# Patient Record
Sex: FEMALE | Race: WHITE | NOT HISPANIC OR LATINO | Employment: OTHER | URBAN - METROPOLITAN AREA
[De-identification: names, ages, dates, MRNs, and addresses within clinical notes are randomized per-mention and may not be internally consistent; named-entity substitution may affect disease eponyms.]

---

## 2017-01-06 DIAGNOSIS — I67.5 MOYAMOYA DISEASE: Primary | ICD-10-CM

## 2017-01-06 NOTE — TELEPHONE ENCOUNTER
clopidogrel           Last Written Prescription Date: 7/14/16  Last Fill Quantity: 90, # refills: 1    Last Office Visit with [facility], [facility] or [facility] prescribing provider:  5/27/16   Future Office Visit:       WBC      7.0   6/16/2015  RBC     4.03   6/16/2015  HGB     12.4   6/16/2015  HCT     37.3   6/16/2015  No components found with this name: mct  MCV       93   6/16/2015  MCH     30.8   6/16/2015  MCHC     33.2   6/16/2015  RDW     12.4   6/16/2015  PLT      339   6/16/2015  AST       15   5/24/2016  ALT       23   5/24/2016  CREATININE   Date Value Ref Range Status   05/24/2016 1.03 0.52 - 1.04 mg/dL Final   ]    Labs showing if normal/abnormal  Lab Results   Component Value Date    WBC 7.0 06/16/2015    RBC 4.03 06/16/2015    HGB 12.4 06/16/2015    HCT 37.3 06/16/2015    MCV 93 06/16/2015    MCH 30.8 06/16/2015    MCHC 33.2 06/16/2015    RDW 12.4 06/16/2015     06/16/2015    DTYP Automated Method 06/05/2010    NEUTROPHIL 52 06/05/2010    LYMPH 36 06/05/2010    MONOCYTE 10 06/05/2010    EOSINOPHIL 2 06/05/2010    BASOPHIL 0 06/05/2010    ANEU 4.8 06/05/2010    ALYM 3.4 06/05/2010    ALDEN 0.9 06/05/2010    AEOS 0.2 06/05/2010    ABAS 0.0 06/05/2010      Lab Results   Component Value Date    AST 15 05/24/2016    ALT 23 05/24/2016     rxclop

## 2017-01-10 RX ORDER — CLOPIDOGREL BISULFATE 75 MG/1
75 TABLET ORAL DAILY
Qty: 90 TABLET | Refills: 0 | Status: SHIPPED | OUTPATIENT
Start: 2017-01-10 | End: 2017-04-09

## 2017-01-13 ENCOUNTER — OFFICE VISIT (OUTPATIENT)
Dept: INTERNAL MEDICINE | Facility: CLINIC | Age: 77
End: 2017-01-13
Payer: COMMERCIAL

## 2017-01-13 VITALS
HEIGHT: 68 IN | DIASTOLIC BLOOD PRESSURE: 50 MMHG | BODY MASS INDEX: 36.4 KG/M2 | HEART RATE: 81 BPM | TEMPERATURE: 97.8 F | OXYGEN SATURATION: 100 % | WEIGHT: 240.2 LBS | SYSTOLIC BLOOD PRESSURE: 99 MMHG

## 2017-01-13 DIAGNOSIS — E55.9 VITAMIN D DEFICIENCY: ICD-10-CM

## 2017-01-13 DIAGNOSIS — M85.80 OSTEOPENIA: Chronic | ICD-10-CM

## 2017-01-13 DIAGNOSIS — I10 HTN, GOAL BELOW 140/90: Primary | Chronic | ICD-10-CM

## 2017-01-13 DIAGNOSIS — Z23 NEED FOR TDAP VACCINATION: ICD-10-CM

## 2017-01-13 DIAGNOSIS — I67.5 MOYAMOYA DISEASE: ICD-10-CM

## 2017-01-13 DIAGNOSIS — E78.5 HYPERLIPIDEMIA LDL GOAL <130: Chronic | ICD-10-CM

## 2017-01-13 DIAGNOSIS — E11.9 TYPE 2 DIABETES MELLITUS WITHOUT COMPLICATION, WITHOUT LONG-TERM CURRENT USE OF INSULIN (H): Chronic | ICD-10-CM

## 2017-01-13 DIAGNOSIS — N18.30 CKD (CHRONIC KIDNEY DISEASE) STAGE 3, GFR 30-59 ML/MIN (H): Chronic | ICD-10-CM

## 2017-01-13 PROCEDURE — 90471 IMMUNIZATION ADMIN: CPT | Performed by: INTERNAL MEDICINE

## 2017-01-13 PROCEDURE — 99213 OFFICE O/P EST LOW 20 MIN: CPT | Mod: 25 | Performed by: INTERNAL MEDICINE

## 2017-01-13 PROCEDURE — 90715 TDAP VACCINE 7 YRS/> IM: CPT | Performed by: INTERNAL MEDICINE

## 2017-01-13 NOTE — PROGRESS NOTES
Dr Farrell's note      Patient's instructions / PLAN:                                                        Plan:  1. Please make a lab appointment for fasting labs    2. Diabetic education referral   3. Diet and exercise as we discussed   4. Tetanus shot  5. I recommend the new pneumonia vaccine ( Prevnar 13) - check with insurance first       ASSESSMENT & PLAN:                                                      (I10) HTN, goal below 140/90  (primary encounter diagnosis)  Comment: Controlled    Plan: CBC with platelets, Comprehensive metabolic         panel, Lipid panel reflex to direct LDL, TSH         with free T4 reflex, Albumin Random Urine         Quantitative, Hemoglobin A1c, aspirin 81 MG         tablet            (E78.5) Hyperlipidemia LDL goal <130  Comment: Controlled    Plan: CBC with platelets, Comprehensive metabolic         panel, Lipid panel reflex to direct LDL, TSH         with free T4 reflex, Albumin Random Urine         Quantitative, Hemoglobin A1c        Continue same meds, same doses for now     (N18.3) CKD 3  Comment: stable  CREATININE   Date Value Ref Range Status   01/18/2017 1.13* 0.52 - 1.04 mg/dL Final   ]   Plan: CBC with platelets, Comprehensive metabolic         panel, Lipid panel reflex to direct LDL, TSH         with free T4 reflex, Albumin Random Urine         Quantitative, Hemoglobin A1c            (E11.9) DM2 - no insulin (H)  Comment:   Plan: CBC with platelets, Comprehensive metabolic         panel, Lipid panel reflex to direct LDL, TSH         with free T4 reflex, Albumin Random Urine         Quantitative, Hemoglobin A1c, DIABETES EDUCATOR        REFERRAL, aspirin 81 MG tablet            (M85.80) Osteopenia  Comment:   Plan: Vitamin D Deficiency, Parathyroid Hormone         Intact            (E55.9) Vitamin D deficiency  Comment:   Plan: Vitamin D Deficiency, Parathyroid Hormone         Intact            (I67.5) Moyamoya disease  Comment: stable   Plan:     (Z23) Need for  "Tdap vaccination  Comment:   Plan: TDAP (ADACEL AGES 11-64)               Chief complaint:                                                      Follow up chronic medical problems      SUBJECTIVE:                                                    Candida Rose is a 76 year old female who presents to clinic today for the following health issues:    URI  -- started last week  -- Pt had persistent cough, running nose, wheezing,and cold  -- she doesn't think she needs attention for the URI    DM  --  New dx   -- discussed diet and exercise. Doesn't want meds  -- A1C      5.9   1/18/2017  A1C      6.3   5/24/2016      Review of Systems:                                                      ROS: negative for fever, chills, cough, wheezes, chest pain, shortness of breath, vomiting, abdominal pain, leg swelling     A 10-point review of systems was obtained.  Those pertinent are above and in the in the Subjective section.  The rest of the systems are negative.           OBJECTIVE:             Physical exam:  Blood pressure 99/50, pulse 81, temperature 97.8  F (36.6  C), temperature source Oral, height 5' 8.3\" (1.735 m), weight 240 lb 3.2 oz (108.954 kg), SpO2 100 %, not currently breastfeeding.   NAD, appears comfortable  Skin: no rashes   Neck: supple, no JVD, No thyroidmegaly. Lymph nodes nonpalpable cervical and supraclavicular.  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 F/u dr. Soto     Anxiety state, unspecified      inactive     OA (osteoarthritis)      s/p bilat total hips      CVA (cerebral infarction) June 2010     acute right " occipital infarct     Hyperlipidemia LDL goal < 130      HTN, goal below 140/90 3/06     Cataract      Vitamin D deficiencies      Family hx of colon cancer      sister dx at 69     Unspecified cerebral artery occlusion with cerebral infarction      After Moyamoya surgery > 10 yrs ago.      PSHx: reviewed  Past Surgical History   Procedure Laterality Date     C nonspecific procedure  10/30/06     neurosurgery Dr Charles LACY nonspecific procedure       bilateral total hips approx 2002     C nonspecific procedure  3/07     neurosurgery      Colonoscopy  2008     normal     Colonoscopy  7/17/2014     Procedure: COLONOSCOPY;  Surgeon: Raul Nolan DO;  Location: [facility]     Reconstruct forefoot with metatarsophalangeal (mtp) fusion Left 8/21/2014     Procedure: RECONSTRUCT FOREFOOT WITH METATARSOPHALANGEAL (MTP) FUSION;  Surgeon: Tres Merlos DPM;  Location: [facility] OR        Meds: reviewed  Current Outpatient Prescriptions   Medication Sig Dispense Refill     clopidogrel (PLAVIX) 75 MG tablet Take 1 tablet (75 mg) by mouth daily 90 tablet 0     losartan (COZAAR) 50 MG tablet Take 1 tablet (50 mg) by mouth 2 times daily 180 tablet 1     atenolol (TENORMIN) 25 MG tablet Take 1 tablet (25 mg) by mouth At Bedtime 90 tablet 1     simvastatin (ZOCOR) 20 MG tablet Take 1 tablet (20 mg) by mouth At Bedtime 90 tablet 1     hydrochlorothiazide (HYDRODIURIL) 25 MG tablet Take 1 tablet (25 mg) by mouth daily 90 tablet 1     aspirin 81 MG tablet Take 1 tablet (81 mg) by mouth daily 30 tablet      Multiple Vitamins-Minerals (MULTIVITAMIN & MINERAL PO) Take 1 tablet by mouth daily.       Calcium Carbonate (CALCIUM 500 PO) Take 1 tablet by mouth daily.         Soc Hx: reviewed  Fam Hx: reviewed          Chani Farrell MD  Internal Medicine

## 2017-01-13 NOTE — PATIENT INSTRUCTIONS
Plan:  1. Please make a lab appointment for fasting labs    2. Diabetic education referral   3. Diet and exercise as we discussed   4. Tetanus shot  5. I recommend the new pneumonia vaccine ( Prevnar 13) - check with insurance

## 2017-01-13 NOTE — MR AVS SNAPSHOT
After Visit Summary   1/13/2017    Candida Rose    MRN: [id number]           Patient Information     Date Of Birth          1940        Visit Information        Provider Department      1/13/2017 11:00 AM Chani Peoples MD [facility]        Today's Diagnoses     HTN, goal below 140/90    -  1     Hyperlipidemia LDL goal <130         CKD 3         DM2 - no insulin (H)         Osteopenia         Vitamin D deficiency         Moyamoya disease           Care Instructions    Plan:  1. Please make a lab appointment for fasting labs    2. Diabetic education referral   3. Diet and exercise as we discussed   4. Tetanus shot  5. I recommend the new pneumonia vaccine ( Prevnar 13) - check with insurance         Follow-ups after your visit        Additional Services     DIABETES EDUCATOR REFERRAL       DIABETES SELF MANAGEMENT TRAINING (DSMT)      Your provider has referred you to Diabetes Education: FMG: Diabetes Education - All [facility] [phone]   [email or web address]    Type of training and number of hours: New Diagnosis: Initial group DSMT - 10 hours.      Medicare covers: 10 hours of initial DSMT in 12 month period from the time of first visit, plus 2 hours of follow-up DSMT annually, and additional hours as requested for insulin training.    Diabetes Type: Type 2 - Diet Control             Diabetes Co-Morbidities: none               A1C Goal:  <7.0       A1C is: A1C      6.3   5/24/2016    If an urgent visit is needed or A1C is above 12, Care Team to call the Diabetes Education Team at [phone] or send an In Basket message to the Diabetes Education Pool (P DIAB ED-PATIENT CARE).    Diabetes Education Topics: Comprehensive Knowledge Assessment and Instruction    Special Educational Needs Requiring Individual DSMT:        MEDICAL NUTRITION THERAPY (MNT) for Diabetes    Medical Nutrition Therapy with a  Registered Dietitian can be provided in coordination with Diabetes Self-Management Training to assist in achieving optimal diabetes management.    MNT Type and Hours:                       Medicare will cover: 3 hours initial MNT in 12 month period after first visit, plus 2 hours of follow-up MNT annually    Please be aware that coverage of these services is subject to the terms and limitations of your health insurance plan.  Call member services at your health plan to determine Diabetes Self-Management Training benefits and ask which blood glucose monitor brands are covered by your plan.      Please bring the following with you to your appointment:    (1)  List of current medications   (2)  List of Blood Glucose Monitor brands that are covered by your insurance plan  (3)  Blood Glucose Monitor and log book  (4)   Food records for the 3 days prior to your visit    The Certified Diabetes Educator may make diabetes medication adjustments per the CDE Protocol and Collaborative Practice Agreement.                  Future tests that were ordered for you today     Open Future Orders        Priority Expected Expires Ordered    Vitamin D Deficiency Routine  1/13/2018 1/13/2017    Parathyroid Hormone Intact Routine  1/13/2018 1/13/2017    CBC with platelets Routine  1/13/2018 1/13/2017    Comprehensive metabolic panel Routine  1/13/2018 1/13/2017    Lipid panel reflex to direct LDL Routine  1/13/2018 1/13/2017    TSH with free T4 reflex Routine  1/13/2018 1/13/2017    Albumin Random Urine Quantitative Routine  1/3/2018 1/13/2017    Hemoglobin A1c Routine  1/13/2018 1/13/2017            Who to contact     If you have questions or need follow up information about today's clinic visit or your schedule please contact [facility] directly at [phone].  Normal or non-critical lab and imaging results will be communicated to you by MyChart, letter or phone within 4 business days after the clinic has received the  "results. If you do not hear from us within 7 days, please contact the clinic through [employer] or phone. If you have a critical or abnormal lab result, we will notify you by phone as soon as possible.  Submit refill requests through [employer] or call your pharmacy and they will forward the refill request to us. Please allow 3 business days for your refill to be completed.          Additional Information About Your Visit        Ideal Me Information     I[employer] Me gives you secure access to your electronic health record. If you see a primary care provider, you can also send messages to your care team and make appointments. If you have questions, please call your primary care clinic.  If you do not have a primary care provider, please call [phone] and they will assist you.        Care EveryWhere ID     This is your Care EveryWhere ID. This could be used by other organizations to access your [place] medical records  [id number]        Your Vitals Were     Pulse Temperature Height BMI (Body Mass Index) Pulse Oximetry Breastfeeding?    81 97.8  F (36.6  C) (Oral) 5' 8.3\" (1.735 m) 36.19 kg/m2 100% No       Blood Pressure from Last 3 Encounters:   01/13/17 99/50   10/12/16 130/76   05/27/16 140/76    Weight from Last 3 Encounters:   01/13/17 240 lb 3.2 oz (108.954 kg)   10/12/16 241 lb (109.317 kg)   05/27/16 241 lb 12.8 oz (109.68 kg)              We Performed the Following     DIABETES EDUCATOR REFERRAL          Today's Medication Changes          These changes are accurate as of: 1/13/17 11:43 AM.  If you have any questions, ask your nurse or doctor.               These medicines have changed or have updated prescriptions.        Dose/Directions    * aspirin 81 MG tablet   This may have changed:  Another medication with the same name was added. Make sure you understand how and when to take each.   Changed by:  Chani Peoples MD        Dose:  81 mg   Take 1 tablet (81 mg) by mouth daily   Quantity:  30 " tablet   Refills:  0       * aspirin 81 MG tablet   This may have changed:  You were already taking a medication with the same name, and this prescription was added. Make sure you understand how and when to take each.   Used for:  HTN, goal below 140/90, Type 2 diabetes mellitus without complication, without long-term current use of insulin (H)   Changed by:  Chani Peoples MD        Dose:  81 mg   Take 1 tablet (81 mg) by mouth daily   Quantity:  30 tablet   Refills:  0       * Notice:  This list has 2 medication(s) that are the same as other medications prescribed for you. Read the directions carefully, and ask your doctor or other care provider to review them with you.         Where to get your medicines      Some of these will need a paper prescription and others can be bought over the counter.  Ask your nurse if you have questions.     You don't need a prescription for these medications    - aspirin 81 MG tablet             Primary Care Provider Office Phone # Fax #    Chani Peoples [phone]       [facility] [address] [place] [address]        Thank you!     Thank you for choosing [facility]  for your care. Our goal is always to provide you with excellent care. Hearing back from our patients is one way we can continue to improve our services. Please take a few minutes to complete the written survey that you may receive in the mail after your visit with us. Thank you!             Your Updated Medication List - Protect others around you: Learn how to safely use, store and throw away your medicines at www.disposemymeds.org.          This list is accurate as of: 1/13/17 11:43 AM.  Always use your most recent med list.                   Brand Name Dispense Instructions for use    * aspirin 81 MG tablet     30 tablet    Take 1 tablet (81 mg) by mouth daily       * aspirin 81 MG tablet     30 tablet    Take 1 tablet (81 mg)  by mouth daily       atenolol 25 MG tablet    TENORMIN    90 tablet    Take 1 tablet (25 mg) by mouth At Bedtime       CALCIUM 500 PO      Take 1 tablet by mouth daily.       clopidogrel 75 MG tablet    PLAVIX    90 tablet    Take 1 tablet (75 mg) by mouth daily       hydrochlorothiazide 25 MG tablet    HYDRODIURIL    90 tablet    Take 1 tablet (25 mg) by mouth daily       losartan 50 MG tablet    COZAAR    180 tablet    Take 1 tablet (50 mg) by mouth 2 times daily       MULTIVITAMIN & MINERAL PO      Take 1 tablet by mouth daily.       simvastatin 20 MG tablet    ZOCOR    90 tablet    Take 1 tablet (20 mg) by mouth At Bedtime       * Notice:  This list has 2 medication(s) that are the same as other medications prescribed for you. Read the directions carefully, and ask your doctor or other care provider to review them with you.

## 2017-01-13 NOTE — NURSING NOTE
"Chief Complaint   Patient presents with     Recheck Medication     Pt has persistent cough, running nose, wheezing,and cold       Initial BP 99/50 mmHg  Pulse 81  Temp(Src) 97.8  F (36.6  C) (Oral)  Ht 5' 8.3\" (1.735 m)  Wt 240 lb 3.2 oz (108.954 kg)  BMI 36.19 kg/m2  SpO2 100%  Breastfeeding? No Estimated body mass index is 36.19 kg/(m^2) as calculated from the following:    Height as of this encounter: 5' 8.3\" (1.735 m).    Weight as of this encounter: 240 lb 3.2 oz (108.954 kg).  BP completed using cuff size: uriel Terry MA      "

## 2017-01-18 DIAGNOSIS — E55.9 VITAMIN D DEFICIENCY: ICD-10-CM

## 2017-01-18 DIAGNOSIS — M85.80 OSTEOPENIA: Chronic | ICD-10-CM

## 2017-01-18 DIAGNOSIS — I10 HTN, GOAL BELOW 140/90: Chronic | ICD-10-CM

## 2017-01-18 DIAGNOSIS — E11.9 TYPE 2 DIABETES MELLITUS WITHOUT COMPLICATION, WITHOUT LONG-TERM CURRENT USE OF INSULIN (H): Chronic | ICD-10-CM

## 2017-01-18 DIAGNOSIS — N18.30 CKD (CHRONIC KIDNEY DISEASE) STAGE 3, GFR 30-59 ML/MIN (H): Chronic | ICD-10-CM

## 2017-01-18 DIAGNOSIS — E78.5 HYPERLIPIDEMIA LDL GOAL <130: Chronic | ICD-10-CM

## 2017-01-18 LAB
ALBUMIN SERPL-MCNC: 3.2 G/DL (ref 3.4–5)
ALP SERPL-CCNC: 118 U/L (ref 40–150)
ALT SERPL W P-5'-P-CCNC: 19 U/L (ref 0–50)
ANION GAP SERPL CALCULATED.3IONS-SCNC: 10 MMOL/L (ref 3–14)
AST SERPL W P-5'-P-CCNC: 9 U/L (ref 0–45)
BILIRUB SERPL-MCNC: 0.4 MG/DL (ref 0.2–1.3)
BUN SERPL-MCNC: 23 MG/DL (ref 7–30)
CALCIUM SERPL-MCNC: 9.1 MG/DL (ref 8.5–10.1)
CHLORIDE SERPL-SCNC: 101 MMOL/L (ref 94–109)
CHOLEST SERPL-MCNC: 179 MG/DL
CO2 SERPL-SCNC: 27 MMOL/L (ref 20–32)
CREAT SERPL-MCNC: 1.13 MG/DL (ref 0.52–1.04)
DEPRECATED CALCIDIOL+CALCIFEROL SERPL-MC: 25 UG/L (ref 20–75)
ERYTHROCYTE [DISTWIDTH] IN BLOOD BY AUTOMATED COUNT: 11.9 % (ref 10–15)
GFR SERPL CREATININE-BSD FRML MDRD: 47 ML/MIN/1.7M2
GLUCOSE SERPL-MCNC: 120 MG/DL (ref 70–99)
HBA1C MFR BLD: 5.9 % (ref 4.3–6)
HCT VFR BLD AUTO: 36.3 % (ref 35–47)
HDLC SERPL-MCNC: 52 MG/DL
HGB BLD-MCNC: 11.8 G/DL (ref 11.7–15.7)
LDLC SERPL CALC-MCNC: 99 MG/DL
MCH RBC QN AUTO: 30.3 PG (ref 26.5–33)
MCHC RBC AUTO-ENTMCNC: 32.5 G/DL (ref 31.5–36.5)
MCV RBC AUTO: 93 FL (ref 78–100)
NONHDLC SERPL-MCNC: 127 MG/DL
PLATELET # BLD AUTO: 414 10E9/L (ref 150–450)
POTASSIUM SERPL-SCNC: 4 MMOL/L (ref 3.4–5.3)
PROT SERPL-MCNC: 7.2 G/DL (ref 6.8–8.8)
PTH-INTACT SERPL-MCNC: 55 PG/ML (ref 12–72)
RBC # BLD AUTO: 3.89 10E12/L (ref 3.8–5.2)
SODIUM SERPL-SCNC: 138 MMOL/L (ref 133–144)
TRIGL SERPL-MCNC: 141 MG/DL
TSH SERPL DL<=0.005 MIU/L-ACNC: 1.49 MU/L (ref 0.4–4)
WBC # BLD AUTO: 7.7 10E9/L (ref 4–11)

## 2017-01-18 PROCEDURE — 80053 COMPREHEN METABOLIC PANEL: CPT | Performed by: INTERNAL MEDICINE

## 2017-01-18 PROCEDURE — 85027 COMPLETE CBC AUTOMATED: CPT | Performed by: INTERNAL MEDICINE

## 2017-01-18 PROCEDURE — 82043 UR ALBUMIN QUANTITATIVE: CPT | Performed by: INTERNAL MEDICINE

## 2017-01-18 PROCEDURE — 83036 HEMOGLOBIN GLYCOSYLATED A1C: CPT | Mod: GZ | Performed by: INTERNAL MEDICINE

## 2017-01-18 PROCEDURE — 80061 LIPID PANEL: CPT | Performed by: INTERNAL MEDICINE

## 2017-01-18 PROCEDURE — 36415 COLL VENOUS BLD VENIPUNCTURE: CPT | Performed by: INTERNAL MEDICINE

## 2017-01-18 PROCEDURE — 84443 ASSAY THYROID STIM HORMONE: CPT | Performed by: INTERNAL MEDICINE

## 2017-01-18 PROCEDURE — 82306 VITAMIN D 25 HYDROXY: CPT | Performed by: INTERNAL MEDICINE

## 2017-01-18 PROCEDURE — 83970 ASSAY OF PARATHORMONE: CPT | Performed by: INTERNAL MEDICINE

## 2017-01-19 LAB
CREAT UR-MCNC: 122 MG/DL
MICROALBUMIN UR-MCNC: 17 MG/L
MICROALBUMIN/CREAT UR: 13.61 MG/G CR (ref 0–25)

## 2017-01-23 DIAGNOSIS — E11.9 TYPE 2 DIABETES MELLITUS WITHOUT COMPLICATION, WITHOUT LONG-TERM CURRENT USE OF INSULIN (H): Primary | Chronic | ICD-10-CM

## 2017-01-23 DIAGNOSIS — I10 HTN, GOAL BELOW 140/90: Chronic | ICD-10-CM

## 2017-01-23 DIAGNOSIS — E55.9 VITAMIN D DEFICIENCY: ICD-10-CM

## 2017-01-26 ENCOUNTER — ALLIED HEALTH/NURSE VISIT (OUTPATIENT)
Dept: EDUCATION SERVICES | Facility: CLINIC | Age: 77
End: 2017-01-26
Payer: COMMERCIAL

## 2017-01-26 DIAGNOSIS — E11.9 TYPE 2 DIABETES MELLITUS WITHOUT COMPLICATION, WITHOUT LONG-TERM CURRENT USE OF INSULIN (H): Primary | Chronic | ICD-10-CM

## 2017-01-26 DIAGNOSIS — N18.30 CKD (CHRONIC KIDNEY DISEASE) STAGE 3, GFR 30-59 ML/MIN (H): Chronic | ICD-10-CM

## 2017-01-26 DIAGNOSIS — I10 HTN, GOAL BELOW 140/90: Primary | Chronic | ICD-10-CM

## 2017-01-26 PROCEDURE — G0108 DIAB MANAGE TRN  PER INDIV: HCPCS

## 2017-01-26 RX ORDER — HYDROCHLOROTHIAZIDE 25 MG/1
25 TABLET ORAL DAILY
Qty: 90 TABLET | Refills: 3 | Status: SHIPPED | OUTPATIENT
Start: 2017-01-26 | End: 2017-05-04 | Stop reason: SINTOL

## 2017-01-26 NOTE — PROGRESS NOTES
Diabetes Self Management Training: Individual Review Visit    Candida Rose presents today for education related to Type 2 diabetes.    She is accompanied by self    Patient's diabetes management related comments/concerns: what to eat for her and her diabetic     Patient's emotional response to diabetes: expresses readiness to learn and confidence diabetes can be controlled    Patient would like this visit to be focused around the following diabetes-related behaviors and goals: Healthy Eating    ASSESSMENT:  Patient Problem List and Family Medical History reviewed for relevant medical history, current medical status, and diabetes risk factors.    Went back to Weight [employer], but needs to know how to follow Weight Watchers with diabetic diet in mind.    Current Diabetes Management per Patient:  Taking diabetes medications? no    *Abbreviated insulin dose documentation key: Insulin Trade Name (lbrzzhezn-bvnwg-upjudy-bedtime) - i.e. Humalog 5-5-5-0 (Humalog 5 units at breakfast, 5 units at lunch, and 5 units at dinner).    Past Diabetes Education: No    Patient glucose self monitoring as follows: never.     Patient's most recent A1C      5.9   1/18/2017 is meeting goal of <7.0    Nutrition:  Usually eats only 2 meals per day -- late breakfast, then a dinner, maybe some between meal snacks     Breakfast - eggs, valle, 2 pancakes with syrup, blueberries ([place]) OR breakfast shake (protein shake)  Lunch - vegetable beef soup, lettuce salad  Dinner - 2 eggs, 2 slices bread, hard boiled egg   Snacks - cottage cheese and fruit    Beverages: water    Cultural/Latter-day diet restrictions: No     Biggest Challenge to Healthy Eating: knowing what to eat    Physical Activity:    Need knee surgery, not as active right now    Diabetes Risk Factors:  family history, age over 45 years, overweight/obesity and inactivity    Diabetes Complications:  Not discussed today.    Vitals:  There were no vitals taken for this  "visit.  Estimated body mass index is 36.19 kg/(m^2) as calculated from the following:    Height as of 1/13/17: 1.735 m (5' 8.3\").    Weight as of 1/13/17: 108.954 kg (240 lb 3.2 oz).   Last 3 BP:   BP Readings from Last 3 Encounters:   01/13/17 99/50   10/12/16 130/76   05/27/16 140/76       History   Smoking status     Never Smoker    Smokeless tobacco     Never Used       Labs:  A1C      5.9   1/18/2017  GLC      120   1/18/2017  LDL       99   1/18/2017  HDL CHOLESTEROL   Date Value Ref Range Status   01/18/2017 52 >49 mg/dL Final   ]  GFR ESTIMATE   Date Value Ref Range Status   01/18/2017 47* >60 mL/min/1.7m2 Final     Comment:     Non  GFR Calc     GFR ESTIMATE IF BLACK   Date Value Ref Range Status   01/18/2017 57* >60 mL/min/1.7m2 Final     Comment:      GFR Calc     CR     1.13   1/18/2017  No results found for this basename: microalbumin    Socio/Economic Considerations:    Support system: family and spouse/significant other    Health Beliefs and Attitudes:   Patient Activation Measure Survey Score:  MITESH Score (Last Two) 10/7/2011   MITESH Raw Score 52   Activation Score 100   MITESH Level 4       Stage of Change: PREPARATION (Decided to change - considering how)      Diabetes knowledge and skills assessment:     Patient is knowledgeable in diabetes management concepts related to: Healthy Eating and Being Active    Patient needs further education on the following diabetes management concepts: none currently    Barriers to Learning Assessment: No Barriers identified    Based on learning assessment above, most appropriate setting for further diabetes education would be: Group class or Individual setting.    INTERVENTION:   Education provided today on:  AADE Self-Care Behaviors:  Healthy Eating: carbohydrate counting, consistency in amount, composition, and timing of food intake, weight reduction, eating out, portion control and label reading  Being Active: relationship to blood " glucose    Opportunities for ongoing education and support in diabetes-self management were discussed.    Pt verbalized understanding of concepts discussed and recommendations provided today.       Education Materials Provided:  Carbohydrate Counting    PLAN:  Meal Plan Recommendation: eat 3 meals a day, have small snacks between meals, if needed and Aim for 3-4 carb servings at meals and 0-1 carb servings at snacks    FOLLOW-UP:  Follow-up as needed.     Ongoing plan for education and support: Weight loss program    LEEROY Skelton CDE  Time Spent: 30 minutes  Encounter Type: Individual    Any diabetes medication dose changes were made via the CDE Protocol and Collaborative Practice Agreement with the patient's referring provider. A copy of this encounter was shared with the provider.

## 2017-01-26 NOTE — TELEPHONE ENCOUNTER
HCTZ      Last Written Prescription Date: 07/27/16  Last Fill Quantity: 90, # refills: 1  Last Office Visit with G, P or [facility] prescribing provider: 01/13/17       POTASSIUM   Date Value Ref Range Status   01/18/2017 4.0 3.4 - 5.3 mmol/L Final     CREATININE   Date Value Ref Range Status   01/18/2017 1.13* 0.52 - 1.04 mg/dL Final     BP Readings from Last 3 Encounters:   01/13/17 99/50   10/12/16 130/76   05/27/16 140/76

## 2017-03-23 ENCOUNTER — OFFICE VISIT (OUTPATIENT)
Dept: ORTHOPEDICS | Facility: CLINIC | Age: 77
End: 2017-03-23
Payer: COMMERCIAL

## 2017-03-23 ENCOUNTER — TELEPHONE (OUTPATIENT)
Dept: ORTHOPEDICS | Facility: CLINIC | Age: 77
End: 2017-03-23

## 2017-03-23 VITALS
BODY MASS INDEX: 35.77 KG/M2 | DIASTOLIC BLOOD PRESSURE: 68 MMHG | SYSTOLIC BLOOD PRESSURE: 114 MMHG | WEIGHT: 236 LBS | HEIGHT: 68 IN

## 2017-03-23 DIAGNOSIS — M17.12 PRIMARY OSTEOARTHRITIS OF LEFT KNEE: Primary | ICD-10-CM

## 2017-03-23 PROCEDURE — 99213 OFFICE O/P EST LOW 20 MIN: CPT | Performed by: ORTHOPAEDIC SURGERY

## 2017-03-23 NOTE — PROGRESS NOTES
"HISTORY OF PRESENT ILLNESS:    Candida Rose is a 76 year old female who is seen in follow up for left knee. She would like to discuss getting a TKA at this point..  Present symptoms: left knee pain  Treatments tried to this point: corticosteroid injections, Aleve,     PHYSICAL EXAM:  /68  Ht 5' 8\" (1.727 m)  Wt 236 lb (107 kg)  BMI 35.88 kg/m2  Body mass index is 35.88 kg/(m^2).   GENERAL APPEARANCE: healthy, alert and no distress   SKIN: no suspicious lesions or rashes  NEURO: Normal strength and tone, mentation intact and speech normal  VASCULAR:  good pulses, and cappillary refill   LYMPH: no lymphadenopathy   PSYCH:  mentation appears normal and affect normal/bright    MSK:  The patient ambulates without an antalgic gait.  The patient is able to get on and off the exam table without difficulty.        KNEE: Examination of the left knee reveals the lower extremity to have a Normal anatomic alignment.  There is no erythema, ecchymosis nor edema.  There is an unclear effusion present clinically.  There is no significant warmth.  Range of motion is 0 to 120 degrees, without crepitus.  There is moderate tenderness to palpation at the medial joint line.  Gillian's is positive without crepitus. The knee is ligamentously stable. Patello-femoral grind test is positive.      The calves and thighs are symmetric, without atrophy and non-tender to palpation. Baljinder's sign is negative, bilaterally.   CMS is intact to the toes.       IMAGING INTERPRETATION:       ASSESSMENT / PLAN: Primary osteoarthritis left knee. We discussed the potential risks as well as benefits of a total knee arthroplasty and she would like to proceed. She will schedule this at her convenience.      Bob Jenkins MD  Dept. Orthopedic Surgery  [facility]         "

## 2017-03-23 NOTE — LETTER
"  3/23/2017       RE: Candida Rose  [address] [facility] [address]           Dear Colleague,    Thank you for referring your patient, Candida Rose, to the [facility] ORTHOPEDIC SURGERY. Please see a copy of my visit note below.    HISTORY OF PRESENT ILLNESS:    Candida Rose is a 76 year old female who is seen in follow up for left knee. She would like to discuss getting a TKA at this point..  Present symptoms: left knee pain  Treatments tried to this point: corticosteroid injections, Aleve,     PHYSICAL EXAM:  /68  Ht 5' 8\" (1.727 m)  Wt 236 lb (107 kg)  BMI 35.88 kg/m2  Body mass index is 35.88 kg/(m^2).   GENERAL APPEARANCE: healthy, alert and no distress   SKIN: no suspicious lesions or rashes  NEURO: Normal strength and tone, mentation intact and speech normal  VASCULAR:  good pulses, and cappillary refill   LYMPH: no lymphadenopathy   PSYCH:  mentation appears normal and affect normal/bright    MSK:  The patient ambulates without an antalgic gait.  The patient is able to get on and off the exam table without difficulty.        KNEE: Examination of the left knee reveals the lower extremity to have a Normal anatomic alignment.  There is no erythema, ecchymosis nor edema.  There is an unclear effusion present clinically.  There is no significant warmth.  Range of motion is 0 to 120 degrees, without crepitus.  There is moderate tenderness to palpation at the medial joint line.  Gillian's is positive without crepitus. The knee is ligamentously stable. Patello-femoral grind test is positive.      The calves and thighs are symmetric, without atrophy and non-tender to palpation. Baljinder's sign is negative, bilaterally.   CMS is intact to the toes.       IMAGING INTERPRETATION:       ASSESSMENT / PLAN: Primary osteoarthritis left knee. We discussed the potential risks as well as benefits of a total knee arthroplasty and she would like to proceed. She will schedule this at her convenience.      Bob" MD Gregory  Dept. Orthopedic Surgery  [facility]           Again, thank you for allowing me to participate in the care of your patient.        Sincerely,    Chidi Jenkins MD

## 2017-03-23 NOTE — TELEPHONE ENCOUNTER
Scheduled surgery for Left total knee arthroplasty on 4/17/2017 with Dr. Jenkins @ [facility] @ 7:30.  Surgery education packet provided to patient.

## 2017-03-23 NOTE — MR AVS SNAPSHOT
After Visit Summary   3/23/2017    Candida Rose    MRN: [id number]           Patient Information     Date Of Birth          1940        Visit Information        Provider Department      3/23/2017 11:10 AM Chidi Jenkins MD [facility] ORTHOPEDIC SURGERY        Today's Diagnoses     Primary osteoarthritis of left knee    -  1       Follow-ups after your visit        Your next 10 appointments already scheduled     Apr 11, 2017  7:40 AM CDT   Pre-Op physical with Chani Peoples MD   [facility] ([facility]    [address]  [facility] [address]   [phone]            Apr 17, 2017   Procedure with Chidi Jenkins MD   [facility]Op [facility] (--)    [address]  [facility] [address]   [phone]            Apr 27, 2017  8:20 AM CDT   Return Visit with Carlos Paz PA-C   [facility] ORTHOPEDIC SURGERY ([facility] [place])    [address]  [address]  [facility] [address]   [phone]              Who to contact     If you have questions or need follow up information about today's clinic visit or your schedule please contact [facility] ORTHOPEDIC SURGERY directly at [phone].  Normal or non-critical lab and imaging results will be communicated to you by MyChart, letter or phone within 4 business days after the clinic has received the results. If you do not hear from us within 7 days, please contact the clinic through MyChart or phone. If you have a critical or abnormal lab result, we will notify you by phone as soon as possible.  Submit refill requests through [employer] or call your pharmacy and they will forward the refill request to us. Please allow 3 business days for your refill to be completed.          Additional Information About Your Visit        [employer] Pocket Information     [employer] gives you secure access to your electronic health record. If you see a  "primary care provider, you can also send messages to your care team and make appointments. If you have questions, please call your primary care clinic.  If you do not have a primary care provider, please call [phone] and they will assist you.        Care EveryWhere ID     This is your Care EveryWhere ID. This could be used by other organizations to access your [place] medical records  [id number]        Your Vitals Were     Height BMI (Body Mass Index)                5' 8\" (1.727 m) 35.88 kg/m2           Blood Pressure from Last 3 Encounters:   03/23/17 114/68   01/13/17 99/50   10/12/16 130/76    Weight from Last 3 Encounters:   03/23/17 236 lb (107 kg)   01/13/17 240 lb 3.2 oz (109 kg)   10/12/16 241 lb (109.3 kg)              Today, you had the following     No orders found for display       Primary Care Provider Office Phone # Fax #    Chani Trina Peoples [phone]       [facility] [address] [place] [address]        Thank you!     Thank you for choosing [facility] ORTHOPEDIC SURGERY  for your care. Our goal is always to provide you with excellent care. Hearing back from our patients is one way we can continue to improve our services. Please take a few minutes to complete the written survey that you may receive in the mail after your visit with us. Thank you!             Your Updated Medication List - Protect others around you: Learn how to safely use, store and throw away your medicines at www.disposemymeds.org.          This list is accurate as of: 3/23/17 11:59 PM.  Always use your most recent med list.                   Brand Name Dispense Instructions for use    aspirin 81 MG tablet     30 tablet    Take 1 tablet (81 mg) by mouth daily       atenolol 25 MG tablet    TENORMIN    90 tablet    Take 1 tablet (25 mg) by mouth At Bedtime       CALCIUM 500 PO      Take 1 tablet by mouth daily.       clopidogrel 75 MG tablet    PLAVIX    90 tablet    " Take 1 tablet (75 mg) by mouth daily       hydrochlorothiazide 25 MG tablet    HYDRODIURIL    90 tablet    Take 1 tablet (25 mg) by mouth daily       losartan 50 MG tablet    COZAAR    180 tablet    Take 1 tablet (50 mg) by mouth 2 times daily       MULTIVITAMIN & MINERAL PO      Take 1 tablet by mouth daily.       simvastatin 20 MG tablet    ZOCOR    90 tablet    Take 1 tablet (20 mg) by mouth At Bedtime

## 2017-04-04 ENCOUNTER — TELEPHONE (OUTPATIENT)
Dept: INTERNAL MEDICINE | Facility: CLINIC | Age: 77
End: 2017-04-04

## 2017-04-04 NOTE — TELEPHONE ENCOUNTER
Added note to appointment note to do MRSA nasal swab when she comes for her pre-op. Also sent pt a message that Dr. Farrell prefers not to do vaccines at pre-op appointments due to the small possibility of having a reaction. If she really wants it on that day, we can do it, but that is not the recommended time for her. Dr. Farrell would prefer to wait until after her surgery to have this done.

## 2017-04-04 NOTE — TELEPHONE ENCOUNTER
Rahel Andrade, nurse in pre-admitting ([phone]) calling to request that clinic staff do nasal screening when pt comes in for preop on 4/11/17.  Orders for MRSA and MSSA screening have been entered into [facility].    Patient also passed along a request via pre-admitting:  She would like a pneumonia shot when she comes in for the preop.

## 2017-04-09 DIAGNOSIS — I67.5 MOYAMOYA DISEASE: ICD-10-CM

## 2017-04-10 RX ORDER — CLOPIDOGREL BISULFATE 75 MG/1
TABLET ORAL
Qty: 90 TABLET | Refills: 0 | Status: SHIPPED | OUTPATIENT
Start: 2017-04-10 | End: 2017-07-09

## 2017-04-11 ENCOUNTER — OFFICE VISIT (OUTPATIENT)
Dept: INTERNAL MEDICINE | Facility: CLINIC | Age: 77
End: 2017-04-11
Payer: COMMERCIAL

## 2017-04-11 VITALS
HEIGHT: 68 IN | SYSTOLIC BLOOD PRESSURE: 108 MMHG | BODY MASS INDEX: 36.65 KG/M2 | HEART RATE: 73 BPM | OXYGEN SATURATION: 98 % | DIASTOLIC BLOOD PRESSURE: 70 MMHG | TEMPERATURE: 98.8 F | WEIGHT: 241.8 LBS

## 2017-04-11 DIAGNOSIS — E55.9 VITAMIN D DEFICIENCY: ICD-10-CM

## 2017-04-11 DIAGNOSIS — I10 HTN, GOAL BELOW 140/90: Chronic | ICD-10-CM

## 2017-04-11 DIAGNOSIS — I67.5 MOYAMOYA DISEASE: Chronic | ICD-10-CM

## 2017-04-11 DIAGNOSIS — Z01.818 PREOPERATIVE EXAMINATION: Primary | ICD-10-CM

## 2017-04-11 DIAGNOSIS — E11.9 TYPE 2 DIABETES MELLITUS WITHOUT COMPLICATION, WITHOUT LONG-TERM CURRENT USE OF INSULIN (H): Chronic | ICD-10-CM

## 2017-04-11 DIAGNOSIS — M17.10 ARTHRITIS OF KNEE: ICD-10-CM

## 2017-04-11 DIAGNOSIS — N18.30 CKD (CHRONIC KIDNEY DISEASE) STAGE 3, GFR 30-59 ML/MIN (H): Chronic | ICD-10-CM

## 2017-04-11 DIAGNOSIS — Z23 ENCOUNTER FOR IMMUNIZATION: ICD-10-CM

## 2017-04-11 LAB
ANION GAP SERPL CALCULATED.3IONS-SCNC: 8 MMOL/L (ref 3–14)
BUN SERPL-MCNC: 21 MG/DL (ref 7–30)
CALCIUM SERPL-MCNC: 8.8 MG/DL (ref 8.5–10.1)
CHLORIDE SERPL-SCNC: 101 MMOL/L (ref 94–109)
CO2 SERPL-SCNC: 27 MMOL/L (ref 20–32)
CREAT SERPL-MCNC: 1.02 MG/DL (ref 0.52–1.04)
ERYTHROCYTE [DISTWIDTH] IN BLOOD BY AUTOMATED COUNT: 12.7 % (ref 10–15)
GFR SERPL CREATININE-BSD FRML MDRD: 53 ML/MIN/1.7M2
GLUCOSE SERPL-MCNC: 106 MG/DL (ref 70–99)
HBA1C MFR BLD: 5.8 % (ref 4.3–6)
HCT VFR BLD AUTO: 36.1 % (ref 35–47)
HGB BLD-MCNC: 11.6 G/DL (ref 11.7–15.7)
MCH RBC QN AUTO: 30.2 PG (ref 26.5–33)
MCHC RBC AUTO-ENTMCNC: 32.1 G/DL (ref 31.5–36.5)
MCV RBC AUTO: 94 FL (ref 78–100)
PLATELET # BLD AUTO: 310 10E9/L (ref 150–450)
POTASSIUM SERPL-SCNC: 3.7 MMOL/L (ref 3.4–5.3)
RBC # BLD AUTO: 3.84 10E12/L (ref 3.8–5.2)
SODIUM SERPL-SCNC: 136 MMOL/L (ref 133–144)
WBC # BLD AUTO: 7.2 10E9/L (ref 4–11)

## 2017-04-11 PROCEDURE — 80048 BASIC METABOLIC PNL TOTAL CA: CPT | Performed by: INTERNAL MEDICINE

## 2017-04-11 PROCEDURE — 36415 COLL VENOUS BLD VENIPUNCTURE: CPT | Performed by: INTERNAL MEDICINE

## 2017-04-11 PROCEDURE — 93000 ELECTROCARDIOGRAM COMPLETE: CPT | Performed by: INTERNAL MEDICINE

## 2017-04-11 PROCEDURE — G0008 ADMIN INFLUENZA VIRUS VAC: HCPCS | Performed by: INTERNAL MEDICINE

## 2017-04-11 PROCEDURE — 83036 HEMOGLOBIN GLYCOSYLATED A1C: CPT | Performed by: INTERNAL MEDICINE

## 2017-04-11 PROCEDURE — 87081 CULTURE SCREEN ONLY: CPT | Performed by: INTERNAL MEDICINE

## 2017-04-11 PROCEDURE — 99215 OFFICE O/P EST HI 40 MIN: CPT | Mod: 25 | Performed by: INTERNAL MEDICINE

## 2017-04-11 PROCEDURE — 82306 VITAMIN D 25 HYDROXY: CPT | Performed by: INTERNAL MEDICINE

## 2017-04-11 PROCEDURE — 90670 PCV13 VACCINE IM: CPT | Performed by: INTERNAL MEDICINE

## 2017-04-11 PROCEDURE — 85027 COMPLETE CBC AUTOMATED: CPT | Performed by: INTERNAL MEDICINE

## 2017-04-11 NOTE — MR AVS SNAPSHOT
After Visit Summary   4/11/2017    Candida Rose    MRN: [id number]           Patient Information     Date Of Birth          1940        Visit Information        Provider Department      4/11/2017 7:40 AM Chani Peoples MD [facility]        Today's Diagnoses     Preoperative examination    -  1    Arthritis of knee        DM2 - no insulin (H)        HTN, goal below 140/90        Vitamin D deficiency        Moyamoya disease        CKD 3           Follow-ups after your visit        Your next 10 appointments already scheduled     Apr 17, 2017   Procedure with Chidi Jenkins MD   [facility] PeriOp [facility] (--)    [address]  [facility] [address]   [phone]            Apr 21, 2017 11:30 AM CDT   [facility]SHALL Extremity with KARLA Palacio PT ([facility]  )    [address] [place] [address]  [address]  [facility] [address]   [phone]            Apr 27, 2017  8:20 AM CDT   Return Visit with Carlos Paz PA-C   [facility]THOPEDIC SURGERY ([facility])    [address]  [address]  [facility] [address]   [phone]              Who to contact     If you have questions or need follow up information about today's clinic visit or your schedule please contact [facility] directly at [phone].  Normal or non-critical lab and imaging results will be communicated to you by MyChart, letter or phone within 4 business days after the clinic has received the results. If you do not hear from us within 7 days, please contact the clinic through MyChart or phone. If you have a critical or abnormal lab result, we will notify you by phone as soon as possible.  Submit refill requests through [employer] or call your pharmacy and they will forward the refill request to us. Please allow 3 business days for your refill to be completed.          Additional Information About Your Visit       "  MyChart Information     [employer] gives you secure access to your electronic health record. If you see a primary care provider, you can also send messages to your care team and make appointments. If you have questions, please call your primary care clinic.  If you do not have a primary care provider, please call [phone] and they will assist you.        Care EveryWhere ID     This is your Care EveryWhere ID. This could be used by other organizations to access your [place] medical records  [id number]        Your Vitals Were     Pulse Temperature Height Pulse Oximetry BMI (Body Mass Index)       73 98.8  F (37.1  C) (Oral) 5' 8\" (1.727 m) 98% 36.77 kg/m2        Blood Pressure from Last 3 Encounters:   04/11/17 108/70   03/23/17 114/68   01/13/17 99/50    Weight from Last 3 Encounters:   04/11/17 241 lb 12.8 oz (109.7 kg)   03/23/17 236 lb (107 kg)   04/04/17 236 lb (107 kg)              We Performed the Following     Basic metabolic panel     CBC with platelets     EKG 12-lead complete w/read - Clinics     Hemoglobin A1c     Vitamin D Deficiency        Primary Care Provider Office Phone # Fax #    Chani Peoples [phone]       [facility] [address] [facility] [address]        Thank you!     Thank you for choosing [facility]  for your care. Our goal is always to provide you with excellent care. Hearing back from our patients is one way we can continue to improve our services. Please take a few minutes to complete the written survey that you may receive in the mail after your visit with us. Thank you!             Your Updated Medication List - Protect others around you: Learn how to safely use, store and throw away your medicines at www.disposemymeds.org.          This list is accurate as of: 4/11/17  8:25 AM.  Always use your most recent med list.                   Brand Name Dispense Instructions for use    aspirin 81 MG tablet     30 " tablet    Take 1 tablet (81 mg) by mouth daily       atenolol 25 MG tablet    TENORMIN    90 tablet    Take 1 tablet (25 mg) by mouth At Bedtime       CALCIUM 500 PO      Take 1 tablet by mouth daily.       clopidogrel 75 MG tablet    PLAVIX    90 tablet    TAKE 1 TABLET BY MOUTH DAILY       hydrochlorothiazide 25 MG tablet    HYDRODIURIL    90 tablet    Take 1 tablet (25 mg) by mouth daily       losartan 50 MG tablet    COZAAR    180 tablet    Take 1 tablet (50 mg) by mouth 2 times daily       MULTIVITAMIN & MINERAL PO      Take 1 tablet by mouth daily.       simvastatin 20 MG tablet    ZOCOR    90 tablet    Take 1 tablet (20 mg) by mouth At Bedtime

## 2017-04-11 NOTE — PROGRESS NOTES
[place] [address]  [facility] [address]  [phone]  Dept: [phone]    PRE-OP EVALUATION:  Today's date: 2017    Candida Rose (: 1940) presents for pre-operative evaluation assessment as requested by Dr. Jenkins.  She requires evaluation and anesthesia risk assessment prior to undergoing surgery/procedure for treatment of knee arthritis  .  Proposed procedure: Left total knee arthroplasty    Date of Surgery/ Procedure: 17  Time of Surgery/ Procedure: 7:30 AM  Hospital/Surgical Facility: [facility]  Fax number for surgical facility:   Primary Physician: Chani Peoples  Type of Anesthesia Anticipated: General    Patient has a Health Care Directive or Living Will:  YES     1. NO - Do you have a history of heart attack, stroke, stent, bypass or surgery on an artery in the head, neck, heart or legs?  2. NO - Do you ever have any pain or discomfort in your chest?  3. NO - Do you have a history of  Heart Failure?  4. NO - Are you troubled by shortness of breath when: walking on the level, up a slight hill or at night?  5. NO - Do you currently have a cold, bronchitis or other respiratory infection?  6. NO - Do you have a cough, shortness of breath or wheezing?  7. NO - Do you sometimes get pains in the calves of your legs when you walk?  8. NO - Do you or anyone in your family have previous history of blood clots?  9. NO - Do you or does anyone in your family have a serious bleeding problem such as prolonged bleeding following surgeries or cuts?  10. NO - Have you ever had problems with anemia or been told to take iron pills?  11. NO - Have you had any abnormal blood loss such as black, tarry or bloody stools, or abnormal vaginal bleeding?  12. NO - Have you ever had a blood transfusion?  13. NO - Have you or any of your relatives ever had problems with anesthesia?  14. NO - Do you have sleep apnea, excessive snoring or daytime drowsiness?  15. NO - Do  you have any prosthetic heart valves?  16. NO - Do you have prosthetic joints?  17. NO - Is there any chance that you may be pregnant?    CC:  Preop for multiple medical problems.    HPI:    The patient is scheduled for L knee arthroplasty surgery with Dr. Jenkins on  April 17  No other acute complaints.    Assessment:  1. V72.83H Preop general physical exam _ I do not see any major contraindications for the patient to go through the scheduled surgery.  The proposed surgical procedure is considered INTERMEDIATE surgery risk.    For above listed surgery and anesthesia, Patient is at MODERATE risk for surgery/procedure and perioperative/procedure complications.  ECG:   Sinus rhythm, first degree AV block, no changes suggestive for ischemia     (M19.90) Arthritis of knee  Comment:   Plan: surgery, as above     (E11.9) DM2 - no insulin (H)  Comment: diet Controlled    Plan: cont diet    (I10) HTN, goal below 140/90  Comment: Controlled    Plan: Continue same meds, same doses for now     (E55.9) Vitamin D deficiency  Comment:   Plan:     (I67.5) Moyamoya disease  Comment: stable, no issues  Plan: f/u with neuro     (N18.3) CKD 3  Comment: stable   Plan: BMP          Plan:  1. In the morning of the surgery day you do not take any meds   2. Labs today       ROS:   General: Negative for fever, chills, major weight changes, fatigue  Skin: Negative for rashes, abnormal spots  Eyes: Negative for blurred or double vision  ENT/mouth: Negative for sinuses discomfort, earache, sore throat  Respiratory: Negative for cough, wheezes, chronic lung disease  Cardiovascular: Negative for rest or exertional chest pain, shortness of breath, palpitations, leg edema,   Gastrointestinal: Negative for vomiting, abdominal pain, heartburn, blood in stool, diarrhea, constipation  Genitourinary: Negative for urinary frequency, blood in urine, history of kidney stones  Neuro: Negative for headaches, numbness, tingling, weakness in arms or legs,  "history of seizure, recent syncope  Psychiatry: Negative for depression, anxiety, suicidal thoughts  Endo: Negative for known thyroid disease, Pos for diabetes, diet controlled.   Hemato/Lymph: Negative for nodes, easy bleeding, history of DVT, blood transfusion  Musculoskeletal: Negative for joint swelling, back pain. 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 F/u dr. Soto     OA (osteoarthritis)     s/p bilat total hips      Other chronic pain     Joint pain for many years     Unspecified cerebral artery occlusion with cerebral infarction     After Moyamoya surgery > 10 yrs ago.     Vitamin D deficiencies      Past Surgical History:   Procedure Laterality Date     C NONSPECIFIC PROCEDURE  10/30/06    neurosurgery Dr Soto     C NONSPECIFIC PROCEDURE      bilateral total hips approx 2002     C NONSPECIFIC PROCEDURE  3/07    neurosurgery      COLONOSCOPY  2008    normal     COLONOSCOPY  7/17/2014    Procedure: COLONOSCOPY;  Surgeon: Raul Nolan DO;  Location:  GI     CRANIOTOMY      MOJAMOJA  \"Pt had repair of blood flow.\"     RECONSTRUCT FOREFOOT WITH METATARSOPHALANGEAL (MTP) FUSION Left 8/21/2014    Procedure: RECONSTRUCT FOREFOOT WITH METATARSOPHALANGEAL (MTP) FUSION;  Surgeon: Tres Merlos DPM;  Location: [facility] OR        PSHx: No complications with prior surgeries or anesthesias.    Soc Hx: No daily alcohol, no smoking    Family History   Problem Relation Age of Onset     Obesity Sister      gastric by-pass age age 60, " "heart murmur.     Cancer - colorectal Sister 69     HEART DISEASE Father      mi,  age 48     Family History Negative Mother      killed in MVA age 54     CANCER Maternal Aunt      lung cancer ,non-smoker      All: reviewed    Current Outpatient Prescriptions   Medication Sig Dispense Refill     clopidogrel (PLAVIX) 75 MG tablet TAKE 1 TABLET BY MOUTH DAILY 90 tablet 0     hydrochlorothiazide (HYDRODIURIL) 25 MG tablet Take 1 tablet (25 mg) by mouth daily 90 tablet 3     aspirin 81 MG tablet Take 1 tablet (81 mg) by mouth daily 30 tablet      losartan (COZAAR) 50 MG tablet Take 1 tablet (50 mg) by mouth 2 times daily 180 tablet 1     atenolol (TENORMIN) 25 MG tablet Take 1 tablet (25 mg) by mouth At Bedtime 90 tablet 1     simvastatin (ZOCOR) 20 MG tablet Take 1 tablet (20 mg) by mouth At Bedtime 90 tablet 1     Multiple Vitamins-Minerals (MULTIVITAMIN & MINERAL PO) Take 1 tablet by mouth daily.       Calcium Carbonate (CALCIUM 500 PO) Take 1 tablet by mouth daily.          Physical exam:  Blood pressure 108/70, pulse 73, temperature 98.8  F (37.1  C), temperature source Oral, height 5' 8\" (1.727 m), weight 241 lb 12.8 oz (109.7 kg), SpO2 98 %, not currently breastfeeding.   NAD, appears comfortable  Skin clear, no rashes  HEENT: PERRLA, EOMI, anicteric sclera, pink conjunctiva, external ears appear normal, bilateral tympanic membranes clinically normal, oropharynx normal color.  Neck: supple, no JVD,  no thyroidmegaly  Lymph nodes non palpable in the cervical, supraclavicular   Chest: clear to auscultation with good respiratory effort  Cardiac: S1S2, RRR, no mgr appreciated  Abdomen: soft, not tender, not distended, audible bowel sound, no hepatosplenomegaly, no palpable masses, no abdominal bruits  Extremities: no cyanosis, clubbing or edema.   Neuro: A, Ox3, no focal signs.      Chani Farrell MD  Internal Medicine       MEDICAL HISTORY:                                                      Patient Active " "Problem List    Diagnosis Date Noted     Moyamoya disease      Priority: High     see line 2       DM2 - no insulin (H) 01/13/2017     Priority: Medium     CKD 3 10/19/2015     Priority: Medium     HTN, goal below 140/90      Family hx of colon cancer      sister dx at 69       Hyponatremia 05/14/2012     Advanced directives, counseling/discussion 05/07/2012     Patient states has Advance Directive and will bring in a copy to clinic. 5/7/2012          Osteopenia 05/07/2012     Vitamin D deficiency      (Problem list name updated by automated process. 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 F/u dr. Soto     OA (osteoarthritis)     s/p bilat total hips      Other chronic pain     Joint pain for many years     Unspecified cerebral artery occlusion with cerebral infarction     After Moyamoya surgery > 10 yrs ago.     Vitamin D deficiencies      Past Surgical History:   Procedure Laterality Date     C NONSPECIFIC PROCEDURE  10/30/06    neurosurgery Dr Soto     C NONSPECIFIC PROCEDURE      bilateral total hips approx 2002     C NONSPECIFIC PROCEDURE  3/07    neurosurgery      COLONOSCOPY  2008    normal     COLONOSCOPY  7/17/2014    Procedure: COLONOSCOPY;  Surgeon: Raul Nolan DO;  Location:  GI     CRANIOTOMY      MOJAMOJA  \"Pt had repair of blood flow.\"     RECONSTRUCT FOREFOOT WITH METATARSOPHALANGEAL (MTP) FUSION Left 8/21/2014    Procedure: RECONSTRUCT FOREFOOT WITH " METATARSOPHALANGEAL (MTP) FUSION;  Surgeon: Tres Merlos DPM;  Location: [facility] OR     Current Outpatient Prescriptions   Medication Sig Dispense Refill     clopidogrel (PLAVIX) 75 MG tablet TAKE 1 TABLET BY MOUTH DAILY 90 tablet 0     hydrochlorothiazide (HYDRODIURIL) 25 MG tablet Take 1 tablet (25 mg) by mouth daily 90 tablet 3     aspirin 81 MG tablet Take 1 tablet (81 mg) by mouth daily 30 tablet      losartan (COZAAR) 50 MG tablet Take 1 tablet (50 mg) by mouth 2 times daily 180 tablet 1     atenolol (TENORMIN) 25 MG tablet Take 1 tablet (25 mg) by mouth At Bedtime 90 tablet 1     simvastatin (ZOCOR) 20 MG tablet Take 1 tablet (20 mg) by mouth At Bedtime 90 tablet 1     Multiple Vitamins-Minerals (MULTIVITAMIN & MINERAL PO) Take 1 tablet by mouth daily.       Calcium Carbonate (CALCIUM 500 PO) Take 1 tablet by mouth daily.       OTC products: None, except as noted above    Allergies   Allergen Reactions     Lisinopril      Persistent cough      Latex Allergy: NO    Social History   Substance Use Topics     Smoking status: Never Smoker     Smokeless tobacco: Never Used     Alcohol use Yes      Comment:  1-2 per day     History   Drug Use No           Recent Labs   Lab Test  01/18/17   0849  05/24/16   0947   06/16/15   1127  07/13/10  07/06/10   1634   HGB  11.8   --    --   12.4   < >   --   12.1   PLT  414   --    --   339   < >   --   300   INR   --    --    --    --    --   0.99@  0.93   NA  138  136   < >  132*   < >   --   138   POTASSIUM  4.0  3.9   < >  3.7   < >  4.1@  4.1   CR  1.13*  1.03   < >  1.00   < >  0.91@  1.06*   A1C  5.9  6.3*   --    --    < >   --    --     < > = values in this interval not displayed.

## 2017-04-11 NOTE — NURSING NOTE
"Chief Complaint   Patient presents with     Pre-Op Exam     Would like to have pneumonia shot today.       Initial /70 (BP Location: Right arm, Patient Position: Chair, Cuff Size: Adult Large)  Pulse 73  Temp 98.8  F (37.1  C) (Oral)  Ht 5' 8\" (1.727 m)  Wt 241 lb 12.8 oz (109.7 kg)  SpO2 98%  BMI 36.77 kg/m2 Estimated body mass index is 36.77 kg/(m^2) as calculated from the following:    Height as of this encounter: 5' 8\" (1.727 m).    Weight as of this encounter: 241 lb 12.8 oz (109.7 kg).  Medication Reconciliation: complete   Ebony Salazar MA      "

## 2017-04-12 LAB — DEPRECATED CALCIDIOL+CALCIFEROL SERPL-MC: 21 UG/L (ref 20–75)

## 2017-04-13 NOTE — H&P (VIEW-ONLY)
[place] [address]  [facility] [address]  [phone]  Dept: [phone]    PRE-OP EVALUATION:  Today's date: 2017    Candida Rose (: 1940) presents for pre-operative evaluation assessment as requested by Dr. Jenkins.  She requires evaluation and anesthesia risk assessment prior to undergoing surgery/procedure for treatment of knee arthritis  .  Proposed procedure: Left total knee arthroplasty    Date of Surgery/ Procedure: 17  Time of Surgery/ Procedure: 7:30 AM  [facility]Surgical [facility]  Fax number for surgical facility:   Primary Physician: Chani Peoples  Type of Anesthesia Anticipated: General    Patient has a Health Care Directive or Living Will:  YES     1. NO - Do you have a history of heart attack, stroke, stent, bypass or surgery on an artery in the head, neck, heart or legs?  2. NO - Do you ever have any pain or discomfort in your chest?  3. NO - Do you have a history of  Heart Failure?  4. NO - Are you troubled by shortness of breath when: walking on the level, up a slight hill or at night?  5. NO - Do you currently have a cold, bronchitis or other respiratory infection?  6. NO - Do you have a cough, shortness of breath or wheezing?  7. NO - Do you sometimes get pains in the calves of your legs when you walk?  8. NO - Do you or anyone in your family have previous history of blood clots?  9. NO - Do you or does anyone in your family have a serious bleeding problem such as prolonged bleeding following surgeries or cuts?  10. NO - Have you ever had problems with anemia or been told to take iron pills?  11. NO - Have you had any abnormal blood loss such as black, tarry or bloody stools, or abnormal vaginal bleeding?  12. NO - Have you ever had a blood transfusion?  13. NO - Have you or any of your relatives ever had problems with anesthesia?  14. NO - Do you have sleep apnea, excessive snoring or daytime drowsiness?  15. NO - Do  you have any prosthetic heart valves?  16. NO - Do you have prosthetic joints?  17. NO - Is there any chance that you may be pregnant?    CC:  Preop for multiple medical problems.    HPI:    The patient is scheduled for L knee arthroplasty surgery with Dr. Jenkins on  April 17  No other acute complaints.    Assessment:  1. V72.83H Preop general physical exam _ I do not see any major contraindications for the patient to go through the scheduled surgery.  The proposed surgical procedure is considered INTERMEDIATE surgery risk.    For above listed surgery and anesthesia, Patient is at MODERATE risk for surgery/procedure and perioperative/procedure complications.  ECG:   Sinus rhythm, first degree AV block, no changes suggestive for ischemia     (M19.90) Arthritis of knee  Comment:   Plan: surgery, as above     (E11.9) DM2 - no insulin (H)  Comment: diet Controlled    Plan: cont diet    (I10) HTN, goal below 140/90  Comment: Controlled    Plan: Continue same meds, same doses for now     (E55.9) Vitamin D deficiency  Comment:   Plan:     (I67.5) Moyamoya disease  Comment: stable, no issues  Plan: f/u with neuro     (N18.3) CKD 3  Comment: stable   Plan: BMP          Plan:  1. In the morning of the surgery day you do not take any meds   2. Labs today       ROS:   General: Negative for fever, chills, major weight changes, fatigue  Skin: Negative for rashes, abnormal spots  Eyes: Negative for blurred or double vision  ENT/mouth: Negative for sinuses discomfort, earache, sore throat  Respiratory: Negative for cough, wheezes, chronic lung disease  Cardiovascular: Negative for rest or exertional chest pain, shortness of breath, palpitations, leg edema,   Gastrointestinal: Negative for vomiting, abdominal pain, heartburn, blood in stool, diarrhea, constipation  Genitourinary: Negative for urinary frequency, blood in urine, history of kidney stones  Neuro: Negative for headaches, numbness, tingling, weakness in arms or legs,  "history of seizure, recent syncope  Psychiatry: Negative for depression, anxiety, suicidal thoughts  Endo: Negative for known thyroid disease, Pos for diabetes, diet controlled.   Hemato/Lymph: Negative for nodes, easy bleeding, history of DVT, blood transfusion  Musculoskeletal: Negative for joint swelling, back pain. 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 F/u dr. Soto     OA (osteoarthritis)     s/p bilat total hips      Other chronic pain     Joint pain for many years     Unspecified cerebral artery occlusion with cerebral infarction     After Moyamoya surgery > 10 yrs ago.     Vitamin D deficiencies      Past Surgical History:   Procedure Laterality Date     C NONSPECIFIC PROCEDURE  10/30/06    neurosurgery Dr Soto     C NONSPECIFIC PROCEDURE      bilateral total hips approx 2002     C NONSPECIFIC PROCEDURE  3/07    neurosurgery      COLONOSCOPY  2008    normal     COLONOSCOPY  7/17/2014    Procedure: COLONOSCOPY;  Surgeon: Raul Nolan DO;  Location:  GI     CRANIOTOMY      MOJAMOJA  \"Pt had repair of blood flow.\"     RECONSTRUCT FOREFOOT WITH METATARSOPHALANGEAL (MTP) FUSION Left 8/21/2014    Procedure: RECONSTRUCT FOREFOOT WITH METATARSOPHALANGEAL (MTP) FUSION;  Surgeon: Tres Merlos DPM;  Location: [facility] OR        PSHx: No complications with prior surgeries or anesthesias.    Soc Hx: No daily alcohol, no smoking    Family History   Problem Relation Age of Onset     Obesity Sister      gastric by-pass age age 60, " "heart murmur.     Cancer - colorectal Sister 69     HEART DISEASE Father      mi,  age 48     Family History Negative Mother      killed in MVA age 54     CANCER Maternal Aunt      lung cancer ,non-smoker      All: reviewed    Current Outpatient Prescriptions   Medication Sig Dispense Refill     clopidogrel (PLAVIX) 75 MG tablet TAKE 1 TABLET BY MOUTH DAILY 90 tablet 0     hydrochlorothiazide (HYDRODIURIL) 25 MG tablet Take 1 tablet (25 mg) by mouth daily 90 tablet 3     aspirin 81 MG tablet Take 1 tablet (81 mg) by mouth daily 30 tablet      losartan (COZAAR) 50 MG tablet Take 1 tablet (50 mg) by mouth 2 times daily 180 tablet 1     atenolol (TENORMIN) 25 MG tablet Take 1 tablet (25 mg) by mouth At Bedtime 90 tablet 1     simvastatin (ZOCOR) 20 MG tablet Take 1 tablet (20 mg) by mouth At Bedtime 90 tablet 1     Multiple Vitamins-Minerals (MULTIVITAMIN & MINERAL PO) Take 1 tablet by mouth daily.       Calcium Carbonate (CALCIUM 500 PO) Take 1 tablet by mouth daily.          Physical exam:  Blood pressure 108/70, pulse 73, temperature 98.8  F (37.1  C), temperature source Oral, height 5' 8\" (1.727 m), weight 241 lb 12.8 oz (109.7 kg), SpO2 98 %, not currently breastfeeding.   NAD, appears comfortable  Skin clear, no rashes  HEENT: PERRLA, EOMI, anicteric sclera, pink conjunctiva, external ears appear normal, bilateral tympanic membranes clinically normal, oropharynx normal color.  Neck: supple, no JVD,  no thyroidmegaly  Lymph nodes non palpable in the cervical, supraclavicular   Chest: clear to auscultation with good respiratory effort  Cardiac: S1S2, RRR, no mgr appreciated  Abdomen: soft, not tender, not distended, audible bowel sound, no hepatosplenomegaly, no palpable masses, no abdominal bruits  Extremities: no cyanosis, clubbing or edema.   Neuro: A, Ox3, no focal signs.      Chani Farrell MD  Internal Medicine       MEDICAL HISTORY:                                                      Patient Active " "Problem List    Diagnosis Date Noted     Moyamoya disease      Priority: High     see line 2       DM2 - no insulin (H) 01/13/2017     Priority: Medium     CKD 3 10/19/2015     Priority: Medium     HTN, goal below 140/90      Family hx of colon cancer      sister dx at 69       Hyponatremia 05/14/2012     Advanced directives, counseling/discussion 05/07/2012     Patient states has Advance Directive and will bring in a copy to clinic. 5/7/2012          Osteopenia 05/07/2012     Vitamin D deficiency      (Problem list name updated by automated process. 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 F/u dr. Soto     OA (osteoarthritis)     s/p bilat total hips      Other chronic pain     Joint pain for many years     Unspecified cerebral artery occlusion with cerebral infarction     After Moyamoya surgery > 10 yrs ago.     Vitamin D deficiencies      Past Surgical History:   Procedure Laterality Date     C NONSPECIFIC PROCEDURE  10/30/06    neurosurgery Dr Soto     C NONSPECIFIC PROCEDURE      bilateral total hips approx 2002     C NONSPECIFIC PROCEDURE  3/07    neurosurgery      COLONOSCOPY  2008    normal     COLONOSCOPY  7/17/2014    Procedure: COLONOSCOPY;  Surgeon: Raul Nolan DO;  Location:  GI     CRANIOTOMY      MOJAMOJA  \"Pt had repair of blood flow.\"     RECONSTRUCT FOREFOOT WITH METATARSOPHALANGEAL (MTP) FUSION Left 8/21/2014    Procedure: RECONSTRUCT FOREFOOT WITH " METATARSOPHALANGEAL (MTP) FUSION;  Surgeon: Tres Merlos DPM;  Location: [facility] OR     Current Outpatient Prescriptions   Medication Sig Dispense Refill     clopidogrel (PLAVIX) 75 MG tablet TAKE 1 TABLET BY MOUTH DAILY 90 tablet 0     hydrochlorothiazide (HYDRODIURIL) 25 MG tablet Take 1 tablet (25 mg) by mouth daily 90 tablet 3     aspirin 81 MG tablet Take 1 tablet (81 mg) by mouth daily 30 tablet      losartan (COZAAR) 50 MG tablet Take 1 tablet (50 mg) by mouth 2 times daily 180 tablet 1     atenolol (TENORMIN) 25 MG tablet Take 1 tablet (25 mg) by mouth At Bedtime 90 tablet 1     simvastatin (ZOCOR) 20 MG tablet Take 1 tablet (20 mg) by mouth At Bedtime 90 tablet 1     Multiple Vitamins-Minerals (MULTIVITAMIN & MINERAL PO) Take 1 tablet by mouth daily.       Calcium Carbonate (CALCIUM 500 PO) Take 1 tablet by mouth daily.       OTC products: None, except as noted above    Allergies   Allergen Reactions     Lisinopril      Persistent cough      Latex Allergy: NO    Social History   Substance Use Topics     Smoking status: Never Smoker     Smokeless tobacco: Never Used     Alcohol use Yes      Comment:  1-2 per day     History   Drug Use No           Recent Labs   Lab Test  01/18/17   0849  05/24/16   0947   06/16/15   1127  07/13/10  07/06/10   1634   HGB  11.8   --    --   12.4   < >   --   12.1   PLT  414   --    --   339   < >   --   300   INR   --    --    --    --    --   0.99@  0.93   NA  138  136   < >  132*   < >   --   138   POTASSIUM  4.0  3.9   < >  3.7   < >  4.1@  4.1   CR  1.13*  1.03   < >  1.00   < >  0.91@  1.06*   A1C  5.9  6.3*   --    --    < >   --    --     < > = values in this interval not displayed.

## 2017-04-13 NOTE — PHARMACY-ADMISSION MEDICATION HISTORY
Admission medication history interview status for this patient is complete. See [facility] admission navigator for allergy information, prior to admission medications and immunization status.     Med rec completed by pre admitting RN Rahel ROD    Prior to Admission medications    Medication Sig Last Dose Taking? Auth Provider   hydrochlorothiazide (HYDRODIURIL) 25 MG tablet Take 1 tablet (25 mg) by mouth daily  Yes Chani Peoples MD   aspirin 81 MG tablet Take 1 tablet (81 mg) by mouth daily  Yes Chani Peoples MD   losartan (COZAAR) 50 MG tablet Take 1 tablet (50 mg) by mouth 2 times daily  Yes Chani Peoples MD   atenolol (TENORMIN) 25 MG tablet Take 1 tablet (25 mg) by mouth At Bedtime  Yes Chani Peoples MD   simvastatin (ZOCOR) 20 MG tablet Take 1 tablet (20 mg) by mouth At Bedtime  Yes Chani Peoples MD   Calcium Carbonate (CALCIUM 500 PO) Take 1 tablet by mouth daily.  Yes Reported, Patient   clopidogrel (PLAVIX) 75 MG tablet TAKE 1 TABLET BY MOUTH DAILY   Chani Peoples MD   Multiple Vitamins-Minerals (MULTIVITAMIN & MINERAL PO) Take 1 tablet by mouth daily.   Reported, Patient

## 2017-04-14 LAB
BACTERIA SPEC CULT: NORMAL
MICRO REPORT STATUS: NORMAL
SPECIMEN SOURCE: NORMAL

## 2017-04-16 DIAGNOSIS — E55.9 VITAMIN D DEFICIENCY: ICD-10-CM

## 2017-04-16 DIAGNOSIS — D64.9 ANEMIA, UNSPECIFIED TYPE: Primary | ICD-10-CM

## 2017-04-17 ENCOUNTER — ANESTHESIA EVENT (OUTPATIENT)
Dept: SURGERY | Facility: CLINIC | Age: 77
DRG: 470 | End: 2017-04-17
Payer: MEDICARE

## 2017-04-17 ENCOUNTER — ANESTHESIA (OUTPATIENT)
Dept: SURGERY | Facility: CLINIC | Age: 77
DRG: 470 | End: 2017-04-17
Payer: MEDICARE

## 2017-04-17 ENCOUNTER — APPOINTMENT (OUTPATIENT)
Dept: PHYSICAL THERAPY | Facility: CLINIC | Age: 77
DRG: 470 | End: 2017-04-17
Attending: ORTHOPAEDIC SURGERY
Payer: MEDICARE

## 2017-04-17 ENCOUNTER — APPOINTMENT (OUTPATIENT)
Dept: GENERAL RADIOLOGY | Facility: CLINIC | Age: 77
DRG: 470 | End: 2017-04-17
Attending: ORTHOPAEDIC SURGERY
Payer: MEDICARE

## 2017-04-17 ENCOUNTER — HOSPITAL ENCOUNTER (INPATIENT)
Facility: CLINIC | Age: 77
LOS: 3 days | Discharge: HOME | DRG: 470 | End: 2017-04-20
Attending: ORTHOPAEDIC SURGERY | Admitting: ORTHOPAEDIC SURGERY
Payer: MEDICARE

## 2017-04-17 DIAGNOSIS — K59.03 CONSTIPATION DUE TO PAIN MEDICATION: ICD-10-CM

## 2017-04-17 DIAGNOSIS — M17.12 OSTEOARTHRITIS OF LEFT KNEE, UNSPECIFIED OSTEOARTHRITIS TYPE: Primary | ICD-10-CM

## 2017-04-17 DIAGNOSIS — Z96.652 S/P TOTAL KNEE REPLACEMENT USING CEMENT, LEFT: ICD-10-CM

## 2017-04-17 LAB — GLUCOSE BLDC GLUCOMTR-MCNC: 135 MG/DL (ref 70–99)

## 2017-04-17 PROCEDURE — 12000007 ZZH R&B INTERMEDIATE

## 2017-04-17 PROCEDURE — 27447 TOTAL KNEE ARTHROPLASTY: CPT | Mod: LT | Performed by: ORTHOPAEDIC SURGERY

## 2017-04-17 PROCEDURE — 25000566 ZZH SEVOFLURANE, EA 15 MIN: Performed by: ORTHOPAEDIC SURGERY

## 2017-04-17 PROCEDURE — 00000146 ZZHCL STATISTIC GLUCOSE BY METER IP

## 2017-04-17 PROCEDURE — 40000171 ZZH STATISTIC PRE-PROCEDURE ASSESSMENT III: Performed by: ORTHOPAEDIC SURGERY

## 2017-04-17 PROCEDURE — 25000125 ZZHC RX 250: Performed by: NURSE ANESTHETIST, CERTIFIED REGISTERED

## 2017-04-17 PROCEDURE — 97110 THERAPEUTIC EXERCISES: CPT | Mod: GP | Performed by: PHYSICAL THERAPIST

## 2017-04-17 PROCEDURE — 25800025 ZZH RX 258: Performed by: ANESTHESIOLOGY

## 2017-04-17 PROCEDURE — 37000009 ZZH ANESTHESIA TECHNICAL FEE, EACH ADDTL 15 MIN: Performed by: ORTHOPAEDIC SURGERY

## 2017-04-17 PROCEDURE — 25000132 ZZH RX MED GY IP 250 OP 250 PS 637: Mod: GY | Performed by: ORTHOPAEDIC SURGERY

## 2017-04-17 PROCEDURE — 25000128 H RX IP 250 OP 636: Performed by: ANESTHESIOLOGY

## 2017-04-17 PROCEDURE — A9270 NON-COVERED ITEM OR SERVICE: HCPCS | Mod: GY | Performed by: ORTHOPAEDIC SURGERY

## 2017-04-17 PROCEDURE — 71000012 ZZH RECOVERY PHASE 1 LEVEL 1 FIRST HR: Performed by: ORTHOPAEDIC SURGERY

## 2017-04-17 PROCEDURE — 97116 GAIT TRAINING THERAPY: CPT | Mod: GP | Performed by: PHYSICAL THERAPIST

## 2017-04-17 PROCEDURE — 0SRD0J9 REPLACEMENT OF LEFT KNEE JOINT WITH SYNTHETIC SUBSTITUTE, CEMENTED, OPEN APPROACH: ICD-10-PCS | Performed by: ORTHOPAEDIC SURGERY

## 2017-04-17 PROCEDURE — 27810169 ZZH OR IMPLANT GENERAL: Performed by: ORTHOPAEDIC SURGERY

## 2017-04-17 PROCEDURE — 37000008 ZZH ANESTHESIA TECHNICAL FEE, 1ST 30 MIN: Performed by: ORTHOPAEDIC SURGERY

## 2017-04-17 PROCEDURE — 27110028 ZZH OR GENERAL SUPPLY NON-STERILE: Performed by: ORTHOPAEDIC SURGERY

## 2017-04-17 PROCEDURE — 25800025 ZZH RX 258: Performed by: ORTHOPAEDIC SURGERY

## 2017-04-17 PROCEDURE — 25000128 H RX IP 250 OP 636: Performed by: NURSE ANESTHETIST, CERTIFIED REGISTERED

## 2017-04-17 PROCEDURE — 40000193 ZZH STATISTIC PT WARD VISIT: Performed by: PHYSICAL THERAPIST

## 2017-04-17 PROCEDURE — C1776 JOINT DEVICE (IMPLANTABLE): HCPCS | Performed by: ORTHOPAEDIC SURGERY

## 2017-04-17 PROCEDURE — 27210794 ZZH OR GENERAL SUPPLY STERILE: Performed by: ORTHOPAEDIC SURGERY

## 2017-04-17 PROCEDURE — 25000128 H RX IP 250 OP 636: Performed by: ORTHOPAEDIC SURGERY

## 2017-04-17 PROCEDURE — 25000125 ZZHC RX 250: Performed by: ANESTHESIOLOGY

## 2017-04-17 PROCEDURE — 97530 THERAPEUTIC ACTIVITIES: CPT | Mod: GP | Performed by: PHYSICAL THERAPIST

## 2017-04-17 PROCEDURE — 36000063 ZZH SURGERY LEVEL 4 EA 15 ADDTL MIN: Performed by: ORTHOPAEDIC SURGERY

## 2017-04-17 PROCEDURE — 71000013 ZZH RECOVERY PHASE 1 LEVEL 1 EA ADDTL HR: Performed by: ORTHOPAEDIC SURGERY

## 2017-04-17 PROCEDURE — 36000093 ZZH SURGERY LEVEL 4 1ST 30 MIN: Performed by: ORTHOPAEDIC SURGERY

## 2017-04-17 PROCEDURE — 40000986 XR KNEE PORT LT 1/2 VW: Mod: LT

## 2017-04-17 PROCEDURE — 25000128 H RX IP 250 OP 636: Performed by: PHYSICIAN ASSISTANT

## 2017-04-17 DEVICE — IMPLANTABLE DEVICE: Type: IMPLANTABLE DEVICE | Site: KNEE | Status: FUNCTIONAL

## 2017-04-17 DEVICE — BONE CEMENT SIMPLEX FULL DOSE 6191-1-001: Type: IMPLANTABLE DEVICE | Site: KNEE | Status: FUNCTIONAL

## 2017-04-17 RX ORDER — LIDOCAINE 40 MG/G
CREAM TOPICAL
Status: DISCONTINUED | OUTPATIENT
Start: 2017-04-17 | End: 2017-04-17

## 2017-04-17 RX ORDER — PROPOFOL 10 MG/ML
INJECTION, EMULSION INTRAVENOUS PRN
Status: DISCONTINUED | OUTPATIENT
Start: 2017-04-17 | End: 2017-04-17

## 2017-04-17 RX ORDER — ACETAMINOPHEN 325 MG/1
975 TABLET ORAL EVERY 8 HOURS
Status: COMPLETED | OUTPATIENT
Start: 2017-04-17 | End: 2017-04-20

## 2017-04-17 RX ORDER — SIMVASTATIN 20 MG
20 TABLET ORAL AT BEDTIME
Status: DISCONTINUED | OUTPATIENT
Start: 2017-04-17 | End: 2017-04-20 | Stop reason: HOSPADM

## 2017-04-17 RX ORDER — CEFAZOLIN SODIUM 2 G/100ML
2 INJECTION, SOLUTION INTRAVENOUS EVERY 8 HOURS
Status: COMPLETED | OUTPATIENT
Start: 2017-04-17 | End: 2017-04-18

## 2017-04-17 RX ORDER — CEFAZOLIN SODIUM 2 G/100ML
2 INJECTION, SOLUTION INTRAVENOUS
Status: DISCONTINUED | OUTPATIENT
Start: 2017-04-17 | End: 2017-04-17

## 2017-04-17 RX ORDER — TEMAZEPAM 7.5 MG/1
7.5 CAPSULE ORAL
Status: DISCONTINUED | OUTPATIENT
Start: 2017-04-18 | End: 2017-04-20 | Stop reason: HOSPADM

## 2017-04-17 RX ORDER — METHYLPREDNISOLONE ACETATE 80 MG/ML
INJECTION, SUSPENSION INTRA-ARTICULAR; INTRALESIONAL; INTRAMUSCULAR; SOFT TISSUE PRN
Status: DISCONTINUED | OUTPATIENT
Start: 2017-04-17 | End: 2017-04-17

## 2017-04-17 RX ORDER — ONDANSETRON 4 MG/1
4 TABLET, ORALLY DISINTEGRATING ORAL EVERY 30 MIN PRN
Status: DISCONTINUED | OUTPATIENT
Start: 2017-04-17 | End: 2017-04-17 | Stop reason: HOSPADM

## 2017-04-17 RX ORDER — BUPIVACAINE HYDROCHLORIDE AND EPINEPHRINE 5; 5 MG/ML; UG/ML
INJECTION, SOLUTION PERINEURAL PRN
Status: DISCONTINUED | OUTPATIENT
Start: 2017-04-17 | End: 2017-04-17

## 2017-04-17 RX ORDER — SODIUM CHLORIDE, SODIUM LACTATE, POTASSIUM CHLORIDE, CALCIUM CHLORIDE 600; 310; 30; 20 MG/100ML; MG/100ML; MG/100ML; MG/100ML
INJECTION, SOLUTION INTRAVENOUS CONTINUOUS
Status: DISCONTINUED | OUTPATIENT
Start: 2017-04-17 | End: 2017-04-17

## 2017-04-17 RX ORDER — ONDANSETRON 2 MG/ML
INJECTION INTRAMUSCULAR; INTRAVENOUS PRN
Status: DISCONTINUED | OUTPATIENT
Start: 2017-04-17 | End: 2017-04-17

## 2017-04-17 RX ORDER — NALOXONE HYDROCHLORIDE 0.4 MG/ML
.1-.4 INJECTION, SOLUTION INTRAMUSCULAR; INTRAVENOUS; SUBCUTANEOUS
Status: DISCONTINUED | OUTPATIENT
Start: 2017-04-17 | End: 2017-04-20 | Stop reason: HOSPADM

## 2017-04-17 RX ORDER — KETOROLAC TROMETHAMINE 15 MG/ML
15 INJECTION, SOLUTION INTRAMUSCULAR; INTRAVENOUS EVERY 6 HOURS
Status: COMPLETED | OUTPATIENT
Start: 2017-04-17 | End: 2017-04-18

## 2017-04-17 RX ORDER — HYDROMORPHONE HYDROCHLORIDE 1 MG/ML
.3-.5 INJECTION, SOLUTION INTRAMUSCULAR; INTRAVENOUS; SUBCUTANEOUS EVERY 5 MIN PRN
Status: DISCONTINUED | OUTPATIENT
Start: 2017-04-17 | End: 2017-04-17 | Stop reason: HOSPADM

## 2017-04-17 RX ORDER — CLOPIDOGREL BISULFATE 75 MG/1
75 TABLET ORAL DAILY
Status: DISCONTINUED | OUTPATIENT
Start: 2017-04-17 | End: 2017-04-17

## 2017-04-17 RX ORDER — LOSARTAN POTASSIUM 50 MG/1
50 TABLET ORAL 2 TIMES DAILY
Status: DISCONTINUED | OUTPATIENT
Start: 2017-04-17 | End: 2017-04-20 | Stop reason: HOSPADM

## 2017-04-17 RX ORDER — DEXAMETHASONE SODIUM PHOSPHATE 4 MG/ML
INJECTION, SOLUTION INTRA-ARTICULAR; INTRALESIONAL; INTRAMUSCULAR; INTRAVENOUS; SOFT TISSUE PRN
Status: DISCONTINUED | OUTPATIENT
Start: 2017-04-17 | End: 2017-04-17

## 2017-04-17 RX ORDER — HYDROCHLOROTHIAZIDE 25 MG/1
25 TABLET ORAL DAILY
Status: DISCONTINUED | OUTPATIENT
Start: 2017-04-18 | End: 2017-04-20 | Stop reason: HOSPADM

## 2017-04-17 RX ORDER — DIMENHYDRINATE 50 MG/ML
25 INJECTION, SOLUTION INTRAMUSCULAR; INTRAVENOUS
Status: DISCONTINUED | OUTPATIENT
Start: 2017-04-17 | End: 2017-04-17 | Stop reason: HOSPADM

## 2017-04-17 RX ORDER — NEOSTIGMINE METHYLSULFATE 1 MG/ML
VIAL (ML) INJECTION PRN
Status: DISCONTINUED | OUTPATIENT
Start: 2017-04-17 | End: 2017-04-17

## 2017-04-17 RX ORDER — ONDANSETRON 2 MG/ML
4 INJECTION INTRAMUSCULAR; INTRAVENOUS EVERY 30 MIN PRN
Status: DISCONTINUED | OUTPATIENT
Start: 2017-04-17 | End: 2017-04-17 | Stop reason: HOSPADM

## 2017-04-17 RX ORDER — CLOPIDOGREL BISULFATE 75 MG/1
75 TABLET ORAL DAILY
Status: DISCONTINUED | OUTPATIENT
Start: 2017-04-18 | End: 2017-04-20 | Stop reason: HOSPADM

## 2017-04-17 RX ORDER — ONDANSETRON 2 MG/ML
4 INJECTION INTRAMUSCULAR; INTRAVENOUS EVERY 6 HOURS PRN
Status: DISCONTINUED | OUTPATIENT
Start: 2017-04-17 | End: 2017-04-20 | Stop reason: HOSPADM

## 2017-04-17 RX ORDER — LIDOCAINE 40 MG/G
CREAM TOPICAL
Status: DISCONTINUED | OUTPATIENT
Start: 2017-04-17 | End: 2017-04-20 | Stop reason: HOSPADM

## 2017-04-17 RX ORDER — LABETALOL HYDROCHLORIDE 5 MG/ML
10 INJECTION, SOLUTION INTRAVENOUS
Status: DISCONTINUED | OUTPATIENT
Start: 2017-04-17 | End: 2017-04-17 | Stop reason: HOSPADM

## 2017-04-17 RX ORDER — KETOROLAC TROMETHAMINE 30 MG/ML
INJECTION, SOLUTION INTRAMUSCULAR; INTRAVENOUS PRN
Status: DISCONTINUED | OUTPATIENT
Start: 2017-04-17 | End: 2017-04-17

## 2017-04-17 RX ORDER — ACETAMINOPHEN 325 MG/1
650 TABLET ORAL EVERY 4 HOURS PRN
Status: DISCONTINUED | OUTPATIENT
Start: 2017-04-20 | End: 2017-04-20 | Stop reason: HOSPADM

## 2017-04-17 RX ORDER — MEPERIDINE HYDROCHLORIDE 25 MG/ML
12.5 INJECTION INTRAMUSCULAR; INTRAVENOUS; SUBCUTANEOUS EVERY 5 MIN PRN
Status: DISCONTINUED | OUTPATIENT
Start: 2017-04-17 | End: 2017-04-17 | Stop reason: HOSPADM

## 2017-04-17 RX ORDER — AMOXICILLIN 250 MG
1-2 CAPSULE ORAL 2 TIMES DAILY
Status: DISCONTINUED | OUTPATIENT
Start: 2017-04-17 | End: 2017-04-20 | Stop reason: HOSPADM

## 2017-04-17 RX ORDER — FENTANYL CITRATE 50 UG/ML
INJECTION, SOLUTION INTRAMUSCULAR; INTRAVENOUS PRN
Status: DISCONTINUED | OUTPATIENT
Start: 2017-04-17 | End: 2017-04-17

## 2017-04-17 RX ORDER — GLYCOPYRROLATE 0.2 MG/ML
INJECTION, SOLUTION INTRAMUSCULAR; INTRAVENOUS PRN
Status: DISCONTINUED | OUTPATIENT
Start: 2017-04-17 | End: 2017-04-17

## 2017-04-17 RX ORDER — HYDROXYZINE HYDROCHLORIDE 10 MG/1
10 TABLET, FILM COATED ORAL EVERY 6 HOURS PRN
Status: DISCONTINUED | OUTPATIENT
Start: 2017-04-17 | End: 2017-04-20 | Stop reason: HOSPADM

## 2017-04-17 RX ORDER — ACETAMINOPHEN 10 MG/ML
1000 INJECTION, SOLUTION INTRAVENOUS ONCE
Status: DISCONTINUED | OUTPATIENT
Start: 2017-04-17 | End: 2017-04-17 | Stop reason: HOSPADM

## 2017-04-17 RX ORDER — HYDROMORPHONE HYDROCHLORIDE 1 MG/ML
.3-.5 INJECTION, SOLUTION INTRAMUSCULAR; INTRAVENOUS; SUBCUTANEOUS
Status: DISCONTINUED | OUTPATIENT
Start: 2017-04-17 | End: 2017-04-20 | Stop reason: HOSPADM

## 2017-04-17 RX ORDER — CYCLOBENZAPRINE HCL 5 MG
5 TABLET ORAL 3 TIMES DAILY PRN
Status: DISCONTINUED | OUTPATIENT
Start: 2017-04-17 | End: 2017-04-20 | Stop reason: HOSPADM

## 2017-04-17 RX ORDER — CEFAZOLIN SODIUM 1 G/3ML
1 INJECTION, POWDER, FOR SOLUTION INTRAMUSCULAR; INTRAVENOUS SEE ADMIN INSTRUCTIONS
Status: DISCONTINUED | OUTPATIENT
Start: 2017-04-17 | End: 2017-04-17

## 2017-04-17 RX ORDER — DROPERIDOL 2.5 MG/ML
0.62 INJECTION, SOLUTION INTRAMUSCULAR; INTRAVENOUS
Status: DISCONTINUED | OUTPATIENT
Start: 2017-04-17 | End: 2017-04-17 | Stop reason: RX

## 2017-04-17 RX ORDER — DIAZEPAM 10 MG/2ML
2.5 INJECTION, SOLUTION INTRAMUSCULAR; INTRAVENOUS
Status: DISCONTINUED | OUTPATIENT
Start: 2017-04-17 | End: 2017-04-17 | Stop reason: HOSPADM

## 2017-04-17 RX ORDER — SODIUM CHLORIDE, SODIUM LACTATE, POTASSIUM CHLORIDE, CALCIUM CHLORIDE 600; 310; 30; 20 MG/100ML; MG/100ML; MG/100ML; MG/100ML
INJECTION, SOLUTION INTRAVENOUS CONTINUOUS
Status: DISCONTINUED | OUTPATIENT
Start: 2017-04-17 | End: 2017-04-17 | Stop reason: HOSPADM

## 2017-04-17 RX ORDER — ATENOLOL 25 MG/1
25 TABLET ORAL AT BEDTIME
Status: DISCONTINUED | OUTPATIENT
Start: 2017-04-17 | End: 2017-04-20 | Stop reason: HOSPADM

## 2017-04-17 RX ORDER — CEFAZOLIN SODIUM 2 G/100ML
2 INJECTION, SOLUTION INTRAVENOUS
Status: COMPLETED | OUTPATIENT
Start: 2017-04-17 | End: 2017-04-17

## 2017-04-17 RX ORDER — ONDANSETRON 4 MG/1
4 TABLET, ORALLY DISINTEGRATING ORAL EVERY 6 HOURS PRN
Status: DISCONTINUED | OUTPATIENT
Start: 2017-04-17 | End: 2017-04-20 | Stop reason: HOSPADM

## 2017-04-17 RX ORDER — HYDROMORPHONE HYDROCHLORIDE 2 MG/1
2-4 TABLET ORAL
Status: DISCONTINUED | OUTPATIENT
Start: 2017-04-17 | End: 2017-04-20 | Stop reason: HOSPADM

## 2017-04-17 RX ORDER — ALBUTEROL SULFATE 0.83 MG/ML
2.5 SOLUTION RESPIRATORY (INHALATION) EVERY 4 HOURS PRN
Status: DISCONTINUED | OUTPATIENT
Start: 2017-04-17 | End: 2017-04-17 | Stop reason: HOSPADM

## 2017-04-17 RX ORDER — FENTANYL CITRATE 50 UG/ML
25-50 INJECTION, SOLUTION INTRAMUSCULAR; INTRAVENOUS
Status: DISCONTINUED | OUTPATIENT
Start: 2017-04-17 | End: 2017-04-17 | Stop reason: HOSPADM

## 2017-04-17 RX ORDER — SODIUM CHLORIDE, SODIUM LACTATE, POTASSIUM CHLORIDE, CALCIUM CHLORIDE 600; 310; 30; 20 MG/100ML; MG/100ML; MG/100ML; MG/100ML
INJECTION, SOLUTION INTRAVENOUS CONTINUOUS
Status: DISCONTINUED | OUTPATIENT
Start: 2017-04-17 | End: 2017-04-18 | Stop reason: CLARIF

## 2017-04-17 RX ADMIN — ACETAMINOPHEN 975 MG: 325 TABLET, FILM COATED ORAL at 12:11

## 2017-04-17 RX ADMIN — CEFAZOLIN SODIUM 2 G: 2 INJECTION, SOLUTION INTRAVENOUS at 07:50

## 2017-04-17 RX ADMIN — GLYCOPYRROLATE 0.2 MG: 0.2 INJECTION, SOLUTION INTRAMUSCULAR; INTRAVENOUS at 07:46

## 2017-04-17 RX ADMIN — DEXAMETHASONE SODIUM PHOSPHATE 8 MG: 4 INJECTION, SOLUTION INTRA-ARTICULAR; INTRALESIONAL; INTRAMUSCULAR; INTRAVENOUS; SOFT TISSUE at 07:46

## 2017-04-17 RX ADMIN — SODIUM CHLORIDE, POTASSIUM CHLORIDE, SODIUM LACTATE AND CALCIUM CHLORIDE: 600; 310; 30; 20 INJECTION, SOLUTION INTRAVENOUS at 07:22

## 2017-04-17 RX ADMIN — KETOROLAC TROMETHAMINE 15 MG: 15 INJECTION, SOLUTION INTRAMUSCULAR; INTRAVENOUS at 18:01

## 2017-04-17 RX ADMIN — SENNOSIDES AND DOCUSATE SODIUM 1 TABLET: 8.6; 5 TABLET ORAL at 19:48

## 2017-04-17 RX ADMIN — KETOROLAC TROMETHAMINE 15 MG: 15 INJECTION, SOLUTION INTRAMUSCULAR; INTRAVENOUS at 12:50

## 2017-04-17 RX ADMIN — Medication 30 MG: at 07:46

## 2017-04-17 RX ADMIN — PROPOFOL 200 MG: 10 INJECTION, EMULSION INTRAVENOUS at 07:46

## 2017-04-17 RX ADMIN — BUPIVACAINE HYDROCHLORIDE AND EPINEPHRINE BITARTRATE 40 ML: 5; .005 INJECTION, SOLUTION EPIDURAL; INTRACAUDAL; PERINEURAL at 07:00

## 2017-04-17 RX ADMIN — KETOROLAC TROMETHAMINE 30 MG: 30 INJECTION, SOLUTION INTRAMUSCULAR at 07:46

## 2017-04-17 RX ADMIN — SODIUM CHLORIDE, POTASSIUM CHLORIDE, SODIUM LACTATE AND CALCIUM CHLORIDE: 600; 310; 30; 20 INJECTION, SOLUTION INTRAVENOUS at 12:07

## 2017-04-17 RX ADMIN — METHYLPREDNISOLONE ACETATE 80 MG: 80 INJECTION, SUSPENSION INTRA-ARTICULAR; INTRALESIONAL; INTRAMUSCULAR; SOFT TISSUE at 07:00

## 2017-04-17 RX ADMIN — MIDAZOLAM HYDROCHLORIDE 2 MG: 1 INJECTION, SOLUTION INTRAMUSCULAR; INTRAVENOUS at 07:00

## 2017-04-17 RX ADMIN — CYCLOBENZAPRINE HYDROCHLORIDE 5 MG: 5 TABLET, FILM COATED ORAL at 13:02

## 2017-04-17 RX ADMIN — ACETAMINOPHEN 975 MG: 325 TABLET, FILM COATED ORAL at 19:48

## 2017-04-17 RX ADMIN — HYDROMORPHONE HYDROCHLORIDE 1 MG: 1 INJECTION, SOLUTION INTRAMUSCULAR; INTRAVENOUS; SUBCUTANEOUS at 07:46

## 2017-04-17 RX ADMIN — GLYCOPYRROLATE 0.4 MG: 0.2 INJECTION, SOLUTION INTRAMUSCULAR; INTRAVENOUS at 09:08

## 2017-04-17 RX ADMIN — Medication 2 MG: at 09:08

## 2017-04-17 RX ADMIN — FENTANYL CITRATE 50 MCG: 50 INJECTION, SOLUTION INTRAMUSCULAR; INTRAVENOUS at 07:00

## 2017-04-17 RX ADMIN — SENNOSIDES AND DOCUSATE SODIUM 2 TABLET: 8.6; 5 TABLET ORAL at 12:11

## 2017-04-17 RX ADMIN — SODIUM CHLORIDE, POTASSIUM CHLORIDE, SODIUM LACTATE AND CALCIUM CHLORIDE: 600; 310; 30; 20 INJECTION, SOLUTION INTRAVENOUS at 08:35

## 2017-04-17 RX ADMIN — ATENOLOL 25 MG: 25 TABLET ORAL at 22:00

## 2017-04-17 RX ADMIN — HYDROMORPHONE HYDROCHLORIDE 2 MG: 2 TABLET ORAL at 22:00

## 2017-04-17 RX ADMIN — HYDROMORPHONE HYDROCHLORIDE 0.5 MG: 1 INJECTION, SOLUTION INTRAMUSCULAR; INTRAVENOUS; SUBCUTANEOUS at 09:45

## 2017-04-17 RX ADMIN — LOSARTAN POTASSIUM 50 MG: 50 TABLET, FILM COATED ORAL at 19:48

## 2017-04-17 RX ADMIN — HYDROMORPHONE HYDROCHLORIDE 0.5 MG: 1 INJECTION, SOLUTION INTRAMUSCULAR; INTRAVENOUS; SUBCUTANEOUS at 13:05

## 2017-04-17 RX ADMIN — FENTANYL CITRATE 25 MCG: 50 INJECTION INTRAMUSCULAR; INTRAVENOUS at 09:45

## 2017-04-17 RX ADMIN — ONDANSETRON 4 MG: 2 INJECTION INTRAMUSCULAR; INTRAVENOUS at 07:46

## 2017-04-17 RX ADMIN — ROCURONIUM BROMIDE 30 MG: 10 INJECTION INTRAVENOUS at 07:46

## 2017-04-17 RX ADMIN — FENTANYL CITRATE 50 MCG: 50 INJECTION INTRAMUSCULAR; INTRAVENOUS at 09:57

## 2017-04-17 RX ADMIN — CEFAZOLIN SODIUM 2 G: 2 INJECTION, SOLUTION INTRAVENOUS at 16:33

## 2017-04-17 RX ADMIN — SIMVASTATIN 20 MG: 20 TABLET, FILM COATED ORAL at 22:00

## 2017-04-17 NOTE — IP AVS SNAPSHOT
MRN:[id number]                      After Visit Summary   4/17/2017    Candida Rose    MRN: [id number]           Thank you!     Thank you for choosing [facility] for your care. Our goal is always to provide you with excellent care. Hearing back from our patients is one way we can continue to improve our services. Please take a few minutes to complete the written survey that you may receive in the mail after you visit. If you would like to speak to someone directly about your visit please contact Patient Relations at [phone]. Thank you!          Patient Information     Date Of Birth          1940        Designated Caregiver       Most Recent Value    Caregiver    Will someone help with your care after discharge? yes    Name of designated caregiver Chris Rose ()    Phone number of caregiver [phone]    Caregiver address [address]      About your hospital stay     You were admitted on:  April 17, 2017 You last received care in the:  [facility]    You were discharged on:  April 20, 2017        Reason for your hospital stay       S/P left knee replacement                  Who to Call     For medical emergencies, please call 911.  For non-urgent questions about your medical care, please call your primary care provider or clinic, [phone]  For questions related to your surgery, please call your surgery clinic        Attending Provider     Provider Specialty    Chidi Jenkins MD Orthopedics       Primary Care Provider Office Phone # Fax #    Chani Trina Peoples [phone]       [facility] [address] [facility] [address]        After Care Instructions     Diet       Follow this diet upon discharge: Advance to a regular diet as tolerated  Increase water and fiber intake while on pain medications.                  Follow-up Appointments     Follow-up and recommended labs  and tests        Follow up with me,  Carlos Paz, on 4/27/17 a 8:20 am. to evaluate after surgery.  No follow up labs or test are needed.    Carlos Paz PA-C  [facility] - Surgery  [facility]  [address] [place]  [address]  [phone]                  Your next 10 appointments already scheduled     Apr 27, 2017  8:20 AM CDT   Return Visit with Carlos Paz PA-C   [facility] ([facility])    [address]  [address]  [facility] [address]   [phone]              Further instructions from your care team       POST OP TOTAL KNEE INSTRUCTION  Pain medication: You will be discharged from the hospital with pain medication(s). In most cases, consistent use the pain pills can be limited to a few days. After this period, the medication should be weaned off as soon as possible to avoid potential complications of constipation, nausea, or rash, etc. Until you can be completely off the pain pills, using them only at nighttime along with controlling the pain with over-the-counter medication(s) such as Ibuprofen/Naproxen and/or Tylenol during the day would be a good way of managing the pain.    A medication for blood clot prevention will be specified when you are discharged. If you were taking Coumadin before the surgery, you will resume it.    Activity: You will be using a walker or crutches until you feel confident. You also need to use the knee immobilizer until you are able to straight leg raise 10-15 times. Gradual rather than sudden increase of walking distance is recommended as the comfort level dictates. A cane instead of walker or crutches can be used when you strength and balance are improved.    Dressing: Typically, the first dressing change will be done on postop day #2. Showers can be taken with plastic covering over the original post surgical dressing for the first 4 days from the surgery.  At that point, the incision site can be wet for showering but should not be soaked. A light gauze dressing along with paper tape should be placed over the wound after each shower. The staples will be removed 10-14 days from the operation. During that time it is best to cover the incision site to protect the staples from being pulled or snagged.     If you have an aquacell dressing, (flesh colored rubber like bandage), you may leave this dressing in place until follow up in the clinic, unless; the dressing becomes completely saturated, is leaking, gets water inside as evident by moisture appearing on the inside of the dressing, or you have a skin reaction.  In this case you should remove and use dressings like what is mentioned above.    Exercises: It is recommended that you do the exercises of range of motion and strengthening in a small amounts frequently throughout the day rather than infrequent long sessions. Being over aggressive with her exercises can be hindering rather than helping. We are looking for a gradual steady improvement even though the general goal for range of motion post-operatively is 0-90 degrees within the first 2 weeks. During this time, gaining full Extension is far more important than gaining flexion. Generally, you be working with a physical therapist 2-3 times per week for the first 2-4 weeks.    5 keys to the knee,- to be done every 2 hours during the day:  1. Knee Bending: Dangle- in a seated position where foot can swing, let the leg from the knee down dangle off the edge of bed or table. You may use the other leg/foot to gently push the affected leg into more bending.  2. Knee straightening: Heel blocking - While sitting or laying, place pillow, ottoman or some other support under the heel with toes pointing up but ankle relaxed.  Rest there, working up to 10 minutes relaxing the leg to induce knee straightening.  Once relaxed, push/squeeze the knee towards the floor, hold for 2  seconds, and relax. Repeat x 10.    NOTE:  Alternate 1 and 2 every other hour.  3. Straight leg raises: from a laying or sitting position, while keeping the knee locked straight, slowly lift the foot up 12-16 inches and hold for 2 seconds and relax.  Repeat working up to 20 repetitions.    4. Ice and elevate: whenever you are not up and about.  No standing around or sitting with the knee bend for more than very short periods.  5. Walk:  Use assistive devices as needed such as walker or cane to promote safety.  Walk focusing more on good form than getting somewhere.  Lead with the knee and then straighten the knee trying to make the surgical leg move like normal knee.    Soft tissue: It is not unusual to have swelling in the leg (thigh down to the foot) up to 2 months from the surgery. Frequent ankle pumping, elevation of the leg above the heart level and gentle compression support with ace wrap should help with swelling. Icing regularly, for 20 minutes every hour, will also help with swelling and pain.  Due to soft tissue swelling, increased amount of redness around the incision site is also commonly seen. Progressively worsening redness along with increasing pain or increasing drainage from the wound should be a reason to alert us immediately.     Follow up in clinic within 14 days after surgery.  May call [phone] to schedule.    Carlos Paz PA-C  [facility] - Surgery  [facility]  [address] [place]  [address]  [phone]          Pending Results     No orders found from 4/15/2017 to 4/18/2017.            Statement of Approval     Ordered          04/20/17 0940  I have reviewed and agree with all the recommendations and orders detailed in this document.  EFFECTIVE NOW     Approved and electronically signed by:  Carlos Paz PA-C             Admission Information     Date & Time Provider Department Dept. Phone    4/17/2017 Chidi Jenkins,  "MD [facility] [phone]      Your Vitals Were     Blood Pressure Pulse Temperature Respirations Height Weight    167/57 60 98.4  F (36.9  C) (Oral) 18 1.727 m (5' 7.99\") 108.9 kg (240 lb)    Pulse Oximetry BMI (Body Mass Index)                96% 36.5 kg/m2          CS-Keys Information     CS-Keys gives you secure access to your electronic health record. If you see a primary care provider, you can also send messages to your care team and make appointments. If you have questions, please call your primary care clinic.  If you do not have a primary care provider, please call [phone] and they will assist you.        Care EveryWhere ID     This is your Care EveryWhere ID. This could be used by other organizations to access your [facility] medical records  [id number]           Review of your medicines      START taking        Dose / Directions    acetaminophen 325 MG tablet   Commonly known as:  TYLENOL   Used for:  S/P total knee replacement using cement, left        Dose:  650 mg   Take 2 tablets (650 mg) by mouth every 4 hours as needed for other (surgical pain)   Quantity:  100 tablet   Refills:  0       HYDROmorphone 2 MG tablet   Commonly known as:  DILAUDID   Used for:  S/P total knee replacement using cement, left        Dose:  2-4 mg   Take 1-2 tablets (2-4 mg) by mouth every 4 hours as needed for moderate to severe pain   Quantity:  50 tablet   Refills:  0       order for DME        Equipment being ordered: Raised Toilet Seats () Treatment Diagnosis :decreased ADL's   Quantity:  1 each   Refills:  0       senna-docusate 8.6-50 MG per tablet   Commonly known as:  SENOKOT-S;PERICOLACE   Used for:  Constipation due to pain medication        Dose:  1 tablet   Take 1 tablet by mouth 2 times daily as needed for constipation   Quantity:  100 tablet   Refills:  0         CONTINUE these medicines which have NOT CHANGED        Dose / Directions    aspirin 81 MG tablet   Used for:  HTN, goal " below 140/90, Type 2 diabetes mellitus without complication, without long-term current use of insulin (H)        Dose:  81 mg   Take 1 tablet (81 mg) by mouth daily   Quantity:  30 tablet   Refills:  0       atenolol 25 MG tablet   Commonly known as:  TENORMIN   Used for:  HTN, goal below 140/90        Dose:  25 mg   Take 1 tablet (25 mg) by mouth At Bedtime   Quantity:  90 tablet   Refills:  1       CALCIUM 500 PO        Dose:  1 tablet   Take 1 tablet by mouth daily.   Refills:  0       clopidogrel 75 MG tablet   Commonly known as:  PLAVIX   Used for:  [place] disease        TAKE 1 TABLET BY MOUTH DAILY   Quantity:  90 tablet   Refills:  0       hydrochlorothiazide 25 MG tablet   Commonly known as:  HYDRODIURIL   Used for:  HTN, goal below 140/90, CKD (chronic kidney disease) stage 3, GFR 30-59 ml/min        Dose:  25 mg   Take 1 tablet (25 mg) by mouth daily   Quantity:  90 tablet   Refills:  3       losartan 50 MG tablet   Commonly known as:  COZAAR   Used for:  HTN, goal below 140/90        Dose:  50 mg   Take 1 tablet (50 mg) by mouth 2 times daily   Quantity:  180 tablet   Refills:  1       MULTIVITAMIN & MINERAL PO        Dose:  1 tablet   Take 1 tablet by mouth daily.   Refills:  0       simvastatin 20 MG tablet   Commonly known as:  ZOCOR   Used for:  Hyperlipidemia LDL goal <130        Dose:  20 mg   Take 1 tablet (20 mg) by mouth At Bedtime   Quantity:  90 tablet   Refills:  1            Where to get your medicines      These medications were sent to [facility] [place] [address] [place] [facility] [address]     Phone:  [phone]     acetaminophen 325 MG tablet         Some of these will need a paper prescription and others can be bought over the counter. Ask your nurse if you have questions.     Bring a paper prescription for each of these medications     HYDROmorphone 2 MG tablet    order for DME       You don't need a prescription for these  medications     senna-docusate 8.6-50 MG per tablet                Protect others around you: Learn how to safely use, store and throw away your medicines at www.disposemymeds.org.             Medication List: This is a list of all your medications and when to take them. Check marks below indicate your daily home schedule. Keep this list as a reference.      Medications           Morning Afternoon Evening Bedtime As Needed    acetaminophen 325 MG tablet   Commonly known as:  TYLENOL   Take 2 tablets (650 mg) by mouth every 4 hours as needed for other (surgical pain)   Last time this was given:  650 mg on 4/20/2017  8:38 AM   Next Dose Due:  12:30 4-20-17                                   aspirin 81 MG tablet   Take 1 tablet (81 mg) by mouth daily   Next Dose Due:  4-                                   atenolol 25 MG tablet   Commonly known as:  TENORMIN   Take 1 tablet (25 mg) by mouth At Bedtime   Last time this was given:  25 mg on 4/19/2017  9:29 PM   Next Dose Due:  4-20-17                                   CALCIUM 500 PO   Take 1 tablet by mouth daily.   Next Dose Due:  4-                                   clopidogrel 75 MG tablet   Commonly known as:  PLAVIX   TAKE 1 TABLET BY MOUTH DAILY   Last time this was given:  75 mg on 4/20/2017  8:38 AM   Next Dose Due:  4-                                   hydrochlorothiazide 25 MG tablet   Commonly known as:  HYDRODIURIL   Take 1 tablet (25 mg) by mouth daily   Last time this was given:  25 mg on 4/20/2017  8:38 AM   Next Dose Due:  4-                                   HYDROmorphone 2 MG tablet   Commonly known as:  DILAUDID   Take 1-2 tablets (2-4 mg) by mouth every 4 hours as needed for moderate to severe pain   Last time this was given:  2 mg on 4/20/2017  8:38 AM   Next Dose Due:  12:38 4-20-17                                   losartan 50 MG tablet   Commonly known as:  COZAAR   Take 1 tablet (50 mg) by mouth 2 times daily   Last time  this was given:  50 mg on 4/20/2017  8:38 AM   Next Dose Due:  4-20-17 evening dose                                      MULTIVITAMIN & MINERAL PO   Take 1 tablet by mouth daily.   Next Dose Due:  4-                                   order for DME   Equipment being ordered: Raised Toilet Seats () Treatment Diagnosis :decreased ADL's                                senna-docusate 8.6-50 MG per tablet   Commonly known as:  SENOKOT-S;PERICOLACE   Take 1 tablet by mouth 2 times daily as needed for constipation   Last time this was given:  2 tablets on 4/20/2017  8:38 AM   Next Dose Due:  4-                                   simvastatin 20 MG tablet   Commonly known as:  ZOCOR   Take 1 tablet (20 mg) by mouth At Bedtime   Last time this was given:  20 mg on 4/19/2017  9:29 PM   Next Dose Due:  4-

## 2017-04-17 NOTE — OP NOTE
DATE OF PROCEDURE:  04/17/2017       ASSISTANT:  Edgar Paz PA-C      PREOPERATIVE DIAGNOSIS:  Left knee osteoarthritis.      POSTOPERATIVE DIAGNOSIS:  Left knee osteoarthritis.      PROCEDURE:  Left total knee arthroplasty.      ANESTHESIA:  General.      INDICATIONS FOR PROCEDURE:  Candida Rose is a 76-year-old woman with end-stage primary osteoarthritis to her left knee, who elected to undergo the above-stated procedure, fully understanding the potential risks as well as benefits of this procedure.      DESCRIPTION OF PROCEDURE:  Candida was brought to the operating room, placed in a supine position on the operating table, where general anesthesia was administered without difficulty.  A tourniquet was applied to her left upper thigh, and following routine sterile scrub, prep and drape, the left lower extremity was exsanguinated and tourniquet was inflated to 300 mmHg.  Total tourniquet time was approximately 1 hour.  Using a standard anterior midline surgical approach, the skin and subcutaneous tissue were dissected sharply down to the quadriceps mechanism, where the quadriceps tendon was split and carried down around the medial aspect of the patella.  There was grade 4 chondromalacia in all compartments.  Osteophytes were rongeured.  An intramedullary guide rupal with 5 degrees of angulation was passed into the femoral canal and sequential cuts were made for a size 6 femoral component from the [employer] system.  A 3 degree posterior slope extramedullary guide was used to cut the proximal tibia for a size 6 tibial baseplate.  The patella was reamed for a 26 mm biconvex patella.  A 9 mm spacer gave excellent range of motion and stability.  The trial components were removed and the bone ends were pulse lavaged and dried carefully.  A size 6 tibial baseplate, a size 6 Oxinium femoral component and 8 at patellar button of 26 mm were cemented with polymethyl methacrylate cement.  Following cement cure, the trial poly  was replaced with a 9 mm prosthetic poly spacer.  The wound was irrigated and was closed in routine fashion in layers.  The wound was dressed sterilely.  Keturah tolerated the procedure well and left the operating room in satisfactory and stable condition.      ESTIMATED BLOOD LOSS:  10 mL.      SPONGE AND NEEDLE COUNT:  Correct x2.         MARY ROSENBAUM MD             D: 2017 09:47   T: 2017 11:53   MT: [id number]      Name:     KETURAH TERRELL   MRN:      [id number]        Account:        [id number]   :      1940           Procedure Date: 2017      Document: [id number]

## 2017-04-17 NOTE — PROGRESS NOTES
04/17/17 1511   Quick Adds   Type of Visit Initial PT Evaluation   Living Environment   Lives With spouse   Living Arrangements house   Home Accessibility stairs to enter home   Number of Stairs to Enter Home 2  (no rail)   Number of Stairs Within Home 14  (stairs to basement laundry; spouse takes care of that)   Transportation Available car;family or friend will provide   Living Environment Comment kristen[place]anneyossi style home;  trouble with stairs prior to surgery   Self-Care   Usual Activity Tolerance fair   Current Activity Tolerance fair   Regular Exercise no   Equipment Currently Used at Home none  (walker;cane; )   Activity/Exercise/Self-Care Comment patient independent with mobility prior to surgery; limited by L knee pain; trouble getting in and out of car prior to surgery   Functional Level Prior   Ambulation 0-->independent   Transferring 0-->independent   Toileting 0-->independent   Bathing 0-->independent   Dressing 0-->independent   Eating 0-->independent   Communication 0-->understands/communicates without difficulty   Swallowing 0-->swallows foods/liquids without difficulty   Cognition 1 - attention or memory deficits  (some aphasia at times; Brain surgery in 2003)   Fall history within last six months no   Which of the above functional risks had a recent onset or change? ambulation;transferring   Prior Functional Level Comment patient independent with mobility without a walker; limited by L knee pain   General Information   Onset of Illness/Injury or Date of Surgery - Date 04/17/17   Referring Physician Dr. Chidi Jenkins   Patient/Family Goals Statement patient plans to return home with assist of spouse and OP PT   Pertinent History of Current Problem (include personal factors and/or comorbidities that impact the POC) Patient with L knee OA who underwent L TKA; see medical record for further information   Precautions/Limitations fall precautions;oxygen therapy device and L/min;other (see comments)  (3L;  knee immobilizer until independent with SLR)   Weight-Bearing Status - LLE weight-bearing as tolerated   General Observations HR 40's-50's; decreased O2 sats; Kake   General Info Comments Activity: ambulate with assist   Cognitive Status Examination   Orientation orientation to person, place and time   Level of Consciousness alert   Follows Commands and Answers Questions 75% of the time  (Kake and possibly some receptive aphasia)   Personal Safety and Judgment impaired;at risk behaviors demonstrated   Memory impaired   Cognitive Comment some minor impairment since brain surgery in 2003   Pain Assessment   Patient Currently in Pain Yes, see Vital Sign flowsheet  (3-4/10)   Integumentary/Edema   Integumentary/Edema Comments L knee incision; wrapped   Range of Motion (ROM)   ROM Comment L LE limited by recent surgery and pain; others appear WFL   Strength   Strength Comments L LE limited by recent surgery  and pain; others appear WFL   Bed Mobility   Bed Mobility Comments Min A for L LE with use of knee immobilizer; cues for safety and sequencing   Transfer Skills   Transfer Comments Mod A x 2 for sit>stand with use of L knee immobilizer and rolling walker; bed elevated to assist standing   Gait   Gait Comments Gait in room forward, backward and sideways with rolling walker and min A x 2; cues for sequencing and safety; patient tends to take too large of steps and needs help to manage walker   Balance   Balance Comments current need for walker and assist   Sensory Examination   Sensory Perception Comments intact per patient   Modality Interventions   Planned Modality Interventions Comments ice to L knee   General Therapy Interventions   Planned Therapy Interventions bed mobility training;gait training;ROM;strengthening;stretching;transfer training;progressive activity/exercise   Clinical Impression   Criteria for Skilled Therapeutic Intervention yes, treatment indicated   PT Diagnosis Impaired gait/transfers   Influenced  "by the following impairments decreased L knee ROM/strength; weakness; pain; muscle cramps; some difficulty with command following (prior brain surgery with some aphasia)   Functional limitations due to impairments impaired functional mobility   Clinical Presentation Stable/Uncomplicated   Clinical Presentation Rationale good family support; min/mod A for activity   Clinical Decision Making (Complexity) Low complexity   Therapy Frequency` 2 times/day   Predicted Duration of Therapy Intervention (days/wks) 3 days   Anticipated Equipment Needs at Discharge front wheeled walker   Anticipated Discharge Disposition Home with Assist;Home with Outpatient Therapy   Risk & Benefits of therapy have been explained Yes   Patient, Family & other staff in agreement with plan of care Yes   [facility] TM \"6 Clicks\"   2016, Trustees of [facility], under license to [employer].  All rights reserved.   6 Clicks Short Forms Basic Mobility Inpatient Short Form   [facility]  \"6 Clicks\" V.2 Basic Mobility Inpatient Short Form   1. Turning from your back to your side while in a flat bed without using bedrails? 3 - A Little   2. Moving from lying on your back to sitting on the side of a flat bed without using bedrails? 3 - A Little   3. Moving to and from a bed to a chair (including a wheelchair)? 3 - A Little   4. Standing up from a chair using your arms (e.g., wheelchair, or bedside chair)? 2 - A Lot   5. To walk in hospital room? 3 - A Little   6. Climbing 3-5 steps with a railing? 2 - A Lot   Basic Mobility Raw Score (Score out of 24.Lower scores equate to lower levels of function) 16   Total Evaluation Time   Total Evaluation Time (Minutes) 15     "

## 2017-04-17 NOTE — ANESTHESIA PREPROCEDURE EVALUATION
Anesthesia Evaluation     . Pt has had prior anesthetic. Type: General    No history of anesthetic complications          ROS/MED HX    ENT/Pulmonary:     (+)LINDA risk factors hypertension, obese, , . .    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 : . . . :. .       METS/Exercise Tolerance:     Hematologic:  - neg hematologic  ROS       Musculoskeletal:   (+) arthritis, , , -       GI/Hepatic:     (+) Other GI/Hepatic S/P gastric bypass      Renal/Genitourinary:     (+) chronic renal disease, type: CRI, Pt does not require dialysis, Pt has no history of transplant,       Endo: Comment: Class 2 despite gastric bypass    (+) type II DM Not using insulin - not using insulin pump Diabetic complications: nephropathy cardiac problems, Obesity, .      Psychiatric:         Infectious Disease:  - neg infectious disease ROS       Malignancy:      - no malignancy   Other:    (+) H/O Chronic Pain,                   Physical Exam  Normal systems: cardiovascular, pulmonary and dental    Airway   Mallampati: II  TM distance: >3 FB  Neck ROM: full    Dental     Cardiovascular   Rhythm and rate: regular and normal      Pulmonary    breath sounds clear to auscultation    Other findings: Lab Test        04/11/17     01/18/17     06/16/15      --          07/13/10     07/06/10     06/05/10                       0849          0849          1127           --                             1634          1605          WBC          7.2          7.7          7.0            < >         --          8.1          9.3           HGB          11.6*        11.8         12.4           < >         --          12.1         12.6          MCV          94           93           93             < >         --          93           91            PLT          310          414          339            < >         --          300          322           INR           --            --           --           --          0.99@        0.93         0.88           < > = values in this interval not displayed.                  Lab Test        04/11/17 01/18/17 05/24/16                       0849          0849          0947          NA           136          138          136           POTASSIUM    3.7          4.0          3.9           CHLORIDE     101          101          101           CO2          27           27           26            BUN          21           23           28            CR           1.02         1.13*        1.03          ANIONGAP     8            10           9             RAINE          8.8          9.1          8.8           GLC          106*         120*         133*                                Anesthesia Plan      History & Physical Review  History and physical reviewed and following examination; no interval change.    ASA Status:  3 .    NPO Status:  > 8 hours    Plan for General, LMA and ETT with Intravenous induction. Maintenance will be Balanced.    PONV prophylaxis:  Ondansetron (or other 5HT-3) and Dexamethasone or Solumedrol       Postoperative Care  Postoperative pain management:  IV analgesics, Oral pain medications, Multi-modal analgesia and Peripheral nerve block (Single Shot).      Consents  Anesthetic plan, risks, benefits and alternatives discussed with:  Patient.  Use of blood products discussed: No .   .                          .

## 2017-04-17 NOTE — ANESTHESIA CARE TRANSFER NOTE
Patient: Candida RUCKER Kastler    Procedure(s):  Left Total Knee Arthroplasty   - Wound Class: I-Clean    Diagnosis: Left knee pain  Diagnosis Additional Information: End stage arthritis of left knee  Dr. Jenkins    Anesthesia Type:   General, LMA     Note:  Airway :Face Mask  Patient transferred to:PACU  Comments: Pt sv good tidal volumes, awake LMA removed prepare to transfer to PACU,  Report to PACU RN.  VSS transfer care      Vitals: (Last set prior to Anesthesia Care Transfer)    CRNA VITALS  4/17/2017 0851 - 4/17/2017 0926      4/17/2017             Pulse: 77    SpO2: 100 %    Resp Rate (observed): (!)  7                Electronically Signed By: ALFREDO Christina CRNA  April 17, 2017  9:26 AM

## 2017-04-17 NOTE — IP AVS SNAPSHOT
[facility]    [address]    [facility] [address]    Phone:  [phone]    Fax:  [phone]                                       After Visit Summary   4/17/2017    Candida Rose    MRN: [id number]           After Visit Summary Signature Page     I have received my discharge instructions, and my questions have been answered. I have discussed any challenges I see with this plan with the nurse or doctor.    ..........................................................................................................................................  Patient/Patient Representative Signature      ..........................................................................................................................................  Patient Representative Print Name and Relationship to Patient    ..................................................               ................................................  Date                                            Time    ..........................................................................................................................................  Reviewed by Signature/Title    ...................................................              ..............................................  Date                                                            Time

## 2017-04-17 NOTE — ANESTHESIA PROCEDURE NOTES
Peripheral nerve/Neuraxial procedure note : Sciatic  Pre-Procedure  Performed by JUJU GAINES  Referred by ROBI  Location: pre-op      Pre-Anesthestic Checklist: patient identified, IV checked, site marked, risks and benefits discussed, informed consent, monitors and equipment checked, pre-op evaluation, at physician/surgeon's request and post-op pain management    Timeout  Correct Patient: Yes   Correct Procedure: Yes   Correct Site: Yes   Correct Laterality: Yes   Correct Position: Yes   Site Marked: Yes   .   Procedure Documentation    .    Procedure:    Sciatic.  Local skin infiltrated with 0.5 mL of 1% lidocaine.     Ultrasound used to identify targeted nerve, plexus, or vascular marker and placed a needle adjacent to it., Ultrasound was used to visualize the spread of the anesthetic in close proximity to the above stated nerve.   Patient Prep;sterile gloves, povidone-iodine 7.5% surgical scrub.  .  Needle: insulated, short bevel (21 G. 4 in). .  Spinal Needle: . . Insertion Method: Single Shot.     Assessment/Narrative  Paresthesias: No.  Injection made incrementally with aspirations every 5 mL..  The placement was negative for: blood aspirated, painful injection and site bleeding.  Bolus given via needle..   Secured via.   Complications: none. Test dose of 3 mL at.. Comments:  10 cc  The surgeon has given a verbal order transferring care of this patient to me for the performance of a regional analgesia block for post-op pain control. It is requested of me because I am uniquely trained and qualified to perform this block and the surgeon is neither trained nor qualified to perform this procedure.

## 2017-04-17 NOTE — ANESTHESIA PROCEDURE NOTES
Peripheral nerve/Neuraxial procedure note : Femoral  Pre-Procedure  Performed by JUJU GAINES  Referred by ROBI  Location: pre-op      Pre-Anesthestic Checklist: patient identified, IV checked, site marked, risks and benefits discussed, informed consent, monitors and equipment checked, pre-op evaluation, at physician/surgeon's request and post-op pain management    Timeout  Correct Patient: Yes   Correct Procedure: Yes   Correct Site: Yes   Correct Laterality: Yes   Correct Position: Yes   Site Marked: Yes   .   Procedure Documentation    .    Procedure:    Femoral.  Local skin infiltrated with 0.5 mL of 1% lidocaine.     Ultrasound used to identify targeted nerve, plexus, or vascular marker and placed a needle adjacent to it., Ultrasound was used to visualize the spread of the anesthetic in close proximity to the above stated nerve.   Patient Prep;sterile gloves, povidone-iodine 7.5% surgical scrub.  Nerve Stim: Initial Level 1 mA.  Lowest motor response 0.5 mA..  Needle: insulated, short bevel (21 G. 4 in). .  Spinal Needle: . . Insertion Method: Single Shot.     Assessment/Narrative  Paresthesias: No.  Injection made incrementally with aspirations every 5 mL..  The placement was negative for: blood aspirated, painful injection and site bleeding.  Bolus given via needle..   Secured via.   Complications: none. Test dose of 3 mL at. Test dose negative for signs of intravascular, subdural or intrathecal injection. Comments:  30 cc  The surgeon has given a verbal order transferring care of this patient to me for the performance of a regional analgesia block for post-op pain control. It is requested of me because I am uniquely trained and qualified to perform this block and the surgeon is neither trained nor qualified to perform this procedure.

## 2017-04-17 NOTE — ANESTHESIA POSTPROCEDURE EVALUATION
Patient: Candida Rose    Procedure(s):  Left Total Knee Arthroplasty   - Wound Class: I-Clean    Diagnosis:Left knee pain  Diagnosis Additional Information: End stage arthritis of left knee  Dr. Jenkins    Anesthesia Type:  General, LMA    Note:  Anesthesia Post Evaluation    Patient location during evaluation: PACU  Patient participation: Able to participate in evaluation but full recovery from regional anesthesia has not yet ocurrred but is anticipated to occur within 48 hours  Level of consciousness: awake  Pain management: adequate  Airway patency: patent  Cardiovascular status: acceptable  Respiratory status: acceptable  Hydration status: acceptable  PONV: none             Last vitals:  Vitals:    04/17/17 0945 04/17/17 0950 04/17/17 0955   BP: 164/69 153/60    Resp: 9 8 10   Temp:      SpO2: 100% 100% 100%         Electronically Signed By: Jose Dobbs MD  April 17, 2017  10:00 AM

## 2017-04-17 NOTE — BRIEF OP NOTE
[facility]  Orthopedics Brief Operative Note    Pre-operative diagnosis: Left knee pain   Post-operative diagnosis Same   Procedure: Procedure(s):  Left Total Knee Arthroplasty   - Wound Class: I-Clean   Surgeon: Chidi Jenkins MD   Assistants(s): Surgeon(s) and Role:     * Chidi Jenkins MD - Primary     * Carlos Paz PA-C - Assisting   Anesthesia: Combined General with Femoral Nerve Block    Estimated blood loss: * No values recorded between 4/17/2017  8:00 AM and 4/17/2017  9:16 AM *    Total IV fluids: See anesthesia record   Blood transfusion: None       Drains: None   Specimens: * No specimens in log *   Implants: Smith Nephew #6 femur and tibia, 26mm patella, 9mm  spacer   Findings: Dictated   Complications: None   Condition: Stable   Comments: See Dictated Operative report for full details

## 2017-04-18 ENCOUNTER — APPOINTMENT (OUTPATIENT)
Dept: PHYSICAL THERAPY | Facility: CLINIC | Age: 77
DRG: 470 | End: 2017-04-18
Attending: ORTHOPAEDIC SURGERY
Payer: MEDICARE

## 2017-04-18 ENCOUNTER — APPOINTMENT (OUTPATIENT)
Dept: OCCUPATIONAL THERAPY | Facility: CLINIC | Age: 77
DRG: 470 | End: 2017-04-18
Attending: ORTHOPAEDIC SURGERY
Payer: MEDICARE

## 2017-04-18 LAB
GLUCOSE SERPL-MCNC: 125 MG/DL (ref 70–99)
HGB BLD-MCNC: 8.8 G/DL (ref 11.7–15.7)

## 2017-04-18 PROCEDURE — 97535 SELF CARE MNGMENT TRAINING: CPT | Mod: GO | Performed by: REHABILITATION PRACTITIONER

## 2017-04-18 PROCEDURE — 82947 ASSAY GLUCOSE BLOOD QUANT: CPT | Performed by: ORTHOPAEDIC SURGERY

## 2017-04-18 PROCEDURE — A9270 NON-COVERED ITEM OR SERVICE: HCPCS | Mod: GY | Performed by: ORTHOPAEDIC SURGERY

## 2017-04-18 PROCEDURE — 36415 COLL VENOUS BLD VENIPUNCTURE: CPT | Performed by: ORTHOPAEDIC SURGERY

## 2017-04-18 PROCEDURE — 97530 THERAPEUTIC ACTIVITIES: CPT | Mod: GP | Performed by: PHYSICAL THERAPIST

## 2017-04-18 PROCEDURE — 97116 GAIT TRAINING THERAPY: CPT | Mod: GP | Performed by: PHYSICAL THERAPIST

## 2017-04-18 PROCEDURE — 40000133 ZZH STATISTIC OT WARD VISIT: Performed by: REHABILITATION PRACTITIONER

## 2017-04-18 PROCEDURE — 25000128 H RX IP 250 OP 636: Performed by: ORTHOPAEDIC SURGERY

## 2017-04-18 PROCEDURE — 85018 HEMOGLOBIN: CPT | Performed by: ORTHOPAEDIC SURGERY

## 2017-04-18 PROCEDURE — 40000193 ZZH STATISTIC PT WARD VISIT: Performed by: PHYSICAL THERAPIST

## 2017-04-18 PROCEDURE — 12000000 ZZH R&B MED SURG/OB

## 2017-04-18 PROCEDURE — 97165 OT EVAL LOW COMPLEX 30 MIN: CPT | Mod: GO | Performed by: REHABILITATION PRACTITIONER

## 2017-04-18 PROCEDURE — 25000132 ZZH RX MED GY IP 250 OP 250 PS 637: Mod: GY | Performed by: ORTHOPAEDIC SURGERY

## 2017-04-18 PROCEDURE — 97110 THERAPEUTIC EXERCISES: CPT | Mod: GP | Performed by: PHYSICAL THERAPIST

## 2017-04-18 RX ADMIN — HYDROMORPHONE HYDROCHLORIDE 2 MG: 2 TABLET ORAL at 12:33

## 2017-04-18 RX ADMIN — ATENOLOL 25 MG: 25 TABLET ORAL at 21:43

## 2017-04-18 RX ADMIN — SENNOSIDES AND DOCUSATE SODIUM 2 TABLET: 8.6; 5 TABLET ORAL at 19:34

## 2017-04-18 RX ADMIN — ACETAMINOPHEN 975 MG: 325 TABLET, FILM COATED ORAL at 19:34

## 2017-04-18 RX ADMIN — HYDROXYZINE HYDROCHLORIDE 10 MG: 10 TABLET ORAL at 15:28

## 2017-04-18 RX ADMIN — CLOPIDOGREL 75 MG: 75 TABLET, FILM COATED ORAL at 07:51

## 2017-04-18 RX ADMIN — HYDROXYZINE HYDROCHLORIDE 10 MG: 10 TABLET ORAL at 08:52

## 2017-04-18 RX ADMIN — KETOROLAC TROMETHAMINE 15 MG: 15 INJECTION, SOLUTION INTRAMUSCULAR; INTRAVENOUS at 06:37

## 2017-04-18 RX ADMIN — SIMVASTATIN 20 MG: 20 TABLET, FILM COATED ORAL at 21:43

## 2017-04-18 RX ADMIN — HYDROMORPHONE HYDROCHLORIDE 2 MG: 2 TABLET ORAL at 15:28

## 2017-04-18 RX ADMIN — SENNOSIDES AND DOCUSATE SODIUM 2 TABLET: 8.6; 5 TABLET ORAL at 07:51

## 2017-04-18 RX ADMIN — KETOROLAC TROMETHAMINE 15 MG: 15 INJECTION, SOLUTION INTRAMUSCULAR; INTRAVENOUS at 01:20

## 2017-04-18 RX ADMIN — HYDROMORPHONE HYDROCHLORIDE 2 MG: 2 TABLET ORAL at 08:52

## 2017-04-18 RX ADMIN — HYDROMORPHONE HYDROCHLORIDE 2 MG: 2 TABLET ORAL at 21:44

## 2017-04-18 RX ADMIN — CEFAZOLIN SODIUM 2 G: 2 INJECTION, SOLUTION INTRAVENOUS at 01:20

## 2017-04-18 RX ADMIN — ACETAMINOPHEN 975 MG: 325 TABLET, FILM COATED ORAL at 12:33

## 2017-04-18 RX ADMIN — LOSARTAN POTASSIUM 50 MG: 50 TABLET, FILM COATED ORAL at 19:34

## 2017-04-18 RX ADMIN — ACETAMINOPHEN 975 MG: 325 TABLET, FILM COATED ORAL at 03:10

## 2017-04-18 RX ADMIN — HYDROCHLOROTHIAZIDE 25 MG: 25 TABLET ORAL at 07:51

## 2017-04-18 RX ADMIN — HYDROMORPHONE HYDROCHLORIDE 2 MG: 2 TABLET ORAL at 18:40

## 2017-04-18 ASSESSMENT — ACTIVITIES OF DAILY LIVING (ADL): PREVIOUS_RESPONSIBILITIES: MEAL PREP

## 2017-04-18 NOTE — PROGRESS NOTES
04/18/17 1528   Quick Adds   Type of Visit Initial Occupational Therapy Evaluation   Living Environment   Lives With spouse   Living Arrangements house   Home Accessibility stairs to enter home;stairs within home   Number of Stairs to Enter Home 2   Number of Stairs Within Home 14  (basement, but doesn't need to use)   Transportation Available car;family or friend will provide   Living Environment Comment Pt lives in single level home with basement laundry.  Pt independent in mobility PTA.  Had difficulty with stairs.   Self-Care   Dominant Hand right   Usual Activity Tolerance moderate   Current Activity Tolerance fair   Regular Exercise no   Equipment Currently Used at Home none   Activity/Exercise/Self-Care Comment Pt reported to be independent in ADLs at baseline.   does cooking, cleaning, homemaking and driving.   Functional Level Prior   Ambulation 0-->independent   Transferring 0-->independent   Toileting 0-->independent   Bathing 0-->independent   Dressing 0-->independent   Eating 0-->independent   Communication 0-->understands/communicates without difficulty   Swallowing 0-->swallows foods/liquids without difficulty   Cognition 1 - attention or memory deficits   Fall history within last six months no   Which of the above functional risks had a recent onset or change? ambulation;transferring;cognition       Present no   General Information   Onset of Illness/Injury or Date of Surgery - Date 04/17/17   Referring Physician Dr. Chidi Jenkins   Patient/Family Goals Statement Return to home.   Additional Occupational Profile Info/Pertinent History of Current Problem Patient with L knee OA who underwent L TKA; see medical record for further information   Precautions/Limitations fall precautions   Weight-Bearing Status - LLE weight-bearing as tolerated   General Observations L KI   General Info Comments activity:  ambulate with assist   Cognitive Status Examination   Orientation  orientation to person, place and time   Level of Consciousness alert   Able to Follow Commands WNL/WFL   Personal Safety (Cognitive) WNL/WFL   Memory impaired   Attention Distractible during evaluation   Organization/Problem Solving No deficits were identified   Executive Function Self awareness/monitoring impaired;Working memory impaired, decreased storage of information for performing tasks   Visual Perception   Visual Perception Wears glasses   Sensory Examination   Sensory Quick Adds No deficits were identified   Pain Assessment   Patient Currently in Pain Yes, see Vital Sign flowsheet   Posture   Posture not impaired   Posture Comments B UE AROM WFL   Range of Motion (ROM)   ROM Quick Adds No deficits were identified   ROM Comment B UE AROM WFL   Strength   Manual Muscle Testing Quick Adds Other   Strength Comments B UE strength grossly 4/5   Hand Strength   Hand Strength Comments WFL   Muscle Tone Assessment   Muscle Tone Quick Adds No deficits were identified   Coordination   Upper Extremity Coordination No deficits were identified   Mobility   Bed Mobility Comments min A   Transfer Skill: Bed to Chair/Chair to Bed   Level of [place]: Bed to Chair contact guard   Physical Assist/Nonphysical Assist: Bed to Chair verbal cues   Weight-Bearing Restrictions weight-bearing as tolerated   Assistive Device - Transfer Skill Bed to Chair Chair to Bed Rehab Eval rolling walker   Transfer Skill: Sit to Stand   Level of [place]: Sit/Stand contact guard   Physical Assist/Nonphysical Assist: Sit/Stand verbal cues   Transfer Skill: Sit to Stand weight-bearing as tolerated   Assistive Device for Transfer: Sit/Stand rolling walker   Transfer Skill: Toilet Transfer   Level of [place]: Toilet contact guard   Physical Assist/Nonphysical Assist: Toilet verbal cues   Weight-Bearing Restrictions: Toilet weight-bearing as tolerated   Assistive Device rolling walker;grab bars   Lower Body Dressing   Level of  "[place]: Dress Lower Body moderate assist (50% patients effort)   Physical Assist/Nonphysical Assist: Dress Lower Body set-up required   Toileting   Level of [place]: Toilet stand-by assist   Physical Assist/Nonphysical Assist: Toilet set-up required   Grooming   Level of [place]: Grooming contact guard   Physical Assist/Nonphysical Assist: Grooming set-up required   Eating/Self Feeding   Level of [place]: Eating independent   Physical Assist/Nonphysical Assist: Eating set-up required   Instrumental Activities of Daily Living (IADL)   Previous Responsibilities meal prep   Activities of Daily Living Analysis   Impairments Contributing to Impaired Activities of Daily Living balance impaired;cognition impaired;pain;post surgical precautions;ROM decreased;strength decreased   General Therapy Interventions   Planned Therapy Interventions ADL retraining;transfer training   Clinical Impression   Criteria for Skilled Therapeutic Interventions Met yes, treatment indicated   OT Diagnosis decreased ADL performance   Influenced by the following impairments L TKA   Assessment of Occupational Performance 1-3 Performance Deficits   Identified Performance Deficits Pt with decreased ability to complete dressing, bathing, toileting   Clinical Decision Making (Complexity) Low complexity   Therapy Frequency daily   Predicted Duration of Therapy Intervention (days/wks) 3 days   Anticipated Discharge Disposition Home with Assist;Home with Outpatient Therapy   Risks and Benefits of Treatment have been explained. Yes   Patient, Family & other staff in agreement with plan of care Yes   [facility] [employer] TM \"6 Clicks\"   2016, Trustees of [facility], under license to [employer].  All rights reserved.   6 Clicks Short Forms Daily Activity Inpatient Short Form   [facility]  \"6 Clicks\" Daily Activity Inpatient Short Form   1. Putting on and taking off regular lower body clothing? 2 - A Lot   2. " Bathing (including washing, rinsing, drying)? 2 - A Lot   3. Toileting, which includes using toilet, bedpan or urinal? 3 - A Little   4. Putting on and taking off regular upper body clothing? 4 - None   5. Taking care of personal grooming such as brushing teeth? 3 - A Little   6. Eating meals? 4 - None   Daily Activity Raw Score (Score out of 24.Lower scores equate to lower levels of function) 18   Total Evaluation Time   Total Evaluation Time (Minutes) 10

## 2017-04-18 NOTE — DOWNTIME EVENT NOTE
The EMR was down for 5   hours on 4/18/2017.    Lillian Arellano  was responsible for completing the paper charting during this time period.     The following information was re-entered into the system by Lillian Arellano: Flowsheet data, Intake and output, Orders and MAR    The following information will remain in the paper chart:     Lillian Arellano  4/18/2017

## 2017-04-18 NOTE — PROGRESS NOTES
[facility]  Orthopedics Progress Note             Interval History:     76 year old female admitted postoperatively for elective LEft total knee arthroplasty  with Dr. Jenkins due to DJD unresponsive to non operative treatment.  There were no intraoperative complications.  Patient currently Post op day #1.    Patient reports doing well. Minimal pain when stays on schedule with medications.  Feeling well.  Eating, ambulating, voiding.  Has stairs to enter home.  No new complaints.       Pain: tolerable.   Nausea: none.   Light headedness : none  Chest pain: none  Shortness of breath: none              Assessment and Plan:   S/P Left TKA, Day #1.  VSS, hemodynamically asymptomatic, pain management adequate.  Hgb down from 11.6 to 8.8.    PLAN:  DVT proph  PT/OT  KI until SLR  hospitalist cares.    Discharge to home tomorrow or Thursday depending on medical stability and ambulation.                  Physical Exam:   Patient Vitals for the past 12 hrs:   BP Temp Temp src Pulse Heart Rate Resp SpO2   04/18/17 1612 138/44 97.6  F (36.4  C) Oral - 62 18 100 %   04/18/17 1233 - - - - - 16 -   04/18/17 1218 127/40 97.5  F (36.4  C) Oral - 58 18 97 %   04/18/17 0738 110/54 97.8  F (36.6  C) Temporal 60 - 18 94 %     Hemoglobin   Date Value Ref Range Status   04/18/2017 8.8 (L) 11.7 - 15.7 g/dL Final   04/11/2017 11.6 (L) 11.7 - 15.7 g/dL Final     Comment:     Reviewed: OK with previous       Patient is alert and oriented.  Vitals: stable  Neurovascular status: intact  Dressing: clean and dry  Calves: soft and non tender    Unable to SLR without KI.      Carlos Paz PA-C  [facility] - Surgery    4/18/2017

## 2017-04-18 NOTE — DISCHARGE INSTRUCTIONS
POST OP TOTAL KNEE INSTRUCTION  Pain medication: You will be discharged from the hospital with pain medication(s). In most cases, consistent use the pain pills can be limited to a few days. After this period, the medication should be weaned off as soon as possible to avoid potential complications of constipation, nausea, or rash, etc. Until you can be completely off the pain pills, using them only at nighttime along with controlling the pain with over-the-counter medication(s) such as Ibuprofen/Naproxen and/or Tylenol during the day would be a good way of managing the pain.    A medication for blood clot prevention will be specified when you are discharged. If you were taking Coumadin before the surgery, you will resume it.    Activity: You will be using a walker or crutches until you feel confident. You also need to use the knee immobilizer until you are able to straight leg raise 10-15 times. Gradual rather than sudden increase of walking distance is recommended as the comfort level dictates. A cane instead of walker or crutches can be used when you strength and balance are improved.    Dressing: Typically, the first dressing change will be done on postop day #2. Showers can be taken with plastic covering over the original post surgical dressing for the first 4 days from the surgery. At that point, the incision site can be wet for showering but should not be soaked. A light gauze dressing along with paper tape should be placed over the wound after each shower. The staples will be removed 10-14 days from the operation. During that time it is best to cover the incision site to protect the staples from being pulled or snagged.     If you have an aquacell dressing, (flesh colored rubber like bandage), you may leave this dressing in place until follow up in the clinic, unless; the dressing becomes completely saturated, is leaking, gets water inside as evident by moisture appearing on the inside of the dressing, or you  have a skin reaction.  In this case you should remove and use dressings like what is mentioned above.    Exercises: It is recommended that you do the exercises of range of motion and strengthening in a small amounts frequently throughout the day rather than infrequent long sessions. Being over aggressive with her exercises can be hindering rather than helping. We are looking for a gradual steady improvement even though the general goal for range of motion post-operatively is 0-90 degrees within the first 2 weeks. During this time, gaining full Extension is far more important than gaining flexion. Generally, you be working with a physical therapist 2-3 times per week for the first 2-4 weeks.    5 keys to the knee,- to be done every 2 hours during the day:  1. Knee Bending: Dangle- in a seated position where foot can swing, let the leg from the knee down dangle off the edge of bed or table. You may use the other leg/foot to gently push the affected leg into more bending.  2. Knee straightening: Heel blocking - While sitting or laying, place pillow, ottoman or some other support under the heel with toes pointing up but ankle relaxed.  Rest there, working up to 10 minutes relaxing the leg to induce knee straightening.  Once relaxed, push/squeeze the knee towards the floor, hold for 2 seconds, and relax. Repeat x 10.    NOTE:  Alternate 1 and 2 every other hour.  3. Straight leg raises: from a laying or sitting position, while keeping the knee locked straight, slowly lift the foot up 12-16 inches and hold for 2 seconds and relax.  Repeat working up to 20 repetitions.    4. Ice and elevate: whenever you are not up and about.  No standing around or sitting with the knee bend for more than very short periods.  5. Walk:  Use assistive devices as needed such as walker or cane to promote safety.  Walk focusing more on good form than getting somewhere.  Lead with the knee and then straighten the knee trying to make the surgical  leg move like normal knee.    Soft tissue: It is not unusual to have swelling in the leg (thigh down to the foot) up to 2 months from the surgery. Frequent ankle pumping, elevation of the leg above the heart level and gentle compression support with ace wrap should help with swelling. Icing regularly, for 20 minutes every hour, will also help with swelling and pain.  Due to soft tissue swelling, increased amount of redness around the incision site is also commonly seen. Progressively worsening redness along with increasing pain or increasing drainage from the wound should be a reason to alert us immediately.     Follow up in clinic within 14 days after surgery.  May call [phone] to schedule.    Carlos Paz PA-C  [facility] - Surgery  [facility]  [address] [place]  [address]  [phone]

## 2017-04-19 ENCOUNTER — APPOINTMENT (OUTPATIENT)
Dept: PHYSICAL THERAPY | Facility: CLINIC | Age: 77
DRG: 470 | End: 2017-04-19
Attending: ORTHOPAEDIC SURGERY
Payer: MEDICARE

## 2017-04-19 ENCOUNTER — APPOINTMENT (OUTPATIENT)
Dept: OCCUPATIONAL THERAPY | Facility: CLINIC | Age: 77
DRG: 470 | End: 2017-04-19
Attending: ORTHOPAEDIC SURGERY
Payer: MEDICARE

## 2017-04-19 LAB
GLUCOSE SERPL-MCNC: 100 MG/DL (ref 70–99)
HGB BLD-MCNC: 7.8 G/DL (ref 11.7–15.7)

## 2017-04-19 PROCEDURE — 12000000 ZZH R&B MED SURG/OB

## 2017-04-19 PROCEDURE — 36415 COLL VENOUS BLD VENIPUNCTURE: CPT | Performed by: ORTHOPAEDIC SURGERY

## 2017-04-19 PROCEDURE — 40000133 ZZH STATISTIC OT WARD VISIT

## 2017-04-19 PROCEDURE — 97116 GAIT TRAINING THERAPY: CPT | Mod: GP | Performed by: PHYSICAL THERAPIST

## 2017-04-19 PROCEDURE — 25000132 ZZH RX MED GY IP 250 OP 250 PS 637: Mod: GY | Performed by: ORTHOPAEDIC SURGERY

## 2017-04-19 PROCEDURE — 82947 ASSAY GLUCOSE BLOOD QUANT: CPT | Performed by: ORTHOPAEDIC SURGERY

## 2017-04-19 PROCEDURE — 97110 THERAPEUTIC EXERCISES: CPT | Mod: GP | Performed by: PHYSICAL THERAPIST

## 2017-04-19 PROCEDURE — A9270 NON-COVERED ITEM OR SERVICE: HCPCS | Mod: GY | Performed by: ORTHOPAEDIC SURGERY

## 2017-04-19 PROCEDURE — 97535 SELF CARE MNGMENT TRAINING: CPT | Mod: GO

## 2017-04-19 PROCEDURE — 85018 HEMOGLOBIN: CPT | Performed by: ORTHOPAEDIC SURGERY

## 2017-04-19 PROCEDURE — 40000193 ZZH STATISTIC PT WARD VISIT: Performed by: PHYSICAL THERAPIST

## 2017-04-19 RX ORDER — DIPHENHYDRAMINE HYDROCHLORIDE 50 MG/ML
25 INJECTION INTRAMUSCULAR; INTRAVENOUS EVERY 6 HOURS PRN
Status: DISCONTINUED | OUTPATIENT
Start: 2017-04-19 | End: 2017-04-20

## 2017-04-19 RX ORDER — DIPHENHYDRAMINE HCL 25 MG
25 CAPSULE ORAL EVERY 6 HOURS PRN
Status: DISCONTINUED | OUTPATIENT
Start: 2017-04-19 | End: 2017-04-20

## 2017-04-19 RX ORDER — LANOLIN ALCOHOL/MO/W.PET/CERES
3 CREAM (GRAM) TOPICAL
Status: DISCONTINUED | OUTPATIENT
Start: 2017-04-19 | End: 2017-04-20

## 2017-04-19 RX ADMIN — LOSARTAN POTASSIUM 50 MG: 50 TABLET, FILM COATED ORAL at 09:05

## 2017-04-19 RX ADMIN — HYDROMORPHONE HYDROCHLORIDE 2 MG: 2 TABLET ORAL at 21:29

## 2017-04-19 RX ADMIN — ACETAMINOPHEN 975 MG: 325 TABLET, FILM COATED ORAL at 12:31

## 2017-04-19 RX ADMIN — HYDROCHLOROTHIAZIDE 25 MG: 25 TABLET ORAL at 09:04

## 2017-04-19 RX ADMIN — ACETAMINOPHEN 975 MG: 325 TABLET, FILM COATED ORAL at 04:18

## 2017-04-19 RX ADMIN — HYDROMORPHONE HYDROCHLORIDE 2 MG: 2 TABLET ORAL at 00:37

## 2017-04-19 RX ADMIN — ATENOLOL 25 MG: 25 TABLET ORAL at 21:29

## 2017-04-19 RX ADMIN — HYDROMORPHONE HYDROCHLORIDE 2 MG: 2 TABLET ORAL at 01:18

## 2017-04-19 RX ADMIN — ACETAMINOPHEN 975 MG: 325 TABLET, FILM COATED ORAL at 21:29

## 2017-04-19 RX ADMIN — SIMVASTATIN 20 MG: 20 TABLET, FILM COATED ORAL at 21:29

## 2017-04-19 RX ADMIN — LOSARTAN POTASSIUM 50 MG: 50 TABLET, FILM COATED ORAL at 21:29

## 2017-04-19 RX ADMIN — HYDROMORPHONE HYDROCHLORIDE 4 MG: 2 TABLET ORAL at 04:18

## 2017-04-19 RX ADMIN — CLOPIDOGREL 75 MG: 75 TABLET, FILM COATED ORAL at 09:04

## 2017-04-19 RX ADMIN — SENNOSIDES AND DOCUSATE SODIUM 2 TABLET: 8.6; 5 TABLET ORAL at 21:29

## 2017-04-19 RX ADMIN — HYDROMORPHONE HYDROCHLORIDE 4 MG: 2 TABLET ORAL at 09:05

## 2017-04-19 NOTE — PROGRESS NOTES
[facility]  Orthopedics Progress Note          Assessment and Plan:   POD#2 L TKA, doing well.  PT/OT, home tomorrow             Interval History:   no new complaints and doing well; no cp, sob, n/v/d, or abd pain.              Review of Systems:   C: NEGATIVE for fever, chills, change in weight  E/M: NEGATIVE for ear, mouth and throat problems  R: NEGATIVE for significant cough or SOB  CV: NEGATIVE for chest pain, palpitations or peripheral edema             Medications:       atenolol  25 mg Oral At Bedtime     hydrochlorothiazide  25 mg Oral Daily     losartan  50 mg Oral BID     simvastatin  20 mg Oral At Bedtime     sodium chloride (PF)  3 mL Intracatheter Q8H     acetaminophen  975 mg Oral Q8H     senna-docusate  1-2 tablet Oral BID     clopidogrel  75 mg Oral Daily     lidocaine, lidocaine 4%, sodium chloride (PF), sore throat lozenge, naloxone, HYDROmorphone, [START ON 4/20/2017] acetaminophen, HYDROmorphone, cyclobenzaprine, hydrOXYzine, ondansetron **OR** ondansetron, temazepam               Physical Exam:   Vitals were reviewed  Patient Vitals for the past 24 hrs:   BP Temp Temp src Heart Rate Resp SpO2   04/19/17 0746 125/40 96.3  F (35.7  C) Oral 54 18 94 %   04/19/17 0033 133/44 95.8  F (35.4  C) Oral 62 18 95 %   04/18/17 2230 - - - - 18 -   04/18/17 2144 - - - - 18 -   04/18/17 2141 134/43 98.3  F (36.8  C) Oral 65 18 96 %   04/18/17 1932 121/44 98.7  F (37.1  C) Oral 64 18 98 %   04/18/17 1840 - - - - 18 -   04/18/17 1612 138/44 97.6  F (36.4  C) Oral 62 18 100 %   04/18/17 1233 - - - - 16 -   04/18/17 1218 127/40 97.5  F (36.4  C) Oral 58 18 97 %     Musculoskeletal:   D/D/I, CMSI, Baljinder's neg              Data:     Lab Results   Component Value Date    WBC 7.2 04/11/2017     Lab Results   Component Value Date    RBC 3.84 04/11/2017     Lab Results   Component Value Date    HGB 7.8 04/19/2017     Lab Results   Component Value Date    HCT 36.1 04/11/2017     Lab Results   Component  Value Date    RDW 12.7 04/11/2017     Lab Results   Component Value Date     04/11/2017           Chidi Jenkins MD  4/19/2017 11:06 AM

## 2017-04-20 ENCOUNTER — APPOINTMENT (OUTPATIENT)
Dept: PHYSICAL THERAPY | Facility: CLINIC | Age: 77
DRG: 470 | End: 2017-04-20
Attending: ORTHOPAEDIC SURGERY
Payer: MEDICARE

## 2017-04-20 ENCOUNTER — APPOINTMENT (OUTPATIENT)
Dept: OCCUPATIONAL THERAPY | Facility: CLINIC | Age: 77
DRG: 470 | End: 2017-04-20
Attending: ORTHOPAEDIC SURGERY
Payer: MEDICARE

## 2017-04-20 VITALS
WEIGHT: 240 LBS | HEIGHT: 68 IN | DIASTOLIC BLOOD PRESSURE: 57 MMHG | RESPIRATION RATE: 18 BRPM | SYSTOLIC BLOOD PRESSURE: 167 MMHG | BODY MASS INDEX: 36.37 KG/M2 | HEART RATE: 60 BPM | TEMPERATURE: 98.4 F | OXYGEN SATURATION: 96 %

## 2017-04-20 PROCEDURE — 25000132 ZZH RX MED GY IP 250 OP 250 PS 637: Mod: GY | Performed by: ORTHOPAEDIC SURGERY

## 2017-04-20 PROCEDURE — A9270 NON-COVERED ITEM OR SERVICE: HCPCS | Mod: GY | Performed by: ORTHOPAEDIC SURGERY

## 2017-04-20 PROCEDURE — 40000193 ZZH STATISTIC PT WARD VISIT: Performed by: PHYSICAL THERAPIST

## 2017-04-20 PROCEDURE — 40000133 ZZH STATISTIC OT WARD VISIT

## 2017-04-20 PROCEDURE — 97535 SELF CARE MNGMENT TRAINING: CPT | Mod: GO

## 2017-04-20 PROCEDURE — 97116 GAIT TRAINING THERAPY: CPT | Mod: GP | Performed by: PHYSICAL THERAPIST

## 2017-04-20 PROCEDURE — 97110 THERAPEUTIC EXERCISES: CPT | Mod: GP | Performed by: PHYSICAL THERAPIST

## 2017-04-20 RX ORDER — HYDROMORPHONE HYDROCHLORIDE 2 MG/1
2-4 TABLET ORAL EVERY 4 HOURS PRN
Qty: 50 TABLET | Refills: 0 | Status: SHIPPED | OUTPATIENT
Start: 2017-04-20 | End: 2017-05-04

## 2017-04-20 RX ORDER — ACETAMINOPHEN 325 MG/1
650 TABLET ORAL EVERY 4 HOURS PRN
Qty: 100 TABLET | Refills: 0 | Status: SHIPPED | OUTPATIENT
Start: 2017-04-20 | End: 2018-06-11

## 2017-04-20 RX ORDER — AMOXICILLIN 250 MG
1 CAPSULE ORAL 2 TIMES DAILY PRN
Qty: 100 TABLET
Start: 2017-04-20 | End: 2017-11-03

## 2017-04-20 RX ADMIN — HYDROMORPHONE HYDROCHLORIDE 2 MG: 2 TABLET ORAL at 08:38

## 2017-04-20 RX ADMIN — HYDROCHLOROTHIAZIDE 25 MG: 25 TABLET ORAL at 08:38

## 2017-04-20 RX ADMIN — ACETAMINOPHEN 650 MG: 325 TABLET, FILM COATED ORAL at 08:38

## 2017-04-20 RX ADMIN — HYDROMORPHONE HYDROCHLORIDE 2 MG: 2 TABLET ORAL at 04:35

## 2017-04-20 RX ADMIN — CLOPIDOGREL 75 MG: 75 TABLET, FILM COATED ORAL at 08:38

## 2017-04-20 RX ADMIN — LOSARTAN POTASSIUM 50 MG: 50 TABLET, FILM COATED ORAL at 08:38

## 2017-04-20 RX ADMIN — SENNOSIDES AND DOCUSATE SODIUM 2 TABLET: 8.6; 5 TABLET ORAL at 08:38

## 2017-04-20 RX ADMIN — ACETAMINOPHEN 975 MG: 325 TABLET, FILM COATED ORAL at 04:02

## 2017-04-20 RX ADMIN — HYDROMORPHONE HYDROCHLORIDE 2 MG: 2 TABLET ORAL at 00:42

## 2017-04-20 NOTE — DISCHARGE SUMMARY
[facility]  Discharge Summary            Interval History:     76 year old female admitted postoperatively for elective Left total knee arthroplasty  with Dr. Jenkins due to DJD unresponsive to non operative treatment.  There were no notable intraoperative complications.  Patient being discharged post op Day #3.  Candida Rose received skilled nursing, PT, OT, SW inquiry.  There was no Hospitalist care during the stay.     Candida Rose has progressed satisfactorily with ambulation and pain management and without medical complication.  Patient is confident in their discharge and has met goals with PT and OT. Pt lives at home with spouse and will be discharged to home based on home living conditions and level of support.  Candida Rose leaves the hospital in stable condition with good chance for recovery.  Today: doing well.  More energy.  Eating, voiding, ambulating.  No complaints, confident in discharge.   present and helpful.    Pain: tolerable.   Nausea: none.   Light headedness : none  Chest pain: none  Shortness of breath: none              Assessment and Plan:   S/P L TKA Day #3.  Final Diagnosis: same.   VSS, Hemodynamically asymptomatic, pain management adequate.    PLAN:  DVT prophylaxis with asa and plavix, as patient took prior to surgery. .  Pain management with Dilaudid and tylenol   Stool softeners OTC  Patient instructions attached to discharge.      Discharge to home.   Follow up in clinic as previously scheduled, approx 10-14 days post op.    [facility]  [facility] [address] [facility].  [facility]  [address]  [phone]                    Physical Exam:   Patient Vitals for the past 12 hrs:   BP Temp Temp src Heart Rate Resp SpO2   04/20/17 0838 167/57 98.4  F (36.9  C) Oral 82 18 96 %   04/20/17 0030 167/60 98.9  F (37.2  C) Axillary 83 18 95 %   04/19/17 2214 - - - - 18 -     Hemoglobin   Date Value Ref Range Status   04/19/2017 7.8 (L) 11.7 - 15.7 g/dL Final    04/18/2017 8.8 (L) 11.7 - 15.7 g/dL Final       Patient is alert and oriented.  Vitals: stable  Neurovascular status: intact  Dressing: clean and dry  Calves: soft and non tender          Carlos Paz PA-C  [facility] - Surgery    4/20/2017

## 2017-04-21 ENCOUNTER — TELEPHONE (OUTPATIENT)
Dept: INTERNAL MEDICINE | Facility: CLINIC | Age: 77
End: 2017-04-21

## 2017-04-21 ENCOUNTER — TELEPHONE (OUTPATIENT)
Dept: ORTHOPEDICS | Facility: CLINIC | Age: 77
End: 2017-04-21

## 2017-04-21 NOTE — TELEPHONE ENCOUNTER
Spoke with patient.  Doing well, no questions at this time.  Offered number for nurse triage and confirmed follow up appointment.    Carlos Paz PA-C  [facility] - Surgery

## 2017-04-21 NOTE — TELEPHONE ENCOUNTER
IP F/U    Date: 4/20/17  Diagnosis: Left Knee Pain, Osteoarthritis Of Left Knee  Is patient active in care coordination? No  Was patient in TCU? No    Next 5 appointments (look out 90 days)     Apr 27, 2017  8:20 AM CDT   Return Visit with Carlos Paz PA-C   [facility] ([facility])    [address] [address] [id number] [phone]

## 2017-04-24 NOTE — TELEPHONE ENCOUNTER
"ED/Discharge Protocol    \"Hi, my name is Alessandra Gomez, a registered nurse, and I am calling on behalf of Dr. Farrell's office at [place].  I am calling to follow up and see how things are going for you after your recent visit.\"    \"I see that you were in the (ER/UC/IP) on 04/17/17.    How are you doing now that you are home?\" good, just pain and exercises associated with TKA.    Is patient experiencing symptoms that may require a hospital visit?  No     Discharge Instructions    \"Let's review your discharge instructions.  What is/are the follow-up recommendations?  Pt. Response: pain and stool softeners; f/u with ortho    \"Were you instructed to make a follow-up appointment?\"  Pt. Response: Not with PCP. F/u with ortho, already as appt.       \"When you see the provider, I would recommend that you bring your discharge instructions with you.    Medications    \"How many new medications are you on since your hospitalization/ED visit?\"    2 or more - Epic MTM referral needed  \"How many of your current medicines changed (dose, timing, name, etc.) while you were in the hospital/ED visit?\"   0-1  \"Do you have questions about your medications?\"   No  \"Were you newly diagnosed with heart failure, COPD, diabetes or did you have a heart attack?\"   No  For patients on insulin: \"Did you start on insulin in the hospital or did you have your insulin dose changed?\"   No    Medication reconciliation completed? Yes    Was MTM referral placed (*Make sure to put transitions as reason for referral)?   No, new meds temporary    Call Summary    \"Do you have any questions or concerns about your condition or care plan at the moment?\"    No  Triage nurse advice given: f/u with ortho. Questions/concerns call clinic    Patient was in ER 0 in the past year (assess appropriateness of ER visits.)      \"If you have questions or things don't continue to improve, we encourage you contact us through the main clinic number,  [phone].  Even if " "the clinic is not open, triage nurses are available 24/7 to help you.     We would like you to know that our clinic has extended hours (provide information).  We also have urgent care (provide details on closest location and hours/contact info)\"      \"Thank you for your time and take care!\"        "

## 2017-04-27 ENCOUNTER — OFFICE VISIT (OUTPATIENT)
Dept: ORTHOPEDICS | Facility: CLINIC | Age: 77
End: 2017-04-27
Payer: COMMERCIAL

## 2017-04-27 DIAGNOSIS — Z47.89 ORTHOPEDIC AFTERCARE: Primary | ICD-10-CM

## 2017-04-27 PROCEDURE — 99024 POSTOP FOLLOW-UP VISIT: CPT | Performed by: PHYSICIAN ASSISTANT

## 2017-04-27 NOTE — MR AVS SNAPSHOT
After Visit Summary   4/27/2017    Candida Rose    MRN: [id number]           Patient Information     Date Of Birth          1940        Visit Information        Provider Department      4/27/2017 10:20 AM Carlos Paz PA-C [facility] ORTHOPEDIC SURGERY        Care Instructions    Incision care instructions:  Keep dry 24 hours.  Showering is ok after that time, however no soaking or scrubbing of incision for 2 weeks.  Steri-strips will most likely fall off on their own, however they may be removed after 2 weeks with rubbing alcohol if they have not.    Take your blood pressure, if below 120 systolic, hold off on Hydrochlorothiazide.      Contact PCP if you continue to be light headed, develop a fever or feel faint.    Some providers recommend that from now on, you take a single dose antibiotic prior to any invasive procedure such as dental work, colonoscopy or surgery.  Please notify the provider of your implant prior to the procedure. They may prescribe an antibiotic for you.    It is recommended that you avoid invasive procedures such as the before mentioned for 4 months after surgery unless it is an emergency.    You may discontinue the aspirin or return to a dose recommended by your Primary care provider 6 weeks after surgery.    You may increase your activities as you can tolerate them.  It is recommended that you do not do prolonged kneeling.    Please follow up in 4 weeks.           Follow-ups after your visit        Follow-up notes from your care team     Return in about 4 weeks (around 5/25/2017).      Who to contact     If you have questions or need follow up information about today's clinic visit or your schedule please contact [facility] ORTHOPEDIC SURGERY directly at [phone].  Normal or non-critical lab and imaging results will be communicated to you by MyChart, letter or phone within 4 business days after the clinic has received the results. If you do not hear  from us within 7 days, please contact the clinic through [employer] or phone. If you have a critical or abnormal lab result, we will notify you by phone as soon as possible.  Submit refill requests through [employer] or call your pharmacy and they will forward the refill request to us. Please allow 3 business days for your refill to be completed.          Additional Information About Your Visit        [employer] Information     [employer] gives you secure access to your electronic health record. If you see a primary care provider, you can also send messages to your care team and make appointments. If you have questions, please call your primary care clinic.  If you do not have a primary care provider, please call [phone] and they will assist you.        Care EveryWhere ID     This is your Care EveryWhere ID. This could be used by other organizations to access your [facility] medical records  [id number]         Blood Pressure from Last 3 Encounters:   04/20/17 167/57   04/11/17 108/70   03/23/17 114/68    Weight from Last 3 Encounters:   04/17/17 240 lb (108.9 kg)   04/11/17 241 lb 12.8 oz (109.7 kg)   03/23/17 236 lb (107 kg)              Today, you had the following     No orders found for display       Primary Care Provider Office Phone # Fax #    Chani Peoples [phone]       [facility] [address] [place] [address]        Thank you!     Thank you for choosing [facility] SURGERY  for your care. Our goal is always to provide you with excellent care. Hearing back from our patients is one way we can continue to improve our services. Please take a few minutes to complete the written survey that you may receive in the mail after your visit with us. Thank you!             Your Updated Medication List - Protect others around you: Learn how to safely use, store and throw away your medicines at www.disposemymeds.org.          This list is accurate as of:  4/27/17 12:03 PM.  Always use your most recent med list.                   Brand Name Dispense Instructions for use    acetaminophen 325 MG tablet    TYLENOL    100 tablet    Take 2 tablets (650 mg) by mouth every 4 hours as needed for other (surgical pain)       aspirin 81 MG tablet     30 tablet    Take 1 tablet (81 mg) by mouth daily       atenolol 25 MG tablet    TENORMIN    90 tablet    Take 1 tablet (25 mg) by mouth At Bedtime       CALCIUM 500 PO      Take 1 tablet by mouth daily.       clopidogrel 75 MG tablet    PLAVIX    90 tablet    TAKE 1 TABLET BY MOUTH DAILY       hydrochlorothiazide 25 MG tablet    HYDRODIURIL    90 tablet    Take 1 tablet (25 mg) by mouth daily       HYDROmorphone 2 MG tablet    DILAUDID    50 tablet    Take 1-2 tablets (2-4 mg) by mouth every 4 hours as needed for moderate to severe pain       losartan 50 MG tablet    COZAAR    180 tablet    Take 1 tablet (50 mg) by mouth 2 times daily       MULTIVITAMIN & MINERAL PO      Take 1 tablet by mouth daily.       order for DME     1 each    Equipment being ordered: Raised Toilet Seats () Treatment Diagnosis :decreased ADL's       senna-docusate 8.6-50 MG per tablet    SENOKOT-S;PERICOLACE    100 tablet    Take 1 tablet by mouth 2 times daily as needed for constipation       simvastatin 20 MG tablet    ZOCOR    90 tablet    Take 1 tablet (20 mg) by mouth At Bedtime

## 2017-04-27 NOTE — LETTER
4/27/2017       RE: Candida Rose  [address] [facility] [address]           Dear Colleague,    Thank you for referring your patient, Candida Rose, to the [facility] ORTHOPEDIC SURGERY. Please see a copy of my visit note below.    HISTORY OF PRESENT ILLNESS:    Candida Rose is a 76 year old female who is seen in follow up for L TKA, DOS, 4/17/27, Dr. Jenkins .  Present symptoms: pt reports light headed today.  Has no BM since surgery.  Also taking both BP meds this morning.  Feels much better when she lays down.  Tired from walk from parking lot to exam.  Pain is very tolerable.  Doing exercises.  No other complaints.   Denies Chest pain, Calve pain, Fever, Chills.    Current Treatment: postop, walker, pain medication.    PHYSICAL EXAM:  BP 96/66  Temp 97.6  F (36.4  C) HR 70  There is no height or weight on file to calculate BMI.   GENERAL APPEARANCE:  alert and no distress   Pt mildly diaphoretic / mild pallor   PSYCH:  mentation appears normal and affect normal/bright    Pt appears to recover from diaphoresis and pallor when supine and water intake.    MSK:  Left: Knee .  Ambulates: Well with walker. .  Incision clean and dry, staples under intact aquacell present, healing.  Appropriate incisional erythema.   Yes Ecchymosis moderate at knee.  No calve pain on palpation.  Edema moderate at knee, difficult to assess due to BH.  CMS: vasile incisional numbness, otherwise grossly intact.  AROM Flexion 0-85.    IMAGING INTERPRETATION:  None today.     ASSESSMENT:  Candida Rose is a 76 year old female S/P L TKA, DOS, 4/17/27, Dr. Jenkins ..  Hypotensive and constipated.    PLAN:  - Surgery discussed, images reviewed if applicable, and all questions were answered at this time.  - Staples removed with sterile technique, steri-strips applied in usual fashion, care instructions given and verbally acknowledged.  - Medications: Hold BP meds and monitor at home., stool softener regimen.  - Physical Therapy: hold  PT until feeling better.  - Present to PCP if no  BM and Hypotension continues.  NOTE: spoke with pt later in the day, doing much better.    Return to clinic 4, weeks.    Carlos Paz PA-C    Dept. Orthopedic Surgery  [facility]   4/28/2017        Again, thank you for allowing me to participate in the care of your patient.        Sincerely,              Carlos Paz PA-C

## 2017-04-27 NOTE — PATIENT INSTRUCTIONS
Incision care instructions:  Keep dry 24 hours.  Showering is ok after that time, however no soaking or scrubbing of incision for 2 weeks.  Steri-strips will most likely fall off on their own, however they may be removed after 2 weeks with rubbing alcohol if they have not.    Take your blood pressure, if below 120 systolic, hold off on Hydrochlorothiazide.      Contact PCP if you continue to be light headed, develop a fever or feel faint.    Some providers recommend that from now on, you take a single dose antibiotic prior to any invasive procedure such as dental work, colonoscopy or surgery.  Please notify the provider of your implant prior to the procedure. They may prescribe an antibiotic for you.    It is recommended that you avoid invasive procedures such as the before mentioned for 4 months after surgery unless it is an emergency.    You may discontinue the aspirin or return to a dose recommended by your Primary care provider 6 weeks after surgery.    You may increase your activities as you can tolerate them.  It is recommended that you do not do prolonged kneeling.    Please follow up in 4 weeks.

## 2017-04-28 ENCOUNTER — TELEPHONE (OUTPATIENT)
Dept: INTERNAL MEDICINE | Facility: CLINIC | Age: 77
End: 2017-04-28

## 2017-04-28 VITALS — DIASTOLIC BLOOD PRESSURE: 66 MMHG | TEMPERATURE: 97.6 F | SYSTOLIC BLOOD PRESSURE: 96 MMHG

## 2017-04-28 NOTE — PROGRESS NOTES
HISTORY OF PRESENT ILLNESS:    Candida Rose is a 76 year old female who is seen in follow up for L TKA, DOS, 4/17/27, Dr. Jenkins .  Present symptoms: pt reports light headed today.  Has no BM since surgery.  Also taking both BP meds this morning.  Feels much better when she lays down.  Tired from walk from parking lot to exam.  Pain is very tolerable.  Doing exercises.  No other complaints.   Denies Chest pain, Calve pain, Fever, Chills.    Current Treatment: postop, walker, pain medication.    PHYSICAL EXAM:  BP 96/66  Temp 97.6  F (36.4  C) HR 70  There is no height or weight on file to calculate BMI.   GENERAL APPEARANCE:  alert and no distress   Pt mildly diaphoretic / mild pallor   PSYCH:  mentation appears normal and affect normal/bright    Pt appears to recover from diaphoresis and pallor when supine and water intake.    MSK:  Left: Knee .  Ambulates: Well with walker. .  Incision clean and dry, staples under intact aquacell present, healing.  Appropriate incisional erythema.   Yes Ecchymosis moderate at knee.  No calve pain on palpation.  Edema moderate at knee, difficult to assess due to BH.  CMS: vasile incisional numbness, otherwise grossly intact.  AROM Flexion 0-85.    IMAGING INTERPRETATION:  None today.     ASSESSMENT:  Candida Rose is a 76 year old female S/P L TKA, DOS, 4/17/27, Dr. Jenkins ..  Hypotensive and constipated.    PLAN:  - Surgery discussed, images reviewed if applicable, and all questions were answered at this time.  - Staples removed with sterile technique, steri-strips applied in usual fashion, care instructions given and verbally acknowledged.  - Medications: Hold BP meds and monitor at home., stool softener regimen.  - Physical Therapy: hold PT until feeling better.  - Present to PCP if no  BM and Hypotension continues.  NOTE: spoke with pt later in the day, doing much better.    Return to clinic 4, weeks.    Carlos Paz PA-C    Dept. Orthopedic Surgery  [facility]  Services   4/28/2017

## 2017-04-28 NOTE — TELEPHONE ENCOUNTER
Pt's  calls. Pt had surgery recently and having constipation. The surgeon's office told her to try Miralax, she started this yesterday, but has not had any results. Pt did take 2 doses of Miralax yesterday.     Advised increasing water intake, high fiber diet and walking. Add Senna-S BID and continued Miralax BID until having regular stools, then can decrease to just one stool softener daily until off pain medication. Pt's  verbalizes understanding and agrees with plan.

## 2017-05-04 ENCOUNTER — OFFICE VISIT (OUTPATIENT)
Dept: INTERNAL MEDICINE | Facility: CLINIC | Age: 77
End: 2017-05-04
Payer: COMMERCIAL

## 2017-05-04 VITALS
SYSTOLIC BLOOD PRESSURE: 98 MMHG | HEART RATE: 65 BPM | HEIGHT: 68 IN | WEIGHT: 231 LBS | DIASTOLIC BLOOD PRESSURE: 60 MMHG | OXYGEN SATURATION: 98 % | BODY MASS INDEX: 35.01 KG/M2 | TEMPERATURE: 97.8 F

## 2017-05-04 DIAGNOSIS — R42 LIGHTHEADEDNESS: Primary | ICD-10-CM

## 2017-05-04 DIAGNOSIS — Z96.652 S/P TOTAL KNEE REPLACEMENT USING CEMENT, LEFT: ICD-10-CM

## 2017-05-04 PROBLEM — E11.9 TYPE 2 DIABETES MELLITUS WITHOUT COMPLICATION, WITHOUT LONG-TERM CURRENT USE OF INSULIN (H): Chronic | Status: RESOLVED | Noted: 2017-01-13 | Resolved: 2017-05-04

## 2017-05-04 LAB
ALBUMIN SERPL-MCNC: 3.5 G/DL (ref 3.4–5)
ALP SERPL-CCNC: 116 U/L (ref 40–150)
ALT SERPL W P-5'-P-CCNC: 17 U/L (ref 0–50)
ANION GAP SERPL CALCULATED.3IONS-SCNC: 7 MMOL/L (ref 3–14)
AST SERPL W P-5'-P-CCNC: 17 U/L (ref 0–45)
BASOPHILS # BLD AUTO: 0.1 10E9/L (ref 0–0.2)
BASOPHILS NFR BLD AUTO: 1 %
BILIRUB SERPL-MCNC: 0.6 MG/DL (ref 0.2–1.3)
BUN SERPL-MCNC: 26 MG/DL (ref 7–30)
CALCIUM SERPL-MCNC: 9.5 MG/DL (ref 8.5–10.1)
CHLORIDE SERPL-SCNC: 101 MMOL/L (ref 94–109)
CO2 SERPL-SCNC: 27 MMOL/L (ref 20–32)
CREAT SERPL-MCNC: 1.1 MG/DL (ref 0.52–1.04)
DIFFERENTIAL METHOD BLD: ABNORMAL
EOSINOPHIL # BLD AUTO: 0.1 10E9/L (ref 0–0.7)
EOSINOPHIL NFR BLD AUTO: 1 %
ERYTHROCYTE [DISTWIDTH] IN BLOOD BY AUTOMATED COUNT: 12.9 % (ref 10–15)
GFR SERPL CREATININE-BSD FRML MDRD: 48 ML/MIN/1.7M2
GLUCOSE SERPL-MCNC: 138 MG/DL (ref 70–99)
HCT VFR BLD AUTO: 31.3 % (ref 35–47)
HGB BLD-MCNC: 10 G/DL (ref 11.7–15.7)
LYMPHOCYTES # BLD AUTO: 2.7 10E9/L (ref 0.8–5.3)
LYMPHOCYTES NFR BLD AUTO: 24 %
MCH RBC QN AUTO: 29.9 PG (ref 26.5–33)
MCHC RBC AUTO-ENTMCNC: 31.9 G/DL (ref 31.5–36.5)
MCV RBC AUTO: 94 FL (ref 78–100)
MONOCYTES # BLD AUTO: 0.9 10E9/L (ref 0–1.3)
MONOCYTES NFR BLD AUTO: 8 %
NEUTROPHILS # BLD AUTO: 7.6 10E9/L (ref 1.6–8.3)
NEUTROPHILS NFR BLD AUTO: 66 %
PLATELET # BLD AUTO: 501 10E9/L (ref 150–450)
POTASSIUM SERPL-SCNC: 4.3 MMOL/L (ref 3.4–5.3)
PROT SERPL-MCNC: 7.5 G/DL (ref 6.8–8.8)
RBC # BLD AUTO: 3.34 10E12/L (ref 3.8–5.2)
SODIUM SERPL-SCNC: 135 MMOL/L (ref 133–144)
WBC # BLD AUTO: 11.4 10E9/L (ref 4–11)

## 2017-05-04 PROCEDURE — 80053 COMPREHEN METABOLIC PANEL: CPT | Performed by: FAMILY MEDICINE

## 2017-05-04 PROCEDURE — 85025 COMPLETE CBC W/AUTO DIFF WBC: CPT | Performed by: FAMILY MEDICINE

## 2017-05-04 PROCEDURE — 36415 COLL VENOUS BLD VENIPUNCTURE: CPT | Performed by: FAMILY MEDICINE

## 2017-05-04 PROCEDURE — 99215 OFFICE O/P EST HI 40 MIN: CPT | Performed by: FAMILY MEDICINE

## 2017-05-04 RX ORDER — HYDROMORPHONE HYDROCHLORIDE 2 MG/1
2-4 TABLET ORAL EVERY 4 HOURS PRN
Qty: 30 TABLET | Refills: 0 | Status: SHIPPED | OUTPATIENT
Start: 2017-05-04 | End: 2017-05-18

## 2017-05-04 NOTE — MR AVS SNAPSHOT
After Visit Summary   5/4/2017    Candida Rose    MRN: [id number]           Patient Information     Date Of Birth          1940        Visit Information        Provider Department      5/4/2017 8:00 AM Anmol Liu MD [facility]        Today's Diagnoses     Lightheadedness    -  1    S/P total knee replacement using cement, left           Follow-ups after your visit        Follow-up notes from your care team     Return in about 2 weeks (around 5/18/2017).      Your next 10 appointments already scheduled     May 18, 2017  9:00 AM CDT   Office Visit with Anmol Liu MD   [facility] ([facility])    [address] [facility] [place] [address] [phone]           Bring a current list of meds and any records pertaining to this visit.  For Physicals, please bring immunization records and any forms needing to be filled out.  Please arrive 10 minutes early to complete paperwork.            May 25, 2017 10:00 AM CDT   Return Visit with Carlos Paz PA-C   [facility] ORTHOPEDIC SURGERY ([facility] [place])    [address]  [address]  [facility] [address]   [phone]              Who to contact     If you have questions or need follow up information about today's clinic visit or your schedule please contact [facility] directly at [phone].  Normal or non-critical lab and imaging results will be communicated to you by MyChart, letter or phone within 4 business days after the clinic has received the results. If you do not hear from us within 7 days, please contact the clinic through MyChart or phone. If you have a critical or abnormal lab result, we will notify you by phone as soon as possible.  Submit refill requests through [employer] or call your pharmacy and they will forward the refill request to us. Please allow 3 business days for your refill to be completed.           "Additional Information About Your Visit        MyChart Information     [employer] gives you secure access to your electronic health record. If you see a primary care provider, you can also send messages to your care team and make appointments. If you have questions, please call your primary care clinic.  If you do not have a primary care provider, please call [phone] and they will assist you.        Care EveryWhere ID     This is your Care EveryWhere ID. This could be used by other organizations to access your [place] medical records  [id number]        Your Vitals Were     Pulse Temperature Height Pulse Oximetry BMI (Body Mass Index)       65 97.8  F (36.6  C) (Oral) 5' 8\" (1.727 m) 98% 35.12 kg/m2        Blood Pressure from Last 3 Encounters:   05/04/17 98/60   04/28/17 96/66   04/20/17 167/57    Weight from Last 3 Encounters:   05/04/17 231 lb (104.8 kg)   04/17/17 240 lb (108.9 kg)   04/11/17 241 lb 12.8 oz (109.7 kg)              We Performed the Following     CBC with platelets and differential     Comprehensive metabolic panel (BMP + Alb, Alk Phos, ALT, AST, Total. Bili, TP)          Today's Medication Changes          These changes are accurate as of: 5/4/17  9:37 AM.  If you have any questions, ask your nurse or doctor.               Stop taking these medicines if you haven't already. Please contact your care team if you have questions.     hydrochlorothiazide 25 MG tablet   Commonly known as:  HYDRODIURIL   Stopped by:  Anmol Liu MD           losartan 50 MG tablet   Commonly known as:  COZAAR   Stopped by:  Anmol Liu MD                Where to get your medicines      Some of these will need a paper prescription and others can be bought over the counter.  Ask your nurse if you have questions.     Bring a paper prescription for each of these medications     HYDROmorphone 2 MG tablet                Primary Care Provider Office Phone # Fax #    Chani Peoples MD " [phone]       [facility]  [address]  [facility] [address]        Thank you!     Thank you for choosing [facility]  for your care. Our goal is always to provide you with excellent care. Hearing back from our patients is one way we can continue to improve our services. Please take a few minutes to complete the written survey that you may receive in the mail after your visit with us. Thank you!             Your Updated Medication List - Protect others around you: Learn how to safely use, store and throw away your medicines at www.disposemymeds.org.          This list is accurate as of: 5/4/17  9:37 AM.  Always use your most recent med list.                   Brand Name Dispense Instructions for use    acetaminophen 325 MG tablet    TYLENOL    100 tablet    Take 2 tablets (650 mg) by mouth every 4 hours as needed for other (surgical pain)       aspirin 81 MG tablet     30 tablet    Take 1 tablet (81 mg) by mouth daily       atenolol 25 MG tablet    TENORMIN    90 tablet    Take 1 tablet (25 mg) by mouth At Bedtime       CALCIUM 500 PO      Take 1 tablet by mouth daily.       clopidogrel 75 MG tablet    PLAVIX    90 tablet    TAKE 1 TABLET BY MOUTH DAILY       HYDROmorphone 2 MG tablet    DILAUDID    30 tablet    Take 1-2 tablets (2-4 mg) by mouth every 4 hours as needed for moderate to severe pain       MULTIVITAMIN & MINERAL PO      Take 1 tablet by mouth daily.       order for DME     1 each    Equipment being ordered: Raised Toilet Seats () Treatment Diagnosis :decreased ADL's       senna-docusate 8.6-50 MG per tablet    SENOKOT-S;PERICOLACE    100 tablet    Take 1 tablet by mouth 2 times daily as needed for constipation       simvastatin 20 MG tablet    ZOCOR    90 tablet    Take 1 tablet (20 mg) by mouth At Bedtime

## 2017-05-04 NOTE — PROGRESS NOTES
"  SUBJECTIVE:                                                    Candida Rose is a 76 year old female who presents to clinic today for the following health issues:     Candida is here today with complaints of lightheadedness, dizziness and overall weakness.  She is status post left total knee roughly 3 weeks ago, and dismissal hemoglobin was 7.8. Her preoperative hemoglobin was 11.6.  She has a past history of vascular disease and is maintained on 3 blood pressure medicines, Plavix, and a statin. Her bowels up and working normally, and her appetite has been normal. She checked her blood pressure at home and it was 200/100. Denies chest pain, shortness of breath, headache, or worsening of her mild aphasia    ROS:  General, neuro, sleep, psych, musculoskeletal system otherwise negative.    BP 98/60  Pulse 65  Temp 97.8  F (36.6  C) (Oral)  Ht 5' 8\" (1.727 m)  Wt 231 lb (104.8 kg)  SpO2 98%  BMI 35.12 kg/m2    Orthostatic blood pressures obtained showing a 30 mm systolic blood pressure drop from laying to standing. This was with commensurate increase in lightheadedness.    GENERAL: Obese, pale, and mildly diaphoretic.   EYES: Eyes grossly normal to inspection, PERRL and conjunctivae and sclerae normal  HENT: ear canals and TM's normal, nose and mouth without ulcers or lesions  NECK: no adenopathy, no asymmetry, masses, or scars and thyroid normal to palpation  RESP: lungs clear to auscultation - no rales, rhonchi or wheezes  CV: regular rate and rhythm, normal S1 S2, no S3 or S4, no murmur, click or rub, no peripheral edema and peripheral pulses strong  MS:  Surgical incision is clean dry and intact, with expected bruising near the surgical site.  SKIN: no suspicious lesions or rashes  NEURO: Normal strength and tone, mentation intact and speech normal  PSYCH: mentation appears normal, affect normal/bright    ASSESSMENT:  1. Lightheadedness  Her hemoglobin is, by 2 g, and she felt better while laying down. We'll " discontinue her losartan as well as hydrochlorothiazide. See her back in one week for repeat labs. She will embellish her iron intake with either over-the-counter iron tablets or submerging in her diet. Cautioned about constipation with iron-containing medications.  - CBC with platelets and differential  - Comprehensive metabolic panel (BMP + Alb, Alk Phos, ALT, AST, Total. Bili, TP)    2. S/P total knee replacement using cement, left  Refill pain medication, and she should follow-up with her orthopedist.  - HYDROmorphone (DILAUDID) 2 MG tablet; Take 1-2 tablets (2-4 mg) by mouth every 4 hours as needed for moderate to severe pain  Dispense: 30 tablet; Refill: 0    Greater than 40 minutes spent with patient and family discussing risks and benefits and management with possible outcomes regarding this issue with greater than 50% in counseling for medical decision making and coordination of care.

## 2017-05-04 NOTE — NURSING NOTE
"Chief Complaint   Patient presents with     Dizziness       Initial BP 98/60  Temp 97.8  F (36.6  C) (Oral)  Ht 5' 8\" (1.727 m)  Wt 231 lb (104.8 kg)  BMI 35.12 kg/m2 Estimated body mass index is 35.12 kg/(m^2) as calculated from the following:    Height as of this encounter: 5' 8\" (1.727 m).    Weight as of this encounter: 231 lb (104.8 kg).  Medication Reconciliation: complete    "

## 2017-05-18 ENCOUNTER — OFFICE VISIT (OUTPATIENT)
Dept: INTERNAL MEDICINE | Facility: CLINIC | Age: 77
End: 2017-05-18
Payer: COMMERCIAL

## 2017-05-18 VITALS
DIASTOLIC BLOOD PRESSURE: 78 MMHG | BODY MASS INDEX: 34.86 KG/M2 | TEMPERATURE: 98 F | HEART RATE: 84 BPM | HEIGHT: 68 IN | SYSTOLIC BLOOD PRESSURE: 150 MMHG | WEIGHT: 230 LBS | OXYGEN SATURATION: 100 %

## 2017-05-18 DIAGNOSIS — I10 HTN, GOAL BELOW 140/90: Primary | Chronic | ICD-10-CM

## 2017-05-18 DIAGNOSIS — Z96.652 S/P TOTAL KNEE REPLACEMENT USING CEMENT, LEFT: ICD-10-CM

## 2017-05-18 LAB
BASOPHILS # BLD AUTO: 0 10E9/L (ref 0–0.2)
BASOPHILS NFR BLD AUTO: 0.7 %
DIFFERENTIAL METHOD BLD: ABNORMAL
EOSINOPHIL # BLD AUTO: 0.2 10E9/L (ref 0–0.7)
EOSINOPHIL NFR BLD AUTO: 3.1 %
ERYTHROCYTE [DISTWIDTH] IN BLOOD BY AUTOMATED COUNT: 12.8 % (ref 10–15)
HCT VFR BLD AUTO: 33 % (ref 35–47)
HGB BLD-MCNC: 10.6 G/DL (ref 11.7–15.7)
LYMPHOCYTES # BLD AUTO: 2 10E9/L (ref 0.8–5.3)
LYMPHOCYTES NFR BLD AUTO: 37.1 %
MCH RBC QN AUTO: 30.1 PG (ref 26.5–33)
MCHC RBC AUTO-ENTMCNC: 32.1 G/DL (ref 31.5–36.5)
MCV RBC AUTO: 94 FL (ref 78–100)
MONOCYTES # BLD AUTO: 0.6 10E9/L (ref 0–1.3)
MONOCYTES NFR BLD AUTO: 11.4 %
NEUTROPHILS # BLD AUTO: 2.6 10E9/L (ref 1.6–8.3)
NEUTROPHILS NFR BLD AUTO: 47.7 %
PLATELET # BLD AUTO: 385 10E9/L (ref 150–450)
RBC # BLD AUTO: 3.52 10E12/L (ref 3.8–5.2)
WBC # BLD AUTO: 5.4 10E9/L (ref 4–11)

## 2017-05-18 PROCEDURE — 85025 COMPLETE CBC W/AUTO DIFF WBC: CPT | Performed by: FAMILY MEDICINE

## 2017-05-18 PROCEDURE — 99214 OFFICE O/P EST MOD 30 MIN: CPT | Performed by: FAMILY MEDICINE

## 2017-05-18 PROCEDURE — 36415 COLL VENOUS BLD VENIPUNCTURE: CPT | Performed by: FAMILY MEDICINE

## 2017-05-18 RX ORDER — LOSARTAN POTASSIUM 50 MG/1
50 TABLET ORAL DAILY
Qty: 30 TABLET | Refills: 11
Start: 2017-05-18 | End: 2017-11-03 | Stop reason: DRUGHIGH

## 2017-05-18 RX ORDER — HYDROMORPHONE HYDROCHLORIDE 2 MG/1
2-4 TABLET ORAL EVERY 4 HOURS PRN
Qty: 30 TABLET | Refills: 0 | Status: SHIPPED | OUTPATIENT
Start: 2017-05-18 | End: 2018-02-02

## 2017-05-18 NOTE — PROGRESS NOTES
"  SUBJECTIVE:                                                    Candida Rose is a 76 year old female who presents to clinic today for the following health issues:    Candida is here today in follow up for her low hgb and BP.   She was seen 2 weeks ago after having a complicated recovery from L knee surgery.   She was weak, dizzy and hypotensive.   We held her BP meds and encouraged her to increase her dietary intake of iron.   Since that time, she has felt better.   Still having pain but tolerable with a few dilaudid weekly.   She is seeing her orthopedist in the next few days.    /78 (BP Location: Left arm, Patient Position: Chair, Cuff Size: Adult Large)  Pulse 84  Temp 98  F (36.7  C) (Oral)  Ht 5' 8\" (1.727 m)  Wt 230 lb (104.3 kg)  SpO2 100%  BMI 34.97 kg/m2    GENERAL: healthy, alert and mild distress  EYES: Eyes grossly normal to inspection, PERRL and conjunctivae and sclerae normal  HENT: ear canals and TM's normal, nose and mouth without ulcers or lesions  NECK: no adenopathy, no asymmetry, masses, or scars and thyroid normal to palpation  RESP: lungs clear to auscultation - no rales, rhonchi or wheezes  CV: regular rate and rhythm, normal S1 S2, no S3 or S4, no murmur, click or rub, no peripheral edema and peripheral pulses strong  MS: L leg limp.   Incision c/d/i  SKIN: no suspicious lesions or rashes  NEURO: Normal strength and tone, mentation intact and speech normal  PSYCH: mentation appears normal, affect normal/bright    ASSESSMENT:  1. S/P total knee replacement using cement, left  Refill pain med     - HYDROmorphone (DILAUDID) 2 MG tablet; Take 1-2 tablets (2-4 mg) by mouth every 4 hours as needed for moderate to severe pain  Dispense: 30 tablet; Refill: 0  - CBC with platelets and differential    2. HTN, goal below 140/90  Restart cozaar at 50 mg.   Pt to contact me if not at goal in 2 weeks    "

## 2017-05-18 NOTE — NURSING NOTE
"Chief Complaint   Patient presents with     RECHECK     Feeling much better - weaning off of dilaudid? - hasn't started PT yet       Initial /78 (BP Location: Left arm, Patient Position: Chair, Cuff Size: Adult Large)  Pulse 84  Temp 98  F (36.7  C) (Oral)  Ht 5' 8\" (1.727 m)  Wt 230 lb (104.3 kg)  SpO2 100%  BMI 34.97 kg/m2 Estimated body mass index is 34.97 kg/(m^2) as calculated from the following:    Height as of this encounter: 5' 8\" (1.727 m).    Weight as of this encounter: 230 lb (104.3 kg).  Medication Reconciliation: complete   Irma Erwin MA    "

## 2017-05-18 NOTE — MR AVS SNAPSHOT
After Visit Summary   5/18/2017    Candida Rose    MRN: [id number]           Patient Information     Date Of Birth          1940        Visit Information        Provider Department      5/18/2017 9:00 AM Anmol Liu MD [facility]        Today's Diagnoses     HTN, goal below 140/90    -  1    S/P total knee replacement using cement, left           Follow-ups after your visit        Your next 10 appointments already scheduled     May 25, 2017 10:00 AM CDT   Return Visit with Carlos Paz PA-C   [facility] ORTHOPEDIC SURGERY ([facility] [place])    [address]  [address]  [facility] [address]   [phone]              Who to contact     If you have questions or need follow up information about today's clinic visit or your schedule please contact [facility] directly at [phone].  Normal or non-critical lab and imaging results will be communicated to you by MyChart, letter or phone within 4 business days after the clinic has received the results. If you do not hear from us within 7 days, please contact the clinic through [employer] or phone. If you have a critical or abnormal lab result, we will notify you by phone as soon as possible.  Submit refill requests through [employer] or call your pharmacy and they will forward the refill request to us. Please allow 3 business days for your refill to be completed.          Additional Information About Your Visit        MyChart Information     [employer] gives you secure access to your electronic health record. If you see a primary care provider, you can also send messages to your care team and make appointments. If you have questions, please call your primary care clinic.  If you do not have a primary care provider, please call [phone] and they will assist you.        Care EveryWhere ID     This is your Care EveryWhere ID. This could be used by other organizations to access your  "[place] medical records  [id number]        Your Vitals Were     Pulse Temperature Height Pulse Oximetry BMI (Body Mass Index)       84 98  F (36.7  C) (Oral) 5' 8\" (1.727 m) 100% 34.97 kg/m2        Blood Pressure from Last 3 Encounters:   05/18/17 150/78   05/04/17 98/60   04/28/17 96/66    Weight from Last 3 Encounters:   05/18/17 230 lb (104.3 kg)   05/04/17 231 lb (104.8 kg)   04/17/17 240 lb (108.9 kg)              We Performed the Following     CBC with platelets and differential          Today's Medication Changes          These changes are accurate as of: 5/18/17  9:31 AM.  If you have any questions, ask your nurse or doctor.               Start taking these medicines.        Dose/Directions    losartan 50 MG tablet   Commonly known as:  COZAAR   Used for:  HTN, goal below 140/90   Started by:  Anmol Liu MD        Dose:  50 mg   Take 1 tablet (50 mg) by mouth daily   Quantity:  30 tablet   Refills:  11            Where to get your medicines      Some of these will need a paper prescription and others can be bought over the counter.  Ask your nurse if you have questions.     Bring a paper prescription for each of these medications     HYDROmorphone 2 MG tablet       You don't need a prescription for these medications     losartan 50 MG tablet                Primary Care Provider Office Phone # Fax #    Chani Trina Peoples [phone]       [facility] [address] [place] [address]        Thank you!     Thank you for choosing [facility]  for your care. Our goal is always to provide you with excellent care. Hearing back from our patients is one way we can continue to improve our services. Please take a few minutes to complete the written survey that you may receive in the mail after your visit with us. Thank you!             Your Updated Medication List - Protect others around you: Learn how to safely use, store and throw away your " medicines at www.disposemymeds.org.          This list is accurate as of: 5/18/17  9:31 AM.  Always use your most recent med list.                   Brand Name Dispense Instructions for use    acetaminophen 325 MG tablet    TYLENOL    100 tablet    Take 2 tablets (650 mg) by mouth every 4 hours as needed for other (surgical pain)       aspirin 81 MG tablet     30 tablet    Take 1 tablet (81 mg) by mouth daily       atenolol 25 MG tablet    TENORMIN    90 tablet    Take 1 tablet (25 mg) by mouth At Bedtime       CALCIUM 500 PO      Take 1 tablet by mouth daily.       clopidogrel 75 MG tablet    PLAVIX    90 tablet    TAKE 1 TABLET BY MOUTH DAILY       HYDROmorphone 2 MG tablet    DILAUDID    30 tablet    Take 1-2 tablets (2-4 mg) by mouth every 4 hours as needed for moderate to severe pain       losartan 50 MG tablet    COZAAR    30 tablet    Take 1 tablet (50 mg) by mouth daily       MULTIVITAMIN & MINERAL PO      Take 1 tablet by mouth daily.       order for DME     1 each    Equipment being ordered: Raised Toilet Seats () Treatment Diagnosis :decreased ADL's       senna-docusate 8.6-50 MG per tablet    SENOKOT-S;PERICOLACE    100 tablet    Take 1 tablet by mouth 2 times daily as needed for constipation       simvastatin 20 MG tablet    ZOCOR    90 tablet    Take 1 tablet (20 mg) by mouth At Bedtime

## 2017-05-24 DIAGNOSIS — I10 HTN, GOAL BELOW 140/90: ICD-10-CM

## 2017-05-24 RX ORDER — ATENOLOL 25 MG/1
TABLET ORAL
Qty: 90 TABLET | Refills: 1 | Status: SHIPPED | OUTPATIENT
Start: 2017-05-24 | End: 2017-11-03

## 2017-05-25 ENCOUNTER — OFFICE VISIT (OUTPATIENT)
Dept: ORTHOPEDICS | Facility: CLINIC | Age: 77
End: 2017-05-25
Payer: COMMERCIAL

## 2017-05-25 DIAGNOSIS — Z96.652 S/P TOTAL KNEE REPLACEMENT USING CEMENT, LEFT: ICD-10-CM

## 2017-05-25 DIAGNOSIS — Z47.89 ORTHOPEDIC AFTERCARE: Primary | ICD-10-CM

## 2017-05-25 PROCEDURE — 99024 POSTOP FOLLOW-UP VISIT: CPT | Performed by: PHYSICIAN ASSISTANT

## 2017-05-25 NOTE — MR AVS SNAPSHOT
After Visit Summary   5/25/2017    Candida Rose    MRN: [id number]           Patient Information     Date Of Birth          1940        Visit Information        Provider Department      5/25/2017 10:00 AM Carlos Paz PA-C [facility] ORTHOPEDIC SURGERY        Today's Diagnoses     Orthopedic aftercare    -  1      Care Instructions    Some providers recommend that from now on, you take a single dose antibiotic prior to any invasive procedure such as dental work, colonoscopy or surgery.  Please notify the provider of your implant prior to the procedure. They may prescribe an antibiotic for you.    It is recommended that you avoid invasive procedures such as the before mentioned for 4 months after surgery unless it is an emergency.    You may discontinue the aspirin or return to a dose recommended by your Primary care provider 6 weeks after surgery.    You may increase your activities as you can tolerate them.  It is recommended that you do not do prolonged kneeling.    Please follow up with your surgeon at one year from surgery for an X ray of your knee.            Follow-ups after your visit        Follow-up notes from your care team     Return in about 4 weeks (around 6/22/2017), or if symptoms worsen or fail to improve.      Who to contact     If you have questions or need follow up information about today's clinic visit or your schedule please contact [facility] ORTHOPEDIC SURGERY directly at [phone].  Normal or non-critical lab and imaging results will be communicated to you by MyChart, letter or phone within 4 business days after the clinic has received the results. If you do not hear from us within 7 days, please contact the clinic through MyChart or phone. If you have a critical or abnormal lab result, we will notify you by phone as soon as possible.  Submit refill requests through [employer]igo or call your pharmacy and they will forward the refill request to us. Please  allow 3 business days for your refill to be completed.          Additional Information About Your Visit        MyChart Information     [employer] gives you secure access to your electronic health record. If you see a primary care provider, you can also send messages to your care team and make appointments. If you have questions, please call your primary care clinic.  If you do not have a primary care provider, please call [phone] and they will assist you.        Care EveryWhere ID     This is your Care EveryWhere ID. This could be used by other organizations to access your [place] medical records  [id number]         Blood Pressure from Last 3 Encounters:   05/18/17 150/78   05/04/17 98/60   04/28/17 96/66    Weight from Last 3 Encounters:   05/18/17 230 lb (104.3 kg)   05/04/17 231 lb (104.8 kg)   04/17/17 240 lb (108.9 kg)              Today, you had the following     No orders found for display       Primary Care Provider Office Phone # Fax #    Chani Trina Peoples [phone]       [facility] [address] [place] [address]        Thank you!     Thank you for choosing [facility] ORTHOPEDIC SURGERY  for your care. Our goal is always to provide you with excellent care. Hearing back from our patients is one way we can continue to improve our services. Please take a few minutes to complete the written survey that you may receive in the mail after your visit with us. Thank you!             Your Updated Medication List - Protect others around you: Learn how to safely use, store and throw away your medicines at www.disposemymeds.org.          This list is accurate as of: 5/25/17 10:14 AM.  Always use your most recent med list.                   Brand Name Dispense Instructions for use    acetaminophen 325 MG tablet    TYLENOL    100 tablet    Take 2 tablets (650 mg) by mouth every 4 hours as needed for other (surgical pain)       aspirin 81 MG tablet     30  tablet    Take 1 tablet (81 mg) by mouth daily       atenolol 25 MG tablet    TENORMIN    90 tablet    TAKE 1 TABLET (25 MG) BY MOUTH AT BEDTIME       CALCIUM 500 PO      Take 1 tablet by mouth daily.       clopidogrel 75 MG tablet    PLAVIX    90 tablet    TAKE 1 TABLET BY MOUTH DAILY       HYDROmorphone 2 MG tablet    DILAUDID    30 tablet    Take 1-2 tablets (2-4 mg) by mouth every 4 hours as needed for moderate to severe pain       losartan 50 MG tablet    COZAAR    30 tablet    Take 1 tablet (50 mg) by mouth daily       MULTIVITAMIN & MINERAL PO      Take 1 tablet by mouth daily.       order for DME     1 each    Equipment being ordered: Raised Toilet Seats () Treatment Diagnosis :decreased ADL's       senna-docusate 8.6-50 MG per tablet    SENOKOT-S;PERICOLACE    100 tablet    Take 1 tablet by mouth 2 times daily as needed for constipation       simvastatin 20 MG tablet    ZOCOR    90 tablet    Take 1 tablet (20 mg) by mouth At Bedtime

## 2017-05-25 NOTE — PATIENT INSTRUCTIONS
Some providers recommend that from now on, you take a single dose antibiotic prior to any invasive procedure such as dental work, colonoscopy or surgery.  Please notify the provider of your implant prior to the procedure. They may prescribe an antibiotic for you.    It is recommended that you avoid invasive procedures such as the before mentioned for 4 months after surgery unless it is an emergency.    You may discontinue the aspirin or return to a dose recommended by your Primary care provider 6 weeks after surgery.    You may increase your activities as you can tolerate them.  It is recommended that you do not do prolonged kneeling.    Please follow up with your surgeon at one year from surgery for an X ray of your knee.

## 2017-05-25 NOTE — PROGRESS NOTES
HISTORY OF PRESENT ILLNESS:    Candida Rose is a 76 year old female who is seen in follow up for L TKA, DOS, 4/17/27, Dr. Jenkins ..  Present symptoms: doing well.  Went to PCP who adjusted meds and is feeling better now.  PCP refilled dilaudid, taking ave 1 per day at night. Tylenol PRN.  Using cane for ambulation.  No PT yet.  No new complaints.    Denies Chest pain, Calve pain, Fever, Chills.    Current Treatment: post op , pain medication.    PHYSICAL EXAM:  There were no vitals taken for this visit.  There is no height or weight on file to calculate BMI.   GENERAL APPEARANCE: healthy, alert and no distress   PSYCH:  mentation appears normal and affect normal/bright    MSK:  Left: Knee .  Ambulates: WNG, with cane..  Incision clean and dry, well healing.   Noincisional erythema.   No Ecchymosis.  No calve pain on palpation.  Edema Difficult to assess due to BH.   CMS: vasile incisional numbness, otherwise grossly intact.  AROM Flexion 0-110.      IMAGING INTERPRETATION:  None today.     ASSESSMENT:  Candida Rose is a 76 year old female S/P L TKA, DOS, 4/17/27, Dr. Jenkins ..  Doing very well.     PLAN:  - Care instructions given and verbally acknowledged.  - Medications: as current.  - Physical therapy: new referral for home exercises.    - AAT    Return to clinic 4, weeks.    Carlos Paz PA-C    Dept. Orthopedic Surgery  [facility]   5/25/2017

## 2017-05-30 ENCOUNTER — THERAPY VISIT (OUTPATIENT)
Dept: PHYSICAL THERAPY | Facility: CLINIC | Age: 77
End: 2017-05-30
Payer: MEDICARE

## 2017-05-30 DIAGNOSIS — Z47.89 AFTERCARE FOLLOWING SURGERY OF THE MUSCULOSKELETAL SYSTEM: ICD-10-CM

## 2017-05-30 DIAGNOSIS — G89.29 CHRONIC PAIN OF LEFT KNEE: Primary | ICD-10-CM

## 2017-05-30 DIAGNOSIS — M25.562 CHRONIC PAIN OF LEFT KNEE: Primary | ICD-10-CM

## 2017-05-30 PROCEDURE — G8979 MOBILITY GOAL STATUS: HCPCS | Mod: GP | Performed by: PHYSICAL THERAPIST

## 2017-05-30 PROCEDURE — 97161 PT EVAL LOW COMPLEX 20 MIN: CPT | Mod: GP | Performed by: PHYSICAL THERAPIST

## 2017-05-30 PROCEDURE — 97110 THERAPEUTIC EXERCISES: CPT | Mod: GP | Performed by: PHYSICAL THERAPIST

## 2017-05-30 PROCEDURE — G8978 MOBILITY CURRENT STATUS: HCPCS | Mod: GP | Performed by: PHYSICAL THERAPIST

## 2017-05-30 ASSESSMENT — ACTIVITIES OF DAILY LIVING (ADL)
GO UP STAIRS: NOT ANSWERED
HOW_WOULD_YOU_RATE_THE_CURRENT_FUNCTION_OF_YOUR_KNEE_DURING_YOUR_USUAL_DAILY_ACTIVITIES_ON_A_SCALE_FROM_0_TO_100_WITH_100_BEING_YOUR_LEVEL_OF_KNEE_FUNCTION_PRIOR_TO_YOUR_INJURY_AND_0_BEING_THE_INABILITY_TO_PERFORM_ANY_OF_YOUR_USUAL_DAILY_ACTIVITIES?: 50
WALK: ACTIVITY IS SOMEWHAT DIFFICULT
SQUAT: ACTIVITY IS VERY DIFFICULT
RISE FROM A CHAIR: ACTIVITY IS SOMEWHAT DIFFICULT
KNEE_ACTIVITY_OF_DAILY_LIVING_SUM: 31
WEAKNESS: THE SYMPTOM AFFECTS MY ACTIVITY MODERATELY
AS_A_RESULT_OF_YOUR_KNEE_INJURY,_HOW_WOULD_YOU_RATE_YOUR_CURRENT_LEVEL_OF_DAILY_ACTIVITY?: NEARLY NORMAL
LIMPING: THE SYMPTOM AFFECTS MY ACTIVITY MODERATELY
GIVING WAY, BUCKLING OR SHIFTING OF KNEE: I HAVE THE SYMPTOM BUT IT DOES NOT AFFECT MY ACTIVITY
SIT WITH YOUR KNEE BENT: ACTIVITY IS SOMEWHAT DIFFICULT
PAIN: THE SYMPTOM AFFECTS MY ACTIVITY MODERATELY
STAND: ACTIVITY IS MINIMALLY DIFFICULT
STIFFNESS: THE SYMPTOM AFFECTS MY ACTIVITY MODERATELY
KNEEL ON THE FRONT OF YOUR KNEE: ACTIVITY IS VERY DIFFICULT
GO DOWN STAIRS: NOT ANSWERED
HOW_WOULD_YOU_RATE_THE_OVERALL_FUNCTION_OF_YOUR_KNEE_DURING_YOUR_USUAL_DAILY_ACTIVITIES?: NEARLY NORMAL
SWELLING: I HAVE THE SYMPTOM BUT IT DOES NOT AFFECT MY ACTIVITY

## 2017-05-30 NOTE — LETTER
DEPARTMENT OF HEALTH AND HUMAN SERVICES  CENTERS FOR MEDICARE & MEDICAID SERVICES    PLAN/UPDATED PLAN OF PROGRESS FOR OUTPATIENT REHABILITATION    PATIENTS NAME:  Candida Rose   : 1940  PR[facility] NUMBER:    [id number]  [id number]  PROVIDER NAME: [facility]  MEDICAL RECORD NUMBER: [id number]   START OF CARE DATE:  SOC Date: 17   TYPE:  PT    PRIMARY/TREATMENT DIAGNOSIS: (Pertinent Medical Diagnosis)     Chronic pain of left knee  Aftercare following surgery of the musculoskeletal system    VISITS FROM START OF CARE:  Rxs Used: 1     Subjective:    Patient is a 76 year old female presenting with rehab left knee hpi.   Candida Rose is a 76 year old female with a left knee condition.  Condition occurred with:  Degenerative joint disease.  Condition occurred: other.  This is a chronic condition  10 year history of left knee pain secondary to OA. Pt did not respond to conservative care Left TKA 17. Pt referred by MD for physical therapy on 17.    Patient reports pain:  Anterior.    Pain is described as aching and is intermittent and reported as 6/10.  Associated symptoms:  Edema, loss of motion/stiffness and loss of strength. Pain is worse in the P.M..  Symptoms are exacerbated by walking, standing, ascending stairs, descending stairs, kneeling and bending/squatting and relieved by ice, rest and analgesics.  Since onset symptoms are gradually improving.  Special tests:  X-ray (see report).  Previous treatment includes physical therapy (in hospital after surgery).  There was moderate improvement following previous treatment.  General health as reported by patient is good.  Pertinent medical history includes:  High blood pressure, overweight, implanted device and osteoarthritis.  Medical allergies: no.  Other surgeries include:  Orthopedic surgery and other (hip replacements,  brain surgery).  Current medications:  High blood pressure medication.  Current occupation is retired.       Red flags:  None as reported by the patient.  Objective:  Gait:  Quad cane  Knee Evaluation:  ROM:    AROM  Hyperextension: Left:  0    Right:  Extension: Left: 0    Right:   Flexion: Left: 90   Right:  PROM  Hyperextension: Left: 0   Right:   Extension: Left: 0   Right:   Flexion: Left: 105   Right:   Strength:   Extension:  Left: 4-/5   Pain:        Flexion:  Left: 4/5   Pain:          PATIENTS NAME:  Candida Rose   : 1940    Palpation:    Left knee tenderness present at:  Patellar Superior and Patellar Inferior  Edema:  Edema of the knee: moderate edema left knee.  Assessment/Plan:    Patient is a 76 year old female with left side knee complaints.    Patient has the following significant findings with corresponding treatment plan.                Diagnosis 1:  Left TKA  Pain -  hot/cold therapy, manual therapy, self management, education and home program  Decreased ROM/flexibility - manual therapy, therapeutic exercise, therapeutic activity and home program  Decreased strength - therapeutic exercise, therapeutic activities and home program    Therapy Evaluation Codes:   1) History comprised of:   Personal factors that impact the plan of care:      Time since onset of symptoms.    Comorbidity factors that impact the plan of care are:      High blood pressure, Osteoarthritis, Overweight and Weakness.     Medications impacting care: High blood pressure.  2) Examination of Body Systems comprised of:   Body structures and functions that impact the plan of care:      Ankle, Hip and Knee.   Activity limitations that impact the plan of care are:      Bathing, Bending, Squatting/kneeling, Stairs and Walking.  3) Clinical presentation characteristics are:   Stable/Uncomplicated.  4) Decision-Making    Low complexity using standardized patient assessment instrument and/or measureable assessment of functional outcome.  Cumulative Therapy Evaluation is: Low complexity.    Previous and current functional limitations:   "(See Goal Flow Sheet for this information)    Short term and Long term goals: (See Goal Flow Sheet for this information)     Communication ability:  Patient appears to be able to clearly communicate and understand verbal and written communication and follow directions correctly.  Treatment Explanation - The following has been discussed with the patient:   RX ordered/plan of care  Anticipated outcomes  Possible risks and side effects  This patient would benefit from PT intervention to resume normal activities.   Rehab potential is good.                      PATIENTS NAME:  Candida Rose   : 1940    Frequency:  1 X week, once daily  Duration:  for 8 weeks  Discharge Plan:  Achieve all LTG.  Independent in home treatment program.  Reach maximal therapeutic benefit.       Caregiver Signature/Credentials _____________________________ Date ________            Treating Provider: Johnathan Subramanian, PT   I have reviewed and certified the need for these services and plan of treatment while under my care.        PHYSICIAN'S SIGNATURE:   ____________________________________  Date___________     Chidi Jenkins    Certification period:  Beginning of Cert date period: 17 to  End of Cert period date: 17     Functional Level Progress Report: Please see attached \"Goal Flow sheet for Functional level.\"    ____X____ Continue Services or       ________ DC Services                Service dates: From  SOC Date: 17 date to present                         "

## 2017-05-30 NOTE — MR AVS SNAPSHOT
After Visit Summary   5/30/2017    Candida Rose    MRN: [id number]           Patient Information     Date Of Birth          1940        Visit Information        Provider Department      5/30/2017 9:30 AM Johnathan Subramanian, PT [facility]        Today's Diagnoses     Chronic pain of left knee    -  1    Aftercare following surgery of the musculoskeletal system           Follow-ups after your visit        Your next 10 appointments already scheduled     Jun 07, 2017  2:00 PM CDT   [facility] Extremity with Johnathan Subramanian, PT   [facility] ([facility] [place]  )    [id number] [place] [address]   [phone]            Jun 13, 2017  7:30 AM CDT   [facility] Extremity with Johnathan Subramanian, PT   [facility] ([facility] [place]  )    [address] [place] [address]   [phone]            Jun 20, 2017  7:30 AM CDT   [facility] Extremity with Johnathan Subramanian, PT   [facility] [place] [facility] ([facility] [place]  )    [address] [place] [address]   [phone]            Jun 27, 2017  7:30 AM CDT   [facility] Extremity with Johnathan Subramanian, PT   [facility] [place] [facility] ([facility] [place]  )    [address] [place] [address]   [phone]              Who to contact     If you have questions or need follow up information about today's clinic visit or your schedule please contact [facility] directly at [phone].  Normal or non-critical lab and imaging results will be communicated to you by MyChart, letter or phone within 4 business days after the clinic has received the results. If you do not hear from us within 7 days, please contact the clinic through Drynchart or phone. If you have a critical or abnormal lab result, we will notify you by phone as soon as possible.  Submit refill requests through Sulia or call your pharmacy and they will forward the refill request to us. Please allow 3 business days for your refill to be completed.           Additional Information About Your Visit        MyChart Information     [employer] gives you secure access to your electronic health record. If you see a primary care provider, you can also send messages to your care team and make appointments. If you have questions, please call your primary care clinic.  If you do not have a primary care provider, please call [phone] and they will assist you.        Care EveryWhere ID     This is your Care EveryWhere ID. This could be used by other organizations to access your [place] medical records  [id number]         Blood Pressure from Last 3 Encounters:   05/18/17 150/78   05/04/17 98/60   04/28/17 96/66    Weight from Last 3 Encounters:   05/18/17 104.3 kg (230 lb)   05/04/17 104.8 kg (231 lb)   04/17/17 108.9 kg (240 lb)              We Performed the Following     [facility] CERT REPORT     [facility] Inital Eval Report     PT Eval, Low Complexity ([id number])     Therapeutic Exercises        Primary Care Provider Office Phone # Fax #    Chani Trina Peoples [phone]       [facility] [address] [place] [address]        Thank you!     Thank you for choosing [facility]  for your care. Our goal is always to provide you with excellent care. Hearing back from our patients is one way we can continue to improve our services. Please take a few minutes to complete the written survey that you may receive in the mail after your visit with us. Thank you!             Your Updated Medication List - Protect others around you: Learn how to safely use, store and throw away your medicines at www.disposemymeds.org.          This list is accurate as of: 5/30/17 11:36 AM.  Always use your most recent med list.                   Brand Name Dispense Instructions for use    acetaminophen 325 MG tablet    TYLENOL    100 tablet    Take 2 tablets (650 mg) by mouth every 4 hours as needed for other (surgical pain)       aspirin 81 MG tablet     30  tablet    Take 1 tablet (81 mg) by mouth daily       atenolol 25 MG tablet    TENORMIN    90 tablet    TAKE 1 TABLET (25 MG) BY MOUTH AT BEDTIME       CALCIUM 500 PO      Take 1 tablet by mouth daily.       clopidogrel 75 MG tablet    PLAVIX    90 tablet    TAKE 1 TABLET BY MOUTH DAILY       HYDROmorphone 2 MG tablet    DILAUDID    30 tablet    Take 1-2 tablets (2-4 mg) by mouth every 4 hours as needed for moderate to severe pain       losartan 50 MG tablet    COZAAR    30 tablet    Take 1 tablet (50 mg) by mouth daily       MULTIVITAMIN & MINERAL PO      Take 1 tablet by mouth daily.       order for DME     1 each    Equipment being ordered: Raised Toilet Seats () Treatment Diagnosis :decreased ADL's       senna-docusate 8.6-50 MG per tablet    SENOKOT-S;PERICOLACE    100 tablet    Take 1 tablet by mouth 2 times daily as needed for constipation       simvastatin 20 MG tablet    ZOCOR    90 tablet    Take 1 tablet (20 mg) by mouth At Bedtime

## 2017-05-30 NOTE — PROGRESS NOTES
Subjective:    Patient is a 76 year old female presenting with rehab left knee hpi.   Candida Rose is a 76 year old female with a left knee condition.  Condition occurred with:  Degenerative joint disease.  Condition occurred: other.  This is a chronic condition  10 year history of left knee pain secondary to OA. Pt did not respond to conservative care Left TKA 4-17-17. Pt referred by MD for physical therapy on 5-25-17.    Patient reports pain:  Anterior.    Pain is described as aching and is intermittent and reported as 6/10.  Associated symptoms:  Edema, loss of motion/stiffness and loss of strength. Pain is worse in the P.M..  Symptoms are exacerbated by walking, standing, ascending stairs, descending stairs, kneeling and bending/squatting and relieved by ice, rest and analgesics.  Since onset symptoms are gradually improving.  Special tests:  X-ray (see report).  Previous treatment includes physical therapy (in hospital after surgery).  There was moderate improvement following previous treatment.  General health as reported by patient is good.  Pertinent medical history includes:  High blood pressure, overweight, implanted device and osteoarthritis.  Medical allergies: no.  Other surgeries include:  Orthopedic surgery and other (hip replacements,  brain surgery).  Current medications:  High blood pressure medication.  Current occupation is retired.            Red flags:  None as reported by the patient.                        Objective:      Gait:  Quad cane                                                          Knee Evaluation:  ROM:    AROM    Hyperextension: Left:  0    Right:  Extension: Left: 0    Right:   Flexion: Left: 90   Right:  PROM    Hyperextension: Left: 0   Right:   Extension: Left: 0   Right:   Flexion: Left: 105   Right:       Strength:     Extension:  Left: 4-/5   Pain:        Flexion:  Left: 4/5   Pain:                Palpation:    Left knee tenderness present at:  Patellar Superior and  Patellar Inferior    Edema:  Edema of the knee: moderate edema left knee.            General     ROS    Assessment/Plan:      Patient is a 76 year old female with left side knee complaints.    Patient has the following significant findings with corresponding treatment plan.                Diagnosis 1:  Left TKA  Pain -  hot/cold therapy, manual therapy, self management, education and home program  Decreased ROM/flexibility - manual therapy, therapeutic exercise, therapeutic activity and home program  Decreased strength - therapeutic exercise, therapeutic activities and home program    Therapy Evaluation Codes:   1) History comprised of:   Personal factors that impact the plan of care:      Time since onset of symptoms.    Comorbidity factors that impact the plan of care are:      High blood pressure, Osteoarthritis, Overweight and Weakness.     Medications impacting care: High blood pressure.  2) Examination of Body Systems comprised of:   Body structures and functions that impact the plan of care:      Ankle, Hip and Knee.   Activity limitations that impact the plan of care are:      Bathing, Bending, Squatting/kneeling, Stairs and Walking.  3) Clinical presentation characteristics are:   Stable/Uncomplicated.  4) Decision-Making    Low complexity using standardized patient assessment instrument and/or measureable assessment of functional outcome.  Cumulative Therapy Evaluation is: Low complexity.    Previous and current functional limitations:  (See Goal Flow Sheet for this information)    Short term and Long term goals: (See Goal Flow Sheet for this information)     Communication ability:  Patient appears to be able to clearly communicate and understand verbal and written communication and follow directions correctly.  Treatment Explanation - The following has been discussed with the patient:   RX ordered/plan of care  Anticipated outcomes  Possible risks and side effects  This patient would benefit from PT  intervention to resume normal activities.   Rehab potential is good.    Frequency:  1 X week, once daily  Duration:  for 8 weeks  Discharge Plan:  Achieve all LTG.  Independent in home treatment program.  Reach maximal therapeutic benefit.    Please refer to the daily flowsheet for treatment today, total treatment time and time spent performing 1:1 timed codes.

## 2017-05-31 DIAGNOSIS — E78.5 HYPERLIPIDEMIA LDL GOAL <130: ICD-10-CM

## 2017-05-31 NOTE — TELEPHONE ENCOUNTER
Simvastatin     Last Written Prescription Date: 11/01/16  Last Fill Quantity: 90, # refills: 1  Last Office Visit with G, P or [facility] prescribing provider: 05/18/17 Alexa       Lab Results   Component Value Date    CHOL 179 01/18/2017     Lab Results   Component Value Date    HDL 52 01/18/2017     Lab Results   Component Value Date    LDL 99 01/18/2017     Lab Results   Component Value Date    TRIG 141 01/18/2017     Lab Results   Component Value Date    CHOLHDLRATIO 3.2 06/16/2015       Labs showing if normal/abnormal  Lab Results   Component Value Date    CHOL 179 01/18/2017    TRIG 141 01/18/2017    HDL 52 01/18/2017    LDL 99 01/18/2017    VLDL 29 06/16/2015    CHOLHDLRATIO 3.2 06/16/2015

## 2017-06-02 RX ORDER — SIMVASTATIN 20 MG
TABLET ORAL
Qty: 90 TABLET | Refills: 1 | Status: SHIPPED | OUTPATIENT
Start: 2017-06-02 | End: 2017-11-03

## 2017-06-07 ENCOUNTER — THERAPY VISIT (OUTPATIENT)
Dept: PHYSICAL THERAPY | Facility: CLINIC | Age: 77
End: 2017-06-07
Payer: MEDICARE

## 2017-06-07 DIAGNOSIS — M25.562 CHRONIC PAIN OF LEFT KNEE: ICD-10-CM

## 2017-06-07 DIAGNOSIS — Z47.89 AFTERCARE FOLLOWING SURGERY OF THE MUSCULOSKELETAL SYSTEM: ICD-10-CM

## 2017-06-07 DIAGNOSIS — G89.29 CHRONIC PAIN OF LEFT KNEE: ICD-10-CM

## 2017-06-07 PROCEDURE — 97110 THERAPEUTIC EXERCISES: CPT | Mod: GP | Performed by: PHYSICAL THERAPIST

## 2017-06-07 PROCEDURE — 97530 THERAPEUTIC ACTIVITIES: CPT | Mod: GP | Performed by: PHYSICAL THERAPIST

## 2017-06-07 NOTE — PROGRESS NOTES
Subjective:    HPI                    Objective:    System    Physical Exam    General     ROS    Assessment/Plan:      SUBJECTIVE  Subjective changes as noted by pt:  Pt notes decreased pain and increased strength in the legs     Current pain level: 4/10     Changes in function:  Pt notes increased ease with ambulation     Adverse reaction to treatment or activity:  None    OBJECTIVE  Changes in objective findings:  Eccentric quad weakness noted descending stairs. PROM left knee flexion 106        ASSESSMENT  Candida continues to require intervention to meet STG and LTG's: PT  Patient's symptoms are resolving.  Response to therapy has shown an improvement in  pain level and strength  Progress made towards STG/LTG?  Yes,     PLAN  Current treatment program is being advanced to more complex exercises.    PTA/ATC plan:  N/A    Please refer to the daily flowsheet for treatment today, total treatment time and time spent performing 1:1 timed codes.

## 2017-06-07 NOTE — MR AVS SNAPSHOT
After Visit Summary   6/7/2017    Candida Rose    MRN: [id number]           Patient Information     Date Of Birth          1940        Visit Information        Provider Department      6/7/2017 2:00 PM Johnathan Subramanian, PT [facility]        Today's Diagnoses     Chronic pain of left knee        Aftercare following surgery of the musculoskeletal system           Follow-ups after your visit        Your next 10 appointments already scheduled     Jun 13, 2017  7:30 AM CDT   [facility] Extremity with Johnathan Subramanian, PT   [facility] ([facility] [place]  )    [address] [place] [address]   [phone]            Jun 20, 2017  7:30 AM CDT   [facility] Extremity with Johnathan Subramanian, PT   [facility] ([facility] [place]  )    [address] [place] [address]   [phone]            Jun 27, 2017  7:30 AM CDT   [facility] Extremity with Johnathan Subramanian, PT   [facility] ([facility] [place]  )    [address] [place] [address]   [phone]              Who to contact     If you have questions or need follow up information about today's clinic visit or your schedule please contact [facility] directly at [phone].  Normal or non-critical lab and imaging results will be communicated to you by FashionGuidehart, letter or phone within 4 business days after the clinic has received the results. If you do not hear from us within 7 days, please contact the clinic through FashionGuidehart or phone. If you have a critical or abnormal lab result, we will notify you by phone as soon as possible.  Submit refill requests through [employer] or call your pharmacy and they will forward the refill request to us. Please allow 3 business days for your refill to be completed.          Additional Information About Your Visit        FashionGuidehart Information     [employer] gives you secure access to your electronic health record. If you see a primary care provider, you can also send  messages to your care team and make appointments. If you have questions, please call your primary care clinic.  If you do not have a primary care provider, please call [phone] and they will assist you.        Care EveryWhere ID     This is your Care EveryWhere ID. This could be used by other organizations to access your [place] medical records  [id number]         Blood Pressure from Last 3 Encounters:   05/18/17 150/78   05/04/17 98/60   04/28/17 96/66    Weight from Last 3 Encounters:   05/18/17 104.3 kg (230 lb)   05/04/17 104.8 kg (231 lb)   04/17/17 108.9 kg (240 lb)              We Performed the Following     Therapeutic Activities     Therapeutic Exercises        Primary Care Provider Office Phone # Fax #    Chani Trina Peoples [phone]       [facility] [address] [facility] [address]        Thank you!     Thank you for choosing [facility]  for your care. Our goal is always to provide you with excellent care. Hearing back from our patients is one way we can continue to improve our services. Please take a few minutes to complete the written survey that you may receive in the mail after your visit with us. Thank you!             Your Updated Medication List - Protect others around you: Learn how to safely use, store and throw away your medicines at www.disposemymeds.org.          This list is accurate as of: 6/7/17  2:54 PM.  Always use your most recent med list.                   Brand Name Dispense Instructions for use    acetaminophen 325 MG tablet    TYLENOL    100 tablet    Take 2 tablets (650 mg) by mouth every 4 hours as needed for other (surgical pain)       aspirin 81 MG tablet     30 tablet    Take 1 tablet (81 mg) by mouth daily       atenolol 25 MG tablet    TENORMIN    90 tablet    TAKE 1 TABLET (25 MG) BY MOUTH AT BEDTIME       CALCIUM 500 PO      Take 1 tablet by mouth daily.       clopidogrel 75 MG tablet    PLAVIX    90 tablet     TAKE 1 TABLET BY MOUTH DAILY       HYDROmorphone 2 MG tablet    DILAUDID    30 tablet    Take 1-2 tablets (2-4 mg) by mouth every 4 hours as needed for moderate to severe pain       losartan 50 MG tablet    COZAAR    30 tablet    Take 1 tablet (50 mg) by mouth daily       MULTIVITAMIN & MINERAL PO      Take 1 tablet by mouth daily.       order for DME     1 each    Equipment being ordered: Raised Toilet Seats () Treatment Diagnosis :decreased ADL's       senna-docusate 8.6-50 MG per tablet    SENOKOT-S;PERICOLACE    100 tablet    Take 1 tablet by mouth 2 times daily as needed for constipation       simvastatin 20 MG tablet    ZOCOR    90 tablet    TAKE 1 TABLET (20 MG) BY MOUTH AT BEDTIME

## 2017-06-08 DIAGNOSIS — I10 HTN, GOAL BELOW 140/90: ICD-10-CM

## 2017-06-08 RX ORDER — LOSARTAN POTASSIUM 50 MG/1
TABLET ORAL
Qty: 180 TABLET | Refills: 0 | Status: SHIPPED | OUTPATIENT
Start: 2017-06-08 | End: 2017-10-06

## 2017-06-08 NOTE — TELEPHONE ENCOUNTER
Cozaar 50 mg      Last Written Prescription Date: Was put on hold 5/4/17 due to low BP.  5/18/17 chart note states to resume but only entered as no print out.    BP Readings from Last 3 Encounters:   05/18/17 150/78   05/04/17 98/60   04/28/17 96/66

## 2017-06-09 ENCOUNTER — TELEPHONE (OUTPATIENT)
Dept: INTERNAL MEDICINE | Facility: CLINIC | Age: 77
End: 2017-06-09

## 2017-06-10 NOTE — TELEPHONE ENCOUNTER
When she feels well with her L knee ( s/p replacement) I would like her to have nurse appointment for BP checked - it was high on 5/18.

## 2017-06-13 ENCOUNTER — THERAPY VISIT (OUTPATIENT)
Dept: PHYSICAL THERAPY | Facility: CLINIC | Age: 77
End: 2017-06-13
Payer: MEDICARE

## 2017-06-13 DIAGNOSIS — G89.29 CHRONIC PAIN OF LEFT KNEE: ICD-10-CM

## 2017-06-13 DIAGNOSIS — M25.562 CHRONIC PAIN OF LEFT KNEE: ICD-10-CM

## 2017-06-13 DIAGNOSIS — Z47.89 AFTERCARE FOLLOWING SURGERY OF THE MUSCULOSKELETAL SYSTEM: ICD-10-CM

## 2017-06-13 PROCEDURE — 97110 THERAPEUTIC EXERCISES: CPT | Mod: GP | Performed by: PHYSICAL THERAPIST

## 2017-06-13 PROCEDURE — 97530 THERAPEUTIC ACTIVITIES: CPT | Mod: GP | Performed by: PHYSICAL THERAPIST

## 2017-06-15 NOTE — TELEPHONE ENCOUNTER
Panel Management Review      Patient has the following on her problem list:     Hypertension   Last three blood pressure readings:  BP Readings from Last 3 Encounters:   05/18/17 150/78   05/04/17 98/60   04/28/17 96/66     Blood pressure: FAILED    HTN Guidelines:  Age 18-59 BP range:  Less than 140/90  Age 60-85 with Diabetes:  Less than 140/90  Age 60-85 without Diabetes:  less than 150/90      Composite cancer screening  Chart review shows that this patient is due/due soon for the following per dr Farrell - she would like patient to come in for a B/P check with the nurse   Summary:    Patient is due/failing the following:   BP CHECK    Action needed:   Patient needs nurse visit for B/P check .    Type of outreach:    Phone, left message for patient to call back.     Questions for provider review:    None                                                                                                                                    MALORIE Juarez LPN       Chart routed to Care Team .

## 2017-06-15 NOTE — TELEPHONE ENCOUNTER
"  Pediatric Panel Management Review      Patient has the following on her problem list: {Peds Problems to Review:156103}    Summary:    Patient is due/failing the following:   {[facility] Peds Due Items:949400}.    Action needed:   {[facility] Action:245308}.    Type of outreach:    {PEDS [facility] PT CONTACT:451351}    Questions for provider review:    {NONE:174141::\"None\"}.                                                                                                                                    {staff signature}     Chart routed to {Peds Care Team / Provider:933984} .            "

## 2017-06-20 ENCOUNTER — THERAPY VISIT (OUTPATIENT)
Dept: PHYSICAL THERAPY | Facility: CLINIC | Age: 77
End: 2017-06-20
Payer: MEDICARE

## 2017-06-20 DIAGNOSIS — Z47.89 AFTERCARE FOLLOWING SURGERY OF THE MUSCULOSKELETAL SYSTEM: ICD-10-CM

## 2017-06-20 DIAGNOSIS — G89.29 CHRONIC PAIN OF LEFT KNEE: ICD-10-CM

## 2017-06-20 DIAGNOSIS — M25.562 CHRONIC PAIN OF LEFT KNEE: ICD-10-CM

## 2017-06-20 PROCEDURE — 97110 THERAPEUTIC EXERCISES: CPT | Mod: GP | Performed by: PHYSICAL THERAPIST

## 2017-06-20 PROCEDURE — 97530 THERAPEUTIC ACTIVITIES: CPT | Mod: GP | Performed by: PHYSICAL THERAPIST

## 2017-06-20 NOTE — MR AVS SNAPSHOT
After Visit Summary   6/20/2017    Candida Rose    MRN: [id number]           Patient Information     Date Of Birth          1940        Visit Information        Provider Department      6/20/2017 7:30 AM Johnathan Subramanian, PT [facility]        Today's Diagnoses     Chronic pain of left knee        Aftercare following surgery of the musculoskeletal system           Follow-ups after your visit        Your next 10 appointments already scheduled     Jun 22, 2017  8:30 AM CDT   Nurse Only with [facility] IM NURSE   [facility] ([facility])    [address]  [facility] [id number] [phone]            Jun 27, 2017  7:30 AM CDT   [facility] Extremity with KARLA Palacio PT ([facility]  )    [address]  [address]  [facility] [address]   [phone]              Who to contact     If you have questions or need follow up information about today's clinic visit or your schedule please contact [facility] directly at [phone].  Normal or non-critical lab and imaging results will be communicated to you by StarM[employer] Interactive[employer], letter or phone within 4 business days after the clinic has received the results. If you do not hear from us within 7 days, please contact the clinic through [employer] or phone. If you have a critical or abnormal lab result, we will notify you by phone as soon as possible.  Submit refill requests through [employer] or call your pharmacy and they will forward the refill request to us. Please allow 3 business days for your refill to be completed.          Additional Information About Your Visit        MyChart Information     [employer] gives you secure access to your electronic health record. If you see a primary care provider, you can also send messages to your care team and make appointments. If you have questions, please call your primary care clinic.  If you do not have a primary care provider, please call [phone]  and they will assist you.        Care EveryWhere ID     This is your Care EveryWhere ID. This could be used by other organizations to access your [place] medical records  [id number]         Blood Pressure from Last 3 Encounters:   05/18/17 150/78   05/04/17 98/60   04/28/17 96/66    Weight from Last 3 Encounters:   05/18/17 104.3 kg (230 lb)   05/04/17 104.8 kg (231 lb)   04/17/17 108.9 kg (240 lb)              We Performed the Following     Therapeutic Activities     Therapeutic Exercises        Primary Care Provider Office Phone # Fax #    Chani Trina Peoples [phone]       [facility] [address] [facility] [address]        Thank you!     Thank you for choosing [facility]  for your care. Our goal is always to provide you with excellent care. Hearing back from our patients is one way we can continue to improve our services. Please take a few minutes to complete the written survey that you may receive in the mail after your visit with us. Thank you!             Your Updated Medication List - Protect others around you: Learn how to safely use, store and throw away your medicines at www.disposemymeds.org.          This list is accurate as of: 6/20/17  8:10 AM.  Always use your most recent med list.                   Brand Name Dispense Instructions for use    acetaminophen 325 MG tablet    TYLENOL    100 tablet    Take 2 tablets (650 mg) by mouth every 4 hours as needed for other (surgical pain)       aspirin 81 MG tablet     30 tablet    Take 1 tablet (81 mg) by mouth daily       atenolol 25 MG tablet    TENORMIN    90 tablet    TAKE 1 TABLET (25 MG) BY MOUTH AT BEDTIME       CALCIUM 500 PO      Take 1 tablet by mouth daily.       clopidogrel 75 MG tablet    PLAVIX    90 tablet    TAKE 1 TABLET BY MOUTH DAILY       HYDROmorphone 2 MG tablet    DILAUDID    30 tablet    Take 1-2 tablets (2-4 mg) by mouth every 4 hours as needed for moderate to severe pain        * losartan 50 MG tablet    COZAAR    30 tablet    Take 1 tablet (50 mg) by mouth daily       * losartan 50 MG tablet    COZAAR    180 tablet    TAKE 1 TABLET BY MOUTH 2 TIMES DAILY       MULTIVITAMIN & MINERAL PO      Take 1 tablet by mouth daily.       order for DME     1 each    Equipment being ordered: Raised Toilet Seats () Treatment Diagnosis :decreased ADL's       senna-docusate 8.6-50 MG per tablet    SENOKOT-S;PERICOLACE    100 tablet    Take 1 tablet by mouth 2 times daily as needed for constipation       simvastatin 20 MG tablet    ZOCOR    90 tablet    TAKE 1 TABLET (20 MG) BY MOUTH AT BEDTIME       * Notice:  This list has 2 medication(s) that are the same as other medications prescribed for you. Read the directions carefully, and ask your doctor or other care provider to review them with you.

## 2017-06-20 NOTE — PROGRESS NOTES
Subjective:    HPI                    Objective:    System    Physical Exam    General     ROS    Assessment/Plan:      SUBJECTIVE  Subjective changes as noted by pt:  Pt reports she was walking without her cane and fell landing on her right knee on 6-15-17     Current pain level: 5/10     Changes in function:  Pt still notes difficulty with walking without cane     Adverse reaction to treatment or activity:  None    OBJECTIVE  Changes in objective findings:  Moderate point tenderness medial joint line left knee. PROM knee flexion 115        ASSESSMENT  Candida continues to require intervention to meet STG and LTG's: PT  Patient has experienced an exacerbation of symptoms.  Response to therapy has shown a worsening of  pain level  Progress made towards STG/LTG?  Pt had been progressing until recent fall    PLAN  Current treatment program is being advanced to more complex exercises.    PTA/ATC plan:  N/A    Please refer to the daily flowsheet for treatment today, total treatment time and time spent performing 1:1 timed codes.

## 2017-06-22 ENCOUNTER — ALLIED HEALTH/NURSE VISIT (OUTPATIENT)
Dept: NURSING | Facility: CLINIC | Age: 77
End: 2017-06-22

## 2017-06-22 VITALS — DIASTOLIC BLOOD PRESSURE: 74 MMHG | SYSTOLIC BLOOD PRESSURE: 144 MMHG

## 2017-06-22 DIAGNOSIS — I10 HTN, GOAL BELOW 140/90: Primary | ICD-10-CM

## 2017-06-22 NOTE — NURSING NOTE
Per Dr. Farrell - She would like to see pt's numbers a little lower. Advised that pt should use low salt and try to lose weight. Make an appointment with Dr. Farrell towards the end of the summer to recheck BP.  Left a detailed message on voicemail.  Irma Erwin MA

## 2017-06-22 NOTE — MR AVS SNAPSHOT
After Visit Summary   6/22/2017    Candida Rose    MRN: [id number]           Patient Information     Date Of Birth          1940        Visit Information        Provider Department      6/22/2017 8:30 AM [facility] IM NURSE [facility]        Today's Diagnoses     HTN, goal below 140/90    -  1       Follow-ups after your visit        Your next 10 appointments already scheduled     Jun 27, 2017  7:30 AM CDT   [facility] Extremity with John[facility]ramanian[facility] [place]  [facility]    [address]  [address]  [facility] [address]   [phone]              Who to contact     If you have questions or need follow up information about today's clinic visit or your schedule please contact [facility] directly at [phone].  Normal or non-critical lab and imaging results will be communicated to you by MyChart, letter or phone within 4 business days after the clinic has received the results. If you do not hear from us within 7 days, please contact the clinic through MyChart or phone. If you have a critical or abnormal lab result, we will notify you by phone as soon as possible.  Submit refill requests through [employer] or call your pharmacy and they will forward the refill request to us. Please allow 3 business days for your refill to be completed.          Additional Information About Your Visit        MyChart Information     [employer] gives you secure access to your electronic health record. If you see a primary care provider, you can also send messages to your care team and make appointments. If you have questions, please call your primary care clinic.  If you do not have a primary care provider, please call [phone] and they will assist you.        Care EveryWhere ID     This is your Care EveryWhere ID. This could be used by other organizations to access your [place] medical records  [id number]         Blood Pressure from Last 3 Encounters:   06/22/17 144/74    05/18/17 150/78   05/04/17 98/60    Weight from Last 3 Encounters:   05/18/17 230 lb (104.3 kg)   05/04/17 231 lb (104.8 kg)   04/17/17 240 lb (108.9 kg)              Today, you had the following     No orders found for display       Primary Care Provider Office Phone # Fax #    Chani Trina Peoples [phone]       [facility] [address] [facility] [address]        Equal Access to Services     [facility]: Hadii aad ku hadasho Soomaali, waaxda luqadaha, qaybta kaalmada adeegyada, waxkathy irvin . So [facility] [phone].    ATENCIÓN: Si habla español, tiene a pan disposición servicios gratuitos de asistencia lingüística. Karsten al [phone].    We comply with applicable federal civil rights laws and [place] laws. We do not discriminate on the basis of race, color, national origin, age, disability sex, sexual orientation or gender identity.            Thank you!     Thank you for choosing [facility]  for your care. Our goal is always to provide you with excellent care. Hearing back from our patients is one way we can continue to improve our services. Please take a few minutes to complete the written survey that you may receive in the mail after your visit with us. Thank you!             Your Updated Medication List - Protect others around you: Learn how to safely use, store and throw away your medicines at www.disposemymeds.org.          This list is accurate as of: 6/22/17  8:51 AM.  Always use your most recent med list.                   Brand Name Dispense Instructions for use Diagnosis    acetaminophen 325 MG tablet    TYLENOL    100 tablet    Take 2 tablets (650 mg) by mouth every 4 hours as needed for other (surgical pain)    S/P total knee replacement using cement, left       aspirin 81 MG tablet     30 tablet    Take 1 tablet (81 mg) by mouth daily    HTN, goal below 140/90, Type 2 diabetes mellitus without  complication, without long-term current use of insulin (H)       atenolol 25 MG tablet    TENORMIN    90 tablet    TAKE 1 TABLET (25 MG) BY MOUTH AT BEDTIME    HTN, goal below 140/90       CALCIUM 500 PO      Take 1 tablet by mouth daily.        clopidogrel 75 MG tablet    PLAVIX    90 tablet    TAKE 1 TABLET BY MOUTH DAILY    Moyamoya disease       HYDROmorphone 2 MG tablet    DILAUDID    30 tablet    Take 1-2 tablets (2-4 mg) by mouth every 4 hours as needed for moderate to severe pain    S/P total knee replacement using cement, left       * losartan 50 MG tablet    COZAAR    30 tablet    Take 1 tablet (50 mg) by mouth daily    HTN, goal below 140/90       * losartan 50 MG tablet    COZAAR    180 tablet    TAKE 1 TABLET BY MOUTH 2 TIMES DAILY    HTN, goal below 140/90       MULTIVITAMIN & MINERAL PO      Take 1 tablet by mouth daily.        order for DME     1 each    Equipment being ordered: Raised Toilet Seats () Treatment Diagnosis :decreased ADL's    Osteoarthritis of left knee, unspecified osteoarthritis type       senna-docusate 8.6-50 MG per tablet    SENOKOT-S;PERICOLACE    100 tablet    Take 1 tablet by mouth 2 times daily as needed for constipation    Constipation due to pain medication       simvastatin 20 MG tablet    ZOCOR    90 tablet    TAKE 1 TABLET (20 MG) BY MOUTH AT BEDTIME    Hyperlipidemia LDL goal <130       * Notice:  This list has 2 medication(s) that are the same as other medications prescribed for you. Read the directions carefully, and ask your doctor or other care provider to review them with you.

## 2017-06-27 ENCOUNTER — THERAPY VISIT (OUTPATIENT)
Dept: PHYSICAL THERAPY | Facility: CLINIC | Age: 77
End: 2017-06-27
Payer: MEDICARE

## 2017-06-27 DIAGNOSIS — G89.29 CHRONIC PAIN OF LEFT KNEE: ICD-10-CM

## 2017-06-27 DIAGNOSIS — Z47.89 AFTERCARE FOLLOWING SURGERY OF THE MUSCULOSKELETAL SYSTEM: ICD-10-CM

## 2017-06-27 DIAGNOSIS — M25.562 CHRONIC PAIN OF LEFT KNEE: ICD-10-CM

## 2017-06-27 PROCEDURE — G8980 MOBILITY D/C STATUS: HCPCS | Mod: GP | Performed by: PHYSICAL THERAPIST

## 2017-06-27 PROCEDURE — 97530 THERAPEUTIC ACTIVITIES: CPT | Mod: GP | Performed by: PHYSICAL THERAPIST

## 2017-06-27 PROCEDURE — G8979 MOBILITY GOAL STATUS: HCPCS | Mod: GP | Performed by: PHYSICAL THERAPIST

## 2017-06-27 PROCEDURE — 97110 THERAPEUTIC EXERCISES: CPT | Mod: GP | Performed by: PHYSICAL THERAPIST

## 2017-06-27 NOTE — MR AVS SNAPSHOT
After Visit Summary   6/27/2017    Candida Rose    MRN: [id number]           Patient Information     Date Of Birth          1940        Visit Information        Provider Department      6/27/2017 7:30 AM Johnathan Subramanian, PT [facility]        Today's Diagnoses     Chronic pain of left knee        Aftercare following surgery of the musculoskeletal system           Follow-ups after your visit        Who to contact     If you have questions or need follow up information about today's clinic visit or your schedule please contact [facility] directly at [phone].  Normal or non-critical lab and imaging results will be communicated to you by [employer], letter or phone within 4 business days after the clinic has received the results. If you do not hear from us within 7 days, please contact the clinic through [email or web address] or phone. If you have a critical or abnormal lab result, we will notify you by phone as soon as possible.  Submit refill requests through [employer] or call your pharmacy and they will forward the refill request to us. Please allow 3 business days for your refill to be completed.          Additional Information About Your Visit        MyChart Information     [employer] gives you secure access to your electronic health record. If you see a primary care provider, you can also send messages to your care team and make appointments. If you have questions, please call your primary care clinic.  If you do not have a primary care provider, please call [phone] and they will assist you.        Care EveryWhere ID     This is your Care EveryWhere ID. This could be used by other organizations to access your [place] medical records  [id number]         Blood Pressure from Last 3 Encounters:   06/22/17 144/74   05/18/17 150/78   05/04/17 98/60    Weight from Last 3 Encounters:   05/18/17 104.3 kg (230 lb)   05/04/17 104.8 kg (231 lb)   04/17/17 108.9 kg (240 lb)              Today, you  had the following     No orders found for display       Primary Care Provider Office Phone # Fax #    Chani Peoples [phone]       [facility] [address] [facility] [address]        Equal Access to Services     BIPINAMANDA MOE : Hadii aad ku hadasho Soomaali, waaxda luqadaha, qaybta kaalmada adeegyada, waxay idiin hayaan adeeg kharash lachema . So [facility] [phone].    ATENCIÓN: Si habla español, tiene a pan disposición servicios gratuitos de asistencia lingüística. Llame al [phone].    We comply with applicable federal civil rights laws and [place] laws. We do not discriminate on the basis of race, color, national origin, age, disability sex, sexual orientation or gender identity.            Thank you!     Thank you for choosing [facility]  for your care. Our goal is always to provide you with excellent care. Hearing back from our patients is one way we can continue to improve our services. Please take a few minutes to complete the written survey that you may receive in the mail after your visit with us. Thank you!             Your Updated Medication List - Protect others around you: Learn how to safely use, store and throw away your medicines at www.disposemymeds.org.          This list is accurate as of: 6/27/17 12:42 PM.  Always use your most recent med list.                   Brand Name Dispense Instructions for use Diagnosis    acetaminophen 325 MG tablet    TYLENOL    100 tablet    Take 2 tablets (650 mg) by mouth every 4 hours as needed for other (surgical pain)    S/P total knee replacement using cement, left       aspirin 81 MG tablet     30 tablet    Take 1 tablet (81 mg) by mouth daily    HTN, goal below 140/90, Type 2 diabetes mellitus without complication, without long-term current use of insulin (H)       atenolol 25 MG tablet    TENORMIN    90 tablet    TAKE 1 TABLET (25 MG) BY MOUTH AT BEDTIME    HTN, goal below 140/90        CALCIUM 500 PO      Take 1 tablet by mouth daily.        clopidogrel 75 MG tablet    PLAVIX    90 tablet    TAKE 1 TABLET BY MOUTH DAILY    Moyamoya disease       HYDROmorphone 2 MG tablet    DILAUDID    30 tablet    Take 1-2 tablets (2-4 mg) by mouth every 4 hours as needed for moderate to severe pain    S/P total knee replacement using cement, left       * losartan 50 MG tablet    COZAAR    30 tablet    Take 1 tablet (50 mg) by mouth daily    HTN, goal below 140/90       * losartan 50 MG tablet    COZAAR    180 tablet    TAKE 1 TABLET BY MOUTH 2 TIMES DAILY    HTN, goal below 140/90       MULTIVITAMIN & MINERAL PO      Take 1 tablet by mouth daily.        order for DME     1 each    Equipment being ordered: Raised Toilet Seats () Treatment Diagnosis :decreased ADL's    Osteoarthritis of left knee, unspecified osteoarthritis type       senna-docusate 8.6-50 MG per tablet    SENOKOT-S;PERICOLACE    100 tablet    Take 1 tablet by mouth 2 times daily as needed for constipation    Constipation due to pain medication       simvastatin 20 MG tablet    ZOCOR    90 tablet    TAKE 1 TABLET (20 MG) BY MOUTH AT BEDTIME    Hyperlipidemia LDL goal <130       * Notice:  This list has 2 medication(s) that are the same as other medications prescribed for you. Read the directions carefully, and ask your doctor or other care provider to review them with you.

## 2017-06-27 NOTE — PROGRESS NOTES
Subjective:    HPI                    Objective:    System    Physical Exam    General     ROS    Assessment/Plan:      DISCHARGE REPORT    Progress reporting period is from 5-30-17 to 6-27-17.       SUBJECTIVE  Subjective changes noted by patient:  Pt notes decreased pain and increased strength..       Current pain level is 3/10  .     Previous pain level was  6/10  .   Changes in function:  Pt notes increased ease with ambulation and transfers. Pt still notes some difficulty with steps. Pt is weaning self from cane  Adverse reaction to treatment or activity: None    OBJECTIVE  Changes noted in objective findings:  Left knee AROM 0-0-102, PROM 0-0-116. Pt denies point tenderness with palpation of the knee. MMT left quads 4/5, hamstrings 4+/5        ASSESSMENT/PLAN  Updated problem list and treatment plan: Diagnosis 1:  Left TKA  Pain -  hot/cold therapy, manual therapy, self management, education and home program  Decreased ROM/flexibility - manual therapy, therapeutic exercise, therapeutic activity and home program  Decreased strength - therapeutic exercise, therapeutic activities and home program  STG/LTGs have been met or progress has been made towards goals:  Yes,   Assessment of Progress: The patient's condition is improving.  Self Management Plans:  Patient has been instructed in a home treatment program.  I have re-evaluated this patient and find that the nature, scope, duration and intensity of the therapy is appropriate for the medical condition of the patient.  Candida continues to require the following intervention to meet STG and LTG's:  PT intervention is no longer required to meet STG/LTG.    Recommendations:  This patient is ready to be discharged from therapy and continue their home treatment program.    Please refer to the daily flowsheet for treatment today, total treatment time and time spent performing 1:1 timed codes.

## 2017-07-09 DIAGNOSIS — I67.5 MOYAMOYA DISEASE: ICD-10-CM

## 2017-07-10 RX ORDER — CLOPIDOGREL BISULFATE 75 MG/1
TABLET ORAL
Qty: 90 TABLET | Refills: 3 | Status: SHIPPED | OUTPATIENT
Start: 2017-07-10 | End: 2018-06-30

## 2017-07-10 NOTE — TELEPHONE ENCOUNTER
Plavix 75mg           Last Written Prescription Date: 4/10/17  Last Fill Quantity: 90, # refills: 0    Last Office Visit with [facility] or [facility] prescribing provider:  5/18/17   Future Office Visit:       Lab Results   Component Value Date    WBC 5.4 05/18/2017     Lab Results   Component Value Date    RBC 3.52 05/18/2017     Lab Results   Component Value Date    HGB 10.6 05/18/2017     Lab Results   Component Value Date    HCT 33.0 05/18/2017     No components found for: MCT  Lab Results   Component Value Date    MCV 94 05/18/2017     Lab Results   Component Value Date    MCH 30.1 05/18/2017     Lab Results   Component Value Date    MCHC 32.1 05/18/2017     Lab Results   Component Value Date    RDW 12.8 05/18/2017     Lab Results   Component Value Date     05/18/2017     Lab Results   Component Value Date    AST 17 05/04/2017     Lab Results   Component Value Date    ALT 17 05/04/2017     Creatinine   Date Value Ref Range Status   05/04/2017 1.10 (H) 0.52 - 1.04 mg/dL Final   ]    Labs showing if normal/abnormal  Lab Results   Component Value Date    WBC 5.4 05/18/2017    RBC 3.52 (L) 05/18/2017    HGB 10.6 (L) 05/18/2017    HCT 33.0 (L) 05/18/2017    MCV 94 05/18/2017    MCH 30.1 05/18/2017    MCHC 32.1 05/18/2017    RDW 12.8 05/18/2017     05/18/2017    DTYP Automated Method 05/18/2017    NEUTROPHIL 47.7 05/18/2017    LYMPH 37.1 05/18/2017    MONOCYTE 11.4 05/18/2017    EOSINOPHIL 3.1 05/18/2017    BASOPHIL 0.7 05/18/2017    ANEU 2.6 05/18/2017    ALYM 2.0 05/18/2017    ALDEN 0.6 05/18/2017    AEOS 0.2 05/18/2017    ABAS 0.0 05/18/2017      Lab Results   Component Value Date    AST 17 05/04/2017    ALT 17 05/04/2017     Prescription approved per [facility] Refill Protocol.

## 2017-07-18 PROBLEM — M25.562 CHRONIC PAIN OF LEFT KNEE: Status: RESOLVED | Noted: 2017-05-30 | Resolved: 2017-07-18

## 2017-07-18 PROBLEM — Z47.89 AFTERCARE FOLLOWING SURGERY OF THE MUSCULOSKELETAL SYSTEM: Status: RESOLVED | Noted: 2017-05-30 | Resolved: 2017-07-18

## 2017-07-18 PROBLEM — G89.29 CHRONIC PAIN OF LEFT KNEE: Status: RESOLVED | Noted: 2017-05-30 | Resolved: 2017-07-18

## 2017-07-31 ENCOUNTER — DOCUMENTATION ONLY (OUTPATIENT)
Dept: OTHER | Facility: CLINIC | Age: 77
End: 2017-07-31

## 2017-07-31 DIAGNOSIS — Z71.89 ACP (ADVANCE CARE PLANNING): Chronic | ICD-10-CM

## 2017-09-06 ENCOUNTER — TRANSFERRED RECORDS (OUTPATIENT)
Dept: HEALTH INFORMATION MANAGEMENT | Facility: CLINIC | Age: 77
End: 2017-09-06

## 2017-09-29 ENCOUNTER — DOCUMENTATION ONLY (OUTPATIENT)
Dept: OTHER | Facility: CLINIC | Age: 77
End: 2017-09-29

## 2017-09-29 DIAGNOSIS — Z71.89 ACP (ADVANCE CARE PLANNING): Chronic | ICD-10-CM

## 2017-10-06 DIAGNOSIS — I10 HTN, GOAL BELOW 140/90: ICD-10-CM

## 2017-10-07 NOTE — TELEPHONE ENCOUNTER
Routing refill request to provider for review/approval because:  Labs out of range:  Cr    Please advise, thanks.

## 2017-10-09 RX ORDER — LOSARTAN POTASSIUM 50 MG/1
TABLET ORAL
Qty: 60 TABLET | Refills: 0 | Status: SHIPPED | OUTPATIENT
Start: 2017-10-09 | End: 2017-12-31

## 2017-11-03 ENCOUNTER — OFFICE VISIT (OUTPATIENT)
Dept: PODIATRY | Facility: CLINIC | Age: 77
End: 2017-11-03
Payer: COMMERCIAL

## 2017-11-03 ENCOUNTER — OFFICE VISIT (OUTPATIENT)
Dept: INTERNAL MEDICINE | Facility: CLINIC | Age: 77
End: 2017-11-03
Payer: COMMERCIAL

## 2017-11-03 ENCOUNTER — RADIANT APPOINTMENT (OUTPATIENT)
Dept: GENERAL RADIOLOGY | Facility: CLINIC | Age: 77
End: 2017-11-03
Attending: PODIATRIST
Payer: COMMERCIAL

## 2017-11-03 VITALS
HEIGHT: 68 IN | DIASTOLIC BLOOD PRESSURE: 78 MMHG | WEIGHT: 230 LBS | SYSTOLIC BLOOD PRESSURE: 128 MMHG | BODY MASS INDEX: 34.86 KG/M2

## 2017-11-03 VITALS
OXYGEN SATURATION: 94 % | SYSTOLIC BLOOD PRESSURE: 118 MMHG | HEIGHT: 68 IN | HEART RATE: 78 BPM | WEIGHT: 240 LBS | BODY MASS INDEX: 36.37 KG/M2 | DIASTOLIC BLOOD PRESSURE: 68 MMHG | TEMPERATURE: 98.3 F

## 2017-11-03 DIAGNOSIS — M19.071 PRIMARY LOCALIZED OSTEOARTHROSIS OF RIGHT ANKLE AND FOOT: ICD-10-CM

## 2017-11-03 DIAGNOSIS — I10 HTN, GOAL BELOW 140/90: Primary | ICD-10-CM

## 2017-11-03 DIAGNOSIS — M79.671 RIGHT FOOT PAIN: ICD-10-CM

## 2017-11-03 DIAGNOSIS — E55.9 VITAMIN D DEFICIENCY: ICD-10-CM

## 2017-11-03 DIAGNOSIS — M85.80 OSTEOPENIA, UNSPECIFIED LOCATION: Chronic | ICD-10-CM

## 2017-11-03 DIAGNOSIS — R73.9 BLOOD SUGAR INCREASED: ICD-10-CM

## 2017-11-03 DIAGNOSIS — E78.5 HYPERLIPIDEMIA LDL GOAL <130: ICD-10-CM

## 2017-11-03 DIAGNOSIS — M79.671 RIGHT FOOT PAIN: Primary | ICD-10-CM

## 2017-11-03 DIAGNOSIS — I67.5 MOYAMOYA DISEASE: Chronic | ICD-10-CM

## 2017-11-03 PROCEDURE — 73630 X-RAY EXAM OF FOOT: CPT | Mod: RT

## 2017-11-03 PROCEDURE — 99214 OFFICE O/P EST MOD 30 MIN: CPT | Performed by: INTERNAL MEDICINE

## 2017-11-03 PROCEDURE — 99203 OFFICE O/P NEW LOW 30 MIN: CPT | Performed by: PODIATRIST

## 2017-11-03 RX ORDER — ATENOLOL 25 MG/1
TABLET ORAL
Qty: 90 TABLET | Refills: 1 | Status: SHIPPED | OUTPATIENT
Start: 2017-11-03 | End: 2018-06-11

## 2017-11-03 RX ORDER — SIMVASTATIN 20 MG
TABLET ORAL
Qty: 90 TABLET | Refills: 1 | Status: SHIPPED | OUTPATIENT
Start: 2017-11-03 | End: 2018-05-18

## 2017-11-03 ASSESSMENT — ANXIETY QUESTIONNAIRES
6. BECOMING EASILY ANNOYED OR IRRITABLE: NOT AT ALL
3. WORRYING TOO MUCH ABOUT DIFFERENT THINGS: NOT AT ALL
2. NOT BEING ABLE TO STOP OR CONTROL WORRYING: NOT AT ALL
7. FEELING AFRAID AS IF SOMETHING AWFUL MIGHT HAPPEN: NOT AT ALL
GAD7 TOTAL SCORE: 0
1. FEELING NERVOUS, ANXIOUS, OR ON EDGE: NOT AT ALL
5. BEING SO RESTLESS THAT IT IS HARD TO SIT STILL: NOT AT ALL

## 2017-11-03 ASSESSMENT — PATIENT HEALTH QUESTIONNAIRE - PHQ9
5. POOR APPETITE OR OVEREATING: NOT AT ALL
SUM OF ALL RESPONSES TO PHQ QUESTIONS 1-9: 0

## 2017-11-03 NOTE — PATIENT INSTRUCTIONS
Plan:  1. Please make a lab appointment for fasting labs in January   2. Continue same meds, same doses for now

## 2017-11-03 NOTE — PROGRESS NOTES
"PATIENT HISTORY:  Candida Rose is a 77 year old female who presents to clinic for intermittent pain to the right great toe. Had it fused a few years to with Dr. Merlos. Has been noting some intermittent aching. Mostly at night. Saw a different podiatrist and was told that he could go in and \"shave something down and it would be a minor surgery\". She would like a second opinion if she needs surgery. Pain can be 8/10 at it worse. 0/10 today.     Review of Systems:  Patient denies fever, chills, rash, wound, stiffness, limping, numbness, weakness, heart burn, blood in stool, chest pain with activity, calf pain when walking, shortness of breath with activity, chronic cough, easy bleeding/bruising, swelling of ankles, excessive thirst, fatigue, depression, anxiety.  Patient admits to stiffness.     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 F/u dr. Soto     OA (osteoarthritis)     s/p bilat total hips      Other chronic pain     Joint pain for many years     Unspecified cerebral artery occlusion with cerebral infarction     After Moyamoya surgery > 10 yrs ago.     Vitamin D deficiencies         PAST SURGICAL HISTORY:   Past Surgical History:   Procedure Laterality Date     ARTHROPLASTY KNEE Left 4/17/2017    Procedure: ARTHROPLASTY KNEE;  Left total knee arthroplasty;  Surgeon: Chidi Jenkins MD;  Location: [facility] OR     C NONSPECIFIC PROCEDURE  10/30/06    neurosurgery Dr Soto     C NONSPECIFIC PROCEDURE      bilateral total hips approx " "2002     C NONSPECIFIC PROCEDURE  3/07    neurosurgery      COLONOSCOPY  2008    normal     COLONOSCOPY  7/17/2014    Procedure: COLONOSCOPY;  Surgeon: Raul Nolan DO;  Location:  GI     CRANIOTOMY      [facility]  \"Pt had repair of blood flow.\"     RECONSTRUCT FOREFOOT WITH METATARSOPHALANGEAL (MTP) FUSION Left 8/21/2014    Procedure: RECONSTRUCT FOREFOOT WITH METATARSOPHALANGEAL (MTP) FUSION;  Surgeon: Tres Merlos DPM;  Location:  OR        MEDICATIONS:   Current Outpatient Prescriptions:      losartan (COZAAR) 50 MG tablet, TAKE 1 TABLET BY MOUTH 2 TIMES DAILY, Disp: 60 tablet, Rfl: 0     clopidogrel (PLAVIX) 75 MG tablet, TAKE 1 TABLET BY MOUTH DAILY, Disp: 90 tablet, Rfl: 3     simvastatin (ZOCOR) 20 MG tablet, TAKE 1 TABLET (20 MG) BY MOUTH AT BEDTIME, Disp: 90 tablet, Rfl: 1     atenolol (TENORMIN) 25 MG tablet, TAKE 1 TABLET (25 MG) BY MOUTH AT BEDTIME, Disp: 90 tablet, Rfl: 1     acetaminophen (TYLENOL) 325 MG tablet, Take 2 tablets (650 mg) by mouth every 4 hours as needed for other (surgical pain), Disp: 100 tablet, Rfl: 0     order for DME, Equipment being ordered: Raised Toilet Seats () Treatment Diagnosis :decreased ADL's, Disp: 1 each, Rfl: 0     aspirin 81 MG tablet, Take 1 tablet (81 mg) by mouth daily, Disp: 30 tablet, Rfl:      Multiple Vitamins-Minerals (MULTIVITAMIN & MINERAL PO), Take 1 tablet by mouth daily., Disp: , Rfl:      Calcium Carbonate (CALCIUM 500 PO), Take 1 tablet by mouth daily., Disp: , Rfl:      HYDROmorphone (DILAUDID) 2 MG tablet, Take 1-2 tablets (2-4 mg) by mouth every 4 hours as needed for moderate to severe pain (Patient not taking: Reported on 11/3/2017), Disp: 30 tablet, Rfl: 0     losartan (COZAAR) 50 MG tablet, Take 1 tablet (50 mg) by mouth daily (Patient not taking: Reported on 11/3/2017), Disp: 30 tablet, Rfl: 11     senna-docusate (SENOKOT-S;PERICOLACE) 8.6-50 MG per tablet, Take 1 tablet by mouth 2 times daily as needed for constipation " "(Patient not taking: Reported on 11/3/2017), Disp: 100 tablet, Rfl:      ALLERGIES:    Allergies   Allergen Reactions     Lisinopril      Persistent cough        SOCIAL HISTORY:   Social History     Social History     Marital status:      Spouse name: Olu      Number of children: 2     Years of education: N/A     Occupational History      Retired     Social History Main Topics     Smoking status: Never Smoker     Smokeless tobacco: Never Used     Alcohol use Yes      Comment:  1-2 per day     Drug use: No     Sexual activity: No     Other Topics Concern     Not on file     Social History Narrative        FAMILY HISTORY:   Family History   Problem Relation Age of Onset     Obesity Sister      gastric by-pass age age 60, heart murmur.     Cancer - colorectal Sister 69     HEART DISEASE Father      mi,  age 48     Family History Negative Mother      killed in MVA age 54     CANCER Maternal Aunt      lung cancer ,non-smoker        EXAM:Vitals: /78  Ht 5' 8\" (1.727 m)  Wt 230 lb (104.3 kg)  BMI 34.97 kg/m2  BMI= Body mass index is 34.97 kg/(m^2).    General appearance: Patient is alert and fully cooperative with history & exam.  No sign of distress is noted during the visit.     Psychiatric: Affect is pleasant & appropriate.  Patient appears motivated to improve health.     Respiratory: Breathing is regular & unlabored while sitting.     HEENT: Hearing is intact to spoken word.  Speech is clear.  No gross evidence of visual impairment that would impact ambulation.     Dermatologic: Skin is intact to both lower extremities without significant lesions, rash or abrasion.  No paronychia or evidence of soft tissue infection is noted.     Vascular: DP & PT pulses are intact & regular bilaterally.   edema and varicosities noted.  CFT and skin temperature is normal to both lower extremities.     Neurologic: Lower extremity sensation is intact to light touch.  No evidence of weakness or contracture in the " lower extremities.  No evidence of neuropathy.     Musculoskeletal: Patient is ambulatory without assistive device or brace.  Right 1st metatarsal phalangeal joint is fused previously.    Radiographs: I have looked at and reviewed xrays personally.  Hardware in good position. No dorsal or medial bone spurs where patient notes pain.      ASSESSMENT:    Right foot pain  Primary localized osteoarthrosis of right ankle and foot       PLAN:  Reviewed patient's chart in epic. At this time, reviewed xrays. Currently, previous surgery site looks well healed. No loosening of screws noted. Talked about arthritis and treatments including orthotics, injections, icing, NSAIDS, bracing, physical therapy, compounding pain cream, or surgical intervention.    Most of her pain is at night. Discussed that we could remove the plate but if it is more an ache at night, that may still be there after plate removal. She is not interested in surgery. Was given order for topical anti inflammatory.        Jaz Schaffer DPM, Podiatry/Foot and Ankle Surgery    Weight management plan: Patient was referred to their PCP to discuss a diet and exercise plan.

## 2017-11-03 NOTE — MR AVS SNAPSHOT
After Visit Summary   11/3/2017    Candida Rose    MRN: [id number]           Patient Information     Date Of Birth          1940        Visit Information        Provider Department      11/3/2017 2:00 PM Jaz Schaffer DPM, Podiatry/Foot and Ankle Surgery [facility] [place] [facility]        Today's Diagnoses     Right foot pain    -  1    Primary localized osteoarthrosis of right ankle and foot          Care Instructions      DR. SCHAFFER'S CLINIC LOCATIONS:   MONDAY AM - [place] TUESDAY - [place]   [address] [facility] [address] [place] [address] [place] [address]   [phone] / [phone] / [phone]       [facility]    [address] [place] [address]    [phone] / [phone]        FRIDAY PM - [place] SCHEDULE SURGERY: [phone]   [address] APPOINTMENTS: [phone]   [place] [address] BILLING QUESTIONS: [phone] / [phone]      OSTEOARTHRITIS OF THE FOOT & ANKLE  Osteoarthritis is a condition characterized by the breakdown and eventual loss of cartilage in one or more joints. Cartilage (the connective tissue found at the end of the bones in the joints) protects and cushions the bones during movement. When cartilage deteriorates or is lost, symptoms develop that can restrict one s ability to easily perform daily activities.  Osteoarthritis is also known as degenerative arthritis, reflecting its nature to develop as part of the aging process. As the most common form of arthritis, osteoarthritis affects millions of Americans. Some people refer to osteoarthritis simply as arthritis, even though there are many different types of arthritis.  Osteoarthritis appears at various joints throughout the body, including the hands, feet, spine, hips, and knees. In the foot, the disease most frequently occurs in the big toe, although it is also often found in the midfoot and  ankle.  CAUSES  Osteoarthritis is considered a  wear and tear  disease because the cartilage in the joint wears down with repeated stress and use over time. As the cartilage deteriorates and gets thinner, the bones lose their protective covering and eventually may rub together, causing pain and inflammation of the joint.  An injury may also lead to osteoarthritis, although it may take months or years after the injury for the condition to develop. For example, osteoarthritis in the big toe is often caused by kicking or jamming the toe, or by dropping something on the toe. Osteoarthritis in the midfoot is often caused by dropping something on it, or by a sprain or fracture. In the ankle, osteoarthritis is usually caused by a fracture and occasionally by a severe sprain.  Sometimes osteoarthritis develops as a result of abnormal foot mechanics such as flat feet or high arches. A flat foot causes less stability in the ligaments (bands of tissue that connect bones), resulting in excessive strain on the joints, which can cause arthritis. A high arch is rigid and lacks mobility, causing a jamming of joints that creates an increased risk of arthritis.  SYMPTOMS  People with osteoarthritis in the foot or ankle experience, in varying degrees, one or more of the following: Pain and stiffness in the joint, swelling in or near the joint, or difficulty walking or bending the joint.   Some patients with osteoarthritis also develop a bone spur (a bony protrusion) at the affected joint. Shoe pressure may cause pain at the site of a bone spur, and in some cases blisters or calluses may form over its surface. Bone spurs can also limit the movement of the joint.    DIAGNOSIS  In diagnosing osteoarthritis, the foot and ankle surgeon will examine the foot thoroughly, looking for swelling in the joint, limited mobility, and pain with movement. In some cases, deformity and/or enlargement (spur) of the joint may be noted. X-rays may be  ordered to evaluate the extent of the disease.  NON-SURGICAL TREATMENT  To help relieve symptoms, the surgeon may begin treating osteoarthritis with one or more of the following non-surgical approaches:  Oral medications. Nonsteroidal anti-inflammatory drugs (NSAIDs), such as ibuprofen, are often helpful in reducing the inflammation and pain. Occasionally a prescription for a steroid medication is needed to adequately reduce symptoms.   Orthotic devices. Custom orthotic devices (shoe inserts) are often prescribed to provide support to improve the foot s mechanics or cushioning to help minimize pain.   Bracing. Bracing, which restricts motion and supports the joint, can reduce pain during walking and help prevent further deformity.   Immobilization. Protecting the foot from movement by wearing a cast or removable cast-boot may be necessary to allow the inflammation to resolve.   Steroid injections. In some cases, steroid injections are applied to the affected joint to deliver anti-inflammatory medication.   Physical therapy. Exercises to strengthen the muscles, especially when the osteoarthritis occurs in the ankle, may give the patient greater stability and help avoid injury that might worsen the condition.   DO I NEED SURGERY?  When osteoarthritis has progressed substantially or failed to improve with non-surgical treatment, surgery may be recommended. In advanced cases, surgery may be the only option. The goal of surgery is to decrease pain and improve function. The foot and ankle surgeon will consider a number of factors when selecting the procedure best suited to the patient s condition and lifestyle.      Body Mass Index (BMI)  Many things can cause foot and ankle problems. Foot structure, activity level, foot mechanics and injuries are common causes of pain.  One very important issue that often goes unmentioned, is body weight. Extra weight can cause increased stress on muscles, ligaments, bones and tendons.   Sometimes just a few extra pounds is all it takes to put one over her/his threshold. Without reducing that stress, it can be difficult to alleviate pain. Some people are uncomfortable addressing this issue, but we feel it is important for you to think about it. As Foot &  Ankle specialists, our job is addressing the lower extremity problem and possible causes. Regarding extra body weight, we encourage patients to discuss diet and weight management plans with their primary care doctors. It is this team approach that gives you the best opportunity for pain relief and getting you back on your feet.                  Follow-ups after your visit        Your next 10 appointments already scheduled     Nov 03, 2017  3:40 PM CDT   SHORT with Chani Peoples MD   [facility] ([facility])    [address]  [facility] [address] [phone]              Who to contact     If you have questions or need follow up information about today's clinic visit or your schedule please contact [facility] directly at [phone].  Normal or non-critical lab and imaging results will be communicated to you by Crowdparkhart, letter or phone within 4 business days after the clinic has received the results. If you do not hear from us within 7 days, please contact the clinic through CSS99t or phone. If you have a critical or abnormal lab result, we will notify you by phone as soon as possible.  Submit refill requests through GotGame or call your pharmacy and they will forward the refill request to us. Please allow 3 business days for your refill to be completed.          Additional Information About Your Visit        GotGame Information     GotGame gives you secure access to your electronic health record. If you see a primary care provider, you can also send messages to your care team and make appointments. If you have questions, please call your primary care clinic.  If you  "do not have a primary care provider, please call [phone] and they will assist you.        Care EveryWhere ID     This is your Care EveryWhere ID. This could be used by other organizations to access your [place] medical records  [id number]        Your Vitals Were     Height BMI (Body Mass Index)                5' 8\" (1.727 m) 34.97 kg/m2           Blood Pressure from Last 3 Encounters:   11/03/17 128/78   06/22/17 144/74   05/18/17 150/78    Weight from Last 3 Encounters:   11/03/17 230 lb (104.3 kg)   05/18/17 230 lb (104.3 kg)   05/04/17 231 lb (104.8 kg)                 Today's Medication Changes          These changes are accurate as of: 11/3/17  2:29 PM.  If you have any questions, ask your nurse or doctor.               Start taking these medicines.        Dose/Directions    diclofenac 1 % Gel topical gel   Commonly known as:  VOLTAREN   Used for:  Right foot pain, Primary localized osteoarthrosis of right ankle and foot   Started by:  Jaz Schaffer DPM, Podiatry/Foot and Ankle Surgery        Apply 4 grams to knees or 2 grams to hands four times daily using enclosed dosing card.   Quantity:  100 g   Refills:  1            Where to get your medicines      These medications were sent to [place] [address] [place] [facility] [address] [address]     Phone:  [phone]     diclofenac 1 % Gel topical gel                Primary Care Provider Office Phone # Fax #    Chani Peoples [phone]       [facility] [address] [facility] [address]        Equal Access to Services     [facility] AH: Hadpadmini Navarro, waraghuda luzabrina, qaybta kaalnadege oneal. So [facility] [phone].    ATENCIÓN: Si habla español, tiene a pan disposición servicios gratuitos de asistencia lingüística. Llame al [phone].    We comply with applicable federal civil rights laws and [place] laws. We do not " discriminate on the basis of race, color, national origin, age, disability, sex, sexual orientation, or gender identity.            Thank you!     Thank you for choosing [facility]  for your care. Our goal is always to provide you with excellent care. Hearing back from our patients is one way we can continue to improve our services. Please take a few minutes to complete the written survey that you may receive in the mail after your visit with us. Thank you!             Your Updated Medication List - Protect others around you: Learn how to safely use, store and throw away your medicines at www.disposemymeds.org.          This list is accurate as of: 11/3/17  2:29 PM.  Always use your most recent med list.                   Brand Name Dispense Instructions for use Diagnosis    acetaminophen 325 MG tablet    TYLENOL    100 tablet    Take 2 tablets (650 mg) by mouth every 4 hours as needed for other (surgical pain)    S/P total knee replacement using cement, left       aspirin 81 MG tablet     30 tablet    Take 1 tablet (81 mg) by mouth daily    HTN, goal below 140/90, Type 2 diabetes mellitus without complication, without long-term current use of insulin (H)       atenolol 25 MG tablet    TENORMIN    90 tablet    TAKE 1 TABLET (25 MG) BY MOUTH AT BEDTIME    HTN, goal below 140/90       CALCIUM 500 PO      Take 1 tablet by mouth daily.        clopidogrel 75 MG tablet    PLAVIX    90 tablet    TAKE 1 TABLET BY MOUTH DAILY    Moyamoya disease       diclofenac 1 % Gel topical gel    VOLTAREN    100 g    Apply 4 grams to knees or 2 grams to hands four times daily using enclosed dosing card.    Right foot pain, Primary localized osteoarthrosis of right ankle and foot       HYDROmorphone 2 MG tablet    DILAUDID    30 tablet    Take 1-2 tablets (2-4 mg) by mouth every 4 hours as needed for moderate to severe pain    S/P total knee replacement using cement, left       * losartan 50 MG tablet    COZAAR    30 tablet     Take 1 tablet (50 mg) by mouth daily    HTN, goal below 140/90       * losartan 50 MG tablet    COZAAR    60 tablet    TAKE 1 TABLET BY MOUTH 2 TIMES DAILY    HTN, goal below 140/90       MULTIVITAMIN & MINERAL PO      Take 1 tablet by mouth daily.        order for DME     1 each    Equipment being ordered: Raised Toilet Seats () Treatment Diagnosis :decreased ADL's    Osteoarthritis of left knee, unspecified osteoarthritis type       senna-docusate 8.6-50 MG per tablet    SENOKOT-S;PERICOLACE    100 tablet    Take 1 tablet by mouth 2 times daily as needed for constipation    Constipation due to pain medication       simvastatin 20 MG tablet    ZOCOR    90 tablet    TAKE 1 TABLET (20 MG) BY MOUTH AT BEDTIME    Hyperlipidemia LDL goal <130       * Notice:  This list has 2 medication(s) that are the same as other medications prescribed for you. Read the directions carefully, and ask your doctor or other care provider to review them with you.

## 2017-11-03 NOTE — LETTER
"    11/3/2017         RE: Candida Rose  [address] [facility] [address]        Dear Colleague,    Thank you for referring your patient, Candida Rose, to the [facility]. Please see a copy of my visit note below.    PATIENT HISTORY:  Candida Rose is a 77 year old female who presents to clinic for intermittent pain to the right great toe. Had it fused a few years to with Dr. Merlos. Has been noting some intermittent aching. Mostly at night. Saw a different podiatrist and was told that he could go in and \"shave something down and it would be a minor surgery\". She would like a second opinion if she needs surgery. Pain can be 8/10 at it worse. 0/10 today.     Review of Systems:  Patient denies fever, chills, rash, wound, stiffness, limping, numbness, weakness, heart burn, blood in stool, chest pain with activity, calf pain when walking, shortness of breath with activity, chronic cough, easy bleeding/bruising, swelling of ankles, excessive thirst, fatigue, depression, anxiety.  Patient admits to stiffness.     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 F/u dr. Soto     OA (osteoarthritis)     s/p bilat total hips      Other chronic pain     Joint pain for many years     Unspecified cerebral artery occlusion with cerebral infarction     After Moyamoya surgery > 10 yrs ago.     Vitamin D deficiencies         PAST SURGICAL HISTORY:   Past Surgical History:   Procedure Laterality Date     ARTHROPLASTY KNEE Left 4/17/2017    Procedure: " "ARTHROPLASTY KNEE;  Left total knee arthroplasty;  Surgeon: Chidi Jenkins MD;  Location: [facility] OR     C NONSPECIFIC PROCEDURE  10/30/06    neurosurgery Dr Charles LACY NONSPECIFIC PROCEDURE      bilateral total hips approx 2002     C NONSPECIFIC PROCEDURE  3/07    neurosurgery      COLONOSCOPY  2008    normal     COLONOSCOPY  7/17/2014    Procedure: COLONOSCOPY;  Surgeon: Raul Nolan DO;  Location:  GI     CRANIOTOMY      MOJAMOJA  \"Pt had repair of blood flow.\"     RECONSTRUCT FOREFOOT WITH METATARSOPHALANGEAL (MTP) FUSION Left 8/21/2014    Procedure: RECONSTRUCT FOREFOOT WITH METATARSOPHALANGEAL (MTP) FUSION;  Surgeon: Tres Merlos DPM;  Location: [facility] OR        MEDICATIONS:   Current Outpatient Prescriptions:      losartan (COZAAR) 50 MG tablet, TAKE 1 TABLET BY MOUTH 2 TIMES DAILY, Disp: 60 tablet, Rfl: 0     clopidogrel (PLAVIX) 75 MG tablet, TAKE 1 TABLET BY MOUTH DAILY, Disp: 90 tablet, Rfl: 3     simvastatin (ZOCOR) 20 MG tablet, TAKE 1 TABLET (20 MG) BY MOUTH AT BEDTIME, Disp: 90 tablet, Rfl: 1     atenolol (TENORMIN) 25 MG tablet, TAKE 1 TABLET (25 MG) BY MOUTH AT BEDTIME, Disp: 90 tablet, Rfl: 1     acetaminophen (TYLENOL) 325 MG tablet, Take 2 tablets (650 mg) by mouth every 4 hours as needed for other (surgical pain), Disp: 100 tablet, Rfl: 0     order for DME, Equipment being ordered: Raised Toilet Seats () Treatment Diagnosis :decreased ADL's, Disp: 1 each, Rfl: 0     aspirin 81 MG tablet, Take 1 tablet (81 mg) by mouth daily, Disp: 30 tablet, Rfl:      Multiple Vitamins-Minerals (MULTIVITAMIN & MINERAL PO), Take 1 tablet by mouth daily., Disp: , Rfl:      Calcium Carbonate (CALCIUM 500 PO), Take 1 tablet by mouth daily., Disp: , Rfl:      HYDROmorphone (DILAUDID) 2 MG tablet, Take 1-2 tablets (2-4 mg) by mouth every 4 hours as needed for moderate to severe pain (Patient not taking: Reported on 11/3/2017), Disp: 30 tablet, Rfl: 0     losartan (COZAAR) 50 MG tablet, Take 1 " "tablet (50 mg) by mouth daily (Patient not taking: Reported on 11/3/2017), Disp: 30 tablet, Rfl: 11     senna-docusate (SENOKOT-S;PERICOLACE) 8.6-50 MG per tablet, Take 1 tablet by mouth 2 times daily as needed for constipation (Patient not taking: Reported on 11/3/2017), Disp: 100 tablet, Rfl:      ALLERGIES:    Allergies   Allergen Reactions     Lisinopril      Persistent cough        SOCIAL HISTORY:   Social History     Social History     Marital status:      Spouse name: Olu      Number of children: 2     Years of education: N/A     Occupational History      Retired     Social History Main Topics     Smoking status: Never Smoker     Smokeless tobacco: Never Used     Alcohol use Yes      Comment:  1-2 per day     Drug use: No     Sexual activity: No     Other Topics Concern     Not on file     Social History Narrative        FAMILY HISTORY:   Family History   Problem Relation Age of Onset     Obesity Sister      gastric by-pass age age 60, heart murmur.     Cancer - colorectal Sister 69     HEART DISEASE Father      mi,  age 48     Family History Negative Mother      killed in MVA age 54     CANCER Maternal Aunt      lung cancer ,non-smoker        EXAM:Vitals: /78  Ht 5' 8\" (1.727 m)  Wt 230 lb (104.3 kg)  BMI 34.97 kg/m2  BMI= Body mass index is 34.97 kg/(m^2).    General appearance: Patient is alert and fully cooperative with history & exam.  No sign of distress is noted during the visit.     Psychiatric: Affect is pleasant & appropriate.  Patient appears motivated to improve health.     Respiratory: Breathing is regular & unlabored while sitting.     HEENT: Hearing is intact to spoken word.  Speech is clear.  No gross evidence of visual impairment that would impact ambulation.     Dermatologic: Skin is intact to both lower extremities without significant lesions, rash or abrasion.  No paronychia or evidence of soft tissue infection is noted.     Vascular: DP & PT pulses are intact & " regular bilaterally.   edema and varicosities noted.  CFT and skin temperature is normal to both lower extremities.     Neurologic: Lower extremity sensation is intact to light touch.  No evidence of weakness or contracture in the lower extremities.  No evidence of neuropathy.     Musculoskeletal: Patient is ambulatory without assistive device or brace.  Right 1st metatarsal phalangeal joint is fused previously.    Radiographs: I have looked at and reviewed xrays personally.  Hardware in good position. No dorsal or medial bone spurs where patient notes pain.      ASSESSMENT:    Right foot pain  Primary localized osteoarthrosis of right ankle and foot       PLAN:  Reviewed patient's chart in epic. At this time, reviewed xrays. Currently, previous surgery site looks well healed. No loosening of screws noted. Talked about arthritis and treatments including orthotics, injections, icing, NSAIDS, bracing, physical therapy, compounding pain cream, or surgical intervention.    Most of her pain is at night. Discussed that we could remove the plate but if it is more an ache at night, that may still be there after plate removal. She is not interested in surgery. Was given order for topical anti inflammatory.        Jaz Schaffer DPM, Podiatry/Foot and Ankle Surgery    Weight management plan: Patient was referred to their PCP to discuss a diet and exercise plan.      Again, thank you for allowing me to participate in the care of your patient.        Sincerely,        Jaz Schaffer DPM, Podiatry/Foot and Ankle Surgery

## 2017-11-03 NOTE — PROGRESS NOTES
Dr Farrell's note      Patient's instructions / PLAN:                                                        Plan:  1. Please make a lab appointment for fasting labs in January 2. Continue same meds, same doses for now       ASSESSMENT & PLAN:                                                      (I10) HTN, goal below 140/90  (primary encounter diagnosis)  Comment: Controlled    Plan: simvastatin (ZOCOR) 20 MG tablet, atenolol         (TENORMIN) 25 MG tablet, Comprehensive         metabolic panel, CBC with platelets, Hemoglobin        A1c, Vitamin D Deficiency, Lipid panel reflex         to direct LDL Fasting, Albumin Random Urine         Quantitative with Creat Ratio            (E78.5) Hyperlipidemia LDL goal <130  Comment: Controlled    Plan: simvastatin (ZOCOR) 20 MG tablet, atenolol         (TENORMIN) 25 MG tablet, Comprehensive         metabolic panel, CBC with platelets, Hemoglobin        A1c, Vitamin D Deficiency, Lipid panel reflex         to direct LDL Fasting, Albumin Random Urine         Quantitative with Creat Ratio            (E55.9) Vitamin D deficiency  Comment:   Plan: simvastatin (ZOCOR) 20 MG tablet, atenolol         (TENORMIN) 25 MG tablet, Comprehensive         metabolic panel, CBC with platelets, Hemoglobin        A1c, Vitamin D Deficiency, Lipid panel reflex         to direct LDL Fasting, Albumin Random Urine         Quantitative with Creat Ratio            (M85.80) Osteopenia, unspecified location  Comment:   Plan: simvastatin (ZOCOR) 20 MG tablet, atenolol         (TENORMIN) 25 MG tablet, Comprehensive         metabolic panel, CBC with platelets, Hemoglobin        A1c, Vitamin D Deficiency, Lipid panel reflex         to direct LDL Fasting, Albumin Random Urine         Quantitative with Creat Ratio            (I67.5) Moyamoya disease  Comment:   Plan: simvastatin (ZOCOR) 20 MG tablet, atenolol         (TENORMIN) 25 MG tablet, Comprehensive         metabolic panel, CBC with platelets,  "Hemoglobin        A1c, Vitamin D Deficiency, Lipid panel reflex         to direct LDL Fasting, Albumin Random Urine         Quantitative with Creat Ratio            (R73.09) Blood sugar increased  Comment:   Plan: Hemoglobin A1c               Chief complaint:                                                      Follow up chronic medical problems    Lump R arm    SUBJECTIVE:   Candida Rose is a 77 year old female who presents to clinic today for the following health issues:        *Bleeding/Bruising-Huge bruise on right upper arm, has lump inside arms in that area. Bruise has cleared up a lot, but lump is still palpable. I think it is a hematoma. She feels it is getting smaller. She doesn't want tests for this and I agree. She will watch it and if it is not getting better she will be back.       Hyperlipidemia Follow-Up      Rate your low fat/cholesterol diet?: good    Taking statin?  Yes, had very bad muscle cramps one night last week, but that was the only night.    Other lipid medications/supplements?:  none    Hypertension Follow-up      Outpatient blood pressures are being checked at other doctor's offices and at home (she had bought a machine to use at home, but stopped using it because it didn't seem to be accurate).  Results are usually in a good range.    Low Salt Diet: low salt          Amount of exercise or physical activity: \"Not as much as I should\"    Problems taking medications regularly: No    Medication side effects: Had extremely bad muscle cramps in her legs (was just for one night).  Diet: low salt and low fat/cholesterol        Review of Systems:                                                      ROS: negative for fever, chills, cough, wheezes, chest pain, shortness of breath, vomiting, abdominal pain, leg swelling     A 10-point review of systems was obtained.  Those pertinent are above and in the in the Subjective section.  The rest of the systems are negative.           OBJECTIVE:       " "      Physical exam:  Blood pressure 118/68, pulse 78, temperature 98.3  F (36.8  C), temperature source Oral, height 5' 8\" (1.727 m), weight 240 lb (108.9 kg), SpO2 94 %, not currently breastfeeding.     NAD, appears comfortable  Skin: no rashes   HEENT: PERRLA, EOMI, pink conjunctiva, anicteric sclerae, bilateral tympanic membranes are clinically normal, oropharynx is normal color  Neck: supple, no JVD,. No thyroidmegaly. Lymph nodes nonpalpable cervical and supraclavicular.  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 F/u dr. Soto     OA (osteoarthritis)     s/p bilat total hips      Other chronic pain     Joint pain for many years     Unspecified cerebral artery occlusion with cerebral infarction     After Moyamoya surgery > 10 yrs ago.     Vitamin D deficiencies       PSHx: reviewed  Past Surgical History:   Procedure Laterality Date     ARTHROPLASTY KNEE Left 4/17/2017    Procedure: ARTHROPLASTY KNEE;  Left total knee arthroplasty;  Surgeon: Chidi Jenkins MD;  Location: [facility] OR     C NONSPECIFIC PROCEDURE  10/30/06    neurosurgery Dr Charles LACY NONSPECIFIC PROCEDURE      bilateral total hips approx 2002     C NONSPECIFIC PROCEDURE  3/07    neurosurgery      COLONOSCOPY  2008    normal     COLONOSCOPY  7/17/2014    Procedure: " "COLONOSCOPY;  Surgeon: Raul Nolan DO;  Location:  GI     CRANIOTOMY      [facility]  \"Pt had repair of blood flow.\"     RECONSTRUCT FOREFOOT WITH METATARSOPHALANGEAL (MTP) FUSION Left 8/21/2014    Procedure: RECONSTRUCT FOREFOOT WITH METATARSOPHALANGEAL (MTP) FUSION;  Surgeon: Tres Merlos DPM;  Location: [facility] OR        Meds: reviewed  Current Outpatient Prescriptions   Medication Sig Dispense Refill     losartan (COZAAR) 50 MG tablet TAKE 1 TABLET BY MOUTH 2 TIMES DAILY 60 tablet 0     clopidogrel (PLAVIX) 75 MG tablet TAKE 1 TABLET BY MOUTH DAILY 90 tablet 3     simvastatin (ZOCOR) 20 MG tablet TAKE 1 TABLET (20 MG) BY MOUTH AT BEDTIME 90 tablet 1     atenolol (TENORMIN) 25 MG tablet TAKE 1 TABLET (25 MG) BY MOUTH AT BEDTIME 90 tablet 1     HYDROmorphone (DILAUDID) 2 MG tablet Take 1-2 tablets (2-4 mg) by mouth every 4 hours as needed for moderate to severe pain 30 tablet 0     acetaminophen (TYLENOL) 325 MG tablet Take 2 tablets (650 mg) by mouth every 4 hours as needed for other (surgical pain) 100 tablet 0     aspirin 81 MG tablet Take 1 tablet (81 mg) by mouth daily 30 tablet      Multiple Vitamins-Minerals (MULTIVITAMIN & MINERAL PO) Take 1 tablet by mouth daily.       Calcium Carbonate (CALCIUM 500 PO) Take 1 tablet by mouth daily.       diclofenac (VOLTAREN) 1 % GEL topical gel Apply 4 grams to knees or 2 grams to hands four times daily using enclosed dosing card. 100 g 1     [DISCONTINUED] losartan (COZAAR) 50 MG tablet Take 1 tablet (50 mg) by mouth daily (Patient not taking: Reported on 11/3/2017) 30 tablet 11       Soc Hx: reviewed  Fam Hx: reviewed          Chani Farrell MD  Internal Medicine      "

## 2017-11-03 NOTE — PATIENT INSTRUCTIONS
DR. BROWN'S CLINIC LOCATIONS:   MONDAY AM - [place] TUESDAY - [place]   [address] [address] [address] [address] [place] [address]   [phone] / [phone] / [phone]       WEDNESDAY - [place]    [address] [address] [address]    [phone] / [phone]        FRIDAY PM - [place] SCHEDULE SURGERY: [phone]   [address] APPOINTMENTS: [phone]   [place] [address] BILLING QUESTIONS: [phone] / [phone]      OSTEOARTHRITIS OF THE FOOT & ANKLE  Osteoarthritis is a condition characterized by the breakdown and eventual loss of cartilage in one or more joints. Cartilage (the connective tissue found at the end of the bones in the joints) protects and cushions the bones during movement. When cartilage deteriorates or is lost, symptoms develop that can restrict one s ability to easily perform daily activities.  Osteoarthritis is also known as degenerative arthritis, reflecting its nature to develop as part of the aging process. As the most common form of arthritis, osteoarthritis affects millions of Americans. Some people refer to osteoarthritis simply as arthritis, even though there are many different types of arthritis.  Osteoarthritis appears at various joints throughout the body, including the hands, feet, spine, hips, and knees. In the foot, the disease most frequently occurs in the big toe, although it is also often found in the midfoot and ankle.  CAUSES  Osteoarthritis is considered a  wear and tear  disease because the cartilage in the joint wears down with repeated stress and use over time. As the cartilage deteriorates and gets thinner, the bones lose their protective covering and eventually may rub together, causing pain and inflammation of the joint.  An injury may also lead to osteoarthritis, although it may take months or years after the injury for the condition to develop. For example,  osteoarthritis in the big toe is often caused by kicking or jamming the toe, or by dropping something on the toe. Osteoarthritis in the midfoot is often caused by dropping something on it, or by a sprain or fracture. In the ankle, osteoarthritis is usually caused by a fracture and occasionally by a severe sprain.  Sometimes osteoarthritis develops as a result of abnormal foot mechanics such as flat feet or high arches. A flat foot causes less stability in the ligaments (bands of tissue that connect bones), resulting in excessive strain on the joints, which can cause arthritis. A high arch is rigid and lacks mobility, causing a jamming of joints that creates an increased risk of arthritis.  SYMPTOMS  People with osteoarthritis in the foot or ankle experience, in varying degrees, one or more of the following: Pain and stiffness in the joint, swelling in or near the joint, or difficulty walking or bending the joint.   Some patients with osteoarthritis also develop a bone spur (a bony protrusion) at the affected joint. Shoe pressure may cause pain at the site of a bone spur, and in some cases blisters or calluses may form over its surface. Bone spurs can also limit the movement of the joint.    DIAGNOSIS  In diagnosing osteoarthritis, the foot and ankle surgeon will examine the foot thoroughly, looking for swelling in the joint, limited mobility, and pain with movement. In some cases, deformity and/or enlargement (spur) of the joint may be noted. X-rays may be ordered to evaluate the extent of the disease.  NON-SURGICAL TREATMENT  To help relieve symptoms, the surgeon may begin treating osteoarthritis with one or more of the following non-surgical approaches:  Oral medications. Nonsteroidal anti-inflammatory drugs (NSAIDs), such as ibuprofen, are often helpful in reducing the inflammation and pain. Occasionally a prescription for a steroid medication is needed to adequately reduce symptoms.   Orthotic devices. Custom  orthotic devices (shoe inserts) are often prescribed to provide support to improve the foot s mechanics or cushioning to help minimize pain.   Bracing. Bracing, which restricts motion and supports the joint, can reduce pain during walking and help prevent further deformity.   Immobilization. Protecting the foot from movement by wearing a cast or removable cast-boot may be necessary to allow the inflammation to resolve.   Steroid injections. In some cases, steroid injections are applied to the affected joint to deliver anti-inflammatory medication.   Physical therapy. Exercises to strengthen the muscles, especially when the osteoarthritis occurs in the ankle, may give the patient greater stability and help avoid injury that might worsen the condition.   DO I NEED SURGERY?  When osteoarthritis has progressed substantially or failed to improve with non-surgical treatment, surgery may be recommended. In advanced cases, surgery may be the only option. The goal of surgery is to decrease pain and improve function. The foot and ankle surgeon will consider a number of factors when selecting the procedure best suited to the patient s condition and lifestyle.      Body Mass Index (BMI)  Many things can cause foot and ankle problems. Foot structure, activity level, foot mechanics and injuries are common causes of pain.  One very important issue that often goes unmentioned, is body weight. Extra weight can cause increased stress on muscles, ligaments, bones and tendons.  Sometimes just a few extra pounds is all it takes to put one over her/his threshold. Without reducing that stress, it can be difficult to alleviate pain. Some people are uncomfortable addressing this issue, but we feel it is important for you to think about it. As Foot &  Ankle specialists, our job is addressing the lower extremity problem and possible causes. Regarding extra body weight, we encourage patients to discuss diet and weight management plans with  their primary care doctors. It is this team approach that gives you the best opportunity for pain relief and getting you back on your feet.

## 2017-11-03 NOTE — NURSING NOTE
"Chief Complaint   Patient presents with     Foot Problems     right foot pain had a bunionectomy with  about a year ago        Initial /78  Ht 5' 8\" (1.727 m)  Wt 230 lb (104.3 kg)  BMI 34.97 kg/m2 Estimated body mass index is 34.97 kg/(m^2) as calculated from the following:    Height as of this encounter: 5' 8\" (1.727 m).    Weight as of this encounter: 230 lb (104.3 kg).  Medication Reconciliation: complete   Shadi Daniels MA      "

## 2017-11-03 NOTE — MR AVS SNAPSHOT
After Visit Summary   11/3/2017    Candida Rose    MRN: [id number]           Patient Information     Date Of Birth          1940        Visit Information        Provider Department      11/3/2017 3:40 PM Chani Peoples MD [facility]        Today's Diagnoses     Vitamin D deficiency    -  1    Hyperlipidemia LDL goal <130        HTN, goal below 140/90        Osteopenia, unspecified location        Moyamoya disease        Blood sugar increased          Care Instructions      Plan:  1. Please make a lab appointment for fasting labs in January   2. Continue same meds, same doses for now           Follow-ups after your visit        Future tests that were ordered for you today     Open Future Orders        Priority Expected Expires Ordered    Comprehensive metabolic panel Routine  11/3/2018 11/3/2017    CBC with platelets Routine  11/3/2018 11/3/2017    Hemoglobin A1c Routine  11/3/2018 11/3/2017    Vitamin D Deficiency Routine  11/3/2018 11/3/2017    Lipid panel reflex to direct LDL Fasting Routine  11/3/2018 11/3/2017    Albumin Random Urine Quantitative with Creat Ratio Routine  10/24/2018 11/3/2017            Who to contact     If you have questions or need follow up information about today's clinic visit or your schedule please contact [facility] directly at [phone].  Normal or non-critical lab and imaging results will be communicated to you by MyChart, letter or phone within 4 business days after the clinic has received the results. If you do not hear from us within 7 days, please contact the clinic through MyChart or phone. If you have a critical or abnormal lab result, we will notify you by phone as soon as possible.  Submit refill requests through [employer] or call your pharmacy and they will forward the refill request to us. Please allow 3 business days for your refill to be completed.          Additional Information About Your Visit       "  [employer] Information     [employer] gives you secure access to your electronic health record. If you see a primary care provider, you can also send messages to your care team and make appointments. If you have questions, please call your primary care clinic.  If you do not have a primary care provider, please call [phone] and they will assist you.        Care EveryWhere ID     This is your Care EveryWhere ID. This could be used by other organizations to access your [place] medical records  [id number]        Your Vitals Were     Pulse Temperature Height Pulse Oximetry Breastfeeding? BMI (Body Mass Index)    78 98.3  F (36.8  C) (Oral) 5' 8\" (1.727 m) 94% No 36.49 kg/m2       Blood Pressure from Last 3 Encounters:   11/03/17 118/68   11/03/17 128/78   06/22/17 144/74    Weight from Last 3 Encounters:   11/03/17 240 lb (108.9 kg)   11/03/17 230 lb (104.3 kg)   05/18/17 230 lb (104.3 kg)                 Today's Medication Changes          These changes are accurate as of: 11/3/17  3:53 PM.  If you have any questions, ask your nurse or doctor.               Start taking these medicines.        Dose/Directions    diclofenac 1 % Gel topical gel   Commonly known as:  VOLTAREN   Used for:  Right foot pain, Primary localized osteoarthrosis of right ankle and foot   Started by:  Jaz Schaffer DPM, Podiatry/Foot and Ankle Surgery        Apply 4 grams to knees or 2 grams to hands four times daily using enclosed dosing card.   Quantity:  100 g   Refills:  1         These medicines have changed or have updated prescriptions.        Dose/Directions    atenolol 25 MG tablet   Commonly known as:  TENORMIN   This may have changed:  See the new instructions.   Used for:  HTN, goal below 140/90, Hyperlipidemia LDL goal <130, Vitamin D deficiency, Osteopenia, unspecified location, Moyamoya disease   Changed by:  Chani Peoples MD        TAKE 1 TABLET (25 MG) BY MOUTH AT BEDTIME   Quantity:  90 tablet   Refills:  " 1       losartan 50 MG tablet   Commonly known as:  COZAAR   This may have changed:  Another medication with the same name was removed. Continue taking this medication, and follow the directions you see here.   Used for:  HTN, goal below 140/90   Changed by:  Chani Peoples MD        TAKE 1 TABLET BY MOUTH 2 TIMES DAILY   Quantity:  60 tablet   Refills:  0       simvastatin 20 MG tablet   Commonly known as:  ZOCOR   This may have changed:  See the new instructions.   Used for:  Hyperlipidemia LDL goal <130, HTN, goal below 140/90, Vitamin D deficiency, Osteopenia, unspecified location, Moyamoya disease   Changed by:  Chani Peoples MD        TAKE 1 TABLET (20 MG) BY MOUTH AT BEDTIME   Quantity:  90 tablet   Refills:  1            Where to get your medicines      These medications were sent to [place] [address] [place] [facility] [address] [facility]  [address] [facility] [address]     Phone:  [phone]     atenolol 25 MG tablet    diclofenac 1 % Gel topical gel    simvastatin 20 MG tablet                Primary Care Provider Office Phone # Fax #    Chani Peoples [phone]       [address] [place] [address]        Equal Access to Services     AMANDA ROSA AH: Hadii vicki ku hadasho Soomaali, waaxda luqadaha, qaybta kaalmada adeegyada, waxay idiin hayedgardon adeeg balaji abdi. So [facility] [phone].    ATENCIÓN: Si habla español, tiene a pan disposición servicios gratuitos de asistencia lingüística. Llame al [phone].    We comply with applicable federal civil rights laws and [place] laws. We do not discriminate on the basis of race, color, national origin, age, disability, sex, sexual orientation, or gender identity.            Thank you!     Thank you for choosing [facility]  for your care. Our goal is always to provide you with excellent care. Hearing back from our patients is one way we can continue to improve  our services. Please take a few minutes to complete the written survey that you may receive in the mail after your visit with us. Thank you!             Your Updated Medication List - Protect others around you: Learn how to safely use, store and throw away your medicines at www.disposemymeds.org.          This list is accurate as of: 11/3/17  3:53 PM.  Always use your most recent med list.                   Brand Name Dispense Instructions for use Diagnosis    acetaminophen 325 MG tablet    TYLENOL    100 tablet    Take 2 tablets (650 mg) by mouth every 4 hours as needed for other (surgical pain)    S/P total knee replacement using cement, left       aspirin 81 MG tablet     30 tablet    Take 1 tablet (81 mg) by mouth daily    HTN, goal below 140/90, Type 2 diabetes mellitus without complication, without long-term current use of insulin (H)       atenolol 25 MG tablet    TENORMIN    90 tablet    TAKE 1 TABLET (25 MG) BY MOUTH AT BEDTIME    HTN, goal below 140/90, Hyperlipidemia LDL goal <130, Vitamin D deficiency, Osteopenia, unspecified location, Moyamoya disease       CALCIUM 500 PO      Take 1 tablet by mouth daily.        clopidogrel 75 MG tablet    PLAVIX    90 tablet    TAKE 1 TABLET BY MOUTH DAILY    Moyamoya disease       diclofenac 1 % Gel topical gel    VOLTAREN    100 g    Apply 4 grams to knees or 2 grams to hands four times daily using enclosed dosing card.    Right foot pain, Primary localized osteoarthrosis of right ankle and foot       HYDROmorphone 2 MG tablet    DILAUDID    30 tablet    Take 1-2 tablets (2-4 mg) by mouth every 4 hours as needed for moderate to severe pain    S/P total knee replacement using cement, left       losartan 50 MG tablet    COZAAR    60 tablet    TAKE 1 TABLET BY MOUTH 2 TIMES DAILY    HTN, goal below 140/90       MULTIVITAMIN & MINERAL PO      Take 1 tablet by mouth daily.        simvastatin 20 MG tablet    ZOCOR    90 tablet    TAKE 1 TABLET (20 MG) BY MOUTH AT BEDTIME     Hyperlipidemia LDL goal <130, HTN, goal below 140/90, Vitamin D deficiency, Osteopenia, unspecified location, Moyamoya disease

## 2017-11-04 ASSESSMENT — ANXIETY QUESTIONNAIRES: GAD7 TOTAL SCORE: 0

## 2017-12-19 DIAGNOSIS — I10 HTN, GOAL BELOW 140/90: ICD-10-CM

## 2017-12-19 DIAGNOSIS — M85.80 OSTEOPENIA, UNSPECIFIED LOCATION: Chronic | ICD-10-CM

## 2017-12-19 DIAGNOSIS — E78.5 HYPERLIPIDEMIA LDL GOAL <130: ICD-10-CM

## 2017-12-19 DIAGNOSIS — I67.5 MOYAMOYA DISEASE: Chronic | ICD-10-CM

## 2017-12-19 DIAGNOSIS — E55.9 VITAMIN D DEFICIENCY: ICD-10-CM

## 2017-12-20 RX ORDER — ATENOLOL 25 MG/1
TABLET ORAL
Qty: 90 TABLET | Refills: 0 | OUTPATIENT
Start: 2017-12-20

## 2017-12-31 DIAGNOSIS — I10 HTN, GOAL BELOW 140/90: ICD-10-CM

## 2018-01-05 RX ORDER — LOSARTAN POTASSIUM 50 MG/1
TABLET ORAL
Qty: 60 TABLET | Refills: 1 | Status: SHIPPED | OUTPATIENT
Start: 2018-01-05 | End: 2018-03-03

## 2018-01-05 NOTE — TELEPHONE ENCOUNTER
Requested Prescriptions   Pending Prescriptions Disp Refills     losartan (COZAAR) 50 MG tablet [Pharmacy Med Name: LOSARTAN POTASSIUM 50 MG TAB] 60 tablet 0     Sig: TAKE 1 TABLET BY MOUTH 2 TIMES DAILY    Angiotensin-II Receptors Failed    12/31/2017  1:40 AM       Failed - Normal serum creatinine on file in past 12 months    Recent Labs   Lab Test  05/04/17   0825   CR  1.10*            Passed - Blood pressure under 140/90 in past 12 months.    BP Readings from Last 3 Encounters:   11/03/17 118/68   11/03/17 128/78   06/22/17 144/74                Passed - Recent or future visit with authorizing provider's specialty    Patient had office visit in the last year or has a visit in the next 30 days with authorizing provider.  See chart review.              Passed - Patient is age 18 or older       Passed - No active pregnancy on record       Passed - Normal serum potassium on file in past 12 months    Recent Labs   Lab Test  05/04/17   0825   POTASSIUM  4.3                   Passed - No positive pregnancy test in past 12 months        Routing refill request to provider for review/approval because:  Labs out of range:  Elevated Cr

## 2018-01-14 DIAGNOSIS — N18.30 CKD (CHRONIC KIDNEY DISEASE) STAGE 3, GFR 30-59 ML/MIN (H): Chronic | ICD-10-CM

## 2018-01-14 DIAGNOSIS — I10 HTN, GOAL BELOW 140/90: Chronic | ICD-10-CM

## 2018-01-14 NOTE — LETTER
[place] [address]  [facility] [address]  Phone: [phone]        January 17, 2018      Candida Rose                                                                                                                                [address] [facility] [address]            Dear Ms. Rose,    We are concerned about your health care.  We recently provided you with a medication refill.  Many medications require routine follow-up with your Doctor.      At this time we ask that: You come to your clinic for routine fasting labs for medication monitoring.        Your prescription: Has been refilled once so you may have time for the above noted follow-up.      Thank you,      Dr. Chani Farrell  Nursing staff

## 2018-01-17 RX ORDER — HYDROCHLOROTHIAZIDE 25 MG/1
TABLET ORAL
Qty: 90 TABLET | Refills: 0 | Status: SHIPPED | OUTPATIENT
Start: 2018-01-17 | End: 2019-02-11

## 2018-01-17 NOTE — TELEPHONE ENCOUNTER
"Requested Prescriptions   Pending Prescriptions Disp Refills     hydrochlorothiazide (HYDRODIURIL) 25 MG tablet [Pharmacy Med Name: HYDROCHLOROTHIAZIDE 25 MG TAB] 90 tablet 3     Sig: TAKE 1 TABLET (25 MG) BY MOUTH DAILY    Diuretics (Including Combos) Protocol Failed    1/14/2018  1:10 AM       Failed - Normal serum creatinine on file in past 12 months    Recent Labs   Lab Test  05/04/17   0825   CR  1.10*             Passed - Blood pressure under 140/90    BP Readings from Last 3 Encounters:   11/03/17 118/68   11/03/17 128/78   06/22/17 144/74                Passed - Recent or future visit with authorizing provider's specialty    Patient had office visit in the last year or has a visit in the next 30 days with authorizing provider.  See \"Patient Info\" tab in inbasket, or \"Choose Columns\" in Meds & Orders section of the refill encounter.              Passed - Patient is age 18 or older       Passed - No active pregancy on record       Passed - Normal serum potassium on file in past 12 months    Recent Labs   Lab Test  05/04/17   0825   POTASSIUM  4.3                   Passed - Normal serum sodium on file in past 12 months    Recent Labs   Lab Test  05/04/17   0825   NA  135             Passed - No positive pregnancy test in past 12 months        Per MD's note from 11/3/17:    Plan:  1. Please make a lab appointment for fasting labs in January   2. Continue same meds, same doses for now       Medication is being filled for 1 time refill only due to:  Future labs ordered Fasting labs due.   Francesca Arellano RN      "

## 2018-01-26 DIAGNOSIS — I10 HTN, GOAL BELOW 140/90: ICD-10-CM

## 2018-01-26 DIAGNOSIS — I67.5 MOYAMOYA DISEASE: Chronic | ICD-10-CM

## 2018-01-26 DIAGNOSIS — D64.9 ANEMIA, UNSPECIFIED TYPE: ICD-10-CM

## 2018-01-26 DIAGNOSIS — M85.80 OSTEOPENIA, UNSPECIFIED LOCATION: Chronic | ICD-10-CM

## 2018-01-26 DIAGNOSIS — E55.9 VITAMIN D DEFICIENCY: ICD-10-CM

## 2018-01-26 DIAGNOSIS — R73.9 BLOOD SUGAR INCREASED: ICD-10-CM

## 2018-01-26 DIAGNOSIS — E78.5 HYPERLIPIDEMIA LDL GOAL <130: ICD-10-CM

## 2018-01-26 LAB
ALBUMIN SERPL-MCNC: 3.4 G/DL (ref 3.4–5)
ALP SERPL-CCNC: 99 U/L (ref 40–150)
ALT SERPL W P-5'-P-CCNC: 19 U/L (ref 0–50)
ANION GAP SERPL CALCULATED.3IONS-SCNC: 3 MMOL/L (ref 3–14)
AST SERPL W P-5'-P-CCNC: 16 U/L (ref 0–45)
BILIRUB SERPL-MCNC: 0.4 MG/DL (ref 0.2–1.3)
BUN SERPL-MCNC: 34 MG/DL (ref 7–30)
CALCIUM SERPL-MCNC: 8.5 MG/DL (ref 8.5–10.1)
CHLORIDE SERPL-SCNC: 105 MMOL/L (ref 94–109)
CHOLEST SERPL-MCNC: 185 MG/DL
CO2 SERPL-SCNC: 30 MMOL/L (ref 20–32)
CREAT SERPL-MCNC: 1.24 MG/DL (ref 0.52–1.04)
CREAT UR-MCNC: 129 MG/DL
ERYTHROCYTE [DISTWIDTH] IN BLOOD BY AUTOMATED COUNT: 12.5 % (ref 10–15)
GFR SERPL CREATININE-BSD FRML MDRD: 42 ML/MIN/1.7M2
GLUCOSE SERPL-MCNC: 98 MG/DL (ref 70–99)
HBA1C MFR BLD: 5.8 % (ref 4.3–6)
HCT VFR BLD AUTO: 36.8 % (ref 35–47)
HDLC SERPL-MCNC: 60 MG/DL
HGB BLD-MCNC: 11.7 G/DL (ref 11.7–15.7)
LDLC SERPL CALC-MCNC: 95 MG/DL
MCH RBC QN AUTO: 30.7 PG (ref 26.5–33)
MCHC RBC AUTO-ENTMCNC: 31.8 G/DL (ref 31.5–36.5)
MCV RBC AUTO: 97 FL (ref 78–100)
MICROALBUMIN UR-MCNC: 15 MG/L
MICROALBUMIN/CREAT UR: 11.94 MG/G CR (ref 0–25)
NONHDLC SERPL-MCNC: 125 MG/DL
PLATELET # BLD AUTO: 315 10E9/L (ref 150–450)
POTASSIUM SERPL-SCNC: 4.3 MMOL/L (ref 3.4–5.3)
PROT SERPL-MCNC: 6.6 G/DL (ref 6.8–8.8)
RBC # BLD AUTO: 3.81 10E12/L (ref 3.8–5.2)
SODIUM SERPL-SCNC: 138 MMOL/L (ref 133–144)
TRIGL SERPL-MCNC: 149 MG/DL
WBC # BLD AUTO: 7.7 10E9/L (ref 4–11)

## 2018-01-26 PROCEDURE — 82306 VITAMIN D 25 HYDROXY: CPT | Performed by: INTERNAL MEDICINE

## 2018-01-26 PROCEDURE — 85027 COMPLETE CBC AUTOMATED: CPT | Performed by: INTERNAL MEDICINE

## 2018-01-26 PROCEDURE — 83036 HEMOGLOBIN GLYCOSYLATED A1C: CPT | Performed by: INTERNAL MEDICINE

## 2018-01-26 PROCEDURE — 36415 COLL VENOUS BLD VENIPUNCTURE: CPT | Performed by: INTERNAL MEDICINE

## 2018-01-26 PROCEDURE — 82043 UR ALBUMIN QUANTITATIVE: CPT | Performed by: INTERNAL MEDICINE

## 2018-01-26 PROCEDURE — 80053 COMPREHEN METABOLIC PANEL: CPT | Performed by: INTERNAL MEDICINE

## 2018-01-26 PROCEDURE — 80061 LIPID PANEL: CPT | Performed by: INTERNAL MEDICINE

## 2018-01-27 LAB — DEPRECATED CALCIDIOL+CALCIFEROL SERPL-MC: 22 UG/L (ref 20–75)

## 2018-02-02 ENCOUNTER — OFFICE VISIT (OUTPATIENT)
Dept: INTERNAL MEDICINE | Facility: CLINIC | Age: 78
End: 2018-02-02
Payer: COMMERCIAL

## 2018-02-02 VITALS
HEART RATE: 66 BPM | SYSTOLIC BLOOD PRESSURE: 140 MMHG | HEIGHT: 68 IN | BODY MASS INDEX: 36.12 KG/M2 | DIASTOLIC BLOOD PRESSURE: 60 MMHG | OXYGEN SATURATION: 100 % | TEMPERATURE: 97.9 F | WEIGHT: 238.3 LBS

## 2018-02-02 DIAGNOSIS — E78.5 HYPERLIPIDEMIA LDL GOAL <130: Chronic | ICD-10-CM

## 2018-02-02 DIAGNOSIS — I67.5 MOYAMOYA DISEASE: Chronic | ICD-10-CM

## 2018-02-02 DIAGNOSIS — N18.30 CKD (CHRONIC KIDNEY DISEASE) STAGE 3, GFR 30-59 ML/MIN (H): Chronic | ICD-10-CM

## 2018-02-02 DIAGNOSIS — M85.80 OSTEOPENIA, UNSPECIFIED LOCATION: Chronic | ICD-10-CM

## 2018-02-02 DIAGNOSIS — I10 HTN, GOAL BELOW 140/90: Primary | Chronic | ICD-10-CM

## 2018-02-02 DIAGNOSIS — R25.2 CRAMP OF LIMB: ICD-10-CM

## 2018-02-02 PROCEDURE — 99214 OFFICE O/P EST MOD 30 MIN: CPT | Performed by: INTERNAL MEDICINE

## 2018-02-02 RX ORDER — AMLODIPINE BESYLATE 2.5 MG/1
2.5 TABLET ORAL DAILY
Qty: 30 TABLET | Refills: 3 | Status: SHIPPED | OUTPATIENT
Start: 2018-02-02 | End: 2018-05-25

## 2018-02-02 RX ORDER — HYDROCHLOROTHIAZIDE 12.5 MG/1
12.5 TABLET ORAL DAILY
Qty: 30 TABLET | Refills: 3 | Status: SHIPPED | OUTPATIENT
Start: 2018-02-02 | End: 2018-05-25

## 2018-02-02 NOTE — NURSING NOTE
"Chief Complaint   Patient presents with     RECHECK     6 months check       Initial /60 (BP Location: Right arm, Patient Position: Sitting, Cuff Size: Adult Large)  Pulse 66  Temp 97.9  F (36.6  C) (Oral)  Ht 5' 8\" (1.727 m)  Wt 238 lb 4.8 oz (108.1 kg)  SpO2 100%  Breastfeeding? No  BMI 36.23 kg/m2 Estimated body mass index is 36.23 kg/(m^2) as calculated from the following:    Height as of this encounter: 5' 8\" (1.727 m).    Weight as of this encounter: 238 lb 4.8 oz (108.1 kg).  Medication Reconciliation: complete   IA SMA    "

## 2018-02-02 NOTE — PATIENT INSTRUCTIONS
Plan:  1. Add Amlodipine 2.5 mg daily for the blood pressure  2. Stop hydrochlorathiazide 25 mg daily  3. Take hydrochlorathiazide 12.5 mg daily - as needed for swollen legs  4. Continue the other meds, same doses for now.  5. Please make a lab appointment for non fasting labs in 1- 3 months to recheck kidneys  6. Make sure you drink plenty of water the day of the blood test.   7. Please make an appointment few days after the labs to discuss about the results.

## 2018-02-02 NOTE — MR AVS SNAPSHOT
After Visit Summary   2/2/2018    Candida Rose    MRN: [id number]           Patient Information     Date Of Birth          1940        Visit Information        Provider Department      2/2/2018 8:20 AM Chani Peoples MD [facility]        Today's Diagnoses     HTN, goal below 140/90    -  1    Cramp of limb        Hyperlipidemia LDL goal <130        Moyamoya disease        CKD 3        Osteopenia, unspecified location          Care Instructions    Plan:  1. Add Amlodipine 2.5 mg daily for the blood pressure  2. Stop hydrochlorathiazide 25 mg daily  3. Take hydrochlorathiazide 12.5 mg daily - as needed for swollen legs  4. Continue the other meds, same doses for now.  5. Please make a lab appointment for non fasting labs in 1- 3 months to recheck kidneys  6. Make sure you drink plenty of water the day of the blood test.   7. Please make an appointment few days after the labs to discuss about the results.           Follow-ups after your visit        Your next 10 appointments already scheduled     Feb 26, 2018 10:00 AM CST   (Arrive by 9:45 AM)   MA SCREENING DIGITAL BILATERAL with [id number]   [facility] ([facility])    [address] [facility], [address]  [facility] [address] [phone]           Do not use any powder, lotion or deodorant under your arms or on your breast. If you do, we will ask you to remove it before your exam.  Wear comfortable, two-piece clothing.  If you have any allergies, tell your care team.  Bring any previous mammograms from other facilities or have them mailed to the breast center. Three-dimensional (3D) mammograms are available at [place] locations in [place], [facility], [facility], [place], [place], and [place]. -Health locations include [facility] in [place]. Benefits of 3D mammograms include: - Improved rate of cancer detection - Decreases your  "chance of having to go back for more tests, which means fewer: - \"False-positive\" results (This means that there is an abnormal area but it isn't cancer.) - Invasive testing procedures, such as a biopsy or surgery - Can provide clearer images of the breast if you have dense breast tissue. 3D mammography is an optional exam that anyone can have with a 2D mammogram. It doesn't replace or take the place of a 2D mammogram. 2D mammograms remain an effective screening test for all women.  Not all insurance companies cover the cost of a 3D mammogram. Check with your insurance.              Future tests that were ordered for you today     Open Future Orders        Priority Expected Expires Ordered    CK total Routine  2/2/2019 2/2/2018    Basic metabolic panel Routine  2/2/2019 2/2/2018            Who to contact     If you have questions or need follow up information about today's clinic visit or your schedule please contact [facility] directly at [phone].  Normal or non-critical lab and imaging results will be communicated to you by [employer], letter or phone within 4 business days after the clinic has received the results. If you do not hear from us within 7 days, please contact the clinic through [employer] or phone. If you have a critical or abnormal lab result, we will notify you by phone as soon as possible.  Submit refill requests through [employer] or call your pharmacy and they will forward the refill request to us. Please allow 3 business days for your refill to be completed.          Additional Information About Your Visit        [employer] Information     [employer] gives you secure access to your electronic health record. If you see a primary care provider, you can also send messages to your care team and make appointments. If you have questions, please call your primary care clinic.  If you do not have a primary care provider, please call [phone] and they will assist you.        Care EveryWhere ID " "    This is your Care EveryWhere ID. This could be used by other organizations to access your [place] medical records  [id number]        Your Vitals Were     Pulse Temperature Height Pulse Oximetry Breastfeeding? BMI (Body Mass Index)    66 97.9  F (36.6  C) (Oral) 5' 8\" (1.727 m) 100% No 36.23 kg/m2       Blood Pressure from Last 3 Encounters:   02/02/18 140/60   11/03/17 118/68   11/03/17 128/78    Weight from Last 3 Encounters:   02/02/18 238 lb 4.8 oz (108.1 kg)   11/03/17 240 lb (108.9 kg)   11/03/17 230 lb (104.3 kg)                 Today's Medication Changes          These changes are accurate as of 2/2/18  8:52 AM.  If you have any questions, ask your nurse or doctor.               Start taking these medicines.        Dose/Directions    amLODIPine 2.5 MG tablet   Commonly known as:  NORVASC   Used for:  HTN, goal below 140/90   Started by:  Chani Peoples MD        Dose:  2.5 mg   Take 1 tablet (2.5 mg) by mouth daily   Quantity:  30 tablet   Refills:  3         These medicines have changed or have updated prescriptions.        Dose/Directions    * hydrochlorothiazide 25 MG tablet   Commonly known as:  HYDRODIURIL   This may have changed:  Another medication with the same name was added. Make sure you understand how and when to take each.   Used for:  HTN, goal below 140/90, CKD (chronic kidney disease) stage 3, GFR 30-59 ml/min   Changed by:  Chani Peoples MD        TAKE 1 TABLET (25 MG) BY MOUTH DAILY   Quantity:  90 tablet   Refills:  0       * hydrochlorothiazide 12.5 MG Tabs tablet   This may have changed:  You were already taking a medication with the same name, and this prescription was added. Make sure you understand how and when to take each.   Used for:  HTN, goal below 140/90   Changed by:  Chani Peoples MD        Dose:  12.5 mg   Take 1 tablet (12.5 mg) by mouth daily   Quantity:  30 tablet   Refills:  3       * Notice:  This list has 2 " medication(s) that are the same as other medications prescribed for you. Read the directions carefully, and ask your doctor or other care provider to review them with you.         Where to get your medicines      These medications were sent to [place] [address] [place] [facility] - [place], [place] - [address]  [address] [facility] [address]     Phone:  [phone]     amLODIPine 2.5 MG tablet    hydrochlorothiazide 12.5 MG Tabs tablet                Primary Care Provider Office Phone # Fax #    Chani Peoples -[phone]       [address] [facility] [address]        Equal Access to Services     [facility] HospitalGIANNA : Hadii aad jamar hadasho Sodeng, waaxda luqadaha, qaybta kaalmada adeegyada, nadege irvin . So [facility] [phone].    ATENCIÓN: Si habla español, tiene a pan disposición servicios gratuitos de asistencia lingüística. [facility] [phone].    We comply with applicable federal civil rights laws and [place] laws. We do not discriminate on the basis of race, color, national origin, age, disability, sex, sexual orientation, or gender identity.            Thank you!     Thank you for choosing [facility]  for your care. Our goal is always to provide you with excellent care. Hearing back from our patients is one way we can continue to improve our services. Please take a few minutes to complete the written survey that you may receive in the mail after your visit with us. Thank you!             Your Updated Medication List - Protect others around you: Learn how to safely use, store and throw away your medicines at www.disposemymeds.org.          This list is accurate as of 2/2/18  8:52 AM.  Always use your most recent med list.                   Brand Name Dispense Instructions for use Diagnosis    acetaminophen 325 MG tablet    TYLENOL    100 tablet    Take 2 tablets (650 mg) by mouth every 4 hours as needed for other (surgical pain)    S/P  total knee replacement using cement, left       amLODIPine 2.5 MG tablet    NORVASC    30 tablet    Take 1 tablet (2.5 mg) by mouth daily    HTN, goal below 140/90       aspirin 81 MG tablet     30 tablet    Take 1 tablet (81 mg) by mouth daily    HTN, goal below 140/90, Type 2 diabetes mellitus without complication, without long-term current use of insulin (H)       atenolol 25 MG tablet    TENORMIN    90 tablet    TAKE 1 TABLET (25 MG) BY MOUTH AT BEDTIME    HTN, goal below 140/90, Hyperlipidemia LDL goal <130, Vitamin D deficiency, Osteopenia, unspecified location, Moyamoya disease       CALCIUM 500 PO      Take 1 tablet by mouth daily.        clopidogrel 75 MG tablet    PLAVIX    90 tablet    TAKE 1 TABLET BY MOUTH DAILY    Moyamoya disease       diclofenac 1 % Gel topical gel    VOLTAREN    100 g    Apply 4 grams to knees or 2 grams to hands four times daily using enclosed dosing card.    Right foot pain, Primary localized osteoarthrosis of right ankle and foot       * hydrochlorothiazide 25 MG tablet    HYDRODIURIL    90 tablet    TAKE 1 TABLET (25 MG) BY MOUTH DAILY    HTN, goal below 140/90, CKD (chronic kidney disease) stage 3, GFR 30-59 ml/min       * hydrochlorothiazide 12.5 MG Tabs tablet     30 tablet    Take 1 tablet (12.5 mg) by mouth daily    HTN, goal below 140/90       losartan 50 MG tablet    COZAAR    60 tablet    TAKE 1 TABLET BY MOUTH 2 TIMES DAILY    HTN, goal below 140/90       MULTIVITAMIN & MINERAL PO      Take 1 tablet by mouth daily.        simvastatin 20 MG tablet    ZOCOR    90 tablet    TAKE 1 TABLET (20 MG) BY MOUTH AT BEDTIME    Hyperlipidemia LDL goal <130, HTN, goal below 140/90, Vitamin D deficiency, Osteopenia, unspecified location, Moyamoya disease       * Notice:  This list has 2 medication(s) that are the same as other medications prescribed for you. Read the directions carefully, and ask your doctor or other care provider to review them with you.

## 2018-02-02 NOTE — PROGRESS NOTES
Patient's instructions / PLAN:                                                        Plan:  1. Add Amlodipine 2.5 mg daily for the blood pressure  2. Stop hydrochlorathiazide 25 mg daily  3. Take hydrochlorathiazide 12.5 mg daily - as needed for swollen legs  4. Continue the other meds, same doses for now.  5. Please make a lab appointment for non fasting labs in 1- 3 months to recheck kidneys  6. Make sure you drink plenty of water the day of the blood test.   7. Please make an appointment few days after the labs to discuss about the results.         ASSESSMENT & PLAN:                                                      (I10) HTN, goal below 140/90  (primary encounter diagnosis)  Comment: Not controlled   Plan: hydrochlorothiazide 12.5 MG TABS tablet,         amLODIPine (NORVASC) 2.5 MG tablet,             (N18.3) CKD 3  Comment:   Plan: Basic         metabolic panel    (E78.5) Hyperlipidemia LDL goal <130  Comment: Controlled    Plan: Continue same meds, same doses for now     (R25.2) Cramp of limb  Comment: Possible related with hydrochlorothiazide or with statin  Plan: CK total            (I67.5) Moyamoya disease  Comment: stable   Plan: f/u w neuro    (M85.80) Osteopenia, unspecified location  Comment:   Plan:        Chief Complaint:                                                      Follow up chronic medical problems        SUBJECTIVE:                                                    History of present illness     Feels well    Labs reviewed   Creat little higher. She didn't drink any water for 12 h the day of the test    Leg cramps at night     ROS:                                                      ROS: negative for fever, chills, cough, wheezes, chest pain, shortness of breath, vomiting, abdominal pain, leg swelling         OBJECTIVE:                                                    Physical Exam :    Blood pressure 140/60, pulse 66, temperature 97.9  F (36.6  C), temperature source Oral, height  "5' 8\" (1.727 m), weight 238 lb 4.8 oz (108.1 kg), SpO2 100 %, not currently breastfeeding.   NAD, appears comfortable  Skin: no rashes   HEENT: PERRLA, EOMI, pink conjunctiva, anicteric sclerae, bilateral tympanic membranes are clinically normal, oropharynx is normal color  Neck: supple, no JVD, No thyroidmegaly. Lymph nodes nonpalpable cervical and supraclavicular.  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 F/u dr. Soto     OA (osteoarthritis)     s/p bilat total hips      Other chronic pain     Joint pain for many years     Unspecified cerebral artery occlusion with cerebral infarction     After Moyamoya surgery > 10 yrs ago.     Vitamin D deficiencies       PSHx: reviewed  Past Surgical History:   Procedure Laterality Date     ARTHROPLASTY KNEE Left 4/17/2017    Procedure: ARTHROPLASTY KNEE;  Left total knee arthroplasty;  Surgeon: Chidi Jenkins MD;  Location: [facility] OR     C NONSPECIFIC PROCEDURE  10/30/06    neurosurgery Dr Soto     C NONSPECIFIC PROCEDURE      bilateral total hips approx 2002     C NONSPECIFIC PROCEDURE  3/07    neurosurgery      COLONOSCOPY  2008    normal     COLONOSCOPY  7/17/2014    Procedure: COLONOSCOPY;  Surgeon: Raul Nolan " "DO Olu;  Location:  GI     [facility] of [facility].\"     RECONSTRUCT FOREFOOT WITH METATARSOPHALANGEAL (MTP) FUSION Left 8/21/2014    Procedure: RECONSTRUCT FOREFOOT WITH METATARSOPHALANGEAL (MTP) FUSION;  Surgeon: Tres Merlos DPM;  Location:  OR        Meds: reviewed  Current Outpatient Prescriptions   Medication Sig Dispense Refill     hydrochlorothiazide (HYDRODIURIL) 25 MG tablet TAKE 1 TABLET (25 MG) BY MOUTH DAILY 90 tablet 0     losartan (COZAAR) 50 MG tablet TAKE 1 TABLET BY MOUTH 2 TIMES DAILY 60 tablet 1     diclofenac (VOLTAREN) 1 % GEL topical gel Apply 4 grams to knees or 2 grams to hands four times daily using enclosed dosing card. 100 g 1     simvastatin (ZOCOR) 20 MG tablet TAKE 1 TABLET (20 MG) BY MOUTH AT BEDTIME 90 tablet 1     atenolol (TENORMIN) 25 MG tablet TAKE 1 TABLET (25 MG) BY MOUTH AT BEDTIME 90 tablet 1     clopidogrel (PLAVIX) 75 MG tablet TAKE 1 TABLET BY MOUTH DAILY 90 tablet 3     aspirin 81 MG tablet Take 1 tablet (81 mg) by mouth daily 30 tablet      Multiple Vitamins-Minerals (MULTIVITAMIN & MINERAL PO) Take 1 tablet by mouth daily.       acetaminophen (TYLENOL) 325 MG tablet Take 2 tablets (650 mg) by mouth every 4 hours as needed for other (surgical pain) (Patient not taking: Reported on 2/2/2018) 100 tablet 0     Calcium Carbonate (CALCIUM 500 PO) Take 1 tablet by mouth daily.         Soc Hx: reviewed  Fam Hx: reviewed          Chani Farrell MD  Internal Medicine     "

## 2018-02-26 ENCOUNTER — HOSPITAL ENCOUNTER (OUTPATIENT)
Dept: MAMMOGRAPHY | Facility: CLINIC | Age: 78
Discharge: HOME | End: 2018-02-26
Attending: INTERNAL MEDICINE | Admitting: INTERNAL MEDICINE
Payer: MEDICARE

## 2018-02-26 DIAGNOSIS — Z12.31 VISIT FOR SCREENING MAMMOGRAM: ICD-10-CM

## 2018-02-26 PROCEDURE — 77067 SCR MAMMO BI INCL CAD: CPT

## 2018-03-03 DIAGNOSIS — I10 HTN, GOAL BELOW 140/90: ICD-10-CM

## 2018-03-06 RX ORDER — LOSARTAN POTASSIUM 50 MG/1
TABLET ORAL
Qty: 60 TABLET | Refills: 2 | Status: SHIPPED | OUTPATIENT
Start: 2018-03-06 | End: 2018-05-25

## 2018-03-06 NOTE — TELEPHONE ENCOUNTER
"Requested Prescriptions   Pending Prescriptions Disp Refills     losartan (COZAAR) 50 MG tablet [Pharmacy Med Name: LOSARTAN POTASSIUM 50 MG TAB] 60 tablet 1     Sig: TAKE 1 TABLET BY MOUTH 2 TIMES DAILY    Angiotensin-II Receptors Failed    3/3/2018  1:43 AM       Failed - Blood pressure under 140/90 in past 12 months    BP Readings from Last 3 Encounters:   02/02/18 140/60   11/03/17 118/68   11/03/17 128/78                Failed - Normal serum creatinine on file in past 12 months    Recent Labs   Lab Test  01/26/18   0818   CR  1.24*            Passed - Recent (12 mo) or future (30 days) visit within the authorizing provider's specialty    Patient had office visit in the last year or has a visit in the next 30 days with authorizing provider.  See \"Patient Info\" tab in inbasket, or \"Choose Columns\" in Meds & Orders section of the refill encounter.            Passed - Patient is age 18 or older       Passed - No active pregnancy on record       Passed - Normal serum potassium on file in past 12 months    Recent Labs   Lab Test  01/26/18   0818   POTASSIUM  4.3                   Passed - No positive pregnancy test in past 12 months        Routing refill request to provider for review/approval because:  Labs out of range:  Creatinine, blood pressure        "

## 2018-04-14 DIAGNOSIS — I10 HTN, GOAL BELOW 140/90: Chronic | ICD-10-CM

## 2018-04-14 DIAGNOSIS — N18.30 CKD (CHRONIC KIDNEY DISEASE) STAGE 3, GFR 30-59 ML/MIN (H): Chronic | ICD-10-CM

## 2018-04-16 RX ORDER — HYDROCHLOROTHIAZIDE 25 MG/1
TABLET ORAL
Qty: 90 TABLET | Refills: 0 | OUTPATIENT
Start: 2018-04-16

## 2018-05-18 DIAGNOSIS — M85.80 OSTEOPENIA, UNSPECIFIED LOCATION: Chronic | ICD-10-CM

## 2018-05-18 DIAGNOSIS — E55.9 VITAMIN D DEFICIENCY: ICD-10-CM

## 2018-05-18 DIAGNOSIS — I10 HTN, GOAL BELOW 140/90: ICD-10-CM

## 2018-05-18 DIAGNOSIS — E78.5 HYPERLIPIDEMIA LDL GOAL <130: ICD-10-CM

## 2018-05-18 DIAGNOSIS — I67.5 MOYAMOYA DISEASE: Chronic | ICD-10-CM

## 2018-05-19 RX ORDER — SIMVASTATIN 20 MG
TABLET ORAL
Qty: 90 TABLET | Refills: 1 | Status: SHIPPED | OUTPATIENT
Start: 2018-05-19 | End: 2018-11-10

## 2018-05-19 NOTE — TELEPHONE ENCOUNTER
"Per 2/2 dict-Please make a lab appointment for non fasting labs in 1- 3 months to recheck kidneys  6. Make sure you drink plenty of water the day of the blood test.   7. Please make an appointment few days after the labs to discuss about the results.     Sent pt my chart message     Requested Prescriptions   Pending Prescriptions Disp Refills     simvastatin (ZOCOR) 20 MG tablet [Pharmacy Med Name: SIMVASTATIN 20 MG TABLET] 90 tablet 1     Sig: TAKE 1 TABLET (20 MG) BY MOUTH AT BEDTIME    Statins Protocol Passed    5/18/2018 12:59 AM       Passed - LDL on file in past 12 months    Recent Labs   Lab Test  01/26/18   0818   LDL  95            Passed - No abnormal creatine kinase in past 12 months    No lab results found.            Passed - Recent (12 mo) or future (30 days) visit within the authorizing provider's specialty    Patient had office visit in the last 12 months or has a visit in the next 30 days with authorizing provider or within the authorizing provider's specialty.  See \"Patient Info\" tab in inbasket, or \"Choose Columns\" in Meds & Orders section of the refill encounter.           Passed - Patient is age 18 or older       Passed - No active pregnancy on record       Passed - No positive pregnancy test in past 12 months          "

## 2018-05-25 DIAGNOSIS — I10 HTN, GOAL BELOW 140/90: Chronic | ICD-10-CM

## 2018-05-25 NOTE — TELEPHONE ENCOUNTER
"Past due for appt-Sent pt my chart message which pt read     Called pt-left message     Requested Prescriptions   Pending Prescriptions Disp Refills     hydrochlorothiazide 12.5 MG TABS tablet [Pharmacy Med Name: HYDROCHLOROTHIAZIDE 12.5 MG TB] 30 tablet 3     Sig: TAKE 1 TABLET BY MOUTH DAILY    Diuretics (Including Combos) Protocol Failed    5/25/2018  1:04 AM       Failed - Blood pressure under 140/90 in past 12 months    BP Readings from Last 3 Encounters:   02/02/18 140/60   11/03/17 118/68   11/03/17 128/78                Failed - Normal serum creatinine on file in past 12 months    Recent Labs   Lab Test  01/26/18 0818   CR  1.24*             Passed - Recent (12 mo) or future (30 days) visit within the authorizing provider's specialty    Patient had office visit in the last 12 months or has a visit in the next 30 days with authorizing provider or within the authorizing provider's specialty.  See \"Patient Info\" tab in inbasket, or \"Choose Columns\" in Meds & Orders section of the refill encounter.           Passed - Patient is age 18 or older       Passed - No active pregancy on record       Passed - Normal serum potassium on file in past 12 months    Recent Labs   Lab Test  01/26/18 0818   POTASSIUM  4.3                   Passed - Normal serum sodium on file in past 12 months    Recent Labs   Lab Test  01/26/18 0818   NA  138             Passed - No positive pregnancy test in past 12 months        losartan (COZAAR) 50 MG tablet [Pharmacy Med Name: LOSARTAN POTASSIUM 50 MG TAB] 60 tablet 2     Sig: TAKE 1 TABLET BY MOUTH 2 TIMES DAILY    Angiotensin-II Receptors Failed    5/25/2018  1:04 AM       Failed - Blood pressure under 140/90 in past 12 months    BP Readings from Last 3 Encounters:   02/02/18 140/60   11/03/17 118/68   11/03/17 128/78                Failed - Normal serum creatinine on file in past 12 months    Recent Labs   Lab Test  01/26/18 0818   CR  1.24*            Passed - Recent (12 mo) or " "future (30 days) visit within the authorizing provider's specialty    Patient had office visit in the last 12 months or has a visit in the next 30 days with authorizing provider or within the authorizing provider's specialty.  See \"Patient Info\" tab in inbasket, or \"Choose Columns\" in Meds & Orders section of the refill encounter.           Passed - Patient is age 18 or older       Passed - No active pregnancy on record       Passed - Normal serum potassium on file in past 12 months    Recent Labs   Lab Test  01/26/18   0818   POTASSIUM  4.3                   Passed - No positive pregnancy test in past 12 months        amLODIPine (NORVASC) 2.5 MG tablet [Pharmacy Med Name: AMLODIPINE BESYLATE 2.5 MG TAB] 30 tablet 3     Sig: TAKE 1 TABLET BY MOUTH EVERY DAY    Calcium Channel Blockers Protocol  Failed    5/25/2018  1:04 AM       Failed - Blood pressure under 140/90 in past 12 months    BP Readings from Last 3 Encounters:   02/02/18 140/60   11/03/17 118/68   11/03/17 128/78                Failed - Normal serum creatinine on file in past 12 months    Recent Labs   Lab Test  01/26/18   0818   CR  1.24*            Passed - Recent (12 mo) or future (30 days) visit within the authorizing provider's specialty    Patient had office visit in the last 12 months or has a visit in the next 30 days with authorizing provider or within the authorizing provider's specialty.  See \"Patient Info\" tab in inbasket, or \"Choose Columns\" in Meds & Orders section of the refill encounter.           Passed - Patient is age 18 or older       Passed - No active pregnancy on record       Passed - No positive pregnancy test in past 12 months          "

## 2018-05-29 RX ORDER — LOSARTAN POTASSIUM 50 MG/1
TABLET ORAL
Qty: 60 TABLET | Refills: 0 | Status: SHIPPED | OUTPATIENT
Start: 2018-05-29 | End: 2018-06-11

## 2018-05-29 RX ORDER — HYDROCHLOROTHIAZIDE 12.5 MG/1
TABLET ORAL
Qty: 30 TABLET | Refills: 0 | Status: SHIPPED | OUTPATIENT
Start: 2018-05-29 | End: 2018-06-11 | Stop reason: DRUGHIGH

## 2018-05-29 RX ORDER — AMLODIPINE BESYLATE 2.5 MG/1
TABLET ORAL
Qty: 30 TABLET | Refills: 0 | Status: SHIPPED | OUTPATIENT
Start: 2018-05-29 | End: 2018-06-11

## 2018-06-06 DIAGNOSIS — I10 HTN, GOAL BELOW 140/90: Chronic | ICD-10-CM

## 2018-06-06 DIAGNOSIS — R25.2 CRAMP OF LIMB: ICD-10-CM

## 2018-06-06 LAB
ANION GAP SERPL CALCULATED.3IONS-SCNC: 7 MMOL/L (ref 3–14)
BUN SERPL-MCNC: 24 MG/DL (ref 7–30)
CALCIUM SERPL-MCNC: 9.1 MG/DL (ref 8.5–10.1)
CHLORIDE SERPL-SCNC: 98 MMOL/L (ref 94–109)
CK SERPL-CCNC: 75 U/L (ref 30–225)
CO2 SERPL-SCNC: 29 MMOL/L (ref 20–32)
CREAT SERPL-MCNC: 1.13 MG/DL (ref 0.52–1.04)
GFR SERPL CREATININE-BSD FRML MDRD: 47 ML/MIN/1.7M2
GLUCOSE SERPL-MCNC: 114 MG/DL (ref 70–99)
POTASSIUM SERPL-SCNC: 4.4 MMOL/L (ref 3.4–5.3)
SODIUM SERPL-SCNC: 134 MMOL/L (ref 133–144)

## 2018-06-06 PROCEDURE — 82550 ASSAY OF CK (CPK): CPT | Performed by: INTERNAL MEDICINE

## 2018-06-06 PROCEDURE — 36415 COLL VENOUS BLD VENIPUNCTURE: CPT | Performed by: INTERNAL MEDICINE

## 2018-06-06 PROCEDURE — 80048 BASIC METABOLIC PNL TOTAL CA: CPT | Performed by: INTERNAL MEDICINE

## 2018-06-11 ENCOUNTER — OFFICE VISIT (OUTPATIENT)
Dept: INTERNAL MEDICINE | Facility: CLINIC | Age: 78
End: 2018-06-11
Payer: COMMERCIAL

## 2018-06-11 VITALS
WEIGHT: 242.4 LBS | BODY MASS INDEX: 36.74 KG/M2 | TEMPERATURE: 98 F | OXYGEN SATURATION: 96 % | HEIGHT: 68 IN | HEART RATE: 70 BPM | DIASTOLIC BLOOD PRESSURE: 72 MMHG | SYSTOLIC BLOOD PRESSURE: 130 MMHG | RESPIRATION RATE: 14 BRPM

## 2018-06-11 DIAGNOSIS — E78.5 HYPERLIPIDEMIA LDL GOAL <130: ICD-10-CM

## 2018-06-11 DIAGNOSIS — I10 HTN, GOAL BELOW 140/90: Chronic | ICD-10-CM

## 2018-06-11 DIAGNOSIS — N18.30 CKD (CHRONIC KIDNEY DISEASE) STAGE 3, GFR 30-59 ML/MIN (H): Chronic | ICD-10-CM

## 2018-06-11 DIAGNOSIS — E55.9 VITAMIN D DEFICIENCY: ICD-10-CM

## 2018-06-11 DIAGNOSIS — Z00.00 ROUTINE GENERAL MEDICAL EXAMINATION AT A HEALTH CARE FACILITY: Primary | ICD-10-CM

## 2018-06-11 DIAGNOSIS — I67.5 MOYAMOYA DISEASE: Chronic | ICD-10-CM

## 2018-06-11 DIAGNOSIS — M85.80 OSTEOPENIA, UNSPECIFIED LOCATION: Chronic | ICD-10-CM

## 2018-06-11 DIAGNOSIS — R60.0 BILATERAL LEG EDEMA: ICD-10-CM

## 2018-06-11 PROCEDURE — 99397 PER PM REEVAL EST PAT 65+ YR: CPT | Performed by: INTERNAL MEDICINE

## 2018-06-11 RX ORDER — ATENOLOL 25 MG/1
TABLET ORAL
Qty: 90 TABLET | Refills: 1 | Status: SHIPPED | OUTPATIENT
Start: 2018-06-11 | End: 2018-12-15

## 2018-06-11 RX ORDER — LOSARTAN POTASSIUM 50 MG/1
TABLET ORAL
Qty: 180 TABLET | Refills: 1 | Status: SHIPPED | OUTPATIENT
Start: 2018-06-11 | End: 2018-12-26

## 2018-06-11 RX ORDER — AMLODIPINE BESYLATE 2.5 MG/1
2.5 TABLET ORAL DAILY
Qty: 90 TABLET | Refills: 1 | Status: SHIPPED | OUTPATIENT
Start: 2018-06-11 | End: 2018-12-26

## 2018-06-11 RX ORDER — HYDROCHLOROTHIAZIDE 25 MG/1
25 TABLET ORAL DAILY
Qty: 90 TABLET | Refills: 1 | Status: SHIPPED | OUTPATIENT
Start: 2018-06-11 | End: 2018-12-17

## 2018-06-11 NOTE — MR AVS SNAPSHOT
After Visit Summary   6/11/2018    Candida Rose    MRN: [id number]           Patient Information     Date Of Birth          1940        Visit Information        Provider Department      6/11/2018 8:00 AM Chani Peoples MD [facility]        Today's Diagnoses     Routine general medical examination at a health care facility    -  1    HTN, goal below 140/90        Bilateral leg edema        Hyperlipidemia LDL goal <130        Vitamin D deficiency        Osteopenia, unspecified location        Moyamoya disease        CKD 3          Care Instructions    Plan:  1. Increase the hydrochlorathiazide to 25 mg daily  2. Continue the other meds, same doses for now.  3.Please make a lab appointment for fasting labs  In  January  4. Please make an appointment few days after the labs to discuss about the results.   5. The following vaccines are recommended for you.   Medicare covers better if you have these vaccines at the pharmacy:  -- Shingerix vaccine - the newest vaccine for shingles           Preventive Health Recommendations    Female Ages 65 +    Yearly exam:     See your health care provider every year in order to  o Review health changes.   o Discuss preventive care.    o Review your medicines if your doctor has prescribed any.      You no longer need a yearly Pap test unless you've had an abnormal Pap test in the past 10 years. If you have vaginal symptoms, such as bleeding or discharge, be sure to talk with your provider about a Pap test.      Every 1 to 2 years, have a mammogram.  If you are over 69, talk with your health care provider about whether or not you want to continue having screening mammograms.      Every 10 years, have a colonoscopy. Or, have a yearly FIT test (stool test). These exams will check for colon cancer.       Have a cholesterol test every 5 years, or more often if your doctor advises it.       Have a diabetes test (fasting glucose) every three  years. If you are at risk for diabetes, you should have this test more often.       At age 65, have a bone density scan (DEXA) to check for osteoporosis (brittle bone disease).    Shots:    Get a flu shot each year.    Get a tetanus shot every 10 years.    Talk to your doctor about your pneumonia vaccines. There are now two you should receive - Pneumovax (PPSV 23) and Prevnar (PCV 13).    Talk to your doctor about the shingles vaccine.    Talk to your doctor about the hepatitis B vaccine.    Nutrition:     Eat at least 5 servings of fruits and vegetables each day.      Eat whole-grain bread, whole-wheat pasta and brown rice instead of white grains and rice.      Talk to your provider about Calcium and Vitamin D.     Lifestyle    Exercise at least 150 minutes a week (30 minutes a day, 5 days a week). This will help you control your weight and prevent disease.      Limit alcohol to one drink per day.      No smoking.       Wear sunscreen to prevent skin cancer.       See your dentist twice a year for an exam and cleaning.      See your eye doctor every 1 to 2 years to screen for conditions such as glaucoma, macular degeneration and cataracts.          Follow-ups after your visit        Future tests that were ordered for you today     Open Future Orders        Priority Expected Expires Ordered    Parathyroid Hormone Intact Routine 5/12/2019 6/11/2019 6/11/2018    Vitamin D Deficiency Routine  6/11/2019 6/11/2018            Who to contact     If you have questions or need follow up information about today's clinic visit or your schedule please contact [facility] directly at [phone].  Normal or non-critical lab and imaging results will be communicated to you by MyChart, letter or phone within 4 business days after the clinic has received the results. If you do not hear from us within 7 days, please contact the clinic through MyChart or phone. If you have a critical or abnormal lab result, we will  "notify you by phone as soon as possible.  Submit refill requests through [employer] or call your pharmacy and they will forward the refill request to us. Please allow 3 business days for your refill to be completed.          Additional Information About Your Visit        [employer] Information     [employer] gives you secure access to your electronic health record. If you see a primary care provider, you can also send messages to your care team and make appointments. If you have questions, please call your primary care clinic.  If you do not have a primary care provider, please call [phone] and they will assist you.        Care EveryWhere ID     This is your Care EveryWhere ID. This could be used by other organizations to access your [facility] medical records  [id number]        Your Vitals Were     Pulse Temperature Respirations Height Pulse Oximetry Breastfeeding?    70 98  F (36.7  C) (Oral) 14 5' 8.25\" (1.734 m) 96% No    BMI (Body Mass Index)                   36.59 kg/m2            Blood Pressure from Last 3 Encounters:   06/11/18 130/72   02/02/18 140/60   11/03/17 118/68    Weight from Last 3 Encounters:   06/11/18 242 lb 6.4 oz (110 kg)   02/02/18 238 lb 4.8 oz (108.1 kg)   11/03/17 240 lb (108.9 kg)              We Performed the Following     Albumin Random Urine Quantitative with Creat Ratio     CBC with platelets     Comprehensive metabolic panel     Lipid panel reflex to direct LDL Fasting     TSH with free T4 reflex          Today's Medication Changes          These changes are accurate as of 6/11/18  8:44 AM.  If you have any questions, ask your nurse or doctor.               These medicines have changed or have updated prescriptions.        Dose/Directions    amLODIPine 2.5 MG tablet   Commonly known as:  NORVASC   This may have changed:  See the new instructions.   Used for:  HTN, goal below 140/90   Changed by:  Chani Peoples MD        Dose:  2.5 mg   Take 1 tablet (2.5 mg) by mouth " daily   Quantity:  90 tablet   Refills:  1       * hydrochlorothiazide 25 MG tablet   Commonly known as:  HYDRODIURIL   This may have changed:  Another medication with the same name was changed. Make sure you understand how and when to take each.   Used for:  HTN, goal below 140/90, CKD (chronic kidney disease) stage 3, GFR 30-59 ml/min   Changed by:  Chani Peoples MD        TAKE 1 TABLET (25 MG) BY MOUTH DAILY   Quantity:  90 tablet   Refills:  0       * hydrochlorothiazide 25 MG tablet   Commonly known as:  HYDRODIURIL   This may have changed:    - medication strength  - See the new instructions.   Used for:  Bilateral leg edema   Changed by:  Chani Peoples MD        Dose:  25 mg   Take 1 tablet (25 mg) by mouth daily   Quantity:  90 tablet   Refills:  1       * Notice:  This list has 2 medication(s) that are the same as other medications prescribed for you. Read the directions carefully, and ask your doctor or other care provider to review them with you.         Where to get your medicines      These medications were sent to [facility] [address] [facility] [place], [place] - [address]  [address] [facility] [address]     Phone:  [phone]     amLODIPine 2.5 MG tablet    atenolol 25 MG tablet    hydrochlorothiazide 25 MG tablet    losartan 50 MG tablet                Primary Care Provider Office Phone # Fax #    Chani Peoples [phone]       [address] [place] [address]        Equal Access to Services     [facility] : Hadii vicki ku hadasho Soomaali, waaxda luqadaha, qaybta kaalmada adeegyada, nadege abdi. So [facility] [phone].    ATENCIÓN: Si habla español, tiene a pan disposición servicios gratuitos de asistencia lingüística. Llame al [phone].    We comply with applicable federal civil rights laws and [place] laws. We do not discriminate on the basis of race, color, national origin, age,  disability, sex, sexual orientation, or gender identity.            Thank you!     Thank you for choosing [facility]  for your care. Our goal is always to provide you with excellent care. Hearing back from our patients is one way we can continue to improve our services. Please take a few minutes to complete the written survey that you may receive in the mail after your visit with us. Thank you!             Your Updated Medication List - Protect others around you: Learn how to safely use, store and throw away your medicines at www.disposemymeds.org.          This list is accurate as of 6/11/18  8:44 AM.  Always use your most recent med list.                   Brand Name Dispense Instructions for use Diagnosis    amLODIPine 2.5 MG tablet    NORVASC    90 tablet    Take 1 tablet (2.5 mg) by mouth daily    HTN, goal below 140/90       aspirin 81 MG tablet     30 tablet    Take 1 tablet (81 mg) by mouth daily    HTN, goal below 140/90, Type 2 diabetes mellitus without complication, without long-term current use of insulin (H)       atenolol 25 MG tablet    TENORMIN    90 tablet    TAKE 1 TABLET (25 MG) BY MOUTH AT BEDTIME    HTN, goal below 140/90       CALCIUM 500 PO      Take 1 tablet by mouth daily.        clopidogrel 75 MG tablet    PLAVIX    90 tablet    TAKE 1 TABLET BY MOUTH DAILY    Moyamoya disease       diclofenac 1 % Gel topical gel    VOLTAREN    100 g    Apply 4 grams to knees or 2 grams to hands four times daily using enclosed dosing card.    Right foot pain, Primary localized osteoarthrosis of right ankle and foot       * hydrochlorothiazide 25 MG tablet    HYDRODIURIL    90 tablet    TAKE 1 TABLET (25 MG) BY MOUTH DAILY    HTN, goal below 140/90, CKD (chronic kidney disease) stage 3, GFR 30-59 ml/min       * hydrochlorothiazide 25 MG tablet    HYDRODIURIL    90 tablet    Take 1 tablet (25 mg) by mouth daily    Bilateral leg edema       losartan 50 MG tablet    COZAAR    180 tablet    TAKE  1 TABLET BY MOUTH 2 TIMES DAILY    HTN, goal below 140/90       MULTIVITAMIN & MINERAL PO      Take 1 tablet by mouth daily.        simvastatin 20 MG tablet    ZOCOR    90 tablet    TAKE 1 TABLET (20 MG) BY MOUTH AT BEDTIME    Hyperlipidemia LDL goal <130, HTN, goal below 140/90, Vitamin D deficiency, Osteopenia, unspecified location, Moyamoya disease       * Notice:  This list has 2 medication(s) that are the same as other medications prescribed for you. Read the directions carefully, and ask your doctor or other care provider to review them with you.

## 2018-06-11 NOTE — PATIENT INSTRUCTIONS
Plan:  1. Increase the hydrochlorathiazide to 25 mg daily  2. Continue the other meds, same doses for now.  3.Please make a lab appointment for fasting labs  In  January 4. Please make an appointment few days after the labs to discuss about the results.   5. The following vaccines are recommended for you.   Medicare covers better if you have these vaccines at the pharmacy:  -- Shingerix vaccine - the newest vaccine for shingles           Preventive Health Recommendations    Female Ages 65 +    Yearly exam:     See your health care provider every year in order to  o Review health changes.   o Discuss preventive care.    o Review your medicines if your doctor has prescribed any.      You no longer need a yearly Pap test unless you've had an abnormal Pap test in the past 10 years. If you have vaginal symptoms, such as bleeding or discharge, be sure to talk with your provider about a Pap test.      Every 1 to 2 years, have a mammogram.  If you are over 69, talk with your health care provider about whether or not you want to continue having screening mammograms.      Every 10 years, have a colonoscopy. Or, have a yearly FIT test (stool test). These exams will check for colon cancer.       Have a cholesterol test every 5 years, or more often if your doctor advises it.       Have a diabetes test (fasting glucose) every three years. If you are at risk for diabetes, you should have this test more often.       At age 65, have a bone density scan (DEXA) to check for osteoporosis (brittle bone disease).    Shots:    Get a flu shot each year.    Get a tetanus shot every 10 years.    Talk to your doctor about your pneumonia vaccines. There are now two you should receive - Pneumovax (PPSV 23) and Prevnar (PCV 13).    Talk to your doctor about the shingles vaccine.    Talk to your doctor about the hepatitis B vaccine.    Nutrition:     Eat at least 5 servings of fruits and vegetables each day.      Eat whole-grain bread,  whole-wheat pasta and brown rice instead of white grains and rice.      Talk to your provider about Calcium and Vitamin D.     Lifestyle    Exercise at least 150 minutes a week (30 minutes a day, 5 days a week). This will help you control your weight and prevent disease.      Limit alcohol to one drink per day.      No smoking.       Wear sunscreen to prevent skin cancer.       See your dentist twice a year for an exam and cleaning.      See your eye doctor every 1 to 2 years to screen for conditions such as glaucoma, macular degeneration and cataracts.

## 2018-06-11 NOTE — PROGRESS NOTES
Dr Farrell's note    Patient's instructions / PLAN:                                                        Plan:  1. Increase the hydrochlorathiazide to 25 mg daily  2. Continue the other meds, same doses for now.  3.Please make a lab appointment for fasting labs  In  January 4. Please make an appointment few days after the labs to discuss about the results.   5. The following vaccines are recommended for you.   Medicare covers better if you have these vaccines at the pharmacy:  -- Shingerix vaccine - the newest vaccine for shingles         ASSESSMENT & PLAN:                                                      (Z00.00) Routine general medical examination at a health care facility  (primary encounter diagnosis)  Comment:   Plan: CBC with platelets, Comprehensive metabolic         panel, Lipid panel reflex to direct LDL         Fasting, Albumin Random Urine Quantitative with        Creat Ratio, TSH with free T4 reflex            (I10) HTN, goal below 140/90  Comment: Controlled    Plan: atenolol (TENORMIN) 25 MG tablet, losartan         (COZAAR) 50 MG tablet, amLODIPine (NORVASC) 2.5        MG tablet, CBC with platelets, Comprehensive         metabolic panel            (R60.0) Bilateral leg edema  Comment:   Plan: hydrochlorothiazide (HYDRODIURIL) 25 MG tablet,        Comprehensive metabolic panel            (E78.5) Hyperlipidemia LDL goal <130  Comment: Controlled    Plan: Lipid panel reflex to direct LDL Fasting, TSH         with free T4 reflex            (E55.9) Vitamin D deficiency  Comment:   Plan: Parathyroid Hormone Intact, Vitamin D         Deficiency            (M85.80) Osteopenia, unspecified location  Comment:   Plan:     (I67.5) Moyamoya disease  Comment: stable   Plan:     (N18.3) CKD 3  Comment:   Plan: Comprehensive metabolic panel, Parathyroid         Hormone Intact               Chief Complaint:                                                        Annual exam    SUBJECTIVE:                          "                           History of present illness     *Has noticed increased swelling in her feet, but it looks like her dose of HCTZ was decreased onher last refill, but it looks like this may have been a mistake? She was always on 25mg/day.     *Daughter was recently diagnosed with breast cancer at age 48, was found to have the \"cancer gene\".     Magnesium helped with the leg cramps     LOV:  Had leg cramps and increased creatinine to 1.24 so we decreased the HCTZ dose     Plan:  1. Add Amlodipine 2.5 mg daily for the blood pressure  2. Stop hydrochlorathiazide 25 mg daily  3. Take hydrochlorathiazide 12.5 mg daily - as needed for swollen legs  4. Continue the other meds, same doses for now.  5. Please make a lab appointment for non fasting labs in 1- 3 months to recheck kidneys  6. Make sure you drink plenty of water the day of the blood test.   7. Please make an appointment few days after the labs to discuss about the results.    ROS:   General: Negative for fever, chills, major weight changes, fatigue  Skin: Negative for rashes, abnormal spots  Eyes: Negative for blurred or double vision  ENT/mouth: Negative for sinuses discomfort, earache, sore throat  Respiratory: Negative for cough, wheezes, chronic lung disease  Cardiovascular: Negative for rest or exertional chest pain, shortness of breath, palpitations, pos for  leg edema,   Gastrointestinal: Negative for vomiting, abdominal pain, heartburn, blood in stool, diarrhea, constipation  Genitourinary: Negative for urinary frequency, blood in urine, history of kidney stones  Female: Negative for abnormal vaginal bleeding, vaginal discharge  Neuro: Negative for headaches, numbness, tingling, weakness in arms or legs, history of seizure, recent syncope  Psychiatry: Negative for depression, anxiety, suicidal thoughts  Endo: Negative for known thyroid disease, diabetes.  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 F/u dr. Soto     OA (osteoarthritis)     s/p bilat total hips      Other chronic pain     Joint pain for many years     Unspecified cerebral artery occlusion with cerebral infarction     After Moyamoya surgery > 10 yrs ago.     Vitamin D deficiencies        PSHx: reviewed  Past Surgical History:   Procedure Laterality Date     ARTHROPLASTY KNEE Left 4/17/2017    Procedure: ARTHROPLASTY KNEE;  Left total knee arthroplasty;  Surgeon: Chidi Jenkins MD;  Location: [facility] OR     C NONSPECIFIC PROCEDURE  10/30/06    neurosurgery Dr Soto     C NONSPECIFIC PROCEDURE      bilateral total hips approx 2002     C NONSPECIFIC PROCEDURE  3/07    neurosurgery      COLONOSCOPY  2008    normal     COLONOSCOPY  7/17/2014    Procedure: COLONOSCOPY;  Surgeon: Raul Nolan DO;  [facility]  [facility]     [facility] had repair of blood flow.\"     RECONSTRUCT FOREFOOT WITH METATARSOPHALANGEAL (MTP) FUSION Left 8/21/2014    Procedure: RECONSTRUCT FOREFOOT WITH METATARSOPHALANGEAL (MTP) FUSION;  Surgeon: Tres Merlos DPM;  Location: [facility] OR        Soc Hx: No daily alcohol, no smoking  Social History     Social History     Marital status:      Spouse name: Olu      Number of children: 2     Years of education: N/A     Occupational History      Retired     Social History Main Topics     Smoking status: Never Smoker     " "Smokeless tobacco: Never Used     Alcohol use Yes      Comment:  1-2 per day     Drug use: No     Sexual activity: No     Other Topics Concern     Not on file     Social History Narrative        Fam Hx: reviewed  Family History   Problem Relation Age of Onset     Obesity Sister      gastric by-pass age age 60, heart murmur.     Cancer - colorectal Sister 69     HEART DISEASE Father      mi,  age 48     Family History Negative Mother      killed in MVA age 54     CANCER Maternal Aunt      lung cancer ,non-smoker     Lung Cancer Maternal Aunt      Breast Cancer Daughter 48         Screening: reviewed      All: reviewed    Meds: reviewed  Current Outpatient Prescriptions   Medication Sig Dispense Refill     amLODIPine (NORVASC) 2.5 MG tablet TAKE 1 TABLET BY MOUTH EVERY DAY 30 tablet 0     aspirin 81 MG tablet Take 1 tablet (81 mg) by mouth daily 30 tablet      atenolol (TENORMIN) 25 MG tablet TAKE 1 TABLET (25 MG) BY MOUTH AT BEDTIME 90 tablet 1     Calcium Carbonate (CALCIUM 500 PO) Take 1 tablet by mouth daily.       clopidogrel (PLAVIX) 75 MG tablet TAKE 1 TABLET BY MOUTH DAILY 90 tablet 3     diclofenac (VOLTAREN) 1 % GEL topical gel Apply 4 grams to knees or 2 grams to hands four times daily using enclosed dosing card. 100 g 1     losartan (COZAAR) 50 MG tablet TAKE 1 TABLET BY MOUTH 2 TIMES DAILY 60 tablet 0     Multiple Vitamins-Minerals (MULTIVITAMIN & MINERAL PO) Take 1 tablet by mouth daily.       simvastatin (ZOCOR) 20 MG tablet TAKE 1 TABLET (20 MG) BY MOUTH AT BEDTIME 90 tablet 1     hydrochlorothiazide (HYDRODIURIL) 25 MG tablet TAKE 1 TABLET (25 MG) BY MOUTH DAILY 90 tablet 0     hydrochlorothiazide 12.5 MG TABS tablet TAKE 1 TABLET BY MOUTH DAILY 30 tablet 0       OBJECTIVE:                                                    Physical Exam :  Blood pressure 130/72, pulse 70, temperature 98  F (36.7  C), temperature source Oral, resp. rate 14, height 5' 8.25\" (1.734 m), weight 242 lb 6.4 oz (110 " "kg), SpO2 96 %, not currently breastfeeding.     NAD, appears comfortable  Skin clear, no rashes  HEENT: PERRLA, EOMI, anicteric sclera, pink conjunctiva, external ears appear normal, bilateral tympanic membranes clinically normal, oropharynx normal color.  Neck: supple, no JVD,  no thyroidmegaly  Lymph nodes non palpable in the cervical, supraclavicular axillaries, inguinal areas  Chest: clear to auscultation with good respiratory effort  Cardiac: S1S2, RRR, no mgr appreciated  Abdomen: soft, not tender, not distended, audible bowel sound, no hepatosplenomegaly, no palpable masses, no abdominal bruits  Extremities: no cyanosis, clubbing. Trace edema.   Neuro: A, Ox3, no focal signs.  Breast exam in supine and erect position: they are symmetrical, no skin changes, no tenderness or nodes on palpation. Nipples are erect, no skin lesions, no discharge on pressure.    Pelvic exam: deferred, s/p menopause, no symptoms, no hx of abnormal pap         Chani Farrell MD  Internal Medicine       SUBJECTIVE:   Candida Rose is a 77 year old female who presents for Preventive Visit.    *Has noticed increased swelling in her feet, but it looks like her dose of HCTZ was decreased onher last refill, but it looks like this may have been a mistake? She was always on 25mg/day.   *Daughter was recently diagnosed with breast cancer at age 48, was found to have the \"cancer gene\".   Are you in the first 12 months of your Medicare Part B coverage?  No    Healthy Habits:    Do you get at least three servings of calcium containing foods daily (dairy, green leafy vegetables, etc.)? yes    Amount of exercise or daily activities, outside of work: \"Not as much as I should\".     Problems taking medications regularly No, just has questions regarding her HCTZ.     Medication side effects: No    Have you had an eye exam in the past two years? Yes, goes every year. Had Lasik a few years ago    Do you see a dentist twice per year? Yes, 3-4 times per " year.    Do you have sleep apnea, excessive snoring or daytime drowsiness?no      Ability to successfully perform activities of daily living: Yes, no assistance needed    Home safety:  none identified     Hearing impairment: Yes, has had her hearing checked and knows she should get hearing aids.     Fall risk:  Fallen 2 or more times in the past year?: No  Any fall with injury in the past year?: No        COGNITIVE SCREEN  1) Repeat 3 items (Banana, Sunrise, Chair)    2) Clock draw: NORMAL  3) 3 item recall: Recalls 3 objects  Results: 3 items recalled: COGNITIVE IMPAIRMENT LESS LIKELY    Mini-CogTM Copyright S [employer]av[employer]. Licensed by the author for use in [facility]; reprinted with permission (law@[facility]). All rights reserved.        Reviewed and updated as needed this visit by clinical staff  Tobacco  Med Hx  Surg Hx  Fam Hx  Soc Hx        Reviewed and updated as needed this visit by Provider        Social History   Substance Use Topics     Smoking status: Never Smoker     Smokeless tobacco: Never Used     Alcohol use Yes      Comment:  1-2 per day       If you drink alcohol do you typically have >3 drinks per day or >7 drinks per week? No                        Today's PHQ-2 Score:   PHQ-2 ( 1999 Pfizer) 5/4/2017 4/11/2017   Q1: Little interest or pleasure in doing things 0 0   Q2: Feeling down, depressed or hopeless 0 0   PHQ-2 Score 0 0       Do you feel safe in your environment - Yes    Do you have a Health Care Directive?: Yes: Patient states has Advance Directive and will bring in a copy to clinic.     Current providers sharing in care for this patient include:   Patient Care Team:  Chani Peoples MD as PCP - General (Internal Medicine)    The following health maintenance items are reviewed in Epic and correct as of today:  Health Maintenance   Topic Date Due     FOOT EXAM Q1 YEAR  07/08/1941     URINE DRUG SCREEN Q1 YR  [birth year]/08/1957     A1C Q6 MO  07/26/2018     EYE EXAM Q1  "YEAR  08/30/2018     CAMILA QUESTIONNAIRE 1 YEAR  11/03/2018     PHQ-9 Q1YR  11/03/2018     TSH W/ FREE T4 REFLEX Q2 YEAR  01/18/2019     LIPID MONITORING Q1 YEAR  01/26/2019     MICROALBUMIN Q1 YEAR  01/26/2019     FALL RISK ASSESSMENT  02/02/2019     CREATININE Q1 YEAR  06/06/2019     ADVANCE DIRECTIVE PLANNING Q5 YRS  09/29/2022     COLONOSCOPY Q10 YR  07/17/2024     TETANUS IMMUNIZATION (SYSTEM ASSIGNED)  01/13/2027     DEXA SCAN SCREENING (SYSTEM ASSIGNED)  Completed     PNEUMOCOCCAL  Completed     INFLUENZA VACCINE  Completed             OBJECTIVE:   There were no vitals taken for this visit. Estimated body mass index is 36.23 kg/(m^2) as calculated from the following:    Height as of 2/2/18: 5' 8\" (1.727 m).    Weight as of 2/2/18: 238 lb 4.8 oz (108.1 kg).      End of Life Planning:  Patient currently has an advanced directive: Yes.  Practitioner is supportive of decision.    COUNSELING:  Reviewed preventive health counseling, as reflected in patient instructions       Regular exercise       Healthy diet/nutrition        Estimated body mass index is 36.23 kg/(m^2) as calculated from the following:    Height as of 2/2/18: 5' 8\" (1.727 m).    Weight as of 2/2/18: 238 lb 4.8 oz (108.1 kg).  Weight management plan: Discussed healthy diet and exercise guidelines and patient will follow up in 12 months in clinic to re-evaluate.     reports that she has never smoked. She has never used smokeless tobacco.      Appropriate preventive services were discussed with this patient, including applicable screening as appropriate for cardiovascular disease, diabetes, osteopenia/osteoporosis, and glaucoma.  As appropriate for age/gender, discussed screening for colorectal cancer, prostate cancer, breast cancer, and cervical cancer. Checklist reviewing preventive services available has been given to the patient.    Reviewed patients plan of care and provided an AVS. The Basic Care Plan (routine screening as documented in Health " Maintenance) for Candida meets the Care Plan requirement. This Care Plan has been established and reviewed with the Patient.    Counseling Resources:  ATP IV Guidelines  Pooled Cohorts Equation Calculator  Breast Cancer Risk Calculator  FRAX Risk Assessment  ICSI Preventive Guidelines  Dietary Guidelines for Americans, 2010  USDA's MyPlate  ASA Prophylaxis  Lung CA Screening    Chani Peoples MD  [facility]

## 2018-06-11 NOTE — NURSING NOTE
"/72 (BP Location: Right arm, Patient Position: Chair, Cuff Size: Adult Large)  Pulse 70  Temp 98  F (36.7  C) (Oral)  Resp 14  Ht 5' 8.25\" (1.734 m)  Wt 242 lb 6.4 oz (110 kg)  SpO2 96%  Breastfeeding? No  BMI 36.59 kg/m2  Shauna Garcia CMA    "

## 2018-06-18 ENCOUNTER — TELEPHONE (OUTPATIENT)
Dept: INTERNAL MEDICINE | Facility: CLINIC | Age: 78
End: 2018-06-18

## 2018-06-18 NOTE — TELEPHONE ENCOUNTER
Patient calls requesting who to contact regarding a document called Validation of Advanced Directive and that she was informed she needs to make a correction to it before it can be scanned to her chart. Provided her with a contact name of Yue at [phone] listed under [employer] through [employer] intranet.

## 2018-06-21 ENCOUNTER — DOCUMENTATION ONLY (OUTPATIENT)
Dept: OTHER | Facility: CLINIC | Age: 78
End: 2018-06-21

## 2018-06-26 DIAGNOSIS — I10 HTN, GOAL BELOW 140/90: Chronic | ICD-10-CM

## 2018-06-27 NOTE — TELEPHONE ENCOUNTER
"Requested Prescriptions   Pending Prescriptions Disp Refills     hydrochlorothiazide 12.5 MG TABS tablet [Pharmacy Med Name: HYDROCHLOROTHIAZIDE 12.5 MG TB] 30 tablet 0    Last Written Prescription Date:  Not on meds list  Last Fill Quantity: n/a,  # refills: n/a   Last office visit: 6/11/2018 with prescribing provider:     Future Office Visit:   Sig: TAKE 1 TABLET BY MOUTH EVERY DAY    Diuretics (Including Combos) Protocol Failed    6/26/2018  1:39 AM       Failed - Normal serum creatinine on file in past 12 months    Recent Labs   Lab Test  06/06/18   0909   CR  1.13*             Passed - Blood pressure under 140/90 in past 12 months    BP Readings from Last 3 Encounters:   06/11/18 130/72   02/02/18 140/60   11/03/17 118/68                Passed - Recent (12 mo) or future (30 days) visit within the authorizing provider's specialty    Patient had office visit in the last 12 months or has a visit in the next 30 days with authorizing provider or within the authorizing provider's specialty.  See \"Patient Info\" tab in inbasket, or \"Choose Columns\" in Meds & Orders section of the refill encounter.           Passed - Patient is age 18 or older       Passed - No active pregancy on record       Passed - Normal serum potassium on file in past 12 months    Recent Labs   Lab Test  06/06/18   0909   POTASSIUM  4.4                   Passed - Normal serum sodium on file in past 12 months    Recent Labs   Lab Test  06/06/18   0909   NA  134             Passed - No positive pregnancy test in past 12 months        "

## 2018-06-28 NOTE — TELEPHONE ENCOUNTER
Please see my last note  Plan:  1. Increase the hydrochlorathiazide to 25 mg daily  2. Continue the other meds, same doses for now.  3.Please make a lab appointment for fasting labs  In  January 4. Please make an appointment few days after the labs to discuss about the results.   5. The following vaccines are recommended for you.   Medicare covers better if you have these vaccines at the pharmacy:  -- Shingerix vaccine - the newest vaccine for shingles

## 2018-06-28 NOTE — TELEPHONE ENCOUNTER
HCTZ 25 mg was filled on 6/11/18.  Please advise which dose patient should be taking.  Francesca Arellano RN

## 2018-06-29 RX ORDER — HYDROCHLOROTHIAZIDE 12.5 MG/1
TABLET ORAL
Qty: 30 TABLET | Refills: 0 | OUTPATIENT
Start: 2018-06-29

## 2018-06-30 DIAGNOSIS — I67.5 MOYAMOYA DISEASE: ICD-10-CM

## 2018-07-02 NOTE — TELEPHONE ENCOUNTER
"Requested Prescriptions   Pending Prescriptions Disp Refills     clopidogrel (PLAVIX) 75 MG tablet [Pharmacy Med Name: CLOPIDOGREL 75 MG TABLET] 90 tablet 3    Last Written Prescription Date:  07/100/2017  Last Fill Quantity: 90,  # refills: 3   Last office visit: 6/11/2018 with prescribing provider:     Future Office Visit:   Sig: TAKE 1 TABLET BY MOUTH DAILY    Plavix Passed    6/30/2018  1:47 AM       Passed - No active PPI on record unless is Protonix       Passed - Normal HGB on file in past 12 months    Recent Labs   Lab Test  01/26/18   0818   HGB  11.7              Passed - Normal Platelets on file in past 12 months    Recent Labs   Lab Test  01/26/18 0818   PLT  315              Passed - Recent (12 mo) or future (30 days) visit within the authorizing provider's specialty    Patient had office visit in the last 12 months or has a visit in the next 30 days with authorizing provider or within the authorizing provider's specialty.  See \"Patient Info\" tab in inbasket, or \"Choose Columns\" in Meds & Orders section of the refill encounter.           Passed - Patient is age 18 or older       Passed - No active pregnancy on record       Passed - No positive pregnancy test in past 12 months        "

## 2018-07-03 RX ORDER — CLOPIDOGREL BISULFATE 75 MG/1
TABLET ORAL
Qty: 90 TABLET | Refills: 3 | Status: SHIPPED | OUTPATIENT
Start: 2018-07-03 | End: 2019-06-15

## 2018-07-03 NOTE — TELEPHONE ENCOUNTER
"Requested Prescriptions   Pending Prescriptions Disp Refills     clopidogrel (PLAVIX) 75 MG tablet [Pharmacy Med Name: CLOPIDOGREL 75 MG TABLET] 90 tablet 3     Sig: TAKE 1 TABLET BY MOUTH DAILY    Plavix Passed    7/2/2018  9:55 AM       Passed - No active PPI on record unless is Protonix       Passed - Normal HGB on file in past 12 months    Recent Labs   Lab Test  01/26/18   0818   HGB  11.7              Passed - Normal Platelets on file in past 12 months    Recent Labs   Lab Test  01/26/18 0818   PLT  315              Passed - Recent (12 mo) or future (30 days) visit within the authorizing provider's specialty    Patient had office visit in the last 12 months or has a visit in the next 30 days with authorizing provider or within the authorizing provider's specialty.  See \"Patient Info\" tab in inbasket, or \"Choose Columns\" in Meds & Orders section of the refill encounter.           Passed - Patient is age 18 or older       Passed - No active pregnancy on record       Passed - No positive pregnancy test in past 12 months        rx approved per [facility].        Jemima Keith RN    "

## 2018-09-10 ENCOUNTER — OFFICE VISIT (OUTPATIENT)
Dept: ORTHOPEDICS | Facility: CLINIC | Age: 78
End: 2018-09-10
Payer: COMMERCIAL

## 2018-09-10 VITALS — WEIGHT: 242 LBS | BODY MASS INDEX: 36.53 KG/M2 | SYSTOLIC BLOOD PRESSURE: 140 MMHG | DIASTOLIC BLOOD PRESSURE: 74 MMHG

## 2018-09-10 DIAGNOSIS — S40.022A: Primary | ICD-10-CM

## 2018-09-10 DIAGNOSIS — E66.01 MORBID OBESITY (H): ICD-10-CM

## 2018-09-10 PROCEDURE — 99213 OFFICE O/P EST LOW 20 MIN: CPT | Performed by: ORTHOPAEDIC SURGERY

## 2018-09-10 NOTE — PATIENT INSTRUCTIONS
Instructed trial of Aleve twice daily for 2 weeks, follow up if left arm pain persists for further evaluation.

## 2018-09-10 NOTE — PROGRESS NOTES
HISTORY OF PRESENT ILLNESS:    Candida Rose is a 78 year old female who is seen in follow-up for left TKA DOS 4/17/17, and also to have her left wrist and forearm evaluated. Patient states that she had a fall while walking landing on her left forearm.  Patient states she is in a walking program with the [facility].  Patient is right hand dominant.   Present symptoms: left knee doing well.    left wrist, elbow, and posterior upper arm.  Pain with rotation of shoulder, and wrist. Pain is dull. Upper arm pain is also achy. Patient denies weakness of left upper extremity, limited due to pain. Increased pain at nighttime with changing positions.  Patient states she does not recall much swelling or bruising after her fall.  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 F/u dr. Soto     OA (osteoarthritis)     s/p bilat total hips      Other chronic pain     Joint pain for many years     Unspecified cerebral artery occlusion with cerebral infarction     After Moyamoya surgery > 10 yrs ago.     Vitamin D deficiencies        Past Surgical History:   Procedure Laterality Date     ARTHROPLASTY KNEE Left 4/17/2017    Procedure: ARTHROPLASTY KNEE;  Left total knee arthroplasty;  Surgeon: Chidi Jenkins MD;  Location: [facility]     C NONSPECIFIC PROCEDURE  10/30/06    neurosurgery Dr Charles LACY NONSPECIFIC PROCEDURE      bilateral total hips  "approx      C NONSPECIFIC PROCEDURE  3/07    neurosurgery      COLONOSCOPY      normal     COLONOSCOPY  2014    Procedure: COLONOSCOPY;  Surgeon: Raul Nolan DO;  Location: [facility]     CRANIOTOMY      MOJAMOJA  \"Pt had repair of blood flow.\"     RECONSTRUCT FOREFOOT WITH METATARSOPHALANGEAL (MTP) FUSION Left 2014    Procedure: RECONSTRUCT FOREFOOT WITH METATARSOPHALANGEAL (MTP) FUSION;  Surgeon: Tres Merlos DPM;  Location: [facility] OR       Family History   Problem Relation Age of Onset     Obesity Sister      gastric by-pass age age 60, heart murmur.     Cancer - colorectal Sister 69     HEART DISEASE Father      mi,  age 48     Family History Negative Mother      killed in MVA age 54     Cancer Maternal Aunt      lung cancer ,non-smoker     Lung Cancer Maternal Aunt      Breast Cancer Daughter 48       Social History     Social History     Marital status:      Spouse name: Olu      Number of children: 2     Years of education: N/A     Occupational History      Retired     Social History Main Topics     Smoking status: Never Smoker     Smokeless tobacco: Never Used     Alcohol use Yes      Comment:  1-2 per day     Drug use: No     Sexual activity: No     Other Topics Concern     Not on file     Social History Narrative       Current Outpatient Prescriptions   Medication Sig Dispense Refill     amLODIPine (NORVASC) 2.5 MG tablet Take 1 tablet (2.5 mg) by mouth daily 90 tablet 1     aspirin 81 MG tablet Take 1 tablet (81 mg) by mouth daily 30 tablet      atenolol (TENORMIN) 25 MG tablet TAKE 1 TABLET (25 MG) BY MOUTH AT BEDTIME 90 tablet 1     Calcium Carbonate (CALCIUM 500 PO) Take 1 tablet by mouth daily.       clopidogrel (PLAVIX) 75 MG tablet TAKE 1 TABLET BY MOUTH DAILY 90 tablet 3     diclofenac (VOLTAREN) 1 % GEL topical gel Apply 4 grams to knees or 2 grams to hands four times daily using enclosed dosing card. 100 g 1     hydrochlorothiazide (HYDRODIURIL) 25 MG " tablet Take 1 tablet (25 mg) by mouth daily 90 tablet 1     hydrochlorothiazide (HYDRODIURIL) 25 MG tablet TAKE 1 TABLET (25 MG) BY MOUTH DAILY 90 tablet 0     losartan (COZAAR) 50 MG tablet TAKE 1 TABLET BY MOUTH 2 TIMES DAILY 180 tablet 1     Multiple Vitamins-Minerals (MULTIVITAMIN & MINERAL PO) Take 1 tablet by mouth daily.       simvastatin (ZOCOR) 20 MG tablet TAKE 1 TABLET (20 MG) BY MOUTH AT BEDTIME 90 tablet 1       Allergies   Allergen Reactions     Lisinopril      Persistent cough       REVIEW OF SYSTEMS:  CONSTITUTIONAL:  NEGATIVE for fever, chills, change in weight  INTEGUMENTARY/SKIN:  NEGATIVE for worrisome rashes, moles or lesions  EYES:  NEGATIVE for vision changes or irritation  ENT/MOUTH:  NEGATIVE for ear, mouth and throat problems  RESP:  NEGATIVE for significant cough or SOB  BREAST:  NEGATIVE for masses, tenderness or discharge  CV:  NEGATIVE for chest pain, palpitations or peripheral edema  GI:  NEGATIVE for nausea, abdominal pain, heartburn, or change in bowel habits  :  Negative   MUSCULOSKELETAL:  See HPI above  NEURO:  NEGATIVE for weakness, dizziness or paresthesias  ENDOCRINE:  NEGATIVE for temperature intolerance, skin/hair changes  HEME/ALLERGY/IMMUNE:  NEGATIVE for bleeding problems  PSYCHIATRIC:  NEGATIVE for changes in mood or affect      PHYSICAL EXAM:  /74 (BP Location: Right arm, Patient Position: Chair, Cuff Size: Adult Large)  Wt 242 lb (109.8 kg)  BMI 36.53 kg/m2  Body mass index is 36.53 kg/(m^2).   GENERAL APPEARANCE: healthy, alert and no distress   SKIN: no suspicious lesions or rashes  NEURO: Normal strength and tone, mentation intact and speech normal  VASCULAR: Good pulses, and capillary refill   LYMPH: no lymphadenopathy   PSYCH:  mentation appears normal and affect normal/bright    MSK:  Examination of her left upper extremity reveals no erythema ecchymosis nor edema.  She is nontender to palpation and percussion about the wrist forearm and elbow.  She has  full range of motion.  CMS is intact to her fingertips.     ASSESSMENT / PLAN: Left forearm contusion from a fall.  We will treat this symptomatically.      Imaging Interpretation:         Bob Jenkins MD  Department of Orthopedic Surgery

## 2018-09-10 NOTE — LETTER
9/10/2018         RE: Candida Rose  [address] [address]        Dear Colleague,    Thank you for referring your patient, Candida Rose, to the [facility] ORTHOPEDIC SURGERY. Please see a copy of my visit note below.    HISTORY OF PRESENT ILLNESS:    Candida Rose is a 78 year old female who is seen in follow-up for left TKA DOS 4/17/17, and also to have her left wrist and forearm evaluated. Patient states that she had a fall while walking landing on her left forearm.  Patient states she is in a walking program with the [facility].  Patient is right hand dominant.   Present symptoms: left knee doing well.    left wrist, elbow, and posterior upper arm.  Pain with rotation of shoulder, and wrist. Pain is dull. Upper arm pain is also achy. Patient denies weakness of left upper extremity, limited due to pain. Increased pain at nighttime with changing positions.  Patient states she does not recall much swelling or bruising after her fall.  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 F/u dr. Soto     OA (osteoarthritis)     s/p bilat total hips      Other chronic pain     Joint pain for many years     Unspecified cerebral artery occlusion with cerebral infarction     After Moyamoya surgery > 10 yrs ago.     Vitamin D deficiencies        Past Surgical History:   Procedure Laterality Date     ARTHROPLASTY KNEE Left  "2017    Procedure: ARTHROPLASTY KNEE;  Left total knee arthroplasty;  Surgeon: Chidi Jenkins MD;  Location: [facility]     C NONSPECIFIC PROCEDURE  10/30/06    neurosurgery Dr Charles LACY NONSPECIFIC PROCEDURE      bilateral total hips approx      C NONSPECIFIC PROCEDURE  3/07    neurosurgery      COLONOSCOPY      normal     COLONOSCOPY  2014    Procedure: COLONOSCOPY;  Surgeon: Raul Nolan DO;  Location: [facility]     CRANIOTOMY      MOJAMOJA  \"Pt had repair of blood flow.\"     RECONSTRUCT FOREFOOT WITH METATARSOPHALANGEAL (MTP) FUSION Left 2014    Procedure: RECONSTRUCT FOREFOOT WITH METATARSOPHALANGEAL (MTP) FUSION;  Surgeon: Tres Merlos DPM;  Location: [facility]       Family History   Problem Relation Age of Onset     Obesity Sister      gastric by-pass age age 60, heart murmur.     Cancer - colorectal Sister 69     HEART DISEASE Father      mi,  age 48     Family History Negative Mother      killed in MVA age 54     Cancer Maternal Aunt      lung cancer ,non-smoker     Lung Cancer Maternal Aunt      Breast Cancer Daughter 48       Social History     Social History     Marital status:      Spouse name: Olu      Number of children: 2     Years of education: N/A     Occupational History      Retired     Social History Main Topics     Smoking status: Never Smoker     Smokeless tobacco: Never Used     Alcohol use Yes      Comment:  1-2 per day     Drug use: No     Sexual activity: No     Other Topics Concern     Not on file     Social History Narrative       Current Outpatient Prescriptions   Medication Sig Dispense Refill     amLODIPine (NORVASC) 2.5 MG tablet Take 1 tablet (2.5 mg) by mouth daily 90 tablet 1     aspirin 81 MG tablet Take 1 tablet (81 mg) by mouth daily 30 tablet      atenolol (TENORMIN) 25 MG tablet TAKE 1 TABLET (25 MG) BY MOUTH AT BEDTIME 90 tablet 1     Calcium Carbonate (CALCIUM 500 PO) Take 1 tablet by mouth daily.       clopidogrel " (PLAVIX) 75 MG tablet TAKE 1 TABLET BY MOUTH DAILY 90 tablet 3     diclofenac (VOLTAREN) 1 % GEL topical gel Apply 4 grams to knees or 2 grams to hands four times daily using enclosed dosing card. 100 g 1     hydrochlorothiazide (HYDRODIURIL) 25 MG tablet Take 1 tablet (25 mg) by mouth daily 90 tablet 1     hydrochlorothiazide (HYDRODIURIL) 25 MG tablet TAKE 1 TABLET (25 MG) BY MOUTH DAILY 90 tablet 0     losartan (COZAAR) 50 MG tablet TAKE 1 TABLET BY MOUTH 2 TIMES DAILY 180 tablet 1     Multiple Vitamins-Minerals (MULTIVITAMIN & MINERAL PO) Take 1 tablet by mouth daily.       simvastatin (ZOCOR) 20 MG tablet TAKE 1 TABLET (20 MG) BY MOUTH AT BEDTIME 90 tablet 1       Allergies   Allergen Reactions     Lisinopril      Persistent cough       REVIEW OF SYSTEMS:  CONSTITUTIONAL:  NEGATIVE for fever, chills, change in weight  INTEGUMENTARY/SKIN:  NEGATIVE for worrisome rashes, moles or lesions  EYES:  NEGATIVE for vision changes or irritation  ENT/MOUTH:  NEGATIVE for ear, mouth and throat problems  RESP:  NEGATIVE for significant cough or SOB  BREAST:  NEGATIVE for masses, tenderness or discharge  CV:  NEGATIVE for chest pain, palpitations or peripheral edema  GI:  NEGATIVE for nausea, abdominal pain, heartburn, or change in bowel habits  :  Negative   MUSCULOSKELETAL:  See HPI above  NEURO:  NEGATIVE for weakness, dizziness or paresthesias  ENDOCRINE:  NEGATIVE for temperature intolerance, skin/hair changes  HEME/ALLERGY/IMMUNE:  NEGATIVE for bleeding problems  PSYCHIATRIC:  NEGATIVE for changes in mood or affect      PHYSICAL EXAM:  /74 (BP Location: Right arm, Patient Position: Chair, Cuff Size: Adult Large)  Wt 242 lb (109.8 kg)  BMI 36.53 kg/m2  Body mass index is 36.53 kg/(m^2).   GENERAL APPEARANCE: healthy, alert and no distress   SKIN: no suspicious lesions or rashes  NEURO: Normal strength and tone, mentation intact and speech normal  VASCULAR: Good pulses, and capillary refill   LYMPH: no  lymphadenopathy   PSYCH:  mentation appears normal and affect normal/bright    MSK:  Examination of her left upper extremity reveals no erythema ecchymosis nor edema.  She is nontender to palpation and percussion about the wrist forearm and elbow.  She has full range of motion.  CMS is intact to her fingertips.     ASSESSMENT / PLAN: Left forearm contusion from a fall.  We will treat this symptomatically.      Imaging Interpretation:         Bob Jenkins MD  Department of Orthopedic Surgery          Again, thank you for allowing me to participate in the care of your patient.        Sincerely,        Chidi Jenkins MD

## 2018-09-10 NOTE — MR AVS SNAPSHOT
After Visit Summary   9/10/2018    Candida Rose    MRN: [id number]           Patient Information     Date Of Birth          1940        Visit Information        Provider Department      9/10/2018 3:00 PM Chidi Jenkins MD [facility] ORTHOPEDIC SURGERY        Today's Diagnoses     Contusion, upper limb, left, initial encounter    -  1    Morbid obesity (H)          Care Instructions    Instructed trial of Aleve twice daily for 2 weeks, follow up if left arm pain persists for further evaluation.             Follow-ups after your visit        Who to contact     If you have questions or need follow up information about today's clinic visit or your schedule please contact [facility] ORTHOPEDIC SURGERY directly at [phone].  Normal or non-critical lab and imaging results will be communicated to you by [employer], letter or phone within 4 business days after the clinic has received the results. If you do not hear from us within 7 days, please contact the clinic through [employer] or phone. If you have a critical or abnormal lab result, we will notify you by phone as soon as possible.  Submit refill requests through [employer] or call your pharmacy and they will forward the refill request to us. Please allow 3 business days for your refill to be completed.          Additional Information About Your Visit        MyChart Information     [employer] gives you secure access to your electronic health record. If you see a primary care provider, you can also send messages to your care team and make appointments. If you have questions, please call your primary care clinic.  If you do not have a primary care provider, please call [phone] and they will assist you.        Care EveryWhere ID     This is your Care EveryWhere ID. This could be used by other organizations to access your [place] medical records  [id number]        Your Vitals Were     BMI (Body Mass Index)                   36.53 kg/m2             Blood Pressure from Last 3 Encounters:   09/10/18 140/74   06/11/18 130/72   02/02/18 140/60    Weight from Last 3 Encounters:   09/10/18 242 lb (109.8 kg)   06/11/18 242 lb 6.4 oz (110 kg)   02/02/18 238 lb 4.8 oz (108.1 kg)              Today, you had the following     No orders found for display       Primary Care Provider Office Phone # Fax #    Chani Peoples [phone]       [address] [facility] [address]        Equal Access to Services     [facility]: Hadii aad ku hadasho Soomaali, waaxda luqadaha, qaybta kaalmada adeegyada, nadege loza aderandall irvin . So [facility] [phone].    ATENCIÓN: Si habla español, tiene a pan disposición servicios gratuitos de asistencia lingüística. Llame al [phone].    We comply with applicable federal civil rights laws and [place] laws. We do not discriminate on the basis of race, color, national origin, age, disability, sex, sexual orientation, or gender identity.            Thank you!     Thank you for choosing [facility] ORTHOPEDIC SURGERY  for your care. Our goal is always to provide you with excellent care. Hearing back from our patients is one way we can continue to improve our services. Please take a few minutes to complete the written survey that you may receive in the mail after your visit with us. Thank you!             Your Updated Medication List - Protect others around you: Learn how to safely use, store and throw away your medicines at www.disposemymeds.org.          This list is accurate as of 9/10/18 11:59 PM.  Always use your most recent med list.                   Brand Name Dispense Instructions for use Diagnosis    amLODIPine 2.5 MG tablet    NORVASC    90 tablet    Take 1 tablet (2.5 mg) by mouth daily    HTN, goal below 140/90       aspirin 81 MG tablet     30 tablet    Take 1 tablet (81 mg) by mouth daily    HTN, goal below 140/90, Type 2 diabetes mellitus without complication,  without long-term current use of insulin (H)       atenolol 25 MG tablet    TENORMIN    90 tablet    TAKE 1 TABLET (25 MG) BY MOUTH AT BEDTIME    HTN, goal below 140/90       CALCIUM 500 PO      Take 1 tablet by mouth daily.        clopidogrel 75 MG tablet    PLAVIX    90 tablet    TAKE 1 TABLET BY MOUTH DAILY    Moyamoya disease       diclofenac 1 % Gel topical gel    VOLTAREN    100 g    Apply 4 grams to knees or 2 grams to hands four times daily using enclosed dosing card.    Right foot pain, Primary localized osteoarthrosis of right ankle and foot       * hydrochlorothiazide 25 MG tablet    HYDRODIURIL    90 tablet    TAKE 1 TABLET (25 MG) BY MOUTH DAILY    HTN, goal below 140/90, CKD (chronic kidney disease) stage 3, GFR 30-59 ml/min       * hydrochlorothiazide 25 MG tablet    HYDRODIURIL    90 tablet    Take 1 tablet (25 mg) by mouth daily    Bilateral leg edema       losartan 50 MG tablet    COZAAR    180 tablet    TAKE 1 TABLET BY MOUTH 2 TIMES DAILY    HTN, goal below 140/90       MULTIVITAMIN & MINERAL PO      Take 1 tablet by mouth daily.        simvastatin 20 MG tablet    ZOCOR    90 tablet    TAKE 1 TABLET (20 MG) BY MOUTH AT BEDTIME    Hyperlipidemia LDL goal <130, HTN, goal below 140/90, Vitamin D deficiency, Osteopenia, unspecified location, Moyamoya disease       * Notice:  This list has 2 medication(s) that are the same as other medications prescribed for you. Read the directions carefully, and ask your doctor or other care provider to review them with you.

## 2018-09-13 PROBLEM — E66.01 MORBID OBESITY (H): Status: ACTIVE | Noted: 2018-09-13

## 2018-09-22 DIAGNOSIS — R60.0 BILATERAL LEG EDEMA: ICD-10-CM

## 2018-09-24 NOTE — TELEPHONE ENCOUNTER
"Requested Prescriptions   Pending Prescriptions Disp Refills     hydrochlorothiazide (HYDRODIURIL) 25 MG tablet [Pharmacy Med Name: HYDROCHLOROTHIAZIDE 25 MG TAB] 90 tablet 1    Last Written Prescription Date:  06/11/2018  Last Fill Quantity: 90,  # refills: 1   Last office visit: 6/11/2018 with prescribing provider:     Future Office Visit:   Sig: TAKE 1 TABLET BY MOUTH EVERY DAY    Diuretics (Including Combos) Protocol Failed    9/22/2018  7:06 PM       Failed - Blood pressure under 140/90 in past 12 months    BP Readings from Last 3 Encounters:   09/10/18 140/74   06/11/18 130/72   02/02/18 140/60                Failed - Normal serum creatinine on file in past 12 months    Recent Labs   Lab Test  06/06/18   0909   CR  1.13*             Passed - Recent (12 mo) or future (30 days) visit within the authorizing provider's specialty    Patient had office visit in the last 12 months or has a visit in the next 30 days with authorizing provider or within the authorizing provider's specialty.  See \"Patient Info\" tab in inbasket, or \"Choose Columns\" in Meds & Orders section of the refill encounter.           Passed - Patient is age 18 or older       Passed - No active pregancy on record       Passed - Normal serum potassium on file in past 12 months    Recent Labs   Lab Test  06/06/18   0909   POTASSIUM  4.4                   Passed - Normal serum sodium on file in past 12 months    Recent Labs   Lab Test  06/06/18   0909   NA  134             Passed - No positive pregnancy test in past 12 months        "

## 2018-09-25 RX ORDER — HYDROCHLOROTHIAZIDE 25 MG/1
TABLET ORAL
Start: 2018-09-25

## 2018-11-10 DIAGNOSIS — I10 HTN, GOAL BELOW 140/90: ICD-10-CM

## 2018-11-10 DIAGNOSIS — M85.80 OSTEOPENIA, UNSPECIFIED LOCATION: Chronic | ICD-10-CM

## 2018-11-10 DIAGNOSIS — E55.9 VITAMIN D DEFICIENCY: ICD-10-CM

## 2018-11-10 DIAGNOSIS — I67.5 MOYAMOYA DISEASE: Chronic | ICD-10-CM

## 2018-11-10 DIAGNOSIS — E78.5 HYPERLIPIDEMIA LDL GOAL <130: ICD-10-CM

## 2018-11-12 NOTE — TELEPHONE ENCOUNTER
"Requested Prescriptions   Pending Prescriptions Disp Refills     simvastatin (ZOCOR) 20 MG tablet [Pharmacy Med Name: SIMVASTATIN 20 MG TABLET]  Last Written Prescription Date:  5/19/18  Last Fill Quantity: 90 TABLET,  # refills: 1   Last office visit: 6/11/2018 with prescribing provider:  RAMIRO   Future Office Visit:     90 tablet 1     Sig: TAKE 1 TABLET (20 MG) BY MOUTH AT BEDTIME    Statins Protocol Passed    11/10/2018  1:43 AM       Passed - LDL on file in past 12 months    Recent Labs   Lab Test  01/26/18   0818   LDL  95            Passed - No abnormal creatine kinase in past 12 months    Recent Labs   Lab Test  06/06/18   0909   CKT  75               Passed - Recent (12 mo) or future (30 days) visit within the authorizing provider's specialty    Patient had office visit in the last 12 months or has a visit in the next 30 days with authorizing provider or within the authorizing provider's specialty.  See \"Patient Info\" tab in inbasket, or \"Choose Columns\" in Meds & Orders section of the refill encounter.             Passed - Patient is age 18 or older       Passed - No active pregnancy on record       Passed - No positive pregnancy test in past 12 months          "

## 2018-11-15 RX ORDER — SIMVASTATIN 20 MG
TABLET ORAL
Qty: 90 TABLET | Refills: 1 | Status: SHIPPED | OUTPATIENT
Start: 2018-11-15 | End: 2019-02-11

## 2018-12-15 DIAGNOSIS — I10 HTN, GOAL BELOW 140/90: Chronic | ICD-10-CM

## 2018-12-17 DIAGNOSIS — R60.0 BILATERAL LEG EDEMA: ICD-10-CM

## 2018-12-17 NOTE — TELEPHONE ENCOUNTER
"Requested Prescriptions   Pending Prescriptions Disp Refills     atenolol (TENORMIN) 25 MG tablet [Pharmacy Med Name: ATENOLOL 25 MG TABLET] 90 tablet 1    Last Written Prescription Date:  06/11/2018  Last Fill Quantity: 90,  # refills: 1   Last office visit: 6/11/2018 with prescribing provider:     Future Office Visit:   Sig: TAKE 1 TABLET (25 MG) BY MOUTH AT BEDTIME    Beta-Blockers Protocol Failed - 12/15/2018  8:48 AM       Failed - Blood pressure under 140/90 in past 12 months    BP Readings from Last 3 Encounters:   09/10/18 140/74   06/11/18 130/72   02/02/18 140/60                Passed - Patient is age 6 or older       Passed - Recent (12 mo) or future (30 days) visit within the authorizing provider's specialty    Patient had office visit in the last 12 months or has a visit in the next 30 days with authorizing provider or within the authorizing provider's specialty.  See \"Patient Info\" tab in inbasket, or \"Choose Columns\" in Meds & Orders section of the refill encounter.              "

## 2018-12-18 NOTE — TELEPHONE ENCOUNTER
"Requested Prescriptions   Pending Prescriptions Disp Refills     hydrochlorothiazide (HYDRODIURIL) 25 MG tablet [Pharmacy Med Name: HYDROCHLOROTHIAZIDE 25 MG TAB]  Last Written Prescription Date:  6/11/18  Last Fill Quantity: 90,  # refills: 1   Last office visit: 6/11/2018 with prescribing provider:  Bud   Future Office Visit:     90 tablet 1     Sig: TAKE 1 TABLET BY MOUTH EVERY DAY    Diuretics (Including Combos) Protocol Failed - 12/17/2018 11:42 PM       Failed - Blood pressure under 140/90 in past 12 months    BP Readings from Last 3 Encounters:   09/10/18 140/74   06/11/18 130/72   02/02/18 140/60                Failed - Normal serum creatinine on file in past 12 months    Recent Labs   Lab Test 06/06/18  0909   CR 1.13*             Passed - Recent (12 mo) or future (30 days) visit within the authorizing provider's specialty    Patient had office visit in the last 12 months or has a visit in the next 30 days with authorizing provider or within the authorizing provider's specialty.  See \"Patient Info\" tab in inbasket, or \"Choose Columns\" in Meds & Orders section of the refill encounter.             Passed - Patient is age 18 or older       Passed - No active pregancy on record       Passed - Normal serum potassium on file in past 12 months    Recent Labs   Lab Test 06/06/18  0909   POTASSIUM 4.4                   Passed - Normal serum sodium on file in past 12 months    Recent Labs   Lab Test 06/06/18  0909                Passed - No positive pregnancy test in past 12 months          "

## 2018-12-20 RX ORDER — ATENOLOL 25 MG/1
TABLET ORAL
Qty: 90 TABLET | Refills: 0 | Status: SHIPPED | OUTPATIENT
Start: 2018-12-20 | End: 2019-02-11

## 2018-12-20 RX ORDER — HYDROCHLOROTHIAZIDE 25 MG/1
TABLET ORAL
Qty: 90 TABLET | Refills: 0 | Status: SHIPPED | OUTPATIENT
Start: 2018-12-20 | End: 2019-01-08

## 2018-12-20 NOTE — TELEPHONE ENCOUNTER
Medication is being filled for 1 time refill only due to:  Patient needs fasting labs and an office visit January 2019.  DIVYA Biggs R.N.

## 2018-12-26 DIAGNOSIS — I10 HTN, GOAL BELOW 140/90: Chronic | ICD-10-CM

## 2018-12-26 NOTE — TELEPHONE ENCOUNTER
"Requested Prescriptions   Pending Prescriptions Disp Refills     losartan (COZAAR) 50 MG tablet [Pharmacy Med Name: LOSARTAN POTASSIUM 50 MG TAB]  Last Written Prescription Date: 06/11/2018  Last Fill Quantity: 180 tablet, # Refills: 1  Last Office Visit: 06/11/2018 JanieestrellaEvan  Future Office visit:      180 tablet 1     Sig: TAKE 1 TABLET BY MOUTH TWICE A DAY    Angiotensin-II Receptors Failed - 12/26/2018  2:17 AM       Failed - Blood pressure under 140/90 in past 12 months    BP Readings from Last 3 Encounters:   09/10/18 140/74   06/11/18 130/72   02/02/18 140/60                Failed - Normal serum creatinine on file in past 12 months    Recent Labs   Lab Test 06/06/18  0909   CR 1.13*            Passed - Recent (12 mo) or future (30 days) visit within the authorizing provider's specialty    Patient had office visit in the last 12 months or has a visit in the next 30 days with authorizing provider or within the authorizing provider's specialty.  See \"Patient Info\" tab in inbasket, or \"Choose Columns\" in Meds & Orders section of the refill encounter.             Passed - Patient is age 18 or older       Passed - No active pregnancy on record       Passed - Normal serum potassium on file in past 12 months    Recent Labs   Lab Test 06/06/18  0909   POTASSIUM 4.4                   Passed - No positive pregnancy test in past 12 months                  amLODIPine (NORVASC) 2.5 MG tablet [Pharmacy Med Name: AMLODIPINE BESYLATE 2.5 MG TAB]  Last Written Prescription Date: 06/11/2018  Last Fill Quantity: 90 tablet, # Refills: 1  Last Office Visit: 06/11/2018 Kaylan  Future Office visit:        90 tablet 1     Sig: TAKE 1 TABLET BY MOUTH EVERY DAY    Calcium Channel Blockers Protocol  Failed - 12/26/2018  2:17 AM       Failed - Blood pressure under 140/90 in past 12 months    BP Readings from Last 3 Encounters:   09/10/18 140/74   06/11/18 130/72   02/02/18 140/60                Failed - Normal serum " "creatinine on file in past 12 months    Recent Labs   Lab Test 06/06/18  0909   CR 1.13*            Passed - Recent (12 mo) or future (30 days) visit within the authorizing provider's specialty    Patient had office visit in the last 12 months or has a visit in the next 30 days with authorizing provider or within the authorizing provider's specialty.  See \"Patient Info\" tab in inbasket, or \"Choose Columns\" in Meds & Orders section of the refill encounter.             Passed - Patient is age 18 or older       Passed - No active pregnancy on record       Passed - No positive pregnancy test in past 12 months          "

## 2018-12-31 RX ORDER — LOSARTAN POTASSIUM 50 MG/1
TABLET ORAL
Qty: 60 TABLET | Refills: 0 | Status: SHIPPED | OUTPATIENT
Start: 2018-12-31 | End: 2019-02-11

## 2018-12-31 RX ORDER — AMLODIPINE BESYLATE 2.5 MG/1
TABLET ORAL
Qty: 30 TABLET | Refills: 0 | Status: SHIPPED | OUTPATIENT
Start: 2018-12-31 | End: 2019-01-28

## 2018-12-31 NOTE — TELEPHONE ENCOUNTER
LOV: 6/11/2018      BP Readings from Last 3 Encounters:   09/10/18 140/74   06/11/18 130/72   02/02/18 140/60     Last Creatinine: 1.13    Plan from last office visit on 6/11/2018 addressing labs that are out of rnage:     Plan:  1. Add Amlodipine 2.5 mg daily for the blood pressure  2. Stop hydrochlorathiazide 25 mg daily  3. Take hydrochlorathiazide 12.5 mg daily - as needed for swollen legs  4. Continue the other meds, same doses for now.  5. Please make a lab appointment for non fasting labs in 1- 3 months to recheck kidneys  6. Make sure you drink plenty of water the day of the blood test.   7. Please make an appointment few days after the labs to discuss about the results.    Patient has not updated labs. NEEDS Appt.     Routing to PCP for review.     Thanks! Francesca Neil RN

## 2018-12-31 NOTE — TELEPHONE ENCOUNTER
Patient needs RN appointment for blood pressure check.  If blood pressure higher than 140/90 she needs appointment  Blood pressure meds refilled for 1 month

## 2019-01-08 ENCOUNTER — ALLIED HEALTH/NURSE VISIT (OUTPATIENT)
Dept: NURSING | Facility: CLINIC | Age: 79
End: 2019-01-08
Payer: MEDICARE

## 2019-01-08 VITALS — SYSTOLIC BLOOD PRESSURE: 122 MMHG | DIASTOLIC BLOOD PRESSURE: 60 MMHG

## 2019-01-08 DIAGNOSIS — I10 HTN (HYPERTENSION): Primary | ICD-10-CM

## 2019-01-18 DIAGNOSIS — I10 HTN, GOAL BELOW 140/90: Chronic | ICD-10-CM

## 2019-01-18 NOTE — TELEPHONE ENCOUNTER
"Requested Prescriptions   Pending Prescriptions Disp Refills     losartan (COZAAR) 50 MG tablet [Pharmacy Med Name: LOSARTAN POTASSIUM 50 MG TAB] 180 tablet 1    Last Written Prescription Date:  12/31/2018  Last Fill Quantity: 60,  # refills: 0   Last office visit: 6/11/2018 with prescribing provider:     Future Office Visit:   Sig: TAKE 1 TABLET BY MOUTH TWICE A DAY    Angiotensin-II Receptors Failed - 1/18/2019 12:18 PM       Failed - Normal serum creatinine on file in past 12 months    Recent Labs   Lab Test 06/06/18  0909   CR 1.13*            Passed - Blood pressure under 140/90 in past 12 months    BP Readings from Last 3 Encounters:   01/08/19 122/60   09/10/18 140/74   06/11/18 130/72                Passed - Recent (12 mo) or future (30 days) visit within the authorizing provider's specialty    Patient had office visit in the last 12 months or has a visit in the next 30 days with authorizing provider or within the authorizing provider's specialty.  See \"Patient Info\" tab in inbasket, or \"Choose Columns\" in Meds & Orders section of the refill encounter.             Passed - Medication is active on med list       Passed - Patient is age 18 or older       Passed - No active pregnancy on record       Passed - Normal serum potassium on file in past 12 months    Recent Labs   Lab Test 06/06/18  0909   POTASSIUM 4.4                   Passed - No positive pregnancy test in past 12 months        "

## 2019-01-22 ENCOUNTER — MYC MEDICAL ADVICE (OUTPATIENT)
Dept: INTERNAL MEDICINE | Facility: CLINIC | Age: 79
End: 2019-01-22

## 2019-01-22 RX ORDER — LOSARTAN POTASSIUM 50 MG/1
TABLET ORAL
Qty: 180 TABLET | Refills: 0 | Status: SHIPPED | OUTPATIENT
Start: 2019-01-22 | End: 2019-02-11

## 2019-01-22 NOTE — TELEPHONE ENCOUNTER
Routing refill request to provider for review/approval because:  Labs out of range:  Creat  (Also due for a lab appointment and office visit in January 2019.  [employer] message sent.)    Please advise, thanks.

## 2019-01-28 DIAGNOSIS — I10 HTN, GOAL BELOW 140/90: Chronic | ICD-10-CM

## 2019-01-28 NOTE — TELEPHONE ENCOUNTER
"Requested Prescriptions   Pending Prescriptions Disp Refills     amLODIPine (NORVASC) 2.5 MG tablet [Pharmacy Med Name: AMLODIPINE BESYLATE 2.5 MG TAB]  Last Written Prescription Date:  12/31/2018  Last Fill Quantity: 30,  # refills: 0   Last office visit: 6/11/2018 with prescribing provider:     Future Office Visit:   Next 5 appointments (look out 90 days)    Feb 11, 2019  7:20 AM CST  [facility] [address]  [facility] [address]  [phone]        30 tablet 0     Sig: TAKE 1 TABLET BY MOUTH EVERY DAY    Calcium Channel Blockers Protocol  Failed - 1/28/2019  9:05 AM       Failed - Normal serum creatinine on file in past 12 months    Recent Labs   Lab Test 06/06/18  0909   CR 1.13*            Passed - Blood pressure under 140/90 in past 12 months    BP Readings from Last 3 Encounters:   01/08/19 122/60   09/10/18 140/74   06/11/18 130/72                Passed - Recent (12 mo) or future (30 days) visit within the authorizing provider's specialty    Patient had office visit in the last 12 months or has a visit in the next 30 days with authorizing provider or within the authorizing provider's specialty.  See \"Patient Info\" tab in inbasket, or \"Choose Columns\" in Meds & Orders section of the refill encounter.             Passed - Medication is active on med list       Passed - Patient is age 18 or older       Passed - No active pregnancy on record       Passed - No positive pregnancy test in past 12 months        "

## 2019-01-30 RX ORDER — AMLODIPINE BESYLATE 2.5 MG/1
TABLET ORAL
Qty: 30 TABLET | Refills: 0 | Status: SHIPPED | OUTPATIENT
Start: 2019-01-30 | End: 2019-02-11

## 2019-01-30 NOTE — TELEPHONE ENCOUNTER
Medication is being filled for 1 time refill only due to:  Patient needs to be seen because due for OV and lab recheck.

## 2019-02-10 ASSESSMENT — ANXIETY QUESTIONNAIRES
7. FEELING AFRAID AS IF SOMETHING AWFUL MIGHT HAPPEN: NOT AT ALL
5. BEING SO RESTLESS THAT IT IS HARD TO SIT STILL: NOT AT ALL
GAD7 TOTAL SCORE: 0
4. TROUBLE RELAXING: NOT AT ALL
2. NOT BEING ABLE TO STOP OR CONTROL WORRYING: NOT AT ALL
3. WORRYING TOO MUCH ABOUT DIFFERENT THINGS: NOT AT ALL
GAD7 TOTAL SCORE: 0
7. FEELING AFRAID AS IF SOMETHING AWFUL MIGHT HAPPEN: NOT AT ALL
6. BECOMING EASILY ANNOYED OR IRRITABLE: NOT AT ALL
GAD7 TOTAL SCORE: 0
1. FEELING NERVOUS, ANXIOUS, OR ON EDGE: NOT AT ALL

## 2019-02-10 ASSESSMENT — PATIENT HEALTH QUESTIONNAIRE - PHQ9
SUM OF ALL RESPONSES TO PHQ QUESTIONS 1-9: 0
SUM OF ALL RESPONSES TO PHQ QUESTIONS 1-9: 0

## 2019-02-11 ENCOUNTER — OFFICE VISIT (OUTPATIENT)
Dept: INTERNAL MEDICINE | Facility: CLINIC | Age: 79
End: 2019-02-11
Payer: MEDICARE

## 2019-02-11 VITALS
OXYGEN SATURATION: 99 % | BODY MASS INDEX: 35.77 KG/M2 | HEIGHT: 68 IN | SYSTOLIC BLOOD PRESSURE: 92 MMHG | DIASTOLIC BLOOD PRESSURE: 60 MMHG | TEMPERATURE: 97.7 F | WEIGHT: 236 LBS | RESPIRATION RATE: 12 BRPM | HEART RATE: 55 BPM

## 2019-02-11 DIAGNOSIS — I67.5 MOYAMOYA DISEASE: Chronic | ICD-10-CM

## 2019-02-11 DIAGNOSIS — E78.5 HYPERLIPIDEMIA LDL GOAL <130: ICD-10-CM

## 2019-02-11 DIAGNOSIS — N18.30 CKD (CHRONIC KIDNEY DISEASE) STAGE 3, GFR 30-59 ML/MIN (H): Chronic | ICD-10-CM

## 2019-02-11 DIAGNOSIS — Z00.00 ROUTINE GENERAL MEDICAL EXAMINATION AT A HEALTH CARE FACILITY: ICD-10-CM

## 2019-02-11 DIAGNOSIS — R42 INTERMITTENT LIGHTHEADEDNESS: ICD-10-CM

## 2019-02-11 DIAGNOSIS — R60.0 BILATERAL LEG EDEMA: ICD-10-CM

## 2019-02-11 DIAGNOSIS — I10 HTN, GOAL BELOW 140/90: Primary | Chronic | ICD-10-CM

## 2019-02-11 LAB
ALBUMIN SERPL-MCNC: 3.7 G/DL (ref 3.4–5)
ALP SERPL-CCNC: 98 U/L (ref 40–150)
ALT SERPL W P-5'-P-CCNC: 20 U/L (ref 0–50)
ANION GAP SERPL CALCULATED.3IONS-SCNC: 7 MMOL/L (ref 3–14)
AST SERPL W P-5'-P-CCNC: 20 U/L (ref 0–45)
BILIRUB SERPL-MCNC: 0.4 MG/DL (ref 0.2–1.3)
BUN SERPL-MCNC: 36 MG/DL (ref 7–30)
CALCIUM SERPL-MCNC: 9.3 MG/DL (ref 8.5–10.1)
CHLORIDE SERPL-SCNC: 105 MMOL/L (ref 94–109)
CHOLEST SERPL-MCNC: 179 MG/DL
CO2 SERPL-SCNC: 26 MMOL/L (ref 20–32)
CREAT SERPL-MCNC: 1.39 MG/DL (ref 0.52–1.04)
CREAT UR-MCNC: 124 MG/DL
ERYTHROCYTE [DISTWIDTH] IN BLOOD BY AUTOMATED COUNT: 12.7 % (ref 10–15)
GFR SERPL CREATININE-BSD FRML MDRD: 37 ML/MIN/{1.73_M2}
GLUCOSE SERPL-MCNC: 108 MG/DL (ref 70–99)
HCT VFR BLD AUTO: 35.8 % (ref 35–47)
HDLC SERPL-MCNC: 62 MG/DL
HGB BLD-MCNC: 11.2 G/DL (ref 11.7–15.7)
LDLC SERPL CALC-MCNC: 87 MG/DL
MCH RBC QN AUTO: 30.4 PG (ref 26.5–33)
MCHC RBC AUTO-ENTMCNC: 31.3 G/DL (ref 31.5–36.5)
MCV RBC AUTO: 97 FL (ref 78–100)
MICROALBUMIN UR-MCNC: 12 MG/L
MICROALBUMIN/CREAT UR: 9.44 MG/G CR (ref 0–25)
NONHDLC SERPL-MCNC: 117 MG/DL
PLATELET # BLD AUTO: 341 10E9/L (ref 150–450)
POTASSIUM SERPL-SCNC: 4 MMOL/L (ref 3.4–5.3)
PROT SERPL-MCNC: 7.3 G/DL (ref 6.8–8.8)
PTH-INTACT SERPL-MCNC: 50 PG/ML (ref 18–80)
RBC # BLD AUTO: 3.68 10E12/L (ref 3.8–5.2)
SODIUM SERPL-SCNC: 138 MMOL/L (ref 133–144)
TRIGL SERPL-MCNC: 150 MG/DL
TSH SERPL DL<=0.005 MIU/L-ACNC: 2.03 MU/L (ref 0.4–4)
WBC # BLD AUTO: 6.7 10E9/L (ref 4–11)

## 2019-02-11 PROCEDURE — 99214 OFFICE O/P EST MOD 30 MIN: CPT | Performed by: INTERNAL MEDICINE

## 2019-02-11 PROCEDURE — 36415 COLL VENOUS BLD VENIPUNCTURE: CPT | Performed by: INTERNAL MEDICINE

## 2019-02-11 PROCEDURE — 85027 COMPLETE CBC AUTOMATED: CPT | Performed by: INTERNAL MEDICINE

## 2019-02-11 PROCEDURE — 80061 LIPID PANEL: CPT | Performed by: INTERNAL MEDICINE

## 2019-02-11 PROCEDURE — 80053 COMPREHEN METABOLIC PANEL: CPT | Performed by: INTERNAL MEDICINE

## 2019-02-11 PROCEDURE — 82306 VITAMIN D 25 HYDROXY: CPT | Performed by: INTERNAL MEDICINE

## 2019-02-11 PROCEDURE — 83970 ASSAY OF PARATHORMONE: CPT | Performed by: INTERNAL MEDICINE

## 2019-02-11 PROCEDURE — 82043 UR ALBUMIN QUANTITATIVE: CPT | Performed by: INTERNAL MEDICINE

## 2019-02-11 PROCEDURE — 84443 ASSAY THYROID STIM HORMONE: CPT | Performed by: INTERNAL MEDICINE

## 2019-02-11 RX ORDER — ATENOLOL 25 MG/1
25 TABLET ORAL DAILY
Qty: 90 TABLET | Refills: 1 | Status: SHIPPED | OUTPATIENT
Start: 2019-02-11 | End: 2019-09-11

## 2019-02-11 RX ORDER — LOSARTAN POTASSIUM 50 MG/1
50 TABLET ORAL 2 TIMES DAILY
Qty: 180 TABLET | Refills: 0 | Status: SHIPPED | OUTPATIENT
Start: 2019-02-11 | End: 2019-07-19

## 2019-02-11 RX ORDER — SIMVASTATIN 20 MG
TABLET ORAL
Qty: 90 TABLET | Refills: 1 | Status: SHIPPED | OUTPATIENT
Start: 2019-02-11 | End: 2019-10-21

## 2019-02-11 RX ORDER — LOSARTAN POTASSIUM 50 MG/1
50 TABLET ORAL DAILY
Qty: 1 TABLET | Refills: 0
Start: 2019-02-11 | End: 2019-02-11

## 2019-02-11 RX ORDER — HYDROCHLOROTHIAZIDE 25 MG/1
TABLET ORAL
Qty: 90 TABLET | Refills: 1 | Status: SHIPPED | OUTPATIENT
Start: 2019-02-11 | End: 2019-10-21

## 2019-02-11 ASSESSMENT — ANXIETY QUESTIONNAIRES: GAD7 TOTAL SCORE: 0

## 2019-02-11 ASSESSMENT — PATIENT HEALTH QUESTIONNAIRE - PHQ9: SUM OF ALL RESPONSES TO PHQ QUESTIONS 1-9: 0

## 2019-02-11 ASSESSMENT — MIFFLIN-ST. JEOR: SCORE: 1612.96

## 2019-02-11 NOTE — PATIENT INSTRUCTIONS
Plan:  1. Stop the Amlodipine   2.Continue the other meds, same doses for now  3. Labs today - [address]   4. Follow up March -April for preop

## 2019-02-11 NOTE — PROGRESS NOTES
Dr Farrell's note      Patient's instructions / PLAN:                                                        Plan:  1. Stop the Amlodipine   2.Continue the other meds, same doses for now  3. Labs today - [address]   4. Follow up March -April for preop          ASSESSMENT & PLAN:                                                      (R42) Intermittent lightheadedness  (primary encounter diagnosis)  Comment: possible sec low BP  Plan: as above     (I10) HTN, goal below 140/90  Comment: Controlled    Plan: losartan (COZAAR) 50 MG tablet, atenolol         (TENORMIN) 25 MG tablet, hydrochlorothiazide         (HYDRODIURIL) 25 MG tablet, simvastatin (ZOCOR)        20 MG tablet, DISCONTINUED: losartan (COZAAR)         50 MG tablet            (N18.3) CKD 3  Comment: stable   Plan: hydrochlorothiazide (HYDRODIURIL) 25 MG tablet            (E78.5) Hyperlipidemia LDL goal <130  Comment: Controlled    Plan: simvastatin (ZOCOR) 20 MG tablet              (R60.0) Bilateral leg edema  Comment: Controlled    Plan: Continue same meds, same doses for now       (I67.5) Moyamoya disease  Comment:   Plan: simvastatin (ZOCOR) 20 MG tablet            (Z00.00) Routine general medical examination at a health care facility  Comment:   Plan:        Chief complaint:                                                      Follow up chronic medical problems          SUBJECTIVE:   Candida Rose is a 78 year old female who presents to clinic today for the following health issues:    SHe forgot to do the labs. She is fasting    She is now part of the study where they measure the BP in the legs with exercise     Lightheadedness / HTN   -- after bending down few times, standing sometimes   -- last time we increased the hydrochlorothiazide for leg edema and it controls the edema    Hyperlipidemia Follow-Up      Rate your low fat/cholesterol diet?: not monitoring fat    Taking statin?  Yes,     Other lipid medications/supplements?:  none    Hypertension  "Follow-up      Outpatient blood pressures are not being checked.    Low Salt Diet: no added salt      Amount of exercise or physical activity: Walking 30 minutes every day.    Problems taking medications regularly: No    Medication side effects: none    Diet: low salt      Answers for HPI/ROS submitted by the patient on 2/10/2019   PHQ9 TOTAL SCORE: 0  CAMILA 7 TOTAL SCORE: 0    LOV: Plan:  1. Increase the hydrochlorathiazide to 25 mg daily  2. Continue the other meds, same doses for now.  3.Please make a lab appointment for fasting labs  In  January  4. Please make an appointment few days after the labs to discuss about the results.   5. The following vaccines are recommended for you.   Medicare covers better if you have these vaccines at the pharmacy:  -- Shingerix vaccine - the newest vaccine for shingles       Review of Systems:                                                      ROS: negative for fever, chills, cough, wheezes, chest pain, shortness of breath, vomiting, abdominal pain, leg swelling          OBJECTIVE:             Physical exam:  Blood pressure 92/60, pulse 55, temperature 97.7  F (36.5  C), temperature source Oral, resp. rate 12, height 1.734 m (5' 8.25\"), weight 107 kg (236 lb), SpO2 99 %, not currently breastfeeding.   NAD, appears comfortable  Skin: no rashes   HEENT: PERRLA, EOMI, pink conjunctiva, anicteric sclerae, bilateral tympanic membranes are clinically normal, oropharynx is normal color  Neck: supple, no JVD,  No thyroidmegaly. Lymph nodes nonpalpable cervical and supraclavicular.  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 F/u dr. Soto     OA (osteoarthritis)     s/p bilat total hips      Other chronic pain     Joint pain for many years     Unspecified cerebral artery occlusion with cerebral infarction     After Moyamoya surgery > 10 yrs ago.     Vitamin D deficiencies       PSHx: reviewed  Past Surgical History:   Procedure Laterality Date     ARTHROPLASTY KNEE Left 4/17/2017    Procedure: ARTHROPLASTY KNEE;  Left total knee arthroplasty;  Surgeon: Chidi Jenkins MD;  Location: [facility] OR     C NONSPECIFIC PROCEDURE  10/30/06    neurosurgery Dr Soto     C NONSPECIFIC PROCEDURE      bilateral total hips approx 2002     C NONSPECIFIC PROCEDURE  3/07    neurosurgery      COLONOSCOPY  2008    normal     COLONOSCOPY  7/17/2014    Procedure: COLONOSCOPY;  Surgeon: Raul Nolan DO;  Location:  GI     CRANIOTOMY      MOJAMOJA  \"Pt had repair of blood flow.\"     RECONSTRUCT FOREFOOT WITH METATARSOPHALANGEAL (MTP) FUSION Left 8/21/2014    Procedure: RECONSTRUCT FOREFOOT WITH METATARSOPHALANGEAL (MTP) FUSION;  Surgeon: Tres Merlos DPM;  Location: [facility] OR        Meds: reviewed  Current Outpatient Medications   Medication Sig Dispense Refill     amLODIPine (NORVASC) 2.5 MG tablet TAKE 1 TABLET BY MOUTH EVERY DAY 30 tablet 0     aspirin 81 MG tablet Take 1 tablet (81 mg) by mouth daily 30 tablet      atenolol (TENORMIN) 25 MG tablet TAKE 1 TABLET (25 MG) BY MOUTH AT BEDTIME 90 tablet 0     clopidogrel (PLAVIX) 75 MG tablet TAKE 1 TABLET BY MOUTH DAILY 90 tablet 3     hydrochlorothiazide (HYDRODIURIL) 25 MG tablet TAKE 1 TABLET (25 MG) " BY MOUTH DAILY 90 tablet 0     losartan (COZAAR) 50 MG tablet TAKE 1 TABLET BY MOUTH TWICE A  tablet 0     magnesium 500 MG TABS        Multiple Vitamins-Minerals (MULTIVITAMIN & MINERAL PO) Take 1 tablet by mouth daily.       simvastatin (ZOCOR) 20 MG tablet TAKE 1 TABLET (20 MG) BY MOUTH AT BEDTIME 90 tablet 1       Soc Hx: reviewed  Fam Hx: reviewed          Chani Farrell MD  Internal Medicine

## 2019-02-12 LAB — DEPRECATED CALCIDIOL+CALCIFEROL SERPL-MC: 29 UG/L (ref 20–75)

## 2019-02-19 ENCOUNTER — MYC MEDICAL ADVICE (OUTPATIENT)
Dept: INTERNAL MEDICINE | Facility: CLINIC | Age: 79
End: 2019-02-19

## 2019-02-19 DIAGNOSIS — I10 HTN, GOAL BELOW 140/90: Primary | Chronic | ICD-10-CM

## 2019-02-26 DIAGNOSIS — I10 HTN, GOAL BELOW 140/90: Chronic | ICD-10-CM

## 2019-02-27 NOTE — TELEPHONE ENCOUNTER
"Requested Prescriptions   Pending Prescriptions Disp Refills     amLODIPine (NORVASC) 2.5 MG tablet [Pharmacy Med Name: AMLODIPINE BESYLATE 2.5 MG TAB]  Last Written Prescription Date:  historical  Last Fill Quantity: ,  # refills:    Last office visit: 2/11/2019 with prescribing provider:     Future Office Visit:   30 tablet 0     Sig: TAKE 1 TABLET BY MOUTH EVERY DAY    Calcium Channel Blockers Protocol  Failed - 2/26/2019  4:30 PM       Failed - Medication is active on med list       Failed - Normal serum creatinine on file in past 12 months    Recent Labs   Lab Test 02/11/19  0759   CR 1.39*            Passed - Blood pressure under 140/90 in past 12 months    BP Readings from Last 3 Encounters:   02/11/19 92/60   01/08/19 122/60   09/10/18 140/74                Passed - Recent (12 mo) or future (30 days) visit within the authorizing provider's specialty    Patient had office visit in the last 12 months or has a visit in the next 30 days with authorizing provider or within the authorizing provider's specialty.  See \"Patient Info\" tab in inbasket, or \"Choose Columns\" in Meds & Orders section of the refill encounter.             Passed - Patient is age 18 or older       Passed - No active pregnancy on record       Passed - No positive pregnancy test in past 12 months        "

## 2019-03-01 NOTE — TELEPHONE ENCOUNTER
Please clarify with th epatient     LOV: Plan:  1. Stop the Amlodipine   2.Continue the other meds, same doses for now  3. Labs today - [address]   4. Follow up March -April for preop

## 2019-03-01 NOTE — TELEPHONE ENCOUNTER
Routing refill request to provider for review/approval because:  Labs out of range:  Creat.  Please authorize if appropriate.  Thanks,  Anai Ruiz RN

## 2019-03-04 RX ORDER — AMLODIPINE BESYLATE 2.5 MG/1
TABLET ORAL
Qty: 30 TABLET | Refills: 5 | OUTPATIENT
Start: 2019-03-04

## 2019-03-04 NOTE — TELEPHONE ENCOUNTER
Patient returning call.  Advised of message from primary care provider below.    States she is not currently taking Amlodipine.  She will let her pharmacy know.    Medication refused with reason: not currently taking.

## 2019-03-08 ENCOUNTER — DOCUMENTATION ONLY (OUTPATIENT)
Dept: INTERNAL MEDICINE | Facility: CLINIC | Age: 79
End: 2019-03-08

## 2019-03-08 NOTE — PROGRESS NOTES
Called and spoke to pt's , asked that he have pt check her MyChart messages and follow the instructions from there. Was advised to call us if she is not able to access her MyChart for any reason (she was advised to stop her hydrochlorothiazide, to schedule a lab appointment in 3 weeks, and then an appointment with Dr. Farrell for a few days after that. She does not have any future appointments scheduled at this time).

## 2019-03-11 ENCOUNTER — TELEPHONE (OUTPATIENT)
Dept: INTERNAL MEDICINE | Facility: CLINIC | Age: 79
End: 2019-03-11

## 2019-03-11 NOTE — TELEPHONE ENCOUNTER
Pt came into the office today and said that [employer] told her the last visit the coding was not done right. Pt spoke with billing and [employer] and [employer] and she wants to make sure that everything is squared away.       Pt also has some questions for the nurse as well and would like to speak with someone.       Ph. [phone]  Cell: [phone]

## 2019-03-11 NOTE — TELEPHONE ENCOUNTER
Pt stopped by our office today to let us know that she received her message on MyChart and will be stopping the hydrochlorothiazide as of today.     She will also be stopping by Dr. Ott's office later today because she has plans to try to have her bunion surgery done sometime in April. This would mean that she would likely be having her pre-op sometime in late March, early April.     She will still plan to come for lab appointment, then appointment for pre-op a few days after that. If this will not work for Dr. Farrell (if it's not ok to address all of this at the same visit), we would need to call her back and let her know?     Routed to Dr. Farrell to make sure there wouldn't be a problem with this plan.

## 2019-03-12 NOTE — TELEPHONE ENCOUNTER
Informed patient of primary care provider's comments. Patient states she is still in the process of scheduling surgery, but verbalized understanding that she should schedule an office visit 2-3 weeks prior.

## 2019-03-12 NOTE — TELEPHONE ENCOUNTER
She should have the preop 2-3 weeks before the surgery, so we have enough time to do changes if needed   Only one appointment should be enough for now

## 2019-03-15 ENCOUNTER — HOSPITAL ENCOUNTER (OUTPATIENT)
Dept: MAMMOGRAPHY | Facility: CLINIC | Age: 79
Discharge: HOME | End: 2019-03-15
Attending: INTERNAL MEDICINE | Admitting: INTERNAL MEDICINE
Payer: MEDICARE

## 2019-03-15 DIAGNOSIS — Z12.31 VISIT FOR SCREENING MAMMOGRAM: ICD-10-CM

## 2019-03-15 PROCEDURE — 77067 SCR MAMMO BI INCL CAD: CPT

## 2019-03-18 ENCOUNTER — TRANSFERRED RECORDS (OUTPATIENT)
Dept: HEALTH INFORMATION MANAGEMENT | Facility: CLINIC | Age: 79
End: 2019-03-18

## 2019-04-03 DIAGNOSIS — I10 HTN, GOAL BELOW 140/90: Chronic | ICD-10-CM

## 2019-04-03 LAB
ANION GAP SERPL CALCULATED.3IONS-SCNC: 7 MMOL/L (ref 3–14)
BUN SERPL-MCNC: 22 MG/DL (ref 7–30)
CALCIUM SERPL-MCNC: 8.8 MG/DL (ref 8.5–10.1)
CHLORIDE SERPL-SCNC: 106 MMOL/L (ref 94–109)
CO2 SERPL-SCNC: 27 MMOL/L (ref 20–32)
CREAT SERPL-MCNC: 1.06 MG/DL (ref 0.52–1.04)
GFR SERPL CREATININE-BSD FRML MDRD: 51 ML/MIN/{1.73_M2}
GLUCOSE SERPL-MCNC: 107 MG/DL (ref 70–99)
POTASSIUM SERPL-SCNC: 4.3 MMOL/L (ref 3.4–5.3)
SODIUM SERPL-SCNC: 139 MMOL/L (ref 133–144)

## 2019-04-03 PROCEDURE — 36415 COLL VENOUS BLD VENIPUNCTURE: CPT | Performed by: INTERNAL MEDICINE

## 2019-04-03 PROCEDURE — 80048 BASIC METABOLIC PNL TOTAL CA: CPT | Performed by: INTERNAL MEDICINE

## 2019-04-05 ENCOUNTER — OFFICE VISIT (OUTPATIENT)
Dept: INTERNAL MEDICINE | Facility: CLINIC | Age: 79
End: 2019-04-05
Payer: MEDICARE

## 2019-04-05 VITALS
SYSTOLIC BLOOD PRESSURE: 160 MMHG | OXYGEN SATURATION: 97 % | DIASTOLIC BLOOD PRESSURE: 90 MMHG | WEIGHT: 238 LBS | TEMPERATURE: 98.4 F | HEART RATE: 72 BPM | BODY MASS INDEX: 35.92 KG/M2

## 2019-04-05 DIAGNOSIS — Z01.818 PREOP GENERAL PHYSICAL EXAM: Primary | ICD-10-CM

## 2019-04-05 DIAGNOSIS — I10 HTN, GOAL BELOW 140/90: Chronic | ICD-10-CM

## 2019-04-05 DIAGNOSIS — I67.5 MOYAMOYA DISEASE: Chronic | ICD-10-CM

## 2019-04-05 DIAGNOSIS — M79.671 RIGHT FOOT PAIN: ICD-10-CM

## 2019-04-05 PROCEDURE — 93000 ELECTROCARDIOGRAM COMPLETE: CPT | Performed by: INTERNAL MEDICINE

## 2019-04-05 PROCEDURE — 99215 OFFICE O/P EST HI 40 MIN: CPT | Performed by: INTERNAL MEDICINE

## 2019-04-05 RX ORDER — UREA 10 %
500 LOTION (ML) TOPICAL DAILY
COMMUNITY
End: 2022-11-08

## 2019-04-05 RX ORDER — AMLODIPINE BESYLATE 2.5 MG/1
2.5 TABLET ORAL DAILY
Qty: 30 TABLET | Refills: 2 | Status: SHIPPED | OUTPATIENT
Start: 2019-04-05 | End: 2019-05-30

## 2019-04-05 NOTE — PATIENT INSTRUCTIONS
Plan:  1. In the morning of the surgery day you take with a sip of water just these meds: Losartan The rest of the meds you resume after surgery.  2. Hold the Aspirin and Plavix a week before the surgery and resume them the evening of the surgery day  3. Continue same meds, same doses for now   4. Add Amlodipine 2.5 mg for the blood pressure   5. Follow up in 1-2 months for the blood pressure

## 2019-04-05 NOTE — PROGRESS NOTES
[place] [address]  [facility] [address]  [phone]  Dept: [phone]    PRE-OP EVALUATION:  Today's date: 2019    Candida Rose (: 1940) presents for pre-operative evaluation assessment as requested by .  She requires evaluation and anesthesia risk assessment prior to undergoing surgery/procedure for treatment of right foot surgery .    Fax number for surgical facility: [phone]  Primary Physician: Chani Peoples  Type of Anesthesia Anticipated: to be determined    Patient has a Health Care Directive or Living Will:  YES     Preop Questions 2019   Who is doing your surgery? dr. marlow, [facility]   What are you having done? correction to right foot bunion surgery   Date of Surgery/Procedure:    Facility or Hospital where procedure/surgery will be performed: [facility]   1.  Do you have a history of Heart attack, stroke, stent, coronary bypass surgery, or other heart surgery? No   2.  Do you ever have any pain or discomfort in your chest? No   3.  Do you have a history of  Heart Failure? No   4.   Are you troubled by shortness of breath when:  walking on a level surface, or up a slight hill, or at night? No   5.  Do you currently have a cold, bronchitis or other respiratory infection? No   6.  Do you have a cough, shortness of breath, or wheezing? No   7.  Do you sometimes get pains in the calves of your legs when you walk? No   8. Do you or anyone in your family have previous history of blood clots? No   9.  Do you or does anyone in your family have a serious bleeding problem such as prolonged bleeding following surgeries or cuts? No   10. Have you ever had problems with anemia or been told to take iron pills? No   11. Have you had any abnormal blood loss such as black, tarry or bloody stools, or abnormal vaginal bleeding? No   12. Have you ever had a blood transfusion? No   13. Have you or  any of your relatives ever had problems with anesthesia? No   14. Do you have sleep apnea, excessive snoring or daytime drowsiness? No   15. Do you have any prosthetic heart valves? No   16. Do you have prosthetic joints? YES - L knee and bilat hips    17. Is there any chance that you may be pregnant? No       CC:  Preop for multiple medical problems.    HPI:    The patient is scheduled for R foot  surgery with Dr. Ott  on  17 April, 2019  Unfortunately patient didn't bring any documents from the surgeon related with the scheduled procedure.  No other acute complaints.    Assessment:  1. V72.83H Preop general physical exam _ I do not see any major contraindications for the patient to go through the scheduled surgery.    The proposed surgical procedure is considered LOW  surgery risk.    For above listed surgery and anesthesia, Patient is at MODERATE,  risk for surgery/procedure and perioperative/procedure complications.      Cardiovascular risk  Assessment  -- low risk   -- No further cardiac work up is needed before this surgery.        ECG:    sinus rhythm, no changes suggestive for ischemia    Pulmonary Risk Assessment -- low risk   -- The patient doesn't have chronic lung disease nor acute respiratory problems       Obstructive Sleep Apnea (or suspected sleep apnea) -- low risk       Anemia Assessment :  -- patient has mild anemia. The patient can go for the scheduled surgery without further anemia work up.      Blood Sugar Assessment  -- patient doesn't have DM      Anticoagulation assessment  -- patient takes Aspirin and Plavix  for Moyamoya Disease  -- she will stop them a week before the surgery         Functional Status Assessment  MODERATE ( 4-7 METS): Cycling\Climbing a flight of stairs\Golf (without cart)\Walking 4 mph\Yardwork (e.g., raking leaves, weeding, pushing a power mower      (M79.671) Right foot pain  Comment: patient didn't bring details from the surgeon office   Plan: surgery, as above      (I10) HTN, goal below 140/90  Comment: Not controlled  She had muscle cramps with hydrochlorothiazide and she stopped and now the BP is high   Plan:  See below      (I67.5) Moyamoya disease  Comment: no symptoms   Plan: continue Aspirin and Plavix        Plan:  1. In the morning of the surgery day you take with a sip of water just these meds: Losartan The rest of the meds you resume after surgery.  2. Hold the Aspirin and Plavix a week before the surgery and resume them the evening of the surgery day  3. Continue same meds, same doses for now   4. Add Amlodipine 2.5 mg for the blood pressure   5. Follow up in 1-2 months for the blood pressure       Physical exam:     Blood pressure 160/90, pulse 72, temperature 98.4  F (36.9  C), temperature source Oral, weight 108 kg (238 lb), SpO2 97 %, not currently breastfeeding.     NAD, appears comfortable  Skin clear, no rashes  HEENT: PERRLA, EOMI, anicteric sclera, pink conjunctiva, external ears appear normal, bilateral tympanic membranes clinically normal, oropharynx normal color.  Neck: supple, no JVD,  no thyroidmegaly  Lymph nodes non palpable in the cervical, supraclavicular   Chest: clear to auscultation with good respiratory effort  Cardiac: S1S2, RRR, no mgr appreciated  Abdomen: soft, not tender, not distended, audible bowel sound, no hepatosplenomegaly, no palpable masses, no abdominal bruits  Extremities: no cyanosis, clubbing or edema.   Neuro: A, Ox3, no focal signs.        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 F/u dr. Soto     OA (osteoarthritis)     s/p bilat total hips      Other chronic pain     Joint pain for many years     Unspecified cerebral artery occlusion with cerebral infarction     After Moyamoya surgery > 10 yrs ago.     Vitamin D deficiencies       Past Surgical History:   Procedure Laterality Date     ARTHROPLASTY KNEE Left 2017    Procedure: ARTHROPLASTY KNEE;  Left total knee arthroplasty;  Surgeon: Chidi Jenkins MD;  Location: [facility] OR     C NONSPECIFIC PROCEDURE  10/30/06    neurosurgery Dr Charles LACY NONSPECIFIC PROCEDURE      bilateral total hips approx      C NONSPECIFIC PROCEDURE  3/07    neurosurgery      COLONOSCOPY      normal     COLONOSCOPY  2014    Procedure: COLONOSCOPY;  Surgeon: Raul Nolan DO;  Location: [facility] GI     CRANIOTOMY      MOJAMOJA  \"Pt had repair of blood flow.\"     RECONSTRUCT FOREFOOT WITH METATARSOPHALANGEAL (MTP) FUSION Left 2014    Procedure: RECONSTRUCT FOREFOOT WITH METATARSOPHALANGEAL (MTP) FUSION;  Surgeon: Tres Merlos DPM;  Location: [facility] OR        PSHx: No complications with prior surgeries or anesthesia     Soc Hx: No daily alcohol, no smoking     Family History   Problem Relation Age of Onset     Obesity Sister         gastric by-pass age age 60, heart murmur.     Cancer - colorectal Sister 69     Heart Disease Father         mi,  age 48     Family History Negative Mother         killed in MVA age 54     Cancer Maternal Aunt         lung cancer ,non-smoker     Lung Cancer Maternal Aunt      Breast Cancer Daughter 48        All: reviewed    Meds: reviewed  Current " Outpatient Medications   Medication Sig Dispense Refill     aspirin 81 MG tablet Take 1 tablet (81 mg) by mouth daily 30 tablet      atenolol (TENORMIN) 25 MG tablet Take 1 tablet (25 mg) by mouth daily 90 tablet 1     clopidogrel (PLAVIX) 75 MG tablet TAKE 1 TABLET BY MOUTH DAILY 90 tablet 3     cyanocobalamin (VITAMIN B-12) 500 MCG tablet Take 500 mcg by mouth daily       losartan (COZAAR) 50 MG tablet Take 1 tablet (50 mg) by mouth 2 times daily TAKE 1 TABLET BY MOUTH TWICE A  tablet 0     magnesium 500 MG TABS        Multiple Vitamins-Minerals (MULTIVITAMIN & MINERAL PO) Take 1 tablet by mouth daily.       simvastatin (ZOCOR) 20 MG tablet TAKE 1 TABLET (20 MG) BY MOUTH AT BEDTIME 90 tablet 1     hydrochlorothiazide (HYDRODIURIL) 25 MG tablet TAKE 1 TABLET (25 MG) BY MOUTH DAILY (Patient not taking: Reported on 4/5/2019) 90 tablet 1            Chani Farrell MD  Internal Medicine       MEDICAL HISTORY:     Patient Active Problem List    Diagnosis Date Noted     Moyamoya disease      Priority: High     see line 2       Obesity (BMI 35.0-39.9) with comorbidity (H) 09/13/2018     Priority: Medium     Osteoarthritis of left knee 04/17/2017     Priority: Medium     CKD 3 10/19/2015     Priority: Medium     HTN, goal below 140/90      Priority: Medium     Family hx of colon cancer      Priority: Medium     sister dx at 69       ACP (advance care planning) 05/07/2012     Priority: Medium     Patient states has Advance Directive and will bring in a copy to clinic. 5/7/2012        Osteopenia 05/07/2012     Priority: Medium     Vitamin D deficiency      Priority: Medium     (Problem list name updated by automated process. 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 F/u dr. Soto     OA (osteoarthritis)     s/p bilat total hips      Other chronic pain     Joint pain for many years     Unspecified cerebral artery occlusion with cerebral infarction     After Moyamoya surgery > 10 yrs ago.     Vitamin D deficiencies      Past Surgical History:   Procedure Laterality Date     ARTHROPLASTY KNEE Left 4/17/2017    Procedure: ARTHROPLASTY KNEE;  Left total knee arthroplasty;  Surgeon: Chidi Jenkins MD;  Location: [facility] OR     C NONSPECIFIC PROCEDURE  10/30/06    neurosurgery Dr Soto     C NONSPECIFIC PROCEDURE      bilateral total hips approx 2002     C NONSPECIFIC PROCEDURE  3/07    neurosurgery      COLONOSCOPY  2008    normal     COLONOSCOPY  7/17/2014    Procedure: COLONOSCOPY;  Surgeon: Raul Nolan DO;  Location:  GI     CRANIOTOMY      MOJAMOJA  \"Pt had repair of blood flow.\"     RECONSTRUCT FOREFOOT WITH METATARSOPHALANGEAL (MTP) FUSION Left 8/21/2014    Procedure: RECONSTRUCT FOREFOOT WITH METATARSOPHALANGEAL (MTP) FUSION;  Surgeon: Tres Merlos DPM;  Location: [facility] OR     Current Outpatient Medications   Medication Sig Dispense Refill     aspirin 81 MG tablet Take 1 tablet (81 mg) by mouth daily 30 tablet      atenolol (TENORMIN) 25 MG tablet Take 1 tablet (25 mg) by mouth daily 90 tablet 1     clopidogrel (PLAVIX) 75 MG tablet TAKE 1 TABLET BY MOUTH DAILY 90 tablet 3     hydrochlorothiazide (HYDRODIURIL) 25 MG tablet TAKE 1 TABLET (25 MG) BY MOUTH DAILY 90 tablet 1     losartan (COZAAR) 50 MG tablet Take 1 tablet (50 mg) by mouth 2 times daily TAKE 1 TABLET BY MOUTH TWICE A  tablet 0     magnesium 500 MG TABS        Multiple Vitamins-Minerals " (MULTIVITAMIN & MINERAL PO) Take 1 tablet by mouth daily.       simvastatin (ZOCOR) 20 MG tablet TAKE 1 TABLET (20 MG) BY MOUTH AT BEDTIME 90 tablet 1     OTC products: None, except as noted above    Allergies   Allergen Reactions     Lisinopril      Persistent cough      Latex Allergy: NO    Social History     Tobacco Use     Smoking status: Never Smoker     Smokeless tobacco: Never Used   Substance Use Topics     Alcohol use: Yes     Comment:  1-2 per day     History   Drug Use No         Recent Labs   Lab Test 04/03/19  0801 02/11/19  0759  01/26/18  0818  04/11/17  0849   HGB  --  11.2*  --  11.7   < > 11.6*   PLT  --  341  --  315   < > 310    138   < > 138   < > 136   POTASSIUM 4.3 4.0   < > 4.3   < > 3.7   CR 1.06* 1.39*   < > 1.24*   < > 1.02   A1C  --   --   --  5.8  --  5.8    < > = values in this interval not displayed.

## 2019-05-01 DIAGNOSIS — I10 HTN, GOAL BELOW 140/90: Chronic | ICD-10-CM

## 2019-05-01 RX ORDER — AMLODIPINE BESYLATE 2.5 MG/1
TABLET ORAL
Qty: 30 TABLET | Refills: 0 | OUTPATIENT
Start: 2019-05-01

## 2019-05-01 NOTE — TELEPHONE ENCOUNTER
"Requested Prescriptions   Pending Prescriptions Disp Refills     amLODIPine (NORVASC) 2.5 MG tablet [Pharmacy Med Name: AMLODIPINE BESYLATE 2.5 MG TAB] 30 tablet 0     Sig: TAKE 1 TABLET BY MOUTH EVERY DAY   Last Written Prescription Date:  04/05/2019  Last Fill Quantity: 30,  # refills: 2   Last office visit: 4/5/2019 with prescribing provider:     Future Office Visit:      Calcium Channel Blockers Protocol  Failed - 5/1/2019  7:36 AM        Failed - Blood pressure under 140/90 in past 12 months     BP Readings from Last 3 Encounters:   04/05/19 160/90   02/11/19 92/60   01/08/19 122/60                 Failed - Normal serum creatinine on file in past 12 months     Recent Labs   Lab Test 04/03/19  0801   CR 1.06*             Passed - Recent (12 mo) or future (30 days) visit within the authorizing provider's specialty     Patient had office visit in the last 12 months or has a visit in the next 30 days with authorizing provider or within the authorizing provider's specialty.  See \"Patient Info\" tab in inbasket, or \"Choose Columns\" in Meds & Orders section of the refill encounter.              Passed - Medication is active on med list        Passed - Patient is age 18 or older        Passed - No active pregnancy on record        Passed - No positive pregnancy test in past 12 months        "

## 2019-05-30 ENCOUNTER — TELEPHONE (OUTPATIENT)
Dept: INTERNAL MEDICINE | Facility: CLINIC | Age: 79
End: 2019-05-30

## 2019-05-30 DIAGNOSIS — I10 HTN, GOAL BELOW 140/90: Chronic | ICD-10-CM

## 2019-05-30 RX ORDER — AMLODIPINE BESYLATE 2.5 MG/1
TABLET ORAL
Qty: 30 TABLET | Refills: 0 | Status: SHIPPED | OUTPATIENT
Start: 2019-05-30 | End: 2019-05-31

## 2019-05-30 NOTE — TELEPHONE ENCOUNTER
"Requested Prescriptions   Pending Prescriptions Disp Refills     amLODIPine (NORVASC) 2.5 MG tablet [Pharmacy Med Name: AMLODIPINE BESYLATE 2.5 MG TAB] 30 tablet 1     Sig: TAKE 1 TABLET BY MOUTH EVERY DAY  Last Written Prescription Date:  4/5/2019  Last Fill Quantity: 30tablet,  # refills: 2   Last Office Visit: 4/5/2019 [place]  Future Office Visit:            Calcium Channel Blockers Protocol  Failed - 5/30/2019 11:34 AM        Failed - Blood pressure under 140/90 in past 12 months     BP Readings from Last 3 Encounters:   04/05/19 160/90   02/11/19 92/60   01/08/19 122/60           Failed - Normal serum creatinine on file in past 12 months     Recent Labs   Lab Test 04/03/19  0801   CR 1.06*           Passed - Recent (12 mo) or future (30 days) visit within the authorizing provider's specialty     Patient had office visit in the last 12 months or has a visit in the next 30 days with authorizing provider or within the authorizing provider's specialty.  See \"Patient Info\" tab in inbasket, or \"Choose Columns\" in Meds & Orders section of the refill encounter.            Passed - Medication is active on med list        Passed - Patient is age 18 or older        Passed - No active pregnancy on record        Passed - No positive pregnancy test in past 12 months          "

## 2019-05-30 NOTE — TELEPHONE ENCOUNTER
"Per notes from last office visit on 4/5/19:    \"Plan:  1. In the morning of the surgery day you take with a sip of water just these meds: Losartan The rest of the meds you resume after surgery.  2. Hold the Aspirin and Plavix a week before the surgery and resume them the evening of the surgery day  3. Continue same meds, same doses for now   4. Add Amlodipine 2.5 mg for the blood pressure   5. Follow up in 1-2 months for the blood pressure\"     [email or web address] message sent to patient to notify her that she is due for an office visit.  Medication is being filled for 1 time refill only due to:  Patient needs to be seen because hypertension follow up due.    Francesca Arellano RN      "

## 2019-05-31 ENCOUNTER — ALLIED HEALTH/NURSE VISIT (OUTPATIENT)
Dept: NURSING | Facility: CLINIC | Age: 79
End: 2019-05-31
Payer: MEDICARE

## 2019-05-31 VITALS — SYSTOLIC BLOOD PRESSURE: 113 MMHG | DIASTOLIC BLOOD PRESSURE: 56 MMHG

## 2019-05-31 DIAGNOSIS — I10 HTN, GOAL BELOW 140/90: Primary | ICD-10-CM

## 2019-05-31 RX ORDER — AMLODIPINE BESYLATE 2.5 MG/1
2.5 TABLET ORAL DAILY
Qty: 90 TABLET | Refills: 1 | Status: SHIPPED | OUTPATIENT
Start: 2019-05-31 | End: 2019-10-21

## 2019-05-31 NOTE — TELEPHONE ENCOUNTER
Patient came in for a nurse only blood pressure check today.  BP was 113/56.  Please advise if patient still needs to schedule an office visit.  Francesca Arellano RN

## 2019-06-15 DIAGNOSIS — I67.5 MOYAMOYA DISEASE: ICD-10-CM

## 2019-06-17 NOTE — TELEPHONE ENCOUNTER
"Requested Prescriptions   Pending Prescriptions Disp Refills     clopidogrel (PLAVIX) 75 MG tablet [Pharmacy Med Name: CLOPIDOGREL 75 MG TABLET] 90 tablet 3     Sig: TAKE 1 TABLET BY MOUTH EVERY DAY   Last Written Prescription Date:  07/03/2018  Last Fill Quantity: 90,  # refills: 3   Last office visit: 4/5/2019 with prescribing provider:     Future Office Visit:      Plavix Failed - 6/15/2019  7:39 AM        Failed - Normal HGB on file in past 12 months     Recent Labs   Lab Test 02/11/19  0759   HGB 11.2*               Passed - No active PPI on record unless is Protonix        Passed - Normal Platelets on file in past 12 months     Recent Labs   Lab Test 02/11/19  0759                  Passed - Recent (12 mo) or future (30 days) visit within the authorizing provider's specialty     Patient had office visit in the last 12 months or has a visit in the next 30 days with authorizing provider or within the authorizing provider's specialty.  See \"Patient Info\" tab in inbasket, or \"Choose Columns\" in Meds & Orders section of the refill encounter.              Passed - Medication is active on med list        Passed - Patient is age 18 or older        Passed - No active pregnancy on record        Passed - No positive pregnancy test in past 12 months        "

## 2019-06-18 RX ORDER — CLOPIDOGREL BISULFATE 75 MG/1
TABLET ORAL
Qty: 90 TABLET | Refills: 0 | Status: SHIPPED | OUTPATIENT
Start: 2019-06-18 | End: 2019-09-11

## 2019-06-18 NOTE — TELEPHONE ENCOUNTER
Routing refill request to provider for review/approval because:  Labs out of range:  Hemoglobin    Primary care provider out of office, will route to covering provider

## 2019-07-19 DIAGNOSIS — I10 HTN, GOAL BELOW 140/90: Chronic | ICD-10-CM

## 2019-07-19 RX ORDER — LOSARTAN POTASSIUM 50 MG/1
TABLET ORAL
Qty: 180 TABLET | Refills: 0 | Status: SHIPPED | OUTPATIENT
Start: 2019-07-19 | End: 2019-10-21

## 2019-07-19 NOTE — TELEPHONE ENCOUNTER
"Requested Prescriptions   Pending Prescriptions Disp Refills     losartan (COZAAR) 50 MG tablet [Pharmacy Med Name: LOSARTAN POTASSIUM 50 MG TAB]  Last Written Prescription Date:  2/11/2019  Last Fill Quantity: 180,  # refills: 0   Last office visit: 4/5/2019 with prescribing provider:     Future Office Visit:   180 tablet 0     Sig: TAKE 1 TABLET BY MOUTH TWICE A DAY       Angiotensin-II Receptors Failed - 7/19/2019  1:34 AM        Failed - Normal serum creatinine on file in past 12 months     Recent Labs   Lab Test 04/03/19  0801   CR 1.06*             Passed - Blood pressure under 140/90 in past 12 months     BP Readings from Last 3 Encounters:   05/31/19 113/56   04/05/19 160/90   02/11/19 92/60                 Passed - Recent (12 mo) or future (30 days) visit within the authorizing provider's specialty     Patient had office visit in the last 12 months or has a visit in the next 30 days with authorizing provider or within the authorizing provider's specialty.  See \"Patient Info\" tab in inbasket, or \"Choose Columns\" in Meds & Orders section of the refill encounter.              Passed - Medication is active on med list        Passed - Patient is age 18 or older        Passed - No active pregnancy on record        Passed - Normal serum potassium on file in past 12 months     Recent Labs   Lab Test 04/03/19  0801   POTASSIUM 4.3                    Passed - No positive pregnancy test in past 12 months        "

## 2019-09-11 DIAGNOSIS — I67.5 MOYAMOYA DISEASE: ICD-10-CM

## 2019-09-11 DIAGNOSIS — I10 HTN, GOAL BELOW 140/90: Chronic | ICD-10-CM

## 2019-09-11 RX ORDER — ATENOLOL 25 MG/1
TABLET ORAL
Qty: 90 TABLET | Refills: 1 | Status: SHIPPED | OUTPATIENT
Start: 2019-09-11 | End: 2019-10-21

## 2019-09-11 RX ORDER — CLOPIDOGREL BISULFATE 75 MG/1
TABLET ORAL
Qty: 90 TABLET | Refills: 0 | Status: SHIPPED | OUTPATIENT
Start: 2019-09-11 | End: 2019-10-21

## 2019-09-11 NOTE — TELEPHONE ENCOUNTER
"Requested Prescriptions   Pending Prescriptions Disp Refills     atenolol (TENORMIN) 25 MG tablet [Pharmacy Med Name: ATENOLOL 25 MG TABLET] 90 tablet 1     Sig: TAKE 1 TABLET BY MOUTH EVERY DAY   Last Written Prescription Date:  02/11/2019  Last Fill Quantity: 90,  # refills: 01   Last office visit: 4/5/2019 with prescribing provider:     Future Office Visit:      Beta-Blockers Protocol Passed - 9/11/2019  1:47 AM        Passed - Blood pressure under 140/90 in past 12 months     BP Readings from Last 3 Encounters:   05/31/19 113/56   04/05/19 160/90   02/11/19 92/60                 Passed - Patient is age 6 or older        Passed - Recent (12 mo) or future (30 days) visit within the authorizing provider's specialty     Patient had office visit in the last 12 months or has a visit in the next 30 days with authorizing provider or within the authorizing provider's specialty.  See \"Patient Info\" tab in inbasket, or \"Choose Columns\" in Meds & Orders section of the refill encounter.              Passed - Medication is active on med list        "

## 2019-09-11 NOTE — TELEPHONE ENCOUNTER
Prescription approved per [facility] Refill Protocol.    Last office visit was 4/5/19.  Last refilled on 2/11/19

## 2019-09-11 NOTE — TELEPHONE ENCOUNTER
"Requested Prescriptions   Pending Prescriptions Disp Refills     clopidogrel (PLAVIX) 75 MG tablet [Pharmacy Med Name: CLOPIDOGREL 75 MG TABLET] 90 tablet 0     Sig: TAKE 1 TABLET BY MOUTH EVERY DAY   Last Written Prescription Date:  06/18/2019  Last Fill Quantity: 90,  # refills: 0   Last office visit: 4/5/2019 with prescribing provider:     Future Office Visit:      Plavix Failed - 9/11/2019  1:48 AM        Failed - Normal HGB on file in past 12 months     Recent Labs   Lab Test 02/11/19  0759   HGB 11.2*               Passed - No active PPI on record unless is Protonix        Passed - Normal Platelets on file in past 12 months     Recent Labs   Lab Test 02/11/19  0759                  Passed - Recent (12 mo) or future (30 days) visit within the authorizing provider's specialty     Patient had office visit in the last 12 months or has a visit in the next 30 days with authorizing provider or within the authorizing provider's specialty.  See \"Patient Info\" tab in inbasket, or \"Choose Columns\" in Meds & Orders section of the refill encounter.              Passed - Medication is active on med list        Passed - Patient is age 18 or older        Passed - No active pregnancy on record        Passed - No positive pregnancy test in past 12 months        "

## 2019-09-30 ENCOUNTER — HEALTH MAINTENANCE LETTER (OUTPATIENT)
Age: 79
End: 2019-09-30

## 2019-10-21 ENCOUNTER — OFFICE VISIT (OUTPATIENT)
Dept: INTERNAL MEDICINE | Facility: CLINIC | Age: 79
End: 2019-10-21
Payer: MEDICARE

## 2019-10-21 ENCOUNTER — DOCUMENTATION ONLY (OUTPATIENT)
Dept: INTERNAL MEDICINE | Facility: CLINIC | Age: 79
End: 2019-10-21

## 2019-10-21 VITALS
SYSTOLIC BLOOD PRESSURE: 154 MMHG | TEMPERATURE: 97.8 F | HEIGHT: 68 IN | RESPIRATION RATE: 20 BRPM | OXYGEN SATURATION: 98 % | DIASTOLIC BLOOD PRESSURE: 70 MMHG | HEART RATE: 65 BPM | WEIGHT: 233.7 LBS | BODY MASS INDEX: 35.42 KG/M2

## 2019-10-21 DIAGNOSIS — E55.9 VITAMIN D DEFICIENCY: ICD-10-CM

## 2019-10-21 DIAGNOSIS — E78.5 HYPERLIPIDEMIA LDL GOAL <130: Chronic | ICD-10-CM

## 2019-10-21 DIAGNOSIS — N18.30 CKD (CHRONIC KIDNEY DISEASE) STAGE 3, GFR 30-59 ML/MIN (H): Chronic | ICD-10-CM

## 2019-10-21 DIAGNOSIS — I67.5 MOYAMOYA DISEASE: Chronic | ICD-10-CM

## 2019-10-21 DIAGNOSIS — I10 HTN, GOAL BELOW 140/90: Chronic | ICD-10-CM

## 2019-10-21 DIAGNOSIS — R73.03 PREDIABETES: ICD-10-CM

## 2019-10-21 DIAGNOSIS — Z00.00 ROUTINE GENERAL MEDICAL EXAMINATION AT A HEALTH CARE FACILITY: Primary | ICD-10-CM

## 2019-10-21 DIAGNOSIS — Z71.89 ACP (ADVANCE CARE PLANNING): Chronic | ICD-10-CM

## 2019-10-21 PROCEDURE — G0438 PPPS, INITIAL VISIT: HCPCS | Performed by: INTERNAL MEDICINE

## 2019-10-21 PROCEDURE — 99213 OFFICE O/P EST LOW 20 MIN: CPT | Mod: 25 | Performed by: INTERNAL MEDICINE

## 2019-10-21 RX ORDER — AMLODIPINE BESYLATE 2.5 MG/1
2.5 TABLET ORAL 2 TIMES DAILY
Qty: 180 TABLET | Refills: 1 | Status: SHIPPED | OUTPATIENT
Start: 2019-10-21 | End: 2020-05-26

## 2019-10-21 RX ORDER — ATENOLOL 25 MG/1
25 TABLET ORAL DAILY
Qty: 90 TABLET | Refills: 1 | Status: SHIPPED | OUTPATIENT
Start: 2019-10-21 | End: 2020-05-26

## 2019-10-21 RX ORDER — SIMVASTATIN 20 MG
TABLET ORAL
Qty: 90 TABLET | Refills: 1 | Status: SHIPPED | OUTPATIENT
Start: 2019-10-21 | End: 2020-04-07

## 2019-10-21 RX ORDER — CLOPIDOGREL BISULFATE 75 MG/1
75 TABLET ORAL DAILY
Qty: 90 TABLET | Refills: 4 | Status: SHIPPED | OUTPATIENT
Start: 2019-10-21 | End: 2021-01-04

## 2019-10-21 RX ORDER — HYDROCHLOROTHIAZIDE 25 MG/1
TABLET ORAL
Qty: 90 TABLET | Refills: 1 | Status: SHIPPED | OUTPATIENT
Start: 2019-10-21 | End: 2020-04-07

## 2019-10-21 RX ORDER — LOSARTAN POTASSIUM 50 MG/1
TABLET ORAL
Qty: 180 TABLET | Refills: 0 | Status: SHIPPED | OUTPATIENT
Start: 2019-10-21 | End: 2020-03-09

## 2019-10-21 ASSESSMENT — ENCOUNTER SYMPTOMS
EYE PAIN: 0
CONSTIPATION: 0
CHILLS: 0
COUGH: 0
FEVER: 0
ABDOMINAL PAIN: 0
HEMATOCHEZIA: 0
DIARRHEA: 0
DIZZINESS: 0
FREQUENCY: 0
HEMATURIA: 0
NERVOUS/ANXIOUS: 0

## 2019-10-21 ASSESSMENT — ANXIETY QUESTIONNAIRES
GAD7 TOTAL SCORE: 0
4. TROUBLE RELAXING: NOT AT ALL
GAD7 TOTAL SCORE: 0
5. BEING SO RESTLESS THAT IT IS HARD TO SIT STILL: NOT AT ALL
7. FEELING AFRAID AS IF SOMETHING AWFUL MIGHT HAPPEN: NOT AT ALL
2. NOT BEING ABLE TO STOP OR CONTROL WORRYING: NOT AT ALL
7. FEELING AFRAID AS IF SOMETHING AWFUL MIGHT HAPPEN: NOT AT ALL
GAD7 TOTAL SCORE: 0
1. FEELING NERVOUS, ANXIOUS, OR ON EDGE: NOT AT ALL
3. WORRYING TOO MUCH ABOUT DIFFERENT THINGS: NOT AT ALL
6. BECOMING EASILY ANNOYED OR IRRITABLE: NOT AT ALL

## 2019-10-21 ASSESSMENT — MIFFLIN-ST. JEOR: SCORE: 1597.53

## 2019-10-21 ASSESSMENT — PATIENT HEALTH QUESTIONNAIRE - PHQ9
SUM OF ALL RESPONSES TO PHQ QUESTIONS 1-9: 1
10. IF YOU CHECKED OFF ANY PROBLEMS, HOW DIFFICULT HAVE THESE PROBLEMS MADE IT FOR YOU TO DO YOUR WORK, TAKE CARE OF THINGS AT HOME, OR GET ALONG WITH OTHER PEOPLE: NOT DIFFICULT AT ALL
SUM OF ALL RESPONSES TO PHQ QUESTIONS 1-9: 1

## 2019-10-21 ASSESSMENT — ACTIVITIES OF DAILY LIVING (ADL): CURRENT_FUNCTION: NO ASSISTANCE NEEDED

## 2019-10-21 NOTE — NURSING NOTE
"BP (!) 154/70 (BP Location: Right arm, Patient Position: Sitting, Cuff Size: Adult Large)   Pulse 65   Temp 97.8  F (36.6  C) (Oral)   Resp 20   Ht 1.734 m (5' 8.25\")   Wt 106 kg (233 lb 11.2 oz)   LMP  (LMP Unknown)   SpO2 98%   Breastfeeding? No   BMI 35.27 kg/m    Shauna Garcia CMA    "

## 2019-10-21 NOTE — PATIENT INSTRUCTIONS
Plan:  1. Increase Amlodipine to 2.5 mg twice a day   2. Continue the other meds, same doses for now.  3. Follow up in 2 months for the blood pressure

## 2019-10-21 NOTE — PROGRESS NOTES
Dr Farrell's note    Patient's instructions / PLAN:                                                        Plan:  1. Increase Amlodipine to 2.5 mg twice a day   2. Continue the other meds, same doses for now.  3. Follow up in 2 months for the blood pressure          ASSESSMENT & PLAN:                                                        (Z00.00) Routine general medical examination at a health care facility  (primary encounter diagnosis)  Comment:   Plan:     (I67.5) Moyamoya disease  Comment: controlled  Plan: clopidogrel (PLAVIX) 75 MG tablet, simvastatin       (ZOCOR) 20 MG tablet        F/u w neuro     (E78.5) Hyperlipidemia LDL goal <130  Comment: controlled  Hx: Patient tries low chol diet, takes meds regularly with no side effects.  Plan: Low-cholesterol diet, weight management, exercise, Monitor labs     Plan: simvastatin (ZOCOR) 20 MG tablet            (I10) HTN, goal below 140/90  Comment: Not Controlled  Hx: Patient tries low salt diet, takes meds regularly with no side effects.  Plan: Low-salt diet, weight management, exercise   Plan: amLODIPine (NORVASC) 2.5 MG tablet, atenolol         (TENORMIN) 25 MG tablet, hydrochlorothiazide         (HYDRODIURIL) 25 MG tablet, losartan (COZAAR)         50 MG tablet, simvastatin (ZOCOR) 20 MG tablet            (N18.3) CKD 3  Comment: stable   Plan: hydrochlorothiazide (HYDRODIURIL) 25 MG tablet  Monitor labs              (E55.9) Vitamin D deficiency  Comment: Controlled  Plan: Continue same meds, same doses for now     (R73.03) Prediabetes  Comment: Controlled   Plan: cont diet and exercise     (Z71.89) ACP (advance care planning) - scanned in the chart 2018  Comment:   Plan:         Chief Complaint:                                                        Annual exam  Follow up chronic medical problems      SUBJECTIVE:                                                    History of present illness     We reviewed the chronic medical problems as above.   I reviewed the  recent tests results in Epic     She wants today visit as Preventive. She will see OBGYN and the visit will be coded as routine OBGYN       Hypertension  --BP today 165/75    Moyamoya disease  --MRI every two years, last one was in spring  --Seeing Neurologist Dr. Coulter    Prediabetes  --Last A1c- 5.8, 1/26/2018    --Gyn specialist Dr. Sheffield    ROS:    See below      This document serves as a record of the services and decisions personally performed and made by Chani Farrell MD. It was created on their behalf by Roberto Mccarthy, a trained medical scribe. The creation of this document is based on the provider's statements to the medical scribe.  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 F/u dr. Soto     OA (osteoarthritis)     s/p bilat total hips      Other chronic pain     Joint pain for many years     Unspecified cerebral artery occlusion with cerebral infarction     After Moyamoya surgery > 10 yrs ago.     Vitamin D deficiencies        PSHx: reviewed  Past Surgical History:   Procedure Laterality Date     ARTHROPLASTY KNEE Left 4/17/2017    Procedure: ARTHROPLASTY KNEE;  Left total knee arthroplasty;  Surgeon: Chidi Jenkins MD;  Location: [facility] OR     C NONSPECIFIC PROCEDURE  10/30/06    neurosurgery Dr Charles LACY NONSPECIFIC PROCEDURE      bilateral total hips approx 2002     C NONSPECIFIC PROCEDURE  3/07    neurosurgery      COLONOSCOPY  2008    normal     COLONOSCOPY  7/17/2014    Procedure:  "COLONOSCOPY;  Surgeon: Raul Nolan DO;  Location: [facility]     CRANIOTOMY      MOJAMOJA  \"Pt had repair of blood flow.\"     RECONSTRUCT FOREFOOT WITH METATARSOPHALANGEAL (MTP) FUSION Left 8/21/2014    Procedure: RECONSTRUCT FOREFOOT WITH METATARSOPHALANGEAL (MTP) FUSION;  Surgeon: Tres Merlos DPM;  Location: [facility] OR        Soc Hx: No daily alcohol, no smoking  Social History     Socioeconomic History     Marital status:      Spouse name: Olu      Number of children: 2     Years of education: Not on file     Highest education level: Not on file   Occupational History     Employer: RETIRED   Social Needs     Financial resource strain: Not on file     Food insecurity:     Worry: Not on file     Inability: Not on file     Transportation needs:     Medical: Not on file     Non-medical: Not on file   Tobacco Use     Smoking status: Never Smoker     Smokeless tobacco: Never Used   Substance and Sexual Activity     Alcohol use: Yes     Comment:  1-2 per day     Drug use: No     Sexual activity: Never     Partners: Male   Lifestyle     Physical activity:     Days per week: Not on file     Minutes per session: Not on file     Stress: Not on file   Relationships     Social connections:     Talks on phone: Not on file     Gets together: Not on file     Attends Hinduism service: Not on file     Active member of club or organization: Not on file     Attends meetings of clubs or organizations: Not on file     Relationship status: Not on file     Intimate partner violence:     Fear of current or ex partner: Not on file     Emotionally abused: Not on file     Physically abused: Not on file     Forced sexual activity: Not on file   Other Topics Concern     Parent/sibling w/ CABG, MI or angioplasty before 65F 55M? Not Asked   Social History Narrative     Not on file        Fam Hx: reviewed  Family History   Problem Relation Age of Onset     Obesity Sister         gastric by-pass age age 60, heart murmur.     " "Cancer - colorectal Sister 69     Heart Disease Father         mi,  age 48     Family History Negative Mother         killed in MVA age 54     Cancer Maternal Aunt         lung cancer ,non-smoker     Lung Cancer Maternal Aunt      Breast Cancer Daughter 48         Screening: reviewed      All: reviewed    Meds: reviewed  Current Outpatient Medications   Medication Sig Dispense Refill     amLODIPine (NORVASC) 2.5 MG tablet Take 1 tablet (2.5 mg) by mouth daily 90 tablet 1     aspirin 81 MG tablet Take 1 tablet (81 mg) by mouth daily 30 tablet      atenolol (TENORMIN) 25 MG tablet TAKE 1 TABLET BY MOUTH EVERY DAY 90 tablet 1     clopidogrel (PLAVIX) 75 MG tablet TAKE 1 TABLET BY MOUTH EVERY DAY 90 tablet 0     cyanocobalamin (VITAMIN B-12) 500 MCG tablet Take 500 mcg by mouth daily       hydrochlorothiazide (HYDRODIURIL) 25 MG tablet TAKE 1 TABLET (25 MG) BY MOUTH DAILY 90 tablet 1     losartan (COZAAR) 50 MG tablet TAKE 1 TABLET BY MOUTH TWICE A  tablet 0     magnesium 500 MG TABS        Multiple Vitamins-Minerals (MULTIVITAMIN & MINERAL PO) Take 1 tablet by mouth daily.       simvastatin (ZOCOR) 20 MG tablet TAKE 1 TABLET (20 MG) BY MOUTH AT BEDTIME 90 tablet 1       OBJECTIVE:                                                    Physical Exam :  Blood pressure (!) 154/70, pulse 65, temperature 97.8  F (36.6  C), temperature source Oral, resp. rate 20, height 1.734 m (5' 8.25\"), weight 106 kg (233 lb 11.2 oz), SpO2 98 %, not currently breastfeeding.     NAD, appears comfortable  Skin clear, no rashes  HEENT: PERRLA, EOMI, anicteric sclera, pink conjunctiva, external ears appear normal, bilateral tympanic membranes clinically normal, oropharynx normal color.  Neck: supple, no JVD,  no thyroidmegaly  Lymph nodes non palpable in the cervical, supraclavicular axillaries, inguinal areas  Chest: clear to auscultation with good respiratory effort  Cardiac: S1S2, RRR, no mgr appreciated  Abdomen: soft, not " "tender, not distended, audible bowel sound, no hepatosplenomegaly, no palpable masses, no abdominal bruits  Extremities: no cyanosis, clubbing or edema.   Neuro: A, Ox3, no focal signs.  Breast exam in supine and erect position: they are symmetrical, no skin changes, no tenderness or nodes on palpation. Nipples are erect, no skin lesions, no discharge on pressure.    Pelvic exam: deferred, done with HAMZAH Farrell MD  Internal Medicine       SUBJECTIVE:   Candida Rose is a 79 year old female who presents for Preventive Visit.      Are you in the first 12 months of your Medicare coverage?  No    Healthy Habits:     In general, how would you rate your overall health?  Good    Frequency of exercise:  2-3 days/week    Duration of exercise:  15-30 minutes    Do you usually eat at least 4 servings of fruit and vegetables a day, include whole grains    & fiber and avoid regularly eating high fat or \"junk\" foods?  Yes    Taking medications regularly:  Yes    Medication side effects:  None    Ability to successfully perform activities of daily living:  No assistance needed    Home Safety:  No safety concerns identified    Hearing Impairment:  No hearing concerns    In the past 6 months, have you been bothered by leaking of urine?  No    In general, how would you rate your overall mental or emotional health?  Excellent      PHQ-2 Total Score: 0    Additional concerns today:  No    Do you feel safe in your environment? Yes    Do you have a Health Care Directive? Yes: Advance Directive has been received and scanned.      Fall risk  Fallen 2 or more times in the past year?: No  Any fall with injury in the past year?: No    Cognitive Screening   1) Repeat 3 items (Leader, Season, Table)    2) Clock draw: NORMAL  3) 3 item recall: Recalls 3 objects  Results: 3 items recalled: COGNITIVE IMPAIRMENT LESS LIKELY    Mini-CogTM Copyright S Kavon. Licensed by the author for use in [facility]; reprinted " with permission (law@[facility]). All rights reserved.      Do you have sleep apnea, excessive snoring or daytime drowsiness?: no    Reviewed and updated as needed this visit by clinical staff  Tobacco  Allergies  Med Hx  Surg Hx  Fam Hx  Soc Hx        Reviewed and updated as needed this visit by Provider        Social History     Tobacco Use     Smoking status: Never Smoker     Smokeless tobacco: Never Used   Substance Use Topics     Alcohol use: Yes     Comment:  1-2 per day     If you drink alcohol do you typically have >3 drinks per day or >7 drinks per week? No    Alcohol Use 10/21/2019   Prescreen: >3 drinks/day or >7 drinks/week? No   Prescreen: >3 drinks/day or >7 drinks/week? -           Hyperlipidemia Follow-Up      Are you having any of the following symptoms? (Select all that apply)  No complaints of shortness of breath, chest pain or pressure.  No increased sweating or nausea with activity.  No left-sided neck or arm pain.  No complaints of pain in calves when walking 1-2 blocks.    Are you regularly taking any medication or supplement to lower your cholesterol?   Yes- see list    Are you having muscle aches or other side effects that you think could be caused by your cholesterol lowering medication?  No      Hypertension Follow-up      Do you check your blood pressure regularly outside of the clinic? No     Are you following a low salt diet? Yes    Are your blood pressures ever more than 140 on the top number (systolic) OR more   than 90 on the bottom number (diastolic), for example 140/90? Yes    Current providers sharing in care for this patient include:   Patient Care Team:  Chani Peoples MD as PCP - General (Internal Medicine)  Chani Peoples MD as Assigned PCP  Neurologist dr Emerson     The following health maintenance items are reviewed in Epic and correct as of today:  Health Maintenance   Topic Date Due     DIABETIC FOOT EXAM  1940     A1C   "07/26/2018     EYE EXAM  08/30/2018     MEDICARE ANNUAL WELLNESS VISIT  06/11/2019     FALL RISK ASSESSMENT  06/11/2019     LIPID  02/11/2020     MICROALBUMIN  02/11/2020     CAMILA ASSESSMENT  02/11/2020     PHQ-9  02/11/2020     BMP  04/03/2020     TSH W/FREE T4 REFLEX  02/11/2021     ADVANCE CARE PLANNING  09/29/2022     COLONOSCOPY  07/17/2024     DTAP/TDAP/TD IMMUNIZATION (3 - Td) 01/13/2027     DEXA  Completed     INFLUENZA VACCINE  Completed     PNEUMOCOCCAL IMMUNIZATION 65+ LOW/MEDIUM RISK  Completed     ZOSTER IMMUNIZATION  Completed     IPV IMMUNIZATION  Aged Out     MENINGITIS IMMUNIZATION  Aged Out     Labs reviewed in EPIC      Review of Systems   Constitutional: Negative for chills and fever.   HENT: Negative for congestion and ear pain.    Eyes: Negative for pain.   Respiratory: Negative for cough.    Cardiovascular: Negative for chest pain.   Gastrointestinal: Negative for abdominal pain, constipation, diarrhea and hematochezia.   Genitourinary: Negative for frequency and hematuria.   Neurological: Negative for dizziness.   Psychiatric/Behavioral: The patient is not nervous/anxious.          End of Life Planning:  Patient currently has an advanced directive: Yes.  Practitioner is supportive of decision.    COUNSELING:  Reviewed preventive health counseling, as reflected in patient instructions       Regular exercise       Healthy diet/nutrition    Estimated body mass index is 35.92 kg/m  as calculated from the following:    Height as of 2/11/19: 1.734 m (5' 8.25\").    Weight as of 4/5/19: 108 kg (238 lb).    Weight management plan: Discussed healthy diet and exercise guidelines     reports that she has never smoked. She has never used smokeless tobacco.      Appropriate preventive services were discussed with this patient, including applicable screening as appropriate for cardiovascular disease, diabetes, osteopenia/osteoporosis, and glaucoma.  As appropriate for age/gender, discussed screening for " colorectal cancer, prostate cancer, breast cancer, and cervical cancer. Checklist reviewing preventive services available has been given to the patient.    Reviewed patients plan of care and provided an AVS. The Basic Care Plan (routine screening as documented in Health Maintenance) for Candida meets the Care Plan requirement. This Care Plan has been established and reviewed with the Patient.    Counseling Resources:  ATP IV Guidelines  Pooled Cohorts Equation Calculator  Breast Cancer Risk Calculator  FRAX Risk Assessment  ICSI Preventive Guidelines  Dietary Guidelines for Americans, 2010  USDA's MyPlate  ASA Prophylaxis  Lung CA Screening    Chani Peoples MD  [facility]    Identified Health Risks:

## 2019-10-21 NOTE — PROGRESS NOTES
DESTINEE completed and faxed to [facility] for records of her last eye exam (she reports it was done approximately May 2018, and that she was advised she needs to come every 2 years). She also reports that her insurance does not cover her to see the eye doctor more frequently than every other year.   Original DESTINEE sent to be scanned.

## 2019-10-22 ASSESSMENT — ANXIETY QUESTIONNAIRES: GAD7 TOTAL SCORE: 0

## 2019-11-17 DIAGNOSIS — I10 HTN, GOAL BELOW 140/90: Chronic | ICD-10-CM

## 2019-11-18 NOTE — TELEPHONE ENCOUNTER
"Requested Prescriptions   Pending Prescriptions Disp Refills     amLODIPine (NORVASC) 2.5 MG tablet [Pharmacy Med Name: AMLODIPINE BESYLATE 2.5 MG TAB] 90 tablet 1     Sig: TAKE 1 TABLET BY MOUTH EVERY DAY   Last Written Prescription Date:  10/21/2019  Last Fill Quantity: 180,  # refills: 01   Last office visit: 10/21/2019 with prescribing provider:     Future Office Visit:      Calcium Channel Blockers Protocol  Failed - 11/17/2019 11:18 AM        Failed - Blood pressure under 140/90 in past 12 months     BP Readings from Last 3 Encounters:   10/21/19 (!) 154/70   05/31/19 113/56   04/05/19 160/90                 Failed - Normal serum creatinine on file in past 12 months     Recent Labs   Lab Test 04/03/19  0801   CR 1.06*             Passed - Recent (12 mo) or future (30 days) visit within the authorizing provider's specialty     Patient has had an office visit with the authorizing provider or a provider within the authorizing providers department within the previous 12 mos or has a future within next 30 days. See \"Patient Info\" tab in inbasket, or \"Choose Columns\" in Meds & Orders section of the refill encounter.              Passed - Medication is active on med list        Passed - Patient is age 18 or older        Passed - No active pregnancy on record        Passed - No positive pregnancy test in past 12 months        "

## 2019-11-19 ENCOUNTER — TRANSFERRED RECORDS (OUTPATIENT)
Dept: HEALTH INFORMATION MANAGEMENT | Facility: CLINIC | Age: 79
End: 2019-11-19

## 2019-11-19 RX ORDER — AMLODIPINE BESYLATE 2.5 MG/1
TABLET ORAL
Qty: 90 TABLET | Refills: 1 | OUTPATIENT
Start: 2019-11-19

## 2020-03-06 DIAGNOSIS — I10 HTN, GOAL BELOW 140/90: Chronic | ICD-10-CM

## 2020-03-06 NOTE — TELEPHONE ENCOUNTER
"Requested Prescriptions   Pending Prescriptions Disp Refills     losartan (COZAAR) 50 MG tablet [Pharmacy Med Name: LOSARTAN POTASSIUM 50 MG TAB] 180 tablet 0     Sig: TAKE 1 TABLET BY MOUTH TWICE A DAY   Last Written Prescription Date:  10/21/2019  Last Fill Quantity: 180,  # refills: 0   Last office visit: 10/21/2019 with prescribing provider:     Future Office Visit:      Angiotensin-II Receptors Failed - 3/6/2020  1:51 AM        Failed - Last blood pressure under 140/90 in past 12 months     BP Readings from Last 3 Encounters:   10/21/19 (!) 154/70   05/31/19 113/56   04/05/19 160/90                 Failed - Normal serum creatinine on file in past 12 months     Recent Labs   Lab Test 04/03/19  0801   CR 1.06*             Passed - Recent (12 mo) or future (30 days) visit within the authorizing provider's specialty     Patient has had an office visit with the authorizing provider or a provider within the authorizing providers department within the previous 12 mos or has a future within next 30 days. See \"Patient Info\" tab in inbasket, or \"Choose Columns\" in Meds & Orders section of the refill encounter.              Passed - Medication is active on med list        Passed - Patient is age 18 or older        Passed - No active pregnancy on record        Passed - Normal serum potassium on file in past 12 months     Recent Labs   Lab Test 04/03/19  0801   POTASSIUM 4.3                    Passed - No positive pregnancy test in past 12 months        "

## 2020-03-09 RX ORDER — LOSARTAN POTASSIUM 50 MG/1
TABLET ORAL
Qty: 60 TABLET | Refills: 0 | Status: SHIPPED | OUTPATIENT
Start: 2020-03-09 | End: 2020-04-07

## 2020-03-09 NOTE — TELEPHONE ENCOUNTER
Routing refill request to provider for review/approval because:  Labs out of range:  Cr  Blood pressure out of range

## 2020-03-09 NOTE — TELEPHONE ENCOUNTER
Patient needs appointment     LOV Oct:Plan:  1. Increase Amlodipine to 2.5 mg twice a day   2. Continue the other meds, same doses for now.  3. Follow up in 2 months for the blood pressure

## 2020-03-12 NOTE — TELEPHONE ENCOUNTER
Patient called and scheduled an appointment for 3/26/2020. Please refill medication until that date.

## 2020-04-04 DIAGNOSIS — I10 HTN, GOAL BELOW 140/90: Chronic | ICD-10-CM

## 2020-04-04 DIAGNOSIS — N18.30 CKD (CHRONIC KIDNEY DISEASE) STAGE 3, GFR 30-59 ML/MIN (H): Chronic | ICD-10-CM

## 2020-04-04 NOTE — TELEPHONE ENCOUNTER
"Requested Prescriptions   Pending Prescriptions Disp Refills     hydrochlorothiazide (HYDRODIURIL) 25 MG tablet [Pharmacy Med Name: HYDROCHLOROTHIAZIDE 25 MG TAB] 90 tablet 1     Sig: TAKE 1 TABLET BY MOUTH EVERY DAY       Diuretics (Including Combos) Protocol Failed - 4/4/2020  9:36 AM        Failed - Blood pressure under 140/90 in past 12 months     BP Readings from Last 3 Encounters:   10/21/19 (!) 154/70   05/31/19 113/56   04/05/19 160/90                 Failed - Normal serum creatinine on file in past 12 months     Recent Labs   Lab Test 04/03/19  0801   CR 1.06*              Failed - Normal serum potassium on file in past 12 months     Recent Labs   Lab Test 04/03/19  0801   POTASSIUM 4.3                    Failed - Normal serum sodium on file in past 12 months     Recent Labs   Lab Test 04/03/19  0801                 Passed - Recent (12 mo) or future (30 days) visit within the authorizing provider's specialty     Patient has had an office visit with the authorizing provider or a provider within the authorizing providers department within the previous 12 mos or has a future within next 30 days. See \"Patient Info\" tab in inbasket, or \"Choose Columns\" in Meds & Orders section of the refill encounter.              Passed - Medication is active on med list        Passed - Patient is age 18 or older        Passed - No active pregancy on record        Passed - No positive pregnancy test in past 12 months             "

## 2020-04-06 NOTE — TELEPHONE ENCOUNTER
Last blood pressure elevated.  Patient needs phone appointment    10/21/2019 LOV Plan:  1. Increase Amlodipine to 2.5 mg twice a day   2. Continue the other meds, same doses for now.  3. Follow up in 2 months for the blood pressure

## 2020-04-07 ENCOUNTER — MYC MEDICAL ADVICE (OUTPATIENT)
Dept: INTERNAL MEDICINE | Facility: CLINIC | Age: 80
End: 2020-04-07

## 2020-04-07 ENCOUNTER — VIRTUAL VISIT (OUTPATIENT)
Dept: INTERNAL MEDICINE | Facility: CLINIC | Age: 80
End: 2020-04-07
Payer: MEDICARE

## 2020-04-07 DIAGNOSIS — I10 HTN, GOAL BELOW 140/90: Chronic | ICD-10-CM

## 2020-04-07 DIAGNOSIS — I67.5 MOYAMOYA DISEASE: Chronic | ICD-10-CM

## 2020-04-07 DIAGNOSIS — E78.5 HYPERLIPIDEMIA LDL GOAL <130: Chronic | ICD-10-CM

## 2020-04-07 DIAGNOSIS — N18.30 CKD (CHRONIC KIDNEY DISEASE) STAGE 3, GFR 30-59 ML/MIN (H): Chronic | ICD-10-CM

## 2020-04-07 PROCEDURE — 99442 ZZC PHYSICIAN TELEPHONE EVALUATION 11-20 MIN: CPT | Performed by: INTERNAL MEDICINE

## 2020-04-07 RX ORDER — LOSARTAN POTASSIUM 50 MG/1
TABLET ORAL
Qty: 180 TABLET | Refills: 0 | Status: SHIPPED | OUTPATIENT
Start: 2020-04-07 | End: 2020-05-26

## 2020-04-07 RX ORDER — AMLODIPINE BESYLATE 5 MG/1
5 TABLET ORAL 2 TIMES DAILY
Qty: 180 TABLET | Refills: 0 | Status: SHIPPED | OUTPATIENT
Start: 2020-04-07 | End: 2020-05-26

## 2020-04-07 RX ORDER — SIMVASTATIN 20 MG
TABLET ORAL
Qty: 90 TABLET | Refills: 1 | Status: SHIPPED | OUTPATIENT
Start: 2020-04-07 | End: 2020-05-26

## 2020-04-07 RX ORDER — HYDROCHLOROTHIAZIDE 25 MG/1
TABLET ORAL
Qty: 90 TABLET | Refills: 0 | Status: SHIPPED | OUTPATIENT
Start: 2020-04-07 | End: 2020-05-26

## 2020-04-07 NOTE — PROGRESS NOTES
"Subjective     Candida Rose is a 79 year old female who is being evaluated via a billable telephone visit.      The patient has been notified of following:     \"This telephone visit will be conducted via a call between you and your physician/provider. We have found that certain health care needs can be provided without the need for a physical exam.  This service lets us provide the care you need with a short phone conversation.  If a prescription is necessary we can send it directly to your pharmacy.  If lab work is needed we can place an order for that and you can then stop by our lab to have the test done at a later time.    If during the course of the call the physician/provider feels a telephone visit is not appropriate, you will not be charged for this service.\"     Patient has given verbal consent for Telephone visit?  Yes     Shireen Lawson      This is a telephone encounter with the patient.       7:17 am --- 7:34          Dr Farrell's note      Patient's instructions / PLAN:                                                        Plan:  1. Increase Amlodipine 5 mg to twice a day  2. Continue the other meds, same doses for now.  3. Follow up in a month with blood pressure records   4. Goal blood pressure is less than 140/90      If leg edema we will decrease the Amlodipine to 2.5 mg bid and add hytrin at bed time 2-5 mg     ASSESSMENT & PLAN:                                                      (I10) HTN, goal below 140/90  Comment: Not controlled   Maximum dose of losartan.  Cannot increase HCTZ because of CKD.  Can increase atenolol because HR is 55s  Plan: hydrochlorothiazide (HYDRODIURIL) 25 MG tablet,        losartan (COZAAR) 50 MG tablet, simvastatin         (ZOCOR) 20 MG tablet, amLODIPine (NORVASC) 5 MG        tablet            (N18.3) CKD 3  Comment:   Plan: Monitor labs              (E78.5) Hyperlipidemia LDL goal <130  Comment: Controlled    Plan: simvastatin (ZOCOR) 20 MG tablet            (I67.5) " Moyamoya disease  Comment: stable No issues  Plan: obs     Chief complaint:                                                      Follow up chronic medical problems       SUBJECTIVE:                                                    History of present illness:    LOV: Plan:  1. Increase Amlodipine to 2.5 mg twice a day   2. Continue the other meds, same doses for now.  3. Follow up in 2 months for the blood pressure       Meds am: losartan amlodipine bid, atenolol 25 every day, hydrochlorothiazide 25 mg       HTN:  -- she has new BP machine bought in Dec  -- BP: 160/ 65, 151/65,  158/63 150s Pulse 55s  -- discuss options of increase amlodipine ( leg edema discussed ) or add hytrin at bed time     Overall she is feeling well.  No acute complaints    Hyperlipidemia Follow-Up      Are you regularly taking any medication or supplement to lower your cholesterol?   Yes- .    Are you having muscle aches or other side effects that you think could be caused by your cholesterol lowering medication?  No    Hypertension Follow-up      Do you check your blood pressure regularly outside of the clinic? Yes     Are you following a low salt diet? Yes    Are your blood pressures ever more than 140 on the top number (systolic) OR more   than 90 on the bottom number (diastolic), for example 140/90? Yes      How many servings of fruits and vegetables do you eat daily?  4 or more    On average, how many sweetened beverages do you drink each day (Examples: soda, juice, sweet tea, etc.  Do NOT count diet or artificially sweetened beverages)?   1    How many days per week do you exercise enough to make your heart beat faster? 6    How many minutes a day do you exercise enough to make your heart beat faster? 20 - 29    How many days per week do you miss taking your medication? 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 F/u dr. Soto     OA (osteoarthritis)     s/p bilat total hips      Other chronic pain     Joint pain for many years     Unspecified cerebral artery occlusion with cerebral infarction     After Moyamoya surgery > 10 yrs ago.     Vitamin D deficiencies       PSHx: reviewed  Past Surgical History:   Procedure Laterality Date     ARTHROPLASTY KNEE Left 4/17/2017    Procedure: ARTHROPLASTY KNEE;  Left total knee arthroplasty;  Surgeon: Chidi Jenkins MD;  Location: [facility] OR     C NONSPECIFIC PROCEDURE  10/30/06    neurosurgery Dr Soto     C NONSPECIFIC PROCEDURE      bilateral total hips approx 2002     C NONSPECIFIC PROCEDURE  3/07    neurosurgery      COLONOSCOPY  2008    normal     COLONOSCOPY  7/17/2014    Procedure: COLONOSCOPY;  Surgeon: Raul Nolan DO;  Location:  GI     CRANIOTOMY      MOJAMOJA  \"Pt had repair of blood flow.\"     RECONSTRUCT FOREFOOT WITH METATARSOPHALANGEAL (MTP) FUSION Left 8/21/2014    Procedure: RECONSTRUCT FOREFOOT WITH METATARSOPHALANGEAL (MTP) FUSION;  Surgeon: Tres Merlos DPM;  Location: [facility] OR        Meds: reviewed  Current Outpatient Medications   Medication Sig Dispense Refill     amLODIPine (NORVASC) 2.5 MG tablet Take 1 tablet (2.5 mg) by mouth 2 times daily 180 tablet 1     aspirin 81 MG tablet Take 1 tablet (81 mg) by mouth " daily 30 tablet      atenolol (TENORMIN) 25 MG tablet Take 1 tablet (25 mg) by mouth daily 90 tablet 1     clopidogrel (PLAVIX) 75 MG tablet Take 1 tablet (75 mg) by mouth daily 90 tablet 4     cyanocobalamin (VITAMIN B-12) 500 MCG tablet Take 500 mcg by mouth daily       hydrochlorothiazide (HYDRODIURIL) 25 MG tablet TAKE 1 TABLET (25 MG) BY MOUTH DAILY 90 tablet 1     losartan (COZAAR) 50 MG tablet TAKE 1 TABLET BY MOUTH TWICE A DAY 60 tablet 0     magnesium 500 MG TABS        Multiple Vitamins-Minerals (MULTIVITAMIN & MINERAL PO) Take 1 tablet by mouth daily.       simvastatin (ZOCOR) 20 MG tablet TAKE 1 TABLET (20 MG) BY MOUTH AT BEDTIME 90 tablet 1       Soc Hx: reviewed  Fam Hx: reviewed          Chani Farrell MD  Internal Medicine

## 2020-04-07 NOTE — TELEPHONE ENCOUNTER
"Requested Prescriptions   Pending Prescriptions Disp Refills     losartan (COZAAR) 50 MG tablet [Pharmacy Med Name: LOSARTAN POTASSIUM 50 MG TAB]  Last Written Prescription Date:  2/11/2019  Last Fill Quantity: ,  # refills:    Last office visit: 10/21/2019 with prescribing provider:     Future Office Visit:   60 tablet 0     Sig: TAKE 1 TABLET BY MOUTH TWICE A DAY       Angiotensin-II Receptors Failed - 4/7/2020 12:31 AM        Failed - Last blood pressure under 140/90 in past 12 months     BP Readings from Last 3 Encounters:   10/21/19 (!) 154/70   05/31/19 113/56   04/05/19 160/90                 Failed - Normal serum creatinine on file in past 12 months     Recent Labs   Lab Test 04/03/19  0801   CR 1.06*       Ok to refill medication if creatinine is low          Failed - Normal serum potassium on file in past 12 months     Recent Labs   Lab Test 04/03/19  0801   POTASSIUM 4.3                    Passed - Recent (12 mo) or future (30 days) visit within the authorizing provider's specialty     Patient has had an office visit with the authorizing provider or a provider within the authorizing providers department within the previous 12 mos or has a future within next 30 days. See \"Patient Info\" tab in inbasket, or \"Choose Columns\" in Meds & Orders section of the refill encounter.              Passed - Medication is active on med list        Passed - Patient is age 18 or older        Passed - No active pregnancy on record        Passed - No positive pregnancy test in past 12 months           "

## 2020-04-08 RX ORDER — LOSARTAN POTASSIUM 50 MG/1
TABLET ORAL
Qty: 60 TABLET | Refills: 0 | OUTPATIENT
Start: 2020-04-08

## 2020-04-08 RX ORDER — HYDROCHLOROTHIAZIDE 25 MG/1
TABLET ORAL
Qty: 90 TABLET | Refills: 1 | OUTPATIENT
Start: 2020-04-08

## 2020-05-10 DIAGNOSIS — I10 HTN, GOAL BELOW 140/90: Chronic | ICD-10-CM

## 2020-05-12 NOTE — TELEPHONE ENCOUNTER
Please help the patient to schedule video/virtual visits      Rx done    4/7/2020 Plan:  1. Increase Amlodipine 5 mg to twice a day  2. Continue the other meds, same doses for now.  3. Follow up in a month with blood pressure records   4. Goal blood pressure is less than 140/90

## 2020-05-14 RX ORDER — AMLODIPINE BESYLATE 2.5 MG/1
TABLET ORAL
Qty: 180 TABLET | Refills: 1 | OUTPATIENT
Start: 2020-05-14

## 2020-05-14 NOTE — TELEPHONE ENCOUNTER
Called patient and advised her of message below. Patient agreed to set up a telephone visit. Patient states she has enough medication to get her through until the upcoming appointment. Denied refill request.

## 2020-05-26 ENCOUNTER — VIRTUAL VISIT (OUTPATIENT)
Dept: INTERNAL MEDICINE | Facility: CLINIC | Age: 80
End: 2020-05-26
Payer: MEDICARE

## 2020-05-26 VITALS — SYSTOLIC BLOOD PRESSURE: 136 MMHG | DIASTOLIC BLOOD PRESSURE: 51 MMHG | HEART RATE: 52 BPM

## 2020-05-26 DIAGNOSIS — E78.5 HYPERLIPIDEMIA LDL GOAL <130: Chronic | ICD-10-CM

## 2020-05-26 DIAGNOSIS — I10 HTN, GOAL BELOW 140/90: Chronic | ICD-10-CM

## 2020-05-26 PROCEDURE — 99442: CPT | Performed by: INTERNAL MEDICINE

## 2020-05-26 RX ORDER — SIMVASTATIN 20 MG
TABLET ORAL
Qty: 90 TABLET | Refills: 1 | Status: SHIPPED | OUTPATIENT
Start: 2020-05-26 | End: 2021-01-04

## 2020-05-26 RX ORDER — LOSARTAN POTASSIUM 50 MG/1
TABLET ORAL
Qty: 180 TABLET | Refills: 1 | Status: SHIPPED | OUTPATIENT
Start: 2020-05-26 | End: 2021-01-04

## 2020-05-26 RX ORDER — ATENOLOL 25 MG/1
25 TABLET ORAL DAILY
Qty: 90 TABLET | Refills: 1 | Status: SHIPPED | OUTPATIENT
Start: 2020-05-26 | End: 2020-10-09

## 2020-05-26 RX ORDER — HYDROCHLOROTHIAZIDE 25 MG/1
TABLET ORAL
Qty: 90 TABLET | Refills: 1 | Status: SHIPPED | OUTPATIENT
Start: 2020-05-26 | End: 2021-01-04

## 2020-05-26 RX ORDER — AMLODIPINE BESYLATE 5 MG/1
5 TABLET ORAL 2 TIMES DAILY
Qty: 180 TABLET | Refills: 1 | Status: SHIPPED | OUTPATIENT
Start: 2020-05-26 | End: 2021-01-04

## 2020-05-26 NOTE — PATIENT INSTRUCTIONS
Plan:  1. Continue same meds, same doses for now   2. Planning for Annual exam after Oct 21 - come fasting

## 2020-05-26 NOTE — PROGRESS NOTES
"Candida Rose is a 79 year old female who is being evaluated via a billable telephone visit.      The patient has been notified of following:     \"This telephone visit will be conducted via a call between you and your physician/provider. We have found that certain health care needs can be provided without the need for a physical exam.  This service lets us provide the care you need with a short phone conversation.  If a prescription is necessary we can send it directly to your pharmacy.  If lab work is needed we can place an order for that and you can then stop by our lab to have the test done at a later time.    Telephone visits are billed at different rates depending on your insurance coverage. During this emergency period, for some insurers they may be billed the same as an in-person visit.  Please reach out to your insurance provider with any questions.    If during the course of the call the physician/provider feels a telephone visit is not appropriate, you will not be charged for this service.\"    Patient has given verbal consent for Telephone visit?  Yes    What phone number would you like to be contacted at? [phone]    How would you like to obtain your AVS? [employer]hart         This is a telephone encounter with the patient.       09:20 --- 09:32          Dr Farrell's note      Patient's instructions / PLAN:                                                        Plan:  1. Continue same meds, same doses for now   2. Planning for Annual exam after Oct 21 - come fasting         ASSESSMENT & PLAN:                                                      (I10) HTN, goal below 140/90  Comment: Controlled     Plan: atenolol (TENORMIN) 25 MG tablet, amLODIPine         (NORVASC) 5 MG tablet, losartan (COZAAR) 50 MG         tablet, hydrochlorothiazide (HYDRODIURIL) 25 MG        tablet, simvastatin (ZOCOR) 20 MG tablet            (E78.5) Hyperlipidemia LDL goal <130  Comment: Controlled   Plan: simvastatin (ZOCOR) 20 MG " tablet               Chief complaint:                                                      Follow up chronic medical problems      SUBJECTIVE:                                                    History of present illness:    HTN  BP: 128/51, HR 54, 117/72,  59.  135/68  58,  139/62,  57    124/55,  53  138/51  52  She has a new BP machine   -- no leg swelling   -- trying to do exercise   --     Subjective     Candida Rose is a 79 year old female who presents via phone visit today for the following health issues:    Hyperlipidemia Follow-Up      Are you regularly taking any medication or supplement to lower your cholesterol?   Yes- zocor    Are you having muscle aches or other side effects that you think could be caused by your cholesterol lowering medication?  No    Hypertension Follow-up      Do you check your blood pressure regularly outside of the clinic? Yes     Are you following a low salt diet? Yes    Are your blood pressures ever more than 140 on the top number (systolic) OR more   than 90 on the bottom number (diastolic), for example 140/90? No      How many servings of fruits and vegetables do you eat daily?  2-3    On average, how many sweetened beverages do you drink each day (Examples: soda, juice, sweet tea, etc.  Do NOT count diet or artificially sweetened beverages)?   0    How many days per week do you exercise enough to make your heart beat faster? 5    How many minutes a day do you exercise enough to make your heart beat faster? 20 - 29    How many days per week do you miss taking your medication? 0         Review of Systems:                                                      ROS: negative for fever, chills, cough, wheezes, chest pain, shortness of breath, vomiting, abdominal pain, leg swelling     A 10-point review of systems was obtained.  Those pertinent are above and in the in the Subjective section.  The rest of the systems are negative.           OBJECTIVE:           An actual physical  "exam can't be done during phone visit   A limited exam can sometimes be performed by video visit   Blood pressure 136/51, pulse 52, not currently breastfeeding.       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 F/u dr. Soto     OA (osteoarthritis)     s/p bilat total hips      Other chronic pain     Joint pain for many years     Unspecified cerebral artery occlusion with cerebral infarction     After Moyamoya surgery > 10 yrs ago.     Vitamin D deficiencies       PSHx: reviewed  Past Surgical History:   Procedure Laterality Date     ARTHROPLASTY KNEE Left 4/17/2017    Procedure: ARTHROPLASTY KNEE;  Left total knee arthroplasty;  Surgeon: Chidi Jenkins MD;  Location: [facility] OR     C NONSPECIFIC PROCEDURE  10/30/06    neurosurgery Dr Soto     C NONSPECIFIC PROCEDURE      bilateral total hips approx 2002     C NONSPECIFIC PROCEDURE  3/07    neurosurgery      COLONOSCOPY  2008    normal     COLONOSCOPY  7/17/2014    Procedure: COLONOSCOPY;  Surgeon: Raul Nolan DO;  Location:  GI     CRANIOTOMY      MOJAMOJA  \"Pt had repair of blood flow.\"     RECONSTRUCT FOREFOOT WITH METATARSOPHALANGEAL (MTP) FUSION Left 8/21/2014    Procedure: RECONSTRUCT FOREFOOT WITH METATARSOPHALANGEAL (MTP) FUSION;  Surgeon: Tres Merlos DPM;  Location: [facility] OR        Meds: reviewed  Current Outpatient Medications   Medication Sig Dispense Refill     amLODIPine (NORVASC) 5 MG tablet Take 1 tablet (5 mg) by mouth 2 times daily 180 tablet 0     aspirin 81 MG " tablet Take 1 tablet (81 mg) by mouth daily 30 tablet      atenolol (TENORMIN) 25 MG tablet Take 1 tablet (25 mg) by mouth daily 90 tablet 1     clopidogrel (PLAVIX) 75 MG tablet Take 1 tablet (75 mg) by mouth daily 90 tablet 4     cyanocobalamin (VITAMIN B-12) 500 MCG tablet Take 500 mcg by mouth daily       hydrochlorothiazide (HYDRODIURIL) 25 MG tablet TAKE 1 TABLET (25 MG) BY MOUTH DAILY 90 tablet 0     losartan (COZAAR) 50 MG tablet TAKE 1 TABLET BY MOUTH TWICE A  tablet 0     magnesium 500 MG TABS        Multiple Vitamins-Minerals (MULTIVITAMIN & MINERAL PO) Take 1 tablet by mouth daily.       simvastatin (ZOCOR) 20 MG tablet TAKE 1 TABLET (20 MG) BY MOUTH AT BEDTIME 90 tablet 1       Soc Hx: reviewed  Fam Hx: reviewed          Chani Farrell MD  Internal Medicine

## 2020-08-24 ENCOUNTER — NURSE TRIAGE (OUTPATIENT)
Dept: INTERNAL MEDICINE | Facility: CLINIC | Age: 80
End: 2020-08-24

## 2020-08-24 NOTE — TELEPHONE ENCOUNTER
Patient has been having episodes of light-headedness for the last several weeks. Her BP yesterday was 135/55. She has symptoms when getting up. Call to advise [phone]

## 2020-08-26 NOTE — TELEPHONE ENCOUNTER
Patient calling back and stated she is getting really light headed when she stands up. Patient states it is a spinning sensation and is having to hang onto furniture upon first standing up and it lasts about 1-2 minutes and then goes away completely.  Blood pressure reported to be 118/ 56 and pulse was 55. Patient does not have air conditioning in her home and states she drinks a couple cans of sparkling water and some plain water thru out the day.  Patient denied any fever, chest pain, vomiting, diarrhea, bleeding, dizzy with changes in head position, weakness, difficulty speaking.  Advised patient to push fluids today and if no improvement to call us back.  Advised patient to also call back if she faints or becomes worse.  Advised to call 911 if develops any extremity weakness, difficulty walking or talking.  Advised patient to stand up slowly and stand still before walking.  Patient verbalized understanding.   Assisted in scheduling an appointment.    Next 5 appointments (look out 90 days)    Sep 03, 2020  8:00 AM CDT  SHORT with Chani Peoples MD  [facility] [address]  [facility] [id number] [phone]            Additional Information    Negative: Shock suspected (e.g., cold/pale/clammy skin, too weak to stand, low BP, rapid pulse)    Negative: Difficult to awaken or acting confused (e.g., disoriented, slurred speech)    Negative: Fainted, and still feels dizzy afterwards    Negative: Severe difficulty breathing (e.g., struggling for each breath, speaks in single words)    Negative: Overdose (accidental or intentional) of medications    Negative: New neurologic deficit that is present now: * Weakness of the face, arm, or leg on one side of the body * Numbness of the face, arm, or leg on one side of the body * Loss of speech or garbled speech    Negative: Heart beating < 50 beats per minute OR > 140 beats per minute    Negative:  Sounds like a life-threatening emergency to the triager    Negative: Chest pain    Negative: Rectal bleeding, bloody stool, or tarry-black stool    Negative: Vomiting is the main symptom    Negative: Diarrhea is the main symptom    Negative: Headache is the main symptom    Negative: Heat exhaustion suspected (i.e., dehydration from heat exposure)    Negative: Patient states that he/she is having an anxiety/panic attack    Negative: SEVERE dizziness (e.g., unable to stand, requires support to walk, feels like passing out now)    Negative: SEVERE headache or neck pain    Negative: Spinning or tilting sensation (vertigo) present now and one or more stroke risk factors (i.e., hypertension, diabetes, prior stroke/TIA, heart attack, age over 60) (Exception: prior physician evaluation for this AND no different/worse than usual)    Negative: Loss of vision or double vision    Negative: Extra heart beats OR irregular heart beating (i.e., 'palpitations')    Negative: Difficulty breathing    Negative: Drinking very little and has signs of dehydration (e.g., no urine > 12 hours, very dry mouth, very lightheaded)    Negative: Follows bleeding (e.g., stomach, rectum, vagina) (Exception: became dizzy from sight of small amount blood)    Negative: Patient sounds very sick or weak to the triager    Negative: Lightheadedness (dizziness) present now, after 2 hours of rest and fluids    Negative: Spinning or tilting sensation (vertigo) present now    Negative: Fever > 103 F (39.4 C)    Negative: Fever > 100.0 F (37.8 C) and has diabetes mellitus or a weak immune system (e.g., HIV positive, cancer chemotherapy, organ transplant, splenectomy, chronic steroids)    Negative: Vomiting occurs with dizziness    Negative: Patient wants to be seen    Negative: Taking a medicine that could cause dizziness (e.g., blood pressure medications, diuretics)    Negative: Diabetes    Dizziness not present now, but is a chronic symptom (recurrent or  "ongoing AND lasting > 4 weeks)    Answer Assessment - Initial Assessment Questions  1. DESCRIPTION: \"Describe your dizziness.\"      Spinning sensation  2. LIGHTHEADED: \"Do you feel lightheaded?\" (e.g., somewhat faint, woozy, weak upon standing)      Only upon standing and lasts 1-2 minutes.  No dizziness with laying or turning in bed  3. VERTIGO: \"Do you feel like either you or the room is spinning or tilting?\" (i.e. vertigo)      spinning  4. SEVERITY: \"How bad is it?\"  \"Do you feel like you are going to faint?\" \"Can you stand and walk?\"    - MILD - walking normally    - MODERATE - interferes with normal activities (e.g., work, school)     - SEVERE - unable to stand, requires support to walk, feels like passing out now.       Not bad now.  Hangs onto furniture when it happens to stop herself from falling   5. ONSET:  \"When did the dizziness begin?\"      Couple weeks to a month ago  6. AGGRAVATING FACTORS: \"Does anything make it worse?\" (e.g., standing, change in head position)      Going from sitting to standing  7. HEART RATE: \"Can you tell me your heart rate?\" \"How many beats in 15 seconds?\"  (Note: not all patients can do this)        55  8. CAUSE: \"What do you think is causing the dizziness?\"      unsure  9. RECURRENT SYMPTOM: \"Have you had dizziness before?\" If so, ask: \"When was the last time?\" \"What happened that time?\"      no  10. OTHER SYMPTOMS: \"Do you have any other symptoms?\" (e.g., fever, chest pain, vomiting, diarrhea, bleeding)        no  11. PREGNANCY: \"Is there any chance you are pregnant?\" \"When was your last menstrual period?\"        no    Protocols used: DIZZINESS-A-OH      "

## 2020-08-27 NOTE — TELEPHONE ENCOUNTER
Carmen,    I am not sure how You scheduled this patient on Sept 3 at 8:00 because all the appointments    7:00-9:00 am on that day are held. I am in a meeting       She needs to reschedule when opening available. Different provider is ok

## 2020-08-31 ENCOUNTER — OFFICE VISIT (OUTPATIENT)
Dept: INTERNAL MEDICINE | Facility: CLINIC | Age: 80
End: 2020-08-31
Payer: MEDICARE

## 2020-08-31 VITALS
RESPIRATION RATE: 18 BRPM | SYSTOLIC BLOOD PRESSURE: 134 MMHG | OXYGEN SATURATION: 98 % | WEIGHT: 229 LBS | HEART RATE: 68 BPM | DIASTOLIC BLOOD PRESSURE: 68 MMHG | BODY MASS INDEX: 34.56 KG/M2

## 2020-08-31 DIAGNOSIS — I10 HTN, GOAL BELOW 140/90: Primary | Chronic | ICD-10-CM

## 2020-08-31 DIAGNOSIS — R42 LIGHTHEADEDNESS: ICD-10-CM

## 2020-08-31 DIAGNOSIS — I67.5 MOYAMOYA DISEASE: Chronic | ICD-10-CM

## 2020-08-31 PROCEDURE — 99214 OFFICE O/P EST MOD 30 MIN: CPT | Performed by: INTERNAL MEDICINE

## 2020-08-31 NOTE — PROGRESS NOTES
Dr Farrell's note      Patient's instructions / PLAN:                                                        Plan:  1. Continue to monitor the blood pressure  2. If your blood pressure goes below 110 skip 1 tablet of Amlodipine of 5 mg   3. Continue the other meds, same doses for now.        ASSESSMENT & PLAN:                                                      (I10) HTN, goal below 140/90  (primary encounter diagnosis)  Comment: Controlled    Plan: as above     (R42) Lightheadedness  Comment: likely lob BP  Plan: as above       (I67.5) Moyamoya disease  Comment: This has been stable for many years.  I do not think the lightheadedness is related with it  Plan: Monitor       Chief complaint:                                                      lightheaded    SUBJECTIVE:                                                    History of present illness:    Daughter moved to [place]      Dizziness  -- after sitting for more than an hour when she stood up she felt very lightheaded x 1-2 min  -- no vertigo, no CP, SOB  -- when she started been more active she hasn't experienced the lightheadedness  -- she also thinks she was not drinking enough water     HTN  -- BP at home 108/56    Hyperlipidemia Follow-Up      Are you regularly taking any medication or supplement to lower your cholesterol?   Yes- zocor    Are you having muscle aches or other side effects that you think could be caused by your cholesterol lowering medication?  No    Hypertension Follow-up      Do you check your blood pressure regularly outside of the clinic? Yes     Are you following a low salt diet? Yes    Are your blood pressures ever more than 140 on the top number (systolic) OR more   than 90 on the bottom number (diastolic), for example 140/90? No      How many servings of fruits and vegetables do you eat daily?  2-3    On average, how many sweetened beverages do you drink each day (Examples: soda, juice, sweet tea, etc.  Do NOT count diet or  artificially sweetened beverages)?   0    How many days per week do you exercise enough to make your heart beat faster? 3 or less    How many minutes a day do you exercise enough to make your heart beat faster? 10 - 19    How many days per week do you miss taking your medication? 0    Review of Systems:                                                      ROS: negative for fever, chills, cough, wheezes, chest pain, shortness of breath, vomiting, abdominal pain, leg swelling       OBJECTIVE:             Physical exam:   Blood pressure 134/68, pulse 68, resp. rate 18, weight 103.9 kg (229 lb), SpO2 98 %, not currently breastfeeding.       NAD, appears comfortable  Skin: no rashes   Neck: supple, no JVD,  No thyroidmegaly. Lymph nodes nonpalpable cervical and supraclavicular.  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 F/u dr. Soto     OA (osteoarthritis)     s/p bilat total hips      Other chronic pain     Joint pain for many years     Unspecified cerebral artery occlusion with cerebral infarction     After Moyamoya surgery > 10 yrs ago.     Vitamin D deficiencies       PSHx: reviewed  Past Surgical History:   Procedure Laterality Date     ARTHROPLASTY KNEE Left 4/17/2017    Procedure: ARTHROPLASTY KNEE;  " Left total knee arthroplasty;  Surgeon: Chidi Jenkins MD;  Location: [facility] OR     C NONSPECIFIC PROCEDURE  10/30/06    neurosurgery Dr Charles LACY NONSPECIFIC PROCEDURE      bilateral total hips approx 2002     C NONSPECIFIC PROCEDURE  3/07    neurosurgery      COLONOSCOPY  2008    normal     COLONOSCOPY  7/17/2014    Procedure: COLONOSCOPY;  Surgeon: Raul Nolan DO;  Location:  GI     CRANIOTOMY      MOJAMOJA  \"Pt had repair of blood flow.\"     RECONSTRUCT FOREFOOT WITH METATARSOPHALANGEAL (MTP) FUSION Left 8/21/2014    Procedure: RECONSTRUCT FOREFOOT WITH METATARSOPHALANGEAL (MTP) FUSION;  Surgeon: Tres Merlos DPM;  Location: [facility] OR        Meds: reviewed  Current Outpatient Medications   Medication Sig Dispense Refill     amLODIPine (NORVASC) 5 MG tablet Take 1 tablet (5 mg) by mouth 2 times daily 180 tablet 1     aspirin 81 MG tablet Take 1 tablet (81 mg) by mouth daily 30 tablet      atenolol (TENORMIN) 25 MG tablet Take 1 tablet (25 mg) by mouth daily 90 tablet 1     clopidogrel (PLAVIX) 75 MG tablet Take 1 tablet (75 mg) by mouth daily 90 tablet 4     cyanocobalamin (VITAMIN B-12) 500 MCG tablet Take 500 mcg by mouth daily       hydrochlorothiazide (HYDRODIURIL) 25 MG tablet TAKE 1 TABLET (25 MG) BY MOUTH DAILY 90 tablet 1     losartan (COZAAR) 50 MG tablet TAKE 1 TABLET BY MOUTH TWICE A  tablet 1     magnesium 500 MG TABS        Multiple Vitamins-Minerals (MULTIVITAMIN & MINERAL PO) Take 1 tablet by mouth daily.       simvastatin (ZOCOR) 20 MG tablet TAKE 1 TABLET (20 MG) BY MOUTH AT BEDTIME 90 tablet 1       Soc Hx: reviewed  Fam Hx: reviewed          Chani Farrell MD  Internal Medicine       "

## 2020-08-31 NOTE — PATIENT INSTRUCTIONS
Plan:  1. Continue to monitor the blood pressure  2. If your blood pressure goes below 110 skip 1 tablet of Amlodipine of 5 mg   3. Continue the other meds, same doses for now.

## 2020-10-06 DIAGNOSIS — I10 HTN, GOAL BELOW 140/90: Chronic | ICD-10-CM

## 2020-10-07 NOTE — TELEPHONE ENCOUNTER
Pending Prescriptions:                       Disp   Refills    atenolol (TENORMIN) 25 MG tablet [Pharmacy*90 tab*1        Sig: TAKE 1 TABLET BY MOUTH EVERY DAY    amLODIPine (NORVASC) 2.5 MG tablet [Pharma*180 ta*1        Sig: TAKE 1 TABLET BY MOUTH 2 TIMES DAILY

## 2020-10-08 ENCOUNTER — TELEPHONE (OUTPATIENT)
Dept: INTERNAL MEDICINE | Facility: CLINIC | Age: 80
End: 2020-10-08

## 2020-10-09 RX ORDER — AMLODIPINE BESYLATE 2.5 MG/1
TABLET ORAL
Qty: 180 TABLET | Refills: 1 | Status: SHIPPED | OUTPATIENT
Start: 2020-10-09 | End: 2021-01-04

## 2020-10-09 RX ORDER — ATENOLOL 25 MG/1
TABLET ORAL
Qty: 90 TABLET | Refills: 1 | Status: SHIPPED | OUTPATIENT
Start: 2020-10-09 | End: 2021-01-04

## 2020-10-15 NOTE — TELEPHONE ENCOUNTER
Jacinta, professor at the [facility], calling to check the status of the form.      Patient would like to participate in a study at the [facility] that includes exercise.  Normally, when doing a study like this, the U performs a stress test to evaluate the patient first.  However, due to COVID, they are not able to do this so instead they are asking the patient's PCP if patient would be safe to participate in the study.    Per Jacinta, a verbal order from the provider would be ok.  Please advise.  Francesca Arellano RN

## 2020-10-21 ENCOUNTER — TELEPHONE (OUTPATIENT)
Dept: INTERNAL MEDICINE | Facility: CLINIC | Age: 80
End: 2020-10-21

## 2020-10-21 NOTE — TELEPHONE ENCOUNTER
Violeta researcher though [facility] calling  for patient.   She is requesting PCP approval for a research study for PAD.    Violeta's number [phone]  Verbal approval is great but you can fax approval to   [phone]    Violeta's email if you have any questions  Or if its easier is [email or web address]

## 2020-10-22 NOTE — TELEPHONE ENCOUNTER
Patient states she asked to be part of this study and would like your OK to participate.  Violeta advised OK with Dr. Farrell.

## 2020-10-22 NOTE — TELEPHONE ENCOUNTER
Then call the patient and explained the patient that somebody at [facility] has access to her chart and they want her to be part of the study.    If she wants to be part of the study I am okay with it.    If she is upset that somebody has access to her chart, please forward this message back to me

## 2021-01-01 DIAGNOSIS — I10 HTN, GOAL BELOW 140/90: Chronic | ICD-10-CM

## 2021-01-02 DIAGNOSIS — I10 HTN, GOAL BELOW 140/90: Chronic | ICD-10-CM

## 2021-01-02 DIAGNOSIS — I67.5 MOYAMOYA DISEASE: Chronic | ICD-10-CM

## 2021-01-04 ENCOUNTER — OFFICE VISIT (OUTPATIENT)
Dept: INTERNAL MEDICINE | Facility: CLINIC | Age: 81
End: 2021-01-04
Payer: MEDICARE

## 2021-01-04 VITALS
HEART RATE: 68 BPM | WEIGHT: 221 LBS | DIASTOLIC BLOOD PRESSURE: 48 MMHG | OXYGEN SATURATION: 98 % | SYSTOLIC BLOOD PRESSURE: 139 MMHG | HEIGHT: 68 IN | RESPIRATION RATE: 18 BRPM | BODY MASS INDEX: 33.49 KG/M2

## 2021-01-04 DIAGNOSIS — E78.5 HYPERLIPIDEMIA LDL GOAL <130: Chronic | ICD-10-CM

## 2021-01-04 DIAGNOSIS — Z00.00 ROUTINE GENERAL MEDICAL EXAMINATION AT A HEALTH CARE FACILITY: Primary | ICD-10-CM

## 2021-01-04 DIAGNOSIS — Z12.31 ENCOUNTER FOR SCREENING MAMMOGRAM FOR BREAST CANCER: ICD-10-CM

## 2021-01-04 DIAGNOSIS — E55.9 VITAMIN D DEFICIENCY: ICD-10-CM

## 2021-01-04 DIAGNOSIS — I10 HTN, GOAL BELOW 140/90: Chronic | ICD-10-CM

## 2021-01-04 DIAGNOSIS — I67.5 MOYAMOYA DISEASE: Chronic | ICD-10-CM

## 2021-01-04 DIAGNOSIS — Z78.0 ASYMPTOMATIC POSTMENOPAUSAL STATUS: ICD-10-CM

## 2021-01-04 LAB
DEPRECATED CALCIDIOL+CALCIFEROL SERPL-MC: 31 UG/L (ref 20–75)
ERYTHROCYTE [DISTWIDTH] IN BLOOD BY AUTOMATED COUNT: 12.9 % (ref 10–15)
HCT VFR BLD AUTO: 35.5 % (ref 35–47)
HGB BLD-MCNC: 10.9 G/DL (ref 11.7–15.7)
MCH RBC QN AUTO: 30.3 PG (ref 26.5–33)
MCHC RBC AUTO-ENTMCNC: 30.7 G/DL (ref 31.5–36.5)
MCV RBC AUTO: 99 FL (ref 78–100)
PLATELET # BLD AUTO: 309 10E9/L (ref 150–450)
RBC # BLD AUTO: 3.6 10E12/L (ref 3.8–5.2)
WBC # BLD AUTO: 7 10E9/L (ref 4–11)

## 2021-01-04 PROCEDURE — 84443 ASSAY THYROID STIM HORMONE: CPT | Performed by: INTERNAL MEDICINE

## 2021-01-04 PROCEDURE — 82306 VITAMIN D 25 HYDROXY: CPT | Performed by: INTERNAL MEDICINE

## 2021-01-04 PROCEDURE — 80061 LIPID PANEL: CPT | Performed by: INTERNAL MEDICINE

## 2021-01-04 PROCEDURE — 85027 COMPLETE CBC AUTOMATED: CPT | Performed by: INTERNAL MEDICINE

## 2021-01-04 PROCEDURE — 82043 UR ALBUMIN QUANTITATIVE: CPT | Performed by: INTERNAL MEDICINE

## 2021-01-04 PROCEDURE — 80053 COMPREHEN METABOLIC PANEL: CPT | Performed by: INTERNAL MEDICINE

## 2021-01-04 PROCEDURE — 36415 COLL VENOUS BLD VENIPUNCTURE: CPT | Performed by: INTERNAL MEDICINE

## 2021-01-04 PROCEDURE — 99214 OFFICE O/P EST MOD 30 MIN: CPT | Mod: 25 | Performed by: INTERNAL MEDICINE

## 2021-01-04 PROCEDURE — G0439 PPPS, SUBSEQ VISIT: HCPCS | Performed by: INTERNAL MEDICINE

## 2021-01-04 RX ORDER — AMLODIPINE BESYLATE 2.5 MG/1
2.5 TABLET ORAL 2 TIMES DAILY
Qty: 180 TABLET | Refills: 4 | Status: SHIPPED | OUTPATIENT
Start: 2021-01-04 | End: 2022-01-31

## 2021-01-04 RX ORDER — CLOPIDOGREL BISULFATE 75 MG/1
75 TABLET ORAL DAILY
Qty: 90 TABLET | Refills: 4 | Status: SHIPPED | OUTPATIENT
Start: 2021-01-04 | End: 2022-01-31

## 2021-01-04 RX ORDER — ATENOLOL 25 MG/1
25 TABLET ORAL DAILY
Qty: 90 TABLET | Refills: 4 | Status: SHIPPED | OUTPATIENT
Start: 2021-01-04 | End: 2022-01-31

## 2021-01-04 RX ORDER — SIMVASTATIN 20 MG
TABLET ORAL
Qty: 90 TABLET | Refills: 4 | Status: SHIPPED | OUTPATIENT
Start: 2021-01-04 | End: 2022-01-31

## 2021-01-04 RX ORDER — HYDROCHLOROTHIAZIDE 25 MG/1
TABLET ORAL
Qty: 90 TABLET | Refills: 4 | Status: SHIPPED | OUTPATIENT
Start: 2021-01-04 | End: 2021-03-15

## 2021-01-04 RX ORDER — AMLODIPINE BESYLATE 5 MG/1
TABLET ORAL
Qty: 180 TABLET | Refills: 1 | OUTPATIENT
Start: 2021-01-04

## 2021-01-04 RX ORDER — CLOPIDOGREL BISULFATE 75 MG/1
TABLET ORAL
Qty: 90 TABLET | Refills: 4 | OUTPATIENT
Start: 2021-01-04

## 2021-01-04 RX ORDER — HYDROCHLOROTHIAZIDE 25 MG/1
TABLET ORAL
Qty: 90 TABLET | Refills: 1 | OUTPATIENT
Start: 2021-01-04

## 2021-01-04 RX ORDER — LOSARTAN POTASSIUM 50 MG/1
TABLET ORAL
Qty: 180 TABLET | Refills: 1 | OUTPATIENT
Start: 2021-01-04

## 2021-01-04 RX ORDER — LOSARTAN POTASSIUM 50 MG/1
TABLET ORAL
Qty: 180 TABLET | Refills: 4 | Status: SHIPPED | OUTPATIENT
Start: 2021-01-04 | End: 2022-01-31

## 2021-01-04 ASSESSMENT — ENCOUNTER SYMPTOMS
BREAST MASS: 0
NERVOUS/ANXIOUS: 0
CHILLS: 0
PARESTHESIAS: 0
ARTHRALGIAS: 1
HEARTBURN: 0
FEVER: 0
DIZZINESS: 1
MYALGIAS: 0
SHORTNESS OF BREATH: 0
HEMATURIA: 0
COUGH: 0
NAUSEA: 0
ABDOMINAL PAIN: 0
HEADACHES: 0
CONSTIPATION: 0
HEMATOCHEZIA: 0
JOINT SWELLING: 0
DYSURIA: 0
PALPITATIONS: 0
EYE PAIN: 0
FREQUENCY: 0
DIARRHEA: 0
SORE THROAT: 0
WEAKNESS: 0

## 2021-01-04 ASSESSMENT — ACTIVITIES OF DAILY LIVING (ADL): CURRENT_FUNCTION: NO ASSISTANCE NEEDED

## 2021-01-04 ASSESSMENT — MIFFLIN-ST. JEOR: SCORE: 1530.95

## 2021-01-04 NOTE — PATIENT INSTRUCTIONS
Plan:  1.  Labs today - [address]   2. Mammogram ( please call [phone] to schedule it)   3. Bone density scan - [phone]  4. Continue same meds, same doses for now   5. Follow up in 1 year or sooner if needed

## 2021-01-04 NOTE — PROGRESS NOTES
Dr Farrell's note    Patient's instructions / PLAN:                                                        Plan:  1.  Labs today - [address]   2. Mammogram ( please call [phone] to schedule it)   3. Bone density scan - [phone]  4. Continue same meds, same doses for now   5. Follow up in 1 year or sooner if needed      ASSESSMENT & PLAN:                                                        (Z00.00) Routine general medical examination at a health care facility  (primary encounter diagnosis)  Comment:   Plan: Comprehensive metabolic panel, CBC with         platelets, Lipid panel reflex to direct LDL         Fasting, Albumin Random Urine Quantitative with        Creat Ratio, TSH with free T4 reflex, Vitamin D        Deficiency             (E55.9) Vitamin D deficiency  Comment:   Plan: Vitamin D Deficiency            (I10) HTN, goal below 140/90  Comment: Controlled    Plan: amLODIPine (NORVASC) 2.5 MG tablet, atenolol         (TENORMIN) 25 MG tablet, hydrochlorothiazide         (HYDRODIURIL) 25 MG tablet, losartan (COZAAR)         50 MG tablet, simvastatin (ZOCOR) 20 MG tablet            (I67.5) Moyamoya disease  Comment: stable   Plan: clopidogrel (PLAVIX) 75 MG tablet        F/u w neuro    (E78.5) Hyperlipidemia LDL goal <130  Comment:   Hx: Patient tries low chol diet, takes meds regularly with no side effects.  Plan: Low-cholesterol diet, weight management, exercise, Monitor labs        Plan: Comprehensive metabolic panel, Lipid panel         reflex to direct LDL Fasting, TSH with free T4         reflex, simvastatin (ZOCOR) 20 MG tablet            (E55.9) Vitamin D deficiency  Comment:   Plan: Vitamin D Deficiency            (Z12.31) Encounter for screening mammogram for breast cancer  Comment:   Plan: MA Screening Digital Bilateral            (Z78.0) Asymptomatic postmenopausal status  Comment:   Plan: DX Hip/Pelvis/Spine                  Chief Complaint:                                                   "      Annual exam  Follow up chronic medical problems      SUBJECTIVE:                                                    History of present illness     We reviewed the chronic medical problems as above.   I reviewed the recent tests results in Epic       She feels well. She has been walking about 20 min a day     Fam Hx of Colon Can ( sister) -- colonoscopy report 2014 - reviewed. She wants to wait for 2024.     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 F/u dr. Soto     OA (osteoarthritis)     s/p bilat total hips      Other chronic pain     Joint pain for many years     Unspecified cerebral artery occlusion with cerebral infarction     After Moyamoya surgery > 10 yrs ago.     Vitamin D deficiencies        PSHx: reviewed  Past Surgical History:   Procedure Laterality Date     ARTHROPLASTY KNEE Left 4/17/2017    Procedure: ARTHROPLASTY KNEE;  Left total knee arthroplasty;  Surgeon: Chidi Jenkins MD;  Location:  OR     COLONOSCOPY  2008    normal     COLONOSCOPY  7/17/2014    Procedure: COLONOSCOPY;  Surgeon: Raul Nolan DO;  Location:  GI     CRANIOTOMY      MOJAMOJA  \"Pt had repair of blood flow.\"     RECONSTRUCT FOREFOOT WITH METATARSOPHALANGEAL (MTP) FUSION Left 8/21/2014    Procedure: RECONSTRUCT FOREFOOT WITH METATARSOPHALANGEAL (MTP) FUSION;  Surgeon: Tres Merlos DPM;  Location:  OR     [facility] " NONSPECIFIC PROCEDURE  10/30/06    neurosurgery Dr Soto     [facility] NONSPECIFIC PROCEDURE      bilateral total hips approx      [facility] NONSPECIFIC PROCEDURE  3/07    neurosurgery         Soc Hx: No daily alcohol, no smoking  Social History     Socioeconomic History     Marital status:      Spouse name: Olu      Number of children: 2     Years of education: Not on file     Highest education level: Not on file   Occupational History     Employer: RETIRED   Social Needs     Financial resource strain: Not on file     Food insecurity     Worry: Not on file     Inability: Not on file     Transportation needs     Medical: Not on file     Non-medical: Not on file   Tobacco Use     Smoking status: Never Smoker     Smokeless tobacco: Never Used   Substance and Sexual Activity     Alcohol use: Yes     Comment:  1-2 per day     Drug use: No     Sexual activity: Never     Partners: Male   Lifestyle     Physical activity     Days per week: Not on file     Minutes per session: Not on file     Stress: Not on file   Relationships     Social connections     Talks on phone: Not on file     Gets together: Not on file     Attends [employer] service: Not on file     Active member of club or organization: Not on file     Attends meetings of clubs or organizations: Not on file     Relationship status: Not on file     Intimate partner violence     Fear of current or ex partner: Not on file     Emotionally abused: Not on file     Physically abused: Not on file     Forced sexual activity: Not on file   Other Topics Concern     Parent/sibling w/ CABG, MI or angioplasty before 65F 55M? Not Asked   Social History Narrative     Not on file        Fam Hx: reviewed  Family History   Problem Relation Age of Onset     Obesity Sister         gastric by-pass age age 60, heart murmur.     Cancer - colorectal Sister 69     Heart Disease Father         mi,  age 48     Family History Negative Mother         killed in MVA age 54     Cancer  "Maternal Aunt         lung cancer ,non-smoker     Lung Cancer Maternal Aunt      Breast Cancer Daughter 48         Screening: reviewed      All: reviewed    Meds: reviewed  Current Outpatient Medications   Medication Sig Dispense Refill     amLODIPine (NORVASC) 2.5 MG tablet TAKE 1 TABLET BY MOUTH 2 TIMES DAILY 180 tablet 1     aspirin 81 MG tablet Take 1 tablet (81 mg) by mouth daily 30 tablet      atenolol (TENORMIN) 25 MG tablet TAKE 1 TABLET BY MOUTH EVERY DAY 90 tablet 1     clopidogrel (PLAVIX) 75 MG tablet Take 1 tablet (75 mg) by mouth daily 90 tablet 4     cyanocobalamin (VITAMIN B-12) 500 MCG tablet Take 500 mcg by mouth daily       hydrochlorothiazide (HYDRODIURIL) 25 MG tablet TAKE 1 TABLET (25 MG) BY MOUTH DAILY 90 tablet 1     losartan (COZAAR) 50 MG tablet TAKE 1 TABLET BY MOUTH TWICE A  tablet 1     magnesium 500 MG TABS        Multiple Vitamins-Minerals (MULTIVITAMIN & MINERAL PO) Take 1 tablet by mouth daily.       simvastatin (ZOCOR) 20 MG tablet TAKE 1 TABLET (20 MG) BY MOUTH AT BEDTIME 90 tablet 1       OBJECTIVE:                                                    Physical Exam :  Blood pressure 139/48, pulse 68, resp. rate 18, height 1.727 m (5' 8\"), weight 100.2 kg (221 lb), SpO2 98 %, not currently breastfeeding.   NAD, appears comfortable  Skin clear, no rashes  Neck: supple, no JVD,  no thyroidmegaly  Lymph nodes non palpable in the cervical, supraclavicular axillaries,   Chest: clear to auscultation with good respiratory effort  Cardiac: S1S2, RRR, no mgr appreciated  Abdomen: soft, not tender, not distended, audible bowel sound, no hepatosplenomegaly, no palpable masses, no abdominal bruits  Extremities: no cyanosis, clubbing or edema.   Neuro: A, Ox3, no focal signs.  Breast exam in supine and erect position: they are symmetrical, no skin changes, no tenderness or nodes on palpation. Nipples are erect, no skin lesions, no discharge on pressure.    Pelvic exam: deferred, s/p " "menopause, no symptoms, no hx of abnormal pap         Chani Farrell MD  Internal Medicine         SUBJECTIVE:   Candida Rose is a 80 year old female who presents for Preventive Visit.      Patient has been advised of split billing requirements and indicates understanding: Yes   Are you in the first 12 months of your Medicare coverage?  No    Healthy Habits:     Duration of exercise:  15-30 minutes    Do you usually eat at least 4 servings of fruit and vegetables a day, include whole grains    & fiber and avoid regularly eating high fat or \"junk\" foods?  Yes    Taking medications regularly:  Yes    Ability to successfully perform activities of daily living:  No assistance needed    Home Safety:  No safety concerns identified    Hearing Impairment:  No hearing concerns    In the past 6 months, have you been bothered by leaking of urine?  No    In general, how would you rate your overall mental or emotional health?  Excellent      PHQ-2 Total Score: 0    Additional concerns today:  No    Do you feel safe in your environment? Yes    Have you ever done Advance Care Planning? (For example, a Health Directive, POLST, or a discussion with a medical provider or your loved ones about your wishes): Yes, advance care planning is on file.      Fall risk       Fallen two or more times in the last year   No   Any fall with an injury in the last year  No    Cognitive Screening   1) Repeat 3 items (Leader, Season, Table)    2) Clock draw: NORMAL  3) 3 item recall: Recalls 3 objects  Results: 3 items recalled: COGNITIVE IMPAIRMENT LESS LIKELY    Mini-CogTM Copyright [employer]. Licensed by the author for use in [facility]; reprinted with permission (law@.[facility]). All rights reserved.      Do you have sleep apnea, excessive snoring or daytime drowsiness?: no    Reviewed and updated as needed this visit by clinical staff   Allergies  Meds              Reviewed and updated as needed this visit by Provider              "   Social History     Tobacco Use     Smoking status: Never Smoker     Smokeless tobacco: Never Used   Substance Use Topics     Alcohol use: Yes     Comment:  1-2 per day         Alcohol Use 1/4/2021   Prescreen: >3 drinks/day or >7 drinks/week? No   Prescreen: >3 drinks/day or >7 drinks/week? -           Hyperlipidemia Follow-Up      Are you regularly taking any medication or supplement to lower your cholesterol?   Yes- zocor    Are you having muscle aches or other side effects that you think could be caused by your cholesterol lowering medication?  No    Hypertension Follow-up      Do you check your blood pressure regularly outside of the clinic? Yes     Are you following a low salt diet? Yes    Are your blood pressures ever more than 140 on the top number (systolic) OR more   than 90 on the bottom number (diastolic), for example 140/90? No      Current providers sharing in care for this patient include:   Patient Care Team:  Chani Peoples MD as PCP - General (Internal Medicine)  Chani Peoples MD as Assigned PCP    The following health maintenance items are reviewed in Epic and correct as of today:  Health Maintenance   Topic Date Due     MICROALBUMIN  02/11/2020     MEDICARE ANNUAL WELLNESS VISIT  10/21/2020     FALL RISK ASSESSMENT  10/21/2020     LIPID  02/11/2024     COLORECTAL CANCER SCREENING  07/17/2024     ADVANCE CARE PLANNING  10/21/2024     DTAP/TDAP/TD IMMUNIZATION (3 - Td) 01/13/2027     DEXA  06/16/2031     HEPATITIS C SCREENING  Completed     PHQ-2  Completed     INFLUENZA VACCINE  Completed     Pneumococcal Vaccine: 65+ Years  Completed     ZOSTER IMMUNIZATION  Completed     Pneumococcal Vaccine: Pediatrics (0 to 5 Years) and At-Risk Patients (6 to 64 Years)  Aged Out     IPV IMMUNIZATION  Aged Out     MENINGITIS IMMUNIZATION  Aged Out     HEPATITIS B IMMUNIZATION  Aged Out     Labs reviewed in EPIC      Review of Systems   Constitutional: Negative for chills and  "fever.   HENT: Negative for congestion, ear pain, hearing loss and sore throat.    Eyes: Negative for pain and visual disturbance.   Respiratory: Negative for cough and shortness of breath.    Cardiovascular: Negative for chest pain, palpitations and peripheral edema.   Gastrointestinal: Negative for abdominal pain, constipation, diarrhea, heartburn, hematochezia and nausea.   Breasts:  Negative for tenderness, breast mass and discharge.   Genitourinary: Negative for dysuria, frequency, genital sores, hematuria, pelvic pain, urgency, vaginal bleeding and vaginal discharge.   Musculoskeletal: Positive for arthralgias. Negative for joint swelling and myalgias.   Skin: Negative for rash.   Neurological: Positive for dizziness. Negative for weakness, headaches and paresthesias.   Psychiatric/Behavioral: Negative for mood changes. The patient is not nervous/anxious.        Patient has been advised of split billing requirements and indicates understanding: Yes At the check in, at the    COUNSELING:  Reviewed preventive health counseling, as reflected in patient instructions       Regular exercise       Healthy diet/nutrition    Estimated body mass index is 34.56 kg/m  as calculated from the following:    Height as of 10/21/19: 1.734 m (5' 8.25\").    Weight as of 8/31/20: 103.9 kg (229 lb).    Weight management plan: Discussed healthy diet and exercise guidelines    She reports that she has never smoked. She has never used smokeless tobacco.      Appropriate preventive services were discussed with this patient, including applicable screening as appropriate for cardiovascular disease, diabetes, osteopenia/osteoporosis, and glaucoma.  As appropriate for age/gender, discussed screening for colorectal cancer, prostate cancer, breast cancer, and cervical cancer. Checklist reviewing preventive services available has been given to the patient.    Reviewed patients plan of care and provided an AVS. The Basic Care Plan " (routine screening as documented in Health Maintenance) for Candida meets the Care Plan requirement. This Care Plan has been established and reviewed with the Patient.    Counseling Resources:  ATP IV Guidelines  Pooled Cohorts Equation Calculator  Breast Cancer Risk Calculator  Breast Cancer: Medication to Reduce Risk  FRAX Risk Assessment  ICSI Preventive Guidelines  Dietary Guidelines for Americans, 2010  USDA's MyPlate  ASA Prophylaxis  Lung CA Screening    Chani Peoples MD  [facility]    Identified Health Risks:

## 2021-01-05 LAB
ALBUMIN SERPL-MCNC: 3.6 G/DL (ref 3.4–5)
ALP SERPL-CCNC: 103 U/L (ref 40–150)
ALT SERPL W P-5'-P-CCNC: 20 U/L (ref 0–50)
ANION GAP SERPL CALCULATED.3IONS-SCNC: 5 MMOL/L (ref 3–14)
AST SERPL W P-5'-P-CCNC: 19 U/L (ref 0–45)
BILIRUB SERPL-MCNC: 0.5 MG/DL (ref 0.2–1.3)
BUN SERPL-MCNC: 45 MG/DL (ref 7–30)
CALCIUM SERPL-MCNC: 9.1 MG/DL (ref 8.5–10.1)
CHLORIDE SERPL-SCNC: 106 MMOL/L (ref 94–109)
CHOLEST SERPL-MCNC: 196 MG/DL
CO2 SERPL-SCNC: 26 MMOL/L (ref 20–32)
CREAT SERPL-MCNC: 1.45 MG/DL (ref 0.52–1.04)
CREAT UR-MCNC: 71 MG/DL
GFR SERPL CREATININE-BSD FRML MDRD: 34 ML/MIN/{1.73_M2}
GLUCOSE SERPL-MCNC: 104 MG/DL (ref 70–99)
HDLC SERPL-MCNC: 56 MG/DL
LDLC SERPL CALC-MCNC: 106 MG/DL
MICROALBUMIN UR-MCNC: 10 MG/L
MICROALBUMIN/CREAT UR: 14.25 MG/G CR (ref 0–25)
NONHDLC SERPL-MCNC: 140 MG/DL
POTASSIUM SERPL-SCNC: 4.6 MMOL/L (ref 3.4–5.3)
PROT SERPL-MCNC: 7.1 G/DL (ref 6.8–8.8)
SODIUM SERPL-SCNC: 137 MMOL/L (ref 133–144)
TRIGL SERPL-MCNC: 171 MG/DL
TSH SERPL DL<=0.005 MIU/L-ACNC: 2.22 MU/L (ref 0.4–4)

## 2021-01-06 ENCOUNTER — MYC MEDICAL ADVICE (OUTPATIENT)
Dept: INTERNAL MEDICINE | Facility: CLINIC | Age: 81
End: 2021-01-06

## 2021-01-06 DIAGNOSIS — N18.30 STAGE 3 CHRONIC KIDNEY DISEASE, UNSPECIFIED WHETHER STAGE 3A OR 3B CKD (H): Primary | ICD-10-CM

## 2021-01-06 DIAGNOSIS — Z12.11 SPECIAL SCREENING FOR MALIGNANT NEOPLASMS, COLON: ICD-10-CM

## 2021-01-06 DIAGNOSIS — D64.9 ANEMIA, UNSPECIFIED TYPE: ICD-10-CM

## 2021-01-21 ENCOUNTER — HOSPITAL ENCOUNTER (OUTPATIENT)
Dept: MAMMOGRAPHY | Facility: CLINIC | Age: 81
Discharge: HOME | End: 2021-01-21
Attending: INTERNAL MEDICINE | Admitting: INTERNAL MEDICINE
Payer: MEDICARE

## 2021-01-21 DIAGNOSIS — Z12.31 ENCOUNTER FOR SCREENING MAMMOGRAM FOR BREAST CANCER: ICD-10-CM

## 2021-01-21 PROCEDURE — 77067 SCR MAMMO BI INCL CAD: CPT

## 2021-01-25 ENCOUNTER — ANCILLARY PROCEDURE (OUTPATIENT)
Dept: BONE DENSITY | Facility: CLINIC | Age: 81
End: 2021-01-25
Payer: MEDICARE

## 2021-01-25 DIAGNOSIS — Z78.0 ASYMPTOMATIC MENOPAUSAL STATE: ICD-10-CM

## 2021-01-25 DIAGNOSIS — Z78.0 ASYMPTOMATIC POSTMENOPAUSAL STATUS: ICD-10-CM

## 2021-01-25 PROCEDURE — 77080 DXA BONE DENSITY AXIAL: CPT | Performed by: INTERNAL MEDICINE

## 2021-01-25 PROCEDURE — 77081 DXA BONE DENSITY APPENDICULR: CPT | Mod: 59 | Performed by: INTERNAL MEDICINE

## 2021-02-17 DIAGNOSIS — Z12.11 SPECIAL SCREENING FOR MALIGNANT NEOPLASMS, COLON: ICD-10-CM

## 2021-02-17 DIAGNOSIS — D64.9 ANEMIA, UNSPECIFIED TYPE: ICD-10-CM

## 2021-02-17 DIAGNOSIS — N18.30 STAGE 3 CHRONIC KIDNEY DISEASE, UNSPECIFIED WHETHER STAGE 3A OR 3B CKD (H): ICD-10-CM

## 2021-02-17 LAB
ALBUMIN UR-MCNC: NEGATIVE MG/DL
ANION GAP SERPL CALCULATED.3IONS-SCNC: 4 MMOL/L (ref 3–14)
APPEARANCE UR: CLEAR
BACTERIA #/AREA URNS HPF: ABNORMAL /HPF
BASOPHILS # BLD AUTO: 0 10E9/L (ref 0–0.2)
BASOPHILS NFR BLD AUTO: 0.5 %
BILIRUB UR QL STRIP: NEGATIVE
BUN SERPL-MCNC: 45 MG/DL (ref 7–30)
CALCIUM SERPL-MCNC: 9.5 MG/DL (ref 8.5–10.1)
CHLORIDE SERPL-SCNC: 100 MMOL/L (ref 94–109)
CO2 SERPL-SCNC: 30 MMOL/L (ref 20–32)
COLOR UR AUTO: YELLOW
CREAT SERPL-MCNC: 1.52 MG/DL (ref 0.52–1.04)
DIFFERENTIAL METHOD BLD: ABNORMAL
EOSINOPHIL # BLD AUTO: 0.2 10E9/L (ref 0–0.7)
EOSINOPHIL NFR BLD AUTO: 2 %
ERYTHROCYTE [DISTWIDTH] IN BLOOD BY AUTOMATED COUNT: 12.3 % (ref 10–15)
FERRITIN SERPL-MCNC: 73 NG/ML (ref 8–252)
FOLATE SERPL-MCNC: 53.5 NG/ML
GFR SERPL CREATININE-BSD FRML MDRD: 32 ML/MIN/{1.73_M2}
GLUCOSE SERPL-MCNC: 102 MG/DL (ref 70–99)
GLUCOSE UR STRIP-MCNC: NEGATIVE MG/DL
HCT VFR BLD AUTO: 34.6 % (ref 35–47)
HGB BLD-MCNC: 11 G/DL (ref 11.7–15.7)
HGB UR QL STRIP: NEGATIVE
IRON SATN MFR SERPL: 27 % (ref 15–46)
IRON SERPL-MCNC: 98 UG/DL (ref 35–180)
KETONES UR STRIP-MCNC: NEGATIVE MG/DL
LEUKOCYTE ESTERASE UR QL STRIP: ABNORMAL
LYMPHOCYTES # BLD AUTO: 3.2 10E9/L (ref 0.8–5.3)
LYMPHOCYTES NFR BLD AUTO: 38.7 %
MCH RBC QN AUTO: 30.3 PG (ref 26.5–33)
MCHC RBC AUTO-ENTMCNC: 31.8 G/DL (ref 31.5–36.5)
MCV RBC AUTO: 95 FL (ref 78–100)
MONOCYTES # BLD AUTO: 0.7 10E9/L (ref 0–1.3)
MONOCYTES NFR BLD AUTO: 8.9 %
NEUTROPHILS # BLD AUTO: 4.1 10E9/L (ref 1.6–8.3)
NEUTROPHILS NFR BLD AUTO: 49.9 %
NITRATE UR QL: NEGATIVE
PH UR STRIP: 6.5 PH (ref 5–7)
PLATELET # BLD AUTO: 334 10E9/L (ref 150–450)
POTASSIUM SERPL-SCNC: 3.5 MMOL/L (ref 3.4–5.3)
RBC # BLD AUTO: 3.63 10E12/L (ref 3.8–5.2)
RBC #/AREA URNS AUTO: ABNORMAL /HPF
RETICS # AUTO: 47.2 10E9/L (ref 25–95)
RETICS/RBC NFR AUTO: 1.3 % (ref 0.5–2)
SODIUM SERPL-SCNC: 134 MMOL/L (ref 133–144)
SOURCE: ABNORMAL
SP GR UR STRIP: 1.02 (ref 1–1.03)
TIBC SERPL-MCNC: 369 UG/DL (ref 240–430)
UROBILINOGEN UR STRIP-ACNC: 0.2 EU/DL (ref 0.2–1)
VIT B12 SERPL-MCNC: 1436 PG/ML (ref 193–986)
WBC # BLD AUTO: 8.2 10E9/L (ref 4–11)
WBC #/AREA URNS AUTO: ABNORMAL /HPF

## 2021-02-17 PROCEDURE — 82746 ASSAY OF FOLIC ACID SERUM: CPT | Performed by: INTERNAL MEDICINE

## 2021-02-17 PROCEDURE — 85045 AUTOMATED RETICULOCYTE COUNT: CPT | Performed by: INTERNAL MEDICINE

## 2021-02-17 PROCEDURE — 83540 ASSAY OF IRON: CPT | Performed by: INTERNAL MEDICINE

## 2021-02-17 PROCEDURE — 80048 BASIC METABOLIC PNL TOTAL CA: CPT | Performed by: INTERNAL MEDICINE

## 2021-02-17 PROCEDURE — 82728 ASSAY OF FERRITIN: CPT | Performed by: INTERNAL MEDICINE

## 2021-02-17 PROCEDURE — 36415 COLL VENOUS BLD VENIPUNCTURE: CPT | Performed by: INTERNAL MEDICINE

## 2021-02-17 PROCEDURE — 82607 VITAMIN B-12: CPT | Performed by: INTERNAL MEDICINE

## 2021-02-17 PROCEDURE — 85025 COMPLETE CBC W/AUTO DIFF WBC: CPT | Performed by: INTERNAL MEDICINE

## 2021-02-17 PROCEDURE — 81001 URINALYSIS AUTO W/SCOPE: CPT | Performed by: INTERNAL MEDICINE

## 2021-02-17 PROCEDURE — 83550 IRON BINDING TEST: CPT | Performed by: INTERNAL MEDICINE

## 2021-02-22 ENCOUNTER — OFFICE VISIT (OUTPATIENT)
Dept: INTERNAL MEDICINE | Facility: CLINIC | Age: 81
End: 2021-02-22
Payer: MEDICARE

## 2021-02-22 VITALS
HEIGHT: 68 IN | HEART RATE: 80 BPM | RESPIRATION RATE: 19 BRPM | BODY MASS INDEX: 32.92 KG/M2 | OXYGEN SATURATION: 98 % | WEIGHT: 217.2 LBS | DIASTOLIC BLOOD PRESSURE: 64 MMHG | SYSTOLIC BLOOD PRESSURE: 138 MMHG

## 2021-02-22 DIAGNOSIS — I10 HTN, GOAL BELOW 140/90: Primary | Chronic | ICD-10-CM

## 2021-02-22 DIAGNOSIS — Z12.11 SPECIAL SCREENING FOR MALIGNANT NEOPLASMS, COLON: ICD-10-CM

## 2021-02-22 DIAGNOSIS — N18.32 STAGE 3B CHRONIC KIDNEY DISEASE (H): ICD-10-CM

## 2021-02-22 DIAGNOSIS — N18.30 STAGE 3 CHRONIC KIDNEY DISEASE, UNSPECIFIED WHETHER STAGE 3A OR 3B CKD (H): ICD-10-CM

## 2021-02-22 DIAGNOSIS — D64.9 ANEMIA, UNSPECIFIED TYPE: ICD-10-CM

## 2021-02-22 PROCEDURE — 99214 OFFICE O/P EST MOD 30 MIN: CPT | Performed by: INTERNAL MEDICINE

## 2021-02-22 PROCEDURE — 82274 ASSAY TEST FOR BLOOD FECAL: CPT | Performed by: INTERNAL MEDICINE

## 2021-02-22 RX ORDER — TERAZOSIN 2 MG/1
2 CAPSULE ORAL AT BEDTIME
Qty: 30 CAPSULE | Refills: 1 | Status: SHIPPED | OUTPATIENT
Start: 2021-02-22 | End: 2021-08-31

## 2021-02-22 ASSESSMENT — MIFFLIN-ST. JEOR: SCORE: 1513.58

## 2021-02-22 NOTE — PROGRESS NOTES
Patient's instructions / PLAN:                                                        Plan:  1. Hold the HCTZ  2. Kidney ultrasound -- To schedule this test you may call Scheduling center at [phone]    3. Continue to monitor the blood pressure at home  4. Continue the other meds, same doses for now.  5. If the blood pressure is constantly above 140, start Terazosin 2 mg daily at bed time  6. Please make a lab appointment for non fasting labs in 2-3 weeks ( after the ultrasound)   7. Make sure you drink plenty of water the day of the blood test.    8. Please make an appointment few days after the labs to discuss about the results. 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    The creatinine increased - no etiol yet: we will do US and hold the hydrochlorothiazide for now.   Can't increase BB because HR in the 50s. We can consider later to hold the losartan and see if it makes a difference.     (I10) HTN, goal below 140/90  (primary encounter diagnosis)  Comment:   Plan: US Renal Complete w Doppler Complete, terazosin        (HYTRIN) 2 MG capsule            (N18.32) Stage 3b chronic kidney disease  Comment:   Plan: US Renal Complete w Doppler Complete               Chief Complaint:                                                      HTN, CKD     SUBJECTIVE:                                                    History of present illness     MedProb: Moyamoya disease ( stable ) HTN, HLip, CKD3, osteopenia  in Feb 2021 the creatinine increased 1.52 <--1.45( Jan 2021)<-- 1.06 ( April 2019)  Hgb mildly low at 11 with normal B12 and iron   Meds: Atenolol, 25, amlodipine 2.5 bid, HCTZ 25, Losartan 25 bid   no kidney US - she will schedule it  HR: 52-72 -- HR 55 at home, --> can't increase the BB  Hold the hctz for now and f/u   Osteopenia -- need meds, but we will need to wait until we find the  "etiol for the increased in Creatinine.     ROS:                                                      ROS: negative for fever, chills, cough, wheezes, chest pain, shortness of breath, vomiting, abdominal pain, leg swelling       OBJECTIVE:                                                    Physical Exam :   Blood pressure 138/64, pulse 80, resp. rate 19, height 1.727 m (5' 7.99\"), weight 98.5 kg (217 lb 3.2 oz), SpO2 98 %, not currently breastfeeding.     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 F/u dr. Soto     OA (osteoarthritis)     s/p bilat total hips      Other chronic pain     Joint pain for many years     Unspecified cerebral artery occlusion with cerebral infarction     After Moyamoya surgery > 10 yrs ago.     Vitamin D deficiencies       PSHx: reviewed  Past Surgical History:   Procedure Laterality Date     ARTHROPLASTY KNEE Left 4/17/2017    Procedure: ARTHROPLASTY KNEE;  Left total knee arthroplasty;  Surgeon: Chidi Jenkins MD;  Location:  OR     COLONOSCOPY  2008    normal     COLONOSCOPY  7/17/2014    Procedure: COLONOSCOPY;  Surgeon: Raul Nolan DO;  Location:  GI     CRANIOTOMY      MOJAMOJA  \"Pt had repair of blood flow.\"     RECONSTRUCT FOREFOOT WITH METATARSOPHALANGEAL (MTP) FUSION " Left 8/21/2014    Procedure: RECONSTRUCT FOREFOOT WITH METATARSOPHALANGEAL (MTP) FUSION;  Surgeon: Tres Merlos DPM;  Location:  OR     [facility] NONSPECIFIC PROCEDURE  10/30/06    neurosurgery Dr Soto     [facility] NONSPECIFIC PROCEDURE      bilateral total hips approx 2002     [facility] NONSPECIFIC PROCEDURE  3/07    neurosurgery         Meds: reviewed  Current Outpatient Medications   Medication Sig Dispense Refill     amLODIPine (NORVASC) 2.5 MG tablet Take 1 tablet (2.5 mg) by mouth 2 times daily 180 tablet 4     aspirin 81 MG tablet Take 1 tablet (81 mg) by mouth daily 30 tablet      atenolol (TENORMIN) 25 MG tablet Take 1 tablet (25 mg) by mouth daily 90 tablet 4     clopidogrel (PLAVIX) 75 MG tablet Take 1 tablet (75 mg) by mouth daily 90 tablet 4     cyanocobalamin (VITAMIN B-12) 500 MCG tablet Take 500 mcg by mouth daily       hydrochlorothiazide (HYDRODIURIL) 25 MG tablet TAKE 1 TABLET (25 MG) BY MOUTH DAILY 90 tablet 4     losartan (COZAAR) 50 MG tablet TAKE 1 TABLET BY MOUTH TWICE A  tablet 4     magnesium 500 MG TABS        Multiple Vitamins-Minerals (MULTIVITAMIN & MINERAL PO) Take 1 tablet by mouth daily.       simvastatin (ZOCOR) 20 MG tablet TAKE 1 TABLET (20 MG) BY MOUTH AT BEDTIME 90 tablet 4       Soc Hx: reviewed  Fam Hx: reviewed          Chani Farrell MD  Internal Medicine

## 2021-02-22 NOTE — PATIENT INSTRUCTIONS
Plan:  1. Hold the HCTZ  2. Kidney ultrasound -- To schedule this test you may call Scheduling center at [phone]    3. Continue to monitor the blood pressure at home  4. Continue the other meds, same doses for now.  5. If the blood pressure is constantly above 140, start Terazosin 2 mg daily at bed time  6. Please make a lab appointment for non fasting labs in 2-3 weeks   7. Make sure you drink plenty of water the day of the blood test.    8. Please make an appointment few days after the labs to discuss about the results ( after the ultrasound)

## 2021-02-23 LAB — HEMOCCULT STL QL IA: NEGATIVE

## 2021-02-25 ENCOUNTER — HOSPITAL ENCOUNTER (OUTPATIENT)
Dept: ULTRASOUND IMAGING | Facility: CLINIC | Age: 81
Discharge: HOME | End: 2021-02-25
Attending: INTERNAL MEDICINE | Admitting: INTERNAL MEDICINE
Payer: MEDICARE

## 2021-02-25 DIAGNOSIS — N18.32 STAGE 3B CHRONIC KIDNEY DISEASE (H): ICD-10-CM

## 2021-02-25 DIAGNOSIS — I10 HTN, GOAL BELOW 140/90: Chronic | ICD-10-CM

## 2021-02-25 PROCEDURE — 93975 VASCULAR STUDY: CPT

## 2021-03-02 ENCOUNTER — DOCUMENTATION ONLY (OUTPATIENT)
Dept: INTERNAL MEDICINE | Facility: CLINIC | Age: 81
End: 2021-03-02

## 2021-03-02 DIAGNOSIS — N18.32 STAGE 3B CHRONIC KIDNEY DISEASE (H): Primary | ICD-10-CM

## 2021-03-11 DIAGNOSIS — N18.32 STAGE 3B CHRONIC KIDNEY DISEASE (H): ICD-10-CM

## 2021-03-11 PROCEDURE — 36415 COLL VENOUS BLD VENIPUNCTURE: CPT | Performed by: INTERNAL MEDICINE

## 2021-03-11 PROCEDURE — 80048 BASIC METABOLIC PNL TOTAL CA: CPT | Performed by: INTERNAL MEDICINE

## 2021-03-12 LAB
ANION GAP SERPL CALCULATED.3IONS-SCNC: 1 MMOL/L (ref 3–14)
BUN SERPL-MCNC: 36 MG/DL (ref 7–30)
CALCIUM SERPL-MCNC: 8.5 MG/DL (ref 8.5–10.1)
CHLORIDE SERPL-SCNC: 104 MMOL/L (ref 94–109)
CO2 SERPL-SCNC: 28 MMOL/L (ref 20–32)
CREAT SERPL-MCNC: 1.26 MG/DL (ref 0.52–1.04)
GFR SERPL CREATININE-BSD FRML MDRD: 41 ML/MIN/{1.73_M2}
GLUCOSE SERPL-MCNC: 101 MG/DL (ref 70–99)
POTASSIUM SERPL-SCNC: 4.4 MMOL/L (ref 3.4–5.3)
SODIUM SERPL-SCNC: 133 MMOL/L (ref 133–144)

## 2021-03-15 ENCOUNTER — OFFICE VISIT (OUTPATIENT)
Dept: INTERNAL MEDICINE | Facility: CLINIC | Age: 81
End: 2021-03-15
Payer: MEDICARE

## 2021-03-15 VITALS
BODY MASS INDEX: 32.48 KG/M2 | DIASTOLIC BLOOD PRESSURE: 80 MMHG | OXYGEN SATURATION: 100 % | RESPIRATION RATE: 14 BRPM | WEIGHT: 219.3 LBS | SYSTOLIC BLOOD PRESSURE: 132 MMHG | TEMPERATURE: 98 F | HEART RATE: 67 BPM | HEIGHT: 69 IN

## 2021-03-15 DIAGNOSIS — N18.30 STAGE 3 CHRONIC KIDNEY DISEASE, UNSPECIFIED WHETHER STAGE 3A OR 3B CKD (H): Primary | ICD-10-CM

## 2021-03-15 DIAGNOSIS — I10 HTN, GOAL BELOW 140/90: Chronic | ICD-10-CM

## 2021-03-15 PROCEDURE — 99213 OFFICE O/P EST LOW 20 MIN: CPT | Performed by: INTERNAL MEDICINE

## 2021-03-15 RX ORDER — HYDROCHLOROTHIAZIDE 25 MG/1
25 TABLET ORAL DAILY PRN
Qty: 90 TABLET | Refills: 0
Start: 2021-03-15 | End: 2021-06-14

## 2021-03-15 ASSESSMENT — MIFFLIN-ST. JEOR: SCORE: 1531.18

## 2021-03-15 NOTE — PATIENT INSTRUCTIONS
Plan:  1. You will take hydrochlorothiazide for few days  only if you notice swollen legs   2. Continue the other meds, same doses for now.  3. Please make a lab appointment for non fasting labs in 6 months  4. Please make an appointment few days after the labs to discuss about the results.

## 2021-03-15 NOTE — PROGRESS NOTES
Patient's instructions / PLAN:                                                        Plan:  1. You will take hydrochlorothiazide for few days  only if you notice swollen legs   2. Continue the other meds, same doses for now.  3. Please make a lab appointment for non fasting labs in 6 months  4. Please make an appointment few days after the labs to discuss about the results.         ASSESSMENT & PLAN:                                                      (N18.30) Stage 3 chronic kidney disease, unspecified whether stage 3a or 3b CKD  (primary encounter diagnosis)  Comment: back to baseline  Plan: NEPHROLOGY ADULT REFERRAL, Basic metabolic         panel            (I10) HTN, goal below 140/90  Comment: Controlled    Plan: hydrochlorothiazide (HYDRODIURIL) 25 MG tablet,        NEPHROLOGY ADULT REFERRAL, Basic metabolic         panel               Chief Complaint:                                                      Follow up chronic medical problems        SUBJECTIVE:                                                    History of present illness     HTN/CKD  -- BP at home < 140/90 she hasn't started the terazosin  -- kidney US: simple cyst but no other abnormalities  -- creat back to baseline  -- exam: no edema today       LOV: Plan:  1. Hold the HCTZ  2. Kidney ultrasound -- To schedule this test you may call Scheduling center at [phone]    3. Continue to monitor the blood pressure at home  4. Continue the other meds, same doses for now.  5. If the blood pressure is constantly above 140, start Terazosin 2 mg daily at bed time  6. Please make a lab appointment for non fasting labs in 2-3 weeks ( after the ultrasound)   7. Make sure you drink plenty of water the day of the blood test.    8. Please make an appointment few days after the labs to discuss about the results. - video or clinic       ROS:                                                      ROS: negative for fever, chills, cough, wheezes, chest pain,  "shortness of breath, vomiting, abdominal pain, leg swelling    OBJECTIVE:                                                    Physical Exam :    Blood pressure 132/80, pulse 67, temperature 98  F (36.7  C), temperature source Oral, resp. rate 14, height 1.74 m (5' 8.5\"), weight 99.5 kg (219 lb 4.8 oz), SpO2 100 %, not currently breastfeeding.   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 F/u dr. Soto     OA (osteoarthritis)     s/p bilat total hips      Other chronic pain     Joint pain for many years     Unspecified cerebral artery occlusion with cerebral infarction     After Moyamoya surgery > 10 yrs ago.     Vitamin D deficiencies       PSHx: reviewed  Past Surgical History:   Procedure Laterality Date     ARTHROPLASTY KNEE Left 4/17/2017    Procedure: ARTHROPLASTY KNEE;  Left total knee arthroplasty;  Surgeon: Chidi Jenkins MD;  Location:  OR     COLONOSCOPY  2008    normal     COLONOSCOPY  7/17/2014    Procedure: COLONOSCOPY;  Surgeon: Raul Nolan DO;  Location:  GI     CRANIOTOMY      MOJAMOJA  \"Pt had repair of blood flow.\"     RECONSTRUCT FOREFOOT WITH METATARSOPHALANGEAL (MTP) FUSION Left 8/21/2014    Procedure: RECONSTRUCT FOREFOOT WITH METATARSOPHALANGEAL (MTP) FUSION;  Surgeon: Tres Merlos DPM;  Location:  OR     [facility] NONSPECIFIC PROCEDURE  10/30/06    neurosurgery Dr Soto     [facility] NONSPECIFIC PROCEDURE      bilateral total hips approx 2002     [facility] NONSPECIFIC PROCEDURE  3/07    " neurosurgery         Meds: reviewed  Current Outpatient Medications   Medication Sig Dispense Refill     amLODIPine (NORVASC) 2.5 MG tablet Take 1 tablet (2.5 mg) by mouth 2 times daily 180 tablet 4     aspirin 81 MG tablet Take 1 tablet (81 mg) by mouth daily 30 tablet      atenolol (TENORMIN) 25 MG tablet Take 1 tablet (25 mg) by mouth daily 90 tablet 4     clopidogrel (PLAVIX) 75 MG tablet Take 1 tablet (75 mg) by mouth daily 90 tablet 4     cyanocobalamin (VITAMIN B-12) 500 MCG tablet Take 500 mcg by mouth daily       hydrochlorothiazide (HYDRODIURIL) 25 MG tablet TAKE 1 TABLET (25 MG) BY MOUTH DAILY 90 tablet 4     losartan (COZAAR) 50 MG tablet TAKE 1 TABLET BY MOUTH TWICE A  tablet 4     magnesium 500 MG TABS        Multiple Vitamins-Minerals (MULTIVITAMIN & MINERAL PO) Take 1 tablet by mouth daily.       simvastatin (ZOCOR) 20 MG tablet TAKE 1 TABLET (20 MG) BY MOUTH AT BEDTIME 90 tablet 4     terazosin (HYTRIN) 2 MG capsule Take 1 capsule (2 mg) by mouth At Bedtime 30 capsule 1       Soc Hx: reviewed  Fam Hx: reviewed          Chani Farrell MD  Internal Medicine

## 2021-03-15 NOTE — NURSING NOTE
"/80   Pulse 67   Temp 98  F (36.7  C) (Oral)   Resp 14   Ht 1.74 m (5' 8.5\")   Wt 99.5 kg (219 lb 4.8 oz)   LMP  (LMP Unknown)   SpO2 100%   BMI 32.86 kg/m    Patient in for follow up on labs.  Silvia Roberto CMA    "

## 2021-03-20 DIAGNOSIS — I10 HTN, GOAL BELOW 140/90: Chronic | ICD-10-CM

## 2021-03-22 RX ORDER — TERAZOSIN 2 MG/1
CAPSULE ORAL
Qty: 30 CAPSULE | Refills: 1 | OUTPATIENT
Start: 2021-03-22

## 2021-05-10 DIAGNOSIS — D63.8 ANEMIA IN OTHER CHRONIC DISEASES CLASSIFIED ELSEWHERE: ICD-10-CM

## 2021-05-10 DIAGNOSIS — N18.30 STAGE 3 CHRONIC KIDNEY DISEASE, UNSPECIFIED WHETHER STAGE 3A OR 3B CKD (H): Primary | Chronic | ICD-10-CM

## 2021-05-15 NOTE — TELEPHONE ENCOUNTER
RECORDS RECEIVED FROM: Internal   DATE RECEIVED: 05.17.2021   NOTES STATUS DETAILS   OFFICE NOTE from referring provider Internal 03.15.2021 Chani Peoples MD   OFFICE NOTE from other specialist  N/A    *Only VASCULITIS or LUPUS gather office notes for the following N/A    *PULMONARY   N/A    *ENT N/A    *DERMATOLOGY N/A    *RHEUMATOLOGY N/A    DISCHARGE SUMMARY from hospital N/A    DISCHARGE REPORT from the ER N/A    MEDICATION LIST Internal    IMAGING  (NEED IMAGES AND REPORTS)     KIDNEY CT SCAN N/A    KIDNEY ULTRASOUND Internal 02.25.2021 US RENAL COMPLETE WITH DOPPLER COMPLETE     MR ABDOMEN N/A    NUCLEAR MEDICINE RENAL N/A    LABS     CBC Internal 02.17.2021   CMP Internal 02.17.2021   BMP Internal 03.11.2021   UA Future 05.10.2021   URINE PROTEIN Future 05.10.2021   RENAL PANEL Future 05.10.2021   BIOPSY     KIDNEY BIOPSY  N/A

## 2021-05-17 ENCOUNTER — OFFICE VISIT (OUTPATIENT)
Dept: NEPHROLOGY | Facility: CLINIC | Age: 81
End: 2021-05-17
Attending: INTERNAL MEDICINE
Payer: MEDICARE

## 2021-05-17 ENCOUNTER — PRE VISIT (OUTPATIENT)
Dept: NEPHROLOGY | Facility: CLINIC | Age: 81
End: 2021-05-17

## 2021-05-17 VITALS
OXYGEN SATURATION: 99 % | HEART RATE: 63 BPM | WEIGHT: 214.4 LBS | TEMPERATURE: 97.6 F | HEIGHT: 69 IN | BODY MASS INDEX: 31.76 KG/M2 | DIASTOLIC BLOOD PRESSURE: 72 MMHG | SYSTOLIC BLOOD PRESSURE: 155 MMHG

## 2021-05-17 DIAGNOSIS — N18.30 STAGE 3 CHRONIC KIDNEY DISEASE, UNSPECIFIED WHETHER STAGE 3A OR 3B CKD (H): Chronic | ICD-10-CM

## 2021-05-17 DIAGNOSIS — D63.8 ANEMIA IN OTHER CHRONIC DISEASES CLASSIFIED ELSEWHERE: ICD-10-CM

## 2021-05-17 DIAGNOSIS — N18.31 CHRONIC KIDNEY DISEASE, STAGE 3A (H): Primary | ICD-10-CM

## 2021-05-17 LAB
ALBUMIN SERPL-MCNC: 3.5 G/DL (ref 3.4–5)
ALBUMIN UR-MCNC: NEGATIVE MG/DL
ANION GAP SERPL CALCULATED.3IONS-SCNC: 7 MMOL/L (ref 3–14)
APPEARANCE UR: CLEAR
BILIRUB UR QL STRIP: NEGATIVE
BUN SERPL-MCNC: 31 MG/DL (ref 7–30)
CALCIUM SERPL-MCNC: 9 MG/DL (ref 8.5–10.1)
CHLORIDE SERPL-SCNC: 102 MMOL/L (ref 94–109)
CO2 SERPL-SCNC: 27 MMOL/L (ref 20–32)
COLOR UR AUTO: YELLOW
CREAT SERPL-MCNC: 1.23 MG/DL (ref 0.52–1.04)
CREAT UR-MCNC: 117 MG/DL
ERYTHROCYTE [DISTWIDTH] IN BLOOD BY AUTOMATED COUNT: 12.4 % (ref 10–15)
FERRITIN SERPL-MCNC: 64 NG/ML (ref 8–252)
GFR SERPL CREATININE-BSD FRML MDRD: 42 ML/MIN/{1.73_M2}
GLUCOSE SERPL-MCNC: 105 MG/DL (ref 70–99)
GLUCOSE UR STRIP-MCNC: NEGATIVE MG/DL
HCT VFR BLD AUTO: 35.1 % (ref 35–47)
HGB BLD-MCNC: 11.3 G/DL (ref 11.7–15.7)
HGB UR QL STRIP: NEGATIVE
IRON SATN MFR SERPL: 20 % (ref 15–46)
IRON SERPL-MCNC: 70 UG/DL (ref 35–180)
KETONES UR STRIP-MCNC: NEGATIVE MG/DL
LEUKOCYTE ESTERASE UR QL STRIP: ABNORMAL
MCH RBC QN AUTO: 30 PG (ref 26.5–33)
MCHC RBC AUTO-ENTMCNC: 32.2 G/DL (ref 31.5–36.5)
MCV RBC AUTO: 93 FL (ref 78–100)
MICROALBUMIN UR-MCNC: 24 MG/L
MICROALBUMIN/CREAT UR: 20.51 MG/G CR (ref 0–25)
MUCOUS THREADS #/AREA URNS LPF: PRESENT /LPF
NITRATE UR QL: NEGATIVE
PH UR STRIP: 6 PH (ref 5–7)
PHOSPHATE SERPL-MCNC: 3.8 MG/DL (ref 2.5–4.5)
PLATELET # BLD AUTO: 290 10E9/L (ref 150–450)
POTASSIUM SERPL-SCNC: 4.1 MMOL/L (ref 3.4–5.3)
PROT UR-MCNC: 0.19 G/L
PROT/CREAT 24H UR: 0.16 G/G CR (ref 0–0.2)
PTH-INTACT SERPL-MCNC: 35 PG/ML (ref 18–80)
RBC # BLD AUTO: 3.77 10E12/L (ref 3.8–5.2)
RBC #/AREA URNS AUTO: 5 /HPF (ref 0–2)
SODIUM SERPL-SCNC: 136 MMOL/L (ref 133–144)
SOURCE: ABNORMAL
SP GR UR STRIP: 1.02 (ref 1–1.03)
SQUAMOUS #/AREA URNS AUTO: 1 /HPF (ref 0–1)
TIBC SERPL-MCNC: 349 UG/DL (ref 240–430)
UROBILINOGEN UR STRIP-MCNC: 0 MG/DL (ref 0–2)
WBC # BLD AUTO: 6.3 10E9/L (ref 4–11)
WBC #/AREA URNS AUTO: 26 /HPF (ref 0–5)

## 2021-05-17 PROCEDURE — 81001 URINALYSIS AUTO W/SCOPE: CPT | Performed by: PATHOLOGY

## 2021-05-17 PROCEDURE — 82728 ASSAY OF FERRITIN: CPT | Performed by: PATHOLOGY

## 2021-05-17 PROCEDURE — 84156 ASSAY OF PROTEIN URINE: CPT | Performed by: PATHOLOGY

## 2021-05-17 PROCEDURE — G0463 HOSPITAL OUTPT CLINIC VISIT: HCPCS

## 2021-05-17 PROCEDURE — 83540 ASSAY OF IRON: CPT | Performed by: PATHOLOGY

## 2021-05-17 PROCEDURE — 83970 ASSAY OF PARATHORMONE: CPT | Performed by: INTERNAL MEDICINE

## 2021-05-17 PROCEDURE — 36415 COLL VENOUS BLD VENIPUNCTURE: CPT | Performed by: PATHOLOGY

## 2021-05-17 PROCEDURE — 99204 OFFICE O/P NEW MOD 45 MIN: CPT | Performed by: INTERNAL MEDICINE

## 2021-05-17 PROCEDURE — 83550 IRON BINDING TEST: CPT | Performed by: PATHOLOGY

## 2021-05-17 PROCEDURE — 85027 COMPLETE CBC AUTOMATED: CPT | Performed by: PATHOLOGY

## 2021-05-17 PROCEDURE — 80069 RENAL FUNCTION PANEL: CPT | Performed by: PATHOLOGY

## 2021-05-17 PROCEDURE — 82043 UR ALBUMIN QUANTITATIVE: CPT | Performed by: PATHOLOGY

## 2021-05-17 PROCEDURE — 82306 VITAMIN D 25 HYDROXY: CPT | Performed by: INTERNAL MEDICINE

## 2021-05-17 ASSESSMENT — PAIN SCALES - GENERAL: PAINLEVEL: NO PAIN (0)

## 2021-05-17 ASSESSMENT — MIFFLIN-ST. JEOR: SCORE: 1508.95

## 2021-05-17 NOTE — NURSING NOTE
"Chief Complaint   Patient presents with     RECHECK     consult HTN chronic kidney disease satge 3   BP (!) 155/72   Pulse 63   Temp 97.6  F (36.4  C) (Oral)   Ht 1.74 m (5' 8.5\")   Wt 97.3 kg (214 lb 6.4 oz)   LMP  (LMP Unknown)   SpO2 99%   BMI 32.13 kg/m        Javier Sanchez MA      "

## 2021-05-17 NOTE — LETTER
5/17/2021       RE: Candida Rose  [address] [facility] [id number]     Dear Colleague,    Thank you for referring your patient, Candida Rose, to the [facility] [place] at [facility]. Please see a copy of my visit note below.    Nephrology Clinic    Candida Rose MRN:[id number] YOB: 1940  Date of Service: 05/17/2021  Primary care provider: Chani Peoples  Requesting physician: Chani Peoples    ASSESSMENT AND RECOMMENDATIONS:   Candida Rose is a 80 year old woman who presents for evaluation of CKD.    1. CKD, stage 3a: Baseline Cr seems to fluctuate around ~1.2 mg/dL with and eGFR of 42 ml/min.  She has no albuminuria (on losartan). The etiology of her CKD is most likely related to underlying renovascular disease perhaps related to Fitch-fitch syndrome though would not be uncommon in the elderly population regardless.  She has no clear evidence of functional JAMAL though could have renovascular disease involving the renal microcirculation.  Fortunately, her kidney function remains stable and focus remains on minimizing cardiovascular risk factors.    -she is on RAAS blockade with losartan for HTN which may be resulting in her lack of albuminuria   -continue ASA, plavix and statin  -RTC in 6 months    2.  HTN:  BP appears at goal of <140/80 at home.    -continue losartan 50 mg BID, amlodipine 2.5 mg BID, atenolol 25 mg daily.  She reports that she stopped taking terazosin 2 mg though it is unclear if she is taking it prn.  She is also no longer on hydrochlorothiazide.  -if HTN worsens would consider going up on amlodipine and/or changing atenolol to carvedilol next  -no changes made today    REASON FOR CONSULT: CKD    HISTORY OF PRESENT ILLNESS:   Candida Rose is a 80 year old woman who presents for evaluation of CKD.  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  She also has a history of HTN and is on losartan 50 mg BID, amlodipine 2.5 mg BID, atenolol 25 mg daily and reports she stopped taking terazosin 2 mg daily.  She no longer is on hydrochlorothiazide due to risk of causing volume depletion.  She hs hyperlipidemia and is on simvastatin.  She has a remote history of diabetes by chart but her hemoglobin A1cs have been at goal and she is not on any medications.  She also has a history of OA and underwent a left TKA.      Her baseline Cr seems to fluctuate around ~1.2 mg/dL with and eGFR of 42 ml/min.  She has no albuminuria.  Her UA is notable for pyuria at times.  She denies a history of excessive NSAID use, UTIs and family history of kidney disease.  Overall, she feels well and has no specific medical complaints.       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 F/u dr. Soto     OA (osteoarthritis)     s/p bilat total hips      Other chronic pain     Joint pain for many years     Unspecified cerebral artery occlusion with cerebral infarction     After Moyamoya surgery > 10 yrs ago.     Vitamin D deficiencies      PAST SURGICAL HISTORY:  Past Surgical History:   Procedure Laterality Date     ARTHROPLASTY KNEE Left 4/17/2017    Procedure: ARTHROPLASTY KNEE;  Left total knee arthroplasty;  Surgeon: Chidi Jenkins MD;  Location: [facility] OR     COLONOSCOPY  2008    normal     COLONOSCOPY  7/17/2014    Procedure: COLONOSCOPY;  Surgeon: Ovidio  "Raul Raines DO;  Location:  GI     CRANIOTOMY      MOJAMOJA  \"Pt had repair of blood flow.\"     RECONSTRUCT FOREFOOT WITH METATARSOPHALANGEAL (MTP) FUSION Left 8/21/2014    Procedure: RECONSTRUCT FOREFOOT WITH METATARSOPHALANGEAL (MTP) FUSION;  Surgeon: Tres Merlos DPM;  Location:  OR     [facility] NONSPECIFIC PROCEDURE  10/30/06    neurosurgery Dr Soto     [facility] NONSPECIFIC PROCEDURE      bilateral total hips approx 2002     [facility] NONSPECIFIC PROCEDURE  3/07    neurosurgery      MEDICATIONS:  Prescription Medications as of 5/17/2021       Rx Number Disp Refills Start End Last Dispensed Date Next Fill Date Owning Pharmacy    amLODIPine (NORVASC) 2.5 MG tablet  180 tablet 4 1/4/2021    [id number] [place] [address]    Sig: Take 1 tablet (2.5 mg) by mouth 2 times daily    Class: E-Prescribe    Route: Oral    aspirin 81 MG tablet  30 tablet  1/13/2017    [id number] [place] [address]    Sig: Take 1 tablet (81 mg) by mouth daily    Class: No Print Out    Route: Oral    atenolol (TENORMIN) 25 MG tablet  90 tablet 4 1/4/2021    [id number] [place] [address]    Sig: Take 1 tablet (25 mg) by mouth daily    Class: E-Prescribe    Route: Oral    clopidogrel (PLAVIX) 75 MG tablet  90 tablet 4 1/4/2021    [facility] [place] [address]    Sig: Take 1 tablet (75 mg) by mouth daily    Class: E-Prescribe    Route: Oral    cyanocobalamin (VITAMIN B-12) 500 MCG tablet            Sig: Take 500 mcg by mouth daily    Class: Historical    Route: Oral    losartan (COZAAR) 50 MG tablet  180 tablet 4 1/4/2021    [facility] [place] [address]    Sig: TAKE 1 TABLET BY MOUTH TWICE A DAY    Class: E-Prescribe    magnesium 500 MG TABS    2/11/2019    [facility] [place] [address]    Class: Historical    Route: Oral    hydrochlorothiazide (HYDRODIURIL) 25 MG tablet  90 tablet 0 3/15/2021    [id number] [place] [address]    Sig: Take 1 tablet (25 mg) by mouth daily as needed (leg edema) TAKE 1 TABLET (25 MG) BY MOUTH DAILY    Class: No Print Out    Route: Oral    Multiple Vitamins-Minerals (MULTIVITAMIN & MINERAL PO)            Sig: Take 1 tablet by mouth daily.    Class: Historical    Route: Oral    simvastatin (ZOCOR) 20 MG tablet  90 tablet 4 1/4/2021    [id number] [place] [address]    Sig: TAKE 1 TABLET (20 MG) BY MOUTH AT BEDTIME    Class: E-Prescribe    terazosin (HYTRIN) 2 MG capsule  30 capsule 1 2/22/2021    [facility] [place] [address]    Sig: Take 1 capsule (2 mg) by mouth At Bedtime    Class: E-Prescribe    Route: Oral         ALLERGIES:    Allergies   Allergen Reactions     Lisinopril      Persistent cough     REVIEW OF SYSTEMS:  A comprehensive review of systems was performed and found to be negative except as described here or above.  SOCIAL HISTORY:   Social History     Socioeconomic History     Marital status:      Spouse name: Olu      Number of children: 2     Years of education: Not on file     Highest education level: Not on file   Occupational History     Employer: RETIRED   Social Needs     Financial resource strain: Not on file     Food insecurity     Worry: Not on file     Inability: Not on file     Transportation needs     Medical: Not on file     Non-medical: Not on file   Tobacco Use     Smoking status: Never Smoker     Smokeless tobacco: Never Used   Substance and Sexual Activity     Alcohol use: Yes     Comment:  1-2 per day     Drug use: No     Sexual activity: Never     Partners: Male   Lifestyle     Physical activity     Days per week: Not on file     Minutes per session: Not on file     Stress: Not on file   Relationships     Social connections     Talks on phone: Not on file     Gets together: Not on file     Attends Muslim service: Not on file     Active member of club or  "organization: Not on file     Attends meetings of clubs or organizations: Not on file     Relationship status: Not on file     Intimate partner violence     Fear of current or ex partner: Not on file     Emotionally abused: Not on file     Physically abused: Not on file     Forced sexual activity: Not on file   Other Topics Concern     Parent/sibling w/ CABG, MI or angioplasty before 65F 55M? Not Asked   Social History Narrative     Not on file     FAMILY MEDICAL HISTORY:   Family History   Problem Relation Age of Onset     Obesity Sister         gastric by-pass age age 60, heart murmur.     Cancer - colorectal Sister 69     Heart Disease Father         mi,  age 48     Family History Negative Mother         killed in MVA age 54     Cancer Maternal Aunt         lung cancer ,non-smoker     Lung Cancer Maternal Aunt      Breast Cancer Daughter 48     PHYSICAL EXAM:   BP (!) 155/72   Pulse 63   Temp 97.6  F (36.4  C) (Oral)   Ht 1.74 m (5' 8.5\")   Wt 97.3 kg (214 lb 6.4 oz)   LMP  (LMP Unknown)   SpO2 99%   BMI 32.13 kg/m    GENERAL APPEARANCE: alert and no distress  EYES: nonicteric  HENT: mouth without ulcers or lesions  NECK: supple, no adenopathy  RESP: lungs clear to auscultation   CV: regular rhythm, normal rate, no rub  ABDOMEN: soft, nontender, nondistended  Extremities: no significant edema  MS: no evidence of inflammation in joints, no muscle tenderness  SKIN: no concerning rash  NEURO: mentation intact and speech normal  PSYCH: affect normal/bright   LABS:   CMP  Recent Labs   Lab Test 21  0738 21  0938 21  0742 21  1001 19  0759 19  0759 18  0818 18  0818 17  0825    133 134 137   < > 138   < > 138 135   POTASSIUM 4.1 4.4 3.5 4.6   < > 4.0   < > 4.3 4.3   CHLORIDE 102 104 100 106   < > 105   < > 105 101   CO2 27 28 30 26   < > 26   < > 30 27   ANIONGAP 7 1* 4 5   < > 7   < > 3 7   * 101* 102* 104*   < > 108*   < > 98 138*   BUN " 31* 36* 45* 45*   < > 36*   < > 34* 26   CR 1.23* 1.26* 1.52* 1.45*   < > 1.39*   < > 1.24* 1.10*   GFRESTIMATED 42* 41* 32* 34*   < > 37*   < > 42* 48*   GFRESTBLACK 48* 47* 38* 40*   < > 42*   < > 51* 59*   RAINE 9.0 8.5 9.5 9.1   < > 9.3   < > 8.5 9.5   PHOS 3.8  --   --   --   --   --   --   --   --    PROTTOTAL  --   --   --  7.1  --  7.3  --  6.6* 7.5   ALBUMIN 3.5  --   --  3.6  --  3.7  --  3.4 3.5   BILITOTAL  --   --   --  0.5  --  0.4  --  0.4 0.6   ALKPHOS  --   --   --  103  --  98  --  99 116   AST  --   --   --  19  --  20  --  16 17   ALT  --   --   --  20  --  20  --  19 17    < > = values in this interval not displayed.     CBC  Recent Labs   Lab Test 21  0738 21  0742 21  1001 19  0759   HGB 11.3* 11.0* 10.9* 11.2*   WBC 6.3 8.2 7.0 6.7   RBC 3.77* 3.63* 3.60* 3.68*   HCT 35.1 34.6* 35.5 35.8   MCV 93 95 99 97   MCH 30.0 30.3 30.3 30.4   MCHC 32.2 31.8 30.7* 31.3*   RDW 12.4 12.3 12.9 12.7    334 309 341     INRNo lab results found.  ABGNo lab results found.   URINE STUDIES  Recent Labs   Lab Test 21  0740 21  0743   COLOR Yellow Yellow   APPEARANCE Clear Clear   URINEGLC Negative Negative   URINEBILI Negative Negative   URINEKETONE Negative Negative   SG 1.017 1.020   UBLD Negative Negative   URINEPH 6.0 6.5   PROTEIN Negative Negative   UROBILINOGEN  --  0.2   NITRITE Negative Negative   LEUKEST Large* Small*   RBCU 5* O - 2   WBCU 26* 10-25*     No lab results found.  PTH  Recent Labs   Lab Test 19  0759 17  0849   PTHI 50 55     IRON STUDIES  Recent Labs   Lab Test 21  0742   IRON 98      IRONSAT 27   KAREN 73     [id number] Renal U/S with doppler:    FINDINGS:   The right kidney measures 9.8 x 5.1 x 4.9 cm. The right renal cortex  measures 1.1 cm in thickness. There is no hydronephrosis. Renal  cortical echogenicity is unremarkable.     Spectral waveform analysis was performed.  The peak systolic  velocities in the right  renal artery at its hilum, mid and origin are  155, 124, 107 cm/sec respectively. Low resistance waveforms are  identified in the right renal artery. The resistance indices in the  right arcuate arteries range between 0.70 and 0.3. RAR ratios range  between 1.00 to 1.34.     The left kidney measures 10.9 x 4.9 x 5.4 cm. The left renal cortex  measures 1.5 cm in thickness. There is no hydronephrosis. Renal  cortical echogenicity is unremarkable. Simple cyst in the upper pole.     Spectral waveform analysis was performed.  The peak systolic  velocities in the left renal artery at its hilum, mid and origin are  69, 97, 35 cm/sec respectively. Low resistance waveforms are  identified in the left renal artery. The resistance indices in the  left arcuate arteries range between 0.63 and 0.74. RAR ratios range  between 0.28 to 0.78.     The peak systolic velocity in the abdominal aorta superior to the  origin of the renal arteries is 123.     The blader is normal.                                                                      IMPRESSION:  1. No evidence of a hemodynamically significant renal artery stenosis.  2. Simple cyst in the upper pole of the left kidney.    All above labs and imaging reviewed by me.   Imtiaz Griffin MD

## 2021-05-17 NOTE — LETTER
5/17/2021      RE: Candida Rose  [address] [facility] [address]       Nephrology Clinic    Candida Rose MRN:[id number] YOB: 1940  Date of Service: 05/17/2021  Primary care provider: Chani Peoples  Requesting physician: Chani Peoples    ASSESSMENT AND RECOMMENDATIONS:   Candida Rose is a 80 year old woman who presents for evaluation of CKD.    1. CKD, stage 3a: Baseline Cr seems to fluctuate around ~1.2 mg/dL with and eGFR of 42 ml/min.  She has no albuminuria (on losartan). The etiology of her CKD is most likely related to underlying renovascular disease perhaps related to Fitch-fitch syndrome though would not be uncommon in the elderly population regardless.  She has no clear evidence of functional JAMAL though could have renovascular disease involving the renal microcirculation.  Fortunately, her kidney function remains stable and focus remains on minimizing cardiovascular risk factors.    -she is on RAAS blockade with losartan for HTN which may be resulting in her lack of albuminuria   -continue ASA, plavix and statin  -RTC in 6 months    2.  HTN:  BP appears at goal of <140/80 at home.    -continue losartan 50 mg BID, amlodipine 2.5 mg BID, atenolol 25 mg daily.  She reports that she stopped taking terazosin 2 mg though it is unclear if she is taking it prn.  She is also no longer on hydrochlorothiazide.  -if HTN worsens would consider going up on amlodipine and/or changing atenolol to carvedilol next  -no changes made today    REASON FOR CONSULT: CKD    HISTORY OF PRESENT ILLNESS:   Candida Rose is a 80 year old woman who presents for evaluation of CKD.  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  She also has a history of HTN and is on losartan 50 mg BID, amlodipine 2.5 mg BID, atenolol 25 mg daily and reports she stopped taking terazosin 2 mg daily.  She no  "longer is on hydrochlorothiazide due to risk of causing volume depletion.  She hs hyperlipidemia and is on simvastatin.  She has a remote history of diabetes by chart but her hemoglobin A1cs have been at goal and she is not on any medications.  She also has a history of OA and underwent a left TKA.      Her baseline Cr seems to fluctuate around ~1.2 mg/dL with and eGFR of 42 ml/min.  She has no albuminuria.  Her UA is notable for pyuria at times.  She denies a history of excessive NSAID use, UTIs and family history of kidney disease.  Overall, she feels well and has no specific medical complaints.       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 F/u dr. Soto     OA (osteoarthritis)     s/p bilat total hips      Other chronic pain     Joint pain for many years     Unspecified cerebral artery occlusion with cerebral infarction     After Moyamoya surgery > 10 yrs ago.     Vitamin D deficiencies      PAST SURGICAL HISTORY:  Past Surgical History:   Procedure Laterality Date     ARTHROPLASTY KNEE Left 4/17/2017    Procedure: ARTHROPLASTY KNEE;  Left total knee arthroplasty;  Surgeon: Chidi Jenkins MD;  Location:  OR     COLONOSCOPY  2008    normal     COLONOSCOPY  7/17/2014    Procedure: COLONOSCOPY;  Surgeon: Raul Nolan DO;  Location:  GI     CRANIOTOMY      MOJAMOJA  \"Pt had repair of blood flow.\"     RECONSTRUCT FOREFOOT WITH METATARSOPHALANGEAL (MTP) FUSION Left 8/21/2014    Procedure: RECONSTRUCT FOREFOOT WITH " METATARSOPHALANGEAL (MTP) FUSION;  Surgeon: Tres Merlos DPM;  Location:  OR     [facility] NONSPECIFIC PROCEDURE  10/30/06    neurosurgery Dr Soto     [facility] NONSPECIFIC PROCEDURE      bilateral total hips approx 2002     [facility] NONSPECIFIC PROCEDURE  3/07    neurosurgery      MEDICATIONS:  Prescription Medications as of 5/17/2021       Rx Number Disp Refills Start End Last Dispensed Date Next Fill Date Owning Pharmacy    amLODIPine (NORVASC) 2.5 MG tablet  180 tablet 4 1/4/2021    [id number] [place] [address]    Sig: Take 1 tablet (2.5 mg) by mouth 2 times daily    Class: E-Prescribe    Route: Oral    aspirin 81 MG tablet  30 tablet  1/13/2017    [id number] [place] [address]    Sig: Take 1 tablet (81 mg) by mouth daily    Class: No Print Out    Route: Oral    atenolol (TENORMIN) 25 MG tablet  90 tablet 4 1/4/2021    [id number] [place] [address]    Sig: Take 1 tablet (25 mg) by mouth daily    Class: E-Prescribe    Route: Oral    clopidogrel (PLAVIX) 75 MG tablet  90 tablet 4 1/4/2021    [id number] [place] [address]    Sig: Take 1 tablet (75 mg) by mouth daily    Class: E-Prescribe    Route: Oral    cyanocobalamin (VITAMIN B-12) 500 MCG tablet            Sig: Take 500 mcg by mouth daily    Class: Historical    Route: Oral    losartan (COZAAR) 50 MG tablet  180 tablet 4 1/4/2021    [id number] [place] [address]    Sig: TAKE 1 TABLET BY MOUTH TWICE A DAY    Class: E-Prescribe    magnesium 500 MG TABS    2/11/2019    [id number] [place] [address]    Class: Historical    Route: Oral    hydrochlorothiazide (HYDRODIURIL) 25 MG tablet  90 tablet 0 3/15/2021    [facility] [place] [address]    Sig: Take 1 tablet (25 mg) by mouth daily as needed (leg edema) TAKE 1 TABLET (25 MG) BY MOUTH DAILY    Class: No Print Out    Route: Oral    Multiple  Vitamins-Minerals (MULTIVITAMIN & MINERAL PO)            Sig: Take 1 tablet by mouth daily.    Class: Historical    Route: Oral    simvastatin (ZOCOR) 20 MG tablet  90 tablet 4 1/4/2021    [id number] [place] [address]    Sig: TAKE 1 TABLET (20 MG) BY MOUTH AT BEDTIME    Class: E-Prescribe    terazosin (HYTRIN) 2 MG capsule  30 capsule 1 2/22/2021    [id number] [place] [address]    Sig: Take 1 capsule (2 mg) by mouth At Bedtime    Class: E-Prescribe    Route: Oral         ALLERGIES:    Allergies   Allergen Reactions     Lisinopril      Persistent cough     REVIEW OF SYSTEMS:  A comprehensive review of systems was performed and found to be negative except as described here or above.  SOCIAL HISTORY:   Social History     Socioeconomic History     Marital status:      Spouse name: Olu      Number of children: 2     Years of education: Not on file     Highest education level: Not on file   Occupational History     Employer: RETIRED   Social Needs     Financial resource strain: Not on file     Food insecurity     Worry: Not on file     Inability: Not on file     Transportation needs     Medical: Not on file     Non-medical: Not on file   Tobacco Use     Smoking status: Never Smoker     Smokeless tobacco: Never Used   Substance and Sexual Activity     Alcohol use: Yes     Comment:  1-2 per day     Drug use: No     Sexual activity: Never     Partners: Male   Lifestyle     Physical activity     Days per week: Not on file     Minutes per session: Not on file     Stress: Not on file   Relationships     Social connections     Talks on phone: Not on file     Gets together: Not on file     Attends [employer] service: Not on file     Active member of club or organization: Not on file     Attends meetings of clubs or organizations: Not on file     Relationship status: Not on file     Intimate partner violence     Fear of current or ex partner: Not on file     Emotionally  "abused: Not on file     Physically abused: Not on file     Forced sexual activity: Not on file   Other Topics Concern     Parent/sibling w/ CABG, MI or angioplasty before 65F 55M? Not Asked   Social History Narrative     Not on file     FAMILY MEDICAL HISTORY:   Family History   Problem Relation Age of Onset     Obesity Sister         gastric by-pass age age 60, heart murmur.     Cancer - colorectal Sister 69     Heart Disease Father         mi,  age 48     Family History Negative Mother         killed in MVA age 54     Cancer Maternal Aunt         lung cancer ,non-smoker     Lung Cancer Maternal Aunt      Breast Cancer Daughter 48     PHYSICAL EXAM:   BP (!) 155/72   Pulse 63   Temp 97.6  F (36.4  C) (Oral)   Ht 1.74 m (5' 8.5\")   Wt 97.3 kg (214 lb 6.4 oz)   LMP  (LMP Unknown)   SpO2 99%   BMI 32.13 kg/m    GENERAL APPEARANCE: alert and no distress  EYES: nonicteric  HENT: mouth without ulcers or lesions  NECK: supple, no adenopathy  RESP: lungs clear to auscultation   CV: regular rhythm, normal rate, no rub  ABDOMEN: soft, nontender, nondistended  Extremities: no significant edema  MS: no evidence of inflammation in joints, no muscle tenderness  SKIN: no concerning rash  NEURO: mentation intact and speech normal  PSYCH: affect normal/bright   LABS:   CMP  Recent Labs   Lab Test 21  0738 21  0938 21  0742 21  1001 19  0759 19  0759 18  0818 18  0818 17  0825    133 134 137   < > 138   < > 138 135   POTASSIUM 4.1 4.4 3.5 4.6   < > 4.0   < > 4.3 4.3   CHLORIDE 102 104 100 106   < > 105   < > 105 101   CO2 27 28 30 26   < > 26   < > 30 27   ANIONGAP 7 1* 4 5   < > 7   < > 3 7   * 101* 102* 104*   < > 108*   < > 98 138*   BUN 31* 36* 45* 45*   < > 36*   < > 34* 26   CR 1.23* 1.26* 1.52* 1.45*   < > 1.39*   < > 1.24* 1.10*   GFRESTIMATED 42* 41* 32* 34*   < > 37*   < > 42* 48*   GFRESTBLACK 48* 47* 38* 40*   < > 42*   < > 51* 59*   RAINE 9.0 " 8.5 9.5 9.1   < > 9.3   < > 8.5 9.5   PHOS 3.8  --   --   --   --   --   --   --   --    PROTTOTAL  --   --   --  7.1  --  7.3  --  6.6* 7.5   ALBUMIN 3.5  --   --  3.6  --  3.7  --  3.4 3.5   BILITOTAL  --   --   --  0.5  --  0.4  --  0.4 0.6   ALKPHOS  --   --   --  103  --  98  --  99 116   AST  --   --   --  19  --  20  --  16 17   ALT  --   --   --  20  --  20  --  19 17    < > = values in this interval not displayed.     CBC  Recent Labs   Lab Test 21  0738 21  0742 21  1001 19  0759   HGB 11.3* 11.0* 10.9* 11.2*   WBC 6.3 8.2 7.0 6.7   RBC 3.77* 3.63* 3.60* 3.68*   HCT 35.1 34.6* 35.5 35.8   MCV 93 95 99 97   MCH 30.0 30.3 30.3 30.4   MCHC 32.2 31.8 30.7* 31.3*   RDW 12.4 12.3 12.9 12.7    334 309 341     INRNo lab results found.  ABGNo lab results found.   URINE STUDIES  Recent Labs   Lab Test 21  0740 21  0743   COLOR Yellow Yellow   APPEARANCE Clear Clear   URINEGLC Negative Negative   URINEBILI Negative Negative   URINEKETONE Negative Negative   SG 1.017 1.020   UBLD Negative Negative   URINEPH 6.0 6.5   PROTEIN Negative Negative   UROBILINOGEN  --  0.2   NITRITE Negative Negative   LEUKEST Large* Small*   RBCU 5* O - 2   WBCU 26* 10-25*     No lab results found.  PTH  Recent Labs   Lab Test 19  0759 17  0849   PTHI 50 55     IRON STUDIES  Recent Labs   Lab Test 21  0742   IRON 98      IRONSAT 27   KAREN 73     [id number] Renal U/S with doppler:    FINDINGS:   The right kidney measures 9.8 x 5.1 x 4.9 cm. The right renal cortex  measures 1.1 cm in thickness. There is no hydronephrosis. Renal  cortical echogenicity is unremarkable.     Spectral waveform analysis was performed.  The peak systolic  velocities in the right renal artery at its hilum, mid and origin are  155, 124, 107 cm/sec respectively. Low resistance waveforms are  identified in the right renal artery. The resistance indices in the  right arcuate arteries range  between 0.70 and 0.3. RAR ratios range  between 1.00 to 1.34.     The left kidney measures 10.9 x 4.9 x 5.4 cm. The left renal cortex  measures 1.5 cm in thickness. There is no hydronephrosis. Renal  cortical echogenicity is unremarkable. Simple cyst in the upper pole.     Spectral waveform analysis was performed.  The peak systolic  velocities in the left renal artery at its hilum, mid and origin are  69, 97, 35 cm/sec respectively. Low resistance waveforms are  identified in the left renal artery. The resistance indices in the  left arcuate arteries range between 0.63 and 0.74. RAR ratios range  between 0.28 to 0.78.     The peak systolic velocity in the abdominal aorta superior to the  origin of the renal arteries is 123.     The blader is normal.                                                                      IMPRESSION:  1. No evidence of a hemodynamically significant renal artery stenosis.  2. Simple cyst in the upper pole of the left kidney.    All above labs and imaging reviewed by me.   Imtiaz Griffin MD

## 2021-05-17 NOTE — PROGRESS NOTES
Nephrology Clinic    Candida Rose MRN:[id number] YOB: 1940  Date of Service: 05/17/2021  Primary care provider: Chani Peoples  Requesting physician: Chani Peoples    ASSESSMENT AND RECOMMENDATIONS:   Candida Rose is a 80 year old woman who presents for evaluation of CKD.    1. CKD, stage 3a: Baseline Cr seems to fluctuate around ~1.2 mg/dL with and eGFR of 42 ml/min.  She has no albuminuria (on losartan). The etiology of her CKD is most likely related to underlying renovascular disease perhaps related to Fitch-fitch syndrome though would not be uncommon in the elderly population regardless.  She has no clear evidence of functional JAMAL though could have renovascular disease involving the renal microcirculation.  Fortunately, her kidney function remains stable and focus remains on minimizing cardiovascular risk factors.    -she is on RAAS blockade with losartan for HTN which may be resulting in her lack of albuminuria   -continue ASA, plavix and statin  -RTC in 6 months    2.  HTN:  BP appears at goal of <140/80 at home.    -continue losartan 50 mg BID, amlodipine 2.5 mg BID, atenolol 25 mg daily.  She reports that she stopped taking terazosin 2 mg though it is unclear if she is taking it prn.  She is also no longer on hydrochlorothiazide.  -if HTN worsens would consider going up on amlodipine and/or changing atenolol to carvedilol next  -no changes made today    REASON FOR CONSULT: CKD    HISTORY OF PRESENT ILLNESS:   Candida Rose is a 80 year old woman who presents for evaluation of CKD.  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  She also has a history of HTN and is on losartan 50 mg BID, amlodipine 2.5 mg BID, atenolol 25 mg daily and reports she stopped taking terazosin 2 mg daily.  She no longer is on hydrochlorothiazide due to risk of causing volume depletion.  She hs  "hyperlipidemia and is on simvastatin.  She has a remote history of diabetes by chart but her hemoglobin A1cs have been at goal and she is not on any medications.  She also has a history of OA and underwent a left TKA.      Her baseline Cr seems to fluctuate around ~1.2 mg/dL with and eGFR of 42 ml/min.  She has no albuminuria.  Her UA is notable for pyuria at times.  She denies a history of excessive NSAID use, UTIs and family history of kidney disease.  Overall, she feels well and has no specific medical complaints.       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 F/u dr. Soto     OA (osteoarthritis)     s/p bilat total hips      Other chronic pain     Joint pain for many years     Unspecified cerebral artery occlusion with cerebral infarction     After Moyamoya surgery > 10 yrs ago.     Vitamin D deficiencies      PAST SURGICAL HISTORY:  Past Surgical History:   Procedure Laterality Date     ARTHROPLASTY KNEE Left 4/17/2017    Procedure: ARTHROPLASTY KNEE;  Left total knee arthroplasty;  Surgeon: Chidi Jenkins MD;  Location:  OR     COLONOSCOPY  2008    normal     COLONOSCOPY  7/17/2014    Procedure: COLONOSCOPY;  Surgeon: Raul Nolan DO;  Location:  GI     CRANIOTOMY      MOJAMOJA  \"Pt had repair of blood flow.\"     RECONSTRUCT FOREFOOT WITH METATARSOPHALANGEAL (MTP) FUSION Left 8/21/2014    Procedure: RECONSTRUCT FOREFOOT WITH METATARSOPHALANGEAL (MTP) FUSION;  Surgeon: Tres Merlos DPM;  Location:  OR     [facility] " NONSPECIFIC PROCEDURE  10/30/06    neurosurgery Dr Soto     [facility] NONSPECIFIC PROCEDURE      bilateral total hips approx 2002     [facility] NONSPECIFIC PROCEDURE  3/07    neurosurgery      MEDICATIONS:  Prescription Medications as of 5/17/2021       Rx Number Disp Refills Start End Last Dispensed Date Next Fill Date Owning Pharmacy    amLODIPine (NORVASC) 2.5 MG tablet  180 tablet 4 1/4/2021    [id number] [place] [address]    Sig: Take 1 tablet (2.5 mg) by mouth 2 times daily    Class: E-Prescribe    Route: Oral    aspirin 81 MG tablet  30 tablet  1/13/2017    [id number] [place] [address]    Sig: Take 1 tablet (81 mg) by mouth daily    Class: No Print Out    Route: Oral    atenolol (TENORMIN) 25 MG tablet  90 tablet 4 1/4/2021    [id number] [place] [address]    Sig: Take 1 tablet (25 mg) by mouth daily    Class: E-Prescribe    Route: Oral    clopidogrel (PLAVIX) 75 MG tablet  90 tablet 4 1/4/2021    [id number] [place] [address]    Sig: Take 1 tablet (75 mg) by mouth daily    Class: E-Prescribe    Route: Oral    cyanocobalamin (VITAMIN B-12) 500 MCG tablet            Sig: Take 500 mcg by mouth daily    Class: Historical    Route: Oral    losartan (COZAAR) 50 MG tablet  180 tablet 4 1/4/2021    [facility] [place] [address]    Sig: TAKE 1 TABLET BY MOUTH TWICE A DAY    Class: E-Prescribe    magnesium 500 MG TABS    2/11/2019    [id number] [place] [address]    Class: Historical    Route: Oral    hydrochlorothiazide (HYDRODIURIL) 25 MG tablet  90 tablet 0 3/15/2021    [id number] [place] [address]    Sig: Take 1 tablet (25 mg) by mouth daily as needed (leg edema) TAKE 1 TABLET (25 MG) BY MOUTH DAILY    Class: No Print Out    Route: Oral    Multiple Vitamins-Minerals (MULTIVITAMIN & MINERAL PO)            Sig: Take 1 tablet by mouth daily.     Class: Historical    Route: Oral    simvastatin (ZOCOR) 20 MG tablet  90 tablet 4 1/4/2021    [id number] [place] [address]    Sig: TAKE 1 TABLET (20 MG) BY MOUTH AT BEDTIME    Class: E-Prescribe    terazosin (HYTRIN) 2 MG capsule  30 capsule 1 2/22/2021    [id number] [place] [address]    Sig: Take 1 capsule (2 mg) by mouth At Bedtime    Class: E-Prescribe    Route: Oral         ALLERGIES:    Allergies   Allergen Reactions     Lisinopril      Persistent cough     REVIEW OF SYSTEMS:  A comprehensive review of systems was performed and found to be negative except as described here or above.  SOCIAL HISTORY:   Social History     Socioeconomic History     Marital status:      Spouse name: Olu      Number of children: 2     Years of education: Not on file     Highest education level: Not on file   Occupational History     Employer: RETIRED   Social Needs     Financial resource strain: Not on file     Food insecurity     Worry: Not on file     Inability: Not on file     Transportation needs     Medical: Not on file     Non-medical: Not on file   Tobacco Use     Smoking status: Never Smoker     Smokeless tobacco: Never Used   Substance and Sexual Activity     Alcohol use: Yes     Comment:  1-2 per day     Drug use: No     Sexual activity: Never     Partners: Male   Lifestyle     Physical activity     Days per week: Not on file     Minutes per session: Not on file     Stress: Not on file   Relationships     Social connections     Talks on phone: Not on file     Gets together: Not on file     Attends Protestant service: Not on file     Active member of club or organization: Not on file     Attends meetings of clubs or organizations: Not on file     Relationship status: Not on file     Intimate partner violence     Fear of current or ex partner: Not on file     Emotionally abused: Not on file     Physically abused: Not on file     Forced sexual activity: Not on file  "  Other Topics Concern     Parent/sibling w/ CABG, MI or angioplasty before 65F 55M? Not Asked   Social History Narrative     Not on file     FAMILY MEDICAL HISTORY:   Family History   Problem Relation Age of Onset     Obesity Sister         gastric by-pass age age 60, heart murmur.     Cancer - colorectal Sister 69     Heart Disease Father         mi,  age 48     Family History Negative Mother         killed in MVA age 54     Cancer Maternal Aunt         lung cancer ,non-smoker     Lung Cancer Maternal Aunt      Breast Cancer Daughter 48     PHYSICAL EXAM:   BP (!) 155/72   Pulse 63   Temp 97.6  F (36.4  C) (Oral)   Ht 1.74 m (5' 8.5\")   Wt 97.3 kg (214 lb 6.4 oz)   LMP  (LMP Unknown)   SpO2 99%   BMI 32.13 kg/m    GENERAL APPEARANCE: alert and no distress  EYES: nonicteric  HENT: mouth without ulcers or lesions  NECK: supple, no adenopathy  RESP: lungs clear to auscultation   CV: regular rhythm, normal rate, no rub  ABDOMEN: soft, nontender, nondistended  Extremities: no significant edema  MS: no evidence of inflammation in joints, no muscle tenderness  SKIN: no concerning rash  NEURO: mentation intact and speech normal  PSYCH: affect normal/bright   LABS:   CMP  Recent Labs   Lab Test 21  0738 21  0938 21  0742 21  1001 19  0759 19  0759 18  0818 18  0818 17  0825    133 134 137   < > 138   < > 138 135   POTASSIUM 4.1 4.4 3.5 4.6   < > 4.0   < > 4.3 4.3   CHLORIDE 102 104 100 106   < > 105   < > 105 101   CO2 27 28 30 26   < > 26   < > 30 27   ANIONGAP 7 1* 4 5   < > 7   < > 3 7   * 101* 102* 104*   < > 108*   < > 98 138*   BUN 31* 36* 45* 45*   < > 36*   < > 34* 26   CR 1.23* 1.26* 1.52* 1.45*   < > 1.39*   < > 1.24* 1.10*   GFRESTIMATED 42* 41* 32* 34*   < > 37*   < > 42* 48*   GFRESTBLACK 48* 47* 38* 40*   < > 42*   < > 51* 59*   RAINE 9.0 8.5 9.5 9.1   < > 9.3   < > 8.5 9.5   PHOS 3.8  --   --   --   --   --   --   --   --  "   PROTTOTAL  --   --   --  7.1  --  7.3  --  6.6* 7.5   ALBUMIN 3.5  --   --  3.6  --  3.7  --  3.4 3.5   BILITOTAL  --   --   --  0.5  --  0.4  --  0.4 0.6   ALKPHOS  --   --   --  103  --  98  --  99 116   AST  --   --   --  19  --  20  --  16 17   ALT  --   --   --  20  --  20  --  19 17    < > = values in this interval not displayed.     CBC  Recent Labs   Lab Test 21  0738 21  0742 21  1001 19  0759   HGB 11.3* 11.0* 10.9* 11.2*   WBC 6.3 8.2 7.0 6.7   RBC 3.77* 3.63* 3.60* 3.68*   HCT 35.1 34.6* 35.5 35.8   MCV 93 95 99 97   MCH 30.0 30.3 30.3 30.4   MCHC 32.2 31.8 30.7* 31.3*   RDW 12.4 12.3 12.9 12.7    334 309 341     INRNo lab results found.  ABGNo lab results found.   URINE STUDIES  Recent Labs   Lab Test 21  0740 21  0743   COLOR Yellow Yellow   APPEARANCE Clear Clear   URINEGLC Negative Negative   URINEBILI Negative Negative   URINEKETONE Negative Negative   SG 1.017 1.020   UBLD Negative Negative   URINEPH 6.0 6.5   PROTEIN Negative Negative   UROBILINOGEN  --  0.2   NITRITE Negative Negative   LEUKEST Large* Small*   RBCU 5* O - 2   WBCU 26* 10-25*     No lab results found.  PTH  Recent Labs   Lab Test 19  0759 17  0849   PTHI 50 55     IRON STUDIES  Recent Labs   Lab Test 21  0742   IRON 98      IRONSAT 27   KAREN 73     [id number] Renal U/S with doppler:    FINDINGS:   The right kidney measures 9.8 x 5.1 x 4.9 cm. The right renal cortex  measures 1.1 cm in thickness. There is no hydronephrosis. Renal  cortical echogenicity is unremarkable.     Spectral waveform analysis was performed.  The peak systolic  velocities in the right renal artery at its hilum, mid and origin are  155, 124, 107 cm/sec respectively. Low resistance waveforms are  identified in the right renal artery. The resistance indices in the  right arcuate arteries range between 0.70 and 0.3. RAR ratios range  between 1.00 to 1.34.     The left kidney measures  10.9 x 4.9 x 5.4 cm. The left renal cortex  measures 1.5 cm in thickness. There is no hydronephrosis. Renal  cortical echogenicity is unremarkable. Simple cyst in the upper pole.     Spectral waveform analysis was performed.  The peak systolic  velocities in the left renal artery at its hilum, mid and origin are  69, 97, 35 cm/sec respectively. Low resistance waveforms are  identified in the left renal artery. The resistance indices in the  left arcuate arteries range between 0.63 and 0.74. RAR ratios range  between 0.28 to 0.78.     The peak systolic velocity in the abdominal aorta superior to the  origin of the renal arteries is 123.     The blader is normal.                                                                      IMPRESSION:  1. No evidence of a hemodynamically significant renal artery stenosis.  2. Simple cyst in the upper pole of the left kidney.    All above labs and imaging reviewed by me.   Imtiaz Griffin MD

## 2021-05-19 LAB
DEPRECATED CALCIDIOL+CALCIFEROL SERPL-MC: <45 UG/L (ref 20–75)
VITAMIN D2 SERPL-MCNC: <5 UG/L
VITAMIN D3 SERPL-MCNC: 40 UG/L

## 2021-05-30 ENCOUNTER — RECORDS - HEALTHEAST (OUTPATIENT)
Dept: ADMINISTRATIVE | Facility: CLINIC | Age: 81
End: 2021-05-30

## 2021-06-02 ENCOUNTER — RECORDS - HEALTHEAST (OUTPATIENT)
Dept: ADMINISTRATIVE | Facility: CLINIC | Age: 81
End: 2021-06-02

## 2021-08-31 ENCOUNTER — TELEPHONE (OUTPATIENT)
Dept: INTERNAL MEDICINE | Facility: CLINIC | Age: 81
End: 2021-08-31

## 2021-08-31 ENCOUNTER — OFFICE VISIT (OUTPATIENT)
Dept: INTERNAL MEDICINE | Facility: CLINIC | Age: 81
End: 2021-08-31
Payer: MEDICARE

## 2021-08-31 VITALS
RESPIRATION RATE: 18 BRPM | HEIGHT: 69 IN | WEIGHT: 200.5 LBS | TEMPERATURE: 96.3 F | HEART RATE: 67 BPM | BODY MASS INDEX: 29.7 KG/M2 | SYSTOLIC BLOOD PRESSURE: 135 MMHG | DIASTOLIC BLOOD PRESSURE: 70 MMHG | OXYGEN SATURATION: 99 %

## 2021-08-31 DIAGNOSIS — E55.9 VITAMIN D DEFICIENCY: ICD-10-CM

## 2021-08-31 DIAGNOSIS — I10 HTN, GOAL BELOW 140/90: ICD-10-CM

## 2021-08-31 DIAGNOSIS — N18.32 STAGE 3B CHRONIC KIDNEY DISEASE (H): Primary | ICD-10-CM

## 2021-08-31 DIAGNOSIS — D64.9 ANEMIA, UNSPECIFIED TYPE: ICD-10-CM

## 2021-08-31 DIAGNOSIS — E78.5 HYPERLIPIDEMIA LDL GOAL <130: ICD-10-CM

## 2021-08-31 PROCEDURE — 99214 OFFICE O/P EST MOD 30 MIN: CPT | Performed by: INTERNAL MEDICINE

## 2021-08-31 ASSESSMENT — MIFFLIN-ST. JEOR: SCORE: 1440.9

## 2021-08-31 NOTE — TELEPHONE ENCOUNTER
Patient calls, she saw Dr. Farrell today for an appointment. She was advised to schedule future appointment for physical and labs. She saw the lab orders from Dr. Farrell on her AVS and asks if she was supposed to get any labs drawn today. Reviewed Dr. Farrell's notes, labs ordered were for physical and advised patient she does not need to return for any labs today.    Silvia Villafana RN  [facility]

## 2021-08-31 NOTE — PROGRESS NOTES
Dr Farrell's note      Patient's instructions / PLAN:                                                        Plan:  1. Continue diet and exercise -- great job !  2. Stop Atenolol. If the blood pressure stays constantly higher than 140 you will resume the Atenolol  3. In 1-2 months if the blood pressure stays in the 120s, you may try to reduce the Amlodipine to 2.5 mg daily.   4. Please make a lab appointment for fasting labs  In January 5. Please make an appointment few days after the labs for ANNUAL EXAM        ASSESSMENT & PLAN:                                                      (N18.32) Stage 3b chronic kidney disease  (primary encounter diagnosis)  Comment: stable   Plan: CBC with platelets, Lipid panel reflex to         direct LDL Fasting, Comprehensive metabolic         panel, TSH with free T4 reflex, Albumin Random         Urine Quantitative with Creat Ratio            (I10) HTN, goal below 140/90  Comment: Controlled    Plan: CBC with platelets, Lipid panel reflex to         direct LDL Fasting, Comprehensive metabolic         panel, TSH with free T4 reflex, Albumin Random         Urine Quantitative with Creat Ratio        as above     (D64.9) Anemia, unspecified type  Comment: normal iron and B12  Plan: CBC with platelets, Lipid panel reflex to         direct LDL Fasting, Comprehensive metabolic         panel, TSH with free T4 reflex, Albumin Random         Urine Quantitative with Creat Ratio            (E78.5) Hyperlipidemia LDL goal <130  Comment: Controlled    Plan: CBC with platelets, Lipid panel reflex to         direct LDL Fasting, Comprehensive metabolic         panel, TSH with free T4 reflex, Albumin Random         Urine Quantitative with Creat Ratio            (E55.9) Vitamin D deficiency  Comment:   Plan: CBC with platelets, Lipid panel reflex to         direct LDL Fasting, Comprehensive metabolic         panel, TSH with free T4 reflex, Albumin Random         Urine Quantitative with Creat  "Ratio               Chief complaint:                                                      Follow up chronic medical problems      SUBJECTIVE:                                                    History of present illness:    Patient is being seen for an blood pressure and medication check.    BP home: 124-142 / 62-70 Most of the numbers < 135 HR 52 -60  BP machine checked today -- accurate w our machine     Wt: lost 20 lbs since Jan with diet and increased exercise     Hyperlipidemia Follow-Up      Are you regularly taking any medication or supplement to lower your cholesterol?   Yes- Simvastatin    Are you having muscle aches or other side effects that you think could be caused by your cholesterol lowering medication?  Yes- bruising    Hypertension Follow-up      Do you check your blood pressure regularly outside of the clinic? Yes     Are you following a low salt diet? No    Are your blood pressures ever more than 140 on the top number (systolic) OR more   than 90 on the bottom number (diastolic), for example 140/90? No      How many servings of fruits and vegetables do you eat daily?  2-3    On average, how many sweetened beverages do you drink each day (Examples: soda, juice, sweet tea, etc.  Do NOT count diet or artificially sweetened beverages)?   0    How many days per week do you exercise enough to make your heart beat faster? 5    How many minutes a day do you exercise enough to make your heart beat faster? 20 - 29    How many days per week do you miss taking your medication? 0        Review of Systems:                                                      ROS: negative for fever, chills, cough, wheezes, chest pain, shortness of breath, vomiting, abdominal pain, leg swelling    OBJECTIVE:             Physical exam:  Blood pressure 130/70, pulse 67, temperature (!) 96.3  F (35.7  C), temperature source Tympanic, resp. rate 18, height 1.74 m (5' 8.5\"), weight 90.9 kg (200 lb 8 oz), SpO2 99 %, not currently " "breastfeeding.     NAD, appears comfortable  Skin: no rashes   Neck: supple, no JVD,  No thyroidmegaly. Lymph nodes nonpalpable cervical and supraclavicular.  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 F/u dr. Soto     OA (osteoarthritis)     s/p bilat total hips      Other chronic pain     Joint pain for many years     Unspecified cerebral artery occlusion with cerebral infarction     After Moyamoya surgery > 10 yrs ago.     Vitamin D deficiencies       PSHx: reviewed  Past Surgical History:   Procedure Laterality Date     ARTHROPLASTY KNEE Left 4/17/2017    Procedure: ARTHROPLASTY KNEE;  Left total knee arthroplasty;  Surgeon: Chidi Jenkins MD;  Location:  OR     COLONOSCOPY  2008    normal     COLONOSCOPY  7/17/2014    Procedure: COLONOSCOPY;  Surgeon: Raul Nolan DO;  Location:  GI     CRANIOTOMY      MOJAMOJA  \"Pt had repair of blood flow.\"     IR CAROTID ANGIOGRAM  10/30/2006     IR CAROTID ANGIOGRAM  6/6/2010     IR CAROTID ANGIOGRAM  6/6/2010     IR CAROTID ANGIOGRAM  6/6/2010     IR CAROTID ANGIOGRAM  5/12/2011     IR CAROTID ANGIOGRAM  5/12/2011     IR CAROTID ANGIOGRAM  5/12/2011     IR CAROTID ANGIOGRAM  6/5/2012     IR CAROTID " ANGIOGRAM  6/5/2012     IR CAROTID ANGIOGRAM  6/5/2012     IR MISCELLANEOUS PROCEDURE  6/6/2010     IR MISCELLANEOUS PROCEDURE  6/6/2010     IR MISCELLANEOUS PROCEDURE  5/12/2011     IR MISCELLANEOUS PROCEDURE  6/5/2012     RECONSTRUCT FOREFOOT WITH METATARSOPHALANGEAL (MTP) FUSION Left 8/21/2014    Procedure: RECONSTRUCT FOREFOOT WITH METATARSOPHALANGEAL (MTP) FUSION;  Surgeon: Tres Merlos DPM;  Location:  OR     [facility] NONSPECIFIC PROCEDURE  10/30/06    neurosurgery Dr Soto     [facility] NONSPECIFIC PROCEDURE      bilateral total hips approx 2002     [facility] NONSPECIFIC PROCEDURE  3/07    neurosurgery         Meds: reviewed  Current Outpatient Medications   Medication Sig Dispense Refill     amLODIPine (NORVASC) 2.5 MG tablet Take 1 tablet (2.5 mg) by mouth 2 times daily 180 tablet 4     aspirin 81 MG tablet Take 1 tablet (81 mg) by mouth daily 30 tablet      atenolol (TENORMIN) 25 MG tablet Take 1 tablet (25 mg) by mouth daily 90 tablet 4     clopidogrel (PLAVIX) 75 MG tablet Take 1 tablet (75 mg) by mouth daily 90 tablet 4     cyanocobalamin (VITAMIN B-12) 500 MCG tablet Take 500 mcg by mouth daily       losartan (COZAAR) 50 MG tablet TAKE 1 TABLET BY MOUTH TWICE A  tablet 4     magnesium 500 MG TABS        Multiple Vitamins-Minerals (MULTIVITAMIN & MINERAL PO) Take 1 tablet by mouth daily.       simvastatin (ZOCOR) 20 MG tablet TAKE 1 TABLET (20 MG) BY MOUTH AT BEDTIME 90 tablet 4       Soc Hx: reviewed  Fam Hx: reviewed          Chani Farrell MD  Internal Medicine

## 2021-08-31 NOTE — PATIENT INSTRUCTIONS
Plan:  1. Continue diet and exercise -- great job !  2. Stop Atenolol. If the blood pressure stays constantly higher than 140 you will resume the Atenolol  3. In 1-2 months if the blood pressure stays in the 120s, you may try to reduce the Amlodipine to 2.5 mg daily.   4. Please make a lab appointment for fasting labs  In January 5. Please make an appointment few days after the labs for ANNUAL EXAM  Be aware that sometimes it takes more than 3-4 weeks to find an appointment.   It would be advisable to schedule the appointment far in advance so you get get the care and the refills in time.

## 2021-12-31 ENCOUNTER — LAB (OUTPATIENT)
Dept: LAB | Facility: CLINIC | Age: 81
End: 2021-12-31
Payer: MEDICARE

## 2021-12-31 DIAGNOSIS — I10 HTN, GOAL BELOW 140/90: ICD-10-CM

## 2021-12-31 DIAGNOSIS — E78.5 HYPERLIPIDEMIA LDL GOAL <130: ICD-10-CM

## 2021-12-31 DIAGNOSIS — E55.9 VITAMIN D DEFICIENCY: ICD-10-CM

## 2021-12-31 DIAGNOSIS — D64.9 ANEMIA, UNSPECIFIED TYPE: ICD-10-CM

## 2021-12-31 DIAGNOSIS — N18.32 STAGE 3B CHRONIC KIDNEY DISEASE (H): ICD-10-CM

## 2021-12-31 LAB
ALBUMIN SERPL-MCNC: 3.3 G/DL (ref 3.4–5)
ALP SERPL-CCNC: 109 U/L (ref 40–150)
ALT SERPL W P-5'-P-CCNC: 24 U/L (ref 0–50)
ANION GAP SERPL CALCULATED.3IONS-SCNC: 7 MMOL/L (ref 3–14)
AST SERPL W P-5'-P-CCNC: 19 U/L (ref 0–45)
BILIRUB SERPL-MCNC: 0.4 MG/DL (ref 0.2–1.3)
BUN SERPL-MCNC: 29 MG/DL (ref 7–30)
CALCIUM SERPL-MCNC: 8.7 MG/DL (ref 8.5–10.1)
CHLORIDE BLD-SCNC: 98 MMOL/L (ref 94–109)
CHOLEST SERPL-MCNC: 150 MG/DL
CO2 SERPL-SCNC: 26 MMOL/L (ref 20–32)
CREAT SERPL-MCNC: 1.13 MG/DL (ref 0.52–1.04)
CREAT UR-MCNC: 84 MG/DL
ERYTHROCYTE [DISTWIDTH] IN BLOOD BY AUTOMATED COUNT: 12.5 % (ref 10–15)
FASTING STATUS PATIENT QL REPORTED: YES
GFR SERPL CREATININE-BSD FRML MDRD: 49 ML/MIN/1.73M2
GLUCOSE BLD-MCNC: 97 MG/DL (ref 70–99)
HCT VFR BLD AUTO: 32.8 % (ref 35–47)
HDLC SERPL-MCNC: 66 MG/DL
HGB BLD-MCNC: 10.7 G/DL (ref 11.7–15.7)
LDLC SERPL CALC-MCNC: 68 MG/DL
MCH RBC QN AUTO: 30.7 PG (ref 26.5–33)
MCHC RBC AUTO-ENTMCNC: 32.6 G/DL (ref 31.5–36.5)
MCV RBC AUTO: 94 FL (ref 78–100)
MICROALBUMIN UR-MCNC: 26 MG/L
MICROALBUMIN/CREAT UR: 30.95 MG/G CR (ref 0–25)
NONHDLC SERPL-MCNC: 84 MG/DL
PLATELET # BLD AUTO: 350 10E3/UL (ref 150–450)
POTASSIUM BLD-SCNC: 3.9 MMOL/L (ref 3.4–5.3)
PROT SERPL-MCNC: 6.8 G/DL (ref 6.8–8.8)
RBC # BLD AUTO: 3.48 10E6/UL (ref 3.8–5.2)
SODIUM SERPL-SCNC: 131 MMOL/L (ref 133–144)
TRIGL SERPL-MCNC: 82 MG/DL
TSH SERPL DL<=0.005 MIU/L-ACNC: 1.78 MU/L (ref 0.4–4)
WBC # BLD AUTO: 7.8 10E3/UL (ref 4–11)

## 2021-12-31 PROCEDURE — 80053 COMPREHEN METABOLIC PANEL: CPT

## 2021-12-31 PROCEDURE — 82043 UR ALBUMIN QUANTITATIVE: CPT

## 2021-12-31 PROCEDURE — 84443 ASSAY THYROID STIM HORMONE: CPT

## 2021-12-31 PROCEDURE — 85027 COMPLETE CBC AUTOMATED: CPT

## 2021-12-31 PROCEDURE — 36415 COLL VENOUS BLD VENIPUNCTURE: CPT

## 2021-12-31 PROCEDURE — 80061 LIPID PANEL: CPT

## 2022-01-07 ENCOUNTER — OFFICE VISIT (OUTPATIENT)
Dept: INTERNAL MEDICINE | Facility: CLINIC | Age: 82
End: 2022-01-07
Payer: MEDICARE

## 2022-01-07 VITALS
BODY MASS INDEX: 26.67 KG/M2 | HEART RATE: 78 BPM | WEIGHT: 178 LBS | RESPIRATION RATE: 16 BRPM | DIASTOLIC BLOOD PRESSURE: 76 MMHG | SYSTOLIC BLOOD PRESSURE: 124 MMHG | OXYGEN SATURATION: 96 %

## 2022-01-07 DIAGNOSIS — Z00.00 ROUTINE GENERAL MEDICAL EXAMINATION AT A HEALTH CARE FACILITY: Primary | ICD-10-CM

## 2022-01-07 DIAGNOSIS — D64.9 ANEMIA, UNSPECIFIED TYPE: ICD-10-CM

## 2022-01-07 DIAGNOSIS — E78.5 HYPERLIPIDEMIA LDL GOAL <130: Chronic | ICD-10-CM

## 2022-01-07 DIAGNOSIS — I10 HTN, GOAL BELOW 140/90: Chronic | ICD-10-CM

## 2022-01-07 DIAGNOSIS — I67.5 MOYAMOYA DISEASE: Chronic | ICD-10-CM

## 2022-01-07 DIAGNOSIS — N18.30 STAGE 3 CHRONIC KIDNEY DISEASE, UNSPECIFIED WHETHER STAGE 3A OR 3B CKD (H): Chronic | ICD-10-CM

## 2022-01-07 PROCEDURE — 99214 OFFICE O/P EST MOD 30 MIN: CPT | Mod: 25 | Performed by: INTERNAL MEDICINE

## 2022-01-07 PROCEDURE — G0439 PPPS, SUBSEQ VISIT: HCPCS | Performed by: INTERNAL MEDICINE

## 2022-01-07 ASSESSMENT — ENCOUNTER SYMPTOMS
HEARTBURN: 0
DIARRHEA: 0
SHORTNESS OF BREATH: 0
JOINT SWELLING: 0
DIZZINESS: 1
DYSURIA: 0
FREQUENCY: 0
NERVOUS/ANXIOUS: 0
SORE THROAT: 0
MYALGIAS: 0
HEMATURIA: 0
BREAST MASS: 0
NAUSEA: 0
EYE PAIN: 0
FEVER: 0
ARTHRALGIAS: 1
CONSTIPATION: 0
WEAKNESS: 0
COUGH: 0
ABDOMINAL PAIN: 0
CHILLS: 0
HEADACHES: 0
PARESTHESIAS: 0
HEMATOCHEZIA: 0
PALPITATIONS: 0

## 2022-01-07 ASSESSMENT — ACTIVITIES OF DAILY LIVING (ADL): CURRENT_FUNCTION: NO ASSISTANCE NEEDED

## 2022-01-07 NOTE — PATIENT INSTRUCTIONS
Plan:  1. If the blood pressure goes below 110 and you feel lightheaded decrease Amlodipine to 2.5 mg daily  2. Continue same meds, same doses for now   3. Please make a lab appointment for non fasting labs  Few days before July 18 appointment with nephrologist  4. Schedule follow up appointment with dr Farrell in Aug 2022

## 2022-01-07 NOTE — PROGRESS NOTES
Dr Farrell's note    Patient's instructions / PLAN:                                                        Plan:  1. If the blood pressure goes below 110 and you feel lightheaded decrease Amlodipine to 2.5 mg daily  2. Continue same meds, same doses for now   3. Please make a lab appointment for non fasting labs  Few days before July 18 appointment with nephrologist  4. Schedule follow up appointment with dr Farrell in Aug 2022    ASSESSMENT & PLAN:                                                      (Z00.00) Routine general medical examination at a health care facility  (primary encounter diagnosis)  Comment:   Plan: CBC with platelets, Iron & Iron Binding         Capacity, Folate, Vitamin B12, Ferritin, Basic         metabolic panel, Parathyroid Hormone Intact            (N18.30) Stage 3 chronic kidney disease, unspecified whether stage 3a or 3b CKD (H)  Comment: stable   Plan: CBC with platelets, Iron & Iron Binding         Capacity, Folate, Vitamin B12, Ferritin, Basic         metabolic panel, Parathyroid Hormone Intact            (I10) HTN, goal below 140/90  Comment: Controlled    Plan: CBC with platelets, Iron & Iron Binding         Capacity, Folate, Vitamin B12, Ferritin, Basic         metabolic panel, Parathyroid Hormone Intact            (E78.5) Hyperlipidemia LDL goal <130  Comment: Controlled    Plan: CBC with platelets, Iron & Iron Binding         Capacity, Folate, Vitamin B12, Ferritin, Basic         metabolic panel, Parathyroid Hormone Intact            (I67.5) Moyamoya disease  Comment: stable   Plan: CBC with platelets, Iron & Iron Binding         Capacity, Folate, Vitamin B12, Ferritin, Basic         metabolic panel, Parathyroid Hormone Intact            (D64.9) Anemia, unspecified type  Comment:  See below    Plan: CBC with platelets, Iron & Iron Binding         Capacity, Folate, Vitamin B12, Ferritin, Basic         metabolic panel, Parathyroid Hormone Intact               Chief Complaint:                                                         Annual exam  Follow up chronic medical problems      SUBJECTIVE:                                                    History of present illness     We reviewed the chronic medical problems as above.   I reviewed the recent tests results in Epic     Due to short of staff the patient was waiting in line to be checked in. She agrees to do the visit before the check in.     Labs Dec 31 - Discussed      Mild anemia -- normal ferritin May 2021  No blood in stools or urine    CKD  -- Cr - st  -- nephrol f/u in July 2022    HTN  -- home - 130    Lost wt intentionally and her goal is 160 lb    ROS:   Answers for HPI/ROS submitted by the patient on 1/7/2022  abdominal pain: No  Blood in stool: No  Blood in urine: No  chest pain: No  chills: No  congestion: No  constipation: No  cough: No  diarrhea: No  dizziness: Yes  ear pain: No  eye pain: No  nervous/anxious: No  fever: No  frequency: No  genital sores: No  headaches: No  hearing loss: No  heartburn: No  arthralgias: Yes  joint swelling: No  peripheral edema: No  mood changes: No  myalgias: No  nausea: No  dysuria: No  palpitations: No  Skin sensation changes: No  sore throat: No  urgency: No  rash: No  shortness of breath: No  visual disturbance: No  weakness: No  pelvic pain: No  vaginal bleeding: No  vaginal discharge: No  tenderness: No  breast mass: No  breast discharge: No        PMHx: - reviewed  Past Medical History:   Diagnosis Date     Anxiety state, unspecified     inactive     Cataract      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 F/u dr. Soto     OA  "(osteoarthritis)     s/p bilat total hips      Other chronic pain     Joint pain for many years     Unspecified cerebral artery occlusion with cerebral infarction     After Moyamoya surgery > 10 yrs ago.     Vitamin D deficiencies        PSHx: reviewed  Past Surgical History:   Procedure Laterality Date     ARTHROPLASTY KNEE Left 4/17/2017    Procedure: ARTHROPLASTY KNEE;  Left total knee arthroplasty;  Surgeon: Chidi Jenkins MD;  Location:  OR     COLONOSCOPY  2008    normal     COLONOSCOPY  7/17/2014    Procedure: COLONOSCOPY;  Surgeon: Raul Nolan DO;  Location:  GI     CRANIOTOMY      MOJAMOJA  \"Pt had repair of blood flow.\"     IR CAROTID ANGIOGRAM  10/30/2006     IR CAROTID ANGIOGRAM  6/6/2010     IR CAROTID ANGIOGRAM  6/6/2010     IR CAROTID ANGIOGRAM  6/6/2010     IR CAROTID ANGIOGRAM  5/12/2011     IR CAROTID ANGIOGRAM  5/12/2011     IR CAROTID ANGIOGRAM  5/12/2011     IR CAROTID ANGIOGRAM  6/5/2012     IR CAROTID ANGIOGRAM  6/5/2012     IR CAROTID ANGIOGRAM  6/5/2012     IR MISCELLANEOUS PROCEDURE  6/6/2010     IR MISCELLANEOUS PROCEDURE  6/6/2010     IR MISCELLANEOUS PROCEDURE  5/12/2011     IR MISCELLANEOUS PROCEDURE  6/5/2012     RECONSTRUCT FOREFOOT WITH METATARSOPHALANGEAL (MTP) FUSION Left 8/21/2014    Procedure: RECONSTRUCT FOREFOOT WITH METATARSOPHALANGEAL (MTP) FUSION;  Surgeon: Tres Merlos DPM;  Location:  OR     [facility] NONSPECIFIC PROCEDURE  10/30/06    neurosurgery Dr oSto     [facility] NONSPECIFIC PROCEDURE      bilateral total hips approx 2002     [facility] NONSPECIFIC PROCEDURE  3/07    neurosurgery         Soc Hx: No daily alcohol, no smoking  Social History     Socioeconomic History     Marital status:      Spouse name: Olu      Number of children: 2     Years of education: Not on file     Highest education level: Not on file   Occupational History     Employer: RETIRED   Tobacco Use     Smoking status: Never Smoker     Smokeless tobacco: Never Used   Vaping " Use     Vaping Use: Not on file   Substance and Sexual Activity     Alcohol use: Yes     Comment:  1-2 per day     Drug use: No     Sexual activity: Never     Partners: Male   Other Topics Concern     Parent/sibling w/ CABG, MI or angioplasty before 65F 55M? Not Asked   Social History Narrative     Not on file     Social Determinants of Health     Financial Resource Strain: Not on file   Food Insecurity: Not on file   Transportation Needs: Not on file   Physical Activity: Not on file   Stress: Not on file   Social Connections: Not on file   Intimate Partner Violence: Not on file   Housing Stability: Not on file        Fam Hx: reviewed  Family History   Problem Relation Age of Onset     Obesity Sister         gastric by-pass age age 60, heart murmur.     Cancer - colorectal Sister 69     Heart Disease Father         mi,  age 48     Family History Negative Mother         killed in MVA age 54     Cancer Maternal Aunt         lung cancer ,non-smoker     Lung Cancer Maternal Aunt      Breast Cancer Daughter 48         Screening: reviewed      All: reviewed    Meds: reviewed  Current Outpatient Medications   Medication Sig Dispense Refill     amLODIPine (NORVASC) 2.5 MG tablet Take 1 tablet (2.5 mg) by mouth 2 times daily 180 tablet 4     aspirin 81 MG tablet Take 1 tablet (81 mg) by mouth daily 30 tablet      atenolol (TENORMIN) 25 MG tablet Take 1 tablet (25 mg) by mouth daily 90 tablet 4     clopidogrel (PLAVIX) 75 MG tablet Take 1 tablet (75 mg) by mouth daily 90 tablet 4     cyanocobalamin (VITAMIN B-12) 500 MCG tablet Take 500 mcg by mouth daily       losartan (COZAAR) 50 MG tablet TAKE 1 TABLET BY MOUTH TWICE A  tablet 4     magnesium 500 MG TABS        Multiple Vitamins-Minerals (MULTIVITAMIN & MINERAL PO) Take 1 tablet by mouth daily.       simvastatin (ZOCOR) 20 MG tablet TAKE 1 TABLET (20 MG) BY MOUTH AT BEDTIME 90 tablet 4       OBJECTIVE:                                                   "  Physical Exam :  Blood pressure 124/76, pulse 78, resp. rate 16, weight 80.7 kg (178 lb), SpO2 96 %, not currently breastfeeding.       NAD, appears comfortable  Skin clear, no rashes  Neck: supple, no JVD,  no thyroidmegaly  Lymph nodes non palpable in the cervical, supraclavicular axillaries,   Chest: clear to auscultation with good respiratory effort  Cardiac: S1S2, RRR, no mgr appreciated  Abdomen: soft, not tender, not distended, audible bowel sound, no hepatosplenomegaly, no palpable masses, no abdominal bruits  Extremities: no cyanosis, clubbing or edema.   Neuro: A, Ox3, no focal signs.  Breast exam in supine and erect position: they are symmetrical, no skin changes, no tenderness or nodes on palpation. Nipples are erect, no skin lesions, no discharge on pressure.    Pelvic exam: deferred, s/p menopause, no symptoms, no hx of abnormal pap         Chani Farrell MD  Internal Medicine           SUBJECTIVE:   Candida Rose is a 81 year old female who presents for Preventive Visit.      Patient has been advised of split billing requirements and indicates understanding: No   Are you in the first 12 months of your Medicare coverage?  No    Healthy Habits:     In general, how would you rate your overall health?  Excellent    Frequency of exercise:  4-5 days/week    Duration of exercise:  45-60 minutes    Do you usually eat at least 4 servings of fruit and vegetables a day, include whole grains    & fiber and avoid regularly eating high fat or \"junk\" foods?  Yes    Taking medications regularly:  Yes    Medication side effects:  Lightheadedness    Ability to successfully perform activities of daily living:  No assistance needed    Home Safety:  No safety concerns identified    Hearing Impairment:  No hearing concerns    In the past 6 months, have you been bothered by leaking of urine?  No    In general, how would you rate your overall mental or emotional health?  Excellent      PHQ-2 Total Score: 0    Additional " concerns today:  No         Do you feel safe in your environment? Yes    Have you ever done Advance Care Planning? (For example, a Health Directive, POLST, or a discussion with a medical provider or your loved ones about your wishes): Yes, advance care planning is on file.       Fall risk  Fallen 2 or more times in the past year?: No  Any fall with injury in the past year?: No     Cognitive Screening   1) Repeat 3 items (Leader, Season, Table)    2) Clock draw: NORMAL  3) 3 item recall: Recalls 3 objects  Results: 3 items recalled: COGNITIVE IMPAIRMENT LESS LIKELY    Mini-CogTM Copyright S Kavon. Licensed by the author for use in [facility]; reprinted with permission (law@[facility]). All rights reserved.      Do you have sleep apnea, excessive snoring or daytime drowsiness?: no    Reviewed and updated as needed this visit by clinical staff  Tobacco               Reviewed and updated as needed this visit by Provider  Tobacco              Social History     Tobacco Use     Smoking status: Never Smoker     Smokeless tobacco: Never Used   Substance Use Topics     Alcohol use: Yes     Comment:  1-2 per day         Alcohol Use 1/7/2022   Prescreen: >3 drinks/day or >7 drinks/week? No   Prescreen: >3 drinks/day or >7 drinks/week? -               Current providers sharing in care for this patient include:   Patient Care Team:  Chani Peoples MD as PCP - General (Internal Medicine)  Chani Peoples MD as Assigned PCP  Imtiaz Griffin MD as Assigned Nephrology Provider    The following health maintenance items are reviewed in Epic and correct as of today:  Health Maintenance Due   Topic Date Due     ANNUAL REVIEW OF HM ORDERS  Never done     MEDICARE ANNUAL WELLNESS VISIT  01/04/2022     Labs reviewed in EPIC        Pertinent mammograms are reviewed under the imaging tab.          Patient has been advised of split billing requirements and indicates understanding: Yes  "At the check in, at the    COUNSELING:  Reviewed preventive health counseling, as reflected in patient instructions       Regular exercise       Healthy diet/nutrition    Estimated body mass index is 26.67 kg/m  as calculated from the following:    Height as of 8/31/21: 1.74 m (5' 8.5\").    Weight as of this encounter: 80.7 kg (178 lb).    Weight management plan: Discussed healthy diet and exercise guidelines    She reports that she has never smoked. She has never used smokeless tobacco.      Appropriate preventive services were discussed with this patient, including applicable screening as appropriate for cardiovascular disease, diabetes, osteopenia/osteoporosis, and glaucoma.  As appropriate for age/gender, discussed screening for colorectal cancer, prostate cancer, breast cancer, and cervical cancer. Checklist reviewing preventive services available has been given to the patient.    Reviewed patients plan of care and provided an AVS. The Basic Care Plan (routine screening as documented in Health Maintenance) for Candida meets the Care Plan requirement. This Care Plan has been established and reviewed with the Patient.    Counseling Resources:  ATP IV Guidelines  Pooled Cohorts Equation Calculator  Breast Cancer Risk Calculator  Breast Cancer: Medication to Reduce Risk  FRAX Risk Assessment  ICSI Preventive Guidelines  Dietary Guidelines for Americans, 2010  BATS's MyPlate  ASA Prophylaxis  Lung CA Screening    Chani Peoples MD  [facility]    Identified Health Risks:  "

## 2022-01-09 DIAGNOSIS — I10 HTN, GOAL BELOW 140/90: Chronic | ICD-10-CM

## 2022-01-09 DIAGNOSIS — E78.5 HYPERLIPIDEMIA LDL GOAL <130: Chronic | ICD-10-CM

## 2022-01-09 DIAGNOSIS — I67.5 MOYAMOYA DISEASE: Chronic | ICD-10-CM

## 2022-01-11 RX ORDER — AMLODIPINE BESYLATE 2.5 MG/1
TABLET ORAL
Qty: 180 TABLET | Refills: 4 | OUTPATIENT
Start: 2022-01-11

## 2022-01-11 RX ORDER — LOSARTAN POTASSIUM 50 MG/1
TABLET ORAL
Qty: 180 TABLET | Refills: 4 | OUTPATIENT
Start: 2022-01-11

## 2022-01-11 RX ORDER — CLOPIDOGREL BISULFATE 75 MG/1
TABLET ORAL
Qty: 90 TABLET | Refills: 4 | OUTPATIENT
Start: 2022-01-11

## 2022-01-11 RX ORDER — ATENOLOL 25 MG/1
TABLET ORAL
Qty: 90 TABLET | Refills: 4 | OUTPATIENT
Start: 2022-01-11

## 2022-01-11 RX ORDER — SIMVASTATIN 20 MG
TABLET ORAL
Qty: 90 TABLET | Refills: 4 | OUTPATIENT
Start: 2022-01-11

## 2022-01-11 NOTE — TELEPHONE ENCOUNTER
Routing refill request to provider for review/approval because:  Drug interaction warning  Labs out of range:    Maricel RAMIREZ RN, BSN

## 2022-01-13 DIAGNOSIS — I67.5 MOYAMOYA DISEASE: Chronic | ICD-10-CM

## 2022-01-14 RX ORDER — CLOPIDOGREL BISULFATE 75 MG/1
TABLET ORAL
Qty: 90 TABLET | Refills: 4 | OUTPATIENT
Start: 2022-01-14

## 2022-01-30 DIAGNOSIS — I10 HTN, GOAL BELOW 140/90: Chronic | ICD-10-CM

## 2022-01-30 DIAGNOSIS — E78.5 HYPERLIPIDEMIA LDL GOAL <130: Chronic | ICD-10-CM

## 2022-01-30 DIAGNOSIS — I67.5 MOYAMOYA DISEASE: Chronic | ICD-10-CM

## 2022-01-31 RX ORDER — ATENOLOL 25 MG/1
TABLET ORAL
Qty: 90 TABLET | Refills: 1 | Status: SHIPPED | OUTPATIENT
Start: 2022-01-31 | End: 2022-08-02

## 2022-01-31 RX ORDER — AMLODIPINE BESYLATE 2.5 MG/1
2.5 TABLET ORAL 2 TIMES DAILY
Qty: 180 TABLET | Refills: 1 | Status: SHIPPED | OUTPATIENT
Start: 2022-01-31 | End: 2022-08-11

## 2022-01-31 RX ORDER — CLOPIDOGREL BISULFATE 75 MG/1
75 TABLET ORAL DAILY
Qty: 90 TABLET | Refills: 1 | Status: SHIPPED | OUTPATIENT
Start: 2022-01-31 | End: 2022-08-11

## 2022-01-31 RX ORDER — SIMVASTATIN 20 MG
TABLET ORAL
Qty: 90 TABLET | Refills: 1 | Status: SHIPPED | OUTPATIENT
Start: 2022-01-31 | End: 2022-08-09

## 2022-01-31 RX ORDER — LOSARTAN POTASSIUM 50 MG/1
TABLET ORAL
Qty: 180 TABLET | Refills: 1 | Status: SHIPPED | OUTPATIENT
Start: 2022-01-31 | End: 2022-08-11

## 2022-02-11 ENCOUNTER — HOSPITAL ENCOUNTER (OUTPATIENT)
Dept: MAMMOGRAPHY | Facility: CLINIC | Age: 82
Discharge: HOME | End: 2022-02-11
Attending: INTERNAL MEDICINE | Admitting: INTERNAL MEDICINE
Payer: MEDICARE

## 2022-02-11 DIAGNOSIS — Z12.31 SCREENING MAMMOGRAM FOR HIGH-RISK PATIENT: ICD-10-CM

## 2022-02-11 PROCEDURE — 77067 SCR MAMMO BI INCL CAD: CPT

## 2022-03-02 ENCOUNTER — TRANSFERRED RECORDS (OUTPATIENT)
Dept: HEALTH INFORMATION MANAGEMENT | Facility: CLINIC | Age: 82
End: 2022-03-02
Payer: MEDICARE

## 2022-03-11 ENCOUNTER — ANCILLARY PROCEDURE (OUTPATIENT)
Dept: GENERAL RADIOLOGY | Facility: CLINIC | Age: 82
End: 2022-03-11
Attending: STUDENT IN AN ORGANIZED HEALTH CARE EDUCATION/TRAINING PROGRAM
Payer: MEDICARE

## 2022-03-11 ENCOUNTER — OFFICE VISIT (OUTPATIENT)
Dept: ORTHOPEDICS | Facility: CLINIC | Age: 82
End: 2022-03-11
Payer: MEDICARE

## 2022-03-11 VITALS
HEIGHT: 69 IN | SYSTOLIC BLOOD PRESSURE: 134 MMHG | BODY MASS INDEX: 25.33 KG/M2 | DIASTOLIC BLOOD PRESSURE: 60 MMHG | WEIGHT: 171 LBS

## 2022-03-11 DIAGNOSIS — M25.512 CHRONIC LEFT SHOULDER PAIN: ICD-10-CM

## 2022-03-11 DIAGNOSIS — M25.512 CHRONIC LEFT SHOULDER PAIN: Primary | ICD-10-CM

## 2022-03-11 DIAGNOSIS — G89.29 CHRONIC LEFT SHOULDER PAIN: ICD-10-CM

## 2022-03-11 DIAGNOSIS — M79.644 FINGER PAIN, RIGHT: ICD-10-CM

## 2022-03-11 DIAGNOSIS — G89.29 CHRONIC LEFT SHOULDER PAIN: Primary | ICD-10-CM

## 2022-03-11 DIAGNOSIS — M19.012 ARTHRITIS OF LEFT GLENOHUMERAL JOINT: ICD-10-CM

## 2022-03-11 PROCEDURE — 99204 OFFICE O/P NEW MOD 45 MIN: CPT | Performed by: STUDENT IN AN ORGANIZED HEALTH CARE EDUCATION/TRAINING PROGRAM

## 2022-03-11 PROCEDURE — 73030 X-RAY EXAM OF SHOULDER: CPT | Mod: LT | Performed by: RADIOLOGY

## 2022-03-11 PROCEDURE — 73140 X-RAY EXAM OF FINGER(S): CPT | Mod: RT | Performed by: RADIOLOGY

## 2022-03-11 NOTE — LETTER
3/11/2022         RE: Candida Rose  [address] [address]        Dear Colleague,    Thank you for referring your patient, Candida Rose, to the [facility] [place]. Please see a copy of my visit note below.    ASSESSMENT & PLAN    1. Chronic left shoulder pain    2. Arthritis of left glenohumeral joint    3. Finger pain, right      Candida Rose is a 81 year old right handed female presenting for evaluation of chronic left shoulder pain since fall 4 years ago. History, examination and XR imaging are consistent with advanced osteoarthritis of the glenohumeral joint. We reviewed treatment plan inclusive of pain management (topicals, NSAIDS, steroid injection), activity modification (avoiding painful activities), formal physical therapy and referral to a shoulder surgeon to discuss shoulder replacement.    At this time, plan to proceed with the following:  - Formal physical therapy - exercises to include passive stretching (walk up wall, towel, and armpit stretches), gentle range of motion exercises (pendulum), and rotator cuff isometric strengthening exercises with use of modalities (heat) with home exercise prescription.  - Heat as needed for comfort  - Keep the shoulder moving with gentle range of motion throughout the day.  - Okay to take Tylenol 1000 mg up to   - Avoid NSAIDS (Aleve, Ibuprofen ,etc) due to decreased kidney function.  - I recommend asking your pharmacist or primary care doctor about trying Turmeric with black pepper 500 mg twice daily. This is an herbal anti-inflammatory supplement that has been found to be equally effective as Ibuprofen for treatment of knee arthritis pain and inflammation. At high doses, this medication can be blood-thinning. I would like you to check about the interaction with Plavix before starting.    If unable to progress with PT due to pain, then return for consideration of ultrasound-guided left shoulder cortisone  injection.     Additionally, pain over the right index fingertip is consistent with cracked dry skin. XR without evidence of significant degenerative joint disease or foreign body. Exam also reassuring against foreign body or infection. Recommend warm soaks and vaseline.    Please schedule a follow up appointment to see me in 6-8 weeks, or sooner as needed for persistence or worsening of pain. You may call our direct clinic number ([phone]) at any time with questions or concerns.    Aysha Bustamante MD, [facility]      -----    SUBJECTIVE  Candida Rose is a/an 81 year old Right handed female who is seen as a self referral for evaluation of left shoulder and right index finger pain. The patient is seen by themselves.    Onset: 4 years(s) ago. Patient describes injury as she fell and landed on left shoulder. Patient was evaluated by Dr. Jenkins in 2018 at which time exam was consistent with contusion from fall. She notes pain has not improved.  Location of Pain: left posterior and lateral shoulder  Rating of Pain at worst: 7/10  Rating of Pain Currently: 4/10  Worsened by: sleeping on left shoulder, turning over in bed  Better with: Aleve PRN  Treatments tried: rest/activity avoidance, Aleve and massage  Associated symptoms: no weakness or mechanical symptoms, no radiation, no distal numbness or tingling; denies swelling or warmth    Orthopedic history: YES - Patient also complains of pain right index finger ongoing for 1-2 years. She believes she may have cut the finger while cooking. Pain is located on the tip of the finger. Pain increases with cooking and typing.    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 F/u dr. Soto     OA (osteoarthritis)     s/p bilat total hips      Other chronic pain     Joint pain for many years     Unspecified cerebral artery occlusion with cerebral infarction     After Moyamoya surgery > 10 yrs ago.     Vitamin D deficiencies      Social History     Socioeconomic History     Marital status:      Spouse name: Olu      Number of children: 2     Years of education: Not on file     Highest education level: Not on file   Occupational History     Employer: RETIRED   Tobacco Use     Smoking status: Never Smoker     Smokeless tobacco: Never Used   Vaping Use     Vaping Use: Not on file   Substance and Sexual Activity     Alcohol use: Yes     Comment:  1-2 per day     Drug use: No     Sexual activity: Never     Partners: Male   Other Topics Concern     Parent/sibling w/ CABG, MI or angioplasty before 65F 55M? Not Asked   Social History Narrative     Not on file     Social Determinants of Health     Financial Resource Strain: Not on file   Food Insecurity: Not on file   Transportation Needs: Not on file   Physical Activity: Not on file   Stress: Not on file   Social Connections: Not on file   Intimate Partner Violence: Not on file   Housing Stability: Not on file         Patient's past medical, surgical, social, and family histories were reviewed today and no changes are noted.    REVIEW OF SYSTEMS:  10 point ROS is negative other than symptoms noted above in HPI, Past Medical History or as stated below  Constitutional: NEGATIVE for fever, chills, change in weight  Skin: NEGATIVE for worrisome rashes, moles or lesions  GI/: NEGATIVE for bowel or bladder changes  Neuro: NEGATIVE for weakness, dizziness or paresthesias    OBJECTIVE:  /60   " Ht 1.74 m (5' 8.5\")   Wt 77.6 kg (171 lb)   LMP  (LMP Unknown)   BMI 25.62 kg/m     General: healthy, alert and in no distress  HEENT: no scleral icterus or conjunctival erythema  Skin: no suspicious lesions or rash. No jaundice.  CV: regular rhythm by palpation  Resp: normal respiratory effort without conversational dyspnea   Psych: normal mood and affect  Gait: normal steady gait with appropriate coordination and balance  Neuro: normal light touch sensory exam of the bilateral upper extremities.    MSK:  LEFT SHOULDER  Inspection:    no swelling, bruising, discoloration, or obvious deformity or asymmetry  Palpation:    Tender about the posterior capsule (mild). Remainder of bony and tendinous landmarks are nontender.  Active Range of Motion:     Abduction 800, FF 1350, , IR L4.      Scapular dyskinesis absent  Strength:    Scapular plane abduction 5-/5,  ER 5-/5, IR 5-/5, biceps 5/5, triceps 5/5  Special Tests:    Positive: Neer's and painful arc    Negative: March', supraspinatus (empty can), Speed's and Yergason's    RIGHT HAND    Dry cracked skin of the right index fingertip adjacent to nail. No swelling, erythema, warmth, flucatuance. Mild TTP. Intact flexion and extension. No joint instability.    Independent visualization of the below image:    XR LEFT SHOULDER 3/11/22  Per my review, advanced glenohumeral osteoarthritis without acute fracture or dislocation.      Aysha Bustamante MD, [facility]        Again, thank you for allowing me to participate in the care of your patient.        Sincerely,        Aysha Bustamante MD    "

## 2022-03-11 NOTE — PROGRESS NOTES
ASSESSMENT & PLAN    1. Chronic left shoulder pain    2. Arthritis of left glenohumeral joint    3. Finger pain, right      Candida Rose is a 81 year old right handed female presenting for evaluation of chronic left shoulder pain since fall 4 years ago. History, examination and XR imaging are consistent with advanced osteoarthritis of the glenohumeral joint. We reviewed treatment plan inclusive of pain management (topicals, NSAIDS, steroid injection), activity modification (avoiding painful activities), formal physical therapy and referral to a shoulder surgeon to discuss shoulder replacement.    At this time, plan to proceed with the following:  - Formal physical therapy - exercises to include passive stretching (walk up wall, towel, and armpit stretches), gentle range of motion exercises (pendulum), and rotator cuff isometric strengthening exercises with use of modalities (heat) with home exercise prescription.  - Heat as needed for comfort  - Keep the shoulder moving with gentle range of motion throughout the day.  - Okay to take Tylenol 1000 mg up to   - Avoid NSAIDS (Aleve, Ibuprofen ,etc) due to decreased kidney function.  - I recommend asking your pharmacist or primary care doctor about trying Turmeric with black pepper 500 mg twice daily. This is an herbal anti-inflammatory supplement that has been found to be equally effective as Ibuprofen for treatment of knee arthritis pain and inflammation. At high doses, this medication can be blood-thinning. I would like you to check about the interaction with Plavix before starting.    If unable to progress with PT due to pain, then return for consideration of ultrasound-guided left shoulder cortisone injection.     Additionally, pain over the right index fingertip is consistent with cracked dry skin. XR without evidence of significant degenerative joint disease or foreign body. Exam also reassuring against foreign body or infection. Recommend warm soaks and  vaseline.    Please schedule a follow up appointment to see me in 6-8 weeks, or sooner as needed for persistence or worsening of pain. You may call our direct clinic number ([phone]) at any time with questions or concerns.    Aysha Bustamante MD, [facility]      -----    SUBJECTIVE  Candida Rose is a/an 81 year old Right handed female who is seen as a self referral for evaluation of left shoulder and right index finger pain. The patient is seen by themselves.    Onset: 4 years(s) ago. Patient describes injury as she fell and landed on left shoulder. Patient was evaluated by Dr. Jenkins in 2018 at which time exam was consistent with contusion from fall. She notes pain has not improved.  Location of Pain: left posterior and lateral shoulder  Rating of Pain at worst: 7/10  Rating of Pain Currently: 4/10  Worsened by: sleeping on left shoulder, turning over in bed  Better with: Aleve PRN  Treatments tried: rest/activity avoidance, Aleve and massage  Associated symptoms: no weakness or mechanical symptoms, no radiation, no distal numbness or tingling; denies swelling or warmth    Orthopedic history: YES - Patient also complains of pain right index finger ongoing for 1-2 years. She believes she may have cut the finger while cooking. Pain is located on the tip of the finger. Pain increases with cooking and typing.    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 F/u   "Charles     OA (osteoarthritis)     s/p bilat total hips      Other chronic pain     Joint pain for many years     Unspecified cerebral artery occlusion with cerebral infarction     After Moyamoya surgery > 10 yrs ago.     Vitamin D deficiencies      Social History     Socioeconomic History     Marital status:      Spouse name: Olu      Number of children: 2     Years of education: Not on file     Highest education level: Not on file   Occupational History     Employer: RETIRED   Tobacco Use     Smoking status: Never Smoker     Smokeless tobacco: Never Used   Vaping Use     Vaping Use: Not on file   Substance and Sexual Activity     Alcohol use: Yes     Comment:  1-2 per day     Drug use: No     Sexual activity: Never     Partners: Male   Other Topics Concern     Parent/sibling w/ CABG, MI or angioplasty before 65F 55M? Not Asked   Social History Narrative     Not on file     Social Determinants of Health     Financial Resource Strain: Not on file   Food Insecurity: Not on file   Transportation Needs: Not on file   Physical Activity: Not on file   Stress: Not on file   Social Connections: Not on file   Intimate Partner Violence: Not on file   Housing Stability: Not on file         Patient's past medical, surgical, social, and family histories were reviewed today and no changes are noted.    REVIEW OF SYSTEMS:  10 point ROS is negative other than symptoms noted above in HPI, Past Medical History or as stated below  Constitutional: NEGATIVE for fever, chills, change in weight  Skin: NEGATIVE for worrisome rashes, moles or lesions  GI/: NEGATIVE for bowel or bladder changes  Neuro: NEGATIVE for weakness, dizziness or paresthesias    OBJECTIVE:  /60   Ht 1.74 m (5' 8.5\")   Wt 77.6 kg (171 lb)   LMP  (LMP Unknown)   BMI 25.62 kg/m     General: healthy, alert and in no distress  HEENT: no scleral icterus or conjunctival erythema  Skin: no suspicious lesions or rash. No jaundice.  CV: regular rhythm " by palpation  Resp: normal respiratory effort without conversational dyspnea   Psych: normal mood and affect  Gait: normal steady gait with appropriate coordination and balance  Neuro: normal light touch sensory exam of the bilateral upper extremities.    MSK:  LEFT SHOULDER  Inspection:    no swelling, bruising, discoloration, or obvious deformity or asymmetry  Palpation:    Tender about the posterior capsule (mild). Remainder of bony and tendinous landmarks are nontender.  Active Range of Motion:     Abduction 800, FF 1350, , IR L4.      Scapular dyskinesis absent  Strength:    Scapular plane abduction 5-/5,  ER 5-/5, IR 5-/5, biceps 5/5, triceps 5/5  Special Tests:    Positive: Neer's and painful arc    Negative: March', supraspinatus (empty can), Speed's and Yergason's    RIGHT HAND    Dry cracked skin of the right index fingertip adjacent to nail. No swelling, erythema, warmth, flucatuance. Mild TTP. Intact flexion and extension. No joint instability.    Independent visualization of the below image:    XR LEFT SHOULDER 3/11/22  Per my review, advanced glenohumeral osteoarthritis without acute fracture or dislocation.      Aysha Bustamante MD, [facility]

## 2022-03-11 NOTE — PATIENT INSTRUCTIONS
1. Chronic left shoulder pain    2. Arthritis of left glenohumeral joint    3. Finger pain, right      Candida Rose is a 81 year old right handed female presenting for evaluation of chronic left shoulder pain since fall 4 years ago. History, examination and XR imaging are consistent with advanced osteoarthritis of the glenohumeral joint. We reviewed treatment plan inclusive of pain management (topicals, NSAIDS, steroid injection), activity modification (avoiding painful activities), formal physical therapy and referral to a shoulder surgeon to discuss shoulder replacement.    At this time, plan to proceed with the following:  - Formal physical therapy - exercises to include passive stretching (walk up wall, towel, and armpit stretches), gentle range of motion exercises (pendulum), and rotator cuff isometric strengthening exercises with use of modalities (heat) with home exercise prescription.  - Heat as needed for comfort  - Keep the shoulder moving with gentle range of motion throughout the day.  - Okay to take Tylenol 1000 mg up to   - Avoid NSAIDS (Aleve, Ibuprofen ,etc) due to decreased kidney function.  - I recommend asking your pharmacist or primary care doctor about trying Turmeric with black pepper 500 mg twice daily. This is an herbal anti-inflammatory supplement that has been found to be equally effective as Ibuprofen for treatment of knee arthritis pain and inflammation. At high doses, this medication can be blood-thinning. I would like you to check about the interaction with Plavix before starting.  - Additional supplements to consider for arthritis: Fish oil, glucosamine-chondroitin    If unable to progress with PT due to pain, then return for consideration of ultrasound-guided left shoulder cortisone injection.     Please schedule a follow up appointment to see me in 6-8 weeks, or sooner as needed for persistence or worsening of pain. You may call our direct clinic number ([phone]) at any time  with questions or concerns.    Aysha Bustamante MD, CAQSM  [facility]

## 2022-03-23 ENCOUNTER — THERAPY VISIT (OUTPATIENT)
Dept: PHYSICAL THERAPY | Facility: CLINIC | Age: 82
End: 2022-03-23
Attending: STUDENT IN AN ORGANIZED HEALTH CARE EDUCATION/TRAINING PROGRAM
Payer: MEDICARE

## 2022-03-23 DIAGNOSIS — G89.29 CHRONIC LEFT SHOULDER PAIN: ICD-10-CM

## 2022-03-23 DIAGNOSIS — M19.012 ARTHRITIS OF LEFT GLENOHUMERAL JOINT: ICD-10-CM

## 2022-03-23 DIAGNOSIS — M25.512 CHRONIC LEFT SHOULDER PAIN: ICD-10-CM

## 2022-03-23 PROCEDURE — 97161 PT EVAL LOW COMPLEX 20 MIN: CPT | Mod: GP | Performed by: PHYSICAL THERAPIST

## 2022-03-23 PROCEDURE — 97110 THERAPEUTIC EXERCISES: CPT | Mod: GP | Performed by: PHYSICAL THERAPIST

## 2022-03-23 NOTE — PROGRESS NOTES
[facility]: Physical Therapy Initial Evaluation   Mar 23, 2022  Comments/Precautions/Restrictions/Physician instructions:   Per Orders/Notes: - Formal physical therapy - exercises to include passive stretching (walk up wall, towel, and armpit stretches), gentle range of motion exercises (pendulum), and rotator cuff isometric strengthening exercises with use of modalities (heat) with home exercise prescription.  - Heat as needed for comfort  - Keep the shoulder moving with gentle range of motion throughout the day.  - Okay to take Tylenol 1000 mg up to   - Avoid NSAIDS (Aleve, Ibuprofen ,etc) due to decreased kidney function.    Subjective:   Chief Complaint: Left shoulder pain   Pain: in the left shoulder, all over and deep   Numbness/Tingling: None   Weakness: Not really   Stiffness: in the left shoulder  New/Recurrent/Chronic: Chronic  DOI/onset: 3 years ago   Referral Date: 3/11/2022 - Aysha Bynum MD  Mechanism of onset: fell on the left shoulder  PMH/surgical history/trauma:    - CKD   - HTN   - PAD   - Surgical History: on the hips, knees, and feet  General health as reported by patient: Excellent  Medications: HTN,   Occupation: Retired () Job duties: computer work, prolonged sitting, housework  Previous Treatment (Effect): aspirin (helps) ; heat (helpful) ;   Imaging: 3/11/2022 - Left Shoulder X-Ray IMPRESSION: Severe end-stage left glenohumeral joint osteoarthritis with bone-on-bone abutment, subchondral cyst formation and marginal spurring. No significant diminishment in glenoid bone stalk. Acquired glenoid retroversion. No acute shoulder fracture or dislocation. Mild to moderate left acromioclavicular joint osteoarthritis. Subacromial spur. Calcified granuloma left mid lung zone. Atherosclerotic vascular disease at the aortic knob.  AM/PM: more at night  Quality of Pain: depends, very inconsistent  Pain: 0/10 at present, 7/10 at worst  Worse: rolling  on the left shoulder,   Better: see above  Progression of Symptoms since onset: getting worse   Current Functional Status: SEE GOALS FLOWSHEET  - Sleeping: Laying on the left side is painful.   - Driving - compensates with driving by using the right arm.    Current HEP/exercise regimen: swimming, walking,   Hand/Leg Dominance: right handed  Transportation to Therapy: Independent with transportation  Live with Others: , no pets,     Patient's goal(s): To have less pain or no pain when rolling over in bed.         Objective:    Standing Posture: mild forward head posture    Scapular Positioning: elevated on the left    Shoulder: (* indicates patient's pain)   PROM L AROM R AROM L   Flex/  Elevation 142 154 121*   Abd 109 162 93   ER  86 51   IR/Ext  L2 L4*     Scapulothoracic Rhythm: mild deviation on the left     Palpation: Tenderness to palpation in the left anterior shoulder. Lack of muscle bulk at the supraspinatus, right more than left.               Assessment/Plan:    Patient is a 81 year old female with left side shoulder complaints.    Patient has the following significant findings with corresponding treatment plan.                Referring Diagnosis: Chronic left shoulder pain ; Arthritis of left glenohumeral joint     Pain -  hot/cold therapy, manual therapy, splint/taping/bracing/orthotics, self management, education and home program  Decreased ROM/flexibility - manual therapy, therapeutic exercise, therapeutic activity and home program  Decreased joint mobility - manual therapy, therapeutic exercise, therapeutic activity and home program  Decreased strength - therapeutic exercise, therapeutic activities and home program  Impaired muscle performance - neuro re-education and home program  Decreased function - therapeutic activities and home program  Impaired posture - neuro re-education, therapeutic activities and home program        Therapy Evaluation Codes:   1) History comprised of:   Personal  factors that impact the plan of care:      None.    Comorbidity factors that impact the plan of care are:      None.     Medications impacting care: None.  2) Examination of Body Systems comprised of:   Body structures and functions that impact the plan of care:      Shoulder and Thoracic Spine.   Activity limitations that impact the plan of care are:      Driving and Sleeping.  3) Clinical presentation characteristics are:   Evolving/Changing.  4) Decision-Making    Low complexity using standardized patient assessment instrument and/or measureable assessment of functional outcome.  Cumulative Therapy Evaluation is: Low complexity.    Previous and current functional limitations:  (See Goal Flow Sheet for this information)    Short term and Long term goals: (See Goal Flow Sheet for this information)     Communication ability:  Patient appears to be able to clearly communicate and understand verbal and written communication and follow directions correctly.  Treatment Explanation - The following has been discussed with the patient:   RX ordered/plan of care  Anticipated outcomes  Possible risks and side effects  This patient would benefit from PT intervention to resume normal activities.   Rehab potential is good.    Frequency:  1 X week, once daily  Duration:  for 4 weeks tapering to 2 X a month over 6 weeks  Discharge Plan:  Achieve all LTG.  Independent in home treatment program.  Reach maximal therapeutic benefit.    Please refer to the daily flowsheet for treatment today, total treatment time and time spent performing 1:1 timed codes.

## 2022-03-24 PROBLEM — M25.512 CHRONIC LEFT SHOULDER PAIN: Status: ACTIVE | Noted: 2022-03-24

## 2022-03-24 PROBLEM — G89.29 CHRONIC LEFT SHOULDER PAIN: Status: ACTIVE | Noted: 2022-03-24

## 2022-03-24 NOTE — PROGRESS NOTES
[facility]    OUTPATIENT Physical Therapy ORTHOPEDIC EVALUATION  PLAN OF TREATMENT FOR OUTPATIENT REHABILITATION  (COMPLETE FOR INITIAL CLAIMS ONLY)  Patient's Last Name, First Name, M.I.  YOB: 1940  Candida Rose    Provider s Name:  [facility]   Medical Record No.  [id number]   Start of Care Date:  03/23/22   Onset Date:   03/11/22   Type:     _X__PT   ___OT Medical Diagnosis:    Encounter Diagnoses   Name Primary?     Chronic left shoulder pain      Arthritis of left glenohumeral joint         Treatment Diagnosis:  L Shoulder Pain        Goals:     03/23/22 0500   Body Part   Goals listed below are for L Shoulder   Goal #1   Goal #1 sleeping   Performance Level Unable to lay on the elft side due ot pain   STG Target Performance Minutes pt able to lie on affected side   Performance Level 30   Rationale to establish restorative sleep pattern   Due Date 04/21/22   LTG Target Performance Hours pt able to lie on affected side   Performance level as needed for a full nights sleep   Rationale to establish restorative sleep pattern   Due Date 06/02/22   Goal #2   Goal #2 driving/transportation   Previous Functional Level No restrictions   Performance Level Has to compensate for the left upper extremity   STG Target Performance Able to use 2 hands to steer car   Rationale for safe driving   Due date 04/21/22   LTG Target Performance Able to use 2 hands to steer car   Performance Level without pain   Rationale for safe driving   Due date 06/02/22       Therapy Frequency:  1x per week  Predicted Duration of Therapy Intervention:  10 weeks    Carlos Alberto Moralez I CERTIFY THE NEED FOR THESE SERVICES FURNISHED UNDER        THIS PLAN OF TREATMENT AND WHILE UNDER MY CARE     (Physician attestation of this document indicates review and certification of the therapy plan).                        Certification Date From:  03/23/22   Certification Date To:  06/09/22    Referring Provider:  Aysha Bustamante    Initial Assessment        See Epic Evaluation SOC Date: 03/23/22

## 2022-03-29 ENCOUNTER — THERAPY VISIT (OUTPATIENT)
Dept: PHYSICAL THERAPY | Facility: CLINIC | Age: 82
End: 2022-03-29
Payer: MEDICARE

## 2022-03-29 DIAGNOSIS — G89.29 CHRONIC LEFT SHOULDER PAIN: ICD-10-CM

## 2022-03-29 DIAGNOSIS — M25.512 CHRONIC LEFT SHOULDER PAIN: ICD-10-CM

## 2022-03-29 PROCEDURE — 97110 THERAPEUTIC EXERCISES: CPT | Mod: GP | Performed by: PHYSICAL THERAPIST

## 2022-03-29 PROCEDURE — 97140 MANUAL THERAPY 1/> REGIONS: CPT | Mod: GP | Performed by: PHYSICAL THERAPIST

## 2022-04-11 ENCOUNTER — THERAPY VISIT (OUTPATIENT)
Dept: PHYSICAL THERAPY | Facility: CLINIC | Age: 82
End: 2022-04-11
Payer: MEDICARE

## 2022-04-11 DIAGNOSIS — M25.512 CHRONIC LEFT SHOULDER PAIN: Primary | ICD-10-CM

## 2022-04-11 DIAGNOSIS — G89.29 CHRONIC LEFT SHOULDER PAIN: Primary | ICD-10-CM

## 2022-04-11 PROCEDURE — 97110 THERAPEUTIC EXERCISES: CPT | Mod: GP | Performed by: PHYSICAL THERAPY ASSISTANT

## 2022-04-11 PROCEDURE — 97140 MANUAL THERAPY 1/> REGIONS: CPT | Mod: GP | Performed by: PHYSICAL THERAPY ASSISTANT

## 2022-05-04 ENCOUNTER — THERAPY VISIT (OUTPATIENT)
Dept: PHYSICAL THERAPY | Facility: CLINIC | Age: 82
End: 2022-05-04
Payer: MEDICARE

## 2022-05-04 DIAGNOSIS — M25.512 CHRONIC LEFT SHOULDER PAIN: Primary | ICD-10-CM

## 2022-05-04 DIAGNOSIS — G89.29 CHRONIC LEFT SHOULDER PAIN: Primary | ICD-10-CM

## 2022-05-04 PROCEDURE — 97110 THERAPEUTIC EXERCISES: CPT | Mod: GP | Performed by: PHYSICAL THERAPY ASSISTANT

## 2022-05-04 NOTE — PROGRESS NOTES
Subjective:  HPI  Physical Exam                    Objective:  System                   Shoulder Evaluation:  ROM:  AROM:    Flexion:  Left:  112        Abduction:  Left: 85                   Flexion/External Rotation:  Left:  T4      Extension/Internal Rotation:  Left:  T12                                                         General     ROS    Assessment/Plan:    DISCHARGE REPORT    Progress reporting period is from 3/23/22 to 5/4/22.      SUBJECTIVE  Subjective changes noted by patient:  : Patient reports that the exercises are going well.  Sleeping and driving are improving.Patient is feeling more of 50% better overall.  Dealing with her daughters cancer has been difficult.   Current pain level is  5/10    Previous pain level was:   Initial Pain level: 7/10   Changes in function:  Yes (See Goal flowsheet attached for changes in current functional level)     Adverse reaction to treatment or activity: None     OBJECTIVE  Changes noted in objective findings:  Yes, Patient has made some improvement with the left shoulder mobility and some changes with the pain.  Keeping the exercises in a pain free plane, patient has responded well to.  Patient has a good HEP.  Recommend that patient see MD for further evaluation of the shoulder as the pain is ongoing and limited ROM yet.

## 2022-05-06 ENCOUNTER — OFFICE VISIT (OUTPATIENT)
Dept: ORTHOPEDICS | Facility: CLINIC | Age: 82
End: 2022-05-06
Payer: MEDICARE

## 2022-05-06 DIAGNOSIS — M19.012 OSTEOARTHRITIS, LOCALIZED, SHOULDER, LEFT: Primary | ICD-10-CM

## 2022-05-06 PROCEDURE — 99204 OFFICE O/P NEW MOD 45 MIN: CPT | Mod: 25 | Performed by: STUDENT IN AN ORGANIZED HEALTH CARE EDUCATION/TRAINING PROGRAM

## 2022-05-06 PROCEDURE — 20610 DRAIN/INJ JOINT/BURSA W/O US: CPT | Mod: LT | Performed by: STUDENT IN AN ORGANIZED HEALTH CARE EDUCATION/TRAINING PROGRAM

## 2022-05-06 RX ORDER — LIDOCAINE HYDROCHLORIDE 10 MG/ML
5 INJECTION, SOLUTION INFILTRATION; PERINEURAL
Status: DISCONTINUED | OUTPATIENT
Start: 2022-05-06 | End: 2022-10-05

## 2022-05-06 RX ORDER — TRIAMCINOLONE ACETONIDE 40 MG/ML
40 INJECTION, SUSPENSION INTRA-ARTICULAR; INTRAMUSCULAR
Status: DISCONTINUED | OUTPATIENT
Start: 2022-05-06 | End: 2022-10-05

## 2022-05-06 RX ORDER — LIDOCAINE HYDROCHLORIDE 10 MG/ML
2 INJECTION, SOLUTION INFILTRATION; PERINEURAL
Status: DISCONTINUED | OUTPATIENT
Start: 2022-05-06 | End: 2022-10-05

## 2022-05-06 RX ADMIN — LIDOCAINE HYDROCHLORIDE 2 ML: 10 INJECTION, SOLUTION INFILTRATION; PERINEURAL at 09:42

## 2022-05-06 RX ADMIN — LIDOCAINE HYDROCHLORIDE 5 ML: 10 INJECTION, SOLUTION INFILTRATION; PERINEURAL at 09:42

## 2022-05-06 RX ADMIN — TRIAMCINOLONE ACETONIDE 40 MG: 40 INJECTION, SUSPENSION INTRA-ARTICULAR; INTRAMUSCULAR at 09:42

## 2022-05-06 NOTE — LETTER
5/6/2022         RE: Candida Rose  [address] [id number]        Dear Colleague,    Thank you for referring your patient, Canidda Rose, to the [facility] [place]. Please see a copy of my visit note below.        [facility]  Orthopaedic Surgery Consultation by Johnathan Willett M.D.    Candida Rose MRN# [id number]   Age: 81 year old YOB: 1940     Requesting physician: Chani Chapin     Background history:  DX:  1. CVA on aspirin and Plavix  2. Obesity  3. Type 2 diabetes mellitus non-insulin-dependent  4. Hyperlipidemia  5. Hypertension  6. Chronic kidney disease stage III  7. Moyamoya disease    TREATMENTS:  1. 4/17/2017, left total knee arthroplasty, Dr. Jenkins  2. Bilateral hip replacements           History of Present Illness:   81 year old right-hand-dominant female who presents our clinic because of chronic left shoulder pain.  This pain has been present for multiple years and started after a fall.  Patient reports significant pain during reaching overhead and away from her body.  She is still able to open the kitchen cabinets and comb her hair.  She endorses significant night pain.  She denies any neck pain or radiculopathy.  No motor or sensory deficits of left upper extremity.  He denies any loss of strength.  To mitigate the pain she has modified some of her activities.  She has not trialed over-the-counter analgesics.  She has seen a physical therapist for range of motion and strengthening exercises.  She has not had intra articular injections.    Of note: Her daughter is currently battling with cancer.    Social:   Occupation: retired, worked in education  Living situation: With spouse.  Hobbies / Sports: walking, swimming    Smoking: No  Alcohol: No  Illicit drug use: No         Physical Exam:     EXAMINATION pertinent findings:   PSYCH: Pleasant, healthy-appearing, alert, oriented x3,  cooperative. Normal mood and affect.  VITAL SIGNS: not currently breastfeeding.  Reviewed nursing intake notes.   There is no height or weight on file to calculate BMI.  RESP: non labored breathing   ABD: benign, soft, non-tender, no acute peritoneal findings  SKIN: grossly normal   LYMPHATIC: grossly normal, no adenopathy, no extremity edema  NEURO: grossly normal , no motor deficits  VASCULAR: satisfactory perfusion of all extremities   MUSCULOSKELETAL:   Cervical Spine: FROM, Spurling's test negative.    Shoulder left:  Normal appearance. No signs of muscular atrophy of SSP, ISP or Deltoid. No tenderness to palpation over the sterno-clavicular joint or clavicle. AC joint: No tenderness to palpation. Cross body negative. Abduction 160, Forward flexion 150, ER 45, IR T12.  Glenohumeral loading is recognizably painful.  Cuff strength 5/5 for SSP/ISP and SSC. Neer, March, and Rohan negative. Belly-press, lift-off negative. Hornblower's test **. Biceps tendon resistance testing nonpainful.  No signs of instability. Apprehension test -. Neurovascular intact LUE.  2+ radial pulse with a warm and well perfused hand. Sensation is intact to light touch in the median, radial, ulnar, musculocutaneous, and axillary nerve distribution. 5/5 , fpl, epl, io, wrist flexor, wrist extensor, biceps, triceps, and deltoid muscle strength on manual muscle testing.          Data:   All laboratory data reviewed  All imaging studies reviewed by me personally.    XR shoulder left 3/11/2022:  My interpretation: End-stage osteoarthritic changes of the left shoulder with complete obliteration of joint space.  Presence of marginal osteophytes, sclerosis and large subchondral cysts.  Relatively well-preserved height between humeral head and acromion.  Some AC osteoarthritic changes present.  Batista B2 glenoid.         Assessment and Plan:   Assessment:  81-year-old right-hand-dominant female presenting with chronic left shoulder pain due to  end-stage osteoarthritic changes.  Nonoperative treatment measures have not been optimized yet.  Patient has well-preserved range of motion consistent with intact rotator cuff.  Currently articulating to refrain from surgery as she is supporting her daughter who is battling cancer.     Plan:  I extensively discussed my findings with the patient.  We discussed the surgical and nonsurgical management for her end-stage osteoarthritic changes.  We discussed the possibility of anatomic versus reverse shoulder arthroplasty.  This would depend on the amount of glenoid wear and rotator cuff pathology.  To further evaluate and preoperative plan a CT scan of the shoulder will be obtained once proceeding with surgery.  We briefly discussed the surgical procedure, risks and complications, rehabilitation and expectations.  At this point in time patient would like to refrain from surgical intervention and optimize nonsurgical management.  Therefore we provided her with a referral to physical therapy for range of motion and strengthening exercises.  We also offered an intra-articular injection with a combination of lidocaine and cortisone.  After obtaining informed consent the left glenohumeral joint was injected without complications.  Patient was provided with more educational information on shoulder osteoarthritis and replacement surgery on paper.  We will follow-up with patient on an as-needed basis.  Patient understands and agrees to the treatment plan as set forth.    Thank you for your referral.    Johnathan Willett MD, PhD     Adult Reconstruction  [facility] Department of Orthopaedic Surgery        DATA for DOCUMENTATION:         Past Medical History:     Patient Active Problem List   Diagnosis     Moyamoya disease     Hyperlipidemia LDL goal <130     Vitamin D deficiency     ACP (advance care planning) - scanned in the chart 2018     Osteopenia     Family hx of colon cancer     HTN, goal  "below 140/90     CKD 3     Osteoarthritis of left knee     Obesity (BMI 35.0-39.9) with comorbidity (H)     Chronic left shoulder pain     Past Medical History:   Diagnosis Date     Anxiety state, unspecified     inactive     Cataract      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 F/u dr. Soto     OA (osteoarthritis)     s/p bilat total hips      Other chronic pain     Joint pain for many years     Unspecified cerebral artery occlusion with cerebral infarction     After Moyamoya surgery > 10 yrs ago.     Vitamin D deficiencies        Also see scanned health assessment forms.       Past Surgical History:     Past Surgical History:   Procedure Laterality Date     ARTHROPLASTY KNEE Left 4/17/2017    Procedure: ARTHROPLASTY KNEE;  Left total knee arthroplasty;  Surgeon: Chidi Jenkins MD;  Location:  OR     COLONOSCOPY  2008    normal     COLONOSCOPY  7/17/2014    Procedure: COLONOSCOPY;  Surgeon: Raul Nolan DO;  Location:  GI     CRANIOTOMY      MOJAMOJA  \"Pt had repair of blood flow.\"     IR CAROTID ANGIOGRAM  10/30/2006     IR CAROTID ANGIOGRAM  6/6/2010     IR CAROTID ANGIOGRAM  6/6/2010     IR CAROTID ANGIOGRAM  6/6/2010     IR CAROTID ANGIOGRAM  5/12/2011     IR CAROTID ANGIOGRAM  5/12/2011     IR CAROTID ANGIOGRAM  5/12/2011     IR CAROTID ANGIOGRAM  6/5/2012     IR CAROTID ANGIOGRAM  6/5/2012     IR CAROTID ANGIOGRAM  6/5/2012     IR MISCELLANEOUS PROCEDURE  6/6/2010     IR MISCELLANEOUS PROCEDURE  6/6/2010     IR MISCELLANEOUS PROCEDURE  5/12/2011     IR MISCELLANEOUS PROCEDURE  6/5/2012     RECONSTRUCT FOREFOOT WITH METATARSOPHALANGEAL (MTP) " FUSION Left 2014    Procedure: RECONSTRUCT FOREFOOT WITH METATARSOPHALANGEAL (MTP) FUSION;  Surgeon: Tres Merlos DPM;  Location:  OR     [email or web address] NONSPECIFIC PROCEDURE  10/30/06    neurosurgery Dr Soto     [facility] [[email or web address] NONSPECIFIC PROCEDURE      bilateral total hips approx 2002     ZZ NONSPECIFIC PROCEDURE  3/07    neurosurgery             Social History:     Social History     Socioeconomic History     Marital status:      Spouse name: Olu      Number of children: 2     Years of education: Not on file     Highest education level: Not on file   Occupational History     Employer: RETIRED   Tobacco Use     Smoking status: Never Smoker     Smokeless tobacco: Never Used   Vaping Use     Vaping Use: Not on file   Substance and Sexual Activity     Alcohol use: Yes     Comment:  1-2 per day     Drug use: No     Sexual activity: Never     Partners: Male   Other Topics Concern     Parent/sibling w/ CABG, MI or angioplasty before 65F 55M? Not Asked   Social History Narrative     Not on file     Social Determinants of Health     Financial Resource Strain: Not on file   Food Insecurity: Not on file   Transportation Needs: Not on file   Physical Activity: Not on file   Stress: Not on file   Social Connections: Not on file   Intimate Partner Violence: Not on file   Housing Stability: Not on file            Family History:       Family History   Problem Relation Age of Onset     Obesity Sister         gastric by-pass age age 60, heart murmur.     Cancer - colorectal Sister 69     Heart Disease Father         mi,  age 48     Family History Negative Mother         killed in MVA age 54     Cancer Maternal Aunt         lung cancer ,non-smoker     Lung Cancer Maternal Aunt      Breast Cancer Daughter 48     Ovarian Cancer No family hx of             Medications:     Current Outpatient Medications   Medication Sig     amLODIPine (NORVASC) 2.5 MG tablet Take 1 tablet (2.5 mg) by mouth 2 times daily     aspirin 81 MG  tablet Take 1 tablet (81 mg) by mouth daily     atenolol (TENORMIN) 25 MG tablet TAKE 1 TABLET BY MOUTH EVERY DAY     clopidogrel (PLAVIX) 75 MG tablet Take 1 tablet (75 mg) by mouth daily     cyanocobalamin (VITAMIN B-12) 500 MCG tablet Take 500 mcg by mouth daily     losartan (COZAAR) 50 MG tablet TAKE 1 TABLET BY MOUTH TWICE A DAY     magnesium 500 MG TABS      Multiple Vitamins-Minerals (MULTIVITAMIN & MINERAL PO) Take 1 tablet by mouth daily.     simvastatin (ZOCOR) 20 MG tablet TAKE 1 TABLET (20 MG) BY MOUTH AT BEDTIME     No current facility-administered medications for this visit.              Review of Systems:   A comprehensive 10 point review of systems (constitutional, ENT, cardiac, peripheral vascular, lymphatic, respiratory, GI, , Musculoskeletal, skin, Neurological) was performed and found to be negative except as described in this note.     See intake form completed by patient    Large Joint Injection/Arthocentesis: L glenohumeral joint    Date/Time: 5/6/2022 9:42 AM  Performed by: Johnathan Willett MD  Authorized by: Johnathan Willett MD     Needle Size:  22 G  Guidance: landmark guided    Location:  Shoulder      Site:  L glenohumeral joint  Medications:  5 mL lidocaine 1 %; 2 mL lidocaine 1 %; 40 mg triamcinolone 40 MG/ML  Procedure discussed: discussed risks, benefits, and alternatives                Again, thank you for allowing me to participate in the care of your patient.        Sincerely,        Johnathan Willett MD

## 2022-05-06 NOTE — PROGRESS NOTES
[facility]  Orthopaedic Surgery Consultation by Johnathan Willett M.D.    Candida Rose MRN# [id number]   Age: 81 year old YOB: 1940     Requesting physician: Chani Chapin     Background history:  DX:  1. CVA on aspirin and Plavix  2. Obesity  3. Type 2 diabetes mellitus non-insulin-dependent  4. Hyperlipidemia  5. Hypertension  6. Chronic kidney disease stage III  7. Moyamoya disease    TREATMENTS:  1. 4/17/2017, left total knee arthroplasty, Dr. Jenkins  2. Bilateral hip replacements           History of Present Illness:   81 year old right-hand-dominant female who presents our clinic because of chronic left shoulder pain.  This pain has been present for multiple years and started after a fall.  Patient reports significant pain during reaching overhead and away from her body.  She is still able to open the kitchen cabinets and comb her hair.  She endorses significant night pain.  She denies any neck pain or radiculopathy.  No motor or sensory deficits of left upper extremity.  He denies any loss of strength.  To mitigate the pain she has modified some of her activities.  She has not trialed over-the-counter analgesics.  She has seen a physical therapist for range of motion and strengthening exercises.  She has not had intra articular injections.    Of note: Her daughter is currently battling with cancer.    Social:   Occupation: retired, worked in education  Living situation: With spouse.  Hobbies / Sports: walking, swimming    Smoking: No  Alcohol: No  Illicit drug use: No         Physical Exam:     EXAMINATION pertinent findings:   PSYCH: Pleasant, healthy-appearing, alert, oriented x3, cooperative. Normal mood and affect.  VITAL SIGNS: not currently breastfeeding.  Reviewed nursing intake notes.   There is no height or weight on file to calculate BMI.  RESP: non labored breathing   ABD: benign, soft, non-tender, no acute peritoneal  findings  SKIN: grossly normal   LYMPHATIC: grossly normal, no adenopathy, no extremity edema  NEURO: grossly normal , no motor deficits  VASCULAR: satisfactory perfusion of all extremities   MUSCULOSKELETAL:   Cervical Spine: FROM, Spurling's test negative.    Shoulder left:  Normal appearance. No signs of muscular atrophy of SSP, ISP or Deltoid. No tenderness to palpation over the sterno-clavicular joint or clavicle. AC joint: No tenderness to palpation. Cross body negative. Abduction 160, Forward flexion 150, ER 45, IR T12.  Glenohumeral loading is recognizably painful.  Cuff strength 5/5 for SSP/ISP and SSC. Neer, March, and Rohan negative. Belly-press, lift-off negative. Hornblower's test **. Biceps tendon resistance testing nonpainful.  No signs of instability. Apprehension test -. Neurovascular intact LUE.  2+ radial pulse with a warm and well perfused hand. Sensation is intact to light touch in the median, radial, ulnar, musculocutaneous, and axillary nerve distribution. 5/5 , fpl, epl, io, wrist flexor, wrist extensor, biceps, triceps, and deltoid muscle strength on manual muscle testing.          Data:   All laboratory data reviewed  All imaging studies reviewed by me personally.    XR shoulder left 3/11/2022:  My interpretation: End-stage osteoarthritic changes of the left shoulder with complete obliteration of joint space.  Presence of marginal osteophytes, sclerosis and large subchondral cysts.  Relatively well-preserved height between humeral head and acromion.  Some AC osteoarthritic changes present.  Batista B2 glenoid.         Assessment and Plan:   Assessment:  81-year-old right-hand-dominant female presenting with chronic left shoulder pain due to end-stage osteoarthritic changes.  Nonoperative treatment measures have not been optimized yet.  Patient has well-preserved range of motion consistent with intact rotator cuff.  Currently articulating to refrain from surgery as she is supporting her  daughter who is battling cancer.     Plan:  I extensively discussed my findings with the patient.  We discussed the surgical and nonsurgical management for her end-stage osteoarthritic changes.  We discussed the possibility of anatomic versus reverse shoulder arthroplasty.  This would depend on the amount of glenoid wear and rotator cuff pathology.  To further evaluate and preoperative plan a CT scan of the shoulder will be obtained once proceeding with surgery.  We briefly discussed the surgical procedure, risks and complications, rehabilitation and expectations.  At this point in time patient would like to refrain from surgical intervention and optimize nonsurgical management.  Therefore we provided her with a referral to physical therapy for range of motion and strengthening exercises.  We also offered an intra-articular injection with a combination of lidocaine and cortisone.  After obtaining informed consent the left glenohumeral joint was injected without complications.  Patient was provided with more educational information on shoulder osteoarthritis and replacement surgery on paper.  We will follow-up with patient on an as-needed basis.  Patient understands and agrees to the treatment plan as set forth.    Thank you for your referral.    Johnathan Willett MD, PhD     Adult Reconstruction  [facility] Department of Orthopaedic Surgery        DATA for DOCUMENTATION:         Past Medical History:     Patient Active Problem List   Diagnosis     Moyamoya disease     Hyperlipidemia LDL goal <130     Vitamin D deficiency     ACP (advance care planning) - scanned in the chart 2018     Osteopenia     Family hx of colon cancer     HTN, goal below 140/90     CKD 3     Osteoarthritis of left knee     Obesity (BMI 35.0-39.9) with comorbidity (H)     Chronic left shoulder pain     Past Medical History:   Diagnosis Date     Anxiety state, unspecified     inactive     Cataract      Congenital  "anomaly of cerebrovascular system 10/06    Fitch-fitch syndrome; 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 F/u dr. Soto     OA (osteoarthritis)     s/p bilat total hips      Other chronic pain     Joint pain for many years     Unspecified cerebral artery occlusion with cerebral infarction     After Moyamoya surgery > 10 yrs ago.     Vitamin D deficiencies        Also see scanned health assessment forms.       Past Surgical History:     Past Surgical History:   Procedure Laterality Date     ARTHROPLASTY KNEE Left 4/17/2017    Procedure: ARTHROPLASTY KNEE;  Left total knee arthroplasty;  Surgeon: Chidi Jenkins MD;  Location:  OR     COLONOSCOPY  2008    normal     COLONOSCOPY  7/17/2014    Procedure: COLONOSCOPY;  Surgeon: Raul Nolan DO;  Location:  GI     CRANIOTOMY      MOJAMOJA  \"Pt had repair of blood flow.\"     IR CAROTID ANGIOGRAM  10/30/2006     IR CAROTID ANGIOGRAM  6/6/2010     IR CAROTID ANGIOGRAM  6/6/2010     IR CAROTID ANGIOGRAM  6/6/2010     IR CAROTID ANGIOGRAM  5/12/2011     IR CAROTID ANGIOGRAM  5/12/2011     IR CAROTID ANGIOGRAM  5/12/2011     IR CAROTID ANGIOGRAM  6/5/2012     IR CAROTID ANGIOGRAM  6/5/2012     IR CAROTID ANGIOGRAM  6/5/2012     IR MISCELLANEOUS PROCEDURE  6/6/2010     IR MISCELLANEOUS PROCEDURE  6/6/2010     IR MISCELLANEOUS PROCEDURE  5/12/2011     IR MISCELLANEOUS PROCEDURE  6/5/2012     RECONSTRUCT FOREFOOT WITH METATARSOPHALANGEAL (MTP) FUSION Left 8/21/2014    Procedure: RECONSTRUCT FOREFOOT WITH METATARSOPHALANGEAL (MTP) FUSION;  Surgeon: Tres Merlos DPOLEG;  Location:  OR     [facility] NONSPECIFIC PROCEDURE  10/30/06    neurosurgery Dr Soto     [facility] NONSPECIFIC PROCEDURE      " bilateral total hips approx 2002     ZZC NONSPECIFIC PROCEDURE  3/07    neurosurgery             Social History:     Social History     Socioeconomic History     Marital status:      Spouse name: Olu      Number of children: 2     Years of education: Not on file     Highest education level: Not on file   Occupational History     Employer: RETIRED   Tobacco Use     Smoking status: Never Smoker     Smokeless tobacco: Never Used   Vaping Use     Vaping Use: Not on file   Substance and Sexual Activity     Alcohol use: Yes     Comment:  1-2 per day     Drug use: No     Sexual activity: Never     Partners: Male   Other Topics Concern     Parent/sibling w/ CABG, MI or angioplasty before 65F 55M? Not Asked   Social History Narrative     Not on file     Social Determinants of Health     Financial Resource Strain: Not on file   Food Insecurity: Not on file   Transportation Needs: Not on file   Physical Activity: Not on file   Stress: Not on file   Social Connections: Not on file   Intimate Partner Violence: Not on file   Housing Stability: Not on file            Family History:       Family History   Problem Relation Age of Onset     Obesity Sister         gastric by-pass age age 60, heart murmur.     Cancer - colorectal Sister 69     Heart Disease Father         mi,  age 48     Family History Negative Mother         killed in MVA age 54     Cancer Maternal Aunt         lung cancer ,non-smoker     Lung Cancer Maternal Aunt      Breast Cancer Daughter 48     Ovarian Cancer No family hx of             Medications:     Current Outpatient Medications   Medication Sig     amLODIPine (NORVASC) 2.5 MG tablet Take 1 tablet (2.5 mg) by mouth 2 times daily     aspirin 81 MG tablet Take 1 tablet (81 mg) by mouth daily     atenolol (TENORMIN) 25 MG tablet TAKE 1 TABLET BY MOUTH EVERY DAY     clopidogrel (PLAVIX) 75 MG tablet Take 1 tablet (75 mg) by mouth daily     cyanocobalamin (VITAMIN B-12) 500 MCG tablet Take 500 mcg  by mouth daily     losartan (COZAAR) 50 MG tablet TAKE 1 TABLET BY MOUTH TWICE A DAY     magnesium 500 MG TABS      Multiple Vitamins-Minerals (MULTIVITAMIN & MINERAL PO) Take 1 tablet by mouth daily.     simvastatin (ZOCOR) 20 MG tablet TAKE 1 TABLET (20 MG) BY MOUTH AT BEDTIME     No current facility-administered medications for this visit.              Review of Systems:   A comprehensive 10 point review of systems (constitutional, ENT, cardiac, peripheral vascular, lymphatic, respiratory, GI, , Musculoskeletal, skin, Neurological) was performed and found to be negative except as described in this note.     See intake form completed by patient    Large Joint Injection/Arthocentesis: L glenohumeral joint    Date/Time: 5/6/2022 9:42 AM  Performed by: Johnathan Willett MD  Authorized by: Johnathan Willett MD     Needle Size:  22 G  Guidance: landmark guided    Location:  Shoulder      Site:  L glenohumeral joint  Medications:  5 mL lidocaine 1 %; 2 mL lidocaine 1 %; 40 mg triamcinolone 40 MG/ML  Procedure discussed: discussed risks, benefits, and alternatives

## 2022-05-06 NOTE — PATIENT INSTRUCTIONS
1. Arthritis of left glenohumeral joint      Steroid injection of the left shoulder: intra-articular  was performed today in clinic    - Would not soak in a hot tub, bath or swimming pool for 48 hours  - Ok to shower  - Ice today and only do your normal amounts of activity  - The lidocaine (what is giving you pain relief right now) will likely stop working in 1-2 hours.  You will then have pain again, similar to before you received the injection. The corticosteroid will not start working until approximately 1-2 weeks from now.  In a small percentage of people, cortisone can cause flushing/redness in the face. This usually lasts for 1-3 days and resolves. Cool compress and Ibuprofen/Tylenol can help if this happens.    Physical Therapy orders have been placed with [facility].  You can call [phone]  to schedule at your convenience.     Follow up with Dr. Willett as needed    Call my office with any questions or concerns, [phone].     Arthritis of the Shoulder  In 2011, more than 50 million people in the [place] reported that they had been diagnosed with some form of arthritis, according to the National Health Interview Survey. Simply defined, arthritis is inflammation of one or more of your joints. In a diseased shoulder, inflammation causes pain and stiffness.  Although there is no cure for arthritis of the shoulder, there are many treatment options available. Using these, most people with arthritis are able to manage pain and stay active.    Anatomy    Your shoulder is made up of three bones: your upper arm bone (humerus), your shoulder blade (scapula), and your collarbone (clavicle).  The head of your upper arm bone fits into a rounded socket in your shoulder blade. This socket is called the glenoid. A combination of muscles and tendons keeps your arm bone centered in your shoulder socket. These tissues are called the rotator cuff.      There are two joints in the shoulder, and both  "may be affected by arthritis. One joint is located where the clavicle meets the tip of the shoulder blade (acromion). This is called the acromioclavicular (AC) joint.  Where the head of the humerus fits into the scapula is called the glenohumeral joint.  To provide you with effective treatment, your physician will need to determine which joint is affected and what type of arthritis you have.    Description. Five major types of arthritis typically affect the shoulder.    Osteoarthritis  Also known as \"wear-and-tear\" arthritis, osteoarthritis is a condition that destroys the smooth outer covering (articular cartilage) of bone. As the cartilage wears away, it becomes frayed and rough, and the protective space between the bones decreases. During movement, the bones of the joint rub against each other, causing pain.  Osteoarthritis usually affects people over 50 years of age and is more common in the acromioclavicular joint than in the glenohumeral shoulder joint.      Rheumatoid Arthritis  Rheumatoid arthritis (RA) is a chronic disease that attacks multiple joints throughout the body. It is symmetrical, meaning that it usually affects the same joint on both sides of the body.  The joints of your body are covered with a lining -- called synovium -- that lubricates the joint and makes it easier to move. Rheumatoid arthritis causes the lining to swell, which causes pain and stiffness in the joint.  Rheumatoid arthritis is an autoimmune disease. This means that the immune system attacks its own tissues. In RA, the defenses that protect the body from infection instead damage normal tissue (such as cartilage and ligaments) and soften bone.  Rheumatoid arthritis is equally common in both joints of the shoulder.    Posttraumatic Arthritis  Posttraumatic arthritis is a form of osteoarthritis that develops after an injury, such as a fracture or dislocation of the shoulder.    Rotator Cuff Tear Arthropathy  Arthritis can also " develop after a large, long-standing rotator cuff tendon tear. The torn rotator cuff can no longer hold the head of the humerus in the glenoid socket, and the humerus can move upward and rub against the acromion. This can damage the surfaces of the bones, causing arthritis to develop.  The combination of a large rotator cuff tear and advanced arthritis can lead to severe pain and weakness, and the patient may not be able to lift the arm away from the side.      Avascular Necrosis  Avascular necrosis (AVN) of the shoulder is a painful condition that occurs when the blood supply to the head of the humerus is disrupted. Because bone cells die without a blood supply, AVN can ultimately lead to destruction of the shoulder joint and arthritis.  Avascular necrosis develops in stages. As it progresses, the dead bone gradually collapses, which damages the articular cartilage covering the bone and leads to arthritis. At first, AVN affects only the head of the humerus, but as AVN progresses, the collapsed head of the humerus can damage the glenoid socket.  Causes of AVN include high dose steroid use, heavy alcohol consumption, sickle cell disease, and traumatic injury, such as fractures of the shoulder. In some cases, no cause can be identified; this is referred to as idiopathic AVN.      Symptoms    Pain. The most common symptom of arthritis of the shoulder is pain, which is aggravated by activity and progressively worsens.  If the glenohumeral shoulder joint is affected, the pain is centered in the back of the shoulder and may intensify with changes in the weather. Patients complain of an ache deep in the joint.   The pain of arthritis in the acromioclavicular (AC) joint is focused on the top of the shoulder. This pain can sometimes radiate or travel to the side of the neck.   Someone with rheumatoid arthritis may have pain throughout the shoulder if both the glenohumeral and AC joints are affected.    Limited range of  motion. Limited motion is another common symptom. It may become more difficult to lift your arm to comb your hair or reach up to a shelf. You may hear a grinding, clicking, or snapping sound (crepitus) as you move your shoulder.  As the disease progresses, any movement of the shoulder causes pain. Night pain is common and sleeping may be difficult.      Doctor Examination    Medical History and Physical Examination    After discussing your symptoms and medical history, your doctor will examine your shoulder.  During the physical examination, your doctor will look for:  Weakness (atrophy) in the muscles   Tenderness to touch   Extent of passive (assisted) and active (self-directed) range of motion   Any signs of injury to the muscles, tendons, and ligaments surrounding the joint   Signs of previous injuries   Involvement of other joints (an indication of rheumatoid arthritis)   Crepitus (a grating sensation inside the joint) with movement   Pain when pressure is placed on the joint    X-Rays    X-rays are imaging tests that create detailed pictures of dense structures, like bone. They can help distinguish among various forms of arthritis.  X-rays of an arthritic shoulder will show a narrowing of the joint space, changes in the bone, and the formation of bone spurs (osteophytes).        To confirm the diagnosis, your doctor may inject a local anesthetic into the joint. If it temporarily relieves the pain, the diagnosis of arthritis is supported.      Treatment    Nonsurgical Treatment    As with other arthritic conditions, initial treatment of arthritis of the shoulder is nonsurgical. Your doctor may recommend the following treatment options:  Rest or change in activities to avoid provoking pain. You may need to change the way you move your arm to do things.   Physical therapy exercises may improve the range of motion in your shoulder.   Nonsteroidal anti-inflammatory medications (NSAIDs), such as aspirin or  ibuprofen, may reduce inflammation and pain. These medications can irritate the stomach lining and cause internal bleeding. They should be taken with food. Consult with your doctor before taking over-the-counter NSAIDs if you have a history of ulcers or are taking blood thinning medication.   Corticosteroid injections in the shoulder can dramatically reduce the inflammation and pain. However, the effect is often temporary.   Moist heat   Ice your shoulder for 20 to 30 minutes two or three times a day to reduce inflammation and ease pain.   If you have rheumatoid arthritis, your doctor may prescribe a disease-modifying drug, such as methotrexate.   Dietary supplements, such as glucosamine and chondroitin sulfate may help relieve pain. (Note: There is little scientific evidence to support the use of glucosamine and chondroitin sulfate to treat arthritis. In addition, the [place] Food and Drug Administration does not test dietary supplements. These compounds may cause negative interactions with other medications. Always consult your doctor before taking dietary supplements.)    Surgical Treatment    Your doctor may consider surgery if your pain causes disability and is not relieved with nonsurgical options.    Arthroscopy. Cases of mild glenohumeral arthritis may be treated with arthroscopy, During arthroscopy, the surgeon inserts a small camera, called an arthroscope, into the shoulder joint. The camera displays pictures on a television screen, and the surgeon uses these images to guide miniature surgical instruments.  Because the arthroscope and surgical instruments are thin, the surgeon can use very small incisions (cuts), rather than the larger incision needed for standard, open surgery.  During the procedure, your surgeon can debride (clean out) the inside of the joint. Although the procedure provides pain relief, it will not eliminate the arthritis from the joint. If the arthritis progresses, further surgery may be  "needed in the future.    Shoulder joint replacement (arthroplasty). Advanced arthritis of the glenohumeral joint can be treated with shoulder replacement surgery, in which the damaged parts of the shoulder are removed and replaced with artificial components, called a prosthesis.    Replacement surgery options include:  Hemiarthroplasty. Just the head of the humerus is replaced by an artificial component.     Total shoulder arthroplasty. Both the head of the humerus and the glenoid are replaced. A plastic \"cup\" is fitted into the glenoid, and a metal \"ball\" is attached to the top of the humerus.     Reverse total shoulder arthroplasty. In a reverse total shoulder replacement, the socket and metal ball are opposite a conventional total shoulder arthroplasty. The metal ball is fixed to the glenoid and the plastic cup is fixed to the upper end of the humerus. A reverse total shoulder replacement works better for people with cuff tear arthropathy because it relies on different muscles -- not the rotator cuff -- to move the arm.    Resection arthroplasty. The most common surgical procedure used to treat arthritis of the acromioclavicular joint is a resection arthroplasty. Your surgeon may choose to do this arthroscopically. In this procedure, a small amount of bone from the end of the collarbone is removed, leaving a space that gradually fills in with scar tissue.          Recovery    Surgical treatment of arthritis of the shoulder is generally very effective in reducing pain and restoring motion. Recovery time and rehabilitation plans depend upon the type of surgery performed.    Pain management    After surgery, you will feel some pain. This is a natural part of the healing process. Your doctor and nurses will work to reduce your pain, which can help you recover from surgery faster.  Medications are often prescribed for short-term pain relief after surgery. Many types of medicines are available to help manage pain, " including opioids, non-steroidal anti-inflammatory drugs (NSAIDs), and local anesthetics. Your doctor may use a combination of these medications to improve pain relief, as well as minimize the need for opioids.  Be aware that although opioids help relieve pain after surgery, they are a narcotic and can be addictive. Opioid dependency and overdose has become a critical public health issue in the [place] It is important to use opioids only as directed by your doctor. As soon as your pain begins to improve, stop taking opioids. Talk to your doctor if your pain has not begun to improve within a few days of your surgery.    Complications    As with all surgeries, there are some risks and possible complications. Potential problems after shoulder surgery include infection, excessive bleeding, blood clots, and damage to blood vessels or nerves.  Your surgeon will discuss the possible complications with you before your operation.    Future Developments    Research is being conducted on shoulder arthritis and its treatment.  In many cases, it is not known why some people develop arthritis and others do not. Research is being done to uncover some of the causes of arthritis of the shoulder.   Joint lubricants, which are currently being used for treatment of knee arthritis, are being studied in the shoulder.   New medications to treat rheumatoid arthritis are being investigated.   Much research is being done on shoulder joint replacement surgery, including the development of different joint prosthesis designs.   The use of biologic materials to resurface an arthritic shoulder is also being studied. Biologic materials are tissue grafts that promote growth of new tissue in the body and foster healing.        SOURCE: Department of Research & Scientific Affairs, [employer]. [place], [place]: ESA; January 2013. Based on data from the National Health Interview Survey, 1289-2787; U.S. Department of Health and  Human Services; Centers for Disease Control and Prevention; National Center for Health Statistics.    Copyright   by the [employer] (AAOS). All material prepared here is protected by copyright. All rights reserved. AAOS does not endorse any treatments, procedures, products, or physicians referenced herein. This information is provided as an educational service and is not intended to serve as medical advice. Anyone seeking specific orthopaedic advice or assistance should consult his or her orthopaedic surgeon.

## 2022-05-20 ENCOUNTER — THERAPY VISIT (OUTPATIENT)
Dept: PHYSICAL THERAPY | Facility: CLINIC | Age: 82
End: 2022-05-20
Attending: STUDENT IN AN ORGANIZED HEALTH CARE EDUCATION/TRAINING PROGRAM
Payer: MEDICARE

## 2022-05-20 DIAGNOSIS — M19.012 OSTEOARTHRITIS, LOCALIZED, SHOULDER, LEFT: ICD-10-CM

## 2022-05-20 DIAGNOSIS — M25.512 CHRONIC LEFT SHOULDER PAIN: Primary | ICD-10-CM

## 2022-05-20 DIAGNOSIS — G89.29 CHRONIC LEFT SHOULDER PAIN: Primary | ICD-10-CM

## 2022-05-20 PROCEDURE — 97110 THERAPEUTIC EXERCISES: CPT | Mod: GP | Performed by: PHYSICAL THERAPIST

## 2022-05-31 ENCOUNTER — THERAPY VISIT (OUTPATIENT)
Dept: PHYSICAL THERAPY | Facility: CLINIC | Age: 82
End: 2022-05-31
Payer: MEDICARE

## 2022-05-31 DIAGNOSIS — M25.512 CHRONIC LEFT SHOULDER PAIN: Primary | ICD-10-CM

## 2022-05-31 DIAGNOSIS — G89.29 CHRONIC LEFT SHOULDER PAIN: Primary | ICD-10-CM

## 2022-05-31 PROCEDURE — 97110 THERAPEUTIC EXERCISES: CPT | Mod: GP | Performed by: PHYSICAL THERAPIST

## 2022-05-31 NOTE — PROGRESS NOTES
[facility]    OUTPATIENT Physical Therapy ORTHOPEDIC EVALUATION  PLAN OF TREATMENT FOR OUTPATIENT REHABILITATION  (COMPLETE FOR INITIAL CLAIMS ONLY)  Patient's Last Name, First Name, M.I.  YOB: 1940  Candida Rose    Provider s Name:  [facility]   Medical Record No.  [id number]   Start of Care Date:  03/23/22   Onset Date:   03/11/22   Type:     _X__PT   ___OT Medical Diagnosis:    Encounter Diagnosis   Name Primary?    Chronic left shoulder pain Yes        Treatment Diagnosis:  L Shoulder Pain        Goals:     05/31/22 0500   Body Part   Goals listed below are for L Shoulder   Goal #1   Goal #1 sleeping   Performance Level Unable to lay on the elft side due ot pain   STG Target Performance Minutes pt able to lie on affected side   Performance Level 30   Rationale to establish restorative sleep pattern   Due Date 04/21/22   Date Goal Met 05/04/22   If goal not met, why? better   LTG Target Performance Hours pt able to lie on affected side   Performance level as needed for a full nights sleep   Rationale to establish restorative sleep pattern   Due Date 06/02/22   Goal #2   Goal #2 driving/transportation   Previous Functional Level No restrictions   Performance Level Has to compensate for the left upper extremity   STG Target Performance Able to use 2 hands to steer car   Rationale for safe driving   Due date 04/21/22   Date Goal Met 05/04/22   If goal not met, Why? better   LTG Target Performance Able to use 2 hands to steer car   Performance Level without pain   Rationale for safe driving   Due date 06/02/22       Therapy Frequency:  1x per week  Predicted Duration of Therapy Intervention:  10 weeks    Lenka Salvador, PT                 I CERTIFY THE NEED FOR THESE SERVICES FURNISHED UNDER        THIS PLAN OF TREATMENT AND WHILE UNDER MY CARE     (Physician  attestation of this document indicates review and certification of the therapy plan).                     Certification Date From:  06/10/22   Certification Date To:  09/06/22    Referring Provider:  Johnathan Willett    Initial Assessment        See Epic Evaluation SOC Date: 03/23/22

## 2022-05-31 NOTE — PROGRESS NOTES
PROGRESS  REPORT    Progress reporting period is from 3/23/22 to 5/31/22.       SUBJECTIVE   Subjective: Pt reports she has been doing exercises. Still planning for surgery at the end of the year. Pt has been able to exercises daily, no pain while doing them      Initial Pain level: 7/10.   Changes in function:  Yes (See Goal flowsheet attached for changes in current functional level)  Adverse reaction to treatment or activity: None    OBJECTIVE  Changes noted in objective findings:  Yes, strength improving  Objective: reviewed and progressed exercises within pt tolerance, spread out upcoming appointments to give pt more time to work on strength gains     ASSESSMENT/PLAN  Updated problem list and treatment plan: Diagnosis 1:  L shoulder pain  Pain -  self management, education and home program  Decreased strength - therapeutic exercise, therapeutic activities and home program  Impaired posture - neuro re-education and therapeutic activities  STG/LTGs have been met or progress has been made towards goals:  Yes (See Goal flow sheet completed today.)  Assessment of Progress: The patient's condition is improving.  Self Management Plans:  Patient has been instructed in a home treatment program.  I have re-evaluated this patient and find that the nature, scope, duration and intensity of the therapy is appropriate for the medical condition of the patient.  Candida continues to require the following intervention to meet STG and LTG's:  PT    Recommendations:  This patient would benefit from continued therapy.     Frequency:  1 X week, once daily  Duration:  for 8 weeks        Please refer to the daily flowsheet for treatment today, total treatment time and time spent performing 1:1 timed codes.

## 2022-06-16 ENCOUNTER — THERAPY VISIT (OUTPATIENT)
Dept: PHYSICAL THERAPY | Facility: CLINIC | Age: 82
End: 2022-06-16
Payer: MEDICARE

## 2022-06-16 DIAGNOSIS — G89.29 CHRONIC LEFT SHOULDER PAIN: Primary | ICD-10-CM

## 2022-06-16 DIAGNOSIS — M25.512 CHRONIC LEFT SHOULDER PAIN: Primary | ICD-10-CM

## 2022-06-16 PROCEDURE — 97110 THERAPEUTIC EXERCISES: CPT | Mod: GP | Performed by: PHYSICAL THERAPIST

## 2022-06-26 DIAGNOSIS — D64.9 ANEMIA, UNSPECIFIED TYPE: ICD-10-CM

## 2022-06-26 DIAGNOSIS — N18.30 STAGE 3 CHRONIC KIDNEY DISEASE, UNSPECIFIED WHETHER STAGE 3A OR 3B CKD (H): Primary | ICD-10-CM

## 2022-06-28 ENCOUNTER — THERAPY VISIT (OUTPATIENT)
Dept: PHYSICAL THERAPY | Facility: CLINIC | Age: 82
End: 2022-06-28
Payer: MEDICARE

## 2022-06-28 DIAGNOSIS — G89.29 CHRONIC LEFT SHOULDER PAIN: Primary | ICD-10-CM

## 2022-06-28 DIAGNOSIS — M25.512 CHRONIC LEFT SHOULDER PAIN: Primary | ICD-10-CM

## 2022-06-28 PROCEDURE — 97110 THERAPEUTIC EXERCISES: CPT | Mod: GP | Performed by: PHYSICAL THERAPIST

## 2022-06-28 NOTE — PROGRESS NOTES
PROGRESS  REPORT    Progress reporting period is from 3/23/22 to 6/28/22.       SUBJECTIVE   Subjective: pt reports exercies are going well, thinking about wrapping up therapy today. L marco antonio feeling better, R starting to bother her, discussed completing exercises on R side as well    Current Pain level: 4/10.      Initial Pain level: 7/10.   Changes in function:  Yes (See Goal flowsheet attached for changes in current functional level)  Adverse reaction to treatment or activity: None    OBJECTIVE  Changes noted in objective findings:  Yes, improved pain levels and strength  Objective: strength of cuff and shoulder flexion grossly 4-/5 on R and L side, AROM shoulder flexion  degrees.     ASSESSMENT/PLAN  Updated problem list and treatment plan: Diagnosis 1:  L shoulder pain   Pain -  self management, education and home program  Decreased ROM/flexibility - manual therapy, therapeutic exercise and home program  Decreased strength - therapeutic exercise, therapeutic activities and home program  Impaired muscle performance - neuro re-education and home program  Decreased function - therapeutic activities and home program  STG/LTGs have been met or progress has been made towards goals:  Yes (See Goal flow sheet completed today.)  Assessment of Progress: The patient's condition is improving.  Self Management Plans:  Patient has been instructed in a home treatment program.  I have re-evaluated this patient and find that the nature, scope, duration and intensity of the therapy is appropriate for the medical condition of the patient.  Candida continues to require the following intervention to meet STG and LTG's:  PT    Recommendations:  This patient would benefit from continued therapy.     Frequency:  1 X week, once daily  Duration:  for 6 weeks        Please refer to the daily flowsheet for treatment today, total treatment time and time spent performing 1:1 timed codes.

## 2022-07-08 ENCOUNTER — MYC MEDICAL ADVICE (OUTPATIENT)
Dept: INTERNAL MEDICINE | Facility: CLINIC | Age: 82
End: 2022-07-08

## 2022-07-13 ENCOUNTER — LAB (OUTPATIENT)
Dept: LAB | Facility: CLINIC | Age: 82
End: 2022-07-13
Payer: MEDICARE

## 2022-07-13 DIAGNOSIS — I67.5 MOYAMOYA DISEASE: Chronic | ICD-10-CM

## 2022-07-13 DIAGNOSIS — D64.9 ANEMIA, UNSPECIFIED TYPE: ICD-10-CM

## 2022-07-13 DIAGNOSIS — I10 HTN, GOAL BELOW 140/90: Chronic | ICD-10-CM

## 2022-07-13 DIAGNOSIS — N18.30 STAGE 3 CHRONIC KIDNEY DISEASE, UNSPECIFIED WHETHER STAGE 3A OR 3B CKD (H): Chronic | ICD-10-CM

## 2022-07-13 DIAGNOSIS — E78.5 HYPERLIPIDEMIA LDL GOAL <130: Chronic | ICD-10-CM

## 2022-07-13 DIAGNOSIS — Z00.00 ROUTINE GENERAL MEDICAL EXAMINATION AT A HEALTH CARE FACILITY: ICD-10-CM

## 2022-07-13 LAB
ALBUMIN MFR UR ELPH: 8.3 MG/DL
CREAT UR-MCNC: 55.2 MG/DL
DEPRECATED CALCIDIOL+CALCIFEROL SERPL-MC: 30 UG/L (ref 20–75)
ERYTHROCYTE [DISTWIDTH] IN BLOOD BY AUTOMATED COUNT: 13.8 % (ref 10–15)
FOLATE SERPL-MCNC: 32.8 NG/ML (ref 4.6–34.8)
HCT VFR BLD AUTO: 30.2 % (ref 35–47)
HGB BLD-MCNC: 10.3 G/DL (ref 11.7–15.7)
MCH RBC QN AUTO: 31.4 PG (ref 26.5–33)
MCHC RBC AUTO-ENTMCNC: 34.1 G/DL (ref 31.5–36.5)
MCV RBC AUTO: 92 FL (ref 78–100)
PLATELET # BLD AUTO: 328 10E3/UL (ref 150–450)
PROT/CREAT 24H UR: 0.15 MG/MG CR (ref 0–0.2)
PTH-INTACT SERPL-MCNC: 41 PG/ML (ref 15–65)
RBC # BLD AUTO: 3.28 10E6/UL (ref 3.8–5.2)
VIT B12 SERPL-MCNC: 1759 PG/ML (ref 232–1245)
WBC # BLD AUTO: 7.2 10E3/UL (ref 4–11)

## 2022-07-13 PROCEDURE — 80069 RENAL FUNCTION PANEL: CPT

## 2022-07-13 PROCEDURE — 82607 VITAMIN B-12: CPT

## 2022-07-13 PROCEDURE — 85027 COMPLETE CBC AUTOMATED: CPT

## 2022-07-13 PROCEDURE — 82306 VITAMIN D 25 HYDROXY: CPT

## 2022-07-13 PROCEDURE — 83550 IRON BINDING TEST: CPT

## 2022-07-13 PROCEDURE — 82728 ASSAY OF FERRITIN: CPT

## 2022-07-13 PROCEDURE — 82043 UR ALBUMIN QUANTITATIVE: CPT

## 2022-07-13 PROCEDURE — 84156 ASSAY OF PROTEIN URINE: CPT

## 2022-07-13 PROCEDURE — 83970 ASSAY OF PARATHORMONE: CPT

## 2022-07-13 PROCEDURE — 82746 ASSAY OF FOLIC ACID SERUM: CPT

## 2022-07-13 PROCEDURE — 36415 COLL VENOUS BLD VENIPUNCTURE: CPT

## 2022-07-14 LAB
ALBUMIN SERPL-MCNC: 3.5 G/DL (ref 3.4–5)
ANION GAP SERPL CALCULATED.3IONS-SCNC: 7 MMOL/L (ref 3–14)
BUN SERPL-MCNC: 19 MG/DL (ref 7–30)
CALCIUM SERPL-MCNC: 8.2 MG/DL (ref 8.5–10.1)
CHLORIDE BLD-SCNC: 98 MMOL/L (ref 94–109)
CO2 SERPL-SCNC: 26 MMOL/L (ref 20–32)
CREAT SERPL-MCNC: 0.96 MG/DL (ref 0.52–1.04)
CREAT UR-MCNC: 61 MG/DL
FERRITIN SERPL-MCNC: 110 NG/ML (ref 8–252)
GFR SERPL CREATININE-BSD FRML MDRD: 60 ML/MIN/1.73M2
GLUCOSE BLD-MCNC: 84 MG/DL (ref 70–99)
IRON SATN MFR SERPL: 15 % (ref 15–46)
IRON SERPL-MCNC: 50 UG/DL (ref 35–180)
MICROALBUMIN UR-MCNC: 12 MG/L
MICROALBUMIN/CREAT UR: 19.67 MG/G CR (ref 0–25)
PHOSPHATE SERPL-MCNC: 3.9 MG/DL (ref 2.5–4.5)
POTASSIUM BLD-SCNC: 4.5 MMOL/L (ref 3.4–5.3)
SODIUM SERPL-SCNC: 131 MMOL/L (ref 133–144)
TIBC SERPL-MCNC: 341 UG/DL (ref 240–430)

## 2022-07-18 ENCOUNTER — MYC MEDICAL ADVICE (OUTPATIENT)
Dept: INTERNAL MEDICINE | Facility: CLINIC | Age: 82
End: 2022-07-18

## 2022-07-18 ENCOUNTER — PATIENT OUTREACH (OUTPATIENT)
Dept: ONCOLOGY | Facility: CLINIC | Age: 82
End: 2022-07-18

## 2022-07-18 ENCOUNTER — OFFICE VISIT (OUTPATIENT)
Dept: NEPHROLOGY | Facility: CLINIC | Age: 82
End: 2022-07-18
Attending: INTERNAL MEDICINE
Payer: MEDICARE

## 2022-07-18 VITALS
HEART RATE: 57 BPM | DIASTOLIC BLOOD PRESSURE: 58 MMHG | WEIGHT: 167.7 LBS | OXYGEN SATURATION: 97 % | TEMPERATURE: 97.6 F | BODY MASS INDEX: 25.13 KG/M2 | SYSTOLIC BLOOD PRESSURE: 145 MMHG

## 2022-07-18 DIAGNOSIS — N18.31 STAGE 3A CHRONIC KIDNEY DISEASE (H): Primary | ICD-10-CM

## 2022-07-18 DIAGNOSIS — I10 HTN, GOAL BELOW 140/90: ICD-10-CM

## 2022-07-18 DIAGNOSIS — D64.9 ANEMIA, UNSPECIFIED TYPE: Primary | ICD-10-CM

## 2022-07-18 PROCEDURE — G0463 HOSPITAL OUTPT CLINIC VISIT: HCPCS

## 2022-07-18 PROCEDURE — 99214 OFFICE O/P EST MOD 30 MIN: CPT | Performed by: INTERNAL MEDICINE

## 2022-07-18 ASSESSMENT — PAIN SCALES - GENERAL: PAINLEVEL: NO PAIN (0)

## 2022-07-18 NOTE — PROGRESS NOTES
Nephrology Clinic    Candida Rose MRN:[id number] YOB: 1940  Date of Service: 07/18/2022  Primary care provider: Chani Peoples  Requesting physician: Chani Peoples    ASSESSMENT AND RECOMMENDATIONS:   Candida Rose is a 82 year old woman who presents for CKD follow-up.    1. CKD, stage 3a: Baseline Cr seems to fluctuate around ~1.2 mg/dL with and eGFR of 42 ml/min.  Kidney function better more recently.  She has no albuminuria (on losartan). The etiology of her CKD is most likely related to underlying renovascular disease perhaps related to Fitch-fitch syndrome though would not be uncommon in the elderly population regardless.  She has no clear evidence of functional JAMAL though could have renovascular disease involving the renal microcirculation.  Fortunately, her kidney function remains stable and focus remains on minimizing cardiovascular risk factors.    -she is on RAAS blockade with losartan for HTN which may be resulting in her lack of albuminuria   -continue ASA, plavix and statin  -RTC as needed    2.  HTN: BP appears at goal of <140/80 at home.    -continue losartan 50 mg BID, amlodipine 2.5 mg qPM, atenolol 25 mg daily.  She reports that she stopped taking terazosin 2 mg though it is unclear if she is taking it prn.  She is also no longer on hydrochlorothiazide.  -if HTN worsens would consider going up on amlodipine and/or changing atenolol to carvedilol next  -no changes made today    REASON FOR CONSULT: CKD    HISTORY OF PRESENT ILLNESS:   Candida Rose is a 82 year old woman who presents for CKD follow-up.  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  She also has a history of HTN and is on losartan 50 mg BID, amlodipine 2.5 mg BID, atenolol 25 mg daily and reports she stopped taking terazosin 2 mg daily.  She no longer is on hydrochlorothiazide due to risk of causing  "volume depletion.  She has hyperlipidemia and is on simvastatin.  She has a remote history of diabetes by chart but her hemoglobin A1cs have been at goal and she is not on any medications.  She also has a history of OA and underwent a left TKA.      Her baseline Cr seems to fluctuate around ~1.2 mg/dL with and eGFR of 42 ml/min.  She has no albuminuria.  Her UA is notable for pyuria at times.  She denies a history of excessive NSAID use, UTIs and family history of kidney disease.  Overall, she continues to feel well and has no specific medical complaints.       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 F/u dr. Soto     OA (osteoarthritis)     s/p bilat total hips      Other chronic pain     Joint pain for many years     Unspecified cerebral artery occlusion with cerebral infarction     After Moyamoya surgery > 10 yrs ago.     Vitamin D deficiencies      PAST SURGICAL HISTORY:  Past Surgical History:   Procedure Laterality Date     ARTHROPLASTY KNEE Left 4/17/2017    Procedure: ARTHROPLASTY KNEE;  Left total knee arthroplasty;  Surgeon: Chidi Jenkins MD;  Location:  OR     COLONOSCOPY  2008    normal     COLONOSCOPY  7/17/2014    Procedure: COLONOSCOPY;  Surgeon: Raul Nolan DO;  Location:  GI     CRANIOTOMY      [facility]  \"Pt had repair of blood flow.\"     IR CAROTID ANGIOGRAM  10/30/2006     IR CAROTID ANGIOGRAM  6/6/2010     IR CAROTID ANGIOGRAM  6/6/2010     IR CAROTID ANGIOGRAM  6/6/2010     IR CAROTID ANGIOGRAM  " 5/12/2011     IR CAROTID ANGIOGRAM  5/12/2011     IR CAROTID ANGIOGRAM  5/12/2011     IR CAROTID ANGIOGRAM  6/5/2012     IR CAROTID ANGIOGRAM  6/5/2012     IR CAROTID ANGIOGRAM  6/5/2012     IR MISCELLANEOUS PROCEDURE  6/6/2010     IR MISCELLANEOUS PROCEDURE  6/6/2010     IR MISCELLANEOUS PROCEDURE  5/12/2011     IR MISCELLANEOUS PROCEDURE  6/5/2012     RECONSTRUCT FOREFOOT WITH METATARSOPHALANGEAL (MTP) FUSION Left 8/21/2014    Procedure: RECONSTRUCT FOREFOOT WITH METATARSOPHALANGEAL (MTP) FUSION;  Surgeon: Tres Merlos DPM;  Location: [facility] OR     [facility] NONSPECIFIC PROCEDURE  10/30/06    neurosurgery Dr Soto     [facility] NONSPECIFIC PROCEDURE      bilateral total hips approx 2002     Z NONSPECIFIC PROCEDURE  3/07    neurosurgery      MEDICATIONS:  Prescription Medications as of 7/18/2022       Rx Number Disp Refills Start End Last Dispensed Date Next Fill Date Owning Pharmacy    amLODIPine (NORVASC) 2.5 MG tablet  180 tablet 1 1/31/2022    [id number] [place] [address]    Sig: Take 1 tablet (2.5 mg) by mouth 2 times daily    Class: E-Prescribe    Route: Oral    aspirin 81 MG tablet  30 tablet  1/13/2017    [id number] [place] [address]    Sig: Take 1 tablet (81 mg) by mouth daily    Class: No Print Out    Route: Oral    atenolol (TENORMIN) 25 MG tablet  90 tablet 1 1/31/2022    [id number] [place] [address]    Sig: TAKE 1 TABLET BY MOUTH EVERY DAY    Class: E-Prescribe    Notes to Pharmacy: DX Code Needed  NEED NEW RX, OUT OF REFILLS.    clopidogrel (PLAVIX) 75 MG tablet  90 tablet 1 1/31/2022    [id number] [place] [address]    Sig: Take 1 tablet (75 mg) by mouth daily    Class: E-Prescribe    Route: Oral    cyanocobalamin (VITAMIN B-12) 500 MCG tablet            Sig: Take 500 mcg by mouth daily    Class: Historical    Route: Oral    losartan (COZAAR) 50 MG tablet  180 tablet 1 1/31/2022    [id number] [place] [facility]  [address]    Sig: TAKE 1 TABLET BY MOUTH TWICE A DAY    Class: E-Prescribe    magnesium 500 MG TABS    2/11/2019    [id number] [place] [address]    Class: Historical    Route: Oral    Multiple Vitamins-Minerals (MULTIVITAMIN & MINERAL PO)            Sig: Take 1 tablet by mouth daily.    Class: Historical    Route: Oral    simvastatin (ZOCOR) 20 MG tablet  90 tablet 1 1/31/2022    [id number] [place] [address]    Sig: TAKE 1 TABLET (20 MG) BY MOUTH AT BEDTIME    Class: E-Prescribe      Clinic-Administered Medications as of 7/18/2022       Dose Frequency Start End    lidocaine 1 % injection 2 mL 2 mL  5/6/2022     lidocaine 1 % injection 5 mL 5 mL  5/6/2022     triamcinolone (KENALOG-40) injection 40 mg 40 mg  5/6/2022          ALLERGIES:    Allergies   Allergen Reactions     Lisinopril      Persistent cough     REVIEW OF SYSTEMS:  A comprehensive review of systems was performed and found to be negative except as described here or above.  SOCIAL HISTORY:   Social History     Socioeconomic History     Marital status:      Spouse name: Olu      Number of children: 2     Years of education: Not on file     Highest education level: Not on file   Occupational History     Employer: RETIRED   Tobacco Use     Smoking status: Never Smoker     Smokeless tobacco: Never Used   Vaping Use     Vaping Use: Not on file   Substance and Sexual Activity     Alcohol use: Yes     Comment:  1-2 per day     Drug use: No     Sexual activity: Never     Partners: Male   Other Topics Concern     Parent/sibling w/ CABG, MI or angioplasty before 65F 55M? Not Asked   Social History Narrative     Not on file     Social Determinants of Health     Financial Resource Strain: Not on file   Food Insecurity: Not on file   Transportation Needs: Not on file   Physical Activity: Not on file   Stress: Not on file   Social Connections: Not on file   Intimate Partner Violence:  Not on file   Housing Stability: Not on file     FAMILY MEDICAL HISTORY:   Family History   Problem Relation Age of Onset     Obesity Sister         gastric by-pass age age 60, heart murmur.     Cancer - colorectal Sister 69     Heart Disease Father         mi,  age 48     Family History Negative Mother         killed in MVA age 54     Cancer Maternal Aunt         lung cancer ,non-smoker     Lung Cancer Maternal Aunt      Breast Cancer Daughter 48     Ovarian Cancer No family hx of      PHYSICAL EXAM:   BP (!) 145/58   Pulse 57   Temp 97.6  F (36.4  C) (Oral)   Wt 76.1 kg (167 lb 11.2 oz)   LMP  (LMP Unknown)   SpO2 97%   BMI 25.13 kg/m    GENERAL APPEARANCE: alert and no distress  EYES: nonicteric  HENT: mouth without ulcers or lesions  NECK: supple, no adenopathy  RESP: lungs clear to auscultation   CV: regular rhythm, normal rate, no rub  ABDOMEN: soft, nontender, nondistended  Extremities: no significant edema  MS: no evidence of inflammation in joints, no muscle tenderness  SKIN: no concerning rash  NEURO: mentation intact and speech normal  PSYCH: affect normal/bright     LABS:   CMP  Recent Labs   Lab Test 22  0715 21  0710 21  0738 21  0938 21  0742 21  1001 19  0801 19  0759 18  0909 18  0818   * 131* 136 133 134 137   < > 138   < > 138   POTASSIUM 4.5 3.9 4.1 4.4 3.5 4.6   < > 4.0   < > 4.3   CHLORIDE 98 98 102 104 100 106   < > 105   < > 105   CO2 26 26 27 28 30 26   < > 26   < > 30   ANIONGAP 7 7 7 1* 4 5   < > 7   < > 3   GLC 84 97 105* 101* 102* 104*   < > 108*   < > 98   BUN 19 29 31* 36* 45* 45*   < > 36*   < > 34*   CR 0.96 1.13* 1.23* 1.26* 1.52* 1.45*   < > 1.39*   < > 1.24*   GFRESTIMATED 60* 49* 42* 41* 32* 34*   < > 37*   < > 42*   GFRESTBLACK  --   --  48* 47* 38* 40*   < > 42*   < > 51*   RAINE 8.2* 8.7 9.0 8.5 9.5 9.1   < > 9.3   < > 8.5   PHOS 3.9  --  3.8  --   --   --   --   --   --   --    PROTTOTAL  --  6.8  --    --   --  7.1  --  7.3  --  6.6*   ALBUMIN 3.5 3.3* 3.5  --   --  3.6  --  3.7  --  3.4   BILITOTAL  --  0.4  --   --   --  0.5  --  0.4  --  0.4   ALKPHOS  --  109  --   --   --  103  --  98  --  99   AST  --  19  --   --   --  19  --  20  --  16   ALT  --  24  --   --   --  20  --  20  --  19    < > = values in this interval not displayed.     CBC  Recent Labs   Lab Test 22  0715 21  0710 21  0738 21  0742   HGB 10.3* 10.7* 11.3* 11.0*   WBC 7.2 7.8 6.3 8.2   RBC 3.28* 3.48* 3.77* 3.63*   HCT 30.2* 32.8* 35.1 34.6*   MCV 92 94 93 95   MCH 31.4 30.7 30.0 30.3   MCHC 34.1 32.6 32.2 31.8   RDW 13.8 12.5 12.4 12.3    350 290 334     INRNo lab results found.  ABGNo lab results found.   URINE STUDIES  Recent Labs   Lab Test 21  0740 21  0743   COLOR Yellow Yellow   APPEARANCE Clear Clear   URINEGLC Negative Negative   URINEBILI Negative Negative   URINEKETONE Negative Negative   SG 1.017 1.020   UBLD Negative Negative   URINEPH 6.0 6.5   PROTEIN Negative Negative   UROBILINOGEN  --  0.2   NITRITE Negative Negative   LEUKEST Large* Small*   RBCU 5* O - 2   WBCU 26* 10-25*     Recent Labs   Lab Test 21  0740   UTPG 0.16     PTH  Recent Labs   Lab Test 22  0715 21  0738 19  0759 17  0849   PTHI 41 35 50 55     IRON STUDIES  Recent Labs   Lab Test 22  0715 21  0738 21  0742   IRON 50 70 98    349 369   IRONSAT 15 20 27   KAREN 110 64 73     [id number] Renal U/S with doppler:    FINDINGS:   The right kidney measures 9.8 x 5.1 x 4.9 cm. The right renal cortex  measures 1.1 cm in thickness. There is no hydronephrosis. Renal  cortical echogenicity is unremarkable.     Spectral waveform analysis was performed.  The peak systolic  velocities in the right renal artery at its hilum, mid and origin are  155, 124, 107 cm/sec respectively. Low resistance waveforms are  identified in the right renal artery. The resistance indices  in the  right arcuate arteries range between 0.70 and 0.3. RAR ratios range  between 1.00 to 1.34.     The left kidney measures 10.9 x 4.9 x 5.4 cm. The left renal cortex  measures 1.5 cm in thickness. There is no hydronephrosis. Renal  cortical echogenicity is unremarkable. Simple cyst in the upper pole.     Spectral waveform analysis was performed.  The peak systolic  velocities in the left renal artery at its hilum, mid and origin are  69, 97, 35 cm/sec respectively. Low resistance waveforms are  identified in the left renal artery. The resistance indices in the  left arcuate arteries range between 0.63 and 0.74. RAR ratios range  between 0.28 to 0.78.     The peak systolic velocity in the abdominal aorta superior to the  origin of the renal arteries is 123.     The blader is normal.                                                                      IMPRESSION:  1. No evidence of a hemodynamically significant renal artery stenosis.  2. Simple cyst in the upper pole of the left kidney.    All above labs and imaging reviewed by me.   Imtiaz Griffin MD

## 2022-07-18 NOTE — PROGRESS NOTES
Referral received for benign heme services, see below.    Referral reason: anemia    Current abnormal labs:   Lab Results   Component Value Date    WBC 7.2 07/13/2022    WBC 6.3 05/17/2021     Lab Results   Component Value Date    RBC 3.28 07/13/2022    RBC 3.77 05/17/2021     Lab Results   Component Value Date    HGB 10.3 07/13/2022    HGB 11.3 05/17/2021     Lab Results   Component Value Date    HCT 30.2 07/13/2022    HCT 35.1 05/17/2021     Lab Results   Component Value Date    MCV 92 07/13/2022    MCV 93 05/17/2021     Lab Results   Component Value Date    MCH 31.4 07/13/2022    MCH 30.0 05/17/2021     Lab Results   Component Value Date    MCHC 34.1 07/13/2022    MCHC 32.2 05/17/2021     Lab Results   Component Value Date    RDW 13.8 07/13/2022    RDW 12.4 05/17/2021     Lab Results   Component Value Date     07/13/2022     05/17/2021      and   Ferritin   Date Value Ref Range Status   07/13/2022 110 8 - 252 ng/mL Final   05/17/2021 64 8 - 252 ng/mL Final     Iron   Date Value Ref Range Status   07/13/2022 50 35 - 180 ug/dL Final   05/17/2021 70 35 - 180 ug/dL Final     Iron Binding Cap   Date Value Ref Range Status   05/17/2021 349 240 - 430 ug/dL Final     Iron Binding Capacity   Date Value Ref Range Status   07/13/2022 341 240 - 430 ug/dL Final           Outreach: Call not placed to patient regarding referral.    Plan: Internal Referral: No additional work-up needed, scheduling instructions updated, referral transferred to [facility] for completion.

## 2022-07-18 NOTE — LETTER
7/18/2022       RE: Candida Rose  [address] [facility] [id number]     Dear Colleague,    Thank you for referring your patient, Candida Rose, to the [facility] NEPHROLOGY CLINIC [place] at [facility]. Please see a copy of my visit note below.    Nephrology Clinic    Candida Rose MRN:[id number] YOB: 1940  Date of Service: 07/18/2022  Primary care provider: Chani Peoples  Requesting physician: Chani Peoples    ASSESSMENT AND RECOMMENDATIONS:   Candida Rose is a 82 year old woman who presents for CKD follow-up.    1. CKD, stage 3a: Baseline Cr seems to fluctuate around ~1.2 mg/dL with and eGFR of 42 ml/min.  Kidney function better more recently.  She has no albuminuria (on losartan). The etiology of her CKD is most likely related to underlying renovascular disease perhaps related to Fitch-fitch syndrome though would not be uncommon in the elderly population regardless.  She has no clear evidence of functional JAMAL though could have renovascular disease involving the renal microcirculation.  Fortunately, her kidney function remains stable and focus remains on minimizing cardiovascular risk factors.    -she is on RAAS blockade with losartan for HTN which may be resulting in her lack of albuminuria   -continue ASA, plavix and statin  -RTC as needed    2.  HTN: BP appears at goal of <140/80 at home.    -continue losartan 50 mg BID, amlodipine 2.5 mg qPM, atenolol 25 mg daily.  She reports that she stopped taking terazosin 2 mg though it is unclear if she is taking it prn.  She is also no longer on hydrochlorothiazide.  -if HTN worsens would consider going up on amlodipine and/or changing atenolol to carvedilol next  -no changes made today    REASON FOR CONSULT: CKD    HISTORY OF PRESENT ILLNESS:   Candida Rose is a 82 year old woman who presents for CKD follow-up.  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  She also has a history of HTN and is on losartan 50 mg BID, amlodipine 2.5 mg BID, atenolol 25 mg daily and reports she stopped taking terazosin 2 mg daily.  She no longer is on hydrochlorothiazide due to risk of causing volume depletion.  She has hyperlipidemia and is on simvastatin.  She has a remote history of diabetes by chart but her hemoglobin A1cs have been at goal and she is not on any medications.  She also has a history of OA and underwent a left TKA.      Her baseline Cr seems to fluctuate around ~1.2 mg/dL with and eGFR of 42 ml/min.  She has no albuminuria.  Her UA is notable for pyuria at times.  She denies a history of excessive NSAID use, UTIs and family history of kidney disease.  Overall, she continues to feel well and has no specific medical complaints.       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 F/u dr. Soto     OA (osteoarthritis)     s/p bilat total hips      Other chronic pain     Joint pain for many years     Unspecified cerebral artery occlusion with cerebral infarction     After Moyamoya surgery > 10 yrs ago.     Vitamin D deficiencies      PAST SURGICAL HISTORY:  Past Surgical History:   Procedure Laterality Date     ARTHROPLASTY KNEE Left 4/17/2017    Procedure: ARTHROPLASTY KNEE;  Left total knee arthroplasty;  Surgeon: Chidi Jenkins MD;  Location: [facility] OR     COLONOSCOPY  2008    normal     COLONOSCOPY  7/17/2014  "   Procedure: COLONOSCOPY;  Surgeon: Raul Nolan DO;  Location: [facility] GI     CRANIOTOMY      MOJAMOJA  \"Pt had repair of blood flow.\"     IR CAROTID ANGIOGRAM  10/30/2006     IR CAROTID ANGIOGRAM  6/6/2010     IR CAROTID ANGIOGRAM  6/6/2010     IR CAROTID ANGIOGRAM  6/6/2010     IR CAROTID ANGIOGRAM  5/12/2011     IR CAROTID ANGIOGRAM  5/12/2011     IR CAROTID ANGIOGRAM  5/12/2011     IR CAROTID ANGIOGRAM  6/5/2012     IR CAROTID ANGIOGRAM  6/5/2012     IR CAROTID ANGIOGRAM  6/5/2012     IR MISCELLANEOUS PROCEDURE  6/6/2010     IR MISCELLANEOUS PROCEDURE  6/6/2010     IR MISCELLANEOUS PROCEDURE  5/12/2011     IR MISCELLANEOUS PROCEDURE  6/5/2012     RECONSTRUCT FOREFOOT WITH METATARSOPHALANGEAL (MTP) FUSION Left 8/21/2014    Procedure: RECONSTRUCT FOREFOOT WITH METATARSOPHALANGEAL (MTP) FUSION;  Surgeon: Tres Merlos DPM;  Location:  OR     [facility] NONSPECIFIC PROCEDURE  10/30/06    neurosurgery Dr Soto     [facility] NONSPECIFIC PROCEDURE      bilateral total hips approx 2002     [facility] NONSPECIFIC PROCEDURE  3/07    neurosurgery      MEDICATIONS:  Prescription Medications as of 7/18/2022       Rx Number Disp Refills Start End Last Dispensed Date Next Fill Date Owning Pharmacy    amLODIPine (NORVASC) 2.5 MG tablet  180 tablet 1 1/31/2022    [id number] [place] [address]    Sig: Take 1 tablet (2.5 mg) by mouth 2 times daily    Class: E-Prescribe    Route: Oral    aspirin 81 MG tablet  30 tablet  1/13/2017    [id number] [place] [address]    Sig: Take 1 tablet (81 mg) by mouth daily    Class: No Print Out    Route: Oral    atenolol (TENORMIN) 25 MG tablet  90 tablet 1 1/31/2022    [id number] [place] [address]    Sig: TAKE 1 TABLET BY MOUTH EVERY DAY    Class: E-Prescribe    Notes to Pharmacy: DX Code Needed  NEED NEW RX, OUT OF REFILLS.    clopidogrel (PLAVIX) 75 MG tablet  90 tablet 1 1/31/2022    [id number] [place] [facility] [address] [facility]    Sig: Take 1 tablet (75 mg) by mouth daily    Class: E-Prescribe    Route: Oral    cyanocobalamin (VITAMIN B-12) 500 MCG tablet            Sig: Take 500 mcg by mouth daily    Class: Historical    Route: Oral    losartan (COZAAR) 50 MG tablet  180 tablet 1 1/31/2022    [id number] [place] [address]    Sig: TAKE 1 TABLET BY MOUTH TWICE A DAY    Class: E-Prescribe    magnesium 500 MG TABS    2/11/2019    [id number] [place] [address]    Class: Historical    Route: Oral    Multiple Vitamins-Minerals (MULTIVITAMIN & MINERAL PO)            Sig: Take 1 tablet by mouth daily.    Class: Historical    Route: Oral    simvastatin (ZOCOR) 20 MG tablet  90 tablet 1 1/31/2022    [id number] [place] [address]    Sig: TAKE 1 TABLET (20 MG) BY MOUTH AT BEDTIME    Class: E-Prescribe      Clinic-Administered Medications as of 7/18/2022       Dose Frequency Start End    lidocaine 1 % injection 2 mL 2 mL  5/6/2022     lidocaine 1 % injection 5 mL 5 mL  5/6/2022     triamcinolone (KENALOG-40) injection 40 mg 40 mg  5/6/2022          ALLERGIES:    Allergies   Allergen Reactions     Lisinopril      Persistent cough     REVIEW OF SYSTEMS:  A comprehensive review of systems was performed and found to be negative except as described here or above.  SOCIAL HISTORY:   Social History     Socioeconomic History     Marital status:      Spouse name: Olu      Number of children: 2     Years of education: Not on file     Highest education level: Not on file   Occupational History     Employer: RETIRED   Tobacco Use     Smoking status: Never Smoker     Smokeless tobacco: Never Used   Vaping Use     Vaping Use: Not on file   Substance and Sexual Activity     Alcohol use: Yes     Comment:  1-2 per day     Drug use: No     Sexual activity: Never     Partners: Male   Other Topics Concern     Parent/sibling w/ CABG, MI or angioplasty before 65F 55M? Not  Asked   Social History Narrative     Not on file     Social Determinants of Health     Financial Resource Strain: Not on file   Food Insecurity: Not on file   Transportation Needs: Not on file   Physical Activity: Not on file   Stress: Not on file   Social Connections: Not on file   Intimate Partner Violence: Not on file   Housing Stability: Not on file     FAMILY MEDICAL HISTORY:   Family History   Problem Relation Age of Onset     Obesity Sister         gastric by-pass age age 60, heart murmur.     Cancer - colorectal Sister 69     Heart Disease Father         mi,  age 48     Family History Negative Mother         killed in MVA age 54     Cancer Maternal Aunt         lung cancer ,non-smoker     Lung Cancer Maternal Aunt      Breast Cancer Daughter 48     Ovarian Cancer No family hx of      PHYSICAL EXAM:   BP (!) 145/58   Pulse 57   Temp 97.6  F (36.4  C) (Oral)   Wt 76.1 kg (167 lb 11.2 oz)   LMP  (LMP Unknown)   SpO2 97%   BMI 25.13 kg/m    GENERAL APPEARANCE: alert and no distress  EYES: nonicteric  HENT: mouth without ulcers or lesions  NECK: supple, no adenopathy  RESP: lungs clear to auscultation   CV: regular rhythm, normal rate, no rub  ABDOMEN: soft, nontender, nondistended  Extremities: no significant edema  MS: no evidence of inflammation in joints, no muscle tenderness  SKIN: no concerning rash  NEURO: mentation intact and speech normal  PSYCH: affect normal/bright     LABS:   CMP  Recent Labs   Lab Test 22  0715 21  0710 21  0738 21  0938 21  0742 21  1001 19  0801 19  0759 18  0909 18  0818   * 131* 136 133 134 137   < > 138   < > 138   POTASSIUM 4.5 3.9 4.1 4.4 3.5 4.6   < > 4.0   < > 4.3   CHLORIDE 98 98 102 104 100 106   < > 105   < > 105   CO2 26 26 27 28 30 26   < > 26   < > 30   ANIONGAP 7 7 7 1* 4 5   < > 7   < > 3   GLC 84 97 105* 101* 102* 104*   < > 108*   < > 98   BUN 19 29 31* 36* 45* 45*   < > 36*   < > 34*    CR 0.96 1.13* 1.23* 1.26* 1.52* 1.45*   < > 1.39*   < > 1.24*   GFRESTIMATED 60* 49* 42* 41* 32* 34*   < > 37*   < > 42*   GFRESTBLACK  --   --  48* 47* 38* 40*   < > 42*   < > 51*   RAINE 8.2* 8.7 9.0 8.5 9.5 9.1   < > 9.3   < > 8.5   PHOS 3.9  --  3.8  --   --   --   --   --   --   --    PROTTOTAL  --  6.8  --   --   --  7.1  --  7.3  --  6.6*   ALBUMIN 3.5 3.3* 3.5  --   --  3.6  --  3.7  --  3.4   BILITOTAL  --  0.4  --   --   --  0.5  --  0.4  --  0.4   ALKPHOS  --  109  --   --   --  103  --  98  --  99   AST  --  19  --   --   --  19  --  20  --  16   ALT  --  24  --   --   --  20  --  20  --  19    < > = values in this interval not displayed.     CBC  Recent Labs   Lab Test 22  0715 21  0710 21  0738 21  0742   HGB 10.3* 10.7* 11.3* 11.0*   WBC 7.2 7.8 6.3 8.2   RBC 3.28* 3.48* 3.77* 3.63*   HCT 30.2* 32.8* 35.1 34.6*   MCV 92 94 93 95   MCH 31.4 30.7 30.0 30.3   MCHC 34.1 32.6 32.2 31.8   RDW 13.8 12.5 12.4 12.3    350 290 334     INRNo lab results found.  ABGNo lab results found.   URINE STUDIES  Recent Labs   Lab Test 21  0740 21  0743   COLOR Yellow Yellow   APPEARANCE Clear Clear   URINEGLC Negative Negative   URINEBILI Negative Negative   URINEKETONE Negative Negative   SG 1.017 1.020   UBLD Negative Negative   URINEPH 6.0 6.5   PROTEIN Negative Negative   UROBILINOGEN  --  0.2   NITRITE Negative Negative   LEUKEST Large* Small*   RBCU 5* O - 2   WBCU 26* 10-25*     Recent Labs   Lab Test 21  0740   UTPG 0.16     PTH  Recent Labs   Lab Test 22  0715 21  0738 19  0759 17  0849   PTHI 41 35 50 55     IRON STUDIES  Recent Labs   Lab Test 22  0715 21  0738 21  0742   IRON 50 70 98    349 369   IRONSAT 15 20 27   KAREN 110 64 73     [id number] Renal U/S with doppler:    FINDINGS:   The right kidney measures 9.8 x 5.1 x 4.9 cm. The right renal cortex  measures 1.1 cm in thickness. There is no  hydronephrosis. Renal  cortical echogenicity is unremarkable.     Spectral waveform analysis was performed.  The peak systolic  velocities in the right renal artery at its hilum, mid and origin are  155, 124, 107 cm/sec respectively. Low resistance waveforms are  identified in the right renal artery. The resistance indices in the  right arcuate arteries range between 0.70 and 0.3. RAR ratios range  between 1.00 to 1.34.     The left kidney measures 10.9 x 4.9 x 5.4 cm. The left renal cortex  measures 1.5 cm in thickness. There is no hydronephrosis. Renal  cortical echogenicity is unremarkable. Simple cyst in the upper pole.     Spectral waveform analysis was performed.  The peak systolic  velocities in the left renal artery at its hilum, mid and origin are  69, 97, 35 cm/sec respectively. Low resistance waveforms are  identified in the left renal artery. The resistance indices in the  left arcuate arteries range between 0.63 and 0.74. RAR ratios range  between 0.28 to 0.78.     The peak systolic velocity in the abdominal aorta superior to the  origin of the renal arteries is 123.     The blader is normal.                                                                   IMPRESSION:  1. No evidence of a hemodynamically significant renal artery stenosis.  2. Simple cyst in the upper pole of the left kidney.    All above labs and imaging reviewed by me.   Imtiaz Griffin MD

## 2022-07-18 NOTE — NURSING NOTE
Chief Complaint   Patient presents with     RECHECK       BP (!) 145/58   Pulse 57   Temp 97.6  F (36.4  C) (Oral)   Wt 76.1 kg (167 lb 11.2 oz)   LMP  (LMP Unknown)   SpO2 97%   BMI 25.13 kg/m      Justo Vann on 7/18/2022 at 8:47 AM

## 2022-07-21 NOTE — TELEPHONE ENCOUNTER
RECORDS STATUS - ALL OTHER DIAGNOSIS      RECORDS RECEIVED FROM: [facility]   DATE RECEIVED: 07/21/22   NOTES STATUS DETAILS   OFFICE NOTE from referring provider EPIC Dr. Chani Peoples   MEDICATION LIST [facility]    LABS     ANYTHING RELATED TO DIAGNOSIS Epic Most recent 07/13/22

## 2022-08-01 DIAGNOSIS — I10 HTN, GOAL BELOW 140/90: Chronic | ICD-10-CM

## 2022-08-02 RX ORDER — ATENOLOL 25 MG/1
TABLET ORAL
Qty: 90 TABLET | Refills: 1 | Status: SHIPPED | OUTPATIENT
Start: 2022-08-02 | End: 2022-10-27

## 2022-08-02 NOTE — TELEPHONE ENCOUNTER
Pending Prescriptions:                       Disp   Refills    atenolol (TENORMIN) 25 MG tablet [Pharmacy*90 tab*1        Sig: TAKE 1 TABLET BY MOUTH EVERY DAY    Routing refill request to provider for review/approval because:  BP Readings from Last 3 Encounters:   07/18/22 (!) 145/58   03/11/22 134/60   01/07/22 124/76

## 2022-08-08 DIAGNOSIS — E78.5 HYPERLIPIDEMIA LDL GOAL <130: Chronic | ICD-10-CM

## 2022-08-08 DIAGNOSIS — I10 HTN, GOAL BELOW 140/90: Chronic | ICD-10-CM

## 2022-08-10 RX ORDER — SIMVASTATIN 20 MG
TABLET ORAL
Qty: 90 TABLET | Refills: 0 | Status: SHIPPED | OUTPATIENT
Start: 2022-08-10 | End: 2022-08-11

## 2022-08-10 NOTE — TELEPHONE ENCOUNTER
Pending Prescriptions:                       Disp   Refills    simvastatin (ZOCOR) 20 MG tablet [Pharmacy*90 tab*0        Sig: TAKE 1 TABLET BY MOUTH AT BEDTIME    Routing refill request to provider for review/approval because:  Drug interaction warning

## 2022-08-11 ENCOUNTER — OFFICE VISIT (OUTPATIENT)
Dept: INTERNAL MEDICINE | Facility: CLINIC | Age: 82
End: 2022-08-11
Payer: MEDICARE

## 2022-08-11 VITALS
WEIGHT: 164.1 LBS | SYSTOLIC BLOOD PRESSURE: 136 MMHG | HEART RATE: 69 BPM | TEMPERATURE: 98.1 F | RESPIRATION RATE: 18 BRPM | OXYGEN SATURATION: 98 % | DIASTOLIC BLOOD PRESSURE: 62 MMHG | HEIGHT: 69 IN | BODY MASS INDEX: 24.3 KG/M2

## 2022-08-11 DIAGNOSIS — N18.30 STAGE 3 CHRONIC KIDNEY DISEASE, UNSPECIFIED WHETHER STAGE 3A OR 3B CKD (H): Chronic | ICD-10-CM

## 2022-08-11 DIAGNOSIS — I10 HTN, GOAL BELOW 140/90: Primary | Chronic | ICD-10-CM

## 2022-08-11 DIAGNOSIS — E78.5 HYPERLIPIDEMIA LDL GOAL <130: Chronic | ICD-10-CM

## 2022-08-11 DIAGNOSIS — I67.5 MOYAMOYA DISEASE: Chronic | ICD-10-CM

## 2022-08-11 PROBLEM — F32.A DEPRESSIVE DISORDER: Status: ACTIVE | Noted: 2022-08-11

## 2022-08-11 PROBLEM — E66.01 MORBID OBESITY (H): Status: RESOLVED | Noted: 2018-09-13 | Resolved: 2022-08-11

## 2022-08-11 PROBLEM — M12.9 ARTHROPATHY: Status: ACTIVE | Noted: 2022-08-11

## 2022-08-11 PROCEDURE — 99214 OFFICE O/P EST MOD 30 MIN: CPT | Performed by: INTERNAL MEDICINE

## 2022-08-11 RX ORDER — AMLODIPINE BESYLATE 2.5 MG/1
2.5 TABLET ORAL DAILY
Qty: 90 TABLET | Refills: 1 | Status: ON HOLD | OUTPATIENT
Start: 2022-08-11 | End: 2022-10-13

## 2022-08-11 RX ORDER — CLOPIDOGREL BISULFATE 75 MG/1
75 TABLET ORAL DAILY
Qty: 90 TABLET | Refills: 1 | Status: ON HOLD | OUTPATIENT
Start: 2022-08-11 | End: 2022-10-13

## 2022-08-11 RX ORDER — SIMVASTATIN 20 MG
TABLET ORAL
Qty: 90 TABLET | Refills: 1 | Status: ON HOLD | OUTPATIENT
Start: 2022-08-11 | End: 2022-10-13

## 2022-08-11 RX ORDER — LOSARTAN POTASSIUM 50 MG/1
TABLET ORAL
Qty: 180 TABLET | Refills: 1 | Status: ON HOLD | OUTPATIENT
Start: 2022-08-11 | End: 2022-10-13

## 2022-08-11 NOTE — PATIENT INSTRUCTIONS
Plan:  1. Continue same meds, same doses for now   2. Schedule ANNUAL EXAM after Jan 7, 2023

## 2022-08-11 NOTE — PROGRESS NOTES
Dr Farrell's note      Patient's instructions / PLAN:                                                        Plan:  1. Continue same meds, same doses for now   2. Schedule ANNUAL EXAM after Jan 7, 2023      ASSESSMENT & PLAN:                                                      Since she lost significant weight with diet and exercise, she is able to control the blood pressure with much less medications and much less doses    (I10) HTN, goa below 140/90  (primary encounter diagnosis)  Comment: Controlled  Plan: amLODIPine (NORVASC) 2.5 MG tablet, simvastatin        (ZOCOR) 20 MG tablet, losartan (COZAAR) 50 MG         tablet            (E78.5) Hyperlipidemia LDL goal <130  Comment: Controlled  Plan: simvastatin (ZOCOR) 20 MG tablet            (I67.5) Moyamoya disease  Comment: Stable  Plan: clopidogrel (PLAVIX) 75 MG tablet            (N18.30) Stage 3 chronic kidney disease, unspecified whether stage 3a or 3b CKD (H)  Comment: Stable  Plan: Monitor labs       Chief complaint:                                                      Follow up chronic medical problems      SUBJECTIVE:                                                    History of present illness:      CKD 3 -- stable Nephrology note July 2022 Discussed  w the patient    HTN  -- home /56, -- most of the time 136/61.   -- Losartan 50 bid, Amlodipine 2.5 in the evening atenolol 25    Wt  -- she lost wt w hard work     Anemia  -- iron B 12 in normal range  -- trending down  -- appointment w [facility] on Sept 2       Candida is a 82 year old, presenting for the following health issues:  Patient is being seen for an follow up.  Amlodipine causes dizziness in the morning.  History of Present Illness       Reason for visit:  Yearly check-up    She eats 4 or more servings of fruits and vegetables daily.She consumes 0 sweetened beverage(s) daily.She exercises with enough effort to increase her heart rate 30 to 60 minutes per day.  She exercises with enough effort to  "increase her heart rate 5 days per week.   She is taking medications regularly.       Hyperlipidemia Follow-Up      Are you regularly taking any medication or supplement to lower your cholesterol?   Yes- simvastatin    Are you having muscle aches or other side effects that you think could be caused by your cholesterol lowering medication?  No    Hypertension Follow-up      Do you check your blood pressure regularly outside of the clinic? Yes     Are you following a low salt diet? Yes    Are your blood pressures ever more than 140 on the top number (systolic) OR more   than 90 on the bottom number (diastolic), for example 140/90? Yes      Review of Systems:                                                      ROS: negative for fever, chills, cough, wheezes, chest pain, shortness of breath, vomiting, abdominal pain, leg swelling       OBJECTIVE:             Physical exam:   Blood pressure 136/62, pulse 69, temperature 98.1  F (36.7  C), temperature source Tympanic, resp. rate 18, height 1.74 m (5' 8.5\"), weight 74.4 kg (164 lb 1.6 oz), SpO2 98 %, not currently breastfeeding.       NAD, appears comfortable  Skin: no rashes   Neck: supple, no JVD,  No thyroidmegaly. Lymph nodes nonpalpable cervical and supraclavicular.  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 F/u dr. Soto     OA (osteoarthritis)     s/p bilat total hips      Other chronic pain     Joint pain for many years     Unspecified cerebral artery occlusion with cerebral infarction     After Moyamoya surgery > 10 yrs ago.     Vitamin D deficiencies       PSHx: reviewed  Past Surgical History:   Procedure Laterality Date     ARTHROPLASTY KNEE Left 4/17/2017    Procedure: ARTHROPLASTY KNEE;  Left total knee arthroplasty;  Surgeon: Chidi Jenkins MD;  Location:  OR     COLONOSCOPY  2008    normal     COLONOSCOPY  7/17/2014    Procedure: COLONOSCOPY;  Surgeon: Raul Nolan DO;  Location:  GI     CRANIOTOMY      MOJAMOJA  \"Pt had repair of blood flow.\"     IR CAROTID ANGIOGRAM  10/30/2006     IR CAROTID ANGIOGRAM  6/6/2010     IR CAROTID ANGIOGRAM  6/6/2010     IR CAROTID ANGIOGRAM  6/6/2010     IR CAROTID ANGIOGRAM  5/12/2011     IR CAROTID ANGIOGRAM  5/12/2011     IR CAROTID ANGIOGRAM  5/12/2011     IR CAROTID ANGIOGRAM  6/5/2012     IR CAROTID ANGIOGRAM  6/5/2012     IR CAROTID ANGIOGRAM  6/5/2012     IR MISCELLANEOUS PROCEDURE  6/6/2010     IR MISCELLANEOUS PROCEDURE  6/6/2010     IR MISCELLANEOUS PROCEDURE  5/12/2011     IR MISCELLANEOUS PROCEDURE  6/5/2012     RECONSTRUCT FOREFOOT WITH METATARSOPHALANGEAL (MTP) FUSION Left 8/21/2014    Procedure: RECONSTRUCT FOREFOOT WITH METATARSOPHALANGEAL (MTP) FUSION;  Surgeon: Tres Merlos DPM;  Location:  OR     [facility] NONSPECIFIC PROCEDURE  10/30/06    neurosurgery Dr Soto     [facility] NONSPECIFIC PROCEDURE      bilateral total hips approx 2002     [facility] NONSPECIFIC PROCEDURE  3/07    neurosurgery         Meds: reviewed  Current Outpatient Medications   Medication Sig Dispense Refill     amLODIPine (NORVASC) 2.5 MG tablet Take 1 tablet (2.5 mg) by mouth 2 times daily 180 tablet 1     aspirin 81 MG tablet Take 1 tablet (81 mg) by mouth daily 30 tablet      atenolol (TENORMIN) 25 MG tablet TAKE 1 TABLET BY MOUTH EVERY DAY 90 tablet 1     " clopidogrel (PLAVIX) 75 MG tablet Take 1 tablet (75 mg) by mouth daily 90 tablet 1     cyanocobalamin (VITAMIN B-12) 500 MCG tablet Take 500 mcg by mouth daily       losartan (COZAAR) 50 MG tablet TAKE 1 TABLET BY MOUTH TWICE A  tablet 1     magnesium 500 MG TABS        Multiple Vitamins-Minerals (MULTIVITAMIN & MINERAL PO) Take 1 tablet by mouth daily.       simvastatin (ZOCOR) 20 MG tablet TAKE 1 TABLET BY MOUTH AT BEDTIME 90 tablet 0       Soc Hx: reviewed  Fam Hx: reviewed          Chani Farrell MD  Internal Medicine

## 2022-08-18 ENCOUNTER — ANCILLARY PROCEDURE (OUTPATIENT)
Dept: BONE DENSITY | Facility: CLINIC | Age: 82
End: 2022-08-18
Attending: OBSTETRICS & GYNECOLOGY
Payer: MEDICARE

## 2022-08-18 DIAGNOSIS — Z78.0 ASYMPTOMATIC MENOPAUSAL STATE: ICD-10-CM

## 2022-08-18 PROCEDURE — 77085 DXA BONE DENSITY AXL VRT FX: CPT | Performed by: INTERNAL MEDICINE

## 2022-08-23 PROBLEM — G89.29 CHRONIC LEFT SHOULDER PAIN: Status: RESOLVED | Noted: 2022-03-24 | Resolved: 2022-08-23

## 2022-08-23 PROBLEM — M25.512 CHRONIC LEFT SHOULDER PAIN: Status: RESOLVED | Noted: 2022-03-24 | Resolved: 2022-08-23

## 2022-08-23 NOTE — PROGRESS NOTES
Discharge Note    Progress reporting period is from last progress note on 06/28/22 to Jun 28, 2022.    Candida failed to follow up and current status is unknown.  Please see information below for last relevant information on current status.  Patient seen for 8 visits.    SUBJECTIVE  Subjective changes noted by patient:  pt reports exercies are going well, thinking about wrapping up therapy today. L shojanetter feeling better, R starting to bother her, discussed completing exercises on R side as well  .  Current pain level is 4/10.     Previous pain level was  7/10.   Changes in function:  Yes (See Goal flowsheet attached for changes in current functional level)  Adverse reaction to treatment or activity: None    OBJECTIVE  Changes noted in objective findings: strength of cuff and shoulder flexion grossly 4-/5 on R and L side, AROM shoulder flexion  degrees.     ASSESSMENT/PLAN  Diagnosis: L Shoulder Pain   Updated problem list and treatment plan:   Pain - HEP  Decreased ROM/flexibility - HEP  Decreased function - HEP  Decreased strength - HEP  STG/LTGs have been met or progress has been made towards goals:  Yes, please see goal flowsheet for most current information  Assessment of Progress: current status is unknown.    Last current status: Pt is progressing as expected   Self Management Plans:  HEP  I have re-evaluated this patient and find that the nature, scope, duration and intensity of the therapy is appropriate for the medical condition of the patient.  Candida continues to require the following intervention to meet STG and LTG's:  HEP.    Recommendations:  Discharge with current home program.  Patient to follow up with MD as needed.    Please refer to the daily flowsheet for treatment today, total treatment time and time spent performing 1:1 timed codes.

## 2022-08-30 NOTE — PROGRESS NOTES
[facility]    Hematology/Oncology New Patient Note      Today's Date: 8/31/22    Reason for Consultation: Anemia, unspecified  Referring Provider: Chani Peoples MD      HISTORY OF PRESENT ILLNESS: Candida Rose is a 82 year old female who presents with anemia. 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    Patient has evidence of chronic NC/NC anemia since 2017 with ofelia of 7.8 in April 2017. On 7/13/22, WBC 7.2, Hb 10.3, MCV 92, . B12 1759, ferritin 110, folate 32.8, iron 50, TIBC 341, iron sat 15%.    She has had intentional 70 lb weight loss via a clinical trial and weight watchers over 2 years. No hematura, hematochezia/melena. No vaginal bleed.     Cancer screening:  Mammogram UTD and no history of abnormal/breast biopsy.  Colonoscopies UTD 2014 (normal; no further follow up).    No history of abnormal pap smears.     Sister was diagnosed with colon cancer at age 60 and passed away soon after diagnosis.     She is a retired .       REVIEW OF SYSTEMS:   A 14 point ROS was reviewed with pertinent positives and negatives in the HPI.        HOME MEDICATIONS:  Current Outpatient Medications   Medication Sig Dispense Refill     amLODIPine (NORVASC) 2.5 MG tablet Take 1 tablet (2.5 mg) by mouth daily 90 tablet 1     aspirin 81 MG tablet Take 1 tablet (81 mg) by mouth daily 30 tablet      atenolol (TENORMIN) 25 MG tablet TAKE 1 TABLET BY MOUTH EVERY DAY 90 tablet 1     clopidogrel (PLAVIX) 75 MG tablet Take 1 tablet (75 mg) by mouth daily 90 tablet 1     cyanocobalamin (VITAMIN B-12) 500 MCG tablet Take 500 mcg by mouth daily       losartan (COZAAR) 50 MG tablet TAKE 1 TABLET BY MOUTH TWICE A  tablet 1     magnesium 500 MG TABS        Multiple Vitamins-Minerals (MULTIVITAMIN & MINERAL PO) Take 1 tablet by mouth daily.       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 F/u dr. Soto     OA (osteoarthritis)     s/p bilat total hips      Other chronic pain     Joint pain for many years     Unspecified cerebral artery occlusion with cerebral infarction     After Moyamoya surgery > 10 yrs ago.     Vitamin D deficiencies          PAST SURGICAL HISTORY:  Past Surgical History:   Procedure Laterality Date     ARTHROPLASTY KNEE Left 4/17/2017    Procedure: ARTHROPLASTY KNEE;  Left total knee arthroplasty;  Surgeon: Chidi Jenkins MD;  Location:  OR     COLONOSCOPY  2008    normal     COLONOSCOPY  7/17/2014    Procedure: COLONOSCOPY;  Surgeon: Raul Nolan DO;  Location:  GI     [facility]  \"Pt had repair of blood flow.\"     IR CAROTID ANGIOGRAM  10/30/2006     IR CAROTID ANGIOGRAM  6/6/2010     IR CAROTID ANGIOGRAM  6/6/2010     IR CAROTID ANGIOGRAM  6/6/2010     IR CAROTID ANGIOGRAM  5/12/2011     IR CAROTID ANGIOGRAM  5/12/2011     IR CAROTID ANGIOGRAM  5/12/2011     IR CAROTID ANGIOGRAM  6/5/2012     IR CAROTID ANGIOGRAM  6/5/2012     IR CAROTID ANGIOGRAM  6/5/2012     IR MISCELLANEOUS PROCEDURE  6/6/2010     IR MISCELLANEOUS PROCEDURE  6/6/2010     IR MISCELLANEOUS PROCEDURE  5/12/2011     IR MISCELLANEOUS PROCEDURE  6/5/2012     RECONSTRUCT FOREFOOT WITH METATARSOPHALANGEAL (MTP) FUSION " "Left 2014    Procedure: RECONSTRUCT FOREFOOT WITH METATARSOPHALANGEAL (MTP) FUSION;  Surgeon: Tres Merlos DPM;  Location:  OR     [facility] NONSPECIFIC PROCEDURE  10/30/06    neurosurgery Dr Soto     [facility] NONSPECIFIC PROCEDURE      bilateral total hips approx      ZZ NONSPECIFIC PROCEDURE  3/07    neurosurgery          SOCIAL HISTORY:  Social History     Socioeconomic History     Marital status:      Spouse name: Olu      Number of children: 2     Years of education: Not on file     Highest education level: Not on file   Occupational History     Employer: RETIRED   Tobacco Use     Smoking status: Never Smoker     Smokeless tobacco: Never Used   Vaping Use     Vaping Use: Never used   Substance and Sexual Activity     Alcohol use: Yes     Comment:  1-2 per day     Drug use: No     Sexual activity: Never     Partners: Male   Other Topics Concern     Parent/sibling w/ CABG, MI or angioplasty before 65F 55M? Not Asked   Social History Narrative     Not on file     Social Determinants of Health     Financial Resource Strain: Not on file   Food Insecurity: Not on file   Transportation Needs: Not on file   Physical Activity: Not on file   Stress: Not on file   Social Connections: Not on file   Intimate Partner Violence: Not on file   Housing Stability: Not on file         FAMILY HISTORY:  Family History   Problem Relation Age of Onset     Obesity Sister         gastric by-pass age age 60, heart murmur.     Cancer - colorectal Sister 69     Heart Disease Father         mi,  age 48     Family History Negative Mother         killed in MVA age 54     Cancer Maternal Aunt         lung cancer ,non-smoker     Lung Cancer Maternal Aunt      Breast Cancer Daughter 48     Ovarian Cancer No family hx of          PHYSICAL EXAM:  Vital signs:  BP (!) 168/67   Pulse 53   Resp 16   Ht 1.74 m (5' 8.5\")   Wt 76 kg (167 lb 8 oz)   LMP  (LMP Unknown)   SpO2 99%   BMI 25.10 kg/m     ECOG: " 1  GENERAL/CONSTITUTIONAL: No acute distress.  EYES: Pupils are equal, round, and react to light and accommodation. Extraocular movements intact.  No scleral icterus.  ENT/MOUTH: Neck supple. Oropharynx clear, no mucositis.  LYMPH: No anterior cervical, posterior cervical, supraclavicular, axillary or inguinal adenopathy.   RESPIRATORY: Clear to auscultation bilaterally. No crackles or wheezing.   CARDIOVASCULAR: Regular rate and rhythm without murmurs, gallops, or rubs.  GASTROINTESTINAL: No hepatosplenomegaly, masses, or tenderness. The patient has normal bowel sounds. No guarding.  No distention.  MUSCULOSKELETAL: Warm and well-perfused, no cyanosis, clubbing. Chronic lymphedema.  NEUROLOGIC: Cranial nerves II-XII are intact. Alert, oriented, answers questions appropriately.  INTEGUMENTARY: No rashes or jaundice.  GAIT: Steady, does not use assistive device.      LABS:   Latest Reference Range & Units 07/13/22 07:15   Sodium 133 - 144 mmol/L 131 (L)   Potassium 3.4 - 5.3 mmol/L 4.5   Chloride 94 - 109 mmol/L 98   Carbon Dioxide 20 - 32 mmol/L 26   Urea Nitrogen 7 - 30 mg/dL 19   Creatinine 0.52 - 1.04 mg/dL 0.96   GFR Estimate >60 mL/min/1.73m2 60 (L)   Calcium 8.5 - 10.1 mg/dL 8.2 (L)   Anion Gap 3 - 14 mmol/L 7   Phosphorus 2.5 - 4.5 mg/dL 3.9   Albumin 3.4 - 5.0 g/dL 3.5   Ferritin 8 - 252 ng/mL 110   Folate 4.6 - 34.8 ng/mL 32.8   Iron 35 - 180 ug/dL 50   Iron Binding Cap 240 - 430 ug/dL 341   Iron Saturation Index 15 - 46 % 15   Vitamin B12 232 - 1,245 pg/mL 1,759 (H)   Vitamin D Deficiency screening 20 - 75 ug/L 30   Glucose 70 - 99 mg/dL 84   WBC 4.0 - 11.0 10e3/uL 7.2   Hemoglobin 11.7 - 15.7 g/dL 10.3 (L)   Hematocrit 35.0 - 47.0 % 30.2 (L)   Platelet Count 150 - 450 10e3/uL 328   RBC Count 3.80 - 5.20 10e6/uL 3.28 (L)   MCV 78 - 100 fL 92   MCH 26.5 - 33.0 pg 31.4   MCHC 31.5 - 36.5 g/dL 34.1   RDW 10.0 - 15.0 % 13.8       PATHOLOGY:  N/A    IMAGING:  N/A    ASSESSMENT/PLAN:  Candida Rose is a 82 year  old female who presents with anemia. 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    1) Chronic NC/NC anemia  -7/2022: iron studies, B12, folate, TSH WNL.  -Patient has had stable hemoglobin in the 10's.  -She denies gross evidence of bleed.  -Discussed with patient to closely monitor urine and stools for bleed. If she develops iron deficiency, will need repeat GI workup (last colonoscopy in 2014 negative).   -Discussed with patient the possibility of an underlying marrow disorder, such as MDS.  -Will obtain peripheral smear and also workup for hemolysis and SPIEP.  -Will review results in 1 month.  -Discussed the possibility of a bone marrow biopsy, but will hold at this time.     2) History of Fitch-Fitch syndrome s/p surgery    3) History of CVA, HTN, HLD    4) Follow up in 1 month to review.       Kary Johns DO  Hematology/Oncology  [facility]

## 2022-09-02 ENCOUNTER — LAB (OUTPATIENT)
Dept: ONCOLOGY | Facility: CLINIC | Age: 82
End: 2022-09-02
Attending: INTERNAL MEDICINE
Payer: MEDICARE

## 2022-09-02 ENCOUNTER — PRE VISIT (OUTPATIENT)
Dept: ONCOLOGY | Facility: CLINIC | Age: 82
End: 2022-09-02

## 2022-09-02 VITALS
WEIGHT: 167.5 LBS | OXYGEN SATURATION: 99 % | RESPIRATION RATE: 16 BRPM | HEIGHT: 69 IN | SYSTOLIC BLOOD PRESSURE: 168 MMHG | DIASTOLIC BLOOD PRESSURE: 67 MMHG | BODY MASS INDEX: 24.81 KG/M2 | HEART RATE: 53 BPM

## 2022-09-02 DIAGNOSIS — R74.9 ABNORMAL SERUM ENZYME LEVEL, UNSPECIFIED: ICD-10-CM

## 2022-09-02 DIAGNOSIS — R63.39 OTHER FEEDING DIFFICULTIES: ICD-10-CM

## 2022-09-02 DIAGNOSIS — D64.9 ANEMIA, UNSPECIFIED TYPE: ICD-10-CM

## 2022-09-02 LAB
ALBUMIN SERPL BCG-MCNC: 4.1 G/DL (ref 3.5–5.2)
ALP SERPL-CCNC: 116 U/L (ref 35–104)
ALT SERPL W P-5'-P-CCNC: 22 U/L (ref 10–35)
ANION GAP SERPL CALCULATED.3IONS-SCNC: 8 MMOL/L (ref 7–15)
AST SERPL W P-5'-P-CCNC: 26 U/L (ref 10–35)
BASOPHILS # BLD AUTO: 0.1 10E3/UL (ref 0–0.2)
BASOPHILS NFR BLD AUTO: 1 %
BILIRUB SERPL-MCNC: 0.4 MG/DL
BUN SERPL-MCNC: 19 MG/DL (ref 8–23)
CALCIUM SERPL-MCNC: 9.6 MG/DL (ref 8.8–10.2)
CHLORIDE SERPL-SCNC: 92 MMOL/L (ref 98–107)
CREAT SERPL-MCNC: 0.87 MG/DL (ref 0.51–0.95)
DEPRECATED HCO3 PLAS-SCNC: 27 MMOL/L (ref 22–29)
EOSINOPHIL # BLD AUTO: 0.2 10E3/UL (ref 0–0.7)
EOSINOPHIL NFR BLD AUTO: 2 %
ERYTHROCYTE [DISTWIDTH] IN BLOOD BY AUTOMATED COUNT: 12.2 % (ref 10–15)
GFR SERPL CREATININE-BSD FRML MDRD: 67 ML/MIN/1.73M2
GLUCOSE SERPL-MCNC: 105 MG/DL (ref 70–99)
HCT VFR BLD AUTO: 35.8 % (ref 35–47)
HGB BLD-MCNC: 11.5 G/DL (ref 11.7–15.7)
IMM GRANULOCYTES # BLD: 0 10E3/UL
IMM GRANULOCYTES NFR BLD: 0 %
LDH SERPL L TO P-CCNC: 190 U/L (ref 0–250)
LYMPHOCYTES # BLD AUTO: 2.9 10E3/UL (ref 0.8–5.3)
LYMPHOCYTES NFR BLD AUTO: 39 %
MCH RBC QN AUTO: 30.4 PG (ref 26.5–33)
MCHC RBC AUTO-ENTMCNC: 32.1 G/DL (ref 31.5–36.5)
MCV RBC AUTO: 95 FL (ref 78–100)
MONOCYTES # BLD AUTO: 0.7 10E3/UL (ref 0–1.3)
MONOCYTES NFR BLD AUTO: 9 %
NEUTROPHILS # BLD AUTO: 3.7 10E3/UL (ref 1.6–8.3)
NEUTROPHILS NFR BLD AUTO: 49 %
NRBC # BLD AUTO: 0 10E3/UL
NRBC BLD AUTO-RTO: 0 /100
PATH REPORT.COMMENTS IMP SPEC: NORMAL
PATH REPORT.COMMENTS IMP SPEC: NORMAL
PATH REPORT.FINAL DX SPEC: NORMAL
PATH REPORT.MICROSCOPIC SPEC OTHER STN: NORMAL
PATH REPORT.MICROSCOPIC SPEC OTHER STN: NORMAL
PATH REPORT.RELEVANT HX SPEC: NORMAL
PLATELET # BLD AUTO: 367 10E3/UL (ref 150–450)
POTASSIUM SERPL-SCNC: 4.5 MMOL/L (ref 3.4–5.3)
PROT SERPL-MCNC: 7.1 G/DL (ref 6.4–8.3)
RBC # BLD AUTO: 3.78 10E6/UL (ref 3.8–5.2)
RETICS # AUTO: 0.04 10E6/UL (ref 0.03–0.1)
RETICS/RBC NFR AUTO: 1.1 % (ref 0.5–2)
SODIUM SERPL-SCNC: 127 MMOL/L (ref 136–145)
TOTAL PROTEIN SERUM FOR ELP: 6.6 G/DL (ref 6.4–8.3)
WBC # BLD AUTO: 7.6 10E3/UL (ref 4–11)

## 2022-09-02 PROCEDURE — 99207 BLOOD MORPHOLOGY PATHOLOGIST REVIEW: CPT | Performed by: PATHOLOGY

## 2022-09-02 PROCEDURE — 83521 IG LIGHT CHAINS FREE EACH: CPT | Mod: 59 | Performed by: INTERNAL MEDICINE

## 2022-09-02 PROCEDURE — 84155 ASSAY OF PROTEIN SERUM: CPT | Performed by: INTERNAL MEDICINE

## 2022-09-02 PROCEDURE — 86334 IMMUNOFIX E-PHORESIS SERUM: CPT | Performed by: PATHOLOGY

## 2022-09-02 PROCEDURE — G0463 HOSPITAL OUTPT CLINIC VISIT: HCPCS

## 2022-09-02 PROCEDURE — 82232 ASSAY OF BETA-2 PROTEIN: CPT | Performed by: INTERNAL MEDICINE

## 2022-09-02 PROCEDURE — 82784 ASSAY IGA/IGD/IGG/IGM EACH: CPT | Performed by: INTERNAL MEDICINE

## 2022-09-02 PROCEDURE — 85045 AUTOMATED RETICULOCYTE COUNT: CPT | Performed by: INTERNAL MEDICINE

## 2022-09-02 PROCEDURE — 82525 ASSAY OF COPPER: CPT | Performed by: INTERNAL MEDICINE

## 2022-09-02 PROCEDURE — 83615 LACTATE (LD) (LDH) ENZYME: CPT | Performed by: INTERNAL MEDICINE

## 2022-09-02 PROCEDURE — 36415 COLL VENOUS BLD VENIPUNCTURE: CPT | Performed by: INTERNAL MEDICINE

## 2022-09-02 PROCEDURE — 84165 PROTEIN E-PHORESIS SERUM: CPT | Mod: TC | Performed by: PATHOLOGY

## 2022-09-02 PROCEDURE — 99214 OFFICE O/P EST MOD 30 MIN: CPT | Performed by: INTERNAL MEDICINE

## 2022-09-02 PROCEDURE — 85025 COMPLETE CBC W/AUTO DIFF WBC: CPT | Performed by: INTERNAL MEDICINE

## 2022-09-02 PROCEDURE — 84630 ASSAY OF ZINC: CPT | Performed by: INTERNAL MEDICINE

## 2022-09-02 PROCEDURE — 80053 COMPREHEN METABOLIC PANEL: CPT | Performed by: INTERNAL MEDICINE

## 2022-09-02 ASSESSMENT — PAIN SCALES - GENERAL: PAINLEVEL: NO PAIN (0)

## 2022-09-02 NOTE — LETTER
9/2/2022         RE: Candida Rose  [address] [id number]        Dear Colleague,    Thank you for referring your patient, Candida Rose, to the [facility]. Please see a copy of my visit note below.    [facility]    Hematology/Oncology New Patient Note      Today's Date: 8/31/22    Reason for Consultation: Anemia, unspecified  Referring Provider: Chani Peoples MD      HISTORY OF PRESENT ILLNESS: Candida Rose is a 82 year old female who presents with anemia. 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    Patient has evidence of chronic NC/NC anemia since 2017 with ofelia of 7.8 in April 2017. On 7/13/22, WBC 7.2, Hb 10.3, MCV 92, . B12 1759, ferritin 110, folate 32.8, iron 50, TIBC 341, iron sat 15%.    She has had intentional 70 lb weight loss via a clinical trial and weight watchers over 2 years. No hematura, hematochezia/melena. No vaginal bleed.     Cancer screening:  Mammogram UTD and no history of abnormal/breast biopsy.  Colonoscopies UTD 2014 (normal; no further follow up).    No history of abnormal pap smears.     Sister was diagnosed with colon cancer at age 60 and passed away soon after diagnosis.     She is a retired .       REVIEW OF SYSTEMS:   A 14 point ROS was reviewed with pertinent positives and negatives in the HPI.        HOME MEDICATIONS:  Current Outpatient Medications   Medication Sig Dispense Refill     amLODIPine (NORVASC) 2.5 MG tablet Take 1 tablet (2.5 mg) by mouth daily 90 tablet 1     aspirin 81 MG tablet Take 1 tablet (81 mg) by mouth daily 30 tablet      atenolol (TENORMIN) 25 MG tablet TAKE 1 TABLET BY MOUTH EVERY DAY 90 tablet 1     clopidogrel (PLAVIX) 75 MG tablet Take 1 tablet (75 mg) by mouth daily 90 tablet 1     cyanocobalamin (VITAMIN B-12) 500 MCG tablet  "Take 500 mcg by mouth daily       losartan (COZAAR) 50 MG tablet TAKE 1 TABLET BY MOUTH TWICE A  tablet 1     magnesium 500 MG TABS        Multiple Vitamins-Minerals (MULTIVITAMIN & MINERAL PO) Take 1 tablet by mouth daily.       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 F/u dr. Soto     OA (osteoarthritis)     s/p bilat total hips      Other chronic pain     Joint pain for many years     Unspecified cerebral artery occlusion with cerebral infarction     After Moyamoya surgery > 10 yrs ago.     Vitamin D deficiencies          PAST SURGICAL HISTORY:  Past Surgical History:   Procedure Laterality Date     ARTHROPLASTY KNEE Left 4/17/2017    Procedure: ARTHROPLASTY KNEE;  Left total knee arthroplasty;  Surgeon: Chidi Jenkins MD;  Location:  OR     COLONOSCOPY  2008    normal     COLONOSCOPY  7/17/2014    Procedure: COLONOSCOPY;  Surgeon: Raul Nolan DO;  Location:  GI     [facility]OTOMY      [facility] [place]  \"Pt had repair of blood flow.\"     IR CAROTID ANGIOGRAM  10/30/2006     IR CAROTID ANGIOGRAM  6/6/2010     IR CAROTID ANGIOGRAM  6/6/2010     IR CAROTID ANGIOGRAM  6/6/2010     IR CAROTID ANGIOGRAM  5/12/2011     IR CAROTID ANGIOGRAM  5/12/2011     IR CAROTID ANGIOGRAM  5/12/2011     IR CAROTID ANGIOGRAM  6/5/2012     IR CAROTID ANGIOGRAM  6/5/2012     IR " CAROTID ANGIOGRAM  2012     IR MISCELLANEOUS PROCEDURE  2010     IR MISCELLANEOUS PROCEDURE  2010     IR MISCELLANEOUS PROCEDURE  2011     IR MISCELLANEOUS PROCEDURE  2012     RECONSTRUCT FOREFOOT WITH METATARSOPHALANGEAL (MTP) FUSION Left 2014    Procedure: RECONSTRUCT FOREFOOT WITH METATARSOPHALANGEAL (MTP) FUSION;  Surgeon: Tres Merlos DPM;  Location:  OR     [facility] NONSPECIFIC PROCEDURE  10/30/06    neurosurgery Dr Soto     [facility] NONSPECIFIC PROCEDURE      bilateral total hips approx      [facility] NONSPECIFIC PROCEDURE  3/07    neurosurgery          SOCIAL HISTORY:  Social History     Socioeconomic History     Marital status:      Spouse name: Olu      Number of children: 2     Years of education: Not on file     Highest education level: Not on file   Occupational History     Employer: RETIRED   Tobacco Use     Smoking status: Never Smoker     Smokeless tobacco: Never Used   Vaping Use     Vaping Use: Never used   Substance and Sexual Activity     Alcohol use: Yes     Comment:  1-2 per day     Drug use: No     Sexual activity: Never     Partners: Male   Other Topics Concern     Parent/sibling w/ CABG, MI or angioplasty before 65F 55M? Not Asked   Social History Narrative     Not on file     Social Determinants of Health     Financial Resource Strain: Not on file   Food Insecurity: Not on file   Transportation Needs: Not on file   Physical Activity: Not on file   Stress: Not on file   Social Connections: Not on file   Intimate Partner Violence: Not on file   Housing Stability: Not on file         FAMILY HISTORY:  Family History   Problem Relation Age of Onset     Obesity Sister         gastric by-pass age age 60, heart murmur.     Cancer - colorectal Sister 69     Heart Disease Father         mi,  age 48     Family History Negative Mother         killed in MVA age 54     Cancer Maternal Aunt         lung cancer ,non-smoker     Lung Cancer Maternal Aunt      Breast  "Cancer Daughter 48     Ovarian Cancer No family hx of          PHYSICAL EXAM:  Vital signs:  BP (!) 168/67   Pulse 53   Resp 16   Ht 1.74 m (5' 8.5\")   Wt 76 kg (167 lb 8 oz)   LMP  (LMP Unknown)   SpO2 99%   BMI 25.10 kg/m     ECO  GENERAL/CONSTITUTIONAL: No acute distress.  EYES: Pupils are equal, round, and react to light and accommodation. Extraocular movements intact.  No scleral icterus.  ENT/MOUTH: Neck supple. Oropharynx clear, no mucositis.  LYMPH: No anterior cervical, posterior cervical, supraclavicular, axillary or inguinal adenopathy.   RESPIRATORY: Clear to auscultation bilaterally. No crackles or wheezing.   CARDIOVASCULAR: Regular rate and rhythm without murmurs, gallops, or rubs.  GASTROINTESTINAL: No hepatosplenomegaly, masses, or tenderness. The patient has normal bowel sounds. No guarding.  No distention.  MUSCULOSKELETAL: Warm and well-perfused, no cyanosis, clubbing. Chronic lymphedema.  NEUROLOGIC: Cranial nerves II-XII are intact. Alert, oriented, answers questions appropriately.  INTEGUMENTARY: No rashes or jaundice.  GAIT: Steady, does not use assistive device.      LABS:   Latest Reference Range & Units 22 07:15   Sodium 133 - 144 mmol/L 131 (L)   Potassium 3.4 - 5.3 mmol/L 4.5   Chloride 94 - 109 mmol/L 98   Carbon Dioxide 20 - 32 mmol/L 26   Urea Nitrogen 7 - 30 mg/dL 19   Creatinine 0.52 - 1.04 mg/dL 0.96   GFR Estimate >60 mL/min/1.73m2 60 (L)   Calcium 8.5 - 10.1 mg/dL 8.2 (L)   Anion Gap 3 - 14 mmol/L 7   Phosphorus 2.5 - 4.5 mg/dL 3.9   Albumin 3.4 - 5.0 g/dL 3.5   Ferritin 8 - 252 ng/mL 110   Folate 4.6 - 34.8 ng/mL 32.8   Iron 35 - 180 ug/dL 50   Iron Binding Cap 240 - 430 ug/dL 341   Iron Saturation Index 15 - 46 % 15   Vitamin B12 232 - 1,245 pg/mL 1,759 (H)   Vitamin D Deficiency screening 20 - 75 ug/L 30   Glucose 70 - 99 mg/dL 84   WBC 4.0 - 11.0 10e3/uL 7.2   Hemoglobin 11.7 - 15.7 g/dL 10.3 (L)   Hematocrit 35.0 - 47.0 % 30.2 (L)   Platelet Count 150 - 450 " 10e3/uL 328   RBC Count 3.80 - 5.20 10e6/uL 3.28 (L)   MCV 78 - 100 fL 92   MCH 26.5 - 33.0 pg 31.4   MCHC 31.5 - 36.5 g/dL 34.1   RDW 10.0 - 15.0 % 13.8       PATHOLOGY:  N/A    IMAGING:  N/A    ASSESSMENT/PLAN:  Candida Rose is a 82 year old female who presents with anemia. 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    1) Chronic NC/NC anemia  -7/2022: iron studies, B12, folate, TSH WNL.  -Patient has had stable hemoglobin in the 10's.  -She denies gross evidence of bleed.  -Discussed with patient to closely monitor urine and stools for bleed. If she develops iron deficiency, will need repeat GI workup (last colonoscopy in 2014 negative).   -Discussed with patient the possibility of an underlying marrow disorder, such as MDS.  -Will obtain peripheral smear and also workup for hemolysis and SPIEP.  -Will review results in 1 month.  -Discussed the possibility of a bone marrow biopsy, but will hold at this time.     2) History of Fitch-Fitch syndrome s/p surgery    3) History of CVA, HTN, HLD    4) Follow up in 1 month to review.       Kary Johns DO  Hematology/Oncology  [facility]        Again, thank you for allowing me to participate in the care of your patient.        Sincerely,        Kary Johns DO

## 2022-09-02 NOTE — PROGRESS NOTES
Medical Assistant Note:  Candida Rose presents today for blood draw.    Patient seen by provider today: Yes: Dr. Johns.   present during visit today: Not Applicable.    Concerns: No Concerns.    Procedure:  Labs drawn    Post Assessment:  Labs drawn without difficulty: Yes.    Discharge Plan:  Departure Mode: Ambulatory.    Face to Face Time: 10 min.    Liya Clark CMA

## 2022-09-02 NOTE — NURSING NOTE
"Oncology Rooming Note    September 2, 2022 9:51 AM   Candida Rose is a 82 year old female who presents for:    Chief Complaint   Patient presents with     Oncology Clinic Visit     New Patient     Initial Vitals: BP (!) 168/67   Pulse 53   Resp 16   Ht 1.74 m (5' 8.5\")   Wt 76 kg (167 lb 8 oz)   LMP  (LMP Unknown)   SpO2 99%   BMI 25.10 kg/m   Estimated body mass index is 25.1 kg/m  as calculated from the following:    Height as of this encounter: 1.74 m (5' 8.5\").    Weight as of this encounter: 76 kg (167 lb 8 oz). Body surface area is 1.92 meters squared.  No Pain (0) Comment: Data Unavailable   No LMP recorded (lmp unknown). Patient is postmenopausal.  Allergies reviewed: Yes  Medications reviewed: Yes    Medications: Medication refills not needed today.  Pharmacy name entered into [employer]: [id number] [place] [address]      Liya Clark CMA              "

## 2022-09-05 NOTE — PATIENT INSTRUCTIONS
Candida is scheduled for a follow up with Dr. Johns on 10/04/22 at 1:30 pm.    Florecita Lainez RN on 9/4/2022 at 8:27 PM

## 2022-09-06 LAB
ALBUMIN SERPL ELPH-MCNC: 3.9 G/DL (ref 3.7–5.1)
ALPHA1 GLOB SERPL ELPH-MCNC: 0.3 G/DL (ref 0.2–0.4)
ALPHA2 GLOB SERPL ELPH-MCNC: 0.7 G/DL (ref 0.5–0.9)
B-GLOBULIN SERPL ELPH-MCNC: 0.8 G/DL (ref 0.6–1)
GAMMA GLOB SERPL ELPH-MCNC: 0.9 G/DL (ref 0.7–1.6)
IGA SERPL-MCNC: 127 MG/DL (ref 84–499)
IGG SERPL-MCNC: 979 MG/DL (ref 610–1616)
IGM SERPL-MCNC: 55 MG/DL (ref 35–242)
KAPPA LC FREE SER-MCNC: 2.62 MG/DL (ref 0.33–1.94)
KAPPA LC FREE/LAMBDA FREE SER NEPH: 1.32 {RATIO} (ref 0.26–1.65)
LAMBDA LC FREE SERPL-MCNC: 1.99 MG/DL (ref 0.57–2.63)
M PROTEIN SERPL ELPH-MCNC: 0 G/DL
PROT PATTERN SERPL ELPH-IMP: NORMAL
PROT PATTERN SERPL IFE-IMP: NORMAL

## 2022-09-06 PROCEDURE — 86334 IMMUNOFIX E-PHORESIS SERUM: CPT | Mod: 26

## 2022-09-06 PROCEDURE — 84165 PROTEIN E-PHORESIS SERUM: CPT | Mod: 26 | Performed by: PATHOLOGY

## 2022-09-07 LAB
B2 MICROGLOB TUMOR MARKER SER-MCNC: 3 MG/L
COPPER SERPL-MCNC: 130.9 UG/DL
ZINC SERPL-MCNC: 77.5 UG/DL

## 2022-10-01 ENCOUNTER — HOSPITAL ENCOUNTER (EMERGENCY)
Facility: CLINIC | Age: 82
Discharge: HOME | End: 2022-10-01
Attending: EMERGENCY MEDICINE | Admitting: EMERGENCY MEDICINE
Payer: MEDICARE

## 2022-10-01 VITALS
DIASTOLIC BLOOD PRESSURE: 61 MMHG | TEMPERATURE: 98.3 F | SYSTOLIC BLOOD PRESSURE: 143 MMHG | OXYGEN SATURATION: 93 % | RESPIRATION RATE: 15 BRPM | HEART RATE: 77 BPM

## 2022-10-01 DIAGNOSIS — N30.00 ACUTE CYSTITIS WITHOUT HEMATURIA: ICD-10-CM

## 2022-10-01 DIAGNOSIS — M62.81 GENERALIZED MUSCLE WEAKNESS: ICD-10-CM

## 2022-10-01 LAB
ALBUMIN UR-MCNC: NEGATIVE MG/DL
ANION GAP SERPL CALCULATED.3IONS-SCNC: 9 MMOL/L (ref 7–15)
APPEARANCE UR: CLEAR
BASOPHILS # BLD AUTO: 0.1 10E3/UL (ref 0–0.2)
BASOPHILS NFR BLD AUTO: 1 %
BILIRUB UR QL STRIP: NEGATIVE
BUN SERPL-MCNC: 26.4 MG/DL (ref 8–23)
CALCIUM SERPL-MCNC: 9 MG/DL (ref 8.8–10.2)
CHLORIDE SERPL-SCNC: 96 MMOL/L (ref 98–107)
COLOR UR AUTO: YELLOW
CREAT SERPL-MCNC: 0.99 MG/DL (ref 0.51–0.95)
DEPRECATED HCO3 PLAS-SCNC: 24 MMOL/L (ref 22–29)
EOSINOPHIL # BLD AUTO: 0.1 10E3/UL (ref 0–0.7)
EOSINOPHIL NFR BLD AUTO: 1 %
ERYTHROCYTE [DISTWIDTH] IN BLOOD BY AUTOMATED COUNT: 12.1 % (ref 10–15)
GFR SERPL CREATININE-BSD FRML MDRD: 57 ML/MIN/1.73M2
GLUCOSE SERPL-MCNC: 141 MG/DL (ref 70–99)
GLUCOSE UR STRIP-MCNC: NEGATIVE MG/DL
HCT VFR BLD AUTO: 33 % (ref 35–47)
HGB BLD-MCNC: 10.7 G/DL (ref 11.7–15.7)
HGB UR QL STRIP: NEGATIVE
IMM GRANULOCYTES # BLD: 0 10E3/UL
IMM GRANULOCYTES NFR BLD: 0 %
KETONES UR STRIP-MCNC: NEGATIVE MG/DL
LEUKOCYTE ESTERASE UR QL STRIP: ABNORMAL
LYMPHOCYTES # BLD AUTO: 2.2 10E3/UL (ref 0.8–5.3)
LYMPHOCYTES NFR BLD AUTO: 21 %
MCH RBC QN AUTO: 30.1 PG (ref 26.5–33)
MCHC RBC AUTO-ENTMCNC: 32.4 G/DL (ref 31.5–36.5)
MCV RBC AUTO: 93 FL (ref 78–100)
MONOCYTES # BLD AUTO: 0.9 10E3/UL (ref 0–1.3)
MONOCYTES NFR BLD AUTO: 9 %
MUCOUS THREADS #/AREA URNS LPF: PRESENT /LPF
NEUTROPHILS # BLD AUTO: 7.3 10E3/UL (ref 1.6–8.3)
NEUTROPHILS NFR BLD AUTO: 68 %
NITRATE UR QL: NEGATIVE
NRBC # BLD AUTO: 0 10E3/UL
NRBC BLD AUTO-RTO: 0 /100
PH UR STRIP: 6.5 [PH] (ref 5–7)
PLATELET # BLD AUTO: 324 10E3/UL (ref 150–450)
POTASSIUM SERPL-SCNC: 4.5 MMOL/L (ref 3.4–5.3)
RBC # BLD AUTO: 3.55 10E6/UL (ref 3.8–5.2)
RBC URINE: 7 /HPF
SODIUM SERPL-SCNC: 129 MMOL/L (ref 136–145)
SP GR UR STRIP: 1.02 (ref 1–1.03)
SQUAMOUS EPITHELIAL: <1 /HPF
TROPONIN T SERPL HS-MCNC: 19 NG/L
TROPONIN T SERPL HS-MCNC: 19 NG/L
UROBILINOGEN UR STRIP-MCNC: NORMAL MG/DL
WBC # BLD AUTO: 10.6 10E3/UL (ref 4–11)
WBC URINE: 19 /HPF

## 2022-10-01 PROCEDURE — 250N000011 HC RX IP 250 OP 636: Performed by: EMERGENCY MEDICINE

## 2022-10-01 PROCEDURE — C9803 HOPD COVID-19 SPEC COLLECT: HCPCS

## 2022-10-01 PROCEDURE — 81001 URINALYSIS AUTO W/SCOPE: CPT | Performed by: EMERGENCY MEDICINE

## 2022-10-01 PROCEDURE — 84484 ASSAY OF TROPONIN QUANT: CPT | Performed by: EMERGENCY MEDICINE

## 2022-10-01 PROCEDURE — 99285 EMERGENCY DEPT VISIT HI MDM: CPT | Mod: 25

## 2022-10-01 PROCEDURE — 80048 BASIC METABOLIC PNL TOTAL CA: CPT | Performed by: EMERGENCY MEDICINE

## 2022-10-01 PROCEDURE — 85004 AUTOMATED DIFF WBC COUNT: CPT | Performed by: EMERGENCY MEDICINE

## 2022-10-01 PROCEDURE — 96366 THER/PROPH/DIAG IV INF ADDON: CPT

## 2022-10-01 PROCEDURE — 96365 THER/PROPH/DIAG IV INF INIT: CPT

## 2022-10-01 PROCEDURE — 84484 ASSAY OF TROPONIN QUANT: CPT | Mod: 91 | Performed by: EMERGENCY MEDICINE

## 2022-10-01 PROCEDURE — 93005 ELECTROCARDIOGRAM TRACING: CPT

## 2022-10-01 PROCEDURE — 87086 URINE CULTURE/COLONY COUNT: CPT | Performed by: EMERGENCY MEDICINE

## 2022-10-01 PROCEDURE — 36415 COLL VENOUS BLD VENIPUNCTURE: CPT | Performed by: EMERGENCY MEDICINE

## 2022-10-01 RX ORDER — CEFTRIAXONE 1 G/1
1 INJECTION, POWDER, FOR SOLUTION INTRAMUSCULAR; INTRAVENOUS ONCE
Status: COMPLETED | OUTPATIENT
Start: 2022-10-01 | End: 2022-10-01

## 2022-10-01 RX ORDER — CEPHALEXIN 500 MG/1
500 CAPSULE ORAL 2 TIMES DAILY
Qty: 14 CAPSULE | Refills: 0 | Status: ON HOLD | OUTPATIENT
Start: 2022-10-01 | End: 2022-10-13

## 2022-10-01 RX ADMIN — CEFTRIAXONE 1 G: 1 INJECTION, POWDER, FOR SOLUTION INTRAMUSCULAR; INTRAVENOUS at 06:32

## 2022-10-01 ASSESSMENT — ENCOUNTER SYMPTOMS
CHILLS: 0
SHORTNESS OF BREATH: 0
VOMITING: 0
BACK PAIN: 0
DIARRHEA: 0
HEMATURIA: 0
LIGHT-HEADEDNESS: 0
HEADACHES: 0
DYSURIA: 0
UNEXPECTED WEIGHT CHANGE: 0
NAUSEA: 0
NECK PAIN: 0
NUMBNESS: 0
CHEST TIGHTNESS: 0
DIZZINESS: 0
ABDOMINAL PAIN: 0
FEVER: 0
DIAPHORESIS: 0
PALPITATIONS: 0
WEAKNESS: 1

## 2022-10-01 ASSESSMENT — ACTIVITIES OF DAILY LIVING (ADL)
ADLS_ACUITY_SCORE: 35
ADLS_ACUITY_SCORE: 35

## 2022-10-01 NOTE — ED TRIAGE NOTES
Pt experienced a fall tonight; pt states she has been falling more often and has fallen 2 times this week; pt has no pain at this time but is concerned of increased falls  No LOC or head trauma; oriented x 4; plavix noted in med list but pt wasn't aware of any thinners.

## 2022-10-01 NOTE — ED PROVIDER NOTES
History     Chief Complaint:    Fall      HPI   Candida Rose is a 82 year old female who presents with ems and hx augemnted by .  Was watching tv in middle of night and then started moving around bedroom.  Legs got weak and shaky and went down to ground.  2nd time this type of episode in 2 days.  No pre or post sx.  No focal sx.      Review of Systems   Constitutional: Negative for chills, diaphoresis, fever and unexpected weight change.   Eyes: Negative for visual disturbance.   Respiratory: Negative for chest tightness and shortness of breath.    Cardiovascular: Positive for leg swelling. Negative for chest pain and palpitations.        Chronic balloon unchanged LE edema from PAD   Gastrointestinal: Negative for abdominal pain, diarrhea, nausea and vomiting.   Genitourinary: Negative for dysuria and hematuria.   Musculoskeletal: Negative for back pain and neck pain.   Skin: Negative for pallor.   Neurological: Positive for weakness. Negative for dizziness, light-headedness, numbness and headaches.   All other systems reviewed and are negative.        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     CVA (cerebral infarction) June 2010     Diabetes (H)      Family hx of colon cancer      HTN, goal below 140/90 3/06     Hyperlipidemia LDL goal < 130      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     OA (osteoarthritis)      Other chronic pain      Unspecified cerebral artery occlusion with cerebral  "infarction      Vitamin D deficiencies      Patient Active Problem List    Diagnosis Date Noted     Moyamoya disease      Priority: High     see line 2       Arthropathy 08/11/2022     Priority: Medium     Depressive disorder 08/11/2022     Priority: Medium     Osteoarthritis of left knee 04/17/2017     Priority: Medium     CKD 3 10/19/2015     Priority: Medium     HTN, goal below 140/90      Priority: Medium     Family hx of colon cancer      Priority: Medium     sister dx at 69       ACP (advance care planning) - scanned in the chart 2018 05/07/2012     Priority: Medium     Patient states has Advance Directive and will bring in a copy to clinic. 5/7/2012        Osteopenia 05/07/2012     Priority: Medium     Vitamin D deficiency      Priority: Medium     (Problem list name updated by automated process. Provider to review and confirm.)       Hyperlipidemia LDL goal <130 10/31/2010     Priority: Medium        Past Surgical History:        Past Surgical History:   Procedure Laterality Date     ARTHROPLASTY KNEE Left 4/17/2017    Procedure: ARTHROPLASTY KNEE;  Left total knee arthroplasty;  Surgeon: Chidi Jenkins MD;  Location:  OR     COLONOSCOPY  2008    normal     COLONOSCOPY  7/17/2014    Procedure: COLONOSCOPY;  Surgeon: Raul Nolan DO;  Location:  GI     [facility].\"     IR CAROTID ANGIOGRAM  10/30/2006     IR CAROTID ANGIOGRAM  6/6/2010     IR CAROTID ANGIOGRAM  6/6/2010     IR CAROTID ANGIOGRAM  6/6/2010     IR CAROTID ANGIOGRAM  5/12/2011     IR CAROTID ANGIOGRAM  5/12/2011     IR CAROTID ANGIOGRAM  5/12/2011     IR CAROTID ANGIOGRAM  6/5/2012     IR CAROTID ANGIOGRAM  6/5/2012     IR CAROTID ANGIOGRAM  6/5/2012     IR MISCELLANEOUS PROCEDURE  6/6/2010     IR MISCELLANEOUS PROCEDURE  6/6/2010     IR MISCELLANEOUS PROCEDURE  5/12/2011     IR MISCELLANEOUS PROCEDURE  6/5/2012     RECONSTRUCT FOREFOOT WITH METATARSOPHALANGEAL (MTP) FUSION Left " 2014    Procedure: RECONSTRUCT FOREFOOT WITH METATARSOPHALANGEAL (MTP) FUSION;  Surgeon: Tres Merlos DPM;  Location:  OR     [facility] NONSPECIFIC PROCEDURE  10/30/06    neurosurgery Dr Soto     [facility] NONSPECIFIC PROCEDURE      bilateral total hips approx      [facility] NONSPECIFIC PROCEDURE  3/07    neurosurgery        Family History:        Family History   Problem Relation Age of Onset     Obesity Sister         gastric by-pass age age 60, heart murmur.     Cancer - colorectal Sister 69     Heart Disease Father         mi,  age 48     Family History Negative Mother         killed in MVA age 54     Cancer Maternal Aunt         lung cancer ,non-smoker     Lung Cancer Maternal Aunt      Breast Cancer Daughter 48     Ovarian Cancer No family hx of        Social History:    Chani Peoples  The patient presents to the ED with  and ems    Physical Exam     Patient Vitals for the past 24 hrs:   BP Temp Temp src Pulse Resp SpO2   10/01/22 0339 (!) 143/61 98.3  F (36.8  C) Oral 77 15 93 %       Physical Exam  Constitutional: Patient is well appearing. No distress.  Head to toe trauma normal.  Head: Atraumatic.  Eyes: Conjunctivae and EOM are normal. No scleral icterus.  Neck: Normal range of motion. Neck supple.   Cardiovascular: Normal rate, regular rhythm, normal heart sounds and intact distal perfusion.   Pulmonary/Chest: Breath sounds normal. No respiratory distress.  Abdominal: Soft. Bowel sounds are normal. No distension. No tenderness. No rebound or guarding.   Musculoskeletal: Normal range of motion. No bony tenderness.  Bilateral soft balloon like LE edema  Neurological: Alert and orientated to person, place, and time. No observable focal neuro deficit  Skin: Warm and dry. No rash noted. Not diaphoretic.     Emergency Department Course   ECG:NSR HR 58 unchanged 4.5.19    ECG results from 10/01/22   EKG 12-lead, tracing only     Value    Systolic Blood Pressure     Diastolic  Blood Pressure     Ventricular Rate 58    Atrial Rate 58    ND Interval 204    QRS Duration 86        QTc 463    P Axis 72    R AXIS 48    T Axis 44    Interpretation ECG      Sinus bradycardia with Premature atrial complexes  Septal infarct , age undetermined  Abnormal ECG  When compared with ECG of 06-JUN-2010 07:58,  Premature atrial complexes are now Present  Septal infarct is now Present  T wave inversion no longer evident in Inferior leads         Imaging:  No orders to display     Report per radiology    Laboratory:  Labs Ordered and Resulted from Time of ED Arrival to Time of ED Departure   BASIC METABOLIC PANEL - Abnormal       Result Value    Sodium 129 (*)     Potassium 4.5      Chloride 96 (*)     Carbon Dioxide (CO2) 24      Anion Gap 9      Urea Nitrogen 26.4 (*)     Creatinine 0.99 (*)     Calcium 9.0      Glucose 141 (*)     GFR Estimate 57 (*)    TROPONIN T, HIGH SENSITIVITY - Abnormal    Troponin T, High Sensitivity 19 (*)    ROUTINE UA WITH MICROSCOPIC REFLEX TO CULTURE - Abnormal    Color Urine Yellow      Appearance Urine Clear      Glucose Urine Negative      Bilirubin Urine Negative      Ketones Urine Negative      Specific Gravity Urine 1.022      Blood Urine Negative      pH Urine 6.5      Protein Albumin Urine Negative      Urobilinogen Urine Normal      Nitrite Urine Negative      Leukocyte Esterase Urine Large (*)     Mucus Urine Present (*)     RBC Urine 7 (*)     WBC Urine 19 (*)     Squamous Epithelials Urine <1     CBC WITH PLATELETS AND DIFFERENTIAL - Abnormal    WBC Count 10.6      RBC Count 3.55 (*)     Hemoglobin 10.7 (*)     Hematocrit 33.0 (*)     MCV 93      MCH 30.1      MCHC 32.4      RDW 12.1      Platelet Count 324      % Neutrophils 68      % Lymphocytes 21      % Monocytes 9      % Eosinophils 1      % Basophils 1      % Immature Granulocytes 0      NRBCs per 100 WBC 0      Absolute Neutrophils 7.3      Absolute Lymphocytes 2.2      Absolute Monocytes 0.9       Absolute Eosinophils 0.1      Absolute Basophils 0.1      Absolute Immature Granulocytes 0.0      Absolute NRBCs 0.0     INFLUENZA A/B & SARS-COV2 PCR MULTIPLEX   TROPONIN T, HIGH SENSITIVITY   URINE CULTURE       Procedures:  Road test safe and well here.     Emergency Department Course:             Reviewed:    I reviewed nursing notes, vitals and past medical history    Assessments:   I obtained history and examined the patient as noted above.    I rechecked the patient and explained findings.       Consults:            Interventions:    Medications   cefTRIAXone (ROCEPHIN) 1 g vial to attach to  mL bag for ADULTS or NS 50 mL bag for PEDS (has no administration in time range)       Disposition:  Care transitioned.     Impression & Plan             Medical Decision Makin who presents for evaluation of weakness.  Associated symptoms today have included leg shaking weakness isolated episode.  The workup here in the emergency room was to evaluate for infections, metabolic, electrolyte, neurologic or cardiovascular causes.  Of note, there are no signs of pneumonia.  A  UTI may be causing the symptoms and empiric coverage here and for home started.  In addition, I do not believe symptoms are due to CVA, TIA, myasthesia gravis, myopathy or acute coronary syndromes and there are no indications at this point for a further workup with a benign history, exam and laboratory tests are in normal range for patient.  Rpt trop pending at care transition..  They were ambulated in ED and did well.  I believe supportive outpatient management is indicated; will have them followup with their primary care doctor in 1-2 days for a recheck. Return for any additional symptoms or worsening weakness.      Covid-19  Candida Rose was evaluated during a global COVID-19 pandemic, which necessitated consideration that the patient might be at risk for infection with the SARS-CoV-2 virus that causes COVID-19.   Applicable protocols  for evaluation were followed during the patient's care.   COVID-19 was considered as part of the patient's evaluation.    Diagnosis:    ICD-10-CM    1. Generalized muscle weakness  M62.81    2. Acute cystitis without hematuria  N30.00        Discharge Medications:  New Prescriptions    CEPHALEXIN (KEFLEX) 500 MG CAPSULE    Take 1 capsule (500 mg) by mouth 2 times daily for 7 days         Scribe Disclosure:  Martin HOWELL MD, am serving as a scribe at 4:11 AM on 10/1/2022 to document services personally performed by Martin Purvis MD based on my observations and the provider's statements to me.      Martin Purvis MD  10/01/22 0536       Martin Purvis MD  10/01/22 0617

## 2022-10-02 LAB — BACTERIA UR CULT: NORMAL

## 2022-10-03 LAB
ATRIAL RATE - MUSE: 58 BPM
DIASTOLIC BLOOD PRESSURE - MUSE: NORMAL MMHG
INTERPRETATION ECG - MUSE: NORMAL
P AXIS - MUSE: 72 DEGREES
PR INTERVAL - MUSE: 204 MS
QRS DURATION - MUSE: 86 MS
QT - MUSE: 472 MS
QTC - MUSE: 463 MS
R AXIS - MUSE: 48 DEGREES
SYSTOLIC BLOOD PRESSURE - MUSE: NORMAL MMHG
T AXIS - MUSE: 44 DEGREES
VENTRICULAR RATE- MUSE: 58 BPM

## 2022-10-03 NOTE — PROGRESS NOTES
[facility]    Hematology/Oncology Established Patient Note      Today's Date: 10/4/22    Reason for Consultation: Anemia, unspecified  Referring Provider: Chani Peoples MD      HISTORY OF PRESENT ILLNESS: Candida Rose is an 82 year old female who presents with anemia. 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    Patient has evidence of chronic NC/NC anemia since 2017 with ofelia of 7.8 in April 2017. On 7/13/22, WBC 7.2, Hb 10.3, MCV 92, . B12 1759, ferritin 110, folate 32.8, iron 50, TIBC 341, iron sat 15%.    She has had intentional 70 lb weight loss via a clinical trial and weight watchers over 2 years. No hematura, hematochezia/melena. No vaginal bleed.     Cancer screening:  Mammogram UTD and no history of abnormal/breast biopsy.  Colonoscopies UTD 2014 (normal; no further follow up).    No history of abnormal pap smears.     Sister was diagnosed with colon cancer at age 60 and passed away soon after diagnosis.     She is a retired .       INTERIM HISTORY:  Patient has had recurrent falls. She was recently evaluated in the ED and treated with keflex for a UTI. She has had chronic hyponatremia with sodium ranging 127-129. Patient denies dizziness or light-headedness. She states her left leg feels unstable.     CBC is stable.         REVIEW OF SYSTEMS:   A 14 point ROS was reviewed with pertinent positives and negatives in the HPI.        HOME MEDICATIONS:  Current Outpatient Medications   Medication Sig Dispense Refill     amLODIPine (NORVASC) 2.5 MG tablet Take 1 tablet (2.5 mg) by mouth daily 90 tablet 1     aspirin 81 MG tablet Take 1 tablet (81 mg) by mouth daily 30 tablet      atenolol (TENORMIN) 25 MG tablet TAKE 1 TABLET BY MOUTH EVERY DAY 90 tablet 1     cephALEXin (KEFLEX) 500 MG capsule Take 1 capsule (500 mg) by mouth 2 times daily  "for 7 days 14 capsule 0     clopidogrel (PLAVIX) 75 MG tablet Take 1 tablet (75 mg) by mouth daily 90 tablet 1     cyanocobalamin (VITAMIN B-12) 500 MCG tablet Take 500 mcg by mouth daily       losartan (COZAAR) 50 MG tablet TAKE 1 TABLET BY MOUTH TWICE A  tablet 1     magnesium 500 MG TABS        Multiple Vitamins-Minerals (MULTIVITAMIN & MINERAL PO) Take 1 tablet by mouth daily.       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 F/u dr. Soto     OA (osteoarthritis)     s/p bilat total hips      Other chronic pain     Joint pain for many years     Unspecified cerebral artery occlusion with cerebral infarction     After Moyamoya surgery > 10 yrs ago.     Vitamin D deficiencies          PAST SURGICAL HISTORY:  Past Surgical History:   Procedure Laterality Date     ARTHROPLASTY KNEE Left 4/17/2017    Procedure: ARTHROPLASTY KNEE;  Left total knee arthroplasty;  Surgeon: Chidi Jenkins MD;  Location:  OR     COLONOSCOPY  2008    normal     COLONOSCOPY  7/17/2014    Procedure: COLONOSCOPY;  Surgeon: Raul Nolan DO;  Location:  GI     CRANIOTOMY      \[facility]\""  \"Pt had repair of blood flow.\"     IR CAROTID ANGIOGRAM  10/30/2006     IR CAROTID ANGIOGRAM  6/6/2010     IR CAROTID ANGIOGRAM  6/6/2010     IR CAROTID ANGIOGRAM  6/6/2010     IR CAROTID ANGIOGRAM  " 5/12/2011     IR CAROTID ANGIOGRAM  5/12/2011     IR CAROTID ANGIOGRAM  5/12/2011     IR CAROTID ANGIOGRAM  6/5/2012     IR CAROTID ANGIOGRAM  6/5/2012     IR CAROTID ANGIOGRAM  6/5/2012     IR MISCELLANEOUS PROCEDURE  6/6/2010     IR MISCELLANEOUS PROCEDURE  6/6/2010     IR MISCELLANEOUS PROCEDURE  5/12/2011     IR MISCELLANEOUS PROCEDURE  6/5/2012     RECONSTRUCT FOREFOOT WITH METATARSOPHALANGEAL (MTP) FUSION Left 8/21/2014    Procedure: RECONSTRUCT FOREFOOT WITH METATARSOPHALANGEAL (MTP) FUSION;  Surgeon: Tres Merlos DPM;  Location: [facility] OR     ZZC NONSPECIFIC PROCEDURE  10/30/06    neurosurgery Dr Soto     Z NONSPECIFIC PROCEDURE      bilateral total hips approx 2002     ZZC NONSPECIFIC PROCEDURE  3/07    neurosurgery          SOCIAL HISTORY:  Social History     Socioeconomic History     Marital status:      Spouse name: Olu      Number of children: 2     Years of education: Not on file     Highest education level: Not on file   Occupational History     Employer: RETIRED   Tobacco Use     Smoking status: Never Smoker     Smokeless tobacco: Never Used   Vaping Use     Vaping Use: Never used   Substance and Sexual Activity     Alcohol use: Yes     Comment:  1-2 per day     Drug use: No     Sexual activity: Never     Partners: Male   Other Topics Concern     Parent/sibling w/ CABG, MI or angioplasty before 65F 55M? Not Asked   Social History Narrative     Not on file     Social Determinants of Health     Financial Resource Strain: Not on file   Food Insecurity: Not on file   Transportation Needs: Not on file   Physical Activity: Not on file   Stress: Not on file   Social Connections: Not on file   Intimate Partner Violence: Not At Risk     Fear of Current or Ex-Partner: No     Emotionally Abused: No     Physically Abused: No     Sexually Abused: No   Housing Stability: Not on file         FAMILY HISTORY:  Family History   Problem Relation Age of Onset     Obesity Sister         gastric by-pass  "age age 60, heart murmur.     Cancer - colorectal Sister 69     Heart Disease Father         mi,  age 48     Family History Negative Mother         killed in MVA age 54     Cancer Maternal Aunt         lung cancer ,non-smoker     Lung Cancer Maternal Aunt      Breast Cancer Daughter 48     Ovarian Cancer No family hx of          PHYSICAL EXAM:  Vital signs:  /58 (Cuff Size: Adult Regular)   Pulse 62   Temp 97.4  F (36.3  C) (Tympanic)   Resp 16   Ht 1.74 m (5' 8.5\")   Wt 76.7 kg (169 lb)   LMP  (LMP Unknown)   SpO2 100%   BMI 25.32 kg/m     ECO  GENERAL/CONSTITUTIONAL: No acute distress.  EYES: Pupils are equal, round, and react to light and accommodation. Extraocular movements intact.  No scleral icterus.  ENT/MOUTH: Neck supple. Oropharynx clear, no mucositis.  LYMPH: No anterior cervical, posterior cervical, supraclavicular, axillary or inguinal adenopathy.   RESPIRATORY: Clear to auscultation bilaterally. No crackles or wheezing.   CARDIOVASCULAR: Regular rate and rhythm without murmurs, gallops, or rubs.  GASTROINTESTINAL: No hepatosplenomegaly, masses, or tenderness. The patient has normal bowel sounds. No guarding.  No distention.  MUSCULOSKELETAL: Warm and well-perfused, no cyanosis, clubbing. Chronic lymphedema.  NEUROLOGIC: Cranial nerves II-XII are intact. Alert, oriented, answers questions appropriately.  INTEGUMENTARY: No rashes or jaundice.  GAIT: Walker.       LABS:   Latest Reference Range & Units 22 07:15   Ferritin 8 - 252 ng/mL 110   Folate 4.6 - 34.8 ng/mL 32.8   Iron 35 - 180 ug/dL 50   Iron Binding Cap 240 - 430 ug/dL 341   Iron Saturation Index 15 - 46 % 15   Vitamin B12 232 - 1,245 pg/mL 1,759 (H)      Latest Reference Range & Units 22 11:41 10/01/22 04:05   Sodium 136 - 145 mmol/L 127 (L) 129 (L)   Potassium 3.4 - 5.3 mmol/L 4.5 4.5   Chloride 98 - 107 mmol/L 92 (L) 96 (L)   Carbon Dioxide (CO2) 22 - 29 mmol/L 27 24   Urea Nitrogen 8.0 - 23.0 mg/dL 19.0 " 26.4 (H)   Creatinine 0.51 - 0.95 mg/dL 0.87 0.99 (H)   GFR Estimate >60 mL/min/1.73m2 67 57 (L)   Calcium 8.8 - 10.2 mg/dL 9.6 9.0   Anion Gap 7 - 15 mmol/L 8 9   Albumin 3.5 - 5.2 g/dL 4.1    Protein Total 6.4 - 8.3 g/dL 7.1    Alkaline Phosphatase 35 - 104 U/L 116 (H)    ALT 10 - 35 U/L 22    AST 10 - 35 U/L 26    Beta-2-Microglobulin <2.3 mg/L 3.0 (H)    Bilirubin Total <=1.2 mg/dL 0.4    Copper 80.0 - 155.0 ug/dL 130.9    Glucose 70 - 99 mg/dL 105 (H) 141 (H)   Lactate Dehydrogenase 0 - 250 U/L 190    Troponin T, High Sensitivity <=14 ng/L  19 (H)   Zinc 60.0 - 120.0 ug/dL 77.5    WBC 4.0 - 11.0 10e3/uL 7.6 10.6   Hemoglobin 11.7 - 15.7 g/dL 11.5 (L) 10.7 (L)   Hematocrit 35.0 - 47.0 % 35.8 33.0 (L)   Platelet Count 150 - 450 10e3/uL 367 324   RBC Count 3.80 - 5.20 10e6/uL 3.78 (L) 3.55 (L)   MCV 78 - 100 fL 95 93   MCH 26.5 - 33.0 pg 30.4 30.1   MCHC 31.5 - 36.5 g/dL 32.1 32.4   RDW 10.0 - 15.0 % 12.2 12.1   % Neutrophils % 49 68   % Lymphocytes % 39 21   % Monocytes % 9 9   % Eosinophils % 2 1   % Basophils % 1 1   Absolute Basophils 0.0 - 0.2 10e3/uL 0.1 0.1   Absolute Eosinophils 0.0 - 0.7 10e3/uL 0.2 0.1   Absolute Immature Granulocytes <=0.4 10e3/uL 0.0 0.0   Absolute Lymphocytes 0.8 - 5.3 10e3/uL 2.9 2.2   Absolute Monocytes 0.0 - 1.3 10e3/uL 0.7 0.9   % Immature Granulocytes % 0 0   Absolute Neutrophils 1.6 - 8.3 10e3/uL 3.7 7.3   Absolute NRBCs 10e3/uL 0.0 0.0   NRBCs per 100 WBC <1 /100 0 0   % Retic 0.5 - 2.0 % 1.1    Absolute Retic 0.025 - 0.095 10e6/uL 0.042      Serum M-protein (g/dL):  9/2/22: 0.0    Total IgG (mg/dL), IgA (mg/dL), IgM (mg/dL):  9/2/22: 979, 127, 55    Free kappa light chains (mg/dL), lambda (mg/dL), kappa/lambda ratio:  9/2/22: 2.62, 1.99, 1.32    PATHOLOGY:  Final Diagnosis 9/2/22:   Peripheral blood:  --Slight normochromic normocytic anemia.         ASSESSMENT/PLAN:  Candida Rose is an 82 year old female who presents with anemia. 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    1) Recurrent falls, hyponatremia, recent UTI  -Patient with recurrent falls and injury to the head over the weekend.   -Will obtain stat CT brain.   -Will also obtain stat CXR and Xray shoulder as she states she has injury here.   -Stat CBC, CMP.  -Discussed with patient she may require inpatient hospitalization due to need for correction of hyponatremia or any acute findings on imaging.     2) Chronic NC/NC anemia  -7/2022: iron studies, B12, folate, TSH WNL.  -Patient has had stable hemoglobin in the 10's.  -She denies gross evidence of bleed.  -Discussed with patient to closely monitor urine and stools for bleed. If she develops iron deficiency, will need repeat GI workup (last colonoscopy in 2014 negative).   -SPIEP negative.   -Schedule for bone marrow biopsy in the next 4 weeks.     2) History of Fitch-Fitch syndrome s/p surgery    3) History of CVA, HTN, HLD    4) Stat imaging and labs. Bone marrow biopsy in the next month. Follow up in 6-8 weeks to review results.        Complexity: HIGH.     Kary Johns DO  Hematology/Oncology  [facility]

## 2022-10-04 ENCOUNTER — APPOINTMENT (OUTPATIENT)
Dept: MRI IMAGING | Facility: CLINIC | Age: 82
End: 2022-10-04
Attending: EMERGENCY MEDICINE
Payer: MEDICARE

## 2022-10-04 ENCOUNTER — TELEPHONE (OUTPATIENT)
Dept: ONCOLOGY | Facility: CLINIC | Age: 82
End: 2022-10-04

## 2022-10-04 ENCOUNTER — ONCOLOGY VISIT (OUTPATIENT)
Dept: ONCOLOGY | Facility: CLINIC | Age: 82
End: 2022-10-04
Attending: INTERNAL MEDICINE
Payer: MEDICARE

## 2022-10-04 ENCOUNTER — HOSPITAL ENCOUNTER (OUTPATIENT)
Dept: CT IMAGING | Facility: CLINIC | Age: 82
Discharge: HOME | End: 2022-10-04
Attending: INTERNAL MEDICINE | Admitting: INTERNAL MEDICINE
Payer: MEDICARE

## 2022-10-04 ENCOUNTER — HOSPITAL ENCOUNTER (EMERGENCY)
Facility: CLINIC | Age: 82
Discharge: ANOTHER HOSPITAL | End: 2022-10-05
Attending: EMERGENCY MEDICINE | Admitting: HOSPITALIST
Payer: MEDICARE

## 2022-10-04 VITALS
SYSTOLIC BLOOD PRESSURE: 136 MMHG | WEIGHT: 169 LBS | HEART RATE: 62 BPM | DIASTOLIC BLOOD PRESSURE: 58 MMHG | OXYGEN SATURATION: 100 % | RESPIRATION RATE: 16 BRPM | HEIGHT: 69 IN | TEMPERATURE: 97.4 F | BODY MASS INDEX: 25.03 KG/M2

## 2022-10-04 DIAGNOSIS — R29.6 RECURRENT FALLS: Primary | ICD-10-CM

## 2022-10-04 DIAGNOSIS — R74.9 ABNORMAL SERUM ENZYME LEVEL, UNSPECIFIED: ICD-10-CM

## 2022-10-04 DIAGNOSIS — I63.9 CEREBROVASCULAR ACCIDENT (CVA), UNSPECIFIED MECHANISM (H): ICD-10-CM

## 2022-10-04 DIAGNOSIS — E87.1 HYPONATREMIA: ICD-10-CM

## 2022-10-04 DIAGNOSIS — D75.9 DISEASE OF BLOOD AND BLOOD-FORMING ORGANS, UNSPECIFIED: ICD-10-CM

## 2022-10-04 DIAGNOSIS — R29.6 RECURRENT FALLS: ICD-10-CM

## 2022-10-04 DIAGNOSIS — D64.9 ANEMIA, UNSPECIFIED TYPE: ICD-10-CM

## 2022-10-04 DIAGNOSIS — R63.39 OTHER FEEDING DIFFICULTIES: ICD-10-CM

## 2022-10-04 LAB
ALBUMIN SERPL BCG-MCNC: 3.6 G/DL (ref 3.5–5.2)
ALP SERPL-CCNC: 115 U/L (ref 35–104)
ALT SERPL W P-5'-P-CCNC: 20 U/L (ref 10–35)
ANION GAP SERPL CALCULATED.3IONS-SCNC: 11 MMOL/L (ref 7–15)
ANION GAP SERPL CALCULATED.3IONS-SCNC: 9 MMOL/L (ref 7–15)
AST SERPL W P-5'-P-CCNC: 28 U/L (ref 10–35)
BASOPHILS # BLD AUTO: 0 10E3/UL (ref 0–0.2)
BASOPHILS # BLD AUTO: 0.1 10E3/UL (ref 0–0.2)
BASOPHILS NFR BLD AUTO: 1 %
BASOPHILS NFR BLD AUTO: 1 %
BILIRUB SERPL-MCNC: 0.4 MG/DL
BUN SERPL-MCNC: 24.2 MG/DL (ref 8–23)
BUN SERPL-MCNC: 25 MG/DL (ref 8–23)
CALCIUM SERPL-MCNC: 9.1 MG/DL (ref 8.8–10.2)
CALCIUM SERPL-MCNC: 9.2 MG/DL (ref 8.8–10.2)
CHLORIDE SERPL-SCNC: 94 MMOL/L (ref 98–107)
CHLORIDE SERPL-SCNC: 95 MMOL/L (ref 98–107)
CREAT SERPL-MCNC: 0.82 MG/DL (ref 0.51–0.95)
CREAT SERPL-MCNC: 0.83 MG/DL (ref 0.51–0.95)
DEPRECATED HCO3 PLAS-SCNC: 26 MMOL/L (ref 22–29)
DEPRECATED HCO3 PLAS-SCNC: 27 MMOL/L (ref 22–29)
EOSINOPHIL # BLD AUTO: 0.1 10E3/UL (ref 0–0.7)
EOSINOPHIL # BLD AUTO: 0.1 10E3/UL (ref 0–0.7)
EOSINOPHIL NFR BLD AUTO: 1 %
EOSINOPHIL NFR BLD AUTO: 1 %
ERYTHROCYTE [DISTWIDTH] IN BLOOD BY AUTOMATED COUNT: 12 % (ref 10–15)
ERYTHROCYTE [DISTWIDTH] IN BLOOD BY AUTOMATED COUNT: 12.2 % (ref 10–15)
GFR SERPL CREATININE-BSD FRML MDRD: 71 ML/MIN/1.73M2
GFR SERPL CREATININE-BSD FRML MDRD: 72 ML/MIN/1.73M2
GLUCOSE SERPL-MCNC: 102 MG/DL (ref 70–99)
GLUCOSE SERPL-MCNC: 107 MG/DL (ref 70–99)
HCT VFR BLD AUTO: 33.4 % (ref 35–47)
HCT VFR BLD AUTO: 35.3 % (ref 35–47)
HGB BLD-MCNC: 10.6 G/DL (ref 11.7–15.7)
HGB BLD-MCNC: 11.2 G/DL (ref 11.7–15.7)
IMM GRANULOCYTES # BLD: 0 10E3/UL
IMM GRANULOCYTES # BLD: 0 10E3/UL
IMM GRANULOCYTES NFR BLD: 0 %
IMM GRANULOCYTES NFR BLD: 0 %
LYMPHOCYTES # BLD AUTO: 2.1 10E3/UL (ref 0.8–5.3)
LYMPHOCYTES # BLD AUTO: 2.4 10E3/UL (ref 0.8–5.3)
LYMPHOCYTES NFR BLD AUTO: 22 %
LYMPHOCYTES NFR BLD AUTO: 28 %
MCH RBC QN AUTO: 29.9 PG (ref 26.5–33)
MCH RBC QN AUTO: 29.9 PG (ref 26.5–33)
MCHC RBC AUTO-ENTMCNC: 31.7 G/DL (ref 31.5–36.5)
MCHC RBC AUTO-ENTMCNC: 31.7 G/DL (ref 31.5–36.5)
MCV RBC AUTO: 94 FL (ref 78–100)
MCV RBC AUTO: 94 FL (ref 78–100)
MONOCYTES # BLD AUTO: 0.8 10E3/UL (ref 0–1.3)
MONOCYTES # BLD AUTO: 0.8 10E3/UL (ref 0–1.3)
MONOCYTES NFR BLD AUTO: 9 %
MONOCYTES NFR BLD AUTO: 9 %
NEUTROPHILS # BLD AUTO: 5.3 10E3/UL (ref 1.6–8.3)
NEUTROPHILS # BLD AUTO: 6.3 10E3/UL (ref 1.6–8.3)
NEUTROPHILS NFR BLD AUTO: 61 %
NEUTROPHILS NFR BLD AUTO: 67 %
NRBC # BLD AUTO: 0 10E3/UL
NRBC # BLD AUTO: 0 10E3/UL
NRBC BLD AUTO-RTO: 0 /100
NRBC BLD AUTO-RTO: 0 /100
PLATELET # BLD AUTO: 366 10E3/UL (ref 150–450)
PLATELET # BLD AUTO: 372 10E3/UL (ref 150–450)
POTASSIUM SERPL-SCNC: 4.1 MMOL/L (ref 3.4–5.3)
POTASSIUM SERPL-SCNC: 4.3 MMOL/L (ref 3.4–5.3)
PROT SERPL-MCNC: 6.5 G/DL (ref 6.4–8.3)
RADIOLOGIST FLAGS: ABNORMAL
RBC # BLD AUTO: 3.55 10E6/UL (ref 3.8–5.2)
RBC # BLD AUTO: 3.75 10E6/UL (ref 3.8–5.2)
SARS-COV-2 RNA RESP QL NAA+PROBE: NEGATIVE
SODIUM SERPL-SCNC: 131 MMOL/L (ref 136–145)
SODIUM SERPL-SCNC: 131 MMOL/L (ref 136–145)
WBC # BLD AUTO: 8.6 10E3/UL (ref 4–11)
WBC # BLD AUTO: 9.3 10E3/UL (ref 4–11)

## 2022-10-04 PROCEDURE — 36415 COLL VENOUS BLD VENIPUNCTURE: CPT | Performed by: EMERGENCY MEDICINE

## 2022-10-04 PROCEDURE — 85025 COMPLETE CBC W/AUTO DIFF WBC: CPT | Performed by: INTERNAL MEDICINE

## 2022-10-04 PROCEDURE — C9803 HOPD COVID-19 SPEC COLLECT: HCPCS

## 2022-10-04 PROCEDURE — 99215 OFFICE O/P EST HI 40 MIN: CPT | Performed by: INTERNAL MEDICINE

## 2022-10-04 PROCEDURE — 83036 HEMOGLOBIN GLYCOSYLATED A1C: CPT | Performed by: HOSPITALIST

## 2022-10-04 PROCEDURE — G0463 HOSPITAL OUTPT CLINIC VISIT: HCPCS | Mod: 25

## 2022-10-04 PROCEDURE — G1010 CDSM STANSON: HCPCS

## 2022-10-04 PROCEDURE — 99285 EMERGENCY DEPT VISIT HI MDM: CPT | Mod: 25

## 2022-10-04 PROCEDURE — 36415 COLL VENOUS BLD VENIPUNCTURE: CPT | Performed by: INTERNAL MEDICINE

## 2022-10-04 PROCEDURE — 82310 ASSAY OF CALCIUM: CPT | Performed by: EMERGENCY MEDICINE

## 2022-10-04 PROCEDURE — 80061 LIPID PANEL: CPT | Performed by: HOSPITALIST

## 2022-10-04 PROCEDURE — 93005 ELECTROCARDIOGRAM TRACING: CPT

## 2022-10-04 PROCEDURE — U0005 INFEC AGEN DETEC AMPLI PROBE: HCPCS | Performed by: EMERGENCY MEDICINE

## 2022-10-04 PROCEDURE — 84155 ASSAY OF PROTEIN SERUM: CPT | Performed by: INTERNAL MEDICINE

## 2022-10-04 PROCEDURE — 99207 PR NO BILLABLE SERVICE THIS VISIT: CPT | Performed by: STUDENT IN AN ORGANIZED HEALTH CARE EDUCATION/TRAINING PROGRAM

## 2022-10-04 PROCEDURE — 84484 ASSAY OF TROPONIN QUANT: CPT | Performed by: HOSPITALIST

## 2022-10-04 PROCEDURE — 70553 MRI BRAIN STEM W/O & W/DYE: CPT | Mod: MG

## 2022-10-04 PROCEDURE — 85025 COMPLETE CBC W/AUTO DIFF WBC: CPT | Performed by: EMERGENCY MEDICINE

## 2022-10-04 PROCEDURE — 70549 MR ANGIOGRAPH NECK W/O&W/DYE: CPT | Mod: MA

## 2022-10-04 PROCEDURE — 255N000002 HC RX 255 OP 636: Performed by: EMERGENCY MEDICINE

## 2022-10-04 PROCEDURE — 70544 MR ANGIOGRAPHY HEAD W/O DYE: CPT | Mod: MA,59

## 2022-10-04 PROCEDURE — A9585 GADOBUTROL INJECTION: HCPCS | Performed by: EMERGENCY MEDICINE

## 2022-10-04 RX ORDER — GADOBUTROL 604.72 MG/ML
8 INJECTION INTRAVENOUS ONCE
Status: DISCONTINUED | OUTPATIENT
Start: 2022-10-04 | End: 2022-10-04

## 2022-10-04 RX ORDER — GADOBUTROL 604.72 MG/ML
10 INJECTION INTRAVENOUS ONCE
Status: COMPLETED | OUTPATIENT
Start: 2022-10-04 | End: 2022-10-04

## 2022-10-04 RX ORDER — IOPAMIDOL 755 MG/ML
500 INJECTION, SOLUTION INTRAVASCULAR ONCE
Status: COMPLETED | OUTPATIENT
Start: 2022-10-04 | End: 2022-10-05

## 2022-10-04 RX ADMIN — GADOBUTROL 10 ML: 604.72 INJECTION INTRAVENOUS at 20:15

## 2022-10-04 ASSESSMENT — ENCOUNTER SYMPTOMS
CHILLS: 0
ABDOMINAL PAIN: 0
RHINORRHEA: 0
NUMBNESS: 0
FEVER: 0
HEADACHES: 0

## 2022-10-04 ASSESSMENT — ACTIVITIES OF DAILY LIVING (ADL)
ADLS_ACUITY_SCORE: 35
ADLS_ACUITY_SCORE: 35

## 2022-10-04 ASSESSMENT — PAIN SCALES - GENERAL: PAINLEVEL: NO PAIN (0)

## 2022-10-04 NOTE — LETTER
10/4/2022         RE: Candida Rose  [address] [id number]        Dear Colleague,    Thank you for referring your patient, Candida Rose, to the [facility]. Please see a copy of my visit note below.    [facility]    Hematology/Oncology Established Patient Note      Today's Date: 10/4/22    Reason for Consultation: Anemia, unspecified  Referring Provider: Chani Peoples MD      HISTORY OF PRESENT ILLNESS: Candida Rose is an 82 year old female who presents with anemia. 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    Patient has evidence of chronic NC/NC anemia since 2017 with ofelia of 7.8 in April 2017. On 7/13/22, WBC 7.2, Hb 10.3, MCV 92, . B12 1759, ferritin 110, folate 32.8, iron 50, TIBC 341, iron sat 15%.    She has had intentional 70 lb weight loss via a clinical trial and weight watchers over 2 years. No hematura, hematochezia/melena. No vaginal bleed.     Cancer screening:  Mammogram UTD and no history of abnormal/breast biopsy.  Colonoscopies UTD 2014 (normal; no further follow up).    No history of abnormal pap smears.     Sister was diagnosed with colon cancer at age 60 and passed away soon after diagnosis.     She is a retired .       INTERIM HISTORY:  Patient has had recurrent falls. She was recently evaluated in the ED and treated with keflex for a UTI. She has had chronic hyponatremia with sodium ranging 127-129. Patient denies dizziness or light-headedness. She states her left leg feels unstable.     CBC is stable.         REVIEW OF SYSTEMS:   A 14 point ROS was reviewed with pertinent positives and negatives in the HPI.        HOME MEDICATIONS:  Current Outpatient Medications   Medication Sig Dispense Refill     amLODIPine (NORVASC) 2.5 MG tablet Take 1 tablet (2.5 mg) by mouth  daily 90 tablet 1     aspirin 81 MG tablet Take 1 tablet (81 mg) by mouth daily 30 tablet      atenolol (TENORMIN) 25 MG tablet TAKE 1 TABLET BY MOUTH EVERY DAY 90 tablet 1     cephALEXin (KEFLEX) 500 MG capsule Take 1 capsule (500 mg) by mouth 2 times daily for 7 days 14 capsule 0     clopidogrel (PLAVIX) 75 MG tablet Take 1 tablet (75 mg) by mouth daily 90 tablet 1     cyanocobalamin (VITAMIN B-12) 500 MCG tablet Take 500 mcg by mouth daily       losartan (COZAAR) 50 MG tablet TAKE 1 TABLET BY MOUTH TWICE A  tablet 1     magnesium 500 MG TABS        Multiple Vitamins-Minerals (MULTIVITAMIN & MINERAL PO) Take 1 tablet by mouth daily.       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 F/u dr. Soto     OA (osteoarthritis)     s/p bilat total hips      Other chronic pain     Joint pain for many years     Unspecified cerebral artery occlusion with cerebral infarction     After Moyamoya surgery > 10 yrs ago.     Vitamin D deficiencies          PAST SURGICAL HISTORY:  Past Surgical History:   Procedure Laterality Date     ARTHROPLASTY KNEE Left 4/17/2017    Procedure: ARTHROPLASTY KNEE;  Left total knee arthroplasty;  Surgeon: Chidi Jenkins MD;  Location: [facility] OR     COLONOSCOPY  2008    normal     COLONOSCOPY  7/17/2014    Procedure: COLONOSCOPY;  Surgeon: Ovidio  "Raul Raines DO;  Location: [facility] GI     CRANIOTOMY      MOJAMOJA  \"Pt had repair of blood flow.\"     IR CAROTID ANGIOGRAM  10/30/2006     IR CAROTID ANGIOGRAM  6/6/2010     IR CAROTID ANGIOGRAM  6/6/2010     IR CAROTID ANGIOGRAM  6/6/2010     IR CAROTID ANGIOGRAM  5/12/2011     IR CAROTID ANGIOGRAM  5/12/2011     IR CAROTID ANGIOGRAM  5/12/2011     IR CAROTID ANGIOGRAM  6/5/2012     IR CAROTID ANGIOGRAM  6/5/2012     IR CAROTID ANGIOGRAM  6/5/2012     IR MISCELLANEOUS PROCEDURE  6/6/2010     IR MISCELLANEOUS PROCEDURE  6/6/2010     IR MISCELLANEOUS PROCEDURE  5/12/2011     IR MISCELLANEOUS PROCEDURE  6/5/2012     RECONSTRUCT FOREFOOT WITH METATARSOPHALANGEAL (MTP) FUSION Left 8/21/2014    Procedure: RECONSTRUCT FOREFOOT WITH METATARSOPHALANGEAL (MTP) FUSION;  Surgeon: Tres Merlos DPM;  Location:  OR     [facility] NONSPECIFIC PROCEDURE  10/30/06    neurosurgery Dr Soto     [facility] NONSPECIFIC PROCEDURE      bilateral total hips approx 2002     [facility] NONSPECIFIC PROCEDURE  3/07    neurosurgery          SOCIAL HISTORY:  Social History     Socioeconomic History     Marital status:      Spouse name: Olu      Number of children: 2     Years of education: Not on file     Highest education level: Not on file   Occupational History     Employer: RETIRED   Tobacco Use     Smoking status: Never Smoker     Smokeless tobacco: Never Used   Vaping Use     Vaping Use: Never used   Substance and Sexual Activity     Alcohol use: Yes     Comment:  1-2 per day     Drug use: No     Sexual activity: Never     Partners: Male   Other Topics Concern     Parent/sibling w/ CABG, MI or angioplasty before 65F 55M? Not Asked   Social History Narrative     Not on file     Social Determinants of Health     Financial Resource Strain: Not on file   Food Insecurity: Not on file   Transportation Needs: Not on file   Physical Activity: Not on file   Stress: Not on file   Social Connections: Not on file   Intimate Partner Violence: Not At Risk " "    Fear of Current or Ex-Partner: No     Emotionally Abused: No     Physically Abused: No     Sexually Abused: No   Housing Stability: Not on file         FAMILY HISTORY:  Family History   Problem Relation Age of Onset     Obesity Sister         gastric by-pass age age 60, heart murmur.     Cancer - colorectal Sister 69     Heart Disease Father         mi,  age 48     Family History Negative Mother         killed in MVA age 54     Cancer Maternal Aunt         lung cancer ,non-smoker     Lung Cancer Maternal Aunt      Breast Cancer Daughter 48     Ovarian Cancer No family hx of          PHYSICAL EXAM:  Vital signs:  /58 (Cuff Size: Adult Regular)   Pulse 62   Temp 97.4  F (36.3  C) (Tympanic)   Resp 16   Ht 1.74 m (5' 8.5\")   Wt 76.7 kg (169 lb)   LMP  (LMP Unknown)   SpO2 100%   BMI 25.32 kg/m     ECO  GENERAL/CONSTITUTIONAL: No acute distress.  EYES: Pupils are equal, round, and react to light and accommodation. Extraocular movements intact.  No scleral icterus.  ENT/MOUTH: Neck supple. Oropharynx clear, no mucositis.  LYMPH: No anterior cervical, posterior cervical, supraclavicular, axillary or inguinal adenopathy.   RESPIRATORY: Clear to auscultation bilaterally. No crackles or wheezing.   CARDIOVASCULAR: Regular rate and rhythm without murmurs, gallops, or rubs.  GASTROINTESTINAL: No hepatosplenomegaly, masses, or tenderness. The patient has normal bowel sounds. No guarding.  No distention.  MUSCULOSKELETAL: Warm and well-perfused, no cyanosis, clubbing. Chronic lymphedema.  NEUROLOGIC: Cranial nerves II-XII are intact. Alert, oriented, answers questions appropriately.  INTEGUMENTARY: No rashes or jaundice.  GAIT: Walker.       LABS:   Latest Reference Range & Units 22 07:15   Ferritin 8 - 252 ng/mL 110   Folate 4.6 - 34.8 ng/mL 32.8   Iron 35 - 180 ug/dL 50   Iron Binding Cap 240 - 430 ug/dL 341   Iron Saturation Index 15 - 46 % 15   Vitamin B12 232 - 1,245 pg/mL 1,759 (H)      " Latest Reference Range & Units 09/02/22 11:41 10/01/22 04:05   Sodium 136 - 145 mmol/L 127 (L) 129 (L)   Potassium 3.4 - 5.3 mmol/L 4.5 4.5   Chloride 98 - 107 mmol/L 92 (L) 96 (L)   Carbon Dioxide (CO2) 22 - 29 mmol/L 27 24   Urea Nitrogen 8.0 - 23.0 mg/dL 19.0 26.4 (H)   Creatinine 0.51 - 0.95 mg/dL 0.87 0.99 (H)   GFR Estimate >60 mL/min/1.73m2 67 57 (L)   Calcium 8.8 - 10.2 mg/dL 9.6 9.0   Anion Gap 7 - 15 mmol/L 8 9   Albumin 3.5 - 5.2 g/dL 4.1    Protein Total 6.4 - 8.3 g/dL 7.1    Alkaline Phosphatase 35 - 104 U/L 116 (H)    ALT 10 - 35 U/L 22    AST 10 - 35 U/L 26    Beta-2-Microglobulin <2.3 mg/L 3.0 (H)    Bilirubin Total <=1.2 mg/dL 0.4    Copper 80.0 - 155.0 ug/dL 130.9    Glucose 70 - 99 mg/dL 105 (H) 141 (H)   Lactate Dehydrogenase 0 - 250 U/L 190    Troponin T, High Sensitivity <=14 ng/L  19 (H)   Zinc 60.0 - 120.0 ug/dL 77.5    WBC 4.0 - 11.0 10e3/uL 7.6 10.6   Hemoglobin 11.7 - 15.7 g/dL 11.5 (L) 10.7 (L)   Hematocrit 35.0 - 47.0 % 35.8 33.0 (L)   Platelet Count 150 - 450 10e3/uL 367 324   RBC Count 3.80 - 5.20 10e6/uL 3.78 (L) 3.55 (L)   MCV 78 - 100 fL 95 93   MCH 26.5 - 33.0 pg 30.4 30.1   MCHC 31.5 - 36.5 g/dL 32.1 32.4   RDW 10.0 - 15.0 % 12.2 12.1   % Neutrophils % 49 68   % Lymphocytes % 39 21   % Monocytes % 9 9   % Eosinophils % 2 1   % Basophils % 1 1   Absolute Basophils 0.0 - 0.2 10e3/uL 0.1 0.1   Absolute Eosinophils 0.0 - 0.7 10e3/uL 0.2 0.1   Absolute Immature Granulocytes <=0.4 10e3/uL 0.0 0.0   Absolute Lymphocytes 0.8 - 5.3 10e3/uL 2.9 2.2   Absolute Monocytes 0.0 - 1.3 10e3/uL 0.7 0.9   % Immature Granulocytes % 0 0   Absolute Neutrophils 1.6 - 8.3 10e3/uL 3.7 7.3   Absolute NRBCs 10e3/uL 0.0 0.0   NRBCs per 100 WBC <1 /100 0 0   % Retic 0.5 - 2.0 % 1.1    Absolute Retic 0.025 - 0.095 10e6/uL 0.042      Serum M-protein (g/dL):  9/2/22: 0.0    Total IgG (mg/dL), IgA (mg/dL), IgM (mg/dL):  9/2/22: 979, 127, 55    Free kappa light chains (mg/dL), lambda (mg/dL), kappa/lambda  ratio:  9/2/22: 2.62, 1.99, 1.32    PATHOLOGY:  Final Diagnosis 9/2/22:   Peripheral blood:  --Slight normochromic normocytic anemia.         ASSESSMENT/PLAN:  Candida Rose is an 82 year old female who presents with anemia. 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    1) Recurrent falls, hyponatremia, recent UTI  -Patient with recurrent falls and injury to the head over the weekend.   -Will obtain stat CT brain.   -Will also obtain stat CXR and Xray shoulder as she states she has injury here.   -Stat CBC, CMP.  -Discussed with patient she may require inpatient hospitalization due to need for correction of hyponatremia or any acute findings on imaging.     2) Chronic NC/NC anemia  -7/2022: iron studies, B12, folate, TSH WNL.  -Patient has had stable hemoglobin in the 10's.  -She denies gross evidence of bleed.  -Discussed with patient to closely monitor urine and stools for bleed. If she develops iron deficiency, will need repeat GI workup (last colonoscopy in 2014 negative).   -SPIEP negative.   -Schedule for bone marrow biopsy in the next 4 weeks.     2) History of Fitch-Fitch syndrome s/p surgery    3) History of CVA, HTN, HLD    4) Stat imaging and labs. Bone marrow biopsy in the next month. Follow up in 6-8 weeks to review results.        Complexity: HIGH.     Kary Johns DO  Hematology/Oncology  [facility]        Again, thank you for allowing me to participate in the care of your patient.        Sincerely,        Kary Johns DO

## 2022-10-04 NOTE — ED TRIAGE NOTES
Pt presents for evaluation of increased falls over the last couple days. Has had 2 falls in last 2 days. Pt states left leg starts to shake and then would fall. Pt unable to get up from falls without help from her . Pt thinks she fell forward both times. Pt hit head with the first fall, but unsure about the second time. No LOC. Pt has been feeling fine since falls, denies any headache, vision changes or unilateral weakness, numbness, tingling or feeling off balance. Went to PCP for annual check up. Had some abnormalities in her labs, so she was sent to a hematologist. Went to see Dr. Johns, had labs and imaging (shoudler xray, chest xray, head CT) done due to falls. Was seen on Sunday in ED for weakness and falls, no imaging done. Treated for a UTI.      Imaging showed :Questionable subacute ischemic infarct at the anterolateral aspect of the right frontal lobe. Clinical correlation recommended. Brain MRI may be helpful for better evaluation.

## 2022-10-04 NOTE — NURSING NOTE
"Oncology Rooming Note    October 4, 2022 1:28 PM   Candida oRse is a 82 year old female who presents for:    Chief Complaint   Patient presents with     Oncology Clinic Visit     Anemia, unspecified type      Initial Vitals: /58 (Cuff Size: Adult Regular)   Pulse 62   Temp 97.4  F (36.3  C) (Tympanic)   Resp 16   Ht 1.74 m (5' 8.5\")   Wt 76.7 kg (169 lb)   LMP  (LMP Unknown)   SpO2 100%   BMI 25.32 kg/m   Estimated body mass index is 25.32 kg/m  as calculated from the following:    Height as of this encounter: 1.74 m (5' 8.5\").    Weight as of this encounter: 76.7 kg (169 lb). Body surface area is 1.93 meters squared.  No Pain (0) Comment: Data Unavailable   No LMP recorded (lmp unknown). Patient is postmenopausal.  Allergies reviewed: Yes  Medications reviewed: Yes    Medications: Medication refills not needed today.  Pharmacy name entered into [facility]: [id number] [place], [place] - [address] [place] [address]    Clinical concerns: 1 month f/u       Akosua Maravilla CMA              "

## 2022-10-04 NOTE — TELEPHONE ENCOUNTER
I called the patient to review results. CBC is stable. Sodium improved to 131. However, CT brain is showing a questionable subacute infarct with evidence of chronic infarcts.     Given her recurrent falls, I have asked her , Chris, to transport patient to the ED immediately for evaluation and further stroke workup. If he is uncomfortable, he is instructed to call 911.     They express their understanding of the above. All questions answered.    Kary Johns,   Hematology/Oncology  [facility]

## 2022-10-05 ENCOUNTER — APPOINTMENT (OUTPATIENT)
Dept: CARDIOLOGY | Facility: CLINIC | Age: 82
DRG: 065 | End: 2022-10-05
Attending: INTERNAL MEDICINE
Payer: MEDICARE

## 2022-10-05 ENCOUNTER — APPOINTMENT (OUTPATIENT)
Dept: CT IMAGING | Facility: CLINIC | Age: 82
End: 2022-10-05
Attending: EMERGENCY MEDICINE
Payer: MEDICARE

## 2022-10-05 ENCOUNTER — HOSPITAL ENCOUNTER (INPATIENT)
Facility: CLINIC | Age: 82
LOS: 8 days | Discharge: NURSING, REHAB OR LONG-TERM CARE | DRG: 065 | End: 2022-10-13
Attending: HOSPITALIST | Admitting: INTERNAL MEDICINE
Payer: MEDICARE

## 2022-10-05 ENCOUNTER — APPOINTMENT (OUTPATIENT)
Dept: CT IMAGING | Facility: CLINIC | Age: 82
DRG: 065 | End: 2022-10-05
Attending: HOSPITALIST
Payer: MEDICARE

## 2022-10-05 ENCOUNTER — APPOINTMENT (OUTPATIENT)
Dept: CT IMAGING | Facility: CLINIC | Age: 82
DRG: 065 | End: 2022-10-05
Attending: PSYCHIATRY & NEUROLOGY
Payer: MEDICARE

## 2022-10-05 ENCOUNTER — APPOINTMENT (OUTPATIENT)
Dept: PHYSICAL THERAPY | Facility: CLINIC | Age: 82
DRG: 065 | End: 2022-10-05
Attending: INTERNAL MEDICINE
Payer: MEDICARE

## 2022-10-05 VITALS
RESPIRATION RATE: 16 BRPM | DIASTOLIC BLOOD PRESSURE: 73 MMHG | WEIGHT: 169 LBS | HEIGHT: 68 IN | SYSTOLIC BLOOD PRESSURE: 150 MMHG | TEMPERATURE: 98.7 F | OXYGEN SATURATION: 98 % | HEART RATE: 67 BPM | BODY MASS INDEX: 25.61 KG/M2

## 2022-10-05 DIAGNOSIS — I67.5 MOYAMOYA DISEASE: Chronic | ICD-10-CM

## 2022-10-05 DIAGNOSIS — I10 HTN, GOAL BELOW 140/90: Chronic | ICD-10-CM

## 2022-10-05 DIAGNOSIS — I63.9 CEREBROVASCULAR ACCIDENT (CVA), UNSPECIFIED MECHANISM (H): Primary | ICD-10-CM

## 2022-10-05 LAB
ANION GAP SERPL CALCULATED.3IONS-SCNC: 12 MMOL/L (ref 3–14)
ATRIAL RATE - MUSE: 63 BPM
BUN SERPL-MCNC: 28 MG/DL (ref 7–30)
CALCIUM SERPL-MCNC: 8.7 MG/DL (ref 8.5–10.1)
CHLORIDE BLD-SCNC: 97 MMOL/L (ref 94–109)
CHOLEST SERPL-MCNC: 166 MG/DL
CO2 SERPL-SCNC: 22 MMOL/L (ref 20–32)
CREAT SERPL-MCNC: 0.74 MG/DL (ref 0.52–1.04)
DIASTOLIC BLOOD PRESSURE - MUSE: NORMAL MMHG
GFR SERPL CREATININE-BSD FRML MDRD: 81 ML/MIN/1.73M2
GLUCOSE BLD-MCNC: 104 MG/DL (ref 70–99)
GLUCOSE BLDC GLUCOMTR-MCNC: 102 MG/DL (ref 70–99)
GLUCOSE BLDC GLUCOMTR-MCNC: 105 MG/DL (ref 70–99)
GLUCOSE BLDC GLUCOMTR-MCNC: 114 MG/DL (ref 70–99)
GLUCOSE BLDC GLUCOMTR-MCNC: 154 MG/DL (ref 70–99)
HBA1C MFR BLD: 5.4 %
HDLC SERPL-MCNC: 82 MG/DL
INTERPRETATION ECG - MUSE: NORMAL
LDLC SERPL CALC-MCNC: 71 MG/DL
LVEF ECHO: NORMAL
NONHDLC SERPL-MCNC: 84 MG/DL
OSMOLALITY SERPL: 277 MMOL/KG (ref 280–301)
OSMOLALITY UR: 370 MMOL/KG (ref 100–1200)
P AXIS - MUSE: NORMAL DEGREES
PA ADP BLD-ACNC: 71 PRU
POTASSIUM BLD-SCNC: 4 MMOL/L (ref 3.4–5.3)
POTASSIUM BLD-SCNC: 4 MMOL/L (ref 3.4–5.3)
PR INTERVAL - MUSE: NORMAL MS
QRS DURATION - MUSE: 80 MS
QT - MUSE: 422 MS
QTC - MUSE: 428 MS
R AXIS - MUSE: -21 DEGREES
SODIUM SERPL-SCNC: 131 MMOL/L (ref 133–144)
SODIUM UR-SCNC: 62 MMOL/L
SYSTOLIC BLOOD PRESSURE - MUSE: NORMAL MMHG
T AXIS - MUSE: 9 DEGREES
TRIGL SERPL-MCNC: 65 MG/DL
TROPONIN I SERPL HS-MCNC: 11 NG/L
TROPONIN T SERPL HS-MCNC: 20 NG/L
VENTRICULAR RATE- MUSE: 62 BPM

## 2022-10-05 PROCEDURE — 93306 TTE W/DOPPLER COMPLETE: CPT

## 2022-10-05 PROCEDURE — 84132 ASSAY OF SERUM POTASSIUM: CPT | Performed by: INTERNAL MEDICINE

## 2022-10-05 PROCEDURE — 250N000009 HC RX 250: Performed by: EMERGENCY MEDICINE

## 2022-10-05 PROCEDURE — 250N000013 HC RX MED GY IP 250 OP 250 PS 637: Performed by: HOSPITALIST

## 2022-10-05 PROCEDURE — 120N000001 HC R&B MED SURG/OB

## 2022-10-05 PROCEDURE — 97162 PT EVAL MOD COMPLEX 30 MIN: CPT | Mod: GP

## 2022-10-05 PROCEDURE — 83930 ASSAY OF BLOOD OSMOLALITY: CPT | Performed by: INTERNAL MEDICINE

## 2022-10-05 PROCEDURE — 250N000013 HC RX MED GY IP 250 OP 250 PS 637: Performed by: INTERNAL MEDICINE

## 2022-10-05 PROCEDURE — 84484 ASSAY OF TROPONIN QUANT: CPT | Performed by: INTERNAL MEDICINE

## 2022-10-05 PROCEDURE — 36415 COLL VENOUS BLD VENIPUNCTURE: CPT | Performed by: HOSPITALIST

## 2022-10-05 PROCEDURE — 97530 THERAPEUTIC ACTIVITIES: CPT | Mod: GP

## 2022-10-05 PROCEDURE — 258N000003 HC RX IP 258 OP 636: Performed by: INTERNAL MEDICINE

## 2022-10-05 PROCEDURE — 99356 PR PROLONGED SERV,INPATIENT,1ST HR: CPT | Mod: 25 | Performed by: PSYCHIATRY & NEUROLOGY

## 2022-10-05 PROCEDURE — 258N000003 HC RX IP 258 OP 636: Performed by: PSYCHIATRY & NEUROLOGY

## 2022-10-05 PROCEDURE — HZ2ZZZZ DETOXIFICATION SERVICES FOR SUBSTANCE ABUSE TREATMENT: ICD-10-PCS | Performed by: INTERNAL MEDICINE

## 2022-10-05 PROCEDURE — 258N000003 HC RX IP 258 OP 636: Performed by: STUDENT IN AN ORGANIZED HEALTH CARE EDUCATION/TRAINING PROGRAM

## 2022-10-05 PROCEDURE — 84300 ASSAY OF URINE SODIUM: CPT | Performed by: INTERNAL MEDICINE

## 2022-10-05 PROCEDURE — 36415 COLL VENOUS BLD VENIPUNCTURE: CPT | Performed by: INTERNAL MEDICINE

## 2022-10-05 PROCEDURE — 0042T CT HEAD PERFUSION W CONTRAST: CPT

## 2022-10-05 PROCEDURE — 250N000011 HC RX IP 250 OP 636: Performed by: EMERGENCY MEDICINE

## 2022-10-05 PROCEDURE — 99223 1ST HOSP IP/OBS HIGH 75: CPT | Mod: AI | Performed by: HOSPITALIST

## 2022-10-05 PROCEDURE — G1010 CDSM STANSON: HCPCS

## 2022-10-05 PROCEDURE — 83935 ASSAY OF URINE OSMOLALITY: CPT | Performed by: INTERNAL MEDICINE

## 2022-10-05 PROCEDURE — 99207 PR NO BILLABLE SERVICE THIS VISIT: CPT | Performed by: INTERNAL MEDICINE

## 2022-10-05 PROCEDURE — 99223 1ST HOSP IP/OBS HIGH 75: CPT | Mod: 25 | Performed by: PSYCHIATRY & NEUROLOGY

## 2022-10-05 PROCEDURE — 99223 1ST HOSP IP/OBS HIGH 75: CPT | Mod: AI | Performed by: INTERNAL MEDICINE

## 2022-10-05 PROCEDURE — 80048 BASIC METABOLIC PNL TOTAL CA: CPT | Performed by: INTERNAL MEDICINE

## 2022-10-05 PROCEDURE — 93306 TTE W/DOPPLER COMPLETE: CPT | Mod: 26 | Performed by: INTERNAL MEDICINE

## 2022-10-05 PROCEDURE — 70450 CT HEAD/BRAIN W/O DYE: CPT | Mod: MA

## 2022-10-05 PROCEDURE — 97116 GAIT TRAINING THERAPY: CPT | Mod: GP

## 2022-10-05 PROCEDURE — 85576 BLOOD PLATELET AGGREGATION: CPT

## 2022-10-05 RX ORDER — ONDANSETRON 2 MG/ML
4 INJECTION INTRAMUSCULAR; INTRAVENOUS EVERY 6 HOURS PRN
Status: DISCONTINUED | OUTPATIENT
Start: 2022-10-05 | End: 2022-10-13 | Stop reason: HOSPADM

## 2022-10-05 RX ORDER — CLOPIDOGREL BISULFATE 75 MG/1
75 TABLET ORAL DAILY
Status: DISCONTINUED | OUTPATIENT
Start: 2022-10-05 | End: 2022-10-05 | Stop reason: HOSPADM

## 2022-10-05 RX ORDER — HYDRALAZINE HYDROCHLORIDE 20 MG/ML
10-20 INJECTION INTRAMUSCULAR; INTRAVENOUS
Status: DISCONTINUED | OUTPATIENT
Start: 2022-10-05 | End: 2022-10-13 | Stop reason: HOSPADM

## 2022-10-05 RX ORDER — SODIUM CHLORIDE 9 MG/ML
INJECTION, SOLUTION INTRAVENOUS CONTINUOUS
Status: DISCONTINUED | OUTPATIENT
Start: 2022-10-05 | End: 2022-10-10

## 2022-10-05 RX ORDER — LABETALOL HYDROCHLORIDE 5 MG/ML
10-20 INJECTION, SOLUTION INTRAVENOUS EVERY 10 MIN PRN
Status: DISCONTINUED | OUTPATIENT
Start: 2022-10-05 | End: 2022-10-13 | Stop reason: HOSPADM

## 2022-10-05 RX ORDER — LIDOCAINE 40 MG/G
CREAM TOPICAL
Status: DISCONTINUED | OUTPATIENT
Start: 2022-10-05 | End: 2022-10-05 | Stop reason: HOSPADM

## 2022-10-05 RX ORDER — LIDOCAINE 40 MG/G
CREAM TOPICAL
Status: DISCONTINUED | OUTPATIENT
Start: 2022-10-05 | End: 2022-10-13 | Stop reason: HOSPADM

## 2022-10-05 RX ORDER — ATORVASTATIN CALCIUM 40 MG/1
40 TABLET, FILM COATED ORAL EVERY EVENING
Status: DISCONTINUED | OUTPATIENT
Start: 2022-10-05 | End: 2022-10-05 | Stop reason: HOSPADM

## 2022-10-05 RX ORDER — ATORVASTATIN CALCIUM 40 MG/1
40 TABLET, FILM COATED ORAL EVERY EVENING
Status: DISCONTINUED | OUTPATIENT
Start: 2022-10-05 | End: 2022-10-05

## 2022-10-05 RX ORDER — ACETAMINOPHEN 500 MG
1000 TABLET ORAL ONCE
Status: DISCONTINUED | OUTPATIENT
Start: 2022-10-05 | End: 2022-10-05

## 2022-10-05 RX ORDER — ASPIRIN 81 MG/1
81 TABLET ORAL DAILY
Status: DISCONTINUED | OUTPATIENT
Start: 2022-10-06 | End: 2022-10-13 | Stop reason: HOSPADM

## 2022-10-05 RX ORDER — ACETAMINOPHEN 325 MG/1
650 TABLET ORAL EVERY 4 HOURS PRN
Status: DISCONTINUED | OUTPATIENT
Start: 2022-10-05 | End: 2022-10-13 | Stop reason: HOSPADM

## 2022-10-05 RX ORDER — ONDANSETRON 4 MG/1
4 TABLET, ORALLY DISINTEGRATING ORAL EVERY 6 HOURS PRN
Status: DISCONTINUED | OUTPATIENT
Start: 2022-10-05 | End: 2022-10-13 | Stop reason: HOSPADM

## 2022-10-05 RX ORDER — ATORVASTATIN CALCIUM 40 MG/1
40 TABLET, FILM COATED ORAL EVERY EVENING
Status: DISCONTINUED | OUTPATIENT
Start: 2022-10-05 | End: 2022-10-13 | Stop reason: HOSPADM

## 2022-10-05 RX ORDER — ASPIRIN 81 MG/1
81 TABLET, CHEWABLE ORAL DAILY
Status: DISCONTINUED | OUTPATIENT
Start: 2022-10-06 | End: 2022-10-13 | Stop reason: HOSPADM

## 2022-10-05 RX ORDER — SODIUM CHLORIDE 9 MG/ML
INJECTION, SOLUTION INTRAVENOUS CONTINUOUS PRN
Status: DISCONTINUED | OUTPATIENT
Start: 2022-10-05 | End: 2022-10-06

## 2022-10-05 RX ORDER — ASPIRIN 81 MG/1
81 TABLET ORAL DAILY
Status: DISCONTINUED | OUTPATIENT
Start: 2022-10-05 | End: 2022-10-05

## 2022-10-05 RX ORDER — ASPIRIN 81 MG/1
81 TABLET ORAL DAILY
Status: DISCONTINUED | OUTPATIENT
Start: 2022-10-05 | End: 2022-10-05 | Stop reason: HOSPADM

## 2022-10-05 RX ORDER — CLOPIDOGREL BISULFATE 75 MG/1
75 TABLET ORAL DAILY
Status: DISCONTINUED | OUTPATIENT
Start: 2022-10-05 | End: 2022-10-05

## 2022-10-05 RX ORDER — ACETAMINOPHEN 325 MG/1
650 TABLET ORAL EVERY 4 HOURS PRN
Status: DISCONTINUED | OUTPATIENT
Start: 2022-10-05 | End: 2022-10-05

## 2022-10-05 RX ORDER — MULTIPLE VITAMINS W/ MINERALS TAB 9MG-400MCG
1 TAB ORAL DAILY
Status: DISCONTINUED | OUTPATIENT
Start: 2022-10-05 | End: 2022-10-05

## 2022-10-05 RX ORDER — ASPIRIN 81 MG/1
81 TABLET, CHEWABLE ORAL DAILY
Status: DISCONTINUED | OUTPATIENT
Start: 2022-10-05 | End: 2022-10-05 | Stop reason: HOSPADM

## 2022-10-05 RX ORDER — CLOPIDOGREL BISULFATE 75 MG/1
75 TABLET ORAL DAILY
Status: DISCONTINUED | OUTPATIENT
Start: 2022-10-05 | End: 2022-10-13 | Stop reason: HOSPADM

## 2022-10-05 RX ORDER — FOLIC ACID 1 MG/1
1 TABLET ORAL DAILY
Status: DISCONTINUED | OUTPATIENT
Start: 2022-10-05 | End: 2022-10-05

## 2022-10-05 RX ADMIN — ASPIRIN 81 MG CHEWABLE TABLET 81 MG: 81 TABLET CHEWABLE at 02:15

## 2022-10-05 RX ADMIN — IOPAMIDOL 50 ML: 755 INJECTION, SOLUTION INTRAVENOUS at 00:28

## 2022-10-05 RX ADMIN — SODIUM CHLORIDE: 9 INJECTION, SOLUTION INTRAVENOUS at 17:14

## 2022-10-05 RX ADMIN — SODIUM CHLORIDE 92 ML: 9 INJECTION, SOLUTION INTRAVENOUS at 00:28

## 2022-10-05 RX ADMIN — CLOPIDOGREL BISULFATE 75 MG: 75 TABLET ORAL at 11:13

## 2022-10-05 RX ADMIN — SODIUM CHLORIDE 500 ML: 9 INJECTION, SOLUTION INTRAVENOUS at 19:41

## 2022-10-05 RX ADMIN — SODIUM CHLORIDE 500 ML: 9 INJECTION, SOLUTION INTRAVENOUS at 11:14

## 2022-10-05 RX ADMIN — ATORVASTATIN CALCIUM 40 MG: 40 TABLET, FILM COATED ORAL at 02:15

## 2022-10-05 RX ADMIN — ATORVASTATIN CALCIUM 40 MG: 40 TABLET, FILM COATED ORAL at 20:29

## 2022-10-05 ASSESSMENT — ACTIVITIES OF DAILY LIVING (ADL)
ADLS_ACUITY_SCORE: 32
ADLS_ACUITY_SCORE: 35
ADLS_ACUITY_SCORE: 41
ADLS_ACUITY_SCORE: 41
ADLS_ACUITY_SCORE: 45
ADLS_ACUITY_SCORE: 41
ADLS_ACUITY_SCORE: 30
ADLS_ACUITY_SCORE: 41
ADLS_ACUITY_SCORE: 45
ADLS_ACUITY_SCORE: 37
ADLS_ACUITY_SCORE: 37
ADLS_ACUITY_SCORE: 32

## 2022-10-05 NOTE — PHARMACY-CONSULT NOTE
Pharmacy Consult to evaluate for medication related stroke core measures    Candida Rose, 82 year old female admitted for ischemic stroke on 10/5/2022.    Thrombolytic was not given because of Time from onset contraindications    VTE Prophylaxis SCDs /PCDs placed on 10/5/2022, as appropriate prior to end of hospital day 2.    Antithrombotic: aspirin and clopidogrel started on 10/5/2022, as appropriate by end of hospital day 2. Continue antithrombotic therapy on discharge to meet quality measures, unless contraindicated.    Anticoagulation if history of A-fib/flutter: Patient does not have history of A-fib/flutter - anticoagulation not required for medication related stroke core measures.     LDL Cholesterol Calculated   Date Value Ref Range Status   10/04/2022 71 <=100 mg/dL Final   01/04/2021 106 (H) <100 mg/dL Final     Comment:     Above desirable:  100-129 mg/dl  Borderline High:  130-159 mg/dL  High:             160-189 mg/dL  Very high:       >189 mg/dl         Patient currently receiving Lipitor (atorvastatin) continue statin on discharge to meet quality measures, unless contraindicated.    Recommendations: None at this time    Thank you for the consult.    Jacklyn Pickett, PharmD 10/5/2022 1:47 PM

## 2022-10-05 NOTE — PROGRESS NOTES
Patient admitted early hours of this morning.  See excellent H&P by Dr. Holland       # Acute ischemic right frontal lobe stroke:    # Moyamoya disease:    HEAD MRI:   1.  Acute infarct in the right frontal lobe.  2.  Chronic small vessel ischemic disease with numerous chronic infarcts.     HEAD MRA:   1.  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  2.  Occlusion of the right middle cerebral artery. 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  3.  Additional multifocal moderate and severe intracranial stenoses as described above.     NECK MRA:  1.  Normal appearance of the right cervical internal carotid artery with absence of flow related enhancement on noncontrast images but presence of contrast enhancement. Findings are suggestive of severe stenosis with retrograde flow via collaterals.  2.  Focal moderately severe stenosis of the distal right vertebral artery.     CT perfusion- Delay in transit time to anterior right frontal lobe and medial right parietal lobe with fairly symmetric deficit in relative cerebral blood flow and blood volume in this region consistent within known subacute infarct    -- Continue current cares as mentioned in H&P stroke neurology team.    Nonbillable

## 2022-10-05 NOTE — PROGRESS NOTES
"   10/05/22 1535   Quick Adds   Type of Visit Initial PT Evaluation   Living Environment   People in Home spouse   Current Living Arrangements house   Home Accessibility stairs to enter home   Number of Stairs, Main Entrance 2   Stair Railings, Main Entrance none  (no handrail, but there is a ledge that can be held onto for support)   Transportation Anticipated car, drives self;family or friend will provide  (Pt previously driving, but  provides rides now)   Living Environment Comments House has a basement, but pt does not need to go into basement. All necessary amenities on main level.   Self-Care   Usual Activity Tolerance good   Current Activity Tolerance moderate   Regular Exercise Yes   Activity/Exercise Type walking   Exercise Amount/Frequency 30 mins;daily   Equipment Currently Used at Home walker, rolling   Fall history within last six months yes   Number of times patient has fallen within last six months 6   Activity/Exercise/Self-Care Comment Pt does not use AD at baseline, but was using a FWW over the past week due to weakness. Pt has shower chair. Has a cane at home. pt noting she was IND with ADLs prior to admission including grocery shopping, dressing, and bathing.   General Information   Onset of Illness/Injury or Date of Surgery 10/04/22   Referring Physician Linda Holland MD   Patient/Family Therapy Goals Statement (PT) return home   Pertinent History of Current Problem (include personal factors and/or comorbidities that impact the POC) Per chart review, \"Candida Rose is a 82-year-old female with a history of moyamoya disease, hypertension, hyperlipidemia, stroke, diabetes, osteoarthritis, alcohol use disorder and chronic pain who was transferred from [facility] after she was found to have an acute ischemic right frontal lobe stroke.\"   Cognition   Cognitive Status Comments Pt responding appropriately to questions 50-75% of the time durig evaluation. Pt sometimes responding to " questions with unrelated information. Pt following 50% of commands appropriately. At times, moving incorrect limb when attempting manual muscle testing.   Integumentary/Edema   Integumentary/Edema Comments 2+ edema in bilat lower extremities   Posture    Posture Not impaired   Range of Motion (ROM)   ROM Comment LUE AROM moderately impaired, unclear if this is secondary to acute stroke or from shoulder arthritis, RUE AROM WFL; bilat LE AROM grossly limtied due to weakness   Strength (Manual Muscle Testing)   Strength Comments LLE hip flexion: 2+/5, knee extension: 3/5, ankle DF: 3+/5, knee flexion: 4/5; RLE grossly 4/5 throughout  (manual muscle testing could be partially limited due to cognitive impairment. pt able to perform sit<>stand without UE assistance demonstrating good functional LE strength)   Bed Mobility   Comment, (Bed Mobility) supine>sit CGA   Transfers   Comment, (Transfers) sit>stand w/ CGA   Gait/Stairs (Locomotion)   Distance in Feet (Required for LE Total Joints) 10' eval   Comment, (Gait/Stairs) pt ambulated 10' for eval w/ FWW and CGA. noted decreased step length w/ LLE at times. pt able to maintain good upright posture while ambulating   Balance   Balance Comments good static sitting balance   Sensory Examination   Sensory Perception patient reports no sensory changes   Sensory Perception Comments light touch intact throughout bilat UEs and LEs   Coordination   Coordination Comments mild to moderate UE and LE coordination impairment noted with decreased speed and accuracy with heel to shin testing and rapid alternating hand tapping testing   Muscle Tone   Muscle Tone no deficits were identified   Clinical Impression   Criteria for Skilled Therapeutic Intervention Yes, treatment indicated   PT Diagnosis (PT) impaired functional mobility   Influenced by the following impairments global LE weakness with LLE focal weaknes, LUE weakness, UE and LE coordination impairment, cognitive impairment,  impaired activity tolerance   Functional limitations due to impairments limited independence with functional mobility   Clinical Presentation (PT Evaluation Complexity) Evolving/Changing   Clinical Presentation Rationale clinical judgement   Clinical Decision Making (Complexity) moderate complexity   Planned Therapy Interventions (PT) balance training;bed mobility training;gait training;neuromuscular re-education;patient/family education;stair training;strengthening;transfer training;progressive activity/exercise;home program guidelines   Risk & Benefits of therapy have been explained evaluation/treatment results reviewed;care plan/treatment goals reviewed;risks/benefits reviewed;current/potential barriers reviewed;participants voiced agreement with care plan;participants included;patient;spouse/significant other   PT Discharge Planning   PT Discharge Recommendation (DC Rec) home with assist;home with outpatient physical therapy   PT Rationale for DC Rec Pt presenting below baseline mobility levels at this time. Pt requiring CGA for ambulation with FWW. Pt very functional and independent at baseline without use of AD and has supportive  at home who can provide 24/7 assistance as necessary. Pt was previously attending outpatient physical therapy prior to admission. Recommending continuing these services after discharge home to continue improving independence with functional mobility and improve strength, balance, and endurance.   PT Brief overview of current status CGA w/ FWW for ambulation, SBA for sit>stand   Total Evaluation Time   Total Evaluation Time (Minutes) 15   Physical Therapy Goals   PT Frequency Daily   PT Predicted Duration/Target Date for Goal Attainment 10/12/22   PT Goals Bed Mobility;Transfers;Gait;Stairs   PT: Bed Mobility Modified independent;Supine to/from sit   PT: Transfers Supervision/stand-by assist;Sit to/from stand;Assistive device   PT: Gait Supervision/stand-by assist;Rolling  walker;Greater than 200 feet   PT: Stairs Supervision/stand-by assist;2 stairs

## 2022-10-05 NOTE — PLAN OF CARE
SLP: Orders received. Chart reviewed and discussed with care team.  SLP not indicated due to passing the swallow screen and tolerating a regular diet and thin liquids. Patient's speech/language is intact per her nurse. Defer discharge recommendations to the medical team. Will complete orders if any changes or concerns please re-order.

## 2022-10-05 NOTE — CONSULTS
[facility]    Stroke Consult Note    Reason for Consult:  Stroke    Chief Complaint: Fall and Abnormal Cxr/ct       HPI  Candida Rose is a 82 year old female w/ PMHX of moyamoya s/p bilateral STA-MCA bypasses in 2006, CVA, DM2, HTN, HLD, OA, vit D defi  The patient is a 82 year old female who presents with multiple falls over the past 2 days. She reports having shaking of her left leg and then has a fall.  She had an outpatient head CT and showed an infarct in the R frontal lobe and was sent to the ED. She has evidence of a R frontal lobe stroke on MRI.  On exam she has equal strength in both legs but she can walk for a short distance and her left leg gives out. No other deficits.     Imaging Findings   IMPRESSION:  HEAD MRI:   1.  Acute infarct in the right frontal lobe.  2.  Chronic small vessel ischemic disease with numerous chronic infarcts.     HEAD MRA:   1.  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  2.  Occlusion of the right middle cerebral artery. 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  3.  Additional multifocal moderate and severe intracranial stenoses as described above.     NECK MRA:  1.  Normal appearance of the right cervical internal carotid artery with absence of flow related enhancement on noncontrast images but presence of contrast enhancement. Findings are suggestive of severe stenosis with retrograde flow via collaterals.  2.  Focal moderately severe stenosis of the distal right vertebral artery.       Impression  Acute ischemic stroke of R frontal lobe stroke due to other etiology (moyamoya) evidence of a new R frontal lobe acute infarct. She has R MCA and ICA occlusions, but given her history of Moyamoya there is question on chronicity, there is evidence of proximal R M1 occlusions on previous MRA scans in 6/2022 but no mention of ICA occlusions. She has had 2 days worth of symptoms which makes her  "ineligible for TNK or any other lytics. She has a non focal exam with ability to hold both legs off of the bed for 5 seconds. Given non-focal exam would not pursue thrombectomy at this time.    Recommendations   - Use orderset: \"Ischemic Stroke Routine Admission\" or \"Ischemic Stroke No Thrombolytics/No Thrombectomy ICU Admission\"  - Neurochecks and Vital Signs every Q4H  - Permissive HTN; goal SBP  220 mmHg  - Resume home aspirin and plavix for secondary stroke prevention  - Statin: Lipitor 40mg, titrate pending on LDL  - TTE (with Bubble Study if age 60 yrs or less)  - Telemetry, EKG  - Bedside Glucose Monitoring  - A1c, Lipid Panel, Troponin x 3  - PT/OT/SLP  - Stroke Education  - Euthermia, Euglycemia  - CTA perfusion head   - P2Y12 level  - [facility] to [facility]      Patient Follow-up    - final recommendation pending work-up    Thank you for this consult. We will continue to follow.     The Stroke Staff is Dr. Rodas  .    Ashley Valverde MD  Vascular Neurology Fellow  To page me or covering stroke neurology team member, click here: AMCOM   Choose \"On Call\" tab at top, then search dropdown box for \"Neurology Adult\", select location, press Enter, then look for stroke/neuro ICU/telestroke.    _____________________________________________________    Clinically Significant Risk Factors Present on Admission         # Hyponatremia: Na = 131 mmol/L (Ref range: 136 - 145 mmol/L) on admission, will monitor as appropriate       # Platelet Defect: home medication list includes an antiplatelet medication    # CKD, : Will monitor and treat as appropriate  - last Cr =  0.82 mg/dL (Ref range: 0.51 - 0.95 mg/dL)  - last GFR = 72 mL/min/1.73m2 (Ref range: >60 mL/min/1.73m2)        Past Medical History   Past Medical History:   Diagnosis Date     Anxiety state, unspecified     inactive     Cataract      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 F/u dr. Soto     OA (osteoarthritis)     s/p bilat total hips      Other chronic pain     Joint pain for many years     Unspecified cerebral artery occlusion with cerebral infarction     After Moyamoya surgery > 10 yrs ago.     Vitamin D deficiencies      Past Surgical History   Past Surgical History:   Procedure Laterality Date     ARTHROPLASTY KNEE Left 4/17/2017    Procedure: ARTHROPLASTY KNEE;  Left total knee arthroplasty;  Surgeon: Chidi Jenkins MD;  Location:  OR     COLONOSCOPY  2008    normal     COLONOSCOPY  7/17/2014    Procedure: COLONOSCOPY;  Surgeon: Raul Nolan DO;  Location:  GI     CRANIOTOMY      MOJAMOJA  \"Pt had repair of blood flow.\"     IR CAROTID ANGIOGRAM  10/30/2006     IR CAROTID ANGIOGRAM  6/6/2010     IR CAROTID ANGIOGRAM  6/6/2010     IR CAROTID ANGIOGRAM  6/6/2010     IR CAROTID ANGIOGRAM  5/12/2011     IR CAROTID ANGIOGRAM  5/12/2011     IR CAROTID ANGIOGRAM  5/12/2011     IR CAROTID ANGIOGRAM  6/5/2012     IR CAROTID ANGIOGRAM  6/5/2012     IR CAROTID ANGIOGRAM  6/5/2012     IR MISCELLANEOUS PROCEDURE  6/6/2010     IR MISCELLANEOUS PROCEDURE  6/6/2010     IR MISCELLANEOUS PROCEDURE  5/12/2011     IR MISCELLANEOUS PROCEDURE  6/5/2012     RECONSTRUCT FOREFOOT WITH METATARSOPHALANGEAL (MTP) FUSION Left 8/21/2014    Procedure: RECONSTRUCT FOREFOOT WITH METATARSOPHALANGEAL (MTP) FUSION;  Surgeon: Tres Merlos DPM;  Location:  OR     [facility] NONSPECIFIC PROCEDURE  10/30/06    neurosurgery Dr Soto     [facility] NONSPECIFIC PROCEDURE      bilateral total hips approx 2002     [facility] NONSPECIFIC PROCEDURE  3/07    neurosurgery      Medications   Home Meds  Prior to Admission medications    Medication Sig " Start Date End Date Taking? Authorizing Provider   amLODIPine (NORVASC) 2.5 MG tablet Take 1 tablet (2.5 mg) by mouth daily 22   Chani Peoples MD   aspirin 81 MG tablet Take 1 tablet (81 mg) by mouth daily 17   Chani Peoples MD   atenolol (TENORMIN) 25 MG tablet TAKE 1 TABLET BY MOUTH EVERY DAY 22   Chani Peoples MD   cephALEXin (KEFLEX) 500 MG capsule Take 1 capsule (500 mg) by mouth 2 times daily for 7 days 10/1/22 10/8/22  Martin Purvis MD   clopidogrel (PLAVIX) 75 MG tablet Take 1 tablet (75 mg) by mouth daily 22   Chani Peoples MD   cyanocobalamin (VITAMIN B-12) 500 MCG tablet Take 500 mcg by mouth daily    Reported, Patient   losartan (COZAAR) 50 MG tablet TAKE 1 TABLET BY MOUTH TWICE A DAY 22   Chani Peoples MD   magnesium 500 MG TABS  19   Chani Peoples MD   Multiple Vitamins-Minerals (MULTIVITAMIN & MINERAL PO) Take 1 tablet by mouth daily.    Reported, Patient   simvastatin (ZOCOR) 20 MG tablet TAKE 1 TABLET BY MOUTH AT BEDTIME 22   Chani Peoples MD       Scheduled Meds    lidocaine  2 mL       lidocaine  5 mL       triamcinolone  40 mg         Infusion Meds      PRN Meds      Allergies   Allergies   Allergen Reactions     Lisinopril      Persistent cough     Family History   Family History   Problem Relation Age of Onset     Obesity Sister         gastric by-pass age age 60, heart murmur.     Cancer - colorectal Sister 69     Heart Disease Father         mi,  age 48     Family History Negative Mother         killed in MVA age 54     Cancer Maternal Aunt         lung cancer ,non-smoker     Lung Cancer Maternal Aunt      Breast Cancer Daughter 48     Ovarian Cancer No family hx of      Social History   Social History     Tobacco Use     Smoking status: Never Smoker     Smokeless tobacco: Never Used   Vaping Use     Vaping Use: Never used    Substance Use Topics     Alcohol use: Yes     Comment:  1-2 per day     Drug use: No       Review of Systems   Review of systems not obtained due to patient factors - critical condition       PHYSICAL EXAMINATION   Temp:  [97.4  F (36.3  C)-98  F (36.7  C)] 98  F (36.7  C)  Pulse:  [62-69] 69  Resp:  [16-18] 18  BP: (133-153)/(57-71) 141/71  SpO2:  [100 %] 100 %    Neuro Exam  Mental Status:  alert, oriented x 3, follows commands, speech clear and fluent, naming and repetition normal  Cranial Nerves:  visual fields intact (tested by nurse), EOMI with normal smooth pursuit, facial sensation intact and symmetric (tested by nurse), facial movements symmetric, hearing not formally tested but intact to conversation, no dysarthria, shoulder shrug equal bilaterally, tongue protrusion midline  Motor:  no abnormal movements, able to move all limbs antigravity spontaneously with no signs of hemiparesis observed, no pronator drift  Reflexes:  unable to test (telestroke)  Sensory:  light touch sensation intact and symmetric throughout upper and lower extremities (assessed by nurse), no extinction on double simultaneous stimulation (assessed by nurse)  Coordination:  normal finger-to-nose and heel-to-shin bilaterally without dysmetria, rapid alternating movements symmetric  Station/Gait:  unable to test due to telestroke    Dysphagia Screen  Per Nursing    Stroke Scales    NIHSS  1a. Level of Consciousness 0-->Alert, keenly responsive   1b. LOC Questions 0-->Answers both questions correctly   1c. LOC Commands 0-->Performs both tasks correctly   2.   Best Gaze 0-->Normal   3.   Visual 0-->No visual loss   4.   Facial Palsy 0-->Normal symmetrical movements   5a. Motor Arm, Left 0-->No drift, limb holds 90 (or 45) degrees for full 10 secs   5b. Motor Arm, Right 0-->No drift, limb holds 90 (or 45) degrees for full 10 secs   6a. Motor Leg, Left 0-->No drift, leg holds 30 degree position for full 5 secs   6b. Motor Leg, right 0-->No  drift, leg holds 30 degree position for full 5 secs   7.   Limb Ataxia 0-->Absent   8.   Sensory 0-->Normal, no sensory loss   9.   Best Language 0-->No aphasia, normal   10. Dysarthria 0-->Normal   11. Extinction and Inattention  0-->No abnormality   Total 0 (10/05/22 0101)       Modified Wesco Score (Pre-morbid)    -       Imaging  I personally reviewed all imaging; relevant findings per HPI.    Labs Data   CBC  Recent Labs   Lab 10/04/22  1950 10/04/22  1432 10/01/22  0405   WBC 8.6 9.3 10.6   RBC 3.55* 3.75* 3.55*   HGB 10.6* 11.2* 10.7*   HCT 33.4* 35.3 33.0*    366 324     Basic Metabolic Panel   Recent Labs   Lab 10/04/22  1950 10/04/22  1432 10/01/22  0405   * 131* 129*   POTASSIUM 4.1 4.3 4.5   CHLORIDE 94* 95* 96*   CO2 26 27 24   BUN 24.2* 25.0* 26.4*   CR 0.82 0.83 0.99*   * 107* 141*   RAINE 9.1 9.2 9.0     Liver Panel  Recent Labs   Lab 10/04/22  1432   PROTTOTAL 6.5   ALBUMIN 3.6   BILITOTAL 0.4   ALKPHOS 115*   AST 28   ALT 20     INR  No lab results found.   Lipid Profile    Recent Labs   Lab Test 12/31/21  0710 01/04/21  1001 02/11/19  0759 05/24/16  0947 06/16/15  1127   CHOL 150 196 179   < > 197   HDL 66 56 62   < > 62   LDL 68 106* 87   < > 106   TRIG 82 171* 150*   < > 143   CHOLHDLRATIO  --   --   --   --  3.2    < > = values in this interval not displayed.     A1C    Recent Labs   Lab Test 01/26/18  0818 04/11/17  0849 01/18/17  0849   A1C 5.8 5.8 5.9     Troponin    Recent Labs   Lab 10/01/22  0611 10/01/22  0405   CTROPT 19* 19*          Stroke Consult Data Data   Telestroke Service Details  (for non-emergent stroke consult with tele)  Video start time      10/4/2022 1120   Video end time      10/4/2022 1130   Type of service telemedicine diagnostic assessment of acute neurological changes   Reason telemedicine is appropriate patient requires assessment with a specialist for diagnosis and treatment of neurological symptoms   Mode of transmission secure interactive audio  and video communication per Joi   Originating site (patient location) [facility]    Distant site (provider location) Provider remote site

## 2022-10-05 NOTE — H&P
[facility]    History and Physical - Hospitalist Service       Date of Admission:  10/5/2022    Assessment & Plan      Candida Rose is a 82-year-old female with a history of moyamoya disease, hypertension, hyperlipidemia, stroke, diabetes, osteoarthritis, alcohol use disorder and chronic pain who was transferred from [facility] after she was found to have an acute stroke.     # Acute ischemic right frontal lobe stroke:    # Moyamoya disease:    HEAD MRI:   1.  Acute infarct in the right frontal lobe.  2.  Chronic small vessel ischemic disease with numerous chronic infarcts.     HEAD MRA:   1.  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  2.  Occlusion of the right middle cerebral artery. 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  3.  Additional multifocal moderate and severe intracranial stenoses as described above.     NECK MRA:  1.  Normal appearance of the right cervical internal carotid artery with absence of flow related enhancement on noncontrast images but presence of contrast enhancement. Findings are suggestive of severe stenosis with retrograde flow via collaterals.  2.  Focal moderately severe stenosis of the distal right vertebral artery.    CT perfusion- Delay in transit time to anterior right frontal lobe and medial right parietal lobe with fairly symmetric deficit in relative cerebral blood flow and blood volume in this region consistent within known subacute infarct    This is likely what contributed to her unsteadiness and falls.    - Stroke Neuro contacted at [facility]- recommended to continue her PTA aspirin 81 mg daily and clopidogrel 75 mg daily  - P2Y12 level  - Stroke Neuro following  - Tele  - FLP- LDL 71, HDL 82; started on Lipitor 40 mg po daily  - Hba1c 5.4  - Echo  - may need cerebral angiogram- defer to Neuro; keep npo for now  - hold PTA Norvasc and Atenolol, allow for permissive HTN  - Hydralazine iv and  "Labetalol iv prn as per protocol  - PT/OT/SLP  - fall precautions    # Falls  - likely related to acute stroke  - X ray left shoulder - No fracture or dislocation. Advanced degenerative changes  at the glenohumeral joint  - reports some right hip pain  - X ray right hip     #  Hyponatremia, hypochloremic   - Na was 129 on 10/1, 131 on 10/4  - check a urine osmolality and urine sodium levels.    - started on mild iv hydration given npo status  - BMP in am      # Chronic anemia:    - She followed up with Hematology on 10/04/2022 for evaluation.  There is no evidence of bleeding.  Bone marrow biopsy is planned in the next 4 weeks.  She has already had extensive laboratory studies performed such as iron studies, B12, folate, TSH, and SPEP.  If there is evidence of urinary or gastrointestinal bleeding, she will be referred to Urology and Gastroenterology, respectively.     #  Hypertension:    - hold her ivgam-fi-jiwumgrda amlodipine, atenolol and losartan for now given goal of permissive hypertension.    #  Dyslipidemia  - PTA on Zocor, now switched to Lipitor 40 mg po daily      #  Abnormal UA:  - had a recent abnormal UA on 10/01- ER visit; started on Keflex at that time  - UC  only growing 10 to 50,000 colonies of urogenital nazia.  - stop Keflex    # Alcohol use disorder  - states that she drinks :more than she should\" 2 glasses of wine daily, sometimes more.  - advised her to quit alcohol drinking, given her neurological issues.  - CIWA monitoring     Dispo- inpatient status.          Diet: NPO per Anesthesia Guidelines for Procedure/Surgery Except for: Meds    DVT Prophylaxis: Pneumatic Compression Devices  Abreu Catheter: Not present  Central Lines: None  Cardiac Monitoring: ACTIVE order. Indication: Stroke, acute (48 hours)  Code Status:   Full Code    Clinically Significant Risk Factors Present on Admission         # Hyponatremia: Na = 131 mmol/L (Ref range: 136 - 145 mmol/L) on admission, will monitor as " "appropriate        # Platelet Defect: home medication list includes an antiplatelet medication  # Hypertension: home medication list includes antihypertensive(s)    # Overweight: Estimated body mass index is 25.7 kg/m  as calculated from the following:    Height as of 10/4/22: 1.727 m (5' 8\").    Weight as of 10/4/22: 76.7 kg (169 lb).        Disposition Plan      Expected Discharge Date: 10/07/2022                The patient's care was discussed with the Patient.    Linda Holland MD  Hospitalist Service  [facility]  Securely message with the Vocera Web Console (learn more here)  Text page via [facility] Paging/Directory         ______________________________________________________________________    Chief Complaint   Falls    History is obtained from the patient and chart review.     History of Present Illness   Candida Rose is a 82-year-old female with history of moyamoya disease, hyperlipidemia, chronic kidney disease and depression, presented to [facility] for evaluation of falls.  History is obtained from my discussion with the patient at bedside. The electronic medical record was also reviewed.     For the past 2-3 days, the patient has been somewhat unsteady on her feet.  Her legs have been shaking.  She had 2 falls over that time.  She states that she feels that her left leg start shaking and then she falls; she is not able to get up by herself. Her  had to help her get around and ambulate.  She did hit her head during one of those falls, but did not lose consciousness.  No chest pain, palpitations, shortness of breath, cough or fever.  Denies any headaches or vision changes.  No numbness or weakness of the extremities unilaterally.  She does have a history of 2 strokes and multiple cranial stenoses due to moyamoya disease.  She was seen in the Emergency Department a few days ago for weakness and falls.  No imaging was performed.  She was treated for urinary " tract infection.  She had Hematology followup a day or 2 ago, a CT of the brain was performed showing findings concerning for stroke. She was called by Hematology and advised to go to ER.    She initially went to [facility] ER- her temperature is 98, heart rate 67, blood pressure 140/60, respiratory rate 20, and oxygen saturation 100% on room air.  Laboratory work shows sodium of 131, creatinine 0.8, glucose 102.  Hemoglobin 10.6.  COVID-19 testing is negative.  MRI shows acute infarct in the right frontal lobe as well as multiple occlusions or stenoses seen throughout the brain compatible with moyamoya disease.  CT perfusion- Delay in transit time to anterior right frontal lobe and medial right parietal lobe with fairly symmetric deficit in relative cerebral blood flow and blood volume in this region consistent within known subacute infarct  Stroke neurology was contacted and recommended aspirin and Plavix, which she is already taking.  They recommended transfer to [facility] or [facility] due to complexity of her care.    Review of Systems    The 10 point Review of Systems is negative other than noted in the HPI.    Past Medical History    I have reviewed this patient's medical history and updated it with pertinent information if needed.   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 F/u dr. Soto     OA (osteoarthritis)     s/p bilat total hips      Other chronic pain     Joint pain for many years     Unspecified cerebral artery occlusion with cerebral infarction      "After Moyamoya surgery > 10 yrs ago.     Vitamin D deficiencies        Past Surgical History   I have reviewed this patient's surgical history and updated it with pertinent information if needed.  Past Surgical History:   Procedure Laterality Date     ARTHROPLASTY KNEE Left 4/17/2017    Procedure: ARTHROPLASTY KNEE;  Left total knee arthroplasty;  Surgeon: Chidi Jenkins MD;  Location:  OR     COLONOSCOPY  2008    normal     COLONOSCOPY  7/17/2014    Procedure: COLONOSCOPY;  Surgeon: Raul Nolan DO;  Location:  GI     CRANIOTOMY      MOJAMOJA  \"Pt had repair of blood flow.\"     IR CAROTID ANGIOGRAM  10/30/2006     IR CAROTID ANGIOGRAM  6/6/2010     IR CAROTID ANGIOGRAM  6/6/2010     IR CAROTID ANGIOGRAM  6/6/2010     IR CAROTID ANGIOGRAM  5/12/2011     IR CAROTID ANGIOGRAM  5/12/2011     IR CAROTID ANGIOGRAM  5/12/2011     IR CAROTID ANGIOGRAM  6/5/2012     IR CAROTID ANGIOGRAM  6/5/2012     IR CAROTID ANGIOGRAM  6/5/2012     IR MISCELLANEOUS PROCEDURE  6/6/2010     IR MISCELLANEOUS PROCEDURE  6/6/2010     IR MISCELLANEOUS PROCEDURE  5/12/2011     IR MISCELLANEOUS PROCEDURE  6/5/2012     RECONSTRUCT FOREFOOT WITH METATARSOPHALANGEAL (MTP) FUSION Left 8/21/2014    Procedure: RECONSTRUCT FOREFOOT WITH METATARSOPHALANGEAL (MTP) FUSION;  Surgeon: Tres Merlos DPM;  Location:  OR     [facility] NONSPECIFIC PROCEDURE  10/30/06    neurosurgery Dr Soto     [facility] NONSPECIFIC PROCEDURE      bilateral total hips approx 2002     [facility] NONSPECIFIC PROCEDURE  3/07    neurosurgery        Social History   I have reviewed this patient's social history and updated it with pertinent information if needed.  Social History     Tobacco Use     Smoking status: Never Smoker     Smokeless tobacco: Never Used   Vaping Use     Vaping Use: Never used   Substance Use Topics     Alcohol use: Yes     Comment:  1-2 per day     Drug use: No       Family History   I have reviewed this patient's family history and updated it " with pertinent information if needed.  Family History   Problem Relation Age of Onset     Obesity Sister         gastric by-pass age age 60, heart murmur.     Cancer - colorectal Sister 69     Heart Disease Father         mi,  age 48     Family History Negative Mother         killed in MVA age 54     Cancer Maternal Aunt         lung cancer ,non-smoker     Lung Cancer Maternal Aunt      Breast Cancer Daughter 48     Ovarian Cancer No family hx of        Prior to Admission Medications   Prior to Admission Medications   Prescriptions Last Dose Informant Patient Reported? Taking?   Multiple Vitamins-Minerals (MULTIVITAMIN & MINERAL PO)   Yes No   Sig: Take 1 tablet by mouth daily.   amLODIPine (NORVASC) 2.5 MG tablet   No No   Sig: Take 1 tablet (2.5 mg) by mouth daily   aspirin 81 MG tablet   No No   Sig: Take 1 tablet (81 mg) by mouth daily   atenolol (TENORMIN) 25 MG tablet   No No   Sig: TAKE 1 TABLET BY MOUTH EVERY DAY   cephALEXin (KEFLEX) 500 MG capsule   No No   Sig: Take 1 capsule (500 mg) by mouth 2 times daily for 7 days   clopidogrel (PLAVIX) 75 MG tablet   No No   Sig: Take 1 tablet (75 mg) by mouth daily   cyanocobalamin (VITAMIN B-12) 500 MCG tablet   Yes No   Sig: Take 500 mcg by mouth daily   losartan (COZAAR) 50 MG tablet   No No   Sig: TAKE 1 TABLET BY MOUTH TWICE A DAY   magnesium 500 MG TABS   Yes No   simvastatin (ZOCOR) 20 MG tablet   No No   Sig: TAKE 1 TABLET BY MOUTH AT BEDTIME      Facility-Administered Medications Last Administration Doses Remaining   lidocaine 1 % injection 2 mL 2022  9:42 AM    lidocaine 1 % injection 5 mL 2022  9:42 AM    triamcinolone (KENALOG-40) injection 40 mg 2022  9:42 AM         Allergies   Allergies   Allergen Reactions     Lisinopril      Persistent cough       Physical Exam   Vital Signs: Temp: 98.1  F (36.7  C) Temp src: Oral BP: 136/72 Pulse: 67   Resp: 18 SpO2: 97 % O2 Device: None (Room air)    Weight: 0 lbs 0 oz     GENERAL: sleepy,  NAD  HEENT: Normocephalic, atraumatic. Extraocular movements intact.  CARDIOVASCULAR: Regular rate and rhythm without murmurs or rubs. No S3.  PULMONARY: Clear to auscultation bilaterally.  ABDOMINAL: Soft, non-tender, non-distended. Bowel sounds normoactive.   EXTREMITIES: No cyanosis or clubbing. No appreciable edema.  NEUROLOGICAL: CN 2-12 grossly intact, no focal neurological deficits.  DERMATOLOGICAL: No rash, ulcer, bruising, nor jaundice.     Data   Data reviewed today: I reviewed all medications, new labs and imaging results over the last 24 hours. I personally reviewed the brain MRI image(s) showing as above.    Recent Labs   Lab 10/05/22  0200 10/04/22  1950 10/04/22  1432 10/01/22  0405   WBC  --  8.6 9.3 10.6   HGB  --  10.6* 11.2* 10.7*   MCV  --  94 94 93   PLT  --  372 366 324   NA  --  131* 131* 129*   POTASSIUM  --  4.1 4.3 4.5   CHLORIDE  --  94* 95* 96*   CO2  --  26 27 24   BUN  --  24.2* 25.0* 26.4*   CR  --  0.82 0.83 0.99*   ANIONGAP  --  11 9 9   RAINE  --  9.1 9.2 9.0   * 102* 107* 141*   ALBUMIN  --   --  3.6  --    PROTTOTAL  --   --  6.5  --    BILITOTAL  --   --  0.4  --    ALKPHOS  --   --  115*  --    ALT  --   --  20  --    AST  --   --  28  --      Recent Results (from the past 24 hour(s))   CT Head w/o contrast*    Narrative    CT OF THE HEAD WITHOUT CONTRAST 10/4/2022 3:19 PM     COMPARISON: Brain MR 6/5/2010    HISTORY: Fall with injury to the head. Recurrent falls.    TECHNIQUE: 5 mm thick axial CT images of the head were acquired  without IV contrast material.    FINDINGS:  There is moderate diffuse cerebral volume loss. There are  subtle patchy areas of decreased density in the cerebral white matter  bilaterally that are consistent with sequela of chronic small vessel  ischemic disease. Small areas of chronic encephalomalacia in the right  occipital lobe and at the temporo-occipital junction on the left are  again noted consistent with chronic ischemic infarcts. There is a  new  area of hypodensity at the low anterolateral right frontal lobe that  could represent a subacute ischemic infarct. Clinical correlation  recommended.    The ventricles and basal cisterns are within normal limits in  configuration given the degree of cerebral volume loss.  There is no  midline shift. There are no extra-axial fluid collections.     No intracranial hemorrhage or mass.    The visualized paranasal sinuses are well-aerated. There is no  mastoiditis. There are no fractures of the visualized bones. Bilateral  parietal craniotomies again noted.      Impression    IMPRESSION:   1. Questionable subacute ischemic infarct at the anterolateral aspect  of the right frontal lobe. Clinical correlation recommended. Brain MRI  may be helpful for better evaluation.  2. Chronic ischemic infarcts at the temporo-occipital junction on the  left and within the right occipital lobe again noted consistent with  chronic ischemic infarcts.  3. Postoperative changes again noted.  4. Diffuse cerebral volume loss and cerebral white matter changes  consistent with chronic small vessel ischemic disease.      Radiation dose for this scan was reduced using automated exposure  control, adjustment of the mA and/or kV according to patient size, or  iterative reconstruction technique.    HARRISON FIELD MD         SYSTEM ID:  [id number]   XR Chest 2 Views    Narrative    CHEST TWO VIEWS  10/4/2022 3:48 PM     HISTORY: 82-year-old woman with recurrent falls.       Impression    IMPRESSION: Since July 6, 2010, heart size is normal. No pleural  effusion, pneumothorax, or abnormal area of consolidation. Granulomas  overlie both lungs. Significant scoliotic curvature of the  thoracolumbar spine is noted, only partially visible, though apex  right at the thoracolumbar junction. Degree of scoliotic curvature is  greater than previous exam.    BRIAN COLVIN MD         SYSTEM ID:  [id number]   XR Shoulder Left 2 Views    Narrative    LEFT  SHOULDER TWO VIEWS 10/4/2022 3:49 PM    HISTORY: Recurrent falls, shoulder pain.    COMPARISON: None.      Impression    IMPRESSION: No fracture or dislocation. Advanced degenerative changes  at the glenohumeral joint. Small benign calcified granuloma in the  left midlung.    BURTON BROWN MD         SYSTEM ID:  [id number]   MRA Brain ([place] [facility] [place]) wo Contrast   Result Value    Radiologist flags (AA)     Infarct in the right frontal lobe abnormality of the right carotid artery.    Narrative    EXAM: MRA BRAIN ([place] [facility] [place]) W/O CONTRAST, MRA NECK (CAROTIDS) W/O and W CONTRAST, MR BRAIN W/O and W CONTRAST  LOCATION: [facility]  DATE/TIME: 10/4/2022 9:22 PM    INDICATION: Fall, hit head, abnormal head CT. Additional history includes moyamoya disease.  COMPARISON: 06/05/2010  CONTRAST: 10mL Gadavist  TECHNIQUE:   1) Routine multiplanar multisequence head MRI without and with intravenous contrast.  2) 3D time-of-flight head MRA without intravenous contrast.  3) Neck MRA without and with IV contrast. Stenosis measurements made according to NASCET criteria unless otherwise specified.    FINDINGS:  HEAD MRI:  INTRACRANIAL CONTENTS: There is focal area of restricted diffusion representing acute infarct corresponds with the abnormality on prior head CT. Largest area measures approximately 3.3 x 2 cm at the right frontal cortex and subcortical white matter   inferiorly. There is also scattered patchy areas of acute infarct involving the right caudate head and right frontal corona radiata. Chronic infarct of the right occipital cortex. There is chronic infarct of the left temporoparietal cortex and   subcortical white matter. There are small chronic lacunar infarcts of the bilateral cerebellar hemispheres. No mass, acute hemorrhage, or extra-axial fluid collections. Patchy and confluent nonspecific T2/FLAIR hyperintensities within the cerebral white   matter most consistent with moderate  chronic microvascular ischemic change. Mild generalized cerebral atrophy. No hydrocephalus. Normal position of the cerebellar tonsils. No pathologic contrast enhancement.    SELLA: No abnormality accounting for technique.    OSSEOUS STRUCTURES/SOFT TISSUES: Normal marrow signal. Abnormal signal involving the right internal carotid artery.     ORBITS: No abnormality accounting for technique.     SINUSES/MASTOIDS: No paranasal sinus mucosal disease. No middle ear or mastoid effusion.     HEAD MRA:   ANTERIOR CIRCULATION: There is occlusion of the right petrous and cavernous internal carotid artery. There is occlusion of the right middle cerebral artery origin and trifurcation with some minimal reconstitution of distal sylvian branches. The left   petrous and cavernous internal carotid artery is patent. There is focal moderate stenosis at the junction of the left petrous and cavernous ICA as well as of the left supraclinoid ICA. There is focal severe stenosis of the M1 left middle cerebral artery.   There is at least moderate stenosis of the left MCA trifurcation. The distal branches of the left middle cerebral artery appear patent. The anterior cerebral arteries appear widely patent.    POSTERIOR CIRCULATION: Moderately severe stenosis of the right posterior cerebral artery origin and P1 P2 segment junction. Dominant left and smaller right vertebral artery contribute to a normal basilar artery.     NECK MRA:   RIGHT CAROTID: There is absence of flow related enhancement on noncontrast images throughout the right cervical carotid artery. No measurable stenosis or dissection on postcontrast image. There is diminished enhancement beginning at the petrous segment.    LEFT CAROTID: No measurable stenosis or dissection.    VERTEBRAL ARTERIES: There is focal moderately severe stenosis of the V4 segment right vertebral artery. Vertebral arteries are otherwise patent. Dominant left and smaller right vertebral  arteries.    AORTIC ARCH: Classic aortic arch anatomy with no significant stenosis at the origin of the great vessels.      Impression    IMPRESSION:  HEAD MRI:   1.  Acute infarct in the right frontal lobe.  2.  Chronic small vessel ischemic disease with numerous chronic infarcts.    HEAD MRA:   1.  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  2.  Occlusion of the right middle cerebral artery. 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  3.  Additional multifocal moderate and severe intracranial stenoses as described above.    NECK MRA:  1.  Normal appearance of the right cervical internal carotid artery with absence of flow related enhancement on noncontrast images but presence of contrast enhancement. Findings are suggestive of severe stenosis with retrograde flow via collaterals.  2.  Focal moderately severe stenosis of the distal right vertebral artery.      [Critical Result: Infarct in the right frontal lobe abnormality of the right carotid artery.]    Finding was identified on 10/4/2022 9:25 PM.     1.  Dr. Peñaloza was contacted by me on 10/4/2022 9:46 PM and verbalized understanding of the critical result.    MRA Neck (Carotids) wo & w Contrast   Result Value    Radiologist flags (AA)     Infarct in the right frontal lobe abnormality of the right carotid artery.    Narrative    EXAM: MRA BRAIN ([place] [facility] [place]) W/O CONTRAST, MRA NECK (CAROTIDS) W/O and W CONTRAST, MR BRAIN W/O and W CONTRAST  LOCATION: [facility]  DATE/TIME: 10/4/2022 9:22 PM    INDICATION: Fall, hit head, abnormal head CT. Additional history includes moyamoya disease.  COMPARISON: 06/05/2010  CONTRAST: 10mL Gadavist  TECHNIQUE:   1) Routine multiplanar multisequence head MRI without and with intravenous contrast.  2) 3D time-of-flight head MRA without intravenous contrast.  3) Neck MRA without and with IV contrast. Stenosis measurements made according to NASCET criteria  unless otherwise specified.    FINDINGS:  HEAD MRI:  INTRACRANIAL CONTENTS: There is focal area of restricted diffusion representing acute infarct corresponds with the abnormality on prior head CT. Largest area measures approximately 3.3 x 2 cm at the right frontal cortex and subcortical white matter   inferiorly. There is also scattered patchy areas of acute infarct involving the right caudate head and right frontal corona radiata. Chronic infarct of the right occipital cortex. There is chronic infarct of the left temporoparietal cortex and   subcortical white matter. There are small chronic lacunar infarcts of the bilateral cerebellar hemispheres. No mass, acute hemorrhage, or extra-axial fluid collections. Patchy and confluent nonspecific T2/FLAIR hyperintensities within the cerebral white   matter most consistent with moderate chronic microvascular ischemic change. Mild generalized cerebral atrophy. No hydrocephalus. Normal position of the cerebellar tonsils. No pathologic contrast enhancement.    SELLA: No abnormality accounting for technique.    OSSEOUS STRUCTURES/SOFT TISSUES: Normal marrow signal. Abnormal signal involving the right internal carotid artery.     ORBITS: No abnormality accounting for technique.     SINUSES/MASTOIDS: No paranasal sinus mucosal disease. No middle ear or mastoid effusion.     HEAD MRA:   ANTERIOR CIRCULATION: There is occlusion of the right petrous and cavernous internal carotid artery. There is occlusion of the right middle cerebral artery origin and trifurcation with some minimal reconstitution of distal sylvian branches. The left   petrous and cavernous internal carotid artery is patent. There is focal moderate stenosis at the junction of the left petrous and cavernous ICA as well as of the left supraclinoid ICA. There is focal severe stenosis of the M1 left middle cerebral artery.   There is at least moderate stenosis of the left MCA trifurcation. The distal branches of the  left middle cerebral artery appear patent. The anterior cerebral arteries appear widely patent.    POSTERIOR CIRCULATION: Moderately severe stenosis of the right posterior cerebral artery origin and P1 P2 segment junction. Dominant left and smaller right vertebral artery contribute to a normal basilar artery.     NECK MRA:   RIGHT CAROTID: There is absence of flow related enhancement on noncontrast images throughout the right cervical carotid artery. No measurable stenosis or dissection on postcontrast image. There is diminished enhancement beginning at the petrous segment.    LEFT CAROTID: No measurable stenosis or dissection.    VERTEBRAL ARTERIES: There is focal moderately severe stenosis of the V4 segment right vertebral artery. Vertebral arteries are otherwise patent. Dominant left and smaller right vertebral arteries.    AORTIC ARCH: Classic aortic arch anatomy with no significant stenosis at the origin of the great vessels.      Impression    IMPRESSION:  HEAD MRI:   1.  Acute infarct in the right frontal lobe.  2.  Chronic small vessel ischemic disease with numerous chronic infarcts.    HEAD MRA:   1.  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  2.  Occlusion of the right middle cerebral artery. 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  3.  Additional multifocal moderate and severe intracranial stenoses as described above.    NECK MRA:  1.  Normal appearance of the right cervical internal carotid artery with absence of flow related enhancement on noncontrast images but presence of contrast enhancement. Findings are suggestive of severe stenosis with retrograde flow via collaterals.  2.  Focal moderately severe stenosis of the distal right vertebral artery.      [Critical Result: Infarct in the right frontal lobe abnormality of the right carotid artery.]    Finding was identified on 10/4/2022 9:25 PM.     1.  Dr. Peñaloza was contacted by me on 10/4/2022 9:46 PM  and verbalized understanding of the critical result.    MR Brain w/o & w Contrast   Result Value    Radiologist flags (RONN)     Infarct in the right frontal lobe abnormality of the right carotid artery.    Narrative    EXAM: MRA BRAIN ([place] [facility] RESTREPO) W/O CONTRAST, MRA NECK (CAROTIDS) W/O and W CONTRAST, MR BRAIN W/O and W CONTRAST  LOCATION: [facility]  DATE/TIME: 10/4/2022 9:22 PM    INDICATION: Fall, hit head, abnormal head CT. Additional history includes moyamoya disease.  COMPARISON: 06/05/2010  CONTRAST: 10mL Gadavist  TECHNIQUE:   1) Routine multiplanar multisequence head MRI without and with intravenous contrast.  2) 3D time-of-flight head MRA without intravenous contrast.  3) Neck MRA without and with IV contrast. Stenosis measurements made according to NASCET criteria unless otherwise specified.    FINDINGS:  HEAD MRI:  INTRACRANIAL CONTENTS: There is focal area of restricted diffusion representing acute infarct corresponds with the abnormality on prior head CT. Largest area measures approximately 3.3 x 2 cm at the right frontal cortex and subcortical white matter   inferiorly. There is also scattered patchy areas of acute infarct involving the right caudate head and right frontal corona radiata. Chronic infarct of the right occipital cortex. There is chronic infarct of the left temporoparietal cortex and   subcortical white matter. There are small chronic lacunar infarcts of the bilateral cerebellar hemispheres. No mass, acute hemorrhage, or extra-axial fluid collections. Patchy and confluent nonspecific T2/FLAIR hyperintensities within the cerebral white   matter most consistent with moderate chronic microvascular ischemic change. Mild generalized cerebral atrophy. No hydrocephalus. Normal position of the cerebellar tonsils. No pathologic contrast enhancement.    SELLA: No abnormality accounting for technique.    OSSEOUS STRUCTURES/SOFT TISSUES: Normal marrow signal. Abnormal  signal involving the right internal carotid artery.     ORBITS: No abnormality accounting for technique.     SINUSES/MASTOIDS: No paranasal sinus mucosal disease. No middle ear or mastoid effusion.     HEAD MRA:   ANTERIOR CIRCULATION: There is occlusion of the right petrous and cavernous internal carotid artery. There is occlusion of the right middle cerebral artery origin and trifurcation with some minimal reconstitution of distal sylvian branches. The left   petrous and cavernous internal carotid artery is patent. There is focal moderate stenosis at the junction of the left petrous and cavernous ICA as well as of the left supraclinoid ICA. There is focal severe stenosis of the M1 left middle cerebral artery.   There is at least moderate stenosis of the left MCA trifurcation. The distal branches of the left middle cerebral artery appear patent. The anterior cerebral arteries appear widely patent.    POSTERIOR CIRCULATION: Moderately severe stenosis of the right posterior cerebral artery origin and P1 P2 segment junction. Dominant left and smaller right vertebral artery contribute to a normal basilar artery.     NECK MRA:   RIGHT CAROTID: There is absence of flow related enhancement on noncontrast images throughout the right cervical carotid artery. No measurable stenosis or dissection on postcontrast image. There is diminished enhancement beginning at the petrous segment.    LEFT CAROTID: No measurable stenosis or dissection.    VERTEBRAL ARTERIES: There is focal moderately severe stenosis of the V4 segment right vertebral artery. Vertebral arteries are otherwise patent. Dominant left and smaller right vertebral arteries.    AORTIC ARCH: Classic aortic arch anatomy with no significant stenosis at the origin of the great vessels.      Impression    IMPRESSION:  HEAD MRI:   1.  Acute infarct in the right frontal lobe.  2.  Chronic small vessel ischemic disease with numerous chronic infarcts.    HEAD MRA:   1.   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  2.  Occlusion of the right middle cerebral artery. 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  3.  Additional multifocal moderate and severe intracranial stenoses as described above.    NECK MRA:  1.  Normal appearance of the right cervical internal carotid artery with absence of flow related enhancement on noncontrast images but presence of contrast enhancement. Findings are suggestive of severe stenosis with retrograde flow via collaterals.  2.  Focal moderately severe stenosis of the distal right vertebral artery.      [Critical Result: Infarct in the right frontal lobe abnormality of the right carotid artery.]    Finding was identified on 10/4/2022 9:25 PM.     1.  Dr. Peñaloza was contacted by me on 10/4/2022 9:46 PM and verbalized understanding of the critical result.    CT Head Perfusion w Contrast    Narrative    EXAM: CT HEAD PERFUSION W CONTRAST  LOCATION: [facility]  DATE/TIME: 10/5/2022 12:38 AM    INDICATION: further eval of abnormal MRA and acute CVA subacute infarct right frontal lobe.  COMPARISON: 10/04/2022.  TECHNIQUE: CT cerebral perfusion was performed utilizing a second contrast bolus. Perfusion data were post processed with generation of standard perfusion maps and estimation of ischemic/infarcted volumes utilizing standard threshold values. Dose   reduction techniques were used. All stenosis measurements made according to NASCET criteria unless otherwise specified.  CONTRAST: 50 mL Isovue 370  COMPARISON: None.    FINDINGS:   CT PERFUSION:  PERFUSION MAPS: There is delayed mean transit time and transit max in the right frontal lobe to the medial right parietal lobe. There is a perfusion deficit fairly symmetric involving the relative cerebral blood volume and relative cerebral blood flow in   the anterior right frontal lobe consistent within known subacute infarct of this  region.    RAPID ANALYSIS:  CBF<30%: 0 mL  Tmax>6sec: 144 ml.  Mismatch volume: 144 mL.  Mismatch ratio: Infinite.      Impression    IMPRESSION:     CT PERFUSION:  1.  Delay in transit time to anterior right frontal lobe and medial right parietal lobe with fairly symmetric deficit in relative cerebral blood flow and blood volume in this region consistent within known subacute infarct.

## 2022-10-05 NOTE — PLAN OF CARE
PRIMARY DIAGNOSIS: CVA/Fall  OUTPATIENT/OBSERVATION GOALS TO BE MET BEFORE DISCHARGE:  ADLs back to baseline: No    Activity and level of assistance: Up with maximum assistance. Consider SW and/or PT evaluation.     Pain status: Pain free.    Return to near baseline physical activity: No     Discharge Planner Nurse   Safe discharge environment identified:  pending   Barriers to discharge:  pending        Entered by: Yuan Sierra RN 10/05/2022 3:09 AM     Please review provider order for any additional goals.   Nurse to notify provider when observation goals have been met and patient is ready for discharge.Goal Outcome Evaluation:      Patient alert and oriented , slightly hypertensive, complained of pain in R groin , when repositioned stated pain improved, no acute needs at this time will cont to monitor

## 2022-10-05 NOTE — ED NOTES
[facility]  ED Nurse Handoff Report    Candida Rose is a 82 year old female   ED Chief complaint: Fall and Abnormal Cxr/ct  . ED Diagnosis:   Final diagnoses:   Cerebrovascular accident (CVA), unspecified mechanism (H)     Allergies:   Allergies   Allergen Reactions     Lisinopril      Persistent cough       Code Status: DNR / DNI  Activity level - Baseline/Home:  Independent. Activity Level - Current:   Assist X 2. Lift room needed: No. Bariatric: No   Needed: No   Isolation: No. Infection: Not Applicable.     Vital Signs:   Vitals:    10/04/22 2120 10/04/22 2130 10/04/22 2245 10/05/22 0100   BP:  (!) 143/57 (!) 153/62 (!) 141/71   Pulse:  67 69    Resp:       Temp:       TempSrc:       SpO2: 100% 100% 100% 100%   Weight:       Height:           Cardiac Rhythm:  ,      Pain level:    Patient confused: No. Patient Falls Risk: Yes.   Elimination Status: Has voided   Patient Report - Initial Complaint: PT HAS BEEN FALLING for 2 weeks, came in to find out why. Focused Assessment: left arm numbness   Tests Performed: ct, mri. Abnormal Results:ct.  Treatments provided: see chart  Family Comments:  here  OBS brochure/video discussed/provided to patient:  Yes  ED Medications:   Medications   gadobutrol (GADAVIST) injection 10 mL (10 mLs Intravenous Given 10/4/22 2015)   CT saline flush (92 mLs Intravenous Given 10/5/22 0028)   iopamidol (ISOVUE-370) solution 500 mL (50 mLs Intravenous Given 10/5/22 0028)     Drips infusing:  No  For the majority of the shift, the patient's behavior Green. Interventions performed were none.    Sepsis treatment initiated: No     Patient tested for COVID 19 prior to admission: YES    ED Nurse Name/Phone Number: Karmen Monzon RN,   1:02 AM

## 2022-10-05 NOTE — PHARMACY-ADMISSION MEDICATION HISTORY
Okay to schedule colonoscopy  Diagnosis: Screening colonoscopy  Prep: Plenvu/Suprep---okay to use GoLYTELY if patient prefers a cheaper option   Pharmacy Medication History  Admission medication history interview status for the 10/5/2022  admission is complete. See EPIC admission navigator for prior to admission medications     Location of Interview: Patient room  Medication history sources: Patient, Patient's family/friend () and Surescripts    Significant changes made to the medication list:  none    In the past week, patient estimated taking medication this percent of the time: greater than 90%    Additional medication history information:   Started Cephalexin on 10-2-22      Medication reconciliation completed by provider prior to medication history? Yes    Time spent in this activity: 15 min    Prior to Admission medications    Medication Sig Last Dose Taking? Auth Provider Long Term End Date   amLODIPine (NORVASC) 2.5 MG tablet Take 1 tablet (2.5 mg) by mouth daily Unknown at Unknown time Yes Chani Peoples MD Yes    aspirin 81 MG tablet Take 1 tablet (81 mg) by mouth daily Unknown at Unknown time Yes Chani Peoples MD     atenolol (TENORMIN) 25 MG tablet TAKE 1 TABLET BY MOUTH EVERY DAY Unknown at Unknown time Yes Chani Peoples MD Yes    cephALEXin (KEFLEX) 500 MG capsule Take 1 capsule (500 mg) by mouth 2 times daily for 7 days Unknown at Unknown time Yes Martin Purvis MD  10/8/22   clopidogrel (PLAVIX) 75 MG tablet Take 1 tablet (75 mg) by mouth daily Unknown at Unknown time Yes Chani Peoples MD Yes    cyanocobalamin (VITAMIN B-12) 500 MCG tablet Take 500 mcg by mouth daily Unknown at Unknown time Yes Reported, Patient     losartan (COZAAR) 50 MG tablet TAKE 1 TABLET BY MOUTH TWICE A DAY Unknown at Unknown time Yes Chani Peoples MD Yes    magnesium 500 MG TABS Take 500 mg by mouth daily Unknown at Unknown time Yes Chani Peoples MD     Multiple Vitamins-Minerals (MULTIVITAMIN & MINERAL PO) Take 1 tablet by mouth daily. Unknown at  Unknown time Yes Reported, Patient     simvastatin (ZOCOR) 20 MG tablet TAKE 1 TABLET BY MOUTH AT BEDTIME Unknown at Unknown time Yes Chani Peoples MD Yes        The information provided in this note is only as accurate as the sources available at the time of update(s)

## 2022-10-05 NOTE — PLAN OF CARE
Pt here with R frontal lobe stroke A&O X 4 with forgetfulness. Pt having generalized weakness, having difficulty following commands. LUE neglect. Neurology updated. Head CT was ordered and completed. VSS. Regular diet, thin liquids. NPO at midnight for cerebral angiogram 10/6/22. Takes pills whole. NS continuous IV fluid 75ml/hr. Up with assist of one with walker and gait belt. Denies pain. Pt scoring green on the Aggression Stop Light Tool.

## 2022-10-05 NOTE — PROGRESS NOTES
"      [facility]    Stroke Progress Note    Interval EventsDiscussed with Neuro IR. Will plan for DSA tomorrow. NPO at midnight.    Miya Strange PA-C  Vascular Neurology    To page me or covering stroke neurology team member, click here: AMCOM   Choose \"On Call\" tab at top, then search dropdown box for \"Neurology Adult\", select location, press Enter, then look for stroke/neuro ICU/telestroke.      "

## 2022-10-05 NOTE — PLAN OF CARE
Goal Outcome Evaluation:      Patient discharged to [facility] via EMS with stretcher transport, Spouse called and notified of location change time of discharge approx. [phone]

## 2022-10-05 NOTE — ED PROVIDER NOTES
History   Chief Complaint:  Fall and Abnormal Cxr/ct       HPI   Candida Rose is a 82 year old female with history of stroke who presents with fall. The patient reports that for the past 3 days she has had 2 falls from her legs shaking. She mentions that she is unable to get up after falling without the help from her . Patient says that she did hit her head during one of the falls but no loss of consciousness. The patient denies headache, vision changes, numbness, abdominal pain, fever, chills and runny nose. Patient has a history of two strokes. She mentions recently going in for her annual check up, where her labs were shown to be abnormal and was sent to see a hematologist. Patient was here at ED 2 days ago for weakness and fall, no imaging done and treated for UTI.     Review of Systems   Constitutional: Negative for chills and fever.   HENT: Negative for rhinorrhea.    Eyes: Negative for visual disturbance.   Gastrointestinal: Negative for abdominal pain.   Neurological: Negative for numbness and headaches.   All other systems reviewed and are negative.    Allergies:  Lisinopril    Medications:  Norvasc  Tenormin  Keflex  Plavix  Cozaar  Zocor    Past Medical History:     Moyamoya disease  Hyperlipidemia  Osteopenia  CKD  Arthropathy  Depression    Past Surgical History:    Arthroplasty knee  Colonoscopy  Craniotomy  Carotid angiogram  Forefoot reconstruct  Neurosurgery  Bilateral total hips    Family History:    Sister: Colorectal cancer  Father: Heart disease  Daughter: Breast cancer    Social History:  Patient accompanied by .  PCP: Chani Peoples     Physical Exam     Patient Vitals for the past 24 hrs:   BP Temp Temp src Pulse Resp SpO2 Height Weight   10/04/22 2245 (!) 153/62 -- -- 69 -- 100 % -- --   10/04/22 2130 (!) 143/57 -- -- 67 -- 100 % -- --   10/04/22 2120 -- -- -- -- -- 100 % -- --   10/04/22 2119 133/63 -- -- 69 -- -- -- --   10/04/22 1839 (!) 148/60 98  F (36.7  " C) Oral 67 18 100 % 1.727 m (5' 8\") 76.7 kg (169 lb)       Physical Exam  Vital signs reviewed.  Nursing note reviewed.  Constitutional: Well-developed, Well-nourished.  Non-diaphoretic  HENT: No evidence of head trauma.   EYES:  PERRL.  EOMI.  NECK:  No Cspine tenderness.  Trachea midline.  Full ROM.  Supple. No stridor.  CARDIAC:  RRR. 2+ bilat radial pulses   PULM: Effort  Normal.  Breath sounds clear and equal bilat  ABD:  Soft, NT/ND  No guarding, no rebound  : No CVA T.    BACK:  No T or L spinous process tenderness.  EXT:  Full ROM X4.  No tenderness, edema, crepitus or obvious deformity  NEURO:  Alert, Oriented X3.  1+ LUE drift, no drift to RU/LE, LLE; Sensation intact to LT. finger-nose normal.   SKIN :  Warm.  Dry.   No erythema.  No rash  PSYCH.:  Normal judgment.  Normal affect.      Emergency Department Course   ECG  ECG results from 10/01/22   EKG 12-lead, tracing only     Value    Systolic Blood Pressure     Diastolic Blood Pressure     Ventricular Rate 58    Atrial Rate 58    IN Interval 204    QRS Duration 86        QTc 463    P Axis 72    R AXIS 48    T Axis 44    Interpretation ECG      Sinus bradycardia with Premature atrial complexes  Septal infarct , age undetermined  Abnormal ECG  When compared with ECG of 06-JUN-2010 07:58,  Premature atrial complexes are now Present  Septal infarct is now Present  T wave inversion no longer evident in Inferior leads  Confirmed by - EMERGENCY ROOM, PHYSICIAN ([id number]),  BHAKTI BERNARDO (Misha) on 10/3/2022 6:43:41 AM         Imaging:  MR Brain w/o & w Contrast   Final Result   Abnormal   IMPRESSION:   HEAD MRI:    1.  Acute infarct in the right frontal lobe.   2.  Chronic small vessel ischemic disease with numerous chronic infarcts.      HEAD MRA:    1.  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   2.  Occlusion of the right middle cerebral artery. 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   3.  " Additional multifocal moderate and severe intracranial stenoses as described above.      NECK MRA:   1.  Normal appearance of the right cervical internal carotid artery with absence of flow related enhancement on noncontrast images but presence of contrast enhancement. Findings are suggestive of severe stenosis with retrograde flow via collaterals.   2.  Focal moderately severe stenosis of the distal right vertebral artery.         [Critical Result: Infarct in the right frontal lobe abnormality of the right carotid artery.]      Finding was identified on 10/4/2022 9:25 PM.       1.  Dr. Peñaloza was contacted by me on 10/4/2022 9:46 PM and verbalized understanding of the critical result.       MRA Neck (Carotids) wo & w Contrast   Final Result   Abnormal   IMPRESSION:   HEAD MRI:    1.  Acute infarct in the right frontal lobe.   2.  Chronic small vessel ischemic disease with numerous chronic infarcts.      HEAD MRA:    1.  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   2.  Occlusion of the right middle cerebral artery. 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   3.  Additional multifocal moderate and severe intracranial stenoses as described above.      NECK MRA:   1.  Normal appearance of the right cervical internal carotid artery with absence of flow related enhancement on noncontrast images but presence of contrast enhancement. Findings are suggestive of severe stenosis with retrograde flow via collaterals.   2.  Focal moderately severe stenosis of the distal right vertebral artery.         [Critical Result: Infarct in the right frontal lobe abnormality of the right carotid artery.]      Finding was identified on 10/4/2022 9:25 PM.       1.  Dr. Peñaloza was contacted by me on 10/4/2022 9:46 PM and verbalized understanding of the critical result.       MRA Brain ([place]) wo Contrast   Final Result   Abnormal   IMPRESSION:   HEAD MRI:    1.  Acute infarct in the right  frontal lobe.   2.  Chronic small vessel ischemic disease with numerous chronic infarcts.      HEAD MRA:    1.  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   2.  Occlusion of the right middle cerebral artery. 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   3.  Additional multifocal moderate and severe intracranial stenoses as described above.      NECK MRA:   1.  Normal appearance of the right cervical internal carotid artery with absence of flow related enhancement on noncontrast images but presence of contrast enhancement. Findings are suggestive of severe stenosis with retrograde flow via collaterals.   2.  Focal moderately severe stenosis of the distal right vertebral artery.         [Critical Result: Infarct in the right frontal lobe abnormality of the right carotid artery.]      Finding was identified on 10/4/2022 9:25 PM.       1.  Dr. Peñaloza was contacted by me on 10/4/2022 9:46 PM and verbalized understanding of the critical result.       CT Head Perfusion w Contrast    (Results Pending)     Report per radiology    Laboratory:  Labs Ordered and Resulted from Time of ED Arrival to Time of ED Departure   BASIC METABOLIC PANEL - Abnormal       Result Value    Sodium 131 (*)     Potassium 4.1      Chloride 94 (*)     Carbon Dioxide (CO2) 26      Anion Gap 11      Urea Nitrogen 24.2 (*)     Creatinine 0.82      Calcium 9.1      Glucose 102 (*)     GFR Estimate 72     CBC WITH PLATELETS AND DIFFERENTIAL - Abnormal    WBC Count 8.6      RBC Count 3.55 (*)     Hemoglobin 10.6 (*)     Hematocrit 33.4 (*)     MCV 94      MCH 29.9      MCHC 31.7      RDW 12.0      Platelet Count 372      % Neutrophils 61      % Lymphocytes 28      % Monocytes 9      % Eosinophils 1      % Basophils 1      % Immature Granulocytes 0      NRBCs per 100 WBC 0      Absolute Neutrophils 5.3      Absolute Lymphocytes 2.4      Absolute Monocytes 0.8      Absolute Eosinophils 0.1      Absolute Basophils  0.0      Absolute Immature Granulocytes 0.0      Absolute NRBCs 0.0     COVID-19 VIRUS (CORONAVIRUS) BY PCR - Normal    SARS CoV2 PCR Negative     ROUTINE UA WITH MICROSCOPIC REFLEX TO CULTURE        Procedures      Emergency Department Course:     Reviewed:  I reviewed nursing notes, vitals, past medical history and Care Everywhere    Assessments:   I obtained history and examined the patient as noted above.   013 I rechecked the patient and explained findings.     Consults:   I spoke with Dr. Valverde, stroke neuro, regarding the patient.  115  I spoke with Dr. Valverde, stroke neuro, regarding the patient.        Disposition:  The patient was admitted to the hospital under the care of Dr. Higuera.     Impression & Plan     Medical Decision Makin year old female presenting w/ left lower extremity weakness, outpatient CT with findings concerning for recent infarct     DDx includes acute CVA, subacute CVA, chronic CVA.  Doubt meningitis, encephalitis, spinal cord compromise, focal seizure given history and physical exam.  Labs significant for mild hyponatremia, mild anemia.  Imaging sig for findings concerning for acute CVA as described above.  I talked to stroke neurology who recommended CT head with perfusion for further evaluation.  The patient is currently on outpatient Plavix and aspirin.  Stroke neurology recommended transfer to [facility] or the [facility], but if no bed availability, patient may be admitted to [facility] emergency room antiplatelet medications.  Unfortunately there are no beds available at [facility] or the [facility] therefore the patient was admitted to the hospital service at [facility].  Patient and  counseled on all results, disposition and diagnosis.  They are understanding and agreeable to plan. Patient admitted in stable condition.      Diagnosis:    ICD-10-CM    1. Cerebrovascular accident (CVA), unspecified mechanism (H)   I63.9        Discharge Medications:  New Prescriptions    No medications on file       Scribe Disclosure:  I, Flaquita Marcus, am serving as a scribe at 8:06 PM on 10/4/2022 to document services personally performed by Carlos Weber MD based on my observations and the provider's statements to me.            Carlos Weber MD  10/05/22 0137

## 2022-10-05 NOTE — CONSULTS
[facility]    Stroke Consult Note    Reason for Consult:  stroke    Chief Complaint: No chief complaint on file.       HPI  Candida Rose is a 82 year old female with PMH of moyamoya s/p bilateral STA-MCA bypasses in 2006, prior CVA, DM2, HTN, HLD. PTA on DAPT. Presented to [facility] ED 10/4 with multiple falls - states the R leg will become shaky and then she would fall without LOC. Telestroke exam with NIHSS 0, no focal weakness. Brain MRI showed acute R frontal stroke. MRA showed occlusion of R ICA petrous/cavernous segments and occlusion of R MCA, focal moderate L ICA stenosis intracranially, severe focal L M1 stenosis, mod severe stenosis R PCA, severe R V 4 stenosis. In reviewing prior MRAs from 6/2022, 6/2021 and 6/2019, these appear similar.    This morning patient was noted to be more lethargic, difficulty staying awake, has some RUE weakness with drift. She states she was very tired and just wanted to sleep. This did improve some after getting her back into bed laying flat and allowing her to rest. Follow up CT was stable.    Stroke Evaluation Summarized    MRI/Head CT MRI brain: acute R frontal infarct, chronic infarcts (R occipital, L temporoparietal, bilateral cerebellar) and small vessel disease   Intracranial Vasculature MRA head: occlusion of R ICA petrous/cavernous segments and occlusion of R MCA, focal moderate L ICA stenosis intracranially, severe focal L M1 stenosis, mod severe stenosis R PCA, severe R V 4 stenosis   Cervical Vasculature 1.  Normal appearance of the right cervical internal carotid artery with absence of flow related enhancement on noncontrast images but presence of contrast enhancement. Findings are suggestive of severe stenosis with retrograde flow via collaterals.  2.  Focal moderately severe stenosis of the distal right vertebral artery.     Echocardiogram pending   EKG/Telemetry Junctional rhythm   Other Testing EEG: pending     LDL  10/4/2022: 71  "mg/dL   A1C  10/4/2022: 5.4 %   Troponin 10/4/2022: 20 ng/L  10/5/2022: 11 ng/L       Impression  1. Acute ischemic stroke of R frontal due to other etiology (Moyamoya)  2. Fluctuating lethargy - may be related to decreased cerebral perfusion in the setting of known multifocal ICAD, dehydration. Will obtain EEG to evaluate for seizures.    Recommendations  - Neurochecks and Vital Signs every 4 hours   - Permissive HTN; goal SBP < 220 mmHg  - Continue DAPT with daily aspirin 81 mg + Plavix 75 mg for secondary stroke prevention  - Statin: atorvastatin 40 mg, LDL goal 40-70  - TTE   - Telemetry, EKG  - Bedside Glucose Monitoring  - Nutrition: per nursing, passed bedside swallow eval  - PT/OT/SLP  - Stroke Education  - Euthermia, Euglycemia  - 2 hour video EEG  - Maintenance isotonic IV fluids    Patient Follow-up    - final recommendation pending work-up    Thank you for this consult. We will continue to follow.     Miya Strange PA-C  Vascular Neurology    10/05/2022 2:19 PM  To page me or covering stroke neurology team member, click here: [facility]COM   Choose \"On Call\" tab at top, then search dropdown box for \"Neurology Adult\", select location, press Enter, then look for stroke/neuro ICU/telestroke.    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 F/u dr. Soto     OA (osteoarthritis)     s/p bilat total hips      Other chronic pain     Joint pain for many years     Unspecified cerebral artery occlusion with cerebral infarction     After Moyamoya surgery > 10 yrs ago.     Vitamin D deficiencies      Past Surgical History   Past Surgical History:   Procedure Laterality Date     ARTHROPLASTY KNEE Left 4/17/2017    Procedure: ARTHROPLASTY KNEE;  Left total knee arthroplasty;  Surgeon: Chidi Jenkins MD;  Location:  OR     COLONOSCOPY  2008    normal     COLONOSCOPY  7/17/2014    Procedure: COLONOSCOPY;  Surgeon: Raul Nolan DO;  Location:  GI     CRANIOTOMY      MOJAMOJA  \"Pt had repair of blood flow.\"     IR CAROTID ANGIOGRAM  10/30/2006     IR CAROTID ANGIOGRAM  6/6/2010     IR CAROTID ANGIOGRAM  6/6/2010     IR CAROTID ANGIOGRAM  6/6/2010     IR CAROTID ANGIOGRAM  5/12/2011     IR CAROTID ANGIOGRAM  5/12/2011     IR CAROTID ANGIOGRAM  5/12/2011     IR CAROTID ANGIOGRAM  6/5/2012     IR CAROTID ANGIOGRAM  6/5/2012     IR CAROTID ANGIOGRAM  6/5/2012     IR MISCELLANEOUS PROCEDURE  6/6/2010     IR MISCELLANEOUS PROCEDURE  6/6/2010     IR MISCELLANEOUS PROCEDURE  5/12/2011     IR MISCELLANEOUS PROCEDURE  6/5/2012     RECONSTRUCT FOREFOOT WITH METATARSOPHALANGEAL (MTP) FUSION Left 8/21/2014    Procedure: RECONSTRUCT FOREFOOT WITH METATARSOPHALANGEAL (MTP) FUSION;  Surgeon: Tres Merlos DPM;  Location:  OR     [facility] NONSPECIFIC PROCEDURE  10/30/06    neurosurgery Dr Soto     [facility] NONSPECIFIC PROCEDURE      bilateral total hips approx 2002     ZZ NONSPECIFIC PROCEDURE  3/07    neurosurgery      Medications   Home Meds  Prior to Admission medications    Medication Sig Start Date End Date Taking? Authorizing Provider   amLODIPine (NORVASC) 2.5 MG tablet Take 1 tablet (2.5 mg) by mouth daily 8/11/22  Yes Chani Peoples " MD Trina   aspirin 81 MG tablet Take 1 tablet (81 mg) by mouth daily 1/13/17  Yes Chani Peoples MD   atenolol (TENORMIN) 25 MG tablet TAKE 1 TABLET BY MOUTH EVERY DAY 8/2/22  Yes Chani Peoples MD   cephALEXin (KEFLEX) 500 MG capsule Take 1 capsule (500 mg) by mouth 2 times daily for 7 days 10/1/22 10/8/22 Yes Martin Purvis MD   clopidogrel (PLAVIX) 75 MG tablet Take 1 tablet (75 mg) by mouth daily 8/11/22  Yes Chani Peoples MD   cyanocobalamin (VITAMIN B-12) 500 MCG tablet Take 500 mcg by mouth daily   Yes Reported, Patient   losartan (COZAAR) 50 MG tablet TAKE 1 TABLET BY MOUTH TWICE A DAY 8/11/22  Yes Chani Peoples MD   magnesium 500 MG TABS Take 500 mg by mouth daily 2/11/19  Yes Chani Peoples MD   Multiple Vitamins-Minerals (MULTIVITAMIN & MINERAL PO) Take 1 tablet by mouth daily.   Yes Reported, Patient   simvastatin (ZOCOR) 20 MG tablet TAKE 1 TABLET BY MOUTH AT BEDTIME 8/11/22  Yes Chani Peoples MD       Scheduled Meds    [START ON 10/6/2022] aspirin  81 mg Oral Daily    Or     [START ON 10/6/2022] aspirin  81 mg Oral or Feeding Tube Daily     atorvastatin  40 mg Oral or Feeding Tube QPM     clopidogrel  75 mg Oral or Feeding Tube Daily     sodium chloride (PF)  3 mL Intracatheter Q8H       Infusion Meds    - MEDICATION INSTRUCTIONS -       - MEDICATION INSTRUCTIONS -       sodium chloride       sodium chloride 75 mL/hr at 10/05/22 1250       PRN Meds  acetaminophen, labetalol **OR** hydrALAZINE, lidocaine 4%, lidocaine (buffered or not buffered), - MEDICATION INSTRUCTIONS -, - MEDICATION INSTRUCTIONS -, melatonin, ondansetron **OR** ondansetron, sodium chloride (PF), sodium chloride    Allergies   Allergies   Allergen Reactions     Lisinopril      Persistent cough     Family History   Family History   Problem Relation Age of Onset     Obesity Sister         gastric by-pass age age 60, heart  murmur.     Cancer - colorectal Sister 69     Heart Disease Father         mi,  age 48     Family History Negative Mother         killed in MVA age 54     Cancer Maternal Aunt         lung cancer ,non-smoker     Lung Cancer Maternal Aunt      Breast Cancer Daughter 48     Ovarian Cancer No family hx of      Social History   Social History     Tobacco Use     Smoking status: Never Smoker     Smokeless tobacco: Never Used   Vaping Use     Vaping Use: Never used   Substance Use Topics     Alcohol use: Yes     Comment:  1-2 per day     Drug use: No       Review of Systems   The 10 point Review of Systems is negative other than noted in the HPI or here.        PHYSICAL EXAMINATION   Temp:  [97.4  F (36.3  C)-98.7  F (37.1  C)] 97.4  F (36.3  C)  Pulse:  [61-69] 61  Resp:  [16-18] 18  BP: ()/(57-73) 149/64  SpO2:  [96 %-100 %] 98 %    General Exam  General:  patient lying in bed without any acute distress    HEENT:  normocephalic/atraumatic  Skin:  intact     Neuro Exam  Mental Status:  lethargic, difficulty staying awake, when awake able to answer questions appropriately and follow commands, speech clear and fluent  Cranial Nerves:  visual fields intact, PERRL, EOMI with normal smooth pursuit, facial sensation intact and symmetric, facial movements symmetric, hearing not formally tested but intact to conversation, no dysarthria, tongue protrusion midline  Motor:  normal muscle tone and bulk, no abnormal movements, able to move all limbs spontaneously, LUE drift with mild weakness, BLE symmetric weakess can elevated off bed but cannot hold against gravity  Sensory:  light touch sensation intact and symmetric throughout upper and lower extremities, inconsistent responses when assessing for sensory neglect  Coordination:  no incoordination out of proportion to weakness  Station/Gait:  deferred    Dysphagia Screen  Passed screening, no dysarthria - Regular Diet with thin liquids  10/05/2022 1100    Stroke  Scales    NIHSS  1a. Level of Consciousness 1-->Not alert, but arousable by minor stimulation to obey, answer, or respond   1b. LOC Questions 0-->Answers both questions correctly   1c. LOC Commands 0-->Performs both tasks correctly   2.   Best Gaze 0-->Normal   3.   Visual 0-->No visual loss   4.   Facial Palsy 0-->Normal symmetrical movements   5a. Motor Arm, Left 1-->Drift, limb holds 90 (or 45) degrees, but drifts down before full 10 seconds, does not hit bed or other support   5b. Motor Arm, Right 0-->No drift, limb holds 90 (or 45) degrees for full 10 secs   6a. Motor Leg, Left 2-->Some effort against gravity, leg falls to bed by 5 secs, but has some effort against gravity   6b. Motor Leg, right 2-->Some effort against gravity, leg falls to bed by 5 secs, but has some effort against gravity   7.   Limb Ataxia 0-->Absent   8.   Sensory 0-->Normal, no sensory loss   9.   Best Language 0-->No aphasia, normal   10. Dysarthria 0-->Normal   11. Extinction and Inattention  0-->No abnormality   Total 6 (10/05/22 0930)       Imaging  I personally reviewed all imaging; relevant findings per HPI.    Labs Data   CBC  Recent Labs   Lab 10/04/22  1950 10/04/22  1432 10/01/22  0405   WBC 8.6 9.3 10.6   RBC 3.55* 3.75* 3.55*   HGB 10.6* 11.2* 10.7*   HCT 33.4* 35.3 33.0*    366 324     Basic Metabolic Panel   Recent Labs   Lab 10/05/22  1134 10/05/22  0910 10/05/22  0811 10/05/22  0200 10/04/22  1950 10/04/22  1432   NA  --   --  131*  --  131* 131*   POTASSIUM  --   --  4.0  4.0  --  4.1 4.3   CHLORIDE  --   --  97  --  94* 95*   CO2  --   --  22  --  26 27   BUN  --   --  28  --  24.2* 25.0*   CR  --   --  0.74  --  0.82 0.83   * 102* 104*   < > 102* 107*   RAINE  --   --  8.7  --  9.1 9.2    < > = values in this interval not displayed.     Liver Panel  Recent Labs   Lab 10/04/22  1432   PROTTOTAL 6.5   ALBUMIN 3.6   BILITOTAL 0.4   ALKPHOS 115*   AST 28   ALT 20     INR  No lab results found.   Lipid Profile     Recent Labs   Lab Test 10/04/22  1950 12/31/21  0710 01/04/21  1001 05/24/16  0947 06/16/15  1127   CHOL 166 150 196   < > 197   HDL 82 66 56   < > 62   LDL 71 68 106*   < > 106   TRIG 65 82 171*   < > 143   CHOLHDLRATIO  --   --   --   --  3.2    < > = values in this interval not displayed.     A1C    Recent Labs   Lab Test 10/04/22  1950 01/26/18  0818 04/11/17  0849   A1C 5.4 5.8 5.8     Troponin    Recent Labs   Lab 10/05/22  0811 10/04/22  1950 10/01/22  0611 10/01/22  0405   CTROPT  --  20* 19* 19*   TROPONINIS 11  --   --   --           Stroke Consult Data Data   This was a non-emergent, non-telestroke consult.  I have personally spent a total of 70 minutes providing care today, time spent in reviewing medical records and reviewing tests, examining the patient and obtaining history, coordination of care, and discussion with the patient and/or family regarding diagnostic results, prognosis, symptom management, risks and benefits of management options, and development of plan of care. Greater than 50% was spent in counseling and coordination of care.

## 2022-10-05 NOTE — PLAN OF CARE
Reason for Admission: R frontal lobe stroke   Cognitive/Mentation: A/Ox 4  Neuros/CMS: Stable with generalized weakness. LUE 3/5, LLE 4/5, RLE 4/5. Some forgetfulness.   VS: VSS on RA   GI: BS audible, + flatus, pt states no BM for few days. Continent.  : Voiding adequately. Continent.  Pulmonary: LS clear/diminished  Pain: R foot/groin pain from fall   Drains/Lines: PIV SL  Skin: Intact with scattered bruising. Some redness under breast folds.    Activity: Assist x 2 with lift until seen by pt/ot.   Diet: No diet ordered yet, dysphagia screen yet to be done.  Therapies recs: Pending  Discharge: Pending  Aggression Stoplight Tool: Green  End of shift summary: No changes. Pt just arrived on the floor and was settled. Plan for neuro/stroke workup. Plan for Echo, PT, OT, SLP.

## 2022-10-05 NOTE — H&P
[facility]  Hospitalist H&P    Name: Candida Rose      MRN: [id number]  YOB: 1940    Age: 82 year old  Date of admission: 10/4/2022  Primary care provider: Chani Peoples            Assessment and Plan:     Candida Rose is a 82-year-old female with a history of moyamoya disease, hypertension, hyperlipidemia, stroke, diabetes, osteoarthritis and chronic pain, comes to the Emergency Department with falls, found to have an acute stroke.    1.  Acute ischemic right frontal lobe stroke:  This is likely what contributed to her unsteadiness and falls.  We will place a formal stroke neurology consultation.  For now, we have been instructed to continue aspirin 81 mg daily and clopidogrel 75 mg daily, which she is already taking prior to admission.  CT head perfusion is being performed at this time.  We will request echocardiogram and telemetry monitoring.  Hemoglobin A1c and fasting lipid panel will be ordered.  Associated consultations from the inpatient stroke order set has been ordered as well.  She is neurologically stable at this time.  Ideally, she will be transferred to a higher level of care such as [facility] or [facility], but there are no beds available, so neurology has approved her to be hospitalized at this facility until she can be transferred.    2.  Moyamoya disease:  The patient has multiple occlusions and stenoses on her imaging studies consistent with her underlying moyamoya disease.  Given this complexity, she may require higher level of care for further evaluation such as a cerebral angiogram.    3.  Chronic anemia:  She followed up with Hematology on 10/04/2022 for evaluation.  There is no evidence of bleeding.  Bone marrow biopsy is planned in the next 4 weeks.  She has already had extensive laboratory studies performed such as iron studies, B12, folate, TSH, and SPEP.  If there is evidence of urinary or gastrointestinal bleeding, she will be  referred to Urology and Gastroenterology, respectively.    4.  Hypertension:  Blood pressure is currently stable.  We will hold her [id number] amlodipine, atenolol and losartan for now given goal of permissive hypertension.    5.  Hyponatremia:  Fairly mild.  We will check a urine osmolality and urine sodium levels.  Hold off on fluids for now.    6.  Recent urinary tract infection:  She started Keflex 2 days ago due to 19 white blood cells and large leukocyte esterase in her urine.  We will continue this for now, although urine culture only growing 10 to 50,000 colonies of urogenital nazia.    7.  The patient will be admitted to inpatient status.    CODE STATUS:  Full Code.            Chief Complaint:     Falls.         History of Present Illness:   Candida Rose is a 82-year-old female with history of moyamoya disease, hyperlipidemia, chronic kidney disease and depression, presents to [facility] for evaluation of falls.  History is obtained from my discussion with the patient at bedside.  I also discussed the case with the ED physician, Dr. Weber.  The electronic medical record was also reviewed.    For the past 2-3 days, the patient has been somewhat unsteady on her feet.  Her legs have been shaking.  She had 2 falls over that time.  Her  had to help her get around and ambulate.  She did hit her head during one of those falls, but did not lose consciousness.  No chest pain, palpitations, shortness of breath, cough or fever.  Denies any headaches or vision changes.  No numbness or weakness of the extremities unilaterally.  She does have a history of 2 strokes and multiple cranial stenoses due to moyamoya disease.  She was seen in the Emergency Department a few days ago for weakness and falls.  No imaging was performed.  She was treated for urinary tract infection.  She had Hematology followup a day or 2 ago, a CT of the brain was performed showing findings concerning for stroke.  This  study, which prompted referral to the Emergency Department.    Here in the hospital, her temperature is 98, heart rate 67, blood pressure 140/60, respiratory rate 20, and oxygen saturation 100% on room air.  Laboratory work shows sodium of 131, creatinine 0.8, glucose 102.  Hemoglobin 10.6.  COVID-19 testing is negative.  MRI shows acute infarct in the right frontal lobe as well as multiple occlusions or stenoses seen throughout the brain compatible with moyamoya disease.  Stroke neurology was contacted and recommended aspirin and Plavix, which she is already taking.  They recommended transfer to [facility] or [facility] due to complexity of her care; however, there were no beds available, so they did approve hospitalization at this facility until she could be transferred.  CT head perfusion is being performed at this time and currently pending.            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 F/u dr. Soto    OA (osteoarthritis)     s/p bilat total hips     Other chronic pain     Joint pain for many years    Unspecified cerebral artery occlusion with cerebral infarction     After Moyamoya surgery > 10 yrs ago.    Vitamin D deficiencies              Past Surgical History:     Past Surgical History:   Procedure Laterality Date    ARTHROPLASTY KNEE Left 4/17/2017    Procedure: ARTHROPLASTY KNEE;  Left total knee arthroplasty;  Surgeon: Chidi Jenkins MD;  Location: [facility] OR    COLONOSCOPY  2008    normal    COLONOSCOPY  7/17/2014    Procedure:  "COLONOSCOPY;  Surgeon: Raul Nolan DO;  Location: [facility] GI    CRANIOTOMY      MOJAMOJA  \"Pt had repair of blood flow.\"    IR CAROTID ANGIOGRAM  10/30/2006    IR CAROTID ANGIOGRAM  6/6/2010    IR CAROTID ANGIOGRAM  6/6/2010    IR CAROTID ANGIOGRAM  6/6/2010    IR CAROTID ANGIOGRAM  5/12/2011    IR CAROTID ANGIOGRAM  5/12/2011    IR CAROTID ANGIOGRAM  5/12/2011    IR CAROTID ANGIOGRAM  6/5/2012    IR CAROTID ANGIOGRAM  6/5/2012    IR CAROTID ANGIOGRAM  6/5/2012    IR MISCELLANEOUS PROCEDURE  6/6/2010    IR MISCELLANEOUS PROCEDURE  6/6/2010    IR MISCELLANEOUS PROCEDURE  5/12/2011    IR MISCELLANEOUS PROCEDURE  6/5/2012    RECONSTRUCT FOREFOOT WITH METATARSOPHALANGEAL (MTP) FUSION Left 8/21/2014    Procedure: RECONSTRUCT FOREFOOT WITH METATARSOPHALANGEAL (MTP) FUSION;  Surgeon: Tres Merlos DPM;  Location:  OR    [facility] [place] NONSPECIFIC PROCEDURE  10/30/06    neurosurgery Dr Soto    [facility] [place] NONSPECIFIC PROCEDURE      bilateral total hips approx 2002    Z NONSPECIFIC PROCEDURE  3/07    neurosurgery              Social History:     Social History     Tobacco Use    Smoking status: Never Smoker    Smokeless tobacco: Never Used   Substance Use Topics    Alcohol use: Yes     Comment:  1-2 per day             Family History:   The family history was fully reviewed and non-contributory in this case.         Allergies:     Allergies   Allergen Reactions    Lisinopril      Persistent cough             Medications:     Prior to Admission medications    Medication Sig Last Dose Taking? Auth Provider Long Term End Date   amLODIPine (NORVASC) 2.5 MG tablet Take 1 tablet (2.5 mg) by mouth daily   Chani Peoples MD Yes    aspirin 81 MG tablet Take 1 tablet (81 mg) by mouth daily   Chani Peoples MD     atenolol (TENORMIN) 25 MG tablet TAKE 1 TABLET BY MOUTH EVERY DAY   Chani Peoples MD Yes    cephALEXin (KEFLEX) 500 MG capsule Take 1 capsule (500 mg) by mouth 2 times daily " "for 7 days   Martin Purvis MD  10/8/22   clopidogrel (PLAVIX) 75 MG tablet Take 1 tablet (75 mg) by mouth daily   Chani Peoples MD Yes    cyanocobalamin (VITAMIN B-12) 500 MCG tablet Take 500 mcg by mouth daily   Reported, Patient     losartan (COZAAR) 50 MG tablet TAKE 1 TABLET BY MOUTH TWICE A DAY   Chani Peoples MD Yes    magnesium 500 MG TABS    Chani Peoples MD     Multiple Vitamins-Minerals (MULTIVITAMIN & MINERAL PO) Take 1 tablet by mouth daily.   Reported, Patient     simvastatin (ZOCOR) 20 MG tablet TAKE 1 TABLET BY MOUTH AT BEDTIME   Chani Peoples MD Yes              Review of Systems:     A Comprehensive greater than 10 system review of systems was carried out.  Pertinent positives and negatives are noted above.  Otherwise negative for contributory information.           Physical Exam:   Blood pressure (!) 150/73, pulse 67, temperature 98.7  F (37.1  C), temperature source Oral, resp. rate 16, height 1.727 m (5' 8\"), weight 76.7 kg (169 lb), SpO2 98 %, not currently breastfeeding.  Wt Readings from Last 1 Encounters:   10/04/22 76.7 kg (169 lb)     Exam:  GENERAL: No apparent distress. Awake, alert, and fully oriented.  HEENT: Normocephalic, atraumatic. Extraocular movements intact.  CARDIOVASCULAR: Regular rate and rhythm without murmurs or rubs. No S3.  PULMONARY: Clear to auscultation bilaterally.  ABDOMINAL: Soft, non-tender, non-distended. Bowel sounds normoactive.   EXTREMITIES: No cyanosis or clubbing. No appreciable edema.  NEUROLOGICAL: CN 2-12 grossly intact, no focal neurological deficits.  DERMATOLOGICAL: No rash, ulcer, bruising, nor jaundice.          Data:   EKG:  Personally reviewed.     Laboratory:  Recent Labs   Lab 10/04/22  1950 10/04/22  1432 10/01/22  0405   WBC 8.6 9.3 10.6   HGB 10.6* 11.2* 10.7*   HCT 33.4* 35.3 33.0*   MCV 94 94 93    366 324     Recent Labs   Lab 10/05/22  0200 10/04/22  1950 " 10/04/22  1432 10/01/22  0405   NA  --  131* 131* 129*   POTASSIUM  --  4.1 4.3 4.5   CHLORIDE  --  94* 95* 96*   CO2  --  26 27 24   ANIONGAP  --  11 9 9   * 102* 107* 141*   BUN  --  24.2* 25.0* 26.4*   CR  --  0.82 0.83 0.99*   GFRESTIMATED  --  72 71 57*   RAINE  --  9.1 9.2 9.0     No results for input(s): CULT in the last 168 hours.    Imaging:  Recent Results (from the past 24 hour(s))   CT Head w/o contrast*    Narrative    CT OF THE HEAD WITHOUT CONTRAST 10/4/2022 3:19 PM     COMPARISON: Brain MR 6/5/2010    HISTORY: Fall with injury to the head. Recurrent falls.    TECHNIQUE: 5 mm thick axial CT images of the head were acquired  without IV contrast material.    FINDINGS:  There is moderate diffuse cerebral volume loss. There are  subtle patchy areas of decreased density in the cerebral white matter  bilaterally that are consistent with sequela of chronic small vessel  ischemic disease. Small areas of chronic encephalomalacia in the right  occipital lobe and at the temporo-occipital junction on the left are  again noted consistent with chronic ischemic infarcts. There is a new  area of hypodensity at the low anterolateral right frontal lobe that  could represent a subacute ischemic infarct. Clinical correlation  recommended.    The ventricles and basal cisterns are within normal limits in  configuration given the degree of cerebral volume loss.  There is no  midline shift. There are no extra-axial fluid collections.     No intracranial hemorrhage or mass.    The visualized paranasal sinuses are well-aerated. There is no  mastoiditis. There are no fractures of the visualized bones. Bilateral  parietal craniotomies again noted.      Impression    IMPRESSION:   1. Questionable subacute ischemic infarct at the anterolateral aspect  of the right frontal lobe. Clinical correlation recommended. Brain MRI  may be helpful for better evaluation.  2. Chronic ischemic infarcts at the temporo-occipital junction on  the  left and within the right occipital lobe again noted consistent with  chronic ischemic infarcts.  3. Postoperative changes again noted.  4. Diffuse cerebral volume loss and cerebral white matter changes  consistent with chronic small vessel ischemic disease.      Radiation dose for this scan was reduced using automated exposure  control, adjustment of the mA and/or kV according to patient size, or  iterative reconstruction technique.    HARRISON FIELD MD         SYSTEM ID:  [id number]   XR Chest 2 Views    Narrative    CHEST TWO VIEWS  10/4/2022 3:48 PM     HISTORY: 82-year-old woman with recurrent falls.       Impression    IMPRESSION: Since July 6, 2010, heart size is normal. No pleural  effusion, pneumothorax, or abnormal area of consolidation. Granulomas  overlie both lungs. Significant scoliotic curvature of the  thoracolumbar spine is noted, only partially visible, though apex  right at the thoracolumbar junction. Degree of scoliotic curvature is  greater than previous exam.    BRIAN COLVIN MD         SYSTEM ID:  [id number]   XR Shoulder Left 2 Views    Narrative    LEFT SHOULDER TWO VIEWS 10/4/2022 3:49 PM    HISTORY: Recurrent falls, shoulder pain.    COMPARISON: None.      Impression    IMPRESSION: No fracture or dislocation. Advanced degenerative changes  at the glenohumeral joint. Small benign calcified granuloma in the  left midlung.    BURTON BROWN MD         SYSTEM ID:  [id number]   MRA Brain ([place]) wo Contrast   Result Value    Radiologist flags (AA)     Infarct in the right frontal lobe abnormality of the right carotid artery.    Narrative    EXAM: MRA BRAIN ([place]) W/O CONTRAST, MRA NECK (CAROTIDS) W/O and W CONTRAST, MR BRAIN W/O and W CONTRAST  LOCATION: [facility]  DATE/TIME: 10/4/2022 9:22 PM    INDICATION: Fall, hit head, abnormal head CT. Additional history includes moyamoya disease.  COMPARISON: 06/05/2010  CONTRAST: 10mL  Gadavist  TECHNIQUE:   1) Routine multiplanar multisequence head MRI without and with intravenous contrast.  2) 3D time-of-flight head MRA without intravenous contrast.  3) Neck MRA without and with IV contrast. Stenosis measurements made according to NASCET criteria unless otherwise specified.    FINDINGS:  HEAD MRI:  INTRACRANIAL CONTENTS: There is focal area of restricted diffusion representing acute infarct corresponds with the abnormality on prior head CT. Largest area measures approximately 3.3 x 2 cm at the right frontal cortex and subcortical white matter   inferiorly. There is also scattered patchy areas of acute infarct involving the right caudate head and right frontal corona radiata. Chronic infarct of the right occipital cortex. There is chronic infarct of the left temporoparietal cortex and   subcortical white matter. There are small chronic lacunar infarcts of the bilateral cerebellar hemispheres. No mass, acute hemorrhage, or extra-axial fluid collections. Patchy and confluent nonspecific T2/FLAIR hyperintensities within the cerebral white   matter most consistent with moderate chronic microvascular ischemic change. Mild generalized cerebral atrophy. No hydrocephalus. Normal position of the cerebellar tonsils. No pathologic contrast enhancement.    SELLA: No abnormality accounting for technique.    OSSEOUS STRUCTURES/SOFT TISSUES: Normal marrow signal. Abnormal signal involving the right internal carotid artery.     ORBITS: No abnormality accounting for technique.     SINUSES/MASTOIDS: No paranasal sinus mucosal disease. No middle ear or mastoid effusion.     HEAD MRA:   ANTERIOR CIRCULATION: There is occlusion of the right petrous and cavernous internal carotid artery. There is occlusion of the right middle cerebral artery origin and trifurcation with some minimal reconstitution of distal sylvian branches. The left   petrous and cavernous internal carotid artery is patent. There is focal moderate  stenosis at the junction of the left petrous and cavernous ICA as well as of the left supraclinoid ICA. There is focal severe stenosis of the M1 left middle cerebral artery.   There is at least moderate stenosis of the left MCA trifurcation. The distal branches of the left middle cerebral artery appear patent. The anterior cerebral arteries appear widely patent.    POSTERIOR CIRCULATION: Moderately severe stenosis of the right posterior cerebral artery origin and P1 P2 segment junction. Dominant left and smaller right vertebral artery contribute to a normal basilar artery.     NECK MRA:   RIGHT CAROTID: There is absence of flow related enhancement on noncontrast images throughout the right cervical carotid artery. No measurable stenosis or dissection on postcontrast image. There is diminished enhancement beginning at the petrous segment.    LEFT CAROTID: No measurable stenosis or dissection.    VERTEBRAL ARTERIES: There is focal moderately severe stenosis of the V4 segment right vertebral artery. Vertebral arteries are otherwise patent. Dominant left and smaller right vertebral arteries.    AORTIC ARCH: Classic aortic arch anatomy with no significant stenosis at the origin of the great vessels.      Impression    IMPRESSION:  HEAD MRI:   1.  Acute infarct in the right frontal lobe.  2.  Chronic small vessel ischemic disease with numerous chronic infarcts.    HEAD MRA:   1.  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  2.  Occlusion of the right middle cerebral artery. 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  3.  Additional multifocal moderate and severe intracranial stenoses as described above.    NECK MRA:  1.  Normal appearance of the right cervical internal carotid artery with absence of flow related enhancement on noncontrast images but presence of contrast enhancement. Findings are suggestive of severe stenosis with retrograde flow via collaterals.  2.  Focal  moderately severe stenosis of the distal right vertebral artery.      [Critical Result: Infarct in the right frontal lobe abnormality of the right carotid artery.]    Finding was identified on 10/4/2022 9:25 PM.     1.  Dr. Peñaloza was contacted by me on 10/4/2022 9:46 PM and verbalized understanding of the critical result.    MRA Neck (Carotids) wo & w Contrast   Result Value    Radiologist flags (AA)     Infarct in the right frontal lobe abnormality of the right carotid artery.    Narrative    EXAM: MRA BRAIN ([place]) W/O CONTRAST, MRA NECK (CAROTIDS) W/O and W CONTRAST, MR BRAIN W/O and W CONTRAST  LOCATION: [facility]  DATE/TIME: 10/4/2022 9:22 PM    INDICATION: Fall, hit head, abnormal head CT. Additional history includes moyamoya disease.  COMPARISON: 06/05/2010  CONTRAST: 10mL Gadavist  TECHNIQUE:   1) Routine multiplanar multisequence head MRI without and with intravenous contrast.  2) 3D time-of-flight head MRA without intravenous contrast.  3) Neck MRA without and with IV contrast. Stenosis measurements made according to NASCET criteria unless otherwise specified.    FINDINGS:  HEAD MRI:  INTRACRANIAL CONTENTS: There is focal area of restricted diffusion representing acute infarct corresponds with the abnormality on prior head CT. Largest area measures approximately 3.3 x 2 cm at the right frontal cortex and subcortical white matter   inferiorly. There is also scattered patchy areas of acute infarct involving the right caudate head and right frontal corona radiata. Chronic infarct of the right occipital cortex. There is chronic infarct of the left temporoparietal cortex and   subcortical white matter. There are small chronic lacunar infarcts of the bilateral cerebellar hemispheres. No mass, acute hemorrhage, or extra-axial fluid collections. Patchy and confluent nonspecific T2/FLAIR hyperintensities within the cerebral white   matter most consistent with moderate chronic  microvascular ischemic change. Mild generalized cerebral atrophy. No hydrocephalus. Normal position of the cerebellar tonsils. No pathologic contrast enhancement.    SELLA: No abnormality accounting for technique.    OSSEOUS STRUCTURES/SOFT TISSUES: Normal marrow signal. Abnormal signal involving the right internal carotid artery.     ORBITS: No abnormality accounting for technique.     SINUSES/MASTOIDS: No paranasal sinus mucosal disease. No middle ear or mastoid effusion.     HEAD MRA:   ANTERIOR CIRCULATION: There is occlusion of the right petrous and cavernous internal carotid artery. There is occlusion of the right middle cerebral artery origin and trifurcation with some minimal reconstitution of distal sylvian branches. The left   petrous and cavernous internal carotid artery is patent. There is focal moderate stenosis at the junction of the left petrous and cavernous ICA as well as of the left supraclinoid ICA. There is focal severe stenosis of the M1 left middle cerebral artery.   There is at least moderate stenosis of the left MCA trifurcation. The distal branches of the left middle cerebral artery appear patent. The anterior cerebral arteries appear widely patent.    POSTERIOR CIRCULATION: Moderately severe stenosis of the right posterior cerebral artery origin and P1 P2 segment junction. Dominant left and smaller right vertebral artery contribute to a normal basilar artery.     NECK MRA:   RIGHT CAROTID: There is absence of flow related enhancement on noncontrast images throughout the right cervical carotid artery. No measurable stenosis or dissection on postcontrast image. There is diminished enhancement beginning at the petrous segment.    LEFT CAROTID: No measurable stenosis or dissection.    VERTEBRAL ARTERIES: There is focal moderately severe stenosis of the V4 segment right vertebral artery. Vertebral arteries are otherwise patent. Dominant left and smaller right vertebral arteries.    AORTIC ARCH:  Classic aortic arch anatomy with no significant stenosis at the origin of the great vessels.      Impression    IMPRESSION:  HEAD MRI:   1.  Acute infarct in the right frontal lobe.  2.  Chronic small vessel ischemic disease with numerous chronic infarcts.    HEAD MRA:   1.  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  2.  Occlusion of the right middle cerebral artery. 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  3.  Additional multifocal moderate and severe intracranial stenoses as described above.    NECK MRA:  1.  Normal appearance of the right cervical internal carotid artery with absence of flow related enhancement on noncontrast images but presence of contrast enhancement. Findings are suggestive of severe stenosis with retrograde flow via collaterals.  2.  Focal moderately severe stenosis of the distal right vertebral artery.      [Critical Result: Infarct in the right frontal lobe abnormality of the right carotid artery.]    Finding was identified on 10/4/2022 9:25 PM.     1.  Dr. Peñaloza was contacted by me on 10/4/2022 9:46 PM and verbalized understanding of the critical result.    MR Brain w/o & w Contrast   Result Value    Radiologist flags (AA)     Infarct in the right frontal lobe abnormality of the right carotid artery.    Narrative    EXAM: MRA BRAIN ([place] [facility] [place]) W/O CONTRAST, MRA NECK (CAROTIDS) W/O and W CONTRAST, MR BRAIN W/O and W CONTRAST  LOCATION: [facility]  DATE/TIME: 10/4/2022 9:22 PM    INDICATION: Fall, hit head, abnormal head CT. Additional history includes moyamoya disease.  COMPARISON: 06/05/2010  CONTRAST: 10mL Gadavist  TECHNIQUE:   1) Routine multiplanar multisequence head MRI without and with intravenous contrast.  2) 3D time-of-flight head MRA without intravenous contrast.  3) Neck MRA without and with IV contrast. Stenosis measurements made according to NASCET criteria unless otherwise  specified.    FINDINGS:  HEAD MRI:  INTRACRANIAL CONTENTS: There is focal area of restricted diffusion representing acute infarct corresponds with the abnormality on prior head CT. Largest area measures approximately 3.3 x 2 cm at the right frontal cortex and subcortical white matter   inferiorly. There is also scattered patchy areas of acute infarct involving the right caudate head and right frontal corona radiata. Chronic infarct of the right occipital cortex. There is chronic infarct of the left temporoparietal cortex and   subcortical white matter. There are small chronic lacunar infarcts of the bilateral cerebellar hemispheres. No mass, acute hemorrhage, or extra-axial fluid collections. Patchy and confluent nonspecific T2/FLAIR hyperintensities within the cerebral white   matter most consistent with moderate chronic microvascular ischemic change. Mild generalized cerebral atrophy. No hydrocephalus. Normal position of the cerebellar tonsils. No pathologic contrast enhancement.    SELLA: No abnormality accounting for technique.    OSSEOUS STRUCTURES/SOFT TISSUES: Normal marrow signal. Abnormal signal involving the right internal carotid artery.     ORBITS: No abnormality accounting for technique.     SINUSES/MASTOIDS: No paranasal sinus mucosal disease. No middle ear or mastoid effusion.     HEAD MRA:   ANTERIOR CIRCULATION: There is occlusion of the right petrous and cavernous internal carotid artery. There is occlusion of the right middle cerebral artery origin and trifurcation with some minimal reconstitution of distal sylvian branches. The left   petrous and cavernous internal carotid artery is patent. There is focal moderate stenosis at the junction of the left petrous and cavernous ICA as well as of the left supraclinoid ICA. There is focal severe stenosis of the M1 left middle cerebral artery.   There is at least moderate stenosis of the left MCA trifurcation. The distal branches of the left middle  cerebral artery appear patent. The anterior cerebral arteries appear widely patent.    POSTERIOR CIRCULATION: Moderately severe stenosis of the right posterior cerebral artery origin and P1 P2 segment junction. Dominant left and smaller right vertebral artery contribute to a normal basilar artery.     NECK MRA:   RIGHT CAROTID: There is absence of flow related enhancement on noncontrast images throughout the right cervical carotid artery. No measurable stenosis or dissection on postcontrast image. There is diminished enhancement beginning at the petrous segment.    LEFT CAROTID: No measurable stenosis or dissection.    VERTEBRAL ARTERIES: There is focal moderately severe stenosis of the V4 segment right vertebral artery. Vertebral arteries are otherwise patent. Dominant left and smaller right vertebral arteries.    AORTIC ARCH: Classic aortic arch anatomy with no significant stenosis at the origin of the great vessels.      Impression    IMPRESSION:  HEAD MRI:   1.  Acute infarct in the right frontal lobe.  2.  Chronic small vessel ischemic disease with numerous chronic infarcts.    HEAD MRA:   1.  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  2.  Occlusion of the right middle cerebral artery. 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  3.  Additional multifocal moderate and severe intracranial stenoses as described above.    NECK MRA:  1.  Normal appearance of the right cervical internal carotid artery with absence of flow related enhancement on noncontrast images but presence of contrast enhancement. Findings are suggestive of severe stenosis with retrograde flow via collaterals.  2.  Focal moderately severe stenosis of the distal right vertebral artery.      [Critical Result: Infarct in the right frontal lobe abnormality of the right carotid artery.]    Finding was identified on 10/4/2022 9:25 PM.     1.  Dr. Peñaloza was contacted by me on 10/4/2022 9:46 PM and verbalized  understanding of the critical result.    CT Head Perfusion w Contrast    Narrative    EXAM: CT HEAD PERFUSION W CONTRAST  LOCATION: [facility]  DATE/TIME: 10/5/2022 12:38 AM    INDICATION: further eval of abnormal MRA and acute CVA subacute infarct right frontal lobe.  COMPARISON: 10/04/2022.  TECHNIQUE: CT cerebral perfusion was performed utilizing a second contrast bolus. Perfusion data were post processed with generation of standard perfusion maps and estimation of ischemic/infarcted volumes utilizing standard threshold values. Dose   reduction techniques were used. All stenosis measurements made according to NASCET criteria unless otherwise specified.  CONTRAST: 50 mL Isovue 370  COMPARISON: None.    FINDINGS:   CT PERFUSION:  PERFUSION MAPS: There is delayed mean transit time and transit max in the right frontal lobe to the medial right parietal lobe. There is a perfusion deficit fairly symmetric involving the relative cerebral blood volume and relative cerebral blood flow in   the anterior right frontal lobe consistent within known subacute infarct of this region.    RAPID ANALYSIS:  CBF<30%: 0 mL  Tmax>6sec: 144 ml.  Mismatch volume: 144 mL.  Mismatch ratio: Infinite.      Impression    IMPRESSION:     CT PERFUSION:  1.  Delay in transit time to anterior right frontal lobe and medial right parietal lobe with fairly symmetric deficit in relative cerebral blood flow and blood volume in this region consistent within known subacute infarct.       Yasir Higuera DO MPH  [facility] Hospitalist  [address] [place] [address]  10/05/2022    D: 10/05/2022   T: 10/05/2022   MT: [id number]    Name:     KETURAH TERRELL  MRN:      [id number]        Account:     [id number]   :      1940           Admitted:    10/04/2022       Document: [id number]    cc:  Chani Peoples MD

## 2022-10-06 ENCOUNTER — APPOINTMENT (OUTPATIENT)
Dept: GENERAL RADIOLOGY | Facility: CLINIC | Age: 82
DRG: 065 | End: 2022-10-06
Attending: INTERNAL MEDICINE
Payer: MEDICARE

## 2022-10-06 ENCOUNTER — APPOINTMENT (OUTPATIENT)
Dept: MRI IMAGING | Facility: CLINIC | Age: 82
DRG: 065 | End: 2022-10-06
Attending: HOSPITALIST
Payer: MEDICARE

## 2022-10-06 ENCOUNTER — APPOINTMENT (OUTPATIENT)
Dept: PHYSICAL THERAPY | Facility: CLINIC | Age: 82
DRG: 065 | End: 2022-10-06
Attending: HOSPITALIST
Payer: MEDICARE

## 2022-10-06 ENCOUNTER — APPOINTMENT (OUTPATIENT)
Dept: OCCUPATIONAL THERAPY | Facility: CLINIC | Age: 82
DRG: 065 | End: 2022-10-06
Attending: INTERNAL MEDICINE
Payer: MEDICARE

## 2022-10-06 LAB
ANION GAP SERPL CALCULATED.3IONS-SCNC: 5 MMOL/L (ref 3–14)
ATRIAL RATE - MUSE: 60 BPM
BUN SERPL-MCNC: 19 MG/DL (ref 7–30)
CALCIUM SERPL-MCNC: 8.3 MG/DL (ref 8.5–10.1)
CHLORIDE BLD-SCNC: 102 MMOL/L (ref 94–109)
CO2 SERPL-SCNC: 26 MMOL/L (ref 20–32)
CREAT SERPL-MCNC: 0.77 MG/DL (ref 0.52–1.04)
DIASTOLIC BLOOD PRESSURE - MUSE: NORMAL MMHG
ERYTHROCYTE [DISTWIDTH] IN BLOOD BY AUTOMATED COUNT: 12.3 % (ref 10–15)
GFR SERPL CREATININE-BSD FRML MDRD: 78 ML/MIN/1.73M2
GLUCOSE BLD-MCNC: 90 MG/DL (ref 70–99)
GLUCOSE BLDC GLUCOMTR-MCNC: 102 MG/DL (ref 70–99)
GLUCOSE BLDC GLUCOMTR-MCNC: 156 MG/DL (ref 70–99)
GLUCOSE BLDC GLUCOMTR-MCNC: 79 MG/DL (ref 70–99)
GLUCOSE BLDC GLUCOMTR-MCNC: 86 MG/DL (ref 70–99)
GLUCOSE BLDC GLUCOMTR-MCNC: 88 MG/DL (ref 70–99)
GLUCOSE BLDC GLUCOMTR-MCNC: 96 MG/DL (ref 70–99)
HCT VFR BLD AUTO: 29.6 % (ref 35–47)
HGB BLD-MCNC: 9.6 G/DL (ref 11.7–15.7)
INTERPRETATION ECG - MUSE: NORMAL
MCH RBC QN AUTO: 30 PG (ref 26.5–33)
MCHC RBC AUTO-ENTMCNC: 32.4 G/DL (ref 31.5–36.5)
MCV RBC AUTO: 93 FL (ref 78–100)
P AXIS - MUSE: 63 DEGREES
PLATELET # BLD AUTO: 315 10E3/UL (ref 150–450)
POTASSIUM BLD-SCNC: 3.9 MMOL/L (ref 3.4–5.3)
PR INTERVAL - MUSE: 218 MS
QRS DURATION - MUSE: 86 MS
QT - MUSE: 476 MS
QTC - MUSE: 476 MS
R AXIS - MUSE: 46 DEGREES
RBC # BLD AUTO: 3.2 10E6/UL (ref 3.8–5.2)
SODIUM SERPL-SCNC: 133 MMOL/L (ref 133–144)
SYSTOLIC BLOOD PRESSURE - MUSE: NORMAL MMHG
T AXIS - MUSE: 46 DEGREES
VENTRICULAR RATE- MUSE: 60 BPM
WBC # BLD AUTO: 6.6 10E3/UL (ref 4–11)

## 2022-10-06 PROCEDURE — 85027 COMPLETE CBC AUTOMATED: CPT | Performed by: INTERNAL MEDICINE

## 2022-10-06 PROCEDURE — 250N000013 HC RX MED GY IP 250 OP 250 PS 637: Performed by: PHYSICIAN ASSISTANT

## 2022-10-06 PROCEDURE — 97530 THERAPEUTIC ACTIVITIES: CPT | Mod: GO

## 2022-10-06 PROCEDURE — 97750 PHYSICAL PERFORMANCE TEST: CPT | Mod: GP

## 2022-10-06 PROCEDURE — 258N000003 HC RX IP 258 OP 636: Performed by: INTERNAL MEDICINE

## 2022-10-06 PROCEDURE — 73502 X-RAY EXAM HIP UNI 2-3 VIEWS: CPT

## 2022-10-06 PROCEDURE — G1010 CDSM STANSON: HCPCS

## 2022-10-06 PROCEDURE — 36415 COLL VENOUS BLD VENIPUNCTURE: CPT | Performed by: INTERNAL MEDICINE

## 2022-10-06 PROCEDURE — 97535 SELF CARE MNGMENT TRAINING: CPT | Mod: GO

## 2022-10-06 PROCEDURE — 82374 ASSAY BLOOD CARBON DIOXIDE: CPT | Performed by: INTERNAL MEDICINE

## 2022-10-06 PROCEDURE — 120N000001 HC R&B MED SURG/OB

## 2022-10-06 PROCEDURE — 99356 PR PROLONGED SERV,INPATIENT,1ST HR: CPT | Mod: FS | Performed by: PSYCHIATRY & NEUROLOGY

## 2022-10-06 PROCEDURE — 97165 OT EVAL LOW COMPLEX 30 MIN: CPT | Mod: GO

## 2022-10-06 PROCEDURE — 99233 SBSQ HOSP IP/OBS HIGH 50: CPT | Mod: FS | Performed by: PSYCHIATRY & NEUROLOGY

## 2022-10-06 PROCEDURE — 250N000013 HC RX MED GY IP 250 OP 250 PS 637: Performed by: INTERNAL MEDICINE

## 2022-10-06 PROCEDURE — 99233 SBSQ HOSP IP/OBS HIGH 50: CPT | Performed by: INTERNAL MEDICINE

## 2022-10-06 PROCEDURE — 97535 SELF CARE MNGMENT TRAINING: CPT | Mod: GP

## 2022-10-06 RX ORDER — NICOTINE POLACRILEX 4 MG
15-30 LOZENGE BUCCAL
Status: DISCONTINUED | OUTPATIENT
Start: 2022-10-06 | End: 2022-10-13 | Stop reason: HOSPADM

## 2022-10-06 RX ORDER — DEXTROSE MONOHYDRATE 25 G/50ML
25-50 INJECTION, SOLUTION INTRAVENOUS
Status: DISCONTINUED | OUTPATIENT
Start: 2022-10-06 | End: 2022-10-13 | Stop reason: HOSPADM

## 2022-10-06 RX ADMIN — SODIUM CHLORIDE: 9 INJECTION, SOLUTION INTRAVENOUS at 01:53

## 2022-10-06 RX ADMIN — ASPIRIN 81 MG: 81 TABLET, COATED ORAL at 10:18

## 2022-10-06 RX ADMIN — CLOPIDOGREL BISULFATE 75 MG: 75 TABLET ORAL at 10:18

## 2022-10-06 RX ADMIN — SODIUM CHLORIDE: 9 INJECTION, SOLUTION INTRAVENOUS at 16:12

## 2022-10-06 RX ADMIN — ATORVASTATIN CALCIUM 40 MG: 40 TABLET, FILM COATED ORAL at 20:49

## 2022-10-06 ASSESSMENT — ACTIVITIES OF DAILY LIVING (ADL)
ADLS_ACUITY_SCORE: 28
ADLS_ACUITY_SCORE: 28
ADLS_ACUITY_SCORE: 30
PREVIOUS_RESPONSIBILITIES: MEAL PREP;HOUSEKEEPING;LAUNDRY;SHOPPING;MEDICATION MANAGEMENT;FINANCES;DRIVING
ADLS_ACUITY_SCORE: 28
ADLS_ACUITY_SCORE: 31
ADLS_ACUITY_SCORE: 32
ADLS_ACUITY_SCORE: 32
ADLS_ACUITY_SCORE: 28
ADLS_ACUITY_SCORE: 30
ADLS_ACUITY_SCORE: 28

## 2022-10-06 NOTE — PROGRESS NOTES
10/06/22 1530   Signing Clinician's Name / Credentials   Signing clinician's name / credentials Brittany Dressler, PT   Quick Adds   Rehab Discipline PT   Physical Performance Test or Measurement   Minutes of Treatment 38   Symptoms Noted During/After Treatment fatigue   Treatment Detail PT: FGA 12/30 no AD (Falls risk < 23).      Mini-BESTest 12/28 (falls risk < 20): APAs 2/6, postural reactions 1/6, sensory orientation 2/6, dynamic gait 7/10.   Intervention (Other)   Intervention (Other) Minutes of Treatment 8   Symptoms Noted During/After Treatment none   Intervention (Other) Detail PT: Self-care/home management: Edu on imbalance, weakness limiting safety with floor transfer training at this time, rodríguez with  in context of 's acute hamstring strain helping up pt from the last fall. Discussed high falls risk (pt thought she passed the balance tests), indication for RW & Ax1 which may not be the most appropriate for injured  currently - both in agreement, wanting to keep the other safe. Pt edu on DC rec of rehab until pt is safe with a RW and less of a falls risk to be able to re-assess if home with RW and supervision is suffiicent with further PT - all in agreement.   PT Discharge Planning   PT Discharge Recommendation (DC Rec) Transitional Care Facility;home with assist;home with outpatient physical therapy   PT Rationale for DC Rec Pt's  is currently injured from helping the pt up after a fall, not the safest plan for  to be providing more than supervision. Pt is currently need RW & hands-on assist given high-falls risk. Pt would be best served at this point in rehab until moving safer for pt & caregiver's well-being (following in case pt improves to be supervision with RW without prompting at which point pt would be appropriate for  assist and him driving pt to OP for Dyn[employer], cog, and PT).   PT Brief overview of current status FGA 12/30 & Mini-BESTest 12/28 - high  falls risk.   Additional Documentation   PT Plan PT: safe mobility with RW, balance progressions.   Total Session Time   Total Session Time (minutes) 46 minutes

## 2022-10-06 NOTE — PROVIDER NOTIFICATION
Date paged: 10/5    Time paged: [phone]    Person paged: allyDVM    Paging system used: itBit    Message: [phone] AK: Just FYI, patient more neglectful and weak when returning to bed now. Previous nurse stated it was similar to how she had been this AM. Thanks, Josie RN *[phone]    Response: 500 mL NS bolus ordered to raise BP, possible orthostatic changes.

## 2022-10-06 NOTE — PROVIDER NOTIFICATION
Paged Dr. Sierra:   729- DA. NIF -10 today. Continues to have oral secretions. Updated gen neuro. They would like him transferred to ICU. Her cell: [phone]. Per speech eval again today, unable to oral meds. Can you order Ng placement via x-ray for nutrition and meds?    New orders for STAT feeding tube placement. Orders to transfer to ICU.

## 2022-10-06 NOTE — PLAN OF CARE
Reason for Admission: R frontal MCA CVA     Cognitive/Mentation: A&O x4  Neuros/CMS: Intact ex L neglect and weakness 3/5, RLE 3/5, difficulty following commands  VS: Stable on RA. SBP >140.   Tele: NSR  GI: BS active, + flatus. Continent.  : Voiding. Continent.  Pulmonary: LS clear.  Pain: Denies     Drains/Lines: PIV R infusing with NS @ 75 ml/hr  Skin: intact ex bruising  Activity: Assist x1 GB/W  Diet: NPO since midnight for procedure.     Therapies recs: home with OPT  Discharge: pending     Aggression Stoplight Tool: Green     End of shift summary: MRI resulted. Plan for cerebral angiogram. Last BS 88 @ 0625. Will continue to monitor for s/s of hypoglycemia.

## 2022-10-06 NOTE — PROVIDER NOTIFICATION
Date paged: 10/6    Time paged: [phone]    Person paged: [employer] system used: [employer]    Message: [phone] AK: FYI Patient lethargic, difficult to arouse, had been alert 20 min prior. L weak and neglect. Vitals all normal. Laid flat, more alert and slightly stronger. Bolus still running. Anything new? Thanks Josie LOWRY *[phone]    Response: Stat head CT ordered.

## 2022-10-06 NOTE — PROGRESS NOTES
[facility]    Medicine Progress Note - Hospitalist Service    Date of Admission:  10/5/2022    Assessment & Plan        Candida Rose is a 82-year-old female with a history of moyamoya disease, hypertension, hyperlipidemia, stroke, diabetes, osteoarthritis, alcohol use disorder and chronic pain who was transferred from [facility] after she was found to have an acute stroke.     # Acute ischemic right frontal lobe stroke:    # Moyamoya disease:    - Appreciate  Stroke Neuro following patient.    - continue her PTA aspirin 81 mg daily and clopidogrel 75 mg daily  - P2Y12 level  - Tele  - FLP- LDL 71, HDL 82; started on Lipitor 40 mg po daily  - Hba1c 5.4  - planned for cerebral angiogram today  - hold PTA Norvasc and Atenolol, allow for permissive HTN and keep SBP >140  - PT/OT/SLP  - fall precautions     # Falls  - likely related to acute stroke  - X ray left shoulder - No fracture or dislocation. Advanced degenerative changes  at the glenohumeral joint     #  Hyponatremia, hypochloremic   - Na was 129 on 10/1, 131 on 10/4  - better     # Chronic anemia:    - She followed up with Hematology on 10/04/2022 for evaluation.  There is no evidence of bleeding. Bone marrow biopsy is planned in the next 4 weeks.  She has already had extensive laboratory studies performed such as iron studies, B12, folate, TSH, and SPEP.  If there is evidence of urinary or gastrointestinal bleeding, she will be referred to Urology and Gastroenterology, respectively.     #  Hypertension:    - hold her oiefp-ge-tfsyxikzy amlodipine, atenolol and losartan for now given goal of permissive hypertension.     #  Dyslipidemia  - PTA on Zocor, now switched to Lipitor 40 mg po daily      #  Abnormal UA:  - had a recent abnormal UA on 10/01- ER visit; started on Keflex at that time  - UC  only growing 10 to 50,000 colonies of urogenital nazia.  - stop Keflex     # Alcohol use disorder  - states that she drinks :more than  "she should\" 2 glasses of wine daily, sometimes more.  - CIWA monitoring     Diet: NPO for Medical/Clinical Reasons Except for: Meds    DVT Prophylaxis: Pneumatic Compression Devices  Abreu Catheter: Not present  Central Lines: None  Cardiac Monitoring: ACTIVE order. Indication: Stroke, acute (48 hours)  Code Status: Full Code      Disposition Plan      Expected Discharge Date: 10/08/2022                The patient's care was discussed with the Bedside Nurse, Patient and Patient's Family.    Rojas Sierra MD, MD  Hospitalist Service  [facility]  Securely message with the Vocera Web Console (learn more here)  Text page via SpokenLayer Paging/Directory         Clinically Significant Risk Factors Present on Admission               # Overweight: Estimated body mass index is 25.7 kg/m  as calculated from the following:    Height as of 10/4/22: 1.727 m (5' 8\").    Weight as of 10/4/22: 76.7 kg (169 lb).        ______________________________________________________________________    Interval History   Last 24-hour events noted.  Minimal change and had repeat MRI.   Plan for cerebral angiogram today  4 point ROS done and neg unless mentioned    Data reviewed today: I reviewed all medications, new labs and imaging results over the last 24 hours. I personally reviewed the brain MRI image(s) showing as below.    Physical Exam   BP (!) 154/68 (BP Location: Left arm)   Pulse 67   Temp 98.1  F (36.7  C) (Oral)   Resp 18   LMP  (LMP Unknown)   SpO2 99%   Gen- pleasant  lying in bed  HEENT- NAD, ALEKSEY  Neck- supple, no JVD elevation, no thyromegaly  CVS- I+II+ no m/r/g  RS- CTAB  Abdo- soft, no tenderness . No g/r/r   Ext- no edema   CNS- as detailed in stroke neuro note    Data    BMPRecent Labs   Lab 10/06/22  0809 10/06/22  0723 10/06/22  0538 10/06/22  0206 10/05/22  0910 10/05/22  0811 10/05/22  0200 10/04/22  1950 10/04/22  1432     --   --   --   --  131*  --  131* 131*   POTASSIUM 3.9  --   " --   --   --  4.0  4.0  --  4.1 4.3   CHLORIDE 102  --   --   --   --  97  --  94* 95*   RAINE 8.3*  --   --   --   --  8.7  --  9.1 9.2   CO2 26  --   --   --   --  22  --  26 27   BUN 19  --   --   --   --  28  --  24.2* 25.0*   CR 0.77  --   --   --   --  0.74  --  0.82 0.83   GLC 90 96 88 102*   < > 104*   < > 102* 107*    < > = values in this interval not displayed.     CBC  Recent Labs   Lab 10/06/22  0809 10/04/22  1950 10/04/22  1432 10/01/22  0405   WBC 6.6 8.6 9.3 10.6   RBC 3.20* 3.55* 3.75* 3.55*   HGB 9.6* 10.6* 11.2* 10.7*   HCT 29.6* 33.4* 35.3 33.0*   MCV 93 94 94 93   MCH 30.0 29.9 29.9 30.1   MCHC 32.4 31.7 31.7 32.4   RDW 12.3 12.0 12.2 12.1    372 366 324     INRNo lab results found in last 7 days.  LFTs  Recent Labs   Lab 10/04/22  1432   ALKPHOS 115*   AST 28   ALT 20   BILITOTAL 0.4   PROTTOTAL 6.5   ALBUMIN 3.6      PANCNo lab results found in last 7 days.    Recent Results (from the past 24 hour(s))   Echocardiogram Complete - For age > 60 yrs   Result Value    LVEF  55-60%    [facility]    [id number]  [id number]  [id number]  [id number]^MAILE^VENECIA^JAMES     [facility]  Echocardiography Laboratory  [address] [place] [address]     Name: KETURAH TERRELL  MRN: [id number]  : 1940  Study Date: 10/05/2022 03:12 PM  Age: 82 yrs  Gender: Female  Patient Location: [facility]  Reason For Study: Cerebrovascular Incident  Ordering Physician: VENECIA GR  Referring Physician: ANISA MARCUS  Performed By: Irving Cedeno     BSA: 1.9 m2  Height: 68 in  Weight: 169 lb  HR: 68  BP: 136/72 mmHg  ______________________________________________________________________________  Procedure  Complete Portable Echo Adult.  ______________________________________________________________________________  Interpretation Summary     1. The left ventricle is normal in size. There is normal left ventricular wall  thickness. Left ventricular systolic function is normal. The  visual ejection  fraction is 55-60%. Diastolic Doppler findings (E/E' ratio and/or other  parameters) suggest left ventricular filling pressures are normal. No regional  wall motion abnormalities noted.  2. The right ventricle is normal size. The right ventricular systolic function  is normal.  3. The left atrium is mildly dilated. Right atrial size is normal. The  interatrial septum is hypermobile. There is no color Doppler evidence of an  atrial shunt.  4. Trace mitral and tricuspid regurgitation.  5. No pericardial effusion.  6. No previous study for comparison.  ______________________________________________________________________________  Left Ventricle  The left ventricle is normal in size. There is normal left ventricular wall  thickness. Left ventricular systolic function is normal. The visual ejection  fraction is 55-60%. Diastolic Doppler findings (E/E' ratio and/or other  parameters) suggest left ventricular filling pressures are normal. No regional  wall motion abnormalities noted.     Right Ventricle  The right ventricle is normal size. The right ventricular systolic function is  normal.     Atria  The left atrium is mildly dilated. Right atrial size is normal. The  interatrial septum is hypermobile. There is no color Doppler evidence of an  atrial shunt.     Mitral Valve  There is mild mitral annular calcification. There is trace mitral  regurgitation.     Tricuspid Valve  There is trace tricuspid regurgitation. The right ventricular systolic  pressure is approximated at 15.1 mmHg plus the right atrial pressure.     Aortic Valve  There is mild trileaflet aortic sclerosis. No aortic regurgitation is present.  No aortic stenosis is present.     Pulmonic Valve  There is no pulmonic valvular stenosis.     Vessels  The aortic root is normal size. Normal size ascending aorta. The inferior vena  cava is normal.     Pericardium  There is no pericardial effusion.     Rhythm  Sinus rhythm was  noted.  ______________________________________________________________________________  MMode/2D Measurements & Calculations  IVSd: 0.82 cm     LVIDd: 4.9 cm  LVIDs: 3.1 cm  LVPWd: 1.1 cm  FS: 36.7 %  LV mass(C)d: 172.1 grams  LV mass(C)dI: 90.4 grams/m2  Ao root diam: 3.5 cm  LA dimension: 3.9 cm  asc Aorta Diam: 3.3 cm  LA/Ao: 1.1  LVOT diam: 2.1 cm  LVOT area: 3.5 cm2  LA Volume (BP): 76.3 ml  LA Volume Index (BP): 40.2 ml/m2  RWT: 0.46     Doppler Measurements & Calculations  MV E max matthias: 64.3 cm/sec  MV A max matthias: 76.8 cm/sec  MV E/A: 0.84  MV dec slope: 165.0 cm/sec2  MV dec time: 0.39 sec  PA acc time: 0.13 sec  TR max matthias: 194.0 cm/sec  TR max PG: 15.1 mmHg  E/E' av.3  Lateral E/e': 5.5  Medial E/e': 9.1     ______________________________________________________________________________  Report approved by: Xiao Oden 10/05/2022 03:55 PM         CT Head w/o Contrast    Narrative    EXAM: CT HEAD W/O CONTRAST  LOCATION: [facility]  DATE/TIME: 10/5/2022 9:41 PM    INDICATION: Weakness.  COMPARISON: CT 10/05/2022. CT perfusion 10/04/2022. MRI 10/04/2022.  TECHNIQUE: Routine CT Head without IV contrast. Multiplanar reformats. Dose reduction techniques were used.    FINDINGS:  INTRACRANIAL CONTENTS: No intracranial hemorrhage, extraaxial collection, or mass effect.  There has been expected evolution of the known acute right frontal lobe infarction, without evidence of hemorrhagic transformation or significant mass effect. No   definite new sites of effaced gray-white differentiation are demonstrated to suggest interval infarction. Chronic infarctions are again demonstrated in the right occipital lobe and the left temporoparietal region. Mild to moderate presumed chronic small   vessel ischemic changes. Mild to moderate generalized volume loss. No hydrocephalus. The sella is unremarkable for technique. The cerebellar tonsils are appropriately positioned.     VISUALIZED  ORBITS/SINUSES/MASTOIDS: Prior bilateral cataract surgery. Visualized portions of the orbits are otherwise unremarkable. No paranasal sinus mucosal disease. No middle ear or mastoid effusion.    BONES/SOFT TISSUES: Multiple bilateral craniotomy defects. No acute abnormality demonstrated.      Impression    IMPRESSION:  1.  Expected interval evolution of known acute right frontal lobe infarction without hemorrhagic transformation or mass effect.  2.  No evidence for new acute infarction.  3.  Chronic ischemic changes and age-related changes as above.   MR Brain w/o Contrast    Narrative    EXAM: MR BRAIN W/O CONTRAST  LOCATION: [facility]  DATE/TIME: 10/6/2022 1:20 AM    INDICATION: new stroke  COMPARISON: 10/5/2022  TECHNIQUE: Routine multiplanar multisequence head MRI without intravenous contrast.    FINDINGS:  INTRACRANIAL CONTENTS: There is a 4.5 x 3.7 x 3.1 cm patchy zone of restricted diffusion in the right frontal lobe is slightly progressed to involve the right middle frontal gyrus anteriorly. No hemorrhage. No mass, acute hemorrhage, or extra-axial fluid   collections. Scattered nonspecific T2/FLAIR hyperintensities within the cerebral white matter most consistent with mild chronic microvascular ischemic change. Chronic infarct right occipital lobe, left occipital lobe. Mild to moderate generalized   cerebral atrophy. No hydrocephalus. Normal position of the cerebellar tonsils.     SELLA: No abnormality accounting for technique.    OSSEOUS STRUCTURES/SOFT TISSUES: Large subacromial right ICA flow void. The major intracranial vascular flow voids are maintained.     ORBITS: No abnormality accounting for technique.     SINUSES/MASTOIDS: Mild mucosal thickening scattered about the paranasal sinuses. No middle ear or mastoid effusion.       Impression    IMPRESSION:  1.  Moderate-sized infarct in the right frontal lobe is slightly progressed in overall volume when compared to MRI dated  10/4/2022. No evidence for hemorrhagic transformation.  2.  Age-related changes with multiple chronic infarcts elsewhere as described.  3.  Loss of the normal right ICA flow void compatible with proximal occlusion.   XR Pelvis w Hip Right 1 View    Narrative    XR PELVIS AND HIP RIGHT 1 VIEW   10/6/2022 9:06 AM     HISTORY: right hip pain, s/p fall  COMPARISON: None.       Impression    IMPRESSION: Bilateral hip arthroplasties. No acute fracture or  dislocation.  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 Diffuse bony  mineralization. Degenerative changes in the lumbar spine.    RYANN RIZZO MD         SYSTEM ID:  [id number]           HEAD MRI:   1.  Acute infarct in the right frontal lobe.  2.  Chronic small vessel ischemic disease with numerous chronic infarcts.     HEAD MRA:   1.  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  2.  Occlusion of the right middle cerebral artery. 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  3.  Additional multifocal moderate and severe intracranial stenoses as described above.     NECK MRA:  1.  Normal appearance of the right cervical internal carotid artery with absence of flow related enhancement on noncontrast images but presence of contrast enhancement. Findings are suggestive of severe stenosis with retrograde flow via collaterals.  2.  Focal moderately severe stenosis of the distal right vertebral artery.     CT perfusion- Delay in transit time to anterior right frontal lobe and medial right parietal lobe with fairly symmetric deficit in relative cerebral blood flow and blood volume in this region consistent within known subacute infarct

## 2022-10-06 NOTE — PROGRESS NOTES
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  Patient is now presenting with new strokes in the right frontal cortical and subcortical regions.  A diagnostic cerebral angiogram is requested for further characterization of bypass.    Plan  - We plan to perform the diagnostic angiogram tomorrow at 9:30 a.m.  - Keep patient n.p.o. after midnight  -Continue dual antiplatelet therapy as planned    Praneeth Castro MD, MPH  Neuroendovascular Surgery Fellow  [facility]  Pager: [phone]

## 2022-10-06 NOTE — PLAN OF CARE
Reason for Admission: R MCA CVA    Cognitive/Mentation: A/Ox 4  Neuros/CMS: Patient waxing and waning through shift. PERRLA, L neglect, L weak, difficulty following commands, inattention.   VS: Stable, keep SBP>140. Tele: NSR.  GI: BS +, + flatus. Continent. Complaining of constipation, requesting stool softeners in AM  : Voiding adequately. Continent.  Pulmonary: LS clear.  Pain: Denies.     Drains: None  Skin: Scattered bruising  Activity: Assist x 1 with GBW.  Diet: NPO at midnight     Aggression Stoplight Score: Green  Therapies recs: Pending  Discharge: Pending    End of shift summary: During in-room handoff with day nurse, patient showed increase in neglect and weakness that slowly improved as patient walked back to bed, Stroke Neuro paged, 500 mL bolus given. At 2000 assessment, patient very lethargic and not following most commands with increased L weakness and neglect. Stroke Neuro paged, see notes. Repeat head Ct shows new area of ischemia in the right MCA territory, MRI ordered and vital parameters changed. Keep HOB flat for 2 hours. NS running at 75 mL/hr. Cerebral angiogram planned for tomorrow.

## 2022-10-06 NOTE — PROGRESS NOTES
"      [facility]    Brief Clinical Note    Patient was noted to be more weak and neglectful after getting back into bed, her blood pressure dropped from 150s to 110s. She had a previous episode earlier this morning and her blood pressure was in the 80s at the time, likely indicating a perfusion deficits related to hypotension or relative BP drops.   She had another episode with increase in left sided neglect, weakness and had improvement with lying down flat but still has symptoms. Repeat head Ct shows new area of ischemia in the right MCA territory consistent with increased left sided symptoms       Plan:  Continue to monitor Bps  Bolus 500ml of NS  Continue IVF  Encourage hydration  Orthostatics  Head Ct   Avoid rapid drops in BP  BP goals 140-220  Repeat MRI brain WO  Continue to lie flat for 2 hours      My recommendations are based on the information provided over the phone by Candida Rose's in-person providers. They are not intended to replace the clinical judgment of her in-person providers. I was not requested to personally see or examine the patient at this time.    The Stroke Staff is Dr. Guerra.    Ashley Valverde MD  Vascular Neurology Fellow  To page me or covering stroke neurology team member, click here: AMCOM   Choose \"On Call\" tab at top, then search dropdown box for \"Neurology Adult\", select location, press Enter, then look for stroke/neuro ICU/telestroke.        "

## 2022-10-06 NOTE — PROGRESS NOTES
[facility]    Stroke Progress Note    Interval EventsYesterday evening had episode of increased weakness and neglect in the setting of hypotension; CT suggested new area of infarct but this was not confirmed on MRI. Echocardiogram returned with normal LVEF, no regional wall motion abnormality, mildly enlarged left atrium. EEG with mild to moderate diffuse encephalopathy, no seizures.  Plan for DSA today to better characterize right ICA petrous and cavernous segments, however delayed due to mechanical downtime.     HPI Summary  Candida Rose is a 82 year old female with PMH of moyamoya s/p bilateral STA-MCA bypasses in 2006, prior CVA, DM2, HTN, HLD. PTA on DAPT. Presented to [facility] ED 10/4 with multiple falls - states the R leg will become shaky and then she would fall without LOC. Telestroke exam with NIHSS 0, no focal weakness. Brain MRI showed acute R frontal stroke. MRA showed occlusion of R ICA petrous/cavernous segments and occlusion of R MCA (vs poor flow as not well visualized), focal moderate L ICA stenosis intracranially, severe focal L M1 stenosis, mod severe stenosis R PCA, severe R V 4 stenosis. In reviewing prior MRAs from 6/2022, 6/2021 and 6/2019, these appear similar.    Stroke Evaluation Summarized     MRI/Head CT MRI brain 10/5: slightly progressed volume of known right frontal infarct, no new area of infarct   MRI brain 10/4: acute R frontal infarct, chronic infarcts (R occipital, L temporoparietal, bilateral cerebellar) and small vessel disease   Intracranial Vasculature MRA head: occlusion of R ICA petrous/cavernous segments and occlusion of R MCA, focal moderate L ICA stenosis intracranially, severe focal L M1 stenosis, mod severe stenosis R PCA, severe R V 4 stenosis   Cervical Vasculature MRA: 1.  Normal appearance of the right cervical internal carotid artery with absence of flow related enhancement on noncontrast images but presence of contrast enhancement.  "Findings are suggestive of severe stenosis with retrograde flow via collaterals.  2.  Focal moderately severe stenosis of the distal right vertebral artery.      Echocardiogram LVEF 55-60%, no regional wall motion abnormality, left atrium mildly dilated, no doppler evidence of shunt    EKG/Telemetry Junctional rhythm   Other Testing EEG: mild to moderate diffuse nonspecific encephalopathy with additional structural abnormalities greater on the right; no electrographic seizures or epileptogenic discharges       LDL  10/4/2022: 71 mg/dL   A1C  10/4/2022: 5.4 %   Troponin 10/4/2022: 20 ng/L  10/5/2022: 11 ng/L     Impression  1. Acute ischemic stroke of R frontal due to other etiology (hypoperfusion in the setting of Moyamoya)    2. Fluctuating lethargy - may be related to decreased cerebral perfusion in the setting of known multifocal ICAD, dehydration. EEG without evidence of seizures.     Plan  - Neurochecks and Vital Signs every 4 hours   - Maintain BP >140 with strict avoidance of hypotension   - Continue DAPT with daily aspirin 81 mg + Plavix 75 mg for secondary stroke prevention  - Statin: atorvastatin 40 mg, LDL goal 40-70  - Telemetry, EKG  - Bedside Glucose Monitoring  - Nutrition: per nursing, passed bedside swallow eval  - PT/OT/SLP  - Stroke Education  - Euthermia, Euglycemia  - 2 hour video EEG  - Maintenance isotonic IV fluids  -DSA, pending    Patient Follow-up    - final recommendation pending work-up    We will continue to follow.     Saba FUENTES, CNP  Vascular Neurology  To page me or covering stroke neurology team member, click here: AMCOM   Choose \"On Call\" tab at top, then search dropdown box for \"Neurology Adult\", select location, press Enter, then look for stroke/neuro ICU/telestroke.  ______________________________________________________    Clinically Significant Risk Factors Present on Admission                 Medications   Scheduled Meds    aspirin  81 mg Oral Daily    Or     aspirin "  81 mg Oral or Feeding Tube Daily     atorvastatin  40 mg Oral or Feeding Tube QPM     clopidogrel  75 mg Oral or Feeding Tube Daily     sodium chloride (PF)  3 mL Intracatheter Q8H       Infusion Meds    - MEDICATION INSTRUCTIONS -       - MEDICATION INSTRUCTIONS -       sodium chloride 75 mL/hr at 10/06/22 0153       PRN Meds  acetaminophen, glucose **OR** dextrose **OR** glucagon, labetalol **OR** hydrALAZINE, lidocaine 4%, lidocaine (buffered or not buffered), - MEDICATION INSTRUCTIONS -, - MEDICATION INSTRUCTIONS -, melatonin, ondansetron **OR** ondansetron, sodium chloride (PF)       PHYSICAL EXAMINATION  Temp:  [97  F (36.1  C)-98.7  F (37.1  C)] 98.1  F (36.7  C)  Pulse:  [64-81] 67  Resp:  [16-18] 18  BP: (115-159)/(49-76) 154/68  SpO2:  [95 %-100 %] 99 %      General Exam  General:  patient lying flat in bed without any acute distress    HEENT:  normocephalic/atraumatic  Pulmonary:  no respiratory distress    Neuro Exam  Mental Status:  alert, oriented x 3, follows commands, speech clear and fluent, naming and repetition normal  Cranial Nerves:  visual fields intact, PERRL, EOMI with normal smooth pursuit, facial sensation intact and symmetric, subtle left lower facial droop, hearing not formally tested but intact to conversation, palate elevation symmetric and uvula midline, no dysarthria, shoulder shrug strong bilaterally, tongue protrusion midline  Motor:  normal muscle tone and bulk, no abnormal movements, able to move all limbs spontaneously, no drift in BUE, drift hitting bed in BLE  Sensory:  light touch sensation intact and symmetric throughout upper and lower extremities, no extinction on double simultaneous stimulation   Coordination:  normal finger-to-nose and heel-to-shin bilaterally without dysmetria  Station/Gait:  deferred    Stroke Scales    NIHSS  1a. Level of Consciousness 0-->Alert, keenly responsive   1b. LOC Questions 0-->Answers both questions correctly   1c. LOC Commands 0-->Performs  both tasks correctly   2.   Best Gaze 0-->Normal   3.   Visual 0-->No visual loss   4.   Facial Palsy 1-->Minor paralysis (flattened nasolabial fold, asymmetry on smiling)   5a. Motor Arm, Left 1-->Drift, limb holds 90 (or 45) degrees, but drifts down before full 10 seconds, does not hit bed or other support   5b. Motor Arm, Right 0-->No drift, limb holds 90 (or 45) degrees for full 10 secs   6a. Motor Leg, Left 2-->Some effort against gravity, leg falls to bed by 5 secs, but has some effort against gravity   6b. Motor Leg, right 2-->Some effort against gravity, leg falls to bed by 5 secs, but has some effort against gravity   7.   Limb Ataxia 0-->Absent   8.   Sensory 0-->Normal, no sensory loss   9.   Best Language 0-->No aphasia, normal   10. Dysarthria 0-->Normal   11. Extinction and Inattention  0-->No abnormality   Total 6 (10/06/22 0859)       Imaging  I personally reviewed all imaging; relevant findings per HPI.     Lab Results Data   CBC  Recent Labs   Lab 10/06/22  0809 10/04/22  1950 10/04/22  1432   WBC 6.6 8.6 9.3   RBC 3.20* 3.55* 3.75*   HGB 9.6* 10.6* 11.2*   HCT 29.6* 33.4* 35.3    372 366     Basic Metabolic Panel    Recent Labs   Lab 10/06/22  1154 10/06/22  0809 10/06/22  0723 10/05/22  0910 10/05/22  0811 10/05/22  0200 10/04/22  1950   NA  --  133  --   --  131*  --  131*   POTASSIUM  --  3.9  --   --  4.0  4.0  --  4.1   CHLORIDE  --  102  --   --  97  --  94*   CO2  --  26  --   --  22  --  26   BUN  --  19  --   --  28  --  24.2*   CR  --  0.77  --   --  0.74  --  0.82   GLC 79 90 96   < > 104*   < > 102*   RAINE  --  8.3*  --   --  8.7  --  9.1    < > = values in this interval not displayed.     Liver Panel  Recent Labs   Lab 10/04/22  1432   PROTTOTAL 6.5   ALBUMIN 3.6   BILITOTAL 0.4   ALKPHOS 115*   AST 28   ALT 20     INR  No lab results found.   Lipid Profile    Recent Labs   Lab Test 10/04/22  1950 12/31/21  0710 01/04/21  1001 05/24/16  0947 06/16/15  1127   CHOL 166 150 196    < > 197   HDL 82 66 56   < > 62   LDL 71 68 106*   < > 106   TRIG 65 82 171*   < > 143   CHOLHDLRATIO  --   --   --   --  3.2    < > = values in this interval not displayed.     A1C    Recent Labs   Lab Test 10/04/22  1950 01/26/18  0818 04/11/17  0849   A1C 5.4 5.8 5.8     Troponin    Recent Labs   Lab 10/05/22  0811 10/04/22  1950 10/01/22  0611 10/01/22  0405   CTROPT  --  20* 19* 19*   TROPONINIS 11  --   --   --    Billing: I have personally spent a total of 40 minutes providing care today, time spent in reviewing medical records and reviewing tests, examining the patient and obtaining history, coordination of care, and discussion with the patient and/or family regarding diagnostic results, prognosis, symptom management, risks and benefits of management options, and development of plan of care. Greater than 50% was spent in counseling and coordination of care.

## 2022-10-06 NOTE — PROGRESS NOTES
"   10/06/22 1400   Quick Adds   Type of Visit Initial Occupational Therapy Evaluation   Living Environment   People in Home spouse   Current Living Arrangements house   Home Accessibility stairs to enter home   Number of Stairs, Main Entrance 2   Stair Railings, Main Entrance   (per chart review no handrail but there is a ledge that can be held onto for support)   Transportation Anticipated car, drives self   Living Environment Comments Pts home has a basement but monet not often go to baseline, all needs met on main level; has walk in shower and tub shower; walk in shower has grab bars   Self-Care   Usual Activity Tolerance good   Current Activity Tolerance moderate   Regular Exercise Yes   Activity/Exercise Type walking   Exercise Amount/Frequency 30 mins;1 hr   Equipment Currently Used at Home walker, standard  (owns but walk not using PTA)   Fall history within last six months yes   Number of times patient has fallen within last six months 6   Activity/Exercise/Self-Care Comment No AD used at baseline, pt owns shower chair from hip surgeries, Pt reports she was very IND w/ ADLs priot to admission. Pt reports she walks over 300 minutes each week and is wondering when she can get back to it   Instrumental Activities of Daily Living (IADL)   Previous Responsibilities meal prep;housekeeping;laundry;shopping;medication management;finances;driving   IADL Comments Pt also very IND w/ IADLs PTA, she does all of her own meds, does most of the meals but her  is also available to cook,   General Information   Onset of Illness/Injury or Date of Surgery 10/05/22   Referring Physician Linda Holland MD   Patient/Family Therapy Goal Statement (OT) \"when can I get back to doing my walking\"   Additional Occupational Profile Info/Pertinent History of Current Problem Candida Rose is a 82-year-old female with a history of moyamoya disease, hypertension, hyperlipidemia, stroke, diabetes, osteoarthritis, alcohol use " disorder and chronic pain who was transferred from [facility] after she was found to have an acute stroke.   Existing Precautions/Restrictions fall   General Observations and Info Possibly some L neglect   Cognitive Status Examination   Cognitive Status Comments Answers questions accurately and appropraitely, follows commands.   Visual Perception   Impact of Vision Impairment on Function (Vision) R gaze preference and possible L neglect   Sensory   Sensory Comments denies any sensory deficits/changes   Pain Assessment   Patient Currently in Pain No   Integumentary/Edema   Integumentary/Edema Comments no concerns, pt reports IV site is bothering her   Posture   Posture Comments R gaze preference   Range of Motion Comprehensive   Comment, General Range of Motion BUE sh elevation 100*( 2+/5), unable to maintain full ROM, holds against slight resistance; pt has existing L shoulder pain from arthritis currently working w/ OP PT for conservative management   Strength Comprehensive (MMT)   Comment, General Manual Muscle Testing (MMT) Assessment generalized global weakness, holds against minimal resistance, poor postural control strength - unable to maintain trunk position when assessing UE strength sitting EOB   Coordination   Coordination Comments Functional coordination intact, manages variety of containers (pull and twist tops) in addtion to opening plastic wrap on toothpaste   Bed Mobility   Comment (Bed Mobility) SBA   Transfers   Transfer Comments CGA   Balance   Balance Comments Functional balance within the room using FWW intact, did not assess further defer to PT - has had many falls, was not using an AD prior to admission   Clinical Impression   Criteria for Skilled Therapeutic Interventions Met (OT) Yes, treatment indicated   OT Diagnosis impaired I/ADL IND   OT Problem List-Impairments impacting ADL problems related to;activity tolerance impaired;balance;cognition;mobility   Assessment of Occupational  Performance 3-5 Performance Deficits   Identified Performance Deficits home mgmt, activity tolerance, balance, attention, driving   Planned Therapy Interventions (OT) ADL retraining;IADL retraining;ROM;transfer training;home program guidelines;progressive activity/exercise;risk factor education   Clinical Decision Making Complexity (OT) low complexity   Risk & Benefits of therapy have been explained care plan/treatment goals reviewed;evaluation/treatment results reviewed;risks/benefits reviewed;current/potential barriers reviewed;participants voiced agreement with care plan;participants included;patient;spouse/significant other   OT Discharge Planning   OT Discharge Recommendation (DC Rec) home with outpatient occupational therapy   OT Rationale for DC Rec Pt functioning below very IND and active baseline. Defer to PT for mobility recs, pt moving functionally within the room Mod I w/ FWW was not using AD at baseline, therapist did not observe L inattention/neglect today but will continue to assess and monitor, would benefit from an OP OT eval for return to driving, spouse assist w/ IADLs as needed.   OT Brief overview of current status SBA w/ FWW, SBA ADLs   Total Evaluation Time (Minutes)   Total Evaluation Time (Minutes) 15   OT Goals   Therapy Frequency (OT) 5 times/wk   OT Predicted Duration/Target Date for Goal Attainment 10/11/22   OT Goals Hygiene/Grooming;Upper Body Dressing;Lower Body Dressing;Toilet Transfer/Toileting;Cognition;OT Goal 1   OT: Hygiene/Grooming modified independent   OT: Upper Body Dressing Modified independent   OT: Lower Body Dressing Modified independent   OT: Toilet Transfer/Toileting Modified independent   OT: Cognitive Patient/caregiver will verbalize understanding of cognitive assessment results/recommendations as needed for safe discharge planning   OT: Goal 1 Pt will demonstrate improved L attention by implementing compensatory strategies for functional tasks.

## 2022-10-06 NOTE — PLAN OF CARE
Reason for Admission: R frontal MCA CVA    Cognitive/Mentation: A/Ox 4, forgetful  Neuros/CMS: Intact ex neuros fluctuate left sided weakness, L neglect,   VS: VSS.   Tele: NSR.  GI: BS active x4, passing flatus, last BM 10/4/22. Continent.  : voiding without difficulty. Continent.  Pulmonary: LS clear throughout.  Pain: denies.     Drains/Lines: PIV w/ NS@75ml/h  Skin: scattered bruising  Activity: Assist x 1 with Gb/W.  Diet: Regular with thin liquids. Takes pills whole with water. --NPO at midnight    Therapies recs: home vs TCU  Discharge: pending, having cerebral angio tomorrow     Aggression Stoplight Tool: green    End of shift summary: PT neuros fluctuate, sleepy at times but arousable. Plan for angio tomorrow.

## 2022-10-07 ENCOUNTER — APPOINTMENT (OUTPATIENT)
Dept: INTERVENTIONAL RADIOLOGY/VASCULAR | Facility: CLINIC | Age: 82
DRG: 065 | End: 2022-10-07
Attending: HOSPITALIST
Payer: MEDICARE

## 2022-10-07 ENCOUNTER — APPOINTMENT (OUTPATIENT)
Dept: CT IMAGING | Facility: CLINIC | Age: 82
DRG: 065 | End: 2022-10-07
Payer: MEDICARE

## 2022-10-07 ENCOUNTER — APPOINTMENT (OUTPATIENT)
Dept: PHYSICAL THERAPY | Facility: CLINIC | Age: 82
DRG: 065 | End: 2022-10-07
Attending: HOSPITALIST
Payer: MEDICARE

## 2022-10-07 LAB
GLUCOSE BLDC GLUCOMTR-MCNC: 105 MG/DL (ref 70–99)
GLUCOSE BLDC GLUCOMTR-MCNC: 116 MG/DL (ref 70–99)
GLUCOSE BLDC GLUCOMTR-MCNC: 120 MG/DL (ref 70–99)
GLUCOSE BLDC GLUCOMTR-MCNC: 84 MG/DL (ref 70–99)
GLUCOSE BLDC GLUCOMTR-MCNC: 87 MG/DL (ref 70–99)
HGB BLD-MCNC: 9.6 G/DL (ref 11.7–15.7)
POTASSIUM BLD-SCNC: 4.4 MMOL/L (ref 3.4–5.3)

## 2022-10-07 PROCEDURE — 99232 SBSQ HOSP IP/OBS MODERATE 35: CPT | Performed by: INTERNAL MEDICINE

## 2022-10-07 PROCEDURE — 85018 HEMOGLOBIN: CPT

## 2022-10-07 PROCEDURE — 36415 COLL VENOUS BLD VENIPUNCTURE: CPT | Performed by: INTERNAL MEDICINE

## 2022-10-07 PROCEDURE — 258N000003 HC RX IP 258 OP 636: Performed by: INTERNAL MEDICINE

## 2022-10-07 PROCEDURE — 250N000013 HC RX MED GY IP 250 OP 250 PS 637: Performed by: INTERNAL MEDICINE

## 2022-10-07 PROCEDURE — 99152 MOD SED SAME PHYS/QHP 5/>YRS: CPT

## 2022-10-07 PROCEDURE — 250N000011 HC RX IP 250 OP 636: Performed by: STUDENT IN AN ORGANIZED HEALTH CARE EDUCATION/TRAINING PROGRAM

## 2022-10-07 PROCEDURE — 250N000013 HC RX MED GY IP 250 OP 250 PS 637: Performed by: PHYSICIAN ASSISTANT

## 2022-10-07 PROCEDURE — C1887 CATHETER, GUIDING: HCPCS

## 2022-10-07 PROCEDURE — 36415 COLL VENOUS BLD VENIPUNCTURE: CPT

## 2022-10-07 PROCEDURE — 36227 PLACE CATH XTRNL CAROTID: CPT

## 2022-10-07 PROCEDURE — 99356 PR PROLONGED SERV,INPATIENT,1ST HR: CPT | Mod: FS | Performed by: PSYCHIATRY & NEUROLOGY

## 2022-10-07 PROCEDURE — 99152 MOD SED SAME PHYS/QHP 5/>YRS: CPT | Mod: GC | Performed by: RADIOLOGY

## 2022-10-07 PROCEDURE — 36224 PLACE CATH CAROTD ART: CPT | Mod: 50 | Performed by: RADIOLOGY

## 2022-10-07 PROCEDURE — 255N000002 HC RX 255 OP 636: Performed by: RADIOLOGY

## 2022-10-07 PROCEDURE — B3161ZZ FLUOROSCOPY OF RIGHT INTERNAL CAROTID ARTERY USING LOW OSMOLAR CONTRAST: ICD-10-PCS | Performed by: STUDENT IN AN ORGANIZED HEALTH CARE EDUCATION/TRAINING PROGRAM

## 2022-10-07 PROCEDURE — 99207 PR NO BILLABLE SERVICE THIS VISIT: CPT

## 2022-10-07 PROCEDURE — B31R1ZZ FLUOROSCOPY OF INTRACRANIAL ARTERIES USING LOW OSMOLAR CONTRAST: ICD-10-PCS | Performed by: STUDENT IN AN ORGANIZED HEALTH CARE EDUCATION/TRAINING PROGRAM

## 2022-10-07 PROCEDURE — C1769 GUIDE WIRE: HCPCS

## 2022-10-07 PROCEDURE — 84132 ASSAY OF SERUM POTASSIUM: CPT | Performed by: INTERNAL MEDICINE

## 2022-10-07 PROCEDURE — 36227 PLACE CATH XTRNL CAROTID: CPT | Mod: 50 | Performed by: RADIOLOGY

## 2022-10-07 PROCEDURE — 272N000196 HC ACCESSORY CR5

## 2022-10-07 PROCEDURE — 272N000567 HC SHEATH CR4

## 2022-10-07 PROCEDURE — 97112 NEUROMUSCULAR REEDUCATION: CPT | Mod: GP

## 2022-10-07 PROCEDURE — 76937 US GUIDE VASCULAR ACCESS: CPT | Mod: 26 | Performed by: RADIOLOGY

## 2022-10-07 PROCEDURE — 250N000011 HC RX IP 250 OP 636

## 2022-10-07 PROCEDURE — 272N000120 HC CATH CR5

## 2022-10-07 PROCEDURE — 99233 SBSQ HOSP IP/OBS HIGH 50: CPT | Mod: FS | Performed by: PSYCHIATRY & NEUROLOGY

## 2022-10-07 PROCEDURE — 250N000011 HC RX IP 250 OP 636: Performed by: INTERNAL MEDICINE

## 2022-10-07 PROCEDURE — 120N000013 HC R&B IMCU

## 2022-10-07 PROCEDURE — 36226 PLACE CATH VERTEBRAL ART: CPT | Mod: LT | Performed by: RADIOLOGY

## 2022-10-07 PROCEDURE — 250N000009 HC RX 250: Performed by: INTERNAL MEDICINE

## 2022-10-07 PROCEDURE — G1010 CDSM STANSON: HCPCS

## 2022-10-07 PROCEDURE — 250N000009 HC RX 250: Performed by: STUDENT IN AN ORGANIZED HEALTH CARE EDUCATION/TRAINING PROGRAM

## 2022-10-07 RX ORDER — FENTANYL CITRATE 50 UG/ML
25 INJECTION, SOLUTION INTRAMUSCULAR; INTRAVENOUS ONCE
Status: DISCONTINUED | OUTPATIENT
Start: 2022-10-07 | End: 2022-10-07

## 2022-10-07 RX ORDER — NALOXONE HYDROCHLORIDE 0.4 MG/ML
0.2 INJECTION, SOLUTION INTRAMUSCULAR; INTRAVENOUS; SUBCUTANEOUS
Status: DISCONTINUED | OUTPATIENT
Start: 2022-10-07 | End: 2022-10-07

## 2022-10-07 RX ORDER — HEPARIN SODIUM 200 [USP'U]/100ML
3 INJECTION, SOLUTION INTRAVENOUS CONTINUOUS PRN
Status: DISCONTINUED | OUTPATIENT
Start: 2022-10-07 | End: 2022-10-07

## 2022-10-07 RX ORDER — FENTANYL CITRATE 50 UG/ML
50 INJECTION, SOLUTION INTRAMUSCULAR; INTRAVENOUS ONCE
Status: DISCONTINUED | OUTPATIENT
Start: 2022-10-07 | End: 2022-10-07

## 2022-10-07 RX ORDER — FENTANYL CITRATE 50 UG/ML
25-50 INJECTION, SOLUTION INTRAMUSCULAR; INTRAVENOUS EVERY 5 MIN PRN
Status: DISCONTINUED | OUTPATIENT
Start: 2022-10-07 | End: 2022-10-07

## 2022-10-07 RX ORDER — IOPAMIDOL 612 MG/ML
150 INJECTION, SOLUTION INTRAVASCULAR ONCE
Status: COMPLETED | OUTPATIENT
Start: 2022-10-07 | End: 2022-10-07

## 2022-10-07 RX ORDER — IOPAMIDOL 755 MG/ML
104 INJECTION, SOLUTION INTRAVASCULAR ONCE
Status: COMPLETED | OUTPATIENT
Start: 2022-10-07 | End: 2022-10-07

## 2022-10-07 RX ORDER — NALOXONE HYDROCHLORIDE 0.4 MG/ML
0.4 INJECTION, SOLUTION INTRAMUSCULAR; INTRAVENOUS; SUBCUTANEOUS
Status: DISCONTINUED | OUTPATIENT
Start: 2022-10-07 | End: 2022-10-07

## 2022-10-07 RX ORDER — FLUMAZENIL 0.1 MG/ML
0.2 INJECTION, SOLUTION INTRAVENOUS
Status: DISCONTINUED | OUTPATIENT
Start: 2022-10-07 | End: 2022-10-07

## 2022-10-07 RX ORDER — HEPARIN SODIUM 200 [USP'U]/100ML
1 INJECTION, SOLUTION INTRAVENOUS CONTINUOUS PRN
Status: DISCONTINUED | OUTPATIENT
Start: 2022-10-07 | End: 2022-10-07

## 2022-10-07 RX ORDER — FENTANYL CITRATE 50 UG/ML
INJECTION, SOLUTION INTRAMUSCULAR; INTRAVENOUS
Status: COMPLETED
Start: 2022-10-07 | End: 2022-10-07

## 2022-10-07 RX ADMIN — ATORVASTATIN CALCIUM 40 MG: 40 TABLET, FILM COATED ORAL at 19:51

## 2022-10-07 RX ADMIN — IOPAMIDOL 104 ML: 755 INJECTION, SOLUTION INTRAVENOUS at 19:01

## 2022-10-07 RX ADMIN — IOPAMIDOL 68 ML: 612 INJECTION, SOLUTION INTRAVENOUS at 15:16

## 2022-10-07 RX ADMIN — MIDAZOLAM HYDROCHLORIDE 1 MG: 1 INJECTION, SOLUTION INTRAMUSCULAR; INTRAVENOUS at 14:17

## 2022-10-07 RX ADMIN — FENTANYL CITRATE 50 MCG: 50 INJECTION, SOLUTION INTRAMUSCULAR; INTRAVENOUS at 14:17

## 2022-10-07 RX ADMIN — SODIUM CHLORIDE: 9 INJECTION, SOLUTION INTRAVENOUS at 04:27

## 2022-10-07 RX ADMIN — FENTANYL CITRATE 50 MCG: 50 INJECTION INTRAMUSCULAR; INTRAVENOUS at 17:48

## 2022-10-07 RX ADMIN — CLOPIDOGREL BISULFATE 75 MG: 75 TABLET ORAL at 08:06

## 2022-10-07 RX ADMIN — SODIUM CHLORIDE 75 ML: 900 INJECTION INTRAVENOUS at 19:05

## 2022-10-07 RX ADMIN — HEPARIN SODIUM 3 BAG: 200 INJECTION, SOLUTION INTRAVENOUS at 14:11

## 2022-10-07 RX ADMIN — ASPIRIN 81 MG CHEWABLE TABLET 81 MG: 81 TABLET CHEWABLE at 08:05

## 2022-10-07 RX ADMIN — FENTANYL CITRATE 25 MCG: 50 INJECTION, SOLUTION INTRAMUSCULAR; INTRAVENOUS at 14:21

## 2022-10-07 RX ADMIN — FENTANYL CITRATE 25 MCG: 50 INJECTION INTRAMUSCULAR; INTRAVENOUS at 18:02

## 2022-10-07 RX ADMIN — LIDOCAINE HYDROCHLORIDE 8 ML: 10 INJECTION, SOLUTION INFILTRATION; PERINEURAL at 14:18

## 2022-10-07 RX ADMIN — FENTANYL CITRATE 50 MCG: 50 INJECTION, SOLUTION INTRAMUSCULAR; INTRAVENOUS at 17:48

## 2022-10-07 RX ADMIN — MIDAZOLAM HYDROCHLORIDE 0.5 MG: 1 INJECTION, SOLUTION INTRAMUSCULAR; INTRAVENOUS at 14:21

## 2022-10-07 RX ADMIN — ACETAMINOPHEN 650 MG: 325 TABLET, FILM COATED ORAL at 19:51

## 2022-10-07 ASSESSMENT — ACTIVITIES OF DAILY LIVING (ADL)
ADLS_ACUITY_SCORE: 34
ADLS_ACUITY_SCORE: 34
ADLS_ACUITY_SCORE: 30
ADLS_ACUITY_SCORE: 33
ADLS_ACUITY_SCORE: 34
ADLS_ACUITY_SCORE: 30
ADLS_ACUITY_SCORE: 34
ADLS_ACUITY_SCORE: 30
ADLS_ACUITY_SCORE: 34

## 2022-10-07 NOTE — PROCEDURES
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 Right STA to MCA anastamosis poor filling of the posterior branches of the superior division of the right MCA, no filling of the posterior or frontal divisions. Filling of the L MCA territory via the STA-MCA bypass is robust.    Plan:  Recommend CT perfusion pre and post diamox study to assess any other areas at risk for stroke (right STEPHEN division)    Primary Surgeon:  Dr. Kris Herrera  Secondary Surgeon:  Not applicable  Secondary Surgeon Review:  None  Fellow:  Kirt  Additional Assistants:  none    Prior to the start of the procedure and with procedural staff participation, I verbally confirmed: the patient s identity using two indicators, relevant allergies, that the procedure was appropriate and matched the consent or emergent situation, and that the correct equipment/implants were available. Immediately prior to starting the procedure I conducted the Time Out with the procedural staff and re-confirmed the patient s name, procedure, and site/side. (The Joint Commission universal protocol was followed.)  Yes    PRU value: Not applicable    Anesthesia:  Conscious Sedation  Medications:  Fentanyl, Midazolam  Puncture site:  Right Femoral Artery    Fluoroscopy time (minutes):  24.1  Radiation dose (mGy):  1786.65    Contrast amount (mL):  68     Estimated blood loss (mL):  10    Closure:  Manual    Disposition:  Will be followed in hospital by the Neuro Critical Care/Stroke team.        Sedation Post-Procedure Summary    Sedatives: Fentanyl and Midazolam  (Versed)    Vital signs and pulse oximetry were monitored and remained stable throughout the procedure, and sedation was maintained until the procedure was complete.  The patient was monitored by staff until sedation discharge criteria were met.    Patient tolerance:  Patient tolerated the procedure well with no immediate complications.    Time of sedation in minutes: 50 minutes from beginning to end of physician one to one monitoring.    Jenna Moralez MD  Pager:  [phone]

## 2022-10-07 NOTE — PLAN OF CARE
Reason for Admission: R frontal CVA     Cognitive/Mentation: A&O x4, forgetful  Neuros/CMS: Intact ex generalized weakness 4/5  VS: Stable on RA. SBP >140.    Tele: NSR  GI: BS active, + flatus. Continent.  : Voiding. Continent.  Pulmonary: LS clear.  Pain: Denies     Drains/Lines: R PIV infusing 75 ml/hr  Skin: Intact ex bruising  Activity: Assist x1 GB/W  Diet:  NPO since midnight.      Therapies recs: TCU vs home  Discharge: Pending     Aggression Stoplight Tool: Green     End of shift summary: No acute changes. Following commands well tonight. Plan for cerebral angio today around 9:30am.

## 2022-10-07 NOTE — IR NOTE
patient Name:  Candida Rose    Medical Record Number: [id number]  Today's Date:  October 7, 2022  Procedure: diagnostic cerebral angiogram  Start Time: 1415  End of procedure time: 1505    Report given to: station 73 RN   Time pt departs:  1330       Notes:       Pt transferred to IR table. Prepped and draped appropriately. Monitoring equipment applied. NC applied. No complaints of pain at this time. Timeout recorded.       VSS. Pt remains on RA. No c/o pain at this time. Groin site closed with manual pressure, site  CDI, soft. No further questions from RN or pt.  Bedrest for 5 hours till 2030    Versed 1.5 mg,   Fentanyl 75mcg.     Post-procedure Education  The following information has been reviewed with the patient and family:    1. Maintain bedrest with right leg straight until 2030 .  2. Notify nurse immediately if having any bleeding from site, any changes in sensation, or changes in strength.  3. Notify nurse if you have to go to bathroom, so staff will assist you.   4. Nurses will be doing frequent checks afterwards, including your foot or arm pulses, vitals, groin site and neuro exam.   5. Press your call light if you have any questions.    Learner's Response to Angiogram Education: verbalizes understanding, needs reinforcement

## 2022-10-07 NOTE — PROVIDER NOTIFICATION
Dr. Joseph notified of /62. Pt asymptomatic.     Addendum: No new orders at this time. Nurse will continue to monitor closely.

## 2022-10-07 NOTE — PROGRESS NOTES
[facility]    Medicine Progress Note - Hospitalist Service    Date of Admission:  10/5/2022    Assessment & Plan        Candida Rose is a 82-year-old female with a history of moyamoya disease, hypertension, hyperlipidemia, stroke, diabetes, osteoarthritis, alcohol use disorder and chronic pain who was transferred from [facility] after she was found to have an acute stroke.     # Acute ischemic right frontal lobe stroke:    # Moyamoya disease:    - Appreciate  Stroke Neuro following patient.    - continue her PTA aspirin 81 mg daily and clopidogrel 75 mg daily  - P2Y12 level  - Tele  - FLP- LDL 71, HDL 82; started on Lipitor 40 mg po daily  - Hba1c 5.4  - planned for cerebral angiogram today  - hold PTA Norvasc and Atenolol, allow for permissive HTN and keep SBP >140  - PT/OT/SLP  - fall precautions     # Falls  - likely related to acute stroke  - X ray left shoulder - No fracture or dislocation. Advanced degenerative changes  at the glenohumeral joint     #  Hyponatremia, hypochloremic   - Na was 129 on 10/1, 131 on 10/4  - better     # Chronic anemia:    - She followed up with Hematology on 10/04/2022 for evaluation.  There is no evidence of bleeding. Bone marrow biopsy is planned in the next 4 weeks.  She has already had extensive laboratory studies performed such as iron studies, B12, folate, TSH, and SPEP.  If there is evidence of urinary or gastrointestinal bleeding, she will be referred to Urology and Gastroenterology, respectively.     #  Hypertension:    - hold her mohwg-pd-sqgvqcyuk amlodipine, atenolol and losartan for now given goal of permissive hypertension.     #  Dyslipidemia  - PTA on Zocor, now switched to Lipitor 40 mg po daily      #  Abnormal UA:  - had a recent abnormal UA on 10/01- ER visit; started on Keflex at that time  - UC  only growing 10 to 50,000 colonies of urogenital nazia.  - stop Keflex     # Alcohol use disorder  - states that she drinks :more than  "she should\" 2 glasses of wine daily, sometimes more.  - CIWA monitoring     Diet: NPO per Anesthesia Guidelines for Procedure/Surgery Except for: Meds    DVT Prophylaxis: Pneumatic Compression Devices  Abreu Catheter: Not present  Central Lines: None  Cardiac Monitoring: ACTIVE order. Indication: Stroke, acute (48 hours)  Code Status: Full Code      Disposition Plan      Expected Discharge Date: 10/08/2022                The patient's care was discussed with the Bedside Nurse, Patient and Patient's Family.    Rojas Sierra MD, MD  Hospitalist Service  [facility]  Securely message with the Vocera Web Console (learn more here)  Text page via [employer] Paging/Directory         Clinically Significant Risk Factors Present on Admission               # Overweight: Estimated body mass index is 25.7 kg/m  as calculated from the following:    Height as of 10/4/22: 1.727 m (5' 8\").    Weight as of 10/4/22: 76.7 kg (169 lb).        ______________________________________________________________________    Interval History   Last 24-hour events noted.    Feels a bit better. Waiting for cerebral angiogram today  4 point ROS done and neg unless mentioned    Data reviewed today: I reviewed all medications, new labs and imaging results over the last 24 hours. I personally reviewed the brain MRI image(s) showing as below.    Physical Exam   /59   Pulse 61   Temp 97.2  F (36.2  C) (Oral)   Resp 13   LMP  (LMP Unknown)   SpO2 92%   Gen- pleasant  lying in bed  HEENT- NAD, ALEKSEY  Neck- supple, no JVD elevation, no thyromegaly  CVS- I+II+ no m/r/g  RS- CTAB  Abdo- soft, no tenderness . No g/r/r   Ext- no edema   CNS- as detailed in stroke neuro note    Data    BMP  Recent Labs   Lab 10/07/22  1201 10/07/22  0832 10/07/22  0703 10/07/22  0423 10/07/22  0023 10/06/22  1154 10/06/22  0809 10/05/22  0910 10/05/22  0811 10/05/22  0200 10/04/22  1950 10/04/22  1432   JESÚS  --   --   --   --   --   --  133  --  " 131*  --  131* 131*   POTASSIUM  --   --  4.4  --   --   --  3.9  --  4.0  4.0  --  4.1 4.3   CHLORIDE  --   --   --   --   --   --  102  --  97  --  94* 95*   RAINE  --   --   --   --   --   --  8.3*  --  8.7  --  9.1 9.2   CO2  --   --   --   --   --   --  26  --  22  --  26 27   BUN  --   --   --   --   --   --  19  --  28  --  24.2* 25.0*   CR  --   --   --   --   --   --  0.77  --  0.74  --  0.82 0.83   GLC 87 105*  --  120* 116*   < > 90   < > 104*   < > 102* 107*    < > = values in this interval not displayed.     CBC  Recent Labs   Lab 10/06/22  0809 10/04/22  1950 10/04/22  1432 10/01/22  0405   WBC 6.6 8.6 9.3 10.6   RBC 3.20* 3.55* 3.75* 3.55*   HGB 9.6* 10.6* 11.2* 10.7*   HCT 29.6* 33.4* 35.3 33.0*   MCV 93 94 94 93   MCH 30.0 29.9 29.9 30.1   MCHC 32.4 31.7 31.7 32.4   RDW 12.3 12.0 12.2 12.1    372 366 324     INRNo lab results found in last 7 days.  LFTs  Recent Labs   Lab 10/04/22  1432   ALKPHOS 115*   AST 28   ALT 20   BILITOTAL 0.4   PROTTOTAL 6.5   ALBUMIN 3.6      PANCNo lab results found in last 7 days.    No results found for this or any previous visit (from the past 24 hour(s)).        HEAD MRI:   1.  Acute infarct in the right frontal lobe.  2.  Chronic small vessel ischemic disease with numerous chronic infarcts.     HEAD MRA:   1.  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  2.  Occlusion of the right middle cerebral artery. 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  3.  Additional multifocal moderate and severe intracranial stenoses as described above.     NECK MRA:  1.  Normal appearance of the right cervical internal carotid artery with absence of flow related enhancement on noncontrast images but presence of contrast enhancement. Findings are suggestive of severe stenosis with retrograde flow via collaterals.  2.  Focal moderately severe stenosis of the distal right vertebral artery.     CT perfusion- Delay in transit time to  anterior right frontal lobe and medial right parietal lobe with fairly symmetric deficit in relative cerebral blood flow and blood volume in this region consistent within known subacute infarct

## 2022-10-07 NOTE — PROGRESS NOTES
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  Patient is now presenting with new strokes in the right frontal cortical and subcortical regions.  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 F/u dr. Soto     OA (osteoarthritis)     s/p bilat total hips      Other chronic pain     Joint pain for many years     Unspecified cerebral artery occlusion with cerebral infarction     After Moyamoya surgery > 10 yrs ago.     Vitamin D deficiencies        Surgical History:  Past Surgical History:   Procedure Laterality Date     ARTHROPLASTY KNEE Left 4/17/2017    Procedure: ARTHROPLASTY KNEE;  Left total knee arthroplasty;  Surgeon: Chidi Jenkins MD;  Location:  OR     COLONOSCOPY  2008    normal     COLONOSCOPY  7/17/2014    Procedure: COLONOSCOPY;  Surgeon: Raul Nolan DO;  Location:  GI     [facility].\"     IR CAROTID ANGIOGRAM  10/30/2006     IR CAROTID ANGIOGRAM  6/6/2010     IR CAROTID ANGIOGRAM  6/6/2010     IR CAROTID ANGIOGRAM  6/6/2010     IR CAROTID ANGIOGRAM  5/12/2011     IR CAROTID ANGIOGRAM  5/12/2011     IR CAROTID ANGIOGRAM  5/12/2011     IR CAROTID ANGIOGRAM  6/5/2012     IR CAROTID ANGIOGRAM  6/5/2012     IR CAROTID ANGIOGRAM  " 2012     IR MISCELLANEOUS PROCEDURE  2010     IR MISCELLANEOUS PROCEDURE  2010     IR MISCELLANEOUS PROCEDURE  2011     IR MISCELLANEOUS PROCEDURE  2012     RECONSTRUCT FOREFOOT WITH METATARSOPHALANGEAL (MTP) FUSION Left 2014    Procedure: RECONSTRUCT FOREFOOT WITH METATARSOPHALANGEAL (MTP) FUSION;  Surgeon: Tres Merlos DPM;  Location: [facility] OR     ZZC NONSPECIFIC PROCEDURE  10/30/06    neurosurgery Dr Soto     Z NONSPECIFIC PROCEDURE      bilateral total hips approx      ZZC NONSPECIFIC PROCEDURE  3/07    neurosurgery        Family History:  Family History   Problem Relation Age of Onset     Obesity Sister         gastric by-pass age age 60, heart murmur.     Cancer - colorectal Sister 69     Heart Disease Father         mi,  age 48     Family History Negative Mother         killed in MVA age 54     Cancer Maternal Aunt         lung cancer ,non-smoker     Lung Cancer Maternal Aunt      Breast Cancer Daughter 48     Ovarian Cancer No family hx of        Social History:  Social History     Socioeconomic History     Marital status:      Spouse name: Olu      Number of children: 2     Years of education: Not on file     Highest education level: Not on file   Occupational History     Employer: RETIRED   Tobacco Use     Smoking status: Never Smoker     Smokeless tobacco: Never Used   Vaping Use     Vaping Use: Never used   Substance and Sexual Activity     Alcohol use: Yes     Comment:  1-2 per day     Drug use: No     Sexual activity: Never     Partners: Male   Other Topics Concern     Parent/sibling w/ CABG, MI or angioplasty before 65F 55M? Not Asked   Social History Narrative     Not on file     Social Determinants of Health     Financial Resource Strain: Not on file   Food Insecurity: Not on file   Transportation Needs: Not on file   Physical Activity: Not on file   Stress: Not on file   Social Connections: Not on file   Intimate Partner Violence: Not At Risk      Fear of Current or Ex-Partner: No     Emotionally Abused: No     Physically Abused: No     Sexually Abused: No   Housing Stability: Not on file       Allergies:  Allergies   Allergen Reactions     Lisinopril      Persistent cough       Is there a contrast allergy?  No    Medications:  Facility-Administered Medications Prior to Admission   Medication Dose Route Frequency Provider Last Rate Last Admin     [DISCONTINUED] lidocaine 1 % injection 2 mL  2 mL   Johnathan Willett MD   2 mL at 05/06/22 0942     [DISCONTINUED] lidocaine 1 % injection 5 mL  5 mL   Johnathan Willett MD   5 mL at 05/06/22 0942     [DISCONTINUED] triamcinolone (KENALOG-40) injection 40 mg  40 mg   Johnathan Willett MD   40 mg at 05/06/22 0942     Medications Prior to Admission   Medication Sig Dispense Refill Last Dose     amLODIPine (NORVASC) 2.5 MG tablet Take 1 tablet (2.5 mg) by mouth daily 90 tablet 1 Unknown at Unknown time     aspirin 81 MG tablet Take 1 tablet (81 mg) by mouth daily 30 tablet  Unknown at Unknown time     atenolol (TENORMIN) 25 MG tablet TAKE 1 TABLET BY MOUTH EVERY DAY 90 tablet 1 Unknown at Unknown time     cephALEXin (KEFLEX) 500 MG capsule Take 1 capsule (500 mg) by mouth 2 times daily for 7 days 14 capsule 0 Unknown at Unknown time     clopidogrel (PLAVIX) 75 MG tablet Take 1 tablet (75 mg) by mouth daily 90 tablet 1 Unknown at Unknown time     cyanocobalamin (VITAMIN B-12) 500 MCG tablet Take 500 mcg by mouth daily   Unknown at Unknown time     losartan (COZAAR) 50 MG tablet TAKE 1 TABLET BY MOUTH TWICE A  tablet 1 Unknown at Unknown time     magnesium 500 MG TABS Take 500 mg by mouth daily   Unknown at Unknown time     Multiple Vitamins-Minerals (MULTIVITAMIN & MINERAL PO) Take 1 tablet by mouth daily.   Unknown at Unknown time     simvastatin (ZOCOR) 20 MG tablet TAKE 1 TABLET BY MOUTH AT BEDTIME 90 tablet 1 Unknown at Unknown time   .    ROS:  The 10 point Review of Systems is negative other than  noted in the HPI or here.     PHYSICAL EXAMINATION  Vital Signs:  B/P: 142/59,  T: 97.2,  P: 64,  R: 18    Cardio:  RRR  Pulmonary:  no respiratory distress  Abdomen:  soft, non-tender, non-distended      NIHSS  1a. Level of Consciousness 0-->Alert, keenly responsive   1b. LOC Questions 0-->Answers both questions correctly   1c. LOC Commands 0-->Performs both tasks correctly   2.   Best Gaze 0-->Normal   3.   Visual 0-->No visual loss   4.   Facial Palsy 0-->Normal symmetrical movements   5a. Motor Arm, Left 0-->No drift, limb holds 90 (or 45) degrees for full 10 secs   5b. Motor Arm, Right 0-->No drift, limb holds 90 (or 45) degrees for full 10 secs   6a. Motor Leg, Left 2-->Some effort against gravity, leg falls to bed by 5 secs, but has some effort against gravity   6b. Motor Leg, right 2-->Some effort against gravity, leg falls to bed by 5 secs, but has some effort against gravity   7.   Limb Ataxia 0-->Absent   8.   Sensory 0-->Normal, no sensory loss   9.   Best Language 0-->No aphasia, normal   10. Dysarthria 0-->Normal   11. 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  Patient is now presenting with new strokes in the right frontal cortical and subcortical regions.  A diagnostic cerebral angiogram is requested for further characterization of bypass    PLAN:  - Diagnostic Cerebral Angiogram  - Radial or Femoral Access  - Moderate Sedation  - Use of Closure Device     PRE-PROCEDURE SEDATION ASSESSMENT     Pre-Procedure Sedation Assessment done at 0800.    Expected Level:  Moderate Sedation    Indication:  Sedation is required to allow for  neurointerventional procedure.    Consent obtained from patient after discussing the risks, benefits and alternatives.     PO Intake:  Appropriately NPO for procedure    ASA Class:  Class 2 - MILD SYSTEMIC DISEASE, NO ACUTE PROBLEMS, NO FUNCTIONAL LIMITATIONS.    Mallampati:  Grade 2:  Soft palate, base of uvula, tonsillar pillars, and portion of posterior pharyngeal wall visible    History and physical reviewed and no updates needed. I have reviewed the lab findings, diagnostic data, medications, and the plan for sedation. I have determined this patient to be an appropriate candidate for the planned sedation and procedure and have reassessed the patient IMMEDIATELY PRIOR to sedation and procedure.  Patient was discussed with the Attending, Dr. Herrera, who agrees with the plan.    Praneeth Castro MD, MPH  Neuroendovascular Surgery Fellow  [facility]  Pager: [phone]

## 2022-10-07 NOTE — CONSULTS
Care Management Initial Consult    General Information  Assessment completed with: Patient, Spouse or significant other, Olu  Type of CM/SW Visit: Initial Assessment    Primary Care Provider verified and updated as needed:     Readmission within the last 30 days:        Reason for Consult: discharge planning  Advance Care Planning:      identified as HCA     Communication Assessment  Patient's communication style: spoken language (English or Bilingual)    Hearing Difficulty or Deaf: yes   Wear Glasses or Blind: yes    Cognitive  Cognitive/Neuro/Behavioral: WDL  Level of Consciousness: alert  Arousal Level: opens eyes spontaneously  Orientation: oriented x 4  Mood/Behavior: calm, cooperative  Best Language: 0 - No aphasia  Speech: clear, logical    Living Environment:   People in home: spouse     Current living Arrangements: house      Able to return to prior arrangements: yes       Family/Social Support:  Care provided by: self  Provides care for:    Marital Status:    is Olu. They have been  for 54 years.  The couple have two children: a son living in [place] area and a daughter living in [place].  Daughter is currently dealing with her own health issuesesu    Description of Support System: Supportive, Involved       Current Resources:   Patient receiving home care services:  No. Pt has had OP therapy and would be agreeable to continuing OP therapy.     Community Resources:    Equipment currently used at home: walker, standard (owns but walk not using PTA)  Supplies currently used at home:      Employment/Financial:  Employment Status:          Financial Concerns:   Medicare and [employer]      Lifestyle & Psychosocial Needs:  Social Determinants of Health     Tobacco Use: Low Risk      Smoking Tobacco Use: Never Smoker     Smokeless Tobacco Use: Never Used   Alcohol Use: Not on file   Financial Resource Strain: Not on file   Food Insecurity: Not on file   Transportation  Needs: Not on file   Physical Activity: Not on file   Stress: Not on file   Social Connections: Not on file   Intimate Partner Violence: Not At Risk     Fear of Current or Ex-Partner: No     Emotionally Abused: No     Physically Abused: No     Sexually Abused: No   Depression: Not at risk     PHQ-2 Score: 0   Housing Stability: Not on file       Functional Status:  Prior to admission patient needed assistance: Pt was independent. She drives and is active socially.  She loves to cook and does housekeeping. Olu does laundry and yard care.   Pt understands that she is not to be driving.    Mental Health Status:        Chemical Dependency Status:              Values/Beliefs:  Spiritual, Cultural Beliefs, Congregation Practices, Values that affect care:     Pt values education and her independence. She is a retired  and has a doctorate in Education Administration. She comes from a long line of strong independent women in her family..    SW met with pt and pt's  to discuss recommendation of TCU . Both pt and  feel she would be fine at home. Pt's  states his leg is better and he will be able to assist pt at home. Th couple have lived in their home for 48 years and have supportive neighbors they are able to call if needed.  The couple is planning discharge to home with OP therapy when medically cleared to discharge.      ARTUR Cary  [facility]  Care Transitions  [phone]

## 2022-10-07 NOTE — PROGRESS NOTES
[facility]    Stroke Progress Note    Interval EventsNo interval change overnight. No headache or other concerns.  Plan for DSA today.     HPI Summary  Candida Rose is a 82 year old female with PMH of moyamoya s/p bilateral STA-MCA bypasses in 2006, prior CVA, DM2, HTN, HLD. PTA on DAPT. Presented to [facility] ED 10/4 with multiple falls - states the R leg will become shaky and then she would fall without LOC. Telestroke exam with NIHSS 0, no focal weakness. Brain MRI showed acute R frontal stroke. MRA showed occlusion of R ICA petrous/cavernous segments and occlusion of R MCA (vs poor flow as not well visualized), focal moderate L ICA stenosis intracranially, severe focal L M1 stenosis, mod severe stenosis R PCA, severe R V 4 stenosis. In reviewing prior MRAs from 6/2022, 6/2021 and 6/2019, these appear similar.    Stroke Evaluation Summarized     MRI/Head CT MRI brain 10/5: slightly progressed volume of known right frontal infarct, no new area of infarct   MRI brain 10/4: acute R frontal infarct, chronic infarcts (R occipital, L temporoparietal, bilateral cerebellar) and small vessel disease   Intracranial Vasculature MRA head: occlusion of R ICA petrous/cavernous segments and occlusion of R MCA, focal moderate L ICA stenosis intracranially, severe focal L M1 stenosis, mod severe stenosis R PCA, severe R V 4 stenosis   Cervical Vasculature MRA: 1.  Normal appearance of the right cervical internal carotid artery with absence of flow related enhancement on noncontrast images but presence of contrast enhancement. Findings are suggestive of severe stenosis with retrograde flow via collaterals.  2.  Focal moderately severe stenosis of the distal right vertebral artery.      Echocardiogram LVEF 55-60%, no regional wall motion abnormality, left atrium mildly dilated, no doppler evidence of shunt    EKG/Telemetry Junctional rhythm   Other Testing DSA: pending    EEG: mild to moderate diffuse  "nonspecific encephalopathy with additional structural abnormalities greater on the right; no electrographic seizures or epileptogenic discharges       LDL  10/4/2022: 71 mg/dL   A1C  10/4/2022: 5.4 %   Troponin 10/4/2022: 20 ng/L  10/5/2022: 11 ng/L     Impression  1. Acute ischemic stroke of R frontal due to other etiology (hypoperfusion in the setting of Moyamoya)     2. Fluctuating lethargy - may be related to decreased cerebral perfusion in the setting of known multifocal ICAD, dehydration. EEG without evidence of seizures.     Plan  - Neurochecks and Vital Signs every 4 hours   - Maintain BP >140 with strict avoidance of hypotension   - Continue DAPT with daily aspirin 81 mg + Plavix 75 mg for secondary stroke prevention  - Statin: atorvastatin 40 mg, LDL goal 40-70  - Telemetry, EKG  - Bedside Glucose Monitoring  - Nutrition: per nursing, passed bedside swallow eval  - PT/OT/SLP  - Stroke Education  - Euthermia, Euglycemia  - Maintenance isotonic IV fluids  -DSA, pending     Patient Follow-up    - final recommendation pending work-up    We will continue to follow.     Saba FUENTES, CNP  Vascular Neurology  To page me or covering stroke neurology team member, click here: [facility]   Choose \"On Call\" tab at top, then search dropdown box for \"Neurology Adult\", select location, press Enter, then look for stroke/neuro ICU/telestroke.  ______________________________________________________    Clinically Significant Risk Factors Present on Admission                 Medications   Scheduled Meds    aspirin  81 mg Oral Daily    Or     aspirin  81 mg Oral or Feeding Tube Daily     atorvastatin  40 mg Oral or Feeding Tube QPM     clopidogrel  75 mg Oral or Feeding Tube Daily     sodium chloride (PF)  3 mL Intracatheter Q8H       Infusion Meds    - MEDICATION INSTRUCTIONS -       - MEDICATION INSTRUCTIONS -       sodium chloride 75 mL/hr at 10/07/22 0427       PRN Meds  acetaminophen, glucose **OR** dextrose **OR** " glucagon, labetalol **OR** hydrALAZINE, lidocaine 4%, lidocaine (buffered or not buffered), - MEDICATION INSTRUCTIONS -, - MEDICATION INSTRUCTIONS -, melatonin, ondansetron **OR** ondansetron, sodium chloride (PF)       PHYSICAL EXAMINATION  Temp:  [97.3  F (36.3  C)-98.3  F (36.8  C)] 98.2  F (36.8  C)  Pulse:  [62-74] 74  Resp:  [16-18] 18  BP: (136-172)/(56-91) 146/67  SpO2:  [96 %-100 %] 96 %      General Exam  General:  patient lying in bed without any acute distress    HEENT:  normocephalic/atraumatic  Pulmonary:  no respiratory distress    Neuro Exam  Mental Status:  alert, oriented x 3, follows commands, speech clear and fluent, naming and repetition normal  Cranial Nerves:  visual fields intact, PERRL, EOMI with normal smooth pursuit, facial sensation intact and symmetric, subtle left lower facial droop, hearing not formally tested but intact to conversation,  no dysarthria, shoulder shrug strong bilaterally, tongue protrusion midline  Motor:  normal muscle tone and bulk, no abnormal movements, able to move all limbs spontaneously, subtle drift in LUE none in RUE, drift hitting bed in BLE  Sensory:  light touch sensation intact and symmetric throughout upper and lower extremities, apparent extinction/left sided neglect with double simultaneous stimulation   Coordination:  normal finger-to-nose and heel-to-shin bilaterally without dysmetria  Station/Gait:  deferred    Stroke Scales    NIHSS  1a. Level of Consciousness 0-->Alert, keenly responsive   1b. LOC Questions 0-->Answers both questions correctly   1c. LOC Commands 0-->Performs both tasks correctly   2.   Best Gaze 0-->Normal   3.   Visual 0-->No visual loss   4.   Facial Palsy 1-->Minor paralysis (flattened nasolabial fold, asymmetry on smiling)   5a. Motor Arm, Left 1-->Drift, limb holds 90 (or 45) degrees, but drifts down before full 10 seconds, does not hit bed or other support   5b. Motor Arm, Right 0-->No drift, limb holds 90 (or 45) degrees for  full 10 secs   6a. Motor Leg, Left 2-->Some effort against gravity, leg falls to bed by 5 secs, but has some effort against gravity   6b. Motor Leg, right 2-->Some effort against gravity, leg falls to bed by 5 secs, but has some effort against gravity   7.   Limb Ataxia 0-->Absent   8.   Sensory 0-->Normal, no sensory loss   9.   Best Language 0-->No aphasia, normal   10. Dysarthria 0-->Normal   11. Extinction and Inattention  1-->Visual, tactile, auditory, spatial, or personal inattention or extinction to bilateral simultaneous stimulation in one of the sensory modalities   Total 7 (10/07/22 1140)         Imaging  I personally reviewed all imaging; relevant findings per HPI.     Lab Results Data   CBC  Recent Labs   Lab 10/06/22  0809 10/04/22  1950 10/04/22  1432   WBC 6.6 8.6 9.3   RBC 3.20* 3.55* 3.75*   HGB 9.6* 10.6* 11.2*   HCT 29.6* 33.4* 35.3    372 366     Basic Metabolic Panel    Recent Labs   Lab 10/07/22  0423 10/07/22  0023 10/06/22  2009 10/06/22  1154 10/06/22  0809 10/05/22  0910 10/05/22  0811 10/05/22  0200 10/04/22  1950   NA  --   --   --   --  133  --  131*  --  131*   POTASSIUM  --   --   --   --  3.9  --  4.0  4.0  --  4.1   CHLORIDE  --   --   --   --  102  --  97  --  94*   CO2  --   --   --   --  26  --  22  --  26   BUN  --   --   --   --  19  --  28  --  24.2*   CR  --   --   --   --  0.77  --  0.74  --  0.82   * 116* 156*   < > 90   < > 104*   < > 102*   RAINE  --   --   --   --  8.3*  --  8.7  --  9.1    < > = values in this interval not displayed.     Liver Panel  Recent Labs   Lab 10/04/22  1432   PROTTOTAL 6.5   ALBUMIN 3.6   BILITOTAL 0.4   ALKPHOS 115*   AST 28   ALT 20     INR  No lab results found.   Lipid Profile    Recent Labs   Lab Test 10/04/22  1950 12/31/21  0710 01/04/21  1001 05/24/16  0947 06/16/15  1127   CHOL 166 150 196   < > 197   HDL 82 66 56   < > 62   LDL 71 68 106*   < > 106   TRIG 65 82 171*   < > 143   CHOLHDLRATIO  --   --   --   --  3.2    < > =  values in this interval not displayed.     A1C    Recent Labs   Lab Test 10/04/22  1950 01/26/18  0818 04/11/17  0849   A1C 5.4 5.8 5.8     Troponin    Recent Labs   Lab 10/05/22  0811 10/04/22  1950 10/01/22  0611 10/01/22  0405   CTROPT  --  20* 19* 19*   TROPONINIS 11  --   --   --          Billing: I have personally spent a total of 40 minutes providing care today, time spent in reviewing medical records and reviewing tests, examining the patient and obtaining history, coordination of care, and discussion with the patient and/or family regarding diagnostic results, prognosis, symptom management, risks and benefits of management options, and development of plan of care. Greater than 50% was spent in counseling and coordination of care.

## 2022-10-07 NOTE — CODE/RAPID RESPONSE
[facility]    RRT Note  10/7/2022   Time Called: 5:36 PM    Code Status: Full Code    I was called to evaluate Candida Rose, who is a 82 year old female who was admitted on 10/5/2022 for new strokes in the right frontal cortical and subcortical regions. PMH includes moyamoya disease s/p bilateral STA MCA bypass in 2006.    Assessment & Plan     Right Groin Hematoma s/p Cerebral Angiogram  Upon arrival patient is laying supine, moaning in pain at groin site. Initial VS include HR 65, /88, RR 12. RN is applying manual pressure. Right lower extremity has good femoral, posterior tibial, and dorsalis pedis pulses. Color of RLE is pale, CRT < 3 sec, Leg warm to touch. Manual pressure was held for 30 minutes, however hematoma continued to develop. Spoke with Dr. Castro who advised to apply manual pressure for 20 more minutes, and if no more signs of bleeding, obtain CT abd/pelvis. He also said femostop ok to apply for light pressure after CT. After 20 minutes of additional pressure, hematoma continued to expand when pressure released. Dr. Castro with Neurointerventional Radiology came to bedside to evaluate patient. When pressure removed groin was soft, lump did not develop. Hematoma was distributed.     INTERVENTIONS:  - Manual Pressure  - Abdomen Pelvis CTA to evaluate for retroperitoneal bleed  - 75 mcg fentanyl for pain  - Spoke with Dr. Dacosta regarding complication after cerebral angiogram.   - Femostop at bedside in case hematoma develops again.    Plan is to obtain abdomen  Pelvis CT. Bedrest extended for 6 more hours. Signout given to my colleague Nikolay Deshpande, Night House Officer mathew will follow up on hemoglobin and CT results.      ALFREDO Mckenzie [facility]  Hospitalist Service - House KAYLA  Pager: [phone] (7a - 6p)      Physical Exam   Vital Signs with Ranges:  Temp:  [97.2  F (36.2  C)-98.2  F (36.8  C)] 97.2  F (36.2  C)  Pulse:  [56-74] 67  Resp:  [9-19] 17  BP:  (133-180)/(59-91) 177/68  SpO2:  [92 %-100 %] 100 %  I/O last 3 completed shifts:  In: 240 [P.O.:240]  Out: -         Physical Exam   General:  Laying supine. Not in acute respiratory distress. A&O x 3.  Skin:  Warm, dry. Large double softball sized groin hematoma  HEENT:  Normocephalic, atraumatic.  Neck:  Supple. Trachea midline.  Chest:  Breathing non labored.   Cardiovascular:  RRR, no rub or murmur. No peripheral edema.  Abdomen:  Soft, non-tender, non-distended.  Neurological:  No focal neuro deficits.  Psychiatric:  Affect and mood congruent.    Data     IMAGING: (X-ray/CT/MRI)   No results found for this or any previous visit (from the past 24 hour(s)).    CBC with Diff:  Recent Labs   Lab Test 10/06/22  0809   WBC 6.6   HGB 9.6*   MCV 93         Absolute Retic (10e9/L)   Date Value   02/17/2021 47.2     Absolute Reticulocyte (10e6/uL)   Date Value   09/02/2022 0.042     % Retic (%)   Date Value   02/17/2021 1.3     % Reticulocyte (%)   Date Value   09/02/2022 1.1       Comprehensive Metabolic Panel:  Recent Labs   Lab 10/07/22  1653 10/07/22  0832 10/07/22  0703 10/06/22  1154 10/06/22  0809 10/04/22  1950 10/04/22  1432   NA  --   --   --   --  133   < > 131*   POTASSIUM  --   --  4.4  --  3.9   < > 4.3   CHLORIDE  --   --   --   --  102   < > 95*   CO2  --   --   --   --  26   < > 27   ANIONGAP  --   --   --   --  5   < > 9   GLC 84   < >  --    < > 90   < > 107*   BUN  --   --   --   --  19   < > 25.0*   CR  --   --   --   --  0.77   < > 0.83   GFRESTIMATED  --   --   --   --  78   < > 71   RAINE  --   --   --   --  8.3*   < > 9.2   PROTTOTAL  --   --   --   --   --   --  6.5   ALBUMIN  --   --   --   --   --   --  3.6   BILITOTAL  --   --   --   --   --   --  0.4   ALKPHOS  --   --   --   --   --   --  115*   AST  --   --   --   --   --   --  28   ALT  --   --   --   --   --   --  20    < > = values in this interval not displayed.         Time Spent on this Encounter   I spent 60 minutes on the  unit/floor managing the care of Candida Rose. Over 50% of my time was spent counseling the patient and/or coordinating care regarding services listed in this note.    Total Visit Time: 60    Total Face-to-Face Prolonged Service Time: 50    Content of the Prolonged Time: Management of large right groin hematoma after cerebral angiogram

## 2022-10-07 NOTE — PLAN OF CARE
Goal Outcome Evaluation:        Patient here with CVA. Alert, oriented times 4, neuro's intact. VSS, order to keep SBP above 140. Patient cerebral angiogram this afternoon, with R femoral groin site. At 1739, increased swelling noted, site hard, increasingly swelling and painful. Immediately manual pressure applied and RRT called. Pressure applied until 1825, groin site soft, swelling decreased. Interventional neurologist assessed patient, no need to apply pressure anymore. Patient will need to be on flat bedrest for an additional 6 hours, and order received for Abreu catheter. Spouse a bedside.

## 2022-10-07 NOTE — PROGRESS NOTES
Neuroendovascular Surgery Progress Note    I was called about concern for a new groin hematoma post-procedure. Per nurse/KAYLA report, the groin hematoma was the size of a softball. They immediately held pressure and took turns holding pressure for a total of 55 min. On my examination, the groin hematoma sized had significantly reduced and there was a subcutaneous redistribution of blood. The nursing/KAYLA team did a fantastic job of recognizing and acting early to prevent further complication. At this time we recommend obtaining a CT Abd/Pelvis to r/o a retroperitoneal extension of the bleeding.     Patient and patient's  updated.    - CT Abd/Pelvis Stat  - Continue frequent groin check  - If there is a concern for further expansion of bleeding then FemoStop can be applied. Instructions provided.   - Keep legs flat for additional 6 hours  - Place Abreu catheter overnight.     Please page us if there are any further concerns.     Praneeth Castro MD, MPH  Neuroendovascular Surgery Fellow  [facility]  Pager: [phone]

## 2022-10-07 NOTE — PROVIDER NOTIFICATION
RRT called for acute increased swelling on R femoral groin site. Immediate pressure applied. Site feels hard and slightly ecchymotic, no bleeding noted.   Pressure held for 30 min, no decrease in hematoma, continued with pressure.    Patient received fentanyl for pin, BP monitored. Patient alert and oriented, pulses in right leg palpable.

## 2022-10-08 ENCOUNTER — APPOINTMENT (OUTPATIENT)
Dept: PHYSICAL THERAPY | Facility: CLINIC | Age: 82
DRG: 065 | End: 2022-10-08
Attending: HOSPITALIST
Payer: MEDICARE

## 2022-10-08 ENCOUNTER — APPOINTMENT (OUTPATIENT)
Dept: CT IMAGING | Facility: CLINIC | Age: 82
DRG: 065 | End: 2022-10-08
Attending: PSYCHIATRY & NEUROLOGY
Payer: MEDICARE

## 2022-10-08 LAB
ABO/RH(D): NORMAL
ANION GAP SERPL CALCULATED.3IONS-SCNC: 6 MMOL/L (ref 3–14)
ANTIBODY SCREEN: NEGATIVE
BUN SERPL-MCNC: 16 MG/DL (ref 7–30)
CALCIUM SERPL-MCNC: 8.6 MG/DL (ref 8.5–10.1)
CHLORIDE BLD-SCNC: 100 MMOL/L (ref 94–109)
CO2 SERPL-SCNC: 25 MMOL/L (ref 20–32)
CREAT SERPL-MCNC: 0.7 MG/DL (ref 0.52–1.04)
ERYTHROCYTE [DISTWIDTH] IN BLOOD BY AUTOMATED COUNT: 12.4 % (ref 10–15)
GFR SERPL CREATININE-BSD FRML MDRD: 87 ML/MIN/1.73M2
GLUCOSE BLD-MCNC: 131 MG/DL (ref 70–99)
GLUCOSE BLDC GLUCOMTR-MCNC: 135 MG/DL (ref 70–99)
GLUCOSE BLDC GLUCOMTR-MCNC: 158 MG/DL (ref 70–99)
GLUCOSE BLDC GLUCOMTR-MCNC: 98 MG/DL (ref 70–99)
HCT VFR BLD AUTO: 31.2 % (ref 35–47)
HGB BLD-MCNC: 10.2 G/DL (ref 11.7–15.7)
HGB BLD-MCNC: 8.3 G/DL (ref 11.7–15.7)
HGB BLD-MCNC: 8.4 G/DL (ref 11.7–15.7)
MCH RBC QN AUTO: 30.2 PG (ref 26.5–33)
MCHC RBC AUTO-ENTMCNC: 32.7 G/DL (ref 31.5–36.5)
MCV RBC AUTO: 92 FL (ref 78–100)
PLATELET # BLD AUTO: 362 10E3/UL (ref 150–450)
POTASSIUM BLD-SCNC: 3.9 MMOL/L (ref 3.4–5.3)
RBC # BLD AUTO: 3.38 10E6/UL (ref 3.8–5.2)
SODIUM SERPL-SCNC: 131 MMOL/L (ref 133–144)
SPECIMEN EXPIRATION DATE: NORMAL
WBC # BLD AUTO: 10.8 10E3/UL (ref 4–11)

## 2022-10-08 PROCEDURE — 36415 COLL VENOUS BLD VENIPUNCTURE: CPT | Performed by: INTERNAL MEDICINE

## 2022-10-08 PROCEDURE — 82310 ASSAY OF CALCIUM: CPT | Performed by: INTERNAL MEDICINE

## 2022-10-08 PROCEDURE — 97535 SELF CARE MNGMENT TRAINING: CPT | Mod: GP

## 2022-10-08 PROCEDURE — 250N000013 HC RX MED GY IP 250 OP 250 PS 637: Performed by: INTERNAL MEDICINE

## 2022-10-08 PROCEDURE — 97530 THERAPEUTIC ACTIVITIES: CPT | Mod: GP

## 2022-10-08 PROCEDURE — 250N000013 HC RX MED GY IP 250 OP 250 PS 637: Performed by: PHYSICIAN ASSISTANT

## 2022-10-08 PROCEDURE — 36415 COLL VENOUS BLD VENIPUNCTURE: CPT | Performed by: NURSE PRACTITIONER

## 2022-10-08 PROCEDURE — 70450 CT HEAD/BRAIN W/O DYE: CPT | Mod: MA

## 2022-10-08 PROCEDURE — 85018 HEMOGLOBIN: CPT | Performed by: NURSE PRACTITIONER

## 2022-10-08 PROCEDURE — 258N000003 HC RX IP 258 OP 636: Performed by: PSYCHIATRY & NEUROLOGY

## 2022-10-08 PROCEDURE — 99233 SBSQ HOSP IP/OBS HIGH 50: CPT | Mod: FS | Performed by: PSYCHIATRY & NEUROLOGY

## 2022-10-08 PROCEDURE — 258N000003 HC RX IP 258 OP 636: Performed by: INTERNAL MEDICINE

## 2022-10-08 PROCEDURE — 99356 PR PROLONGED SERV,INPATIENT,1ST HR: CPT | Mod: FS | Performed by: PSYCHIATRY & NEUROLOGY

## 2022-10-08 PROCEDURE — 99232 SBSQ HOSP IP/OBS MODERATE 35: CPT | Performed by: INTERNAL MEDICINE

## 2022-10-08 PROCEDURE — 85027 COMPLETE CBC AUTOMATED: CPT | Performed by: INTERNAL MEDICINE

## 2022-10-08 PROCEDURE — 86901 BLOOD TYPING SEROLOGIC RH(D): CPT | Performed by: NURSE PRACTITIONER

## 2022-10-08 PROCEDURE — 120N000001 HC R&B MED SURG/OB

## 2022-10-08 RX ORDER — AMLODIPINE BESYLATE 2.5 MG/1
2.5 TABLET ORAL DAILY
Status: DISCONTINUED | OUTPATIENT
Start: 2022-10-08 | End: 2022-10-08

## 2022-10-08 RX ADMIN — ASPIRIN 81 MG CHEWABLE TABLET 81 MG: 81 TABLET CHEWABLE at 08:43

## 2022-10-08 RX ADMIN — ATORVASTATIN CALCIUM 40 MG: 40 TABLET, FILM COATED ORAL at 20:06

## 2022-10-08 RX ADMIN — AMLODIPINE BESYLATE 2.5 MG: 2.5 TABLET ORAL at 13:05

## 2022-10-08 RX ADMIN — CLOPIDOGREL BISULFATE 75 MG: 75 TABLET ORAL at 08:43

## 2022-10-08 RX ADMIN — SODIUM CHLORIDE 500 ML: 9 INJECTION, SOLUTION INTRAVENOUS at 16:25

## 2022-10-08 RX ADMIN — SODIUM CHLORIDE: 9 INJECTION, SOLUTION INTRAVENOUS at 23:42

## 2022-10-08 ASSESSMENT — ACTIVITIES OF DAILY LIVING (ADL)
ADLS_ACUITY_SCORE: 34
ADLS_ACUITY_SCORE: 34
ADLS_ACUITY_SCORE: 30
ADLS_ACUITY_SCORE: 34
ADLS_ACUITY_SCORE: 30
ADLS_ACUITY_SCORE: 34
ADLS_ACUITY_SCORE: 34

## 2022-10-08 NOTE — PROGRESS NOTES
Care Management Follow Up    Length of Stay (days): 3    Expected Discharge Date: 10/09/2022     Concerns to be Addressed: all concerns addressed in this encounter     Patient plan of care discussed at interdisciplinary rounds: Yes    Anticipated Discharge Disposition: Transitional Care     Anticipated Discharge Services:    Anticipated Discharge DME:      Patient/family educated on Medicare website which has current facility and service quality ratings:    Education Provided on the Discharge Plan:    Patient/Family in Agreement with the Plan:      Referrals Placed by CM/SW:    Private pay costs discussed: Not applicable    Additional Information:  SW met with patient's spouse, reviewed therapy recommendation of TCU and spouse says that they are considering it. Reviewed Medicare.gov and provided patient with list of TCU's near patient's home. Patient's spouse reports he will review with patient and other family and provide choices to SW.       ARTUR Eaton

## 2022-10-08 NOTE — PLAN OF CARE
Pt here with CVA. A&OX4. Neuros are intact. VSS, maintained SBP above 140. Tele NSR. Regular diet, thin liquids. Takes pills whole. Strict bedrest this shift. Denies pain. CIWA score is 0. R groin site hematoma s/p cerebral angiogram. No bleeding or increase swelling noted this shift. Abreu placed this shift for urine retention, with adequate output. Pt scoring green on the Aggression Stop Light Tool. Discharge pending.

## 2022-10-08 NOTE — PLAN OF CARE
Goal Outcome Evaluation:    Plan of Care Reviewed With: patient, spouse        Patient alert, oriented times 2-4. VSS. Tele NSR. Neuro's with some confusion, forgetfulness. Left arm weaker r/t shoulder problem. R groin site hematoma unchanged, soft, bruised. Denied pain. Up with assist of one with GB and walker. Abreu catheter removed, patient voiding, bladder scanned for 89 ml. Patient on a regular diet, good appetite. Spouse at bedside, supportive. Plan to go to TCU at discharge.       Addendum: around 1600 RRT called for left UE and LLE weakness, left facial droop, left neglect, patient was sitting at the side of her bed, getting ready to walk to the bathroom. Neurology notified as well an order received for IV fluid bolus, and top put HOB down and her feet up. Patient felt better immediately, facial droop gone, and left UE and left LE stronger. Head CT ordered and completed.

## 2022-10-08 NOTE — PROGRESS NOTES
CT Abdomen and Pelvis reviewed. No retroperitoneal hematoma noted. A small area of groin hematoma noted over the puncture site at Right Common femoral artery. Continue care as previously planned.

## 2022-10-08 NOTE — CODE/RAPID RESPONSE
[facility]    RRT Note  10/8/2022   Time Called: 403pm    RRT called for: New focal neurodeficit    Assessment & Plan   IMPRESSION & PLAN:    Candida Rose is a 82 year old female w/ PMH of moyamoya disease, hypertension, hyperlipidemia, stroke who was admitted on 10/5/2022 with acute ischemic right frontal lobe stroke.  Patient underwent cerebral angiogram on 10/7/2022 that was complicated by hematoma but there is no CT evidence of retroperitoneal hematoma.  MR imaging of the vasculature showed intracranial stenosis of multiple areas including left-sided among others.  She was started on 2.5 mg amlodipine on the afternoon of 10/8/2022.    Approximately 2.5 to 3 hours after receiving the amlodipine patient indicated she needed to go to the bathroom.  She sat up to the edge of the bed and had left-sided facial droop and left-sided plegia.  She subjectively reported that the left side was harder to move and heavy.  For this reason rapid response was activated.  On my arrival I find a elderly woman appears younger than stated age lying in bed.  Left facial droop resolves with smiling.  She has limited movement of the left upper extremity because of the shoulder arthritis.  She reports feeling better when she is laying down.  RN had contacted stroke neurology team who had recommended IV fluid bolus and raising legs to optimize renal perfusion if this happened.  /61, heart rate 83, SPO2 100% on room air    Impression  Transient focal neurologic deficits with left facial droop and left-sided weakness  Differential diagnosis:  -Likely degree cerebral perfusion pressure secondary to relative hypotension/orthostasis which may be related to antihypertensive medications  and/or hypervolemia from acute blood loss from hematoma    INTERVENTIONS:  -Glucose 135 mg/dL  - IV fluid bolus as per stroke neurology  - Patient placed in Trendelenburg as per stroke neurology.  - I held the amlodipine  -  "Check CBC--> 8.3 g down from 10.2g earlier 10/08/22.  We will recheck at 10 PM and in the morning and add on type and screen  - Stat CT head as per stroke neurology.  - Patient did report after about 2 to 3 minutes in Trendelenburg position that she \"felt better, is more aware of what is going on\"  also reports that she sounds better and patient generally feels she is getting back to her usual self.    Working diagnosis: Decreased cerebral perfusion pressure in the setting of relative hypotension    At the end of the RRT patient was feeling better.  I had to leave prior to completion of the neuroimaging    disposition continue current level of care    Discussed with and defer further cares to hospitalist attending physician Dr. Sierra and stroke neurology team    Code Status: Full Code    Allergies   Allergies   Allergen Reactions     Lisinopril      Persistent cough       Physical Exam   Vital Signs with Ranges:  Temp:  [97.5  F (36.4  C)-98.3  F (36.8  C)] 98.3  F (36.8  C)  Pulse:  [60-89] 78  Resp:  [9-30] 16  BP: (114-186)/() 138/61  SpO2:  [92 %-100 %] 100 %  I/O last 3 completed shifts:  In: 240 [P.O.:240]  Out: 1700 [Urine:1700]    Constitutional: vs as per EMR  General:  adult pt lying in bed without acute distress   GCS:   Motor 6=Obeys commands   Verbal 5=Oriented   Eye Opening 4=Spontaneous   Total: 15       Neuro: +follows commands wiggle toes and show 2 fingers bilat, face symmetric, tongue midline, speech fluent  Eyes pupils equal round 3mm briskly reactive bilat, sclera nonicteric, noninjected, conjunctiva pink,  Head, ENT & mouth: NC/AT,  mouth moist oral mucosa  Neck: supple  CV S1S2  resp: CTAB upper and lower lobes  gi:normoactive bowel sounds, soft, nontender, nondisteded  Ext: no edema  Skin: no rashes on exposed skin  Lymph: no supraclavicular lymphadenopathy  Musculoskeletal no bony joint deformities      Data       IMAGING: (X-ray/CT/MRI)   Recent Results (from the past 24 " hour(s))   CTA Abdomen Pelvis with Contrast    Narrative    EXAM: CTA ABDOMEN PELVIS WITH CONTRAST  LOCATION: [facility]  DATE/TIME: 10/7/2022 7:31 PM    INDICATION: evaluation for bleeding s p angiogram with right femoral groin access site. Very large right hematoma.  COMPARISON: None.  TECHNIQUE: CT angiogram abdomen pelvis during arterial phase of injection of IV contrast. 2D and 3D MIP reconstructions were performed by the CT technologist. Dose reduction techniques were used.  CONTRAST: 104mL Isovue 370    FINDINGS:  ANGIOGRAM ABDOMEN/PELVIS: The lower thoracic and abdominal aorta are normal in caliber with scattered calcific atherosclerotic plaque. The origin of the celiac artery is patent. Moderate stenosis of the SMA origin. The bilateral renal arteries are   patent. The inferior mesenteric artery and its proximal branch vessels are patent.    The bilateral common, internal, and external iliac arteries are patent with scattered eccentric calcific plaque.    The right common and visualized portions of the deep and superficial femoral arteries are patent. Note is made of a hematoma within the right groin without evidence of active extravasation of contrast within the visualized portions of the groin and   thigh. The hematoma is incompletely imaged so accurate size measurement is not possible. The hematoma also insinuates into the soft tissues rather than being a confluent fluid collection.    LOWER CHEST: The limited visualized portions of the lung bases are clear    HEPATOBILIARY: The liver morphology is normal. No biliary ductal dilatation.    PANCREAS: Normal.    SPLEEN: Normal.    ADRENAL GLANDS: Normal.    KIDNEYS/BLADDER: Left renal cyst. No renal collecting system dilatation.    BOWEL: The large and small bowel are normal in caliber.    LYMPH NODES: Normal.    PELVIC ORGANS: Normal.    MUSCULOSKELETAL: Multilevel degenerative changes.      Impression    IMPRESSION:  1.   Superficial right groin hematoma without evidence of active hemorrhage within the visualized groin and upper thigh.       CBC with Diff:  Recent Labs   Lab Test 10/08/22  1050   WBC 10.8   HGB 10.2*   MCV 92         Comprehensive Metabolic Panel:  Recent Labs   Lab 10/08/22  1601 10/08/22  1154 10/08/22  1050 10/04/22  1950 10/04/22  1432   NA  --   --  131*   < > 131*   POTASSIUM  --   --  3.9   < > 4.3   CHLORIDE  --   --  100   < > 95*   CO2  --   --  25   < > 27   ANIONGAP  --   --  6   < > 9   *   < > 131*   < > 107*   BUN  --   --  16   < > 25.0*   CR  --   --  0.70   < > 0.83   GFRESTIMATED  --   --  87   < > 71   RAINE  --   --  8.6   < > 9.2   PROTTOTAL  --   --   --   --  6.5   ALBUMIN  --   --   --   --  3.6   BILITOTAL  --   --   --   --  0.4   ALKPHOS  --   --   --   --  115*   AST  --   --   --   --  28   ALT  --   --   --   --  20    < > = values in this interval not displayed.       Time Spent on this Encounter   I spent 25 minutes 405-430pm on the unit/floor managing the care of Candida Rose. Over 50% of my time was spent counseling the patient and/or coordinating care regarding services listed in this note.    Josie Damon, [facility]  Hospitalist [place] [facility]AYLA  [phone]

## 2022-10-08 NOTE — PROVIDER NOTIFICATION
"In CT patient patient stated her left arm felt \"weird\" in CT, patient stated it was numb.  Arm had been up for CT scan, BP also < 140.  When NP came to evaluate arm numbness it had resolved.  Arm was back at her side for 5 minutes approx. And SBP 180s.  No new orders.  "

## 2022-10-08 NOTE — PROGRESS NOTES
[facility]    Medicine Progress Note - Hospitalist Service    Date of Admission:  10/5/2022    Assessment & Plan        Candida Rose is a 82-year-old female with a history of moyamoya disease, hypertension, hyperlipidemia, stroke, diabetes, osteoarthritis, alcohol use disorder and chronic pain who was transferred from [facility] after she was found to have an acute stroke.     # Acute ischemic right frontal lobe stroke:    # Moyamoya disease:    - Appreciate  Stroke Neuro following patient.    - continue her PTA aspirin 81 mg daily and clopidogrel 75 mg daily  - P2Y12 level  - Tele  - FLP- LDL 71, HDL 82; started on Lipitor 40 mg po daily  - Hba1c 5.4  - s/p cerebral angiogram 10/7.  Await further recommendations from neurology.  Monitor right groin hematoma site  - hold PTA Norvasc and Atenolol, allow for permissive HTN and keep SBP >140  - PT/OT/SLP  - fall precautions     # Falls  - likely related to acute stroke  - X ray left shoulder - No fracture or dislocation. Advanced degenerative changes  at the glenohumeral joint     #  Hyponatremia, hypochloremic   - Na was 129 on 10/1, 131 on 10/4  - better     # Chronic anemia:    - She followed up with Hematology on 10/04/2022 for evaluation.  There is no evidence of bleeding. Bone marrow biopsy is planned in the next 4 weeks.  She has already had extensive laboratory studies performed such as iron studies, B12, folate, TSH, and SPEP.  If there is evidence of urinary or gastrointestinal bleeding, she will be referred to Urology and Gastroenterology, respectively.     #  Hypertension:    - hold her [id number] amlodipine, atenolol and losartan for now given goal of permissive hypertension.     #  Dyslipidemia  - PTA on Zocor, now switched to Lipitor 40 mg po daily      #  Abnormal UA:  - had a recent abnormal UA on 10/01- ER visit; started on Keflex at that time  - UC  only growing 10 to 50,000 colonies of urogenital nazia.  -  "stop Keflex     # Alcohol use disorder  - states that she drinks :more than she should\" 2 glasses of wine daily, sometimes more.  - CIWA monitoring     Diet: Regular Diet Adult    DVT Prophylaxis: Pneumatic Compression Devices  Abreu Catheter: Not present  Central Lines: None  Cardiac Monitoring: ACTIVE order. Indication: Stroke, acute (48 hours)  Code Status: Full Code      Disposition Plan      Expected Discharge Date: 10/09/2022                The patient's care was discussed with the Bedside Nurse, Patient and Patient's Family.    Rojas Sierra MD, MD  Hospitalist Service  [facility]  Securely message with the Vocera Web Console (learn more here)  Text page via PasswordBank Paging/Directory         Clinically Significant Risk Factors Present on Admission               # Overweight: Estimated body mass index is 25.7 kg/m  as calculated from the following:    Height as of 10/4/22: 1.727 m (5' 8\").    Weight as of 10/4/22: 76.7 kg (169 lb).        ______________________________________________________________________    Interval History   Last 24-hour events noted.    Had cerebral angiogram.  There was concern for right groin bleeding and patient underwent CT abdomen pelvis as below.  Currently feels stable and the swelling is going down  Generally feeling better    4 point ROS done and neg unless mentioned    Data reviewed today: I reviewed all medications, new labs and imaging results over the last 24 hours. I personally reviewed the brain MRI image(s) showing as below.    Physical Exam   BP (!) 174/89 (BP Location: Right arm)   Pulse 64   Temp 98  F (36.7  C) (Axillary)   Resp 16   LMP  (LMP Unknown)   SpO2 100%   Gen- pleasant  lying in bed  HEENT- NAD, ALEKSEY  Neck- supple, no JVD elevation, no thyromegaly  CVS- I+II+ no m/r/g  RS- CTAB  Abdo- soft, no tenderness . No g/r/r   Ext- no edema   CNS- as detailed in stroke neuro note    Data    BMP  Recent Labs   Lab 10/08/22  0743 " 10/07/22  1653 10/07/22  1201 10/07/22  0832 10/07/22  0703 10/06/22  1154 10/06/22  0809 10/05/22  0910 10/05/22  0811 10/05/22  0200 10/04/22  1950 10/04/22  1432   NA  --   --   --   --   --   --  133  --  131*  --  131* 131*   POTASSIUM  --   --   --   --  4.4  --  3.9  --  4.0  4.0  --  4.1 4.3   CHLORIDE  --   --   --   --   --   --  102  --  97  --  94* 95*   RAINE  --   --   --   --   --   --  8.3*  --  8.7  --  9.1 9.2   CO2  --   --   --   --   --   --  26  --  22  --  26 27   BUN  --   --   --   --   --   --  19  --  28  --  24.2* 25.0*   CR  --   --   --   --   --   --  0.77  --  0.74  --  0.82 0.83   GLC 98 84 87 105*  --    < > 90   < > 104*   < > 102* 107*    < > = values in this interval not displayed.     CBC  Recent Labs   Lab 10/07/22  1817 10/06/22  0809 10/04/22  1950 10/04/22  1432   WBC  --  6.6 8.6 9.3   RBC  --  3.20* 3.55* 3.75*   HGB 9.6* 9.6* 10.6* 11.2*   HCT  --  29.6* 33.4* 35.3   MCV  --  93 94 94   MCH  --  30.0 29.9 29.9   MCHC  --  32.4 31.7 31.7   RDW  --  12.3 12.0 12.2   PLT  --  315 372 366     INRNo lab results found in last 7 days.  LFTs  Recent Labs   Lab 10/04/22  1432   ALKPHOS 115*   AST 28   ALT 20   BILITOTAL 0.4   PROTTOTAL 6.5   ALBUMIN 3.6      PANCNo lab results found in last 7 days.    Recent Results (from the past 24 hour(s))   CTA Abdomen Pelvis with Contrast    Narrative    EXAM: CTA ABDOMEN PELVIS WITH CONTRAST  LOCATION: [facility]  DATE/TIME: 10/7/2022 7:31 PM    INDICATION: evaluation for bleeding s p angiogram with right femoral groin access site. Very large right hematoma.  COMPARISON: None.  TECHNIQUE: CT angiogram abdomen pelvis during arterial phase of injection of IV contrast. 2D and 3D MIP reconstructions were performed by the CT technologist. Dose reduction techniques were used.  CONTRAST: 104mL Isovue 370    FINDINGS:  ANGIOGRAM ABDOMEN/PELVIS: The lower thoracic and abdominal aorta are normal in caliber with scattered  calcific atherosclerotic plaque. The origin of the celiac artery is patent. Moderate stenosis of the SMA origin. The bilateral renal arteries are   patent. The inferior mesenteric artery and its proximal branch vessels are patent.    The bilateral common, internal, and external iliac arteries are patent with scattered eccentric calcific plaque.    The right common and visualized portions of the deep and superficial femoral arteries are patent. Note is made of a hematoma within the right groin without evidence of active extravasation of contrast within the visualized portions of the groin and   thigh. The hematoma is incompletely imaged so accurate size measurement is not possible. The hematoma also insinuates into the soft tissues rather than being a confluent fluid collection.    LOWER CHEST: The limited visualized portions of the lung bases are clear    HEPATOBILIARY: The liver morphology is normal. No biliary ductal dilatation.    PANCREAS: Normal.    SPLEEN: Normal.    ADRENAL GLANDS: Normal.    KIDNEYS/BLADDER: Left renal cyst. No renal collecting system dilatation.    BOWEL: The large and small bowel are normal in caliber.    LYMPH NODES: Normal.    PELVIC ORGANS: Normal.    MUSCULOSKELETAL: Multilevel degenerative changes.      Impression    IMPRESSION:  1.  Superficial right groin hematoma without evidence of active hemorrhage within the visualized groin and upper thigh.           HEAD MRI:   1.  Acute infarct in the right frontal lobe.  2.  Chronic small vessel ischemic disease with numerous chronic infarcts.     HEAD MRA:   1.  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  2.  Occlusion of the right middle cerebral artery. 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  3.  Additional multifocal moderate and severe intracranial stenoses as described above.     NECK MRA:  1.  Normal appearance of the right cervical internal carotid artery with absence of flow  related enhancement on noncontrast images but presence of contrast enhancement. Findings are suggestive of severe stenosis with retrograde flow via collaterals.  2.  Focal moderately severe stenosis of the distal right vertebral artery.     CT perfusion- Delay in transit time to anterior right frontal lobe and medial right parietal lobe with fairly symmetric deficit in relative cerebral blood flow and blood volume in this region consistent within known subacute infarct

## 2022-10-08 NOTE — PROGRESS NOTES
"      [facility]    Stroke Telephone Note    Stroke team was paged 10/08/22 regarding patient Candida Rose for acute unresponsiveness and posturing upon using bedpan. Stroke team spoke with attending, Dr. Guerra who recommended that patient lay flat and elevate legs and check BP. Recommended to obtain STAT CTH, which was stable without bleed and expected evolution of stroke. Amlodipine had been restarted today, this has been discontinued and Bps will continue to be monitored closely    My recommendations are based on the information provided over the phone by Candida Rose's in-person providers. They are not intended to replace the clinical judgment of her in-person providers. I was not requested to personally see or examine the patient at this time.    Aviva Nieto PA-C  Vascular Neurology  To page me or covering stroke neurology team member, click here: AMCOM   Choose \"On Call\" tab at top, then search dropdown box for \"Neurology Adult\", select location, press Enter, then look for stroke/neuro ICU/telestroke.      "

## 2022-10-08 NOTE — PROGRESS NOTES
[facility]    Stroke Progress Note    Interval EventsNeuro endovascular team perform diagnostic cerebral angiogram 10/7/2022, results below showing no filling to R STEPHEN, recommended CTP with Diamox.  Discussed with vascular neurology attending Dr. Guerra R STEPHEN finding explains R MCA/STEPHEN watershed territory infarct.  No need for CTP with Diamox at this time as would not be a good surgical candidate. She had a R groin hematoma following procedure, pressure was applied and controlled bleeding, CT a/p showed superficial hematoma without retroperitoneal extension.    Today she denies any new symptoms. She has chronic L arm weakness due to L shoulder osteoarthritis. There were no tremors noted to R leg. Her R leg shakiness happens only when she stands and has caused her to fall a few times in the past week.    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 PTA on DAPT with ASA 81 mg daily and plavix 75 mg daily. PTA Simvastatin 20 mg daily.    He presented to the [facility] ED 10/4/2022 due to multiple falls in setting of right leg shakiness.  No LOC.  NIHSS 0.  MRI showed right frontal ischemic infarct.  MRA head/neck showed R ICA/MCA occlusion, focal moderate L ICA stenosis intracranially, severe focal L M1 stenosis, mod-severe R PCA stenosis, severe R V4 stenosis, appearing similar to prior imaging.  EEG obtained and showed diffuse encephalopathy, worse on the right due to structural abnormality.  She was switched to Lipitor 40 mg daily and continued on PTA DAPT.  She was recommended DSA.    Stroke Evaluation Summarized     MRI/Head CT MRI brain 10/5: slightly progressed volume of known right frontal infarct, no new area of infarct   MRI brain 10/4: acute R frontal infarct, chronic infarcts (R occipital, L temporoparietal, bilateral cerebellar) and small vessel disease   Intracranial Vasculature MRA head:  occlusion of R ICA petrous/cavernous segments and occlusion of R MCA, focal moderate L ICA stenosis intracranially, severe focal L M1 stenosis, mod severe stenosis R PCA, severe R V 4 stenosis   Cervical Vasculature MRA: 1.  Normal appearance of the right cervical internal carotid artery with absence of flow related enhancement on noncontrast images but presence of contrast enhancement. Findings are suggestive of severe stenosis with retrograde flow via collaterals.  2.  Focal moderately severe stenosis of the distal right vertebral artery.      Echocardiogram LVEF 55-60%, no regional wall motion abnormality, left atrium mildly dilated, no doppler evidence of shunt, hypermobile interatrial septum, mild mitral annular calcification, SR   EKG/Telemetry Junctional rhythm   Other Testing DSA: functional robust filling through L STA-MCA bypass, R ICA occluded, fewer R MCA branches, no filling R STEPHEN     EEG: mild to moderate diffuse nonspecific encephalopathy with additional structural abnormalities greater on the right; no electrographic seizures or epileptogenic discharges       LDL  10/4/2022: 71 mg/dL   A1C  10/4/2022: 5.4 %   Troponin 10/4/2022: 20 ng/L  10/5/2022: 11 ng/L        Impression  1. Acute ischemic stroke of R frontal due to other etiology (hypoperfusion in the setting of Moyamoya) s/p history of b/l STA-MCA bypass 2006 s/p DSA 10/7/22 showing bypass L functional, R ICA occluded, decreased perfusion through R MCA, no filling R STEPHEN     2. Fluctuating lethargy - may be related to decreased cerebral perfusion in the setting of known multifocal ICAD, dehydration. EEG without evidence of seizures.     3. R leg shakiness x 1 week, unclear etiology, suspect musculoskeletal in nature    4. L arm weakness chronic secondary to severe osteoathritis       Plan  -Discussed with vascular neurology attending, Dr. Guerra  -Neuro checks and vitals every 4 hours  -maintain goal -160, DBP , okay to start PTA  "amlodipine 2.5 mg daily and hold for SBP <130, continue holding atenolol, recommend BP monitoring at least AM and PM at home, if any symptoms then lay flat and elevate legs, (if at home then also call 911 if symptoms don't immediately resolve with increased perfusion)  -avoid caffeine  -Continue PTA aspirin 81 mg daily and Plavix 75 mg daily x 21 days then recommend ongoing monotherapy with  mg daily as long term PTA DAPT not felt to provide additional benefit long term but will be at increased risk of bleeding  -LDL 71, Lipitor 40 mg daily, follow-up with PCP for titration to goal LDL 40-70, <40 increases risk of Intracranial hemorrhage  -Blood glucose monitoring, Hgb A1c 5.4%, (goal <7% for secondary stroke prevention), follow-up with PCP  -telemetry, no need for heart monitoring at discharge  -PT/OT/SPT, passed swallow test  -Smoking screen: never  -Sleep Apnea screen: denies significant snoring or excessive daytime sleepiness  -Euthermia, euglycemia, eunatremia  -Stroke Education  -Stroke Class per [facility]      Patient Follow-up    -Follow-up with PCP in 1-2 weeks  -Follow-up with neurology team in 6-8 weeks (ordered)    We will follow peripherally to ensure stability prior to likely discharge to ARU expected.    Aviva Nieto PA-C  Vascular Neurology  To page me or covering stroke neurology team member, click here: AMCOM   Choose \"On Call\" tab at top, then search dropdown box for \"Neurology Adult\", select location, press Enter, then look for stroke/neuro ICU/telestroke.    ______________________________________________________    Clinically Significant Risk Factors Present on Admission      Medications   Scheduled Meds    aspirin  81 mg Oral Daily    Or     aspirin  81 mg Oral or Feeding Tube Daily     atorvastatin  40 mg Oral or Feeding Tube QPM     clopidogrel  75 mg Oral or Feeding Tube Daily     sodium chloride (PF)  3 mL Intracatheter Q8H       Infusion Meds    - MEDICATION " INSTRUCTIONS -       - MEDICATION INSTRUCTIONS -       sodium chloride 75 mL/hr at 10/07/22 0427       PRN Meds  acetaminophen, glucose **OR** dextrose **OR** glucagon, labetalol **OR** hydrALAZINE, lidocaine 4%, lidocaine (buffered or not buffered), - MEDICATION INSTRUCTIONS -, - MEDICATION INSTRUCTIONS -, melatonin, ondansetron **OR** ondansetron, sodium chloride (PF)       PHYSICAL EXAMINATION  Temp:  [97.5  F (36.4  C)-98  F (36.7  C)] 98  F (36.7  C)  Pulse:  [56-79] 64  Resp:  [9-30] 16  BP: (114-186)/() 174/89  SpO2:  [92 %-100 %] 100 %      General Exam  General:  patient lying in bed without any acute distress    HEENT:  normocephalic/atraumatic  Pulmonary:  no respiratory distress    Neuro Exam  Mental Status:  alert, oriented x 3, follows commands, speech clear and fluent, naming and repetition normal  Cranial Nerves:  visual fields intact, PERRL, EOMI with normal smooth pursuit, facial sensation intact and symmetric, facial movements symmetric, hearing not formally tested but intact to conversation, no dysarthria, tongue protrusion midline  Motor:  No tremors to R leg, she has chronic L arm weakness due to chronic L shoulder osteoarthritis, no drift to other extremities but generalized weakness  Reflexes:  toes down-going  Sensory:  light touch sensation intact and symmetric throughout upper and lower extremities, no extinction on double simultaneous stimulation   Coordination:  normal finger-to-nose and heel-to-shin bilaterally without dysmetria  Station/Gait:  deferred    Stroke Scales    NIHSS  1a. Level of Consciousness 0-->Alert, keenly responsive   1b. LOC Questions 0-->Answers both questions correctly   1c. LOC Commands 0-->Performs both tasks correctly   2.   Best Gaze 0-->Normal   3.   Visual 0-->No visual loss   4.   Facial Palsy 0-->Normal symmetrical movements   5a. Motor Arm, Left 1-->Drift, limb holds 90 (or 45) degrees, but drifts down before full 10 seconds, does not hit bed or  other support   5b. Motor Arm, Right 0-->No drift, limb holds 90 (or 45) degrees for full 10 secs   6a. Motor Leg, Left 0-->No drift, leg holds 30 degree position for full 5 secs   6b. Motor Leg, right 1-->Drift, leg falls by the end of the 5-sec period but does not hit bed   7.   Limb Ataxia 0-->Absent   8.   Sensory 0-->Normal, no sensory loss   9.   Best Language 0-->No aphasia, normal   10. Dysarthria 0-->Normal   11. Extinction and Inattention  0-->No abnormality   Total 2 (10/08/22 1327)       Modified Lois Score (Pre-morbid)  3 - Moderate disability.  Requires some help, but able to walk unassisted.  Modified Lois Score (Discharge)  4 - Moderately severe disability.  Unable to attend to own bodily needs without assistance or unable to walk unassisted.    Imaging  I personally reviewed all imaging; relevant findings per HPI.     Lab Results Data   CBC  Recent Labs   Lab 10/07/22  1817 10/06/22  0809 10/04/22  1950 10/04/22  1432   WBC  --  6.6 8.6 9.3   RBC  --  3.20* 3.55* 3.75*   HGB 9.6* 9.6* 10.6* 11.2*   HCT  --  29.6* 33.4* 35.3   PLT  --  315 372 366     Basic Metabolic Panel    Recent Labs   Lab 10/08/22  0743 10/07/22  1653 10/07/22  1201 10/07/22  0832 10/07/22  0703 10/06/22  1154 10/06/22  0809 10/05/22  0910 10/05/22  0811 10/05/22  0200 10/04/22  1950   NA  --   --   --   --   --   --  133  --  131*  --  131*   POTASSIUM  --   --   --   --  4.4  --  3.9  --  4.0  4.0  --  4.1   CHLORIDE  --   --   --   --   --   --  102  --  97  --  94*   CO2  --   --   --   --   --   --  26  --  22  --  26   BUN  --   --   --   --   --   --  19  --  28  --  24.2*   CR  --   --   --   --   --   --  0.77  --  0.74  --  0.82   GLC 98 84 87   < >  --    < > 90   < > 104*   < > 102*   RAINE  --   --   --   --   --   --  8.3*  --  8.7  --  9.1    < > = values in this interval not displayed.     Liver Panel  Recent Labs   Lab 10/04/22  1432   PROTTOTAL 6.5   ALBUMIN 3.6   BILITOTAL 0.4   ALKPHOS 115*   AST 28   ALT  20     INR  No lab results found.   Lipid Profile    Recent Labs   Lab Test 10/04/22  1950 12/31/21  0710 01/04/21  1001 05/24/16  0947 06/16/15  1127   CHOL 166 150 196   < > 197   HDL 82 66 56   < > 62   LDL 71 68 106*   < > 106   TRIG 65 82 171*   < > 143   CHOLHDLRATIO  --   --   --   --  3.2    < > = values in this interval not displayed.     A1C    Recent Labs   Lab Test 10/04/22  1950 01/26/18  0818 04/11/17  0849   A1C 5.4 5.8 5.8     Troponin    Recent Labs   Lab 10/05/22  0811 10/04/22  1950   CTROPT  --  20*   TROPONINIS 11  --         Billing: I have personally spent a total of 45 minutes providing care today, time spent in reviewing medical records and reviewing tests, examining the patient and obtaining history, coordination of care, and discussion with the patient and/or family regarding diagnostic results, prognosis, symptom management, risks and benefits of management options, and development of plan of care. Greater than 50% was spent in counseling and coordination of care.

## 2022-10-09 ENCOUNTER — APPOINTMENT (OUTPATIENT)
Dept: MRI IMAGING | Facility: CLINIC | Age: 82
DRG: 065 | End: 2022-10-09
Attending: PSYCHIATRY & NEUROLOGY
Payer: MEDICARE

## 2022-10-09 LAB
ANION GAP SERPL CALCULATED.3IONS-SCNC: 8 MMOL/L (ref 3–14)
BUN SERPL-MCNC: 15 MG/DL (ref 7–30)
CALCIUM SERPL-MCNC: 8.6 MG/DL (ref 8.5–10.1)
CHLORIDE BLD-SCNC: 103 MMOL/L (ref 94–109)
CO2 SERPL-SCNC: 24 MMOL/L (ref 20–32)
CREAT SERPL-MCNC: 0.68 MG/DL (ref 0.52–1.04)
GFR SERPL CREATININE-BSD FRML MDRD: 88 ML/MIN/1.73M2
GLUCOSE BLD-MCNC: 100 MG/DL (ref 70–99)
HGB BLD-MCNC: 8.2 G/DL (ref 11.7–15.7)
HGB BLD-MCNC: 8.5 G/DL (ref 11.7–15.7)
POTASSIUM BLD-SCNC: 4.1 MMOL/L (ref 3.4–5.3)
SODIUM SERPL-SCNC: 135 MMOL/L (ref 133–144)

## 2022-10-09 PROCEDURE — 99233 SBSQ HOSP IP/OBS HIGH 50: CPT | Performed by: PSYCHIATRY & NEUROLOGY

## 2022-10-09 PROCEDURE — 250N000013 HC RX MED GY IP 250 OP 250 PS 637: Performed by: PHYSICIAN ASSISTANT

## 2022-10-09 PROCEDURE — 70553 MRI BRAIN STEM W/O & W/DYE: CPT | Mod: MG

## 2022-10-09 PROCEDURE — 80048 BASIC METABOLIC PNL TOTAL CA: CPT | Performed by: INTERNAL MEDICINE

## 2022-10-09 PROCEDURE — 120N000001 HC R&B MED SURG/OB

## 2022-10-09 PROCEDURE — A9585 GADOBUTROL INJECTION: HCPCS | Performed by: INTERNAL MEDICINE

## 2022-10-09 PROCEDURE — 258N000003 HC RX IP 258 OP 636: Performed by: INTERNAL MEDICINE

## 2022-10-09 PROCEDURE — 99233 SBSQ HOSP IP/OBS HIGH 50: CPT | Performed by: INTERNAL MEDICINE

## 2022-10-09 PROCEDURE — 85018 HEMOGLOBIN: CPT | Performed by: NURSE PRACTITIONER

## 2022-10-09 PROCEDURE — 255N000002 HC RX 255 OP 636: Performed by: INTERNAL MEDICINE

## 2022-10-09 PROCEDURE — 36415 COLL VENOUS BLD VENIPUNCTURE: CPT | Performed by: INTERNAL MEDICINE

## 2022-10-09 PROCEDURE — 36415 COLL VENOUS BLD VENIPUNCTURE: CPT | Performed by: NURSE PRACTITIONER

## 2022-10-09 PROCEDURE — 85018 HEMOGLOBIN: CPT | Performed by: INTERNAL MEDICINE

## 2022-10-09 PROCEDURE — 250N000013 HC RX MED GY IP 250 OP 250 PS 637: Performed by: INTERNAL MEDICINE

## 2022-10-09 RX ORDER — GADOBUTROL 604.72 MG/ML
8 INJECTION INTRAVENOUS ONCE
Status: COMPLETED | OUTPATIENT
Start: 2022-10-09 | End: 2022-10-09

## 2022-10-09 RX ADMIN — GADOBUTROL 8 ML: 604.72 INJECTION INTRAVENOUS at 14:53

## 2022-10-09 RX ADMIN — ATORVASTATIN CALCIUM 40 MG: 40 TABLET, FILM COATED ORAL at 19:55

## 2022-10-09 RX ADMIN — SODIUM CHLORIDE: 9 INJECTION, SOLUTION INTRAVENOUS at 11:54

## 2022-10-09 RX ADMIN — CLOPIDOGREL BISULFATE 75 MG: 75 TABLET ORAL at 08:29

## 2022-10-09 RX ADMIN — ASPIRIN 81 MG CHEWABLE TABLET 81 MG: 81 TABLET CHEWABLE at 08:29

## 2022-10-09 ASSESSMENT — ACTIVITIES OF DAILY LIVING (ADL)
ADLS_ACUITY_SCORE: 34
ADLS_ACUITY_SCORE: 38
ADLS_ACUITY_SCORE: 34

## 2022-10-09 NOTE — PROGRESS NOTES
[facility]    Medicine Progress Note - Hospitalist Service    Date of Admission:  10/5/2022    Assessment & Plan        Candida Rose is a 82-year-old female with a history of moyamoya disease, hypertension, hyperlipidemia, stroke, diabetes, osteoarthritis, alcohol use disorder and chronic pain who was transferred from [facility] after she was found to have an acute stroke.     # Acute ischemic right frontal lobe stroke:    # Moyamoya disease:    - Appreciate  Stroke Neuro following patient.    - continue her PTA aspirin 81 mg daily (indefinitely) and clopidogrel 75 mg daily (for 3 weeks then stop)  - P2Y12 level  - Tele  - FLP- LDL 71, HDL 82; started on Lipitor 40 mg po daily  - Hba1c 5.4  - s/p cerebral angiogram 10/7.  Appreciate stroke neurology following closely.  Plan for repeat MRI today  Continue on IV fluids and bedrest with commode priviliges  - hold PTA Norvasc and Atenolol, allow for permissive HTN and keep SBP >140  (Had trial of Amlodipine 10/8 with worsening left sided weakness)   - PT/OT/SLP  - fall precautions     # Falls  - likely related to acute stroke  - X ray left shoulder - No fracture or dislocation. Advanced degenerative changes  at the glenohumeral joint     #  Hyponatremia, hypochloremic : RESOLVED  - better     # Acute on Chronic anemia:    - She followed up with Hematology on 10/04/2022 for evaluation.  There is no evidence of bleeding. Bone marrow biopsy is planned in the next 4 weeks.  She has already had extensive laboratory studies performed such as iron studies, B12, folate, TSH, and SPEP.  If there is evidence of urinary or gastrointestinal bleeding, she will be referred to Urology and Gastroenterology, respectively.     Acute anemia likely component of of bleeding from the groin, hemodilution  -Hemoglobin trending down but no active signs or symptoms of bleeding.  Will monitor closely and if it continues to drop may repeat CT abdomen pelvis to  "evaluate for hematoma    #  Hypertension:    - hold her [id number] amlodipine, atenolol and losartan      #  Dyslipidemia  - PTA on Zocor, now switched to Lipitor 40 mg po daily      #  Abnormal UA:  - had a recent abnormal UA on 10/01- ER visit; started on Keflex at that time  - UC  only growing 10 to 50,000 colonies of urogenital nazia.  - stopped Keflex     # Alcohol use disorder  - states that she drinks :more than she should\" 2 glasses of wine daily, sometimes more.     Diet: Regular Diet Adult    DVT Prophylaxis: Pneumatic Compression Devices  Abreu Catheter: Not present  Central Lines: None  Cardiac Monitoring: ACTIVE order. Indication: Stroke, acute (48 hours)  Code Status: Full Code      Disposition Plan     Expected Discharge Date: 10/09/2022      Destination: home with family;nursing home          The patient's care was discussed with the Bedside Nurse, Patient and Patient's Family.    Rojas Sierra MD, MD  Hospitalist Service  [facility]  Securely message with the Vocera Web Console (learn more here)  Text page via mytrax Paging/Directory       Clinically Significant Risk Factors Present on Admission                      ______________________________________________________________________    Interval History   Last 24-hour events noted.    Yesterday evening events noted.  Had worsening of left-sided weakness with drop in blood pressure that improved after IV fluids and laying flat.      Doing okay lying flat although still feels weak left-side    4 point ROS done and neg unless mentioned    Data reviewed today: I reviewed all medications, new labs and imaging results over the last 24 hours. I personally reviewed the head CT image(s) showing As below.    Physical Exam   /58 (BP Location: Right arm)   Pulse 87   Temp 97.5  F (36.4  C) (Oral)   Resp 18   LMP  (LMP Unknown)   SpO2 99%   Gen- pleasant  lying in bed  HEENT- NAD, ALEKSEY  Neck- supple, no JVD " elevation, no thyromegaly  CVS- I+II+ no m/r/g  RS- CTAB  Abdo- soft, no tenderness . No g/r/r   Ext- no edema   CNS- as detailed in stroke neuro note  Left sided weakness.  Subtle left facial droop  Data    BMP  Recent Labs   Lab 10/09/22  1057 10/08/22  1601 10/08/22  1154 10/08/22  1050 10/07/22  0832 10/07/22  0703 10/06/22  1154 10/06/22  0809 10/05/22  0910 10/05/22  0811     --   --  131*  --   --   --  133  --  131*   POTASSIUM 4.1  --   --  3.9  --  4.4  --  3.9  --  4.0  4.0   CHLORIDE 103  --   --  100  --   --   --  102  --  97   RAINE 8.6  --   --  8.6  --   --   --  8.3*  --  8.7   CO2 24  --   --  25  --   --   --  26  --  22   BUN 15  --   --  16  --   --   --  19  --  28   CR 0.68  --   --  0.70  --   --   --  0.77  --  0.74   * 135* 158* 131*   < >  --    < > 90   < > 104*    < > = values in this interval not displayed.     CBC  Recent Labs   Lab 10/09/22  0625 10/08/22  1648 10/08/22  1050 10/07/22  1817 10/06/22  0809 10/04/22  1950 10/04/22  1432   WBC  --   --  10.8  --  6.6 8.6 9.3   RBC  --   --  3.38*  --  3.20* 3.55* 3.75*   HGB 8.2*   < > 10.2*   < > 9.6* 10.6* 11.2*   HCT  --   --  31.2*  --  29.6* 33.4* 35.3   MCV  --   --  92  --  93 94 94   MCH  --   --  30.2  --  30.0 29.9 29.9   MCHC  --   --  32.7  --  32.4 31.7 31.7   RDW  --   --  12.4  --  12.3 12.0 12.2   PLT  --   --  362  --  315 372 366    < > = values in this interval not displayed.     INRNo lab results found in last 7 days.  LFTs  Recent Labs   Lab 10/04/22  1432   ALKPHOS 115*   AST 28   ALT 20   BILITOTAL 0.4   PROTTOTAL 6.5   ALBUMIN 3.6      PANCNo lab results found in last 7 days.    Recent Results (from the past 24 hour(s))   CT Head w/o Contrast    Narrative    EXAM: CT HEAD W/O CONTRAST  LOCATION: [facility]  DATE/TIME: 10/8/2022 5:16 PM    INDICATION: Moyamoya. Recent right frontal stroke. New left hemiparesis. Assess for hemorrhagic transformation and new stroke.  COMPARISON:  Brain MRI 10/06/2022, head CT 10/05/2022.  TECHNIQUE: Routine CT Head without IV contrast. Multiplanar reformats. Dose reduction techniques were used.    FINDINGS:  INTRACRANIAL CONTENTS: Increased conspicuity of moderate size region of acute infarction in the anterior to mid right frontal lobe and right basal ganglia, within the right middle cerebral artery territory. Stable moderate size region of chronic   infarction in the left posterior temporal lobe, within the left middle cerebral artery posterior division territory. Stable small region of chronic infarction in the right occipital lobe, within the right posterior cerebral artery territory. Stable   postsurgical changes of bilateral temporal craniotomy with small foci of underlying lateral temporal encephalomalacia. Bilateral parietal craniotomy defects. Stable mild chronic small vessel ischemic changes throughout the cerebral hemispheric white   matter. Stable few tiny chronic wedge-shaped infarcts within the cerebellar hemispheres. Stable mild to moderate generalized brain parenchymal volume loss. Ventricles are not enlarged out of proportion to the cerebral sulci. No intracranial hemorrhage or   abnormal extra-axial fluid collection.    VISUALIZED ORBITS/SINUSES/MASTOIDS: Prior bilateral cataract surgery. Visualized portions of the orbits are otherwise unremarkable. No paranasal sinus mucosal disease. No middle ear or mastoid effusion.    BONES/SOFT TISSUES: No acute abnormality.      Impression    IMPRESSION:  1.  Expected evolution of moderate size region of acute infarction within the right middle cerebral artery territory since 10/05/2022. No intracranial hemorrhage.  2.  Stable chronic regions of infarction within the left middle cerebral artery posterior division territory and right posterior cerebral artery territory.  3.  Stable postsurgical changes of bilateral parietal and temporal craniotomy with underlying minimal lateral temporal  encephalomalacia.  4.  Stable mild chronic small vessel ischemic disease and mild to moderate generalized brain parenchymal volume loss.         HEAD MRI:   1.  Acute infarct in the right frontal lobe.  2.  Chronic small vessel ischemic disease with numerous chronic infarcts.     HEAD MRA:   1.  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  2.  Occlusion of the right middle cerebral artery. 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  3.  Additional multifocal moderate and severe intracranial stenoses as described above.     NECK MRA:  1.  Normal appearance of the right cervical internal carotid artery with absence of flow related enhancement on noncontrast images but presence of contrast enhancement. Findings are suggestive of severe stenosis with retrograde flow via collaterals.  2.  Focal moderately severe stenosis of the distal right vertebral artery.     CT perfusion- Delay in transit time to anterior right frontal lobe and medial right parietal lobe with fairly symmetric deficit in relative cerebral blood flow and blood volume in this region consistent within known subacute infarct

## 2022-10-09 NOTE — PROGRESS NOTES
Care Management Follow Up    Length of Stay (days): 4    Expected Discharge Date: 10/09/2022     Concerns to be Addressed: all concerns addressed in this encounter     Patient plan of care discussed at interdisciplinary rounds: Yes    Anticipated Discharge Disposition: Transitional Care     Anticipated Discharge Services:    Anticipated Discharge DME:      Patient/family educated on Medicare website which has current facility and service quality ratings:    Education Provided on the Discharge Plan:    Patient/Family in Agreement with the Plan:      Referrals Placed by CM/SW:  [facility], MEGHAN Lama  Private pay costs discussed: private room/amenity fees    Additional Information:  SW followed up with patient and spouse. Per spouse, TCU choices are [facility], [facility] and [facility]. Per request, referrals placed via [facility], to check bed availability.       ARTUR Eaton

## 2022-10-09 NOTE — PLAN OF CARE
Goal Outcome Evaluation:    Plan of Care Reviewed With: patient, spouse Plan of Care Reviewed With: patient, spouse      Patient here with CVA. Alert, oriented times 4, forgetful. Neuro's with left arm and left leg weakness. VSS, BP lower. Tele NSR. IVF infusing at 75/hr. Up to bedside commode with assist of one. On bedrest with bedside commode privileges today. Occasionally incontinent of urine. Patient on a regular diet, good appetite. MRI completed this afternoon. Spouse at bedside.

## 2022-10-09 NOTE — PROGRESS NOTES
"Vascular neurology progress note     S: feels back to baseline.   O: /58 (BP Location: Right arm)   Pulse 87   Temp 97.5  F (36.4  C) (Oral)   Resp 18   LMP  (LMP Unknown)   SpO2 99%      Neuro exam:   Persistent moderate left hemiparesis and left neglect. NIHSS  10. See flowsheet.     A&P:   Moyamoya. Neurologically worse since yesterdays episode which I think was provoked by hypotension after starting low dose amlodipine.     -Bedrest with commode privileges   -IVF: NS at 75/h   -MRI brain (I ordered)   -ASA 81 and plavix for 3 weeks then stop plavix   -PT, OT, SLP     Will continue to follow.     Discussed with      I spent 35 minutes in face to face with this patient and coordinating care. Over 50% of the time on the unit was spent counseling the patient and/or coordinating care as documented.     Beverly Guerra MD, Msc, KIMBERLY, FAAN   of Neurology  [facility]     10/09/2022 11:36 AM  To page me or covering stroke neurology team member, click here: AMCOM  Choose \"On Call\" tab at top, then search dropdown box for \"Neurology Adult\" & press Enter, look for Neuro ICU/Stroke      "

## 2022-10-09 NOTE — PLAN OF CARE
Goal Outcome Evaluation:     Reason for Admission: R frontal CVA    Cognitive/Mentation: A/Ox 4, forgetful. Drowsy at times  Neuros/CMS: Intact ex Generalized weakness, L slightly weaker than R.   VS: Stable on RA SBP goal >140.   Tele: NSR..  GI: BS active, passing flatus, last BM 10/7. Continent.  : Voiding adequate amt. In/Continent.  Pulmonary: LS clear.  Pain: denies.     Drains/Lines: PIV infusing NS @ 75/hr.  Skin: Scattered bruising.  Activity: Assist x 2 with GB W, pivot to BSC.  Diet: Regular with thin liquids. Takes pills whole.     Therapies recs: TCU  Discharge: pending medical stability.     Aggression Stoplight Tool: Green    End of shift summary: Pt continues to be very weak. No return of L droop when getting up to bedside commode.

## 2022-10-10 ENCOUNTER — APPOINTMENT (OUTPATIENT)
Dept: OCCUPATIONAL THERAPY | Facility: CLINIC | Age: 82
DRG: 065 | End: 2022-10-10
Attending: HOSPITALIST
Payer: MEDICARE

## 2022-10-10 LAB
HGB BLD-MCNC: 8.7 G/DL (ref 11.7–15.7)
POTASSIUM BLD-SCNC: 4.1 MMOL/L (ref 3.4–5.3)

## 2022-10-10 PROCEDURE — 97530 THERAPEUTIC ACTIVITIES: CPT | Mod: GO | Performed by: OCCUPATIONAL THERAPIST

## 2022-10-10 PROCEDURE — 250N000013 HC RX MED GY IP 250 OP 250 PS 637: Performed by: INTERNAL MEDICINE

## 2022-10-10 PROCEDURE — 36415 COLL VENOUS BLD VENIPUNCTURE: CPT | Performed by: INTERNAL MEDICINE

## 2022-10-10 PROCEDURE — 85018 HEMOGLOBIN: CPT | Performed by: INTERNAL MEDICINE

## 2022-10-10 PROCEDURE — 99233 SBSQ HOSP IP/OBS HIGH 50: CPT | Performed by: PHYSICIAN ASSISTANT

## 2022-10-10 PROCEDURE — 99233 SBSQ HOSP IP/OBS HIGH 50: CPT | Performed by: INTERNAL MEDICINE

## 2022-10-10 PROCEDURE — 250N000013 HC RX MED GY IP 250 OP 250 PS 637: Performed by: PHYSICIAN ASSISTANT

## 2022-10-10 PROCEDURE — 120N000001 HC R&B MED SURG/OB

## 2022-10-10 PROCEDURE — 258N000003 HC RX IP 258 OP 636: Performed by: INTERNAL MEDICINE

## 2022-10-10 PROCEDURE — 84132 ASSAY OF SERUM POTASSIUM: CPT | Performed by: INTERNAL MEDICINE

## 2022-10-10 RX ADMIN — SODIUM CHLORIDE: 9 INJECTION, SOLUTION INTRAVENOUS at 03:00

## 2022-10-10 RX ADMIN — ASPIRIN 81 MG CHEWABLE TABLET 81 MG: 81 TABLET CHEWABLE at 10:04

## 2022-10-10 RX ADMIN — ATORVASTATIN CALCIUM 40 MG: 40 TABLET, FILM COATED ORAL at 20:00

## 2022-10-10 RX ADMIN — CLOPIDOGREL BISULFATE 75 MG: 75 TABLET ORAL at 10:04

## 2022-10-10 ASSESSMENT — ACTIVITIES OF DAILY LIVING (ADL)
ADLS_ACUITY_SCORE: 38
ADLS_ACUITY_SCORE: 34
ADLS_ACUITY_SCORE: 38
ADLS_ACUITY_SCORE: 38
ADLS_ACUITY_SCORE: 34
ADLS_ACUITY_SCORE: 38
ADLS_ACUITY_SCORE: 34
ADLS_ACUITY_SCORE: 38
ADLS_ACUITY_SCORE: 34
ADLS_ACUITY_SCORE: 38
ADLS_ACUITY_SCORE: 38
ADLS_ACUITY_SCORE: 34

## 2022-10-10 NOTE — PLAN OF CARE
"Physical Therapy paged MD:     : Hi, can you please clarify activity orders for 703-1? Last order is for bedrest. I'm from Physical Therapy, would like to get pt out of bed, etc. Thank you!\"    Will defer until activity orders are clarified.     Jaja Avila, PT, DPT  "

## 2022-10-10 NOTE — PROGRESS NOTES
"      [facility]    Stroke Progress Note    Interval EventsToday she is feeling significant improvement. She still has weakness to L arm, no neglect. She maintains that it is from her chronic shoulder pain. Her  states that she normally does have some weakness and pain but seems like maybe she is \"babying it more in the hospital\", suspect there is some additional weakness increased from her baseline. Will attempt to stop IVF to see if her BP will tolerate it.    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 PTA on DAPT with ASA 81 mg daily and plavix 75 mg daily. PTA Simvastatin 20 mg daily. Chronic L arm weakness due to L shoulder osteoarthritis.    She presented to the [facility] ED 10/4/2022 due to multiple falls in setting of right leg shakiness.  No LOC.  NIHSS 0.  MRI showed right frontal ischemic infarct.  MRA head/neck showed R ICA/MCA occlusion, focal moderate L ICA stenosis intracranially, severe focal L M1 stenosis, mod-severe R PCA stenosis, severe R V4 stenosis, appearing similar to prior imaging.   No tremors were noted on exam to the RLE. Her R leg shakiness happens only when she stands and has caused her to fall a few times in the past week. EEG obtained and showed diffuse encephalopathy, worse on the right due to structural abnormality.  She was switched to Lipitor 40 mg daily and continued on PTA DAPT.      Neuro endovascular team perform diagnostic cerebral angiogram 10/7/2022, results below showing no filling to R STEPHEN, recommended CTP with Diamox.  Discussed with vascular neurology attending Dr. Guerra R STEPHEN finding explains R MCA/STEPHEN watershed territory infarct.  No need for CTP with Diamox at this time as would not be a good surgical candidate. She had a R groin hematoma following procedure, pressure was applied and controlled bleeding, CT a/p showed superficial hematoma without " retroperitoneal extension.    10/8/22 there was attempt at restarting just her amlodipine to see if able to tolerate. She later had an episode of unresponsiveness and posturing, directed to lay flat, elevate legs, STAT CTH (stable, no bleed), discontinued amlodipine. Started IVF, bedrest with commode privileges. 10/9/22 she had moderate L hemiparesis and apparent L neglect. NIHSS 10. MRI showed numerous new tiny punctate/small patchy foci acute infarct b/l STEPHEN territories (R> L), R PCA/MCA since  10/06/2022    Stroke Evaluation Summarized     MRI/Head CT MRI: Numerous new tiny punctate/small patchy foci acute infarct b/l STEPHEN territories (R> L), R PCA/MCA since  10/06/2022, Expected evolution of late acute/early subacute infarcts R MCA territory, Stable chronic cortical infarcts b/l MCA posterior division and R PCA territories, mild-mod chronic SVID  CTH 10/8/22: stable, no bleed  MRI brain 10/5: slightly progressed volume of known right frontal infarct, no new area of infarct   MRI brain 10/4: acute R frontal infarct, chronic infarcts (R occipital, L temporoparietal, bilateral cerebellar) and small vessel disease   Intracranial Vasculature MRA head: occlusion of R ICA petrous/cavernous segments and occlusion of R MCA, focal moderate L ICA stenosis intracranially, severe focal L M1 stenosis, mod severe stenosis R PCA, severe R V 4 stenosis   Cervical Vasculature MRA: 1.  Normal appearance of the right cervical internal carotid artery with absence of flow related enhancement on noncontrast images but presence of contrast enhancement. Findings are suggestive of severe stenosis with retrograde flow via collaterals.  2.  Focal moderately severe stenosis of the distal right vertebral artery.      Echocardiogram LVEF 55-60%, no regional wall motion abnormality, left atrium mildly dilated, no doppler evidence of shunt, hypermobile interatrial septum, mild mitral annular calcification, SR   EKG/Telemetry Junctional rhythm    Other Testing DSA: functional robust filling through L STA-MCA bypass, R ICA occluded, fewer R MCA branches, no filling R STEPHEN     EEG: mild to moderate diffuse nonspecific encephalopathy with additional structural abnormalities greater on the right; no electrographic seizures or epileptogenic discharges       LDL  10/4/2022: 71 mg/dL   A1C  10/4/2022: 5.4 %   Troponin 10/4/2022: 20 ng/L  10/5/2022: 11 ng/L        Impression   10/09/22 new L STEPHEN territories (R> L), R PCA/MCA   10/6/22  R frontal MCA territory infarct  Chronic cortical infarcts b/l MCA posterior division and R PCA territories  Suspect infarcts due to hypoperfusion in the setting of Moyamoya s/p history of b/l STA-MCA bypass 2006 s/p DSA 10/7/22 showing bypass L functional, R ICA occluded, decreased perfusion through R MCA, no filling R STEPHEN complicated by superficial R groin hematoma     Fluctuating lethargy - may be related to decreased cerebral perfusion in the setting of known multifocal ICAD, dehydration. EEG without evidence of seizures.      R leg shakiness x 1 week, unclear etiology, negative for seizures although symptoms had resolved, suspect musculoskeletal in nature     L arm weakness chronic secondary to severe osteoathritis     Plan  -Neuro checks and vitals every 4 hours  -maintain goal -160, DBP , continue to hold all antihypertensives, attempt to stop IVF and monitor BP and exam closely to ensure doesn't become symptomatic, discussed with hospitalist. At discharge, recommend BP monitoring at least AM and PM at home, if any symptoms then lay flat and elevate legs, (if at home then also call 911 if symptoms don't immediately resolve with increased perfusion)  -if no worsening symptoms when stop IVF then increase activity as tolerated, okay to continue therapies  -avoid caffeine  -Continue PTA aspirin 81 mg daily and Plavix 75 mg daily x 21 days then recommend ongoing monotherapy with ASA 81 mg daily as long term PTA DAPT  "not felt to provide additional benefit long term but will be at increased risk of bleeding  -LDL 71, Lipitor 40 mg daily, follow-up with PCP for titration to goal LDL 40-70, <40 increases risk of Intracranial hemorrhage  -Blood glucose monitoring, Hgb A1c 5.4%, (goal <7% for secondary stroke prevention), follow-up with PCP  -telemetry, recommend 30 day heart monitoring at discharge to rule out Afib (ordered)  -PT/OT/SPT, passed swallow test   -Smoking screen: never  -Sleep Apnea screen: denies significant snoring or excessive daytime sleepiness  -Euthermia, euglycemia, eunatremia  -Stroke Education  -Stroke Class per [facility]    Patient Follow-up    -Follow-up with PCP in 1-2 weeks  -Follow-up with neurology team in 6-8 weeks (ordered)    No further stroke work-up needed. We will sign off. If any worsening symptoms or concerns then please page stroke team.    Aviva Nieto PA-C  Vascular Neurology  To page me or covering stroke neurology team member, click here: [facility]COM   Choose \"On Call\" tab at top, then search dropdown box for \"Neurology Adult\", select location, press Enter, then look for stroke/neuro ICU/telestroke.    ______________________________________________________    Clinically Significant Risk Factors Present on Admission                  Medications   Scheduled Meds    aspirin  81 mg Oral Daily    Or     aspirin  81 mg Oral or Feeding Tube Daily     atorvastatin  40 mg Oral or Feeding Tube QPM     clopidogrel  75 mg Oral or Feeding Tube Daily     sodium chloride (PF)  3 mL Intracatheter Q8H       Infusion Meds    - MEDICATION INSTRUCTIONS -       - MEDICATION INSTRUCTIONS -         PRN Meds  acetaminophen, glucose **OR** dextrose **OR** glucagon, labetalol **OR** hydrALAZINE, lidocaine 4%, lidocaine (buffered or not buffered), - MEDICATION INSTRUCTIONS -, - MEDICATION INSTRUCTIONS -, melatonin, ondansetron **OR** ondansetron, sodium chloride (PF)       PHYSICAL EXAMINATION  Temp:  " [97.7  F (36.5  C)-98.5  F (36.9  C)] 98.3  F (36.8  C)  Pulse:  [70-88] 70  Resp:  [17-18] 17  BP: (126-164)/(60-79) 126/79  SpO2:  [97 %-99 %] 98 %      General Exam  General:  patient lying in bed without any acute distress    HEENT:  normocephalic/atraumatic  Pulmonary:  no respiratory distress      Neuro Exam  Mental Status:  alert, oriented x 3, follows commands, speech clear and fluent, naming and repetition normal  Cranial Nerves:  visual fields intact, PERRL, EOMI with normal smooth pursuit, facial sensation intact and symmetric, facial movements symmetric, hearing not formally tested but intact to conversation, no dysarthria, tongue protrusion midline  Motor: generalized weakness with drift to BLE, LUE drift with chronic weakness due to pain in shoulder, capillary filling symmetric and palpable R foot dorsalis pedis pulse  Reflexes:  toes down-going  Sensory:  light touch sensation intact and symmetric throughout upper and lower extremities, no extinction on double simultaneous stimulation   Coordination:normal finger to nose, not able to do heel to shin due to generalized weakness, rapid alternating movements symmetric  Station/Gait:  deferred to PT    Stroke Scales    NIHSS  1a. Level of Consciousness 0-->Alert, keenly responsive   1b. LOC Questions 0-->Answers both questions correctly   1c. LOC Commands 0-->Performs both tasks correctly   2.   Best Gaze 0-->Normal   3.   Visual 0-->No visual loss   4.   Facial Palsy 0-->Normal symmetrical movements   5a. Motor Arm, Left (S) 1-->Drift, limb holds 90 (or 45) degrees, but drifts down before full 10 seconds, does not hit bed or other support (chronic weakness from L shoulder pain)   5b. Motor Arm, Right 0-->No drift, limb holds 90 (or 45) degrees for full 10 secs   6a. Motor Leg, Left 1-->Drift, leg falls by the end of the 5-sec period but does not hit bed   6b. Motor Leg, right 1-->Drift, leg falls by the end of the 5-sec period but does not hit bed   7.    Limb Ataxia 0-->Absent   8.   Sensory 0-->Normal, no sensory loss   9.   Best Language 0-->No aphasia, normal   10. Dysarthria 0-->Normal   11. Extinction and Inattention  0-->No abnormality   Total 3 (10/10/22 1137)       Modified [place] Score (Pre-morbid)  3 - Moderate disability.  Requires some help, but able to walk unassisted.  Modified [place] Score (Discharge)  4 - Moderately severe disability.  Unable to attend to own bodily needs without assistance or unable to walk unassisted.    Imaging  I personally reviewed all imaging; relevant findings per HPI.     Lab Results Data   CBC  Recent Labs   Lab 10/10/22  0629 10/09/22  1739 10/09/22  0625 10/08/22  1648 10/08/22  1050 10/07/22  1817 10/06/22  0809 10/04/22  1950   WBC  --   --   --   --  10.8  --  6.6 8.6   RBC  --   --   --   --  3.38*  --  3.20* 3.55*   HGB 8.7* 8.5* 8.2*   < > 10.2*   < > 9.6* 10.6*   HCT  --   --   --   --  31.2*  --  29.6* 33.4*   PLT  --   --   --   --  362  --  315 372    < > = values in this interval not displayed.     Basic Metabolic Panel    Recent Labs   Lab 10/10/22  0629 10/09/22  1057 10/08/22  1601 10/08/22  1154 10/08/22  1050 10/06/22  1154 10/06/22  0809   NA  --  135  --   --  131*  --  133   POTASSIUM 4.1 4.1  --   --  3.9   < > 3.9   CHLORIDE  --  103  --   --  100  --  102   CO2  --  24  --   --  25  --  26   BUN  --  15  --   --  16  --  19   CR  --  0.68  --   --  0.70  --  0.77   GLC  --  100* 135* 158* 131*   < > 90   RAINE  --  8.6  --   --  8.6  --  8.3*    < > = values in this interval not displayed.     Liver Panel  Recent Labs   Lab 10/04/22  1432   PROTTOTAL 6.5   ALBUMIN 3.6   BILITOTAL 0.4   ALKPHOS 115*   AST 28   ALT 20     INR  No lab results found.   Lipid Profile    Recent Labs   Lab Test 10/04/22  1950 12/31/21  0710 01/04/21  1001 05/24/16  0947 06/16/15  1127   CHOL 166 150 196   < > 197   HDL 82 66 56   < > 62   LDL 71 68 106*   < > 106   TRIG 65 82 171*   < > 143   CHOLHDLRATIO  --   --   --   --   3.2    < > = values in this interval not displayed.     A1C    Recent Labs   Lab Test 10/04/22  1950 01/26/18  0818 04/11/17  0849   A1C 5.4 5.8 5.8     Troponin    Recent Labs   Lab 10/05/22  0811 10/04/22  1950   CTROPT  --  20*   TROPONINIS 11  --         Billing: I have personally spent a total of 35 minutes providing care today, time spent in reviewing medical records and reviewing tests, examining the patient and obtaining history, coordination of care, and discussion with the patient and/or family regarding diagnostic results, prognosis, symptom management, risks and benefits of management options, and development of plan of care. Greater than 50% was spent in counseling and coordination of care.

## 2022-10-10 NOTE — PROGRESS NOTES
"SPIRITUAL HEALTH SERVICES Progress Note     SH  73     Saw pt Candida Rose per length of stay protocol.   Spouse was noted to be present daily at [facility] but was not in the room during this encounter.    Illness Narrative - Candida notes that she arrived to [facility] after having two strokes and that she was previously diagnosed with moyamoya. She was awake and easy to engage with during this visit. Candida noted that she is feeling good today.     Distress - Candida mentioned being distressed about her ability to be independent after discharge and a TCU stay. She noted that she values her independence and isn't quite sure what it will look like when she gets home. She specifically mentioned driving in reflection on this.     Coping -   Spouse: Olu has been regularly present at the bedside and has been key to supporting her.   Children: Candida noted having three children that live out of state and who have been checking-in and providing support. Specifically, Candida noted that she is happy that her children have been able to support her  as he is supporting her.  RN's/Staff: Candida noted that the staff have also been helpful in lifting her spirits as she deals with the stress of this hospitalization. \"They work so hard in their job,\" she noted.     Meaning-Making - Overall, Candida stated that she is \"doing really good\" and has \"plenty of support\" around her. We spent time reflecting on her career as a  and on her 50+ years of marriage to Olu. She also views the TCU option positively as it will likely relieve her spouse of some of the stress of caring for her at home while she is still recovering.     Plan -  is available to support this patient. Please consult with any immediate needs. I will continue following while on unit 73.      ------  Aung Moulton M.Div.  Resident   Pager: [phone]   "

## 2022-10-10 NOTE — PLAN OF CARE
Reason for Admission: CVAs     Cognitive/Mentation: A/Ox 4, forgetful.   Neuros/CMS: Intact ex Generalized weakness, L slightly weaker with drift. Some L sided neglect noted.   VS: Stable on RA SBP goal >140.   Tele: NSR..  GI: BS active, passing flatus, last BM 10/9. Continent.  : Voiding adequate amt. In/Continent.  Pulmonary: LS clear.  Pain: denies.      Drains/Lines: PIV infusing NS @ 75/hr.  Skin: Scattered bruising. R groin hematoma unchanged.   Activity: Assist x 1-2 with GB W. Bedrest with BSC privileges.   Diet: Regular with thin liquids. Takes pills whole.      Therapies recs: TCU  Discharge: pending medical stability.      Aggression Stoplight Tool: Green     End of shift summary: Pt continues to be gernerally very weak but improving. No return of L droop this shift. Calls appropriately.

## 2022-10-10 NOTE — PROGRESS NOTES
Care Management Follow Up    Length of Stay (days): 5    Expected Discharge Date: 10/11/2022     Concerns to be Addressed: all concerns addressed in this encounter     Patient plan of care discussed at interdisciplinary rounds: Yes    Anticipated Discharge Disposition: Transitional Care     Anticipated Discharge Services:    Anticipated Discharge DME:        Referrals Placed by CM/DESTINY: DESTINY called [facility] to follow up on admission referral sent over the weekend.   Private pay costs discussed:     Additional Information:    ARTUR Cary  [facility]  Care Transitions  [phone]

## 2022-10-10 NOTE — PLAN OF CARE
Goal Outcome Evaluation:       Pt here with R CVA. A&O x4. Neuros L side weakness, L drift, generalized weakness. VSS, SBP >140, other vitals stable. Tele SR 1st degree AV block. Reg diet, thin liquids. Takes pills whole with water. Up with A 1-2 GB W, unclear orders if pt is bed rest with toileting privileges , she gets up to bedside commode. Denies pain. Pt scoring green on the Aggression Stop Light Tool. On K+ replacement. R side groin bruise from previous angiogram.  Plan to discharge to a TCU when medically stable.

## 2022-10-10 NOTE — PROVIDER NOTIFICATION
MD Notification    Notified Person: MD    Notified Person Name: Susanne Matt    Notification Date/Time: 10/9 2308    Notification Interaction: Amcom Page    Purpose of Notification:  FYI repeat MRI from this afternoon resulted and showed new CVAs. wanted to clarify if any change in plan of care?    Orders Received:    Comments:

## 2022-10-10 NOTE — PROGRESS NOTES
[facility]    Medicine Progress Note - Hospitalist Service    Date of Admission:  10/5/2022    Assessment & Plan        Candida Rose is a 82-year-old female with a history of moyamoya disease, hypertension, hyperlipidemia, stroke, diabetes, osteoarthritis, alcohol use disorder and chronic pain who was transferred from [facility] after she was found to have an acute stroke.     # Acute ischemic right frontal lobe stroke:    # Moyamoya disease:    - Appreciate  Stroke Neuro following patient.    - continue her PTA aspirin 81 mg daily (indefinitely) and clopidogrel 75 mg daily (for 3 weeks then stop)  - P2Y12 level  - Tele  - FLP- LDL 71, HDL 82; started on Lipitor 40 mg po daily  - Hba1c 5.4  - s/p cerebral angiogram 10/7.  Appreciate stroke neurology following closely.    - repeat MRI 10/9:  Numerous new tiny punctate and small patchy foci of acute infarction within the bilateral anterior cerebral artery territories (right greater than left), right posterior cerebral artery territory and right middle cerebral artery territory since   10/06/2022.   10/10: Discussed with neurology: Discontinue IV fluids and monitor for any orthostatic drop in blood pressure  - hold PTA Norvasc and Atenolol, allow for permissive HTN and keep SBP >140  (Had trial of Amlodipine 10/8 with worsening left sided weakness)   - PT/OT/SLP  - fall precautions     # Falls  - likely related to acute stroke  - X ray left shoulder - No fracture or dislocation. Advanced degenerative changes  at the glenohumeral joint     #  Hyponatremia, hypochloremic : RESOLVED  - better     # Acute on Chronic anemia:    - She followed up with Hematology on 10/04/2022 for evaluation.  There is no evidence of bleeding. Bone marrow biopsy is planned in the next 4 weeks.  She has already had extensive laboratory studies performed such as iron studies, B12, folate, TSH, and SPEP.  If there is evidence of urinary or gastrointestinal  "bleeding, she will be referred to Urology and Gastroenterology, respectively.     Acute anemia likely component of of bleeding from the groin, hemodilution  -Hemoglobin trending down but no active signs or symptoms of bleeding.  Will monitor closely and if it continues to drop may repeat CT abdomen pelvis to evaluate for hematoma  10/10- stable 8.7    #  Hypertension:    - hold her [id number] amlodipine, atenolol and losartan      #  Dyslipidemia  - PTA on Zocor, now switched to Lipitor 40 mg po daily      #  Abnormal UA:  - had a recent abnormal UA on 10/01- ER visit; started on Keflex at that time  - UC  only growing 10 to 50,000 colonies of urogenital nazia.  - stopped Keflex     # Alcohol use disorder  - states that she drinks :more than she should\" 2 glasses of wine daily, sometimes more.     Diet: Regular Diet Adult    DVT Prophylaxis: Pneumatic Compression Devices  Abreu Catheter: Not present  Central Lines: None  Cardiac Monitoring: ACTIVE order. Indication: Stroke, acute (48 hours)  Code Status: Full Code      Disposition Plan      Expected Discharge Date: 10/11/2022      Destination: home with family;nursing home          The patient's care was discussed with the Bedside Nurse, Patient and Patient's Family.    Rojas Sierra MD, MD  Hospitalist Service  [facility] [place]  Securely message with the Vocera Web Console (learn more here)  Text page via [employer] Paging/Directory     \"This dictation was performed with voice recognition software and may contain errors,  omissions and inadvertent word substitution.\"     Clinically Significant Risk Factors Present on Admission                    ______________________________________________________________________    Interval History   Last 24-hour events noted.    Feels better but frustrated with repeated MRI.  Still feels a bit weak.  As per her started but did not feel dizziness or return of symptoms      4 point ROS done and neg unless " mentioned    Data reviewed today: I reviewed all medications, new labs and imaging results over the last 24 hours. I personally reviewed the brain MRI image(s) showing As below.    Physical Exam   /79 (BP Location: Right arm)   Pulse 70   Temp 98.3  F (36.8  C) (Oral)   Resp 17   LMP  (LMP Unknown)   SpO2 98%   Gen- pleasant  lying in bed  HEENT- NAD, ALEKSEY  Neck- supple, no JVD elevation, no thyromegaly  CVS- I+II+ no m/r/g  RS- CTAB  Abdo- soft, no tenderness . No g/r/r   Ext- no edema   CNS- as detailed in stroke neuro note  Left sided weakness.    Data    BMP  Recent Labs   Lab 10/10/22  0629 10/09/22  1057 10/08/22  1601 10/08/22  1154 10/08/22  1050 10/07/22  0832 10/07/22  0703 10/06/22  1154 10/06/22  0809 10/05/22  0910 10/05/22  0811   NA  --  135  --   --  131*  --   --   --  133  --  131*   POTASSIUM 4.1 4.1  --   --  3.9  --  4.4  --  3.9  --  4.0  4.0   CHLORIDE  --  103  --   --  100  --   --   --  102  --  97   RAINE  --  8.6  --   --  8.6  --   --   --  8.3*  --  8.7   CO2  --  24  --   --  25  --   --   --  26  --  22   BUN  --  15  --   --  16  --   --   --  19  --  28   CR  --  0.68  --   --  0.70  --   --   --  0.77  --  0.74   GLC  --  100* 135* 158* 131*   < >  --    < > 90   < > 104*    < > = values in this interval not displayed.     CBC  Recent Labs   Lab 10/10/22  0629 10/08/22  1648 10/08/22  1050 10/07/22  1817 10/06/22  0809 10/04/22  1950 10/04/22  1432   WBC  --   --  10.8  --  6.6 8.6 9.3   RBC  --   --  3.38*  --  3.20* 3.55* 3.75*   HGB 8.7*   < > 10.2*   < > 9.6* 10.6* 11.2*   HCT  --   --  31.2*  --  29.6* 33.4* 35.3   MCV  --   --  92  --  93 94 94   MCH  --   --  30.2  --  30.0 29.9 29.9   MCHC  --   --  32.7  --  32.4 31.7 31.7   RDW  --   --  12.4  --  12.3 12.0 12.2   PLT  --   --  362  --  315 372 366    < > = values in this interval not displayed.     INRNo lab results found in last 7 days.  LFTs  Recent Labs   Lab 10/04/22  1432   ALKPHOS 115*   AST 28   ALT 20    BILITOTAL 0.4   PROTTOTAL 6.5   ALBUMIN 3.6      PANCNo lab results found in last 7 days.    Recent Results (from the past 24 hour(s))   MR Brain w/o & w Contrast    Narrative    EXAM: MR BRAIN W/O AND W CONTRAST  LOCATION: [facility]  DATE/TIME: 10/9/2022 3:27 PM    INDICATION: New left hemiparesis.  COMPARISON: Head CT 10/08/2022, brain MRI 10/06/2022, 10/04/2022.  CONTRAST: Gadobutrol (Gadavist) injection 8 mL.  TECHNIQUE: Routine multiplanar multisequence head MRI without and with intravenous contrast.    FINDINGS:  Several images are degraded by patient artifact, which mild to moderately limits evaluation.    INTRACRANIAL CONTENTS: Numerous new punctate/patchy foci of acute infarction in the high parasagittal right frontoparietal region and high parasagittal left parietal lobe, within the anterior cerebral artery territories (right greater than left). Few new   scattered punctate foci of acute infarction within the mesial aspect of the right temporal lobe, within the right posterior cerebral artery territory, and lateral aspect of the right temporal lobe, within the right middle cerebral artery territory.   Expected evolution of multifocal patchy and confluent regions of infarction in the anterior lateral right frontal lobe and right basal ganglia, within the right middle cerebral artery territory with associated developing enhancing subacute component in   the right orbitofrontal region. Stable small foci of chronic wedge-shaped infarction within the bilateral cerebellar hemispheres, right greater than left. Stable small focus of chronic cortical infarction in the right temporoparietal region. Stable   moderate size regions of chronic infarction in the right occipital lobe and left occipitotemporal region. Mild to moderate chronic small vessel ischemic changes throughout the cerebral hemispheric white matter bilaterally.     Mild to moderate generalized brain parenchymal volume  loss. Ventricles are not enlarged out of proportion to the cerebral sulci. No acute intracranial hemorrhage or abnormal extra-axial fluid collection. No mass effect, midline shift or herniation.    Bilateral temporoparietal and parietal craniotomy defects.     SELLA: No abnormality accounting for technique.    OSSEOUS STRUCTURES/SOFT TISSUES: Normal marrow signal. The major intracranial vascular flow voids are maintained. Upper cervical spondylosis.    ORBITS: Prior bilateral cataract surgery. Visualized portions of the orbits are otherwise unremarkable.     SINUSES/MASTOIDS: No paranasal sinus mucosal disease. No middle ear or mastoid effusion.       Impression    IMPRESSION:  1.  Numerous new tiny punctate and small patchy foci of acute infarction within the bilateral anterior cerebral artery territories (right greater than left), right posterior cerebral artery territory and right middle cerebral artery territory since   10/06/2022.  2.  Expected evolution of late acute/early subacute infarcts throughout the right middle cerebral artery territory since 10/06/2022.  3.  Stable chronic cortical infarcts within the bilateral middle cerebral artery posterior division territories and right posterior cerebral artery territory.  4.  Stable mild to moderate chronic small vessel ischemic disease and generalized brain parenchymal volume loss.       HEAD MRI:   1.  Acute infarct in the right frontal lobe.  2.  Chronic small vessel ischemic disease with numerous chronic infarcts.     HEAD MRA:   1.  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  2.  Occlusion of the right middle cerebral artery. 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  3.  Additional multifocal moderate and severe intracranial stenoses as described above.     NECK MRA:  1.  Normal appearance of the right cervical internal carotid artery with absence of flow related enhancement on noncontrast images but presence  of contrast enhancement. Findings are suggestive of severe stenosis with retrograde flow via collaterals.  2.  Focal moderately severe stenosis of the distal right vertebral artery.     CT perfusion- Delay in transit time to anterior right frontal lobe and medial right parietal lobe with fairly symmetric deficit in relative cerebral blood flow and blood volume in this region consistent within known subacute infarct

## 2022-10-11 ENCOUNTER — APPOINTMENT (OUTPATIENT)
Dept: OCCUPATIONAL THERAPY | Facility: CLINIC | Age: 82
DRG: 065 | End: 2022-10-11
Attending: HOSPITALIST
Payer: MEDICARE

## 2022-10-11 ENCOUNTER — APPOINTMENT (OUTPATIENT)
Dept: PHYSICAL THERAPY | Facility: CLINIC | Age: 82
DRG: 065 | End: 2022-10-11
Attending: HOSPITALIST
Payer: MEDICARE

## 2022-10-11 LAB
ALBUMIN SERPL-MCNC: 2.6 G/DL (ref 3.4–5)
ALP SERPL-CCNC: 91 U/L (ref 40–150)
ALT SERPL W P-5'-P-CCNC: 24 U/L (ref 0–50)
ANION GAP SERPL CALCULATED.3IONS-SCNC: 6 MMOL/L (ref 3–14)
AST SERPL W P-5'-P-CCNC: 17 U/L (ref 0–45)
BILIRUB SERPL-MCNC: 0.6 MG/DL (ref 0.2–1.3)
BUN SERPL-MCNC: 17 MG/DL (ref 7–30)
CALCIUM SERPL-MCNC: 8.9 MG/DL (ref 8.5–10.1)
CHLORIDE BLD-SCNC: 102 MMOL/L (ref 94–109)
CO2 SERPL-SCNC: 26 MMOL/L (ref 20–32)
CREAT SERPL-MCNC: 0.74 MG/DL (ref 0.52–1.04)
ERYTHROCYTE [DISTWIDTH] IN BLOOD BY AUTOMATED COUNT: 12.5 % (ref 10–15)
GFR SERPL CREATININE-BSD FRML MDRD: 81 ML/MIN/1.73M2
GLUCOSE BLD-MCNC: 89 MG/DL (ref 70–99)
GLUCOSE BLDC GLUCOMTR-MCNC: 102 MG/DL (ref 70–99)
HCT VFR BLD AUTO: 25.6 % (ref 35–47)
HGB BLD-MCNC: 8.5 G/DL (ref 11.7–15.7)
MCH RBC QN AUTO: 30 PG (ref 26.5–33)
MCHC RBC AUTO-ENTMCNC: 33.2 G/DL (ref 31.5–36.5)
MCV RBC AUTO: 91 FL (ref 78–100)
PLATELET # BLD AUTO: 270 10E3/UL (ref 150–450)
POTASSIUM BLD-SCNC: 3.9 MMOL/L (ref 3.4–5.3)
PROT SERPL-MCNC: 5.7 G/DL (ref 6.8–8.8)
RBC # BLD AUTO: 2.83 10E6/UL (ref 3.8–5.2)
SODIUM SERPL-SCNC: 134 MMOL/L (ref 133–144)
WBC # BLD AUTO: 7 10E3/UL (ref 4–11)

## 2022-10-11 PROCEDURE — 99233 SBSQ HOSP IP/OBS HIGH 50: CPT | Performed by: INTERNAL MEDICINE

## 2022-10-11 PROCEDURE — 85027 COMPLETE CBC AUTOMATED: CPT | Performed by: INTERNAL MEDICINE

## 2022-10-11 PROCEDURE — 120N000001 HC R&B MED SURG/OB

## 2022-10-11 PROCEDURE — 250N000013 HC RX MED GY IP 250 OP 250 PS 637: Performed by: INTERNAL MEDICINE

## 2022-10-11 PROCEDURE — 97535 SELF CARE MNGMENT TRAINING: CPT | Mod: GO

## 2022-10-11 PROCEDURE — 36415 COLL VENOUS BLD VENIPUNCTURE: CPT | Performed by: INTERNAL MEDICINE

## 2022-10-11 PROCEDURE — 250N000013 HC RX MED GY IP 250 OP 250 PS 637: Performed by: PHYSICIAN ASSISTANT

## 2022-10-11 PROCEDURE — 97530 THERAPEUTIC ACTIVITIES: CPT | Mod: GP | Performed by: PHYSICAL THERAPIST

## 2022-10-11 PROCEDURE — 82040 ASSAY OF SERUM ALBUMIN: CPT | Performed by: INTERNAL MEDICINE

## 2022-10-11 PROCEDURE — 80053 COMPREHEN METABOLIC PANEL: CPT | Performed by: INTERNAL MEDICINE

## 2022-10-11 RX ADMIN — ATORVASTATIN CALCIUM 40 MG: 40 TABLET, FILM COATED ORAL at 20:27

## 2022-10-11 RX ADMIN — CLOPIDOGREL BISULFATE 75 MG: 75 TABLET ORAL at 09:40

## 2022-10-11 RX ADMIN — ASPIRIN 81 MG: 81 TABLET, COATED ORAL at 09:40

## 2022-10-11 ASSESSMENT — ACTIVITIES OF DAILY LIVING (ADL)
ADLS_ACUITY_SCORE: 34
ADLS_ACUITY_SCORE: 34
ADLS_ACUITY_SCORE: 38
ADLS_ACUITY_SCORE: 34
ADLS_ACUITY_SCORE: 34
ADLS_ACUITY_SCORE: 38
ADLS_ACUITY_SCORE: 38
ADLS_ACUITY_SCORE: 34
ADLS_ACUITY_SCORE: 38
ADLS_ACUITY_SCORE: 34
ADLS_ACUITY_SCORE: 33
ADLS_ACUITY_SCORE: 38

## 2022-10-11 NOTE — PROGRESS NOTES
"[facility]    Medicine Progress Note - Hospitalist Service    Date of Admission:  10/5/2022    Assessment & Plan        Candida Rose is a 82-year-old female with a history of moyamoya disease, hypertension, hyperlipidemia, stroke, diabetes, osteoarthritis, alcohol use disorder and chronic pain who was transferred from [facility] after she was found to have an acute stroke.     # Acute ischemic right frontal lobe stroke:    # Moyamoya disease:    - FLP- LDL 71, HDL 82; started on Lipitor 40 mg po daily  - Hba1c 5.4  - s/p cerebral angiogram 10/7.  Appreciate stroke neurology   - repeat MRI 10/9:  Numerous new tiny punctate and small patchy foci of acute infarction within the bilateral anterior cerebral artery territories (right greater than left), right posterior cerebral artery territory and right middle cerebral artery territory since   10/06/2022.   10/10: Discussed with neurology: Discontinue IV fluids and monitor for any orthostatic drop in blood pressure  - hold PTA Norvasc and Atenolol, allow for permissive HTN and keep SBP >140  (Had trial of Amlodipine 10/8 with worsening left sided weakness)   \" -Continue PTA aspirin 81 mg daily and Plavix 75 mg daily x 21 days then recommend ongoing monotherapy with ASA 81 mg daily as long term PTA DAPT not felt to provide additional benefit long term but will be at increased risk of bleeding  -LDL 71, Lipitor 40 mg daily, follow-up with PCP for titration to goal LDL 40-70, <40 increases risk of Intracranial hemorrhage  -Blood glucose monitoring, Hgb A1c 5.4%, (goal <7% for secondary stroke prevention), follow-up with PCP  -telemetry, recommend 30 day heart monitoring at discharge to rule out Afib (ordered by neurology) \"      Patient Follow-up    -Follow-up with PCP in 1-2 weeks  -Follow-up with neurology team in 6-8 weeks (ordered)     # Falls  - likely related to acute stroke  - X ray left shoulder - No fracture or dislocation. Advanced " "degenerative changes  at the glenohumeral joint     #  Hyponatremia, hypochloremic : RESOLVED  - better     # Acute on Chronic anemia:    - She followed up with Hematology on 10/04/2022 for evaluation.  There is no evidence of bleeding. Bone marrow biopsy is planned in the next 4 weeks.  She has already had extensive laboratory studies performed such as iron studies, B12, folate, TSH, and SPEP.  If there is evidence of urinary or gastrointestinal bleeding, she will be referred to Urology and Gastroenterology, respectively.     Acute anemia likely component of of bleeding from the groin, hemodilution  -Hemoglobin trending down but no active signs or symptoms of bleeding.  Will monitor closely and if it continues to drop may repeat CT abdomen pelvis to evaluate for hematoma  10/10- stable 8.7    #  Hypertension:    - hold her [id number] amlodipine, atenolol and losartan      #  Dyslipidemia  - PTA on Zocor, now switched to Lipitor 40 mg po daily      #  Abnormal UA:  - had a recent abnormal UA on 10/01- ER visit; started on Keflex at that time  - UC  only growing 10 to 50,000 colonies of urogenital nazia.  - stopped Keflex     # Alcohol use disorder  - states that she drinks :more than she should\" 2 glasses of wine daily, sometimes more.     Diet: Regular Diet Adult    DVT Prophylaxis: Pneumatic Compression Devices  Abreu Catheter: Not present  Central Lines: None  Cardiac Monitoring: None  Code Status: Full Code      Disposition Plan  Medically stable for discharge.  Appreciate  evaluation for TCU     Expected Discharge Date: 10/11/2022    Discharge Delays: Placement - TCU  *Medically Ready for Discharge  Destination: home with family;nursing home          The patient's care was discussed with the Bedside Nurse and Patient.    Rojas Sierra MD, MD  Hospitalist Service  [facility]  Securely message with the Vocera Web Console (learn more here)  Text page via [employer] " "Paging/Directory     \"This dictation was performed with voice recognition software and may contain errors,  omissions and inadvertent word substitution.\"     Clinically Significant Risk Factors Present on Admission                    ______________________________________________________________________    Interval History   Last 24-hour events noted.    Feels better.  No worsening of symptoms since/stopping IV fluids.  Denies any orthostatic dizziness/weakness    4 point ROS done and neg unless mentioned    Data reviewed today: I reviewed all medications, new labs and imaging results over the last 24 hours. I personally reviewed no images or EKG's today.    Physical Exam   BP (!) 160/76 (BP Location: Right arm)   Pulse 68   Temp 98.4  F (36.9  C) (Oral)   Resp 16   LMP  (LMP Unknown)   SpO2 98%   Gen- pleasant  lying in bed  HEENT- NAD, ALEKSEY  Neck- supple  CVS- I+II+ no m/r/g  RS- CTAB  Abdo- soft, no tenderness . No g/r/r   Ext- no edema   CNS- as detailed in stroke neuro note  Left sided weakness.     Data    BMP  Recent Labs   Lab 10/11/22  0751 10/10/22  0629 10/09/22  1057 10/08/22  1601 10/08/22  1154 10/08/22  1050 10/06/22  1154 10/06/22  0809     --  135  --   --  131*  --  133   POTASSIUM 3.9 4.1 4.1  --   --  3.9   < > 3.9   CHLORIDE 102  --  103  --   --  100  --  102   RAINE 8.9  --  8.6  --   --  8.6  --  8.3*   CO2 26  --  24  --   --  25  --  26   BUN 17  --  15  --   --  16  --  19   CR 0.74  --  0.68  --   --  0.70  --  0.77   GLC 89  --  100* 135* 158* 131*   < > 90    < > = values in this interval not displayed.     CBC  Recent Labs   Lab 10/11/22  0751 10/08/22  1648 10/08/22  1050 10/07/22  1817 10/06/22  0809 10/04/22  1950   WBC 7.0  --  10.8  --  6.6 8.6   RBC 2.83*  --  3.38*  --  3.20* 3.55*   HGB 8.5*   < > 10.2*   < > 9.6* 10.6*   HCT 25.6*  --  31.2*  --  29.6* 33.4*   MCV 91  --  92  --  93 94   MCH 30.0  --  30.2  --  30.0 29.9   MCHC 33.2  --  32.7  --  32.4 31.7   RDW " 12.5  --  12.4  --  12.3 12.0     --  362  --  315 372    < > = values in this interval not displayed.     INRNo lab results found in last 7 days.  LFTs  Recent Labs   Lab 10/11/22  0751 10/04/22  1432   ALKPHOS 91 115*   AST 17 28   ALT 24 20   BILITOTAL 0.6 0.4   PROTTOTAL 5.7* 6.5   ALBUMIN 2.6* 3.6      PANCNo lab results found in last 7 days.    No results found for this or any previous visit (from the past 24 hour(s)).    HEAD MRI:   1.  Acute infarct in the right frontal lobe.  2.  Chronic small vessel ischemic disease with numerous chronic infarcts.     HEAD MRA:   1.  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  2.  Occlusion of the right middle cerebral artery. 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  3.  Additional multifocal moderate and severe intracranial stenoses as described above.     NECK MRA:  1.  Normal appearance of the right cervical internal carotid artery with absence of flow related enhancement on noncontrast images but presence of contrast enhancement. Findings are suggestive of severe stenosis with retrograde flow via collaterals.  2.  Focal moderately severe stenosis of the distal right vertebral artery.     CT perfusion- Delay in transit time to anterior right frontal lobe and medial right parietal lobe with fairly symmetric deficit in relative cerebral blood flow and blood volume in this region consistent within known subacute infarct

## 2022-10-11 NOTE — PROVIDER NOTIFICATION
"Notified provider re patient SBP being under goal of 140 or higher. Per provider, \"Drink more fluids. We r avoiding iv if possible, use stockings.\"     "

## 2022-10-11 NOTE — PLAN OF CARE
Pt here with acute right frontal CVA. Alert and oriented, forgetful. Neuros intact, left upper extremity weakness, 4/5 strength, lower extremities 4/5. VSS, goal to keep SBP greater than 140. Tele sinus rhythm with first degree AV block. IDDSI level regular diet with thin liquids. Takes pills whole. Up with Ax1, gait belt, walker. Continent of bowel and bladder. Denies pain. Pt scoring green on the Aggression Stop Light Tool. Plan discharge to TCU, pending placement.

## 2022-10-11 NOTE — PLAN OF CARE
Reason for Admission: Multiple Ischemic CVAs    Cognitive/Mentation: A/Ox 4, forgetful/slow processing at times.   Neuros/CMS: Intact ex LUE weakness & drift, forgetfulness. Denies numbness/tingling.   VS: Stable.   Tele: NSR 1st degree AV.  GI: BS active, passing flatus, last BM 10/9. Continent.  : Voiding adequate amt. In/Continent.  Pulmonary: LS clear.  Pain: denies.     Drains/Lines: PIV SL  Skin: Scattered bruising. R groin hematoma unchanged.   Activity: Assist x 1 with GB W. Able to ambulate to  this shift, fatigues easily.   Diet: Regular with thin liquids. Takes pills whole.     Therapies recs: TCU  Discharge: TCU pending medical stability.     Aggression Stoplight Tool: green    End of shift summary: Neuros unchanged this shift. SBP >140. Makes needs known.

## 2022-10-12 ENCOUNTER — APPOINTMENT (OUTPATIENT)
Dept: OCCUPATIONAL THERAPY | Facility: CLINIC | Age: 82
DRG: 065 | End: 2022-10-12
Attending: HOSPITALIST
Payer: MEDICARE

## 2022-10-12 ENCOUNTER — APPOINTMENT (OUTPATIENT)
Dept: PHYSICAL THERAPY | Facility: CLINIC | Age: 82
DRG: 065 | End: 2022-10-12
Attending: HOSPITALIST
Payer: MEDICARE

## 2022-10-12 LAB
GLUCOSE BLDC GLUCOMTR-MCNC: 109 MG/DL (ref 70–99)
POTASSIUM BLD-SCNC: 3.7 MMOL/L (ref 3.4–5.3)

## 2022-10-12 PROCEDURE — 99232 SBSQ HOSP IP/OBS MODERATE 35: CPT | Performed by: INTERNAL MEDICINE

## 2022-10-12 PROCEDURE — 250N000013 HC RX MED GY IP 250 OP 250 PS 637: Performed by: INTERNAL MEDICINE

## 2022-10-12 PROCEDURE — 120N000001 HC R&B MED SURG/OB

## 2022-10-12 PROCEDURE — 250N000013 HC RX MED GY IP 250 OP 250 PS 637: Performed by: PHYSICIAN ASSISTANT

## 2022-10-12 PROCEDURE — 84132 ASSAY OF SERUM POTASSIUM: CPT | Performed by: INTERNAL MEDICINE

## 2022-10-12 PROCEDURE — 36415 COLL VENOUS BLD VENIPUNCTURE: CPT | Performed by: INTERNAL MEDICINE

## 2022-10-12 PROCEDURE — 97535 SELF CARE MNGMENT TRAINING: CPT | Mod: GO

## 2022-10-12 PROCEDURE — 97116 GAIT TRAINING THERAPY: CPT | Mod: GP

## 2022-10-12 RX ADMIN — CLOPIDOGREL BISULFATE 75 MG: 75 TABLET ORAL at 08:05

## 2022-10-12 RX ADMIN — ASPIRIN 81 MG: 81 TABLET, COATED ORAL at 08:05

## 2022-10-12 RX ADMIN — ATORVASTATIN CALCIUM 40 MG: 40 TABLET, FILM COATED ORAL at 20:11

## 2022-10-12 ASSESSMENT — ACTIVITIES OF DAILY LIVING (ADL)
ADLS_ACUITY_SCORE: 33

## 2022-10-12 NOTE — PLAN OF CARE
Goal Outcome Evaluation:    Plan of Care Reviewed With: patient, spouse  Overall Patient Progress: no changeOverall Patient Progress: no change    Pt here with R frontal CVA. Pt A&O x3-4, intermittent confusion to time, forgetful. Elevated BP, SBP>140, other VSS, on RA. LS clear. Denies pain. CMS and Neuor's unchanged except for LUE pronator drift, moderate hand  and BLE weakness, 4/5 with LLE slightly weaker than RLE and BLE +2 to +3 edema noted, has TEDs and compression socks. Up A1 w/ GB and walker. Cont of B&B. Voiding adequately. +BS, BM yesterday. Reg diet, takes pills whole with water. Pt scroing green on Aggression Stop Light Tool. TCU at time of discharge, pending placement, additional referrals sent toay, see SW note.

## 2022-10-12 NOTE — PROGRESS NOTES
"[facility]    Medicine Progress Note - Hospitalist Service    Date of Admission:  10/5/2022    Assessment & Plan        Candida Rose is a 82-year-old female with a history of moyamoya disease, hypertension, hyperlipidemia, stroke, diabetes, osteoarthritis, alcohol use disorder and chronic pain who was transferred from [facility] after she was found to have an acute stroke.     # Acute ischemic right frontal lobe stroke:    # Moyamoya disease:    - FLP- LDL 71, HDL 82; started on Lipitor 40 mg po daily  - Hba1c 5.4  - s/p cerebral angiogram 10/7.  Appreciate stroke neurology   - repeat MRI 10/9:  Numerous new tiny punctate and small patchy foci of acute infarction within the bilateral anterior cerebral artery territories (right greater than left), right posterior cerebral artery territory and right middle cerebral artery territory since   10/06/2022.   10/10: Discussed with neurology: Discontinued IV fluids and monitor for any orthostatic drop in blood pressure  - hold PTA Norvasc and Atenolol, allow for permissive HTN and keep SBP >140  (Had trial of Amlodipine 10/8 with worsening left sided weakness)   \" -Continue PTA aspirin 81 mg daily and Plavix 75 mg daily x 21 days then recommend ongoing monotherapy with ASA 81 mg daily as long term PTA DAPT not felt to provide additional benefit long term but will be at increased risk of bleeding  -LDL 71, Lipitor 40 mg daily, follow-up with PCP for titration to goal LDL 40-70, <40 increases risk of Intracranial hemorrhage  -Blood glucose monitoring, Hgb A1c 5.4%, (goal <7% for secondary stroke prevention), follow-up with PCP  -telemetry, recommend 30 day heart monitoring at discharge to rule out Afib (ordered by neurology) \"      Patient Follow-up    -Follow-up with PCP in 1-2 weeks  -Follow-up with neurology team in 6-8 weeks (ordered)     # Falls  - likely related to acute stroke  - X ray left shoulder - No fracture or dislocation. Advanced " "degenerative changes  at the glenohumeral joint     #  Hyponatremia, hypochloremic : RESOLVED  - better     # Acute on Chronic anemia:    - She followed up with Hematology on 10/04/2022 for evaluation.  There is no evidence of bleeding. Bone marrow biopsy is planned in the next 4 weeks.  She has already had extensive laboratory studies performed such as iron studies, B12, folate, TSH, and SPEP.  If there is evidence of urinary or gastrointestinal bleeding, she will be referred to Urology and Gastroenterology, respectively.     Acute anemia likely component of of bleeding from the groin, hemodilution  -Hemoglobin trending down but no active signs or symptoms of bleeding.  Will monitor closely and if it continues to drop may repeat CT abdomen pelvis to evaluate for hematoma  10/10- stable 8.7    #  Hypertension:    - hold her [id number] amlodipine, atenolol and losartan      #  Dyslipidemia  - PTA on Zocor, now switched to Lipitor 40 mg po daily      #  Abnormal UA:  - had a recent abnormal UA on 10/01- ER visit; started on Keflex at that time  - UC  only growing 10 to 50,000 colonies of urogenital nazia.  - stopped Keflex     # Alcohol use disorder  - states that she drinks :more than she should\" 2 glasses of wine daily, sometimes more.     Diet: Regular Diet Adult    DVT Prophylaxis: Pneumatic Compression Devices  Abreu Catheter: Not present  Central Lines: None  Cardiac Monitoring: None  Code Status: Full Code      Disposition Plan  Medically stable for discharge.  Appreciate  evaluation for TCU.  Patient is getting frustrated     Expected Discharge Date: 10/12/2022    Discharge Delays: Placement - TCU  *Medically Ready for Discharge  Destination: home with family;nursing home          The patient's care was discussed with the Bedside Nurse and Patient.    Rojas Sierra MD, MD  Hospitalist Service  [facility]  Securely message with the Vocera Web Console (learn more " "here)  Text page via [facility] Paging/Directory     \"This dictation was performed with voice recognition software and may contain errors,  omissions and inadvertent word substitution.\"     Clinically Significant Risk Factors Present on Admission                    ______________________________________________________________________    Interval History   Last 24-hour events noted.    Feels better.  No worsening of symptoms since/stopping IV fluids.  Denies any orthostatic dizziness/weakness    4 point ROS done and neg unless mentioned    Data reviewed today: I reviewed all medications, new labs and imaging results over the last 24 hours. I personally reviewed no images or EKG's today.    Physical Exam   BP (!) 141/55 (BP Location: Right arm)   Pulse 74   Temp 98.1  F (36.7  C) (Oral)   Resp 18   Wt 76.7 kg (169 lb 1.5 oz)   LMP  (LMP Unknown)   SpO2 95%   BMI 25.71 kg/m    Gen- pleasant  lying in bed  HEENT- NAD, ALEKSEY  Neck- supple  CVS- I+II+ no m/r/g  RS- CTAB  Abdo- soft, no tenderness . No g/r/r   Ext- no edema   CNS- as detailed in stroke neuro note  Left sided weakness.     Data    BMP  Recent Labs   Lab 10/12/22  0925 10/12/22  0201 10/11/22  2229 10/11/22  0751 10/10/22  0629 10/09/22  1057 10/08/22  1154 10/08/22  1050 10/06/22  1154 10/06/22  0809   NA  --   --   --  134  --  135  --  131*  --  133   POTASSIUM 3.7  --   --  3.9 4.1 4.1  --  3.9   < > 3.9   CHLORIDE  --   --   --  102  --  103  --  100  --  102   RAINE  --   --   --  8.9  --  8.6  --  8.6  --  8.3*   CO2  --   --   --  26  --  24  --  25  --  26   BUN  --   --   --  17  --  15  --  16  --  19   CR  --   --   --  0.74  --  0.68  --  0.70  --  0.77   GLC  --  109* 102* 89  --  100*   < > 131*   < > 90    < > = values in this interval not displayed.     CBC  Recent Labs   Lab 10/11/22  0751 10/08/22  1648 10/08/22  1050 10/07/22  1817 10/06/22  0809   WBC 7.0  --  10.8  --  6.6   RBC 2.83*  --  3.38*  --  3.20*   HGB 8.5*   < > 10.2*   < > " 9.6*   HCT 25.6*  --  31.2*  --  29.6*   MCV 91  --  92  --  93   MCH 30.0  --  30.2  --  30.0   MCHC 33.2  --  32.7  --  32.4   RDW 12.5  --  12.4  --  12.3     --  362  --  315    < > = values in this interval not displayed.     LFTs  Recent Labs   Lab 10/11/22  0751   ALKPHOS 91   AST 17   ALT 24   BILITOTAL 0.6   PROTTOTAL 5.7*   ALBUMIN 2.6*     HEAD MRI:   1.  Acute infarct in the right frontal lobe.  2.  Chronic small vessel ischemic disease with numerous chronic infarcts.     HEAD MRA:   1.  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  2.  Occlusion of the right middle cerebral artery. 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  3.  Additional multifocal moderate and severe intracranial stenoses as described above.     NECK MRA:  1.  Normal appearance of the right cervical internal carotid artery with absence of flow related enhancement on noncontrast images but presence of contrast enhancement. Findings are suggestive of severe stenosis with retrograde flow via collaterals.  2.  Focal moderately severe stenosis of the distal right vertebral artery.     CT perfusion- Delay in transit time to anterior right frontal lobe and medial right parietal lobe with fairly symmetric deficit in relative cerebral blood flow and blood volume in this region consistent within known subacute infarct

## 2022-10-12 NOTE — PROVIDER NOTIFICATION
"Paged Dr. Guerrier re: CIWA protocol - \"703-1 AK: Pt on CIWA protocol, has not scored greater than 1 over the last week. Okay to d/c?\"    CIWA order discontinued.  "

## 2022-10-12 NOTE — PLAN OF CARE
Pt here with R. Frontal CVA. A&Ox4, forgetful. Neuros with LUE and BLE weakness scoring a 4/5. VSS on RA, parameters to keep SBP >140. Regular diet, thin liquids. Takes pills whole with water. Up with A1 GB/walker. Continent. Denies pain. PIV SL. Pt scoring green on the Aggression Stop Light Tool. Discharge to TCU pending placement.

## 2022-10-12 NOTE — PROGRESS NOTES
Care Management Follow Up    Length of Stay (days): 7    Expected Discharge Date: 10/12/2022     Concerns to be Addressed: all concerns addressed in this encounter     Patient plan of care discussed at interdisciplinary rounds: Yes    Anticipated Discharge Disposition: Transitional Care     Anticipated Discharge Services:    Anticipated Discharge DME:      Patient/family educated on Medicare website which has current facility and service quality ratings:  yes  Education Provided on the Discharge Plan:  yes  Patient/Family in Agreement with the Plan:  Yes  SW met with pt and wife regarding TCU placement. They are both in favor of TCU for strengthening and therapy. Updated them on referrals that have come back and sent two more referrals; one to [facility] and one to [facility].  [facility] is also reevaluating pt for possible opening end of week if pt still needing placement..    SW made follow up call to admissions at [facility] and faxed PT notes for review. Return call requested regarding referral.  13:47: SW called [facility] admissions and request return call regarding referral.    Additional Information:  ARTUR Cary  [facility]  Care Transitions  [phone]

## 2022-10-13 ENCOUNTER — LAB REQUISITION (OUTPATIENT)
Dept: LAB | Facility: CLINIC | Age: 82
End: 2022-10-13

## 2022-10-13 ENCOUNTER — APPOINTMENT (OUTPATIENT)
Dept: PHYSICAL THERAPY | Facility: CLINIC | Age: 82
DRG: 065 | End: 2022-10-13
Attending: HOSPITALIST
Payer: MEDICARE

## 2022-10-13 ENCOUNTER — APPOINTMENT (OUTPATIENT)
Dept: CARDIOLOGY | Facility: CLINIC | Age: 82
DRG: 065 | End: 2022-10-13
Attending: PHYSICIAN ASSISTANT
Payer: MEDICARE

## 2022-10-13 VITALS
DIASTOLIC BLOOD PRESSURE: 62 MMHG | SYSTOLIC BLOOD PRESSURE: 156 MMHG | TEMPERATURE: 97.8 F | RESPIRATION RATE: 16 BRPM | OXYGEN SATURATION: 100 % | WEIGHT: 169.09 LBS | HEART RATE: 69 BPM | BODY MASS INDEX: 25.71 KG/M2

## 2022-10-13 DIAGNOSIS — Z00.01 ENCOUNTER FOR GENERAL ADULT MEDICAL EXAMINATION WITH ABNORMAL FINDINGS: ICD-10-CM

## 2022-10-13 LAB
GLUCOSE BLDC GLUCOMTR-MCNC: 107 MG/DL (ref 70–99)
GLUCOSE BLDC GLUCOMTR-MCNC: 98 MG/DL (ref 70–99)
SARS-COV-2 RNA RESP QL NAA+PROBE: NEGATIVE

## 2022-10-13 PROCEDURE — 93272 ECG/REVIEW INTERPRET ONLY: CPT | Performed by: INTERNAL MEDICINE

## 2022-10-13 PROCEDURE — 97110 THERAPEUTIC EXERCISES: CPT | Mod: GP | Performed by: PHYSICAL THERAPIST

## 2022-10-13 PROCEDURE — 97530 THERAPEUTIC ACTIVITIES: CPT | Mod: GP | Performed by: PHYSICAL THERAPIST

## 2022-10-13 PROCEDURE — 93270 REMOTE 30 DAY ECG REV/REPORT: CPT

## 2022-10-13 PROCEDURE — 250N000013 HC RX MED GY IP 250 OP 250 PS 637: Performed by: PHYSICIAN ASSISTANT

## 2022-10-13 PROCEDURE — 250N000013 HC RX MED GY IP 250 OP 250 PS 637: Performed by: INTERNAL MEDICINE

## 2022-10-13 PROCEDURE — 99239 HOSP IP/OBS DSCHRG MGMT >30: CPT | Performed by: INTERNAL MEDICINE

## 2022-10-13 PROCEDURE — 97116 GAIT TRAINING THERAPY: CPT | Mod: GP | Performed by: PHYSICAL THERAPIST

## 2022-10-13 PROCEDURE — U0003 INFECTIOUS AGENT DETECTION BY NUCLEIC ACID (DNA OR RNA); SEVERE ACUTE RESPIRATORY SYNDROME CORONAVIRUS 2 (SARS-COV-2) (CORONAVIRUS DISEASE [COVID-19]), AMPLIFIED PROBE TECHNIQUE, MAKING USE OF HIGH THROUGHPUT TECHNOLOGIES AS DESCRIBED BY CMS-2020-01-R: HCPCS | Performed by: INTERNAL MEDICINE

## 2022-10-13 RX ORDER — ATORVASTATIN CALCIUM 40 MG/1
40 TABLET, FILM COATED ORAL EVERY EVENING
DISCHARGE
Start: 2022-10-13 | End: 2022-10-27

## 2022-10-13 RX ORDER — CLOPIDOGREL BISULFATE 75 MG/1
75 TABLET ORAL DAILY
Qty: 14 TABLET | Refills: 1 | Status: SHIPPED | OUTPATIENT
Start: 2022-10-13 | End: 2022-10-31

## 2022-10-13 RX ORDER — LOSARTAN POTASSIUM 50 MG/1
50 TABLET ORAL DAILY
Qty: 180 TABLET | Refills: 1 | DISCHARGE
Start: 2022-10-13 | End: 2022-10-13

## 2022-10-13 RX ADMIN — ACETAMINOPHEN 650 MG: 325 TABLET, FILM COATED ORAL at 03:31

## 2022-10-13 RX ADMIN — ASPIRIN 81 MG: 81 TABLET, COATED ORAL at 08:41

## 2022-10-13 RX ADMIN — CLOPIDOGREL BISULFATE 75 MG: 75 TABLET ORAL at 08:41

## 2022-10-13 ASSESSMENT — ACTIVITIES OF DAILY LIVING (ADL)
ADLS_ACUITY_SCORE: 33
ADLS_ACUITY_SCORE: 32
ADLS_ACUITY_SCORE: 33
ADLS_ACUITY_SCORE: 32

## 2022-10-13 NOTE — PROGRESS NOTES
Care Management Discharge Note    Discharge Date: 10/13/2022       Discharge Disposition: Transitional Care    Discharge Services:      Discharge DME:      Discharge Transportation: family or friend will provide    Private pay costs discussed: Not applicable    PAS Confirmation Code: [id number]  Patient/family educated on Medicare website which has current facility and service quality ratings: yes    Education Provided on the Discharge Plan:    Persons Notified of Discharge Plans: patient, spouse, care team  Patient/Family in Agreement with the Plan: yes    Handoff Referral Completed: yes    Additional Information:  Writer received updated from Kenneth in admissions at [facility] stating they can clinically accept patient today into a shared TCU room at 1500.   Writer met with patient and spouse at bedside who are both in agreement with this discharge plan. Patient inquired about insurance coverage - writer review medicare benefits. Patient's spouse states he is not concerned with coverage. Writer encouraged patient and spouse to communicate with therapy/sw at facility to discuss timeline of therapy needed in TCU.   Kenneth stated facility will provide w/c transport via [facility].   PAS completed and placed in front of chart.  Discharge orders faxed.  PAS-RR    D: Per DHS regulation, DESTINY completed and submitted PAS-RR to [place] [employer] Direct Connect via the Senior LinkAge Line.  PAS-RR confirmation # is : [id number]    I: SW spoke with patient and spouse and they are aware a PAS-RR has been submitted.  SW reviewed with patient and spouse that they may be contacted for a follow up appointment within 10 days of hospital discharge if their SNF stay is < 30 days.  Contact information for Senior LinkAge Line was also provided.    A: Patient and spouse verbalized understanding.    P: Further questions may be directed to [facility] LinkAge Line at #[phone], option #4 for PAS-RR staff.      ALEXANDER Neely, LGSW    OLEG  [facility]

## 2022-10-13 NOTE — PROGRESS NOTES
Pt here with R frontal CVA. Pt A&O x4, forgetful. Elevated BP, SBP>140, other VSS, on RA. LS clear. Denies pain. CMS and Neuor's unchanged except for LUE pronator drift, moderate hand  and BLE weakness, 3-4/5 with LLE weaker 3-4/5, than RLE 4/5, and BLE +2  edema, has TEDs/compression socks on. Up A1 w/ GB and walker. Cont of B&B. Voiding adequately. +BS, passing flatus, no BM this shift. Reg diet, takes pills whole with water. Discharging to TCU, [facility], today at 1500. Discharging with 30 day event monitor. Discharge follow-up care and med teaching reviewed.

## 2022-10-13 NOTE — DISCHARGE INSTRUCTIONS
Your risk factors for stroke or TIA (transient ischemic attack):    Your Risk Factors Your Results Normal Ranges   High blood pressure BP Readings from Last 1 Encounters:   10/13/22 (!) 156/62    Less than 120/80   Cholesterol              Total Lab Results   Component Value Date    CHOL 166 10/04/2022    CHOL 196 01/04/2021      Less than 150    Triglycerides   Lab Results   Component Value Date    TRIG 65 10/04/2022    TRIG 171 01/04/2021    Less than 150   LDL Lab Results   Component Value Date    LDL 71 10/04/2022     01/04/2021       Less than 70   HDL Lab Results   Component Value Date    HDL 82 10/04/2022    HDL 56 01/04/2021            Greater than 40 (men)  Greater than 50 (women)   Diabetes Recent Labs   Lab 10/13/22  0627   GLC 98    Fasting blood glucose    Smoking/tobacco use  Quit smoking and tobacco   Overweight  Lose 1-2 pounds a week   Lack of exercise  30 minutes moderate activity each day   Other risk factors include carotid (neck) artery disease, atrial fibrillation and stress. You may be on new medicine to treat high blood pressure, cholesterol, diabetes or atrial fibrillation.    Understanding Stroke Booklet given to patient. Please refer to booklet for further information.    Stroke warning signs and symptoms - CALL 911 right away for:  - Sudden numbness or weakness in the face, arm or leg (often on one side of the body).  - Sudden confusion or trouble understanding what is going on.  - Sudden blurred or decreased vision in one or both eyes.  - Sudden trouble speaking, loss of balance, dizziness or problems with coordination.  - Sudden, severe headache for no reason.  - Fainting or seizures.  - Symptoms may go away then come back suddenly.

## 2022-10-13 NOTE — PLAN OF CARE
Reason for Admission: R frontal CVA     Cognitive/Mentation: A&O x4, forgetful, inattentive  Neuros/CMS: Intact ex generalized weakness 4/5, LLE 3/5  VS: Stable on RA. SBP >140.    GI: BS active, + flatus. Continent.  : Voiding. Continent.  Pulmonary: LS clear.  Pain: Tylenol given for R shoulder     Drains/Lines: 1x PIV   Skin: Intact ex bruising  Activity: Assist x1 GB/W  Diet:  Regular, thin      Therapies recs: TCU  Discharge: 1-2 days Pending placement     Aggression Stoplight Tool: Green     End of shift summary: No acute changes overnight.

## 2022-10-13 NOTE — DISCHARGE SUMMARY
[facility]    Discharge Summary  Hospitalist    Date of Admission:  10/5/2022  Date of Discharge:  10/13/2022  Discharging Provider: Quinton Gutierrez MD, MD    Discharge Diagnoses      Acute ischemic right frontal lobe stroke    Moyamoya disease   Falls  Hyponatremia, hypochloremic    Acute on Chronic anemia   Hypertension   Dyslipidemia  Abnormal UA   At risk alcohol use    Hospital Course:    Candida Rose is a 82-year-old female with a history of moyamoya disease, hypertension, hyperlipidemia, stroke, diabetes, osteoarthritis, alcohol use disorder and chronic pain who was transferred from [facility] after she was found to have an acute stroke.     Acute ischemic right frontal lobe stroke   Moyamoya disease  -Initially transferred from [facility].  Initial MRI showed acute infarct in the right frontal lobe.  -Followed by stroke neurology  -s/p cerebral angiogram 10/7.  Please see angiogram report on 10/7 for details.  -repeat MRI 10/9-  Numerous new tiny punctate and small patchy foci of acute infarction within the bilateral anterior cerebral artery territories (right greater than left), right posterior cerebral artery territory and right middle cerebral artery territory since 10/06/2022.   -Continue PTA aspirin 81 mg daily and Plavix 75 mg daily x 21 days then recommend ongoing monotherapy with ASA 81 mg daily as long term PTA DAPT not felt to provide additional benefit long term but will be at increased risk of bleeding  -Simvastatin was switched to atorvastatin 40 mg daily.    -Neurology recommend 30 day event monitor at discharge.    Falls  -likely related to acute stroke  - X ray left shoulder - No fracture or dislocation. Advanced degenerative changes at the glenohumeral joint     Hyponatremia, hypochloremic : RESOLVED     Acute on Chronic anemia  - he followed up with Hematology on 10/04/2022 for evaluation.  There is no evidence of bleeding. Bone marrow  biopsy is planned in the next 4 weeks.  She has already had extensive laboratory studies performed such as iron studies, B12, folate, TSH, and SPEP.  If there is evidence of urinary or gastrointestinal bleeding, she will be referred to Urology and Gastroenterology, respectively.  -Some drop in hemoglobin likely dilutional.  Stable.      Hypertension:    -Patient did not tolerate reintroduction of amlodipine due to dizziness, plan is to go slowly for the moment.  I will restart her beta-blocker at discharge.  Depending on the response we can gradually add losartan at 25 mg once a day and gradually uptitrate to her prior to admission dose of 50 mg twice a day.     Dyslipidemia  -PTA on Zocor, now switched to Lipitor 40 mg po daily      Abnormal UA:  - had a recent abnormal UA on 10/01- ER visit; started on Keflex at that time  - UC  only growing 10 to 50,000 colonies of urogenital nazia.  - stopped Keflex        Quinton Gutierrez MD, MD    Significant Results and Procedures   See below    Pending Results     Unresulted Labs Ordered in the Past 30 Days of this Admission     No orders found from 9/5/2022 to 10/6/2022.          Code Status   Full Code       Primary Care Physician   Chani Peoples    Physical Exam   Temp: 97.8  F (36.6  C) Temp src: Oral BP: (!) 156/62 Pulse: 69   Resp: 16 SpO2: 100 % O2 Device: None (Room air)      Constitutional: AAOX3, NAD  Respiratory: CTA B/L, Normal WOB  Cardiovascular: RRR, No murmur  GI: Soft, Non- tender, BS- normoactive  MSK: 1+ edema  Neuro: CN- grossly intact, still have some residual weakness on the left side, 4+/5 in the upper extremity and 4/5 in the lower extremity    Discharge Disposition   Discharged to short-term care facility  Condition at discharge: Stable    Consultations This Hospital Stay   PATIENT LEARNING CENTER IP CONSULT  NEUROLOGY IP STROKE CONSULT  SPEECH LANGUAGE PATH ADULT IP CONSULT  PHARMACY IP CONSULT  PHARMACY IP CONSULT  PHARMACY IP  CONSULT  PHYSICAL THERAPY ADULT IP CONSULT  OCCUPATIONAL THERAPY ADULT IP CONSULT  REHAB ADMISSIONS LIAISON IP CONSULT  CARE MANAGEMENT / SOCIAL WORK IP CONSULT  PHYSICAL THERAPY ADULT IP CONSULT  OCCUPATIONAL THERAPY ADULT IP CONSULT  SMOKING CESSATION PROGRAM IP CONSULT    Time Spent on this Encounter   IQuinton MD, personally saw the patient today and spent greater than 30 minutes discharging this patient.    Discharge Orders      General info for SNF    Length of Stay Estimate: Short Term Care: Estimated # of Days <30  Condition at Discharge: Improving  Level of care:skilled   Rehabilitation Potential: Excellent  Admission H&P remains valid and up-to-date: Yes  Recent Chemotherapy: N/A  Use Nursing Home Standing Orders: Yes     Mantoux instructions    Give two-step Mantoux (PPD) Per Facility Policy Yes     Activity - Up with nursing assistance     Daily weights    Call Provider for weight gain of more than 2 pounds per day or 5 pounds per week.     Follow Up and recommended labs and tests    Follow up with Nursing home physician.    Neurology in 6 weeks     Full Code     Physical Therapy Adult Consult    Evaluate and treat as clinically indicated.    Reason:  Deconditioning     Occupational Therapy Adult Consult    Evaluate and treat as clinically indicated.    Reason:  Deconditioning     Fall precautions     Diet    Follow this diet upon discharge: Orders Placed This Encounter      Regular Diet Adult     Stroke Hospital Follow Up    [facility] will call you to coordinate care as prescribed by your provider. If you don t hear from a representative within 2 business days, please call [phone].       Discharge Medications         Review of your medicines      START taking      Dose / Directions   atorvastatin 40 MG tablet  Commonly known as: LIPITOR      Dose: 40 mg  1 tablet (40 mg) by Oral or Feeding Tube route every evening  Refills: 0        CONTINUE these medicines which have NOT  CHANGED      Dose / Directions   aspirin 81 MG tablet  Commonly known as: ASA  Used for: HTN, goal below 140/90, DM2 - no insulin (H)      Dose: 81 mg  Take 1 tablet (81 mg) by mouth daily  Quantity: 30 tablet  Refills: 0     atenolol 25 MG tablet  Commonly known as: TENORMIN  Used for: HTN, goal below 140/90      TAKE 1 TABLET BY MOUTH EVERY DAY  Quantity: 90 tablet  Refills: 1     clopidogrel 75 MG tablet  Commonly known as: PLAVIX  Used for: Moyamoya disease      Dose: 75 mg  Take 1 tablet (75 mg) by mouth daily for 14 days  Quantity: 14 tablet  Refills: 1     cyanocobalamin 500 MCG tablet  Commonly known as: VITAMIN B-12      Dose: 500 mcg  Take 500 mcg by mouth daily  Refills: 0     magnesium 500 MG Tabs      Dose: 500 mg  Take 500 mg by mouth daily  Refills: 0     MULTIVITAMIN & MINERAL PO      Dose: 1 tablet  Take 1 tablet by mouth daily.  Refills: 0        STOP taking    amLODIPine 2.5 MG tablet  Commonly known as: NORVASC        cephALEXin 500 MG capsule  Commonly known as: KEFLEX        losartan 50 MG tablet  Commonly known as: COZAAR        simvastatin 20 MG tablet  Commonly known as: ZOCOR              Where to get your medicines      These medications were sent to [place] [address] [place], [place] - [address] [facility]  [address] [facility] [address]    Phone: [phone]     clopidogrel 75 MG tablet         Allergies   Allergies   Allergen Reactions     Lisinopril      Persistent cough     Data   Most Recent 3 CBC's:  Recent Labs   Lab Test 10/11/22  0751 10/10/22  0629 10/09/22  1739 10/08/22  1648 10/08/22  1050 10/07/22  1817 10/06/22  0809   WBC 7.0  --   --   --  10.8  --  6.6   HGB 8.5* 8.7* 8.5*   < > 10.2*   < > 9.6*   MCV 91  --   --   --  92  --  93     --   --   --  362  --  315    < > = values in this interval not displayed.      Most Recent 3 BMP's:  Recent Labs   Lab Test 10/13/22  0627 10/13/22  0304 10/12/22  0925 10/12/22  0201 10/11/22  2229 10/11/22  0751 10/10/22  0629  10/09/22  1057 10/08/22  1154 10/08/22  1050   NA  --   --   --   --   --  134  --  135  --  131*   POTASSIUM  --   --  3.7  --   --  3.9 4.1 4.1  --  3.9   CHLORIDE  --   --   --   --   --  102  --  103  --  100   CO2  --   --   --   --   --  26  --  24  --  25   BUN  --   --   --   --   --  17  --  15  --  16   CR  --   --   --   --   --  0.74  --  0.68  --  0.70   ANIONGAP  --   --   --   --   --  6  --  8  --  6   RAINE  --   --   --   --   --  8.9  --  8.6  --  8.6   GLC 98 107*  --  109*   < > 89  --  100*   < > 131*    < > = values in this interval not displayed.     Most Recent 2 LFT's:  Recent Labs   Lab Test 10/11/22  0751 10/04/22  1432   AST 17 28   ALT 24 20   ALKPHOS 91 115*   BILITOTAL 0.6 0.4     Most Recent INR's and Anticoagulation Dosing History:  Anticoagulation Dose History     Recent Dosing and Labs 8/9/2006 9/13/2006 10/30/2006 3/1/2007 6/5/2010 7/6/2010 7/13/2010    INR 0.83(L) 0.83@ 0.91@ 0.92 0.88 0.93 0.99@        Most Recent 3 Troponin's:No lab results found.  Most Recent Cholesterol Panel:  Recent Labs   Lab Test 10/04/22  1950   CHOL 166   LDL 71   HDL 82   TRIG 65     Most Recent 6 Bacteria Isolates From Any Culture (See EPIC Reports for Culture Details):  Recent Labs   Lab Test 04/11/17  0835   CULT No MRSA isolated     Most Recent TSH, T4 and A1c Labs:  Recent Labs   Lab Test 10/04/22  1950 12/31/21  0710   TSH  --  1.78   A1C 5.4  --        Results for orders placed or performed during the hospital encounter of 10/05/22   CT Head w/o Contrast    Narrative    CT SCAN OF THE HEAD WITHOUT CONTRAST   10/5/2022 10:28 AM     HISTORY: Admitted with stroke, now has new left UE weakness. Please  assess for hemorrhage and new stroke.    TECHNIQUE: Axial images of the head and coronal reformations without  IV contrast material. Radiation dose for this scan was reduced using  automated exposure control, adjustment of the mA and/or kV according  to patient size, or iterative reconstruction  technique.    COMPARISON: Head CT 10/4/2022, MRI 10/4/2022      Impression    IMPRESSION:   No evidence of hemorrhage. Recent infarct involving the right  prefrontal region and right basal ganglia, similar compared to the  prior exams. Chronic-appearing infarcts involving the bilateral  occipital lobes, left parietal lobe, and left temporal lobe, not  appreciably changed. Small old bilateral cerebellar infarcts, not  appreciably changed. Background of volume loss and white matter  hypoattenuation which likely represent chronic small vessel ischemic  change. No acute osseous abnormality. Multiple craniotomies.    HAIR GAITAN MD         SYSTEM ID:  [id number]   CT Head w/o Contrast    Narrative    EXAM: CT HEAD W/O CONTRAST  LOCATION: [facility]  DATE/TIME: 10/5/2022 9:41 PM    INDICATION: Weakness.  COMPARISON: CT 10/05/2022. CT perfusion 10/04/2022. MRI 10/04/2022.  TECHNIQUE: Routine CT Head without IV contrast. Multiplanar reformats. Dose reduction techniques were used.    FINDINGS:  INTRACRANIAL CONTENTS: No intracranial hemorrhage, extraaxial collection, or mass effect.  There has been expected evolution of the known acute right frontal lobe infarction, without evidence of hemorrhagic transformation or significant mass effect. No   definite new sites of effaced gray-white differentiation are demonstrated to suggest interval infarction. Chronic infarctions are again demonstrated in the right occipital lobe and the left temporoparietal region. Mild to moderate presumed chronic small   vessel ischemic changes. Mild to moderate generalized volume loss. No hydrocephalus. The sella is unremarkable for technique. The cerebellar tonsils are appropriately positioned.     VISUALIZED ORBITS/SINUSES/MASTOIDS: Prior bilateral cataract surgery. Visualized portions of the orbits are otherwise unremarkable. No paranasal sinus mucosal disease. No middle ear or mastoid effusion.    BONES/SOFT TISSUES:  Multiple bilateral craniotomy defects. No acute abnormality demonstrated.      Impression    IMPRESSION:  1.  Expected interval evolution of known acute right frontal lobe infarction without hemorrhagic transformation or mass effect.  2.  No evidence for new acute infarction.  3.  Chronic ischemic changes and age-related changes as above.   MR Brain w/o Contrast    Narrative    EXAM: MR BRAIN W/O CONTRAST  LOCATION: [facility]  DATE/TIME: 10/6/2022 1:20 AM    INDICATION: new stroke  COMPARISON: 10/5/2022  TECHNIQUE: Routine multiplanar multisequence head MRI without intravenous contrast.    FINDINGS:  INTRACRANIAL CONTENTS: There is a 4.5 x 3.7 x 3.1 cm patchy zone of restricted diffusion in the right frontal lobe is slightly progressed to involve the right middle frontal gyrus anteriorly. No hemorrhage. No mass, acute hemorrhage, or extra-axial fluid   collections. Scattered nonspecific T2/FLAIR hyperintensities within the cerebral white matter most consistent with mild chronic microvascular ischemic change. Chronic infarct right occipital lobe, left occipital lobe. Mild to moderate generalized   cerebral atrophy. No hydrocephalus. Normal position of the cerebellar tonsils.     SELLA: No abnormality accounting for technique.    OSSEOUS STRUCTURES/SOFT TISSUES: Large subacromial right ICA flow void. The major intracranial vascular flow voids are maintained.     ORBITS: No abnormality accounting for technique.     SINUSES/MASTOIDS: Mild mucosal thickening scattered about the paranasal sinuses. No middle ear or mastoid effusion.       Impression    IMPRESSION:  1.  Moderate-sized infarct in the right frontal lobe is slightly progressed in overall volume when compared to MRI dated 10/4/2022. No evidence for hemorrhagic transformation.  2.  Age-related changes with multiple chronic infarcts elsewhere as described.  3.  Loss of the normal right ICA flow void compatible with proximal occlusion.    XR Pelvis w Hip Right 1 View    Narrative    XR PELVIS AND HIP RIGHT 1 VIEW   10/6/2022 9:06 AM     HISTORY: right hip pain, s/p fall  COMPARISON: None.       Impression    IMPRESSION: Bilateral hip arthroplasties. No acute fracture or  dislocation.  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 Diffuse bony  mineralization. Degenerative changes in the lumbar spine.    RYANN RIZZO MD         SYSTEM ID:  [id number]   CTA Abdomen Pelvis with Contrast    Narrative    EXAM: CTA ABDOMEN PELVIS WITH CONTRAST  LOCATION: [facility]  DATE/TIME: 10/7/2022 7:31 PM    INDICATION: evaluation for bleeding s p angiogram with right femoral groin access site. Very large right hematoma.  COMPARISON: None.  TECHNIQUE: CT angiogram abdomen pelvis during arterial phase of injection of IV contrast. 2D and 3D MIP reconstructions were performed by the CT technologist. Dose reduction techniques were used.  CONTRAST: 104mL Isovue 370    FINDINGS:  ANGIOGRAM ABDOMEN/PELVIS: The lower thoracic and abdominal aorta are normal in caliber with scattered calcific atherosclerotic plaque. The origin of the celiac artery is patent. Moderate stenosis of the SMA origin. The bilateral renal arteries are   patent. The inferior mesenteric artery and its proximal branch vessels are patent.    The bilateral common, internal, and external iliac arteries are patent with scattered eccentric calcific plaque.    The right common and visualized portions of the deep and superficial femoral arteries are patent. Note is made of a hematoma within the right groin without evidence of active extravasation of contrast within the visualized portions of the groin and   thigh. The hematoma is incompletely imaged so accurate size measurement is not possible. The hematoma also insinuates into the soft tissues rather than being a confluent fluid collection.    LOWER CHEST: The limited  visualized portions of the lung bases are clear    HEPATOBILIARY: The liver morphology is normal. No biliary ductal dilatation.    PANCREAS: Normal.    SPLEEN: Normal.    ADRENAL GLANDS: Normal.    KIDNEYS/BLADDER: Left renal cyst. No renal collecting system dilatation.    BOWEL: The large and small bowel are normal in caliber.    LYMPH NODES: Normal.    PELVIC ORGANS: Normal.    MUSCULOSKELETAL: Multilevel degenerative changes.      Impression    IMPRESSION:  1.  Superficial right groin hematoma without evidence of active hemorrhage within the visualized groin and upper thigh.   CT Head w/o Contrast    Narrative    EXAM: CT HEAD W/O CONTRAST  LOCATION: [facility]  DATE/TIME: 10/8/2022 5:16 PM    INDICATION: Moyamoya. Recent right frontal stroke. New left hemiparesis. Assess for hemorrhagic transformation and new stroke.  COMPARISON: Brain MRI 10/06/2022, head CT 10/05/2022.  TECHNIQUE: Routine CT Head without IV contrast. Multiplanar reformats. Dose reduction techniques were used.    FINDINGS:  INTRACRANIAL CONTENTS: Increased conspicuity of moderate size region of acute infarction in the anterior to mid right frontal lobe and right basal ganglia, within the right middle cerebral artery territory. Stable moderate size region of chronic   infarction in the left posterior temporal lobe, within the left middle cerebral artery posterior division territory. Stable small region of chronic infarction in the right occipital lobe, within the right posterior cerebral artery territory. Stable   postsurgical changes of bilateral temporal craniotomy with small foci of underlying lateral temporal encephalomalacia. Bilateral parietal craniotomy defects. Stable mild chronic small vessel ischemic changes throughout the cerebral hemispheric white   matter. Stable few tiny chronic wedge-shaped infarcts within the cerebellar hemispheres. Stable mild to moderate generalized brain parenchymal volume loss.  Ventricles are not enlarged out of proportion to the cerebral sulci. No intracranial hemorrhage or   abnormal extra-axial fluid collection.    VISUALIZED ORBITS/SINUSES/MASTOIDS: Prior bilateral cataract surgery. Visualized portions of the orbits are otherwise unremarkable. No paranasal sinus mucosal disease. No middle ear or mastoid effusion.    BONES/SOFT TISSUES: No acute abnormality.      Impression    IMPRESSION:  1.  Expected evolution of moderate size region of acute infarction within the right middle cerebral artery territory since 10/05/2022. No intracranial hemorrhage.  2.  Stable chronic regions of infarction within the left middle cerebral artery posterior division territory and right posterior cerebral artery territory.  3.  Stable postsurgical changes of bilateral parietal and temporal craniotomy with underlying minimal lateral temporal encephalomalacia.  4.  Stable mild chronic small vessel ischemic disease and mild to moderate generalized brain parenchymal volume loss.   MR Brain w/o & w Contrast    Narrative    EXAM: MR BRAIN W/O AND W CONTRAST  LOCATION: [facility]  DATE/TIME: 10/9/2022 3:27 PM    INDICATION: New left hemiparesis.  COMPARISON: Head CT 10/08/2022, brain MRI 10/06/2022, 10/04/2022.  CONTRAST: Gadobutrol (Gadavist) injection 8 mL.  TECHNIQUE: Routine multiplanar multisequence head MRI without and with intravenous contrast.    FINDINGS:  Several images are degraded by patient artifact, which mild to moderately limits evaluation.    INTRACRANIAL CONTENTS: Numerous new punctate/patchy foci of acute infarction in the high parasagittal right frontoparietal region and high parasagittal left parietal lobe, within the anterior cerebral artery territories (right greater than left). Few new   scattered punctate foci of acute infarction within the mesial aspect of the right temporal lobe, within the right posterior cerebral artery territory, and lateral aspect of the  right temporal lobe, within the right middle cerebral artery territory.   Expected evolution of multifocal patchy and confluent regions of infarction in the anterior lateral right frontal lobe and right basal ganglia, within the right middle cerebral artery territory with associated developing enhancing subacute component in   the right orbitofrontal region. Stable small foci of chronic wedge-shaped infarction within the bilateral cerebellar hemispheres, right greater than left. Stable small focus of chronic cortical infarction in the right temporoparietal region. Stable   moderate size regions of chronic infarction in the right occipital lobe and left occipitotemporal region. Mild to moderate chronic small vessel ischemic changes throughout the cerebral hemispheric white matter bilaterally.     Mild to moderate generalized brain parenchymal volume loss. Ventricles are not enlarged out of proportion to the cerebral sulci. No acute intracranial hemorrhage or abnormal extra-axial fluid collection. No mass effect, midline shift or herniation.    Bilateral temporoparietal and parietal craniotomy defects.     SELLA: No abnormality accounting for technique.    OSSEOUS STRUCTURES/SOFT TISSUES: Normal marrow signal. The major intracranial vascular flow voids are maintained. Upper cervical spondylosis.    ORBITS: Prior bilateral cataract surgery. Visualized portions of the orbits are otherwise unremarkable.     SINUSES/MASTOIDS: No paranasal sinus mucosal disease. No middle ear or mastoid effusion.       Impression    IMPRESSION:  1.  Numerous new tiny punctate and small patchy foci of acute infarction within the bilateral anterior cerebral artery territories (right greater than left), right posterior cerebral artery territory and right middle cerebral artery territory since   10/06/2022.  2.  Expected evolution of late acute/early subacute infarcts throughout the right middle cerebral artery territory since 10/06/2022.  3.   Stable chronic cortical infarcts within the bilateral middle cerebral artery posterior division territories and right posterior cerebral artery territory.  4.  Stable mild to moderate chronic small vessel ischemic disease and generalized brain parenchymal volume loss.   Echocardiogram Complete - For age > 60 yrs     Value    LVEF  55-60%    [facility]    [id number]  [id number]  [id number]  [id number]^MAILE^VENECIA^JAMES     [facility]  Echocardiography Laboratory  [address] [place] [id number]     Name: KETURAH TERRELL  MRN: [id number]  : 1940  Study Date: 10/05/2022 03:12 PM  Age: 82 yrs  Gender: Female  Patient Location: [facility]  Reason For Study: Cerebrovascular Incident  Ordering Physician: VENECIA GR  Referring Physician: ANISA MARCUS  Performed By: Irving Cedeno     BSA: 1.9 m2  Height: 68 in  Weight: 169 lb  HR: 68  BP: 136/72 mmHg  ______________________________________________________________________________  Procedure  Complete Portable Echo Adult.  ______________________________________________________________________________  Interpretation Summary     1. The left ventricle is normal in size. There is normal left ventricular wall  thickness. Left ventricular systolic function is normal. The visual ejection  fraction is 55-60%. Diastolic Doppler findings (E/E' ratio and/or other  parameters) suggest left ventricular filling pressures are normal. No regional  wall motion abnormalities noted.  2. The right ventricle is normal size. The right ventricular systolic function  is normal.  3. The left atrium is mildly dilated. Right atrial size is normal. The  interatrial septum is hypermobile. There is no color Doppler evidence of an  atrial shunt.  4. Trace mitral and tricuspid regurgitation.  5. No pericardial effusion.  6. No previous study for comparison.  ______________________________________________________________________________  Left Ventricle  The left  ventricle is normal in size. There is normal left ventricular wall  thickness. Left ventricular systolic function is normal. The visual ejection  fraction is 55-60%. Diastolic Doppler findings (E/E' ratio and/or other  parameters) suggest left ventricular filling pressures are normal. No regional  wall motion abnormalities noted.     Right Ventricle  The right ventricle is normal size. The right ventricular systolic function is  normal.     Atria  The left atrium is mildly dilated. Right atrial size is normal. The  interatrial septum is hypermobile. There is no color Doppler evidence of an  atrial shunt.     Mitral Valve  There is mild mitral annular calcification. There is trace mitral  regurgitation.     Tricuspid Valve  There is trace tricuspid regurgitation. The right ventricular systolic  pressure is approximated at 15.1 mmHg plus the right atrial pressure.     Aortic Valve  There is mild trileaflet aortic sclerosis. No aortic regurgitation is present.  No aortic stenosis is present.     Pulmonic Valve  There is no pulmonic valvular stenosis.     Vessels  The aortic root is normal size. Normal size ascending aorta. The inferior vena  cava is normal.     Pericardium  There is no pericardial effusion.     Rhythm  Sinus rhythm was noted.  ______________________________________________________________________________  MMode/2D Measurements & Calculations  IVSd: 0.82 cm     LVIDd: 4.9 cm  LVIDs: 3.1 cm  LVPWd: 1.1 cm  FS: 36.7 %  LV mass(C)d: 172.1 grams  LV mass(C)dI: 90.4 grams/m2  Ao root diam: 3.5 cm  LA dimension: 3.9 cm  asc Aorta Diam: 3.3 cm  LA/Ao: 1.1  LVOT diam: 2.1 cm  LVOT area: 3.5 cm2  LA Volume (BP): 76.3 ml  LA Volume Index (BP): 40.2 ml/m2  RWT: 0.46     Doppler Measurements & Calculations  MV E max matthias: 64.3 cm/sec  MV A max matthias: 76.8 cm/sec  MV E/A: 0.84  MV dec slope: 165.0 cm/sec2  MV dec time: 0.39 sec  PA acc time: 0.13 sec  TR max matthias: 194.0 cm/sec  TR max PG: 15.1 mmHg  E/E' avg:  7.3  Lateral E/e': 5.5  Medial E/e': 9.1     ______________________________________________________________________________  Report approved by: Xiao Oden 10/05/2022 03:55 PM

## 2022-10-13 NOTE — PROGRESS NOTES
[id number]:  Pt here with R frontal CVA. A&O x4, forgetful. Neuros with LUE drift, BLE weakness with R weaker than L. VSS, keep SBP >140. Regular diet, thin liquids. Takes pills whole. Up with Ax1 GBW. Denies pain. Pt scoring green on the Aggression Stop Light Tool. Discharge pending TCU placement.

## 2022-10-13 NOTE — PLAN OF CARE
Occupational Therapy Discharge Summary    Reason for therapy discharge:    Discharged to transitional care facility.    Progress towards therapy goal(s). See goals on Care Plan in [facility] electronic health record for goal details.  Goals partially met.  Barriers to achieving goals:   discharge from facility.    Therapy recommendation(s):    Continued therapy is recommended.  Rationale/Recommendations:  continued IP OT in TCU setting.      Pt functioning below very IND and active baseline. Defer to PT for mobility recs, pt moving functionally within the room CGA w/ FWW was not using AD at baseline. Pt fatigues quickly and appears to have decreased activity tolerance for ADL tasks. WOuld recommend continued IP OT to progress activity tolerance for I/ADL tasks prior to return home. If pt does return home, recommend increased assistance w/ bathing, IADL tasks and 1A for mobility. Also would recommend HH OT to progress safety/activity tolerance for I/ADLs at home

## 2022-10-13 NOTE — PROGRESS NOTES
Care Management Follow Up    Length of Stay (days): 8    Expected Discharge Date: 10/13/2022     Concerns to be Addressed: all concerns addressed in this encounter     Patient plan of care discussed at interdisciplinary rounds: Yes    Anticipated Discharge Disposition: Transitional Care     Anticipated Discharge Services:    Anticipated Discharge DME:      Patient/family educated on Medicare website which has current facility and service quality ratings:    Education Provided on the Discharge Plan:    Patient/Family in Agreement with the Plan:      Referrals Placed by CM/SW:    Private pay costs discussed: Not applicable    Additional Information:  Writer followed up with outstanding TCU referrals as patient is medically stable for discharge.  [facility] - has not reviewed yet. No beds until 10/15. Will reach back out to writer after reviewed.     Will continue to follow.    ALEXANDER Neely, LGSW    [facility]

## 2022-10-14 ENCOUNTER — TRANSITIONAL CARE UNIT VISIT (OUTPATIENT)
Dept: GERIATRICS | Facility: CLINIC | Age: 82
End: 2022-10-14
Payer: MEDICARE

## 2022-10-14 ENCOUNTER — PATIENT OUTREACH (OUTPATIENT)
Dept: CARE COORDINATION | Facility: CLINIC | Age: 82
End: 2022-10-14

## 2022-10-14 VITALS
HEART RATE: 78 BPM | DIASTOLIC BLOOD PRESSURE: 68 MMHG | BODY MASS INDEX: 25.71 KG/M2 | SYSTOLIC BLOOD PRESSURE: 160 MMHG | OXYGEN SATURATION: 100 % | RESPIRATION RATE: 18 BRPM | HEIGHT: 68 IN | TEMPERATURE: 97.6 F

## 2022-10-14 DIAGNOSIS — Z71.89 ADVANCED DIRECTIVES, COUNSELING/DISCUSSION: ICD-10-CM

## 2022-10-14 DIAGNOSIS — K59.01 SLOW TRANSIT CONSTIPATION: ICD-10-CM

## 2022-10-14 DIAGNOSIS — I63.9 CEREBROVASCULAR ACCIDENT (CVA), UNSPECIFIED MECHANISM (H): Primary | ICD-10-CM
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
## 2022-10-14 DIAGNOSIS — I67.5 MOYAMOYA DISEASE: Chronic | ICD-10-CM

## 2022-10-14 DIAGNOSIS — E78.5 DYSLIPIDEMIA: ICD-10-CM

## 2022-10-14 DIAGNOSIS — I10 HYPERTENSION, UNSPECIFIED TYPE: ICD-10-CM

## 2022-10-14 DIAGNOSIS — E87.1 HYPONATREMIA: ICD-10-CM

## 2022-10-14 DIAGNOSIS — R53.81 PHYSICAL DECONDITIONING: ICD-10-CM

## 2022-10-14 DIAGNOSIS — D64.9 ACUTE ON CHRONIC ANEMIA: ICD-10-CM

## 2022-10-14 PROCEDURE — 86481 TB AG RESPONSE T-CELL SUSP: CPT | Performed by: NURSE PRACTITIONER

## 2022-10-14 PROCEDURE — 36415 COLL VENOUS BLD VENIPUNCTURE: CPT | Performed by: NURSE PRACTITIONER

## 2022-10-14 PROCEDURE — P9604 ONE-WAY ALLOW PRORATED TRIP: HCPCS | Performed by: NURSE PRACTITIONER

## 2022-10-14 PROCEDURE — 99310 SBSQ NF CARE HIGH MDM 45: CPT | Performed by: NURSE PRACTITIONER

## 2022-10-14 NOTE — PROGRESS NOTES
[facility] GERIATRICS    PRIMARY CARE PROVIDER AND CLINIC:  Chani Peoples MD, [address] [facility] [address]  Chief Complaint   Patient presents with     Hospital F/U      [facility] Medical Record Number:  [id number]  Place of Service where encounter took place:  [facility] (TCU) [[id number]]    Candida Rose  is a 82 year old  (1940), admitted to the above facility from  [facility]. Hospital stay 10/05/22 through 10/13/22.  HPI:    82-year-old female with a history of moyamoya disease, HTN, HLD, stroke, diabetes, osteoarthritis, alcohol use disorder and chronic pain hospitalized with an acute stroke right frontal lobe. MRI 10/9: numerous new tiny punctate and small patchy foci of acute infarction within the bilateral anterior cerebral artery territories (right greater than left), right posterior cerebral artery territory and right middle cerebral artery territory since 10/06/2022. Managed with ASA, Plavix x 21 days then life long ASA. On Lipitor and neuro recommend 30 day event monitor as outpatient. Hyponatremia resolved. Acute on chronic anemia: sees hematology, bone marrow biopsy in 4 weeks. HTN on betablocker. UA abnormal, received few days of keflex but stopped due to UC negative. To TCU for rehab.     Patient seen for initial visit. Reports doing well. No headaches, dizziness, chest pain, dyspnea, bladder concerns. Reports needs a laxative. Since admission BP range 133-177/66-73 and sats 97% room air. Reviewed and completed POLST; DNR/DNI desired.     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 F/u dr. Soto     OA (osteoarthritis)     s/p bilat total hips      Other chronic pain     Joint pain for many years     Unspecified cerebral artery occlusion with cerebral infarction     After Moyamoya surgery > 10 yrs ago.     Vitamin D deficiencies       PAST SURGICAL HISTORY:   has a past surgical history that includes NONSPECIFIC PROCEDURE (10/30/06); NONSPECIFIC PROCEDURE; NONSPECIFIC PROCEDURE (3/07); colonoscopy (2008); Colonoscopy (7/17/2014); Reconstruct forefoot with metatarsophalangeal (MTP) fusion (Left, 8/21/2014); craniotomy; Arthroplasty knee (Left, 4/17/2017); IR Carotid Angiogram (10/30/2006); IR Carotid Angiogram (6/6/2010); IR Carotid Angiogram (6/6/2010); IR Carotid Angiogram (6/6/2010); IR Miscellaneous Procedure (6/6/2010); IR Miscellaneous Procedure (6/6/2010); IR Carotid Angiogram (5/12/2011); IR Carotid Angiogram (5/12/2011); IR Miscellaneous Procedure (5/12/2011); IR Carotid Angiogram (5/12/2011); IR Carotid Angiogram (6/5/2012); IR Carotid Angiogram (6/5/2012); IR Carotid Angiogram (6/5/2012); and IR Miscellaneous Procedure (6/5/2012).  FAMILY HISTORY: family history includes Breast Cancer (age of onset: 48) in her daughter; Cancer in her maternal aunt; Cancer - colorectal (age of onset: 69) in her sister; Family History Negative in her mother; Heart Disease in her father; Lung Cancer in her maternal aunt; Obesity in her sister.  SOCIAL HISTORY:   reports that she has never smoked. She has never used smokeless tobacco. She reports current alcohol use. She reports that she does not use drugs.  Patient's living condition: lives with spouse    Post Discharge Medication Reconciliation Status:   Post Medication  "Reconciliation Status: Discharge medications reconciled and changed, see notes/orders    Current Outpatient Medications   Medication Sig     aspirin 81 MG tablet Take 1 tablet (81 mg) by mouth daily     atenolol (TENORMIN) 25 MG tablet TAKE 1 TABLET BY MOUTH EVERY DAY     atorvastatin (LIPITOR) 40 MG tablet 1 tablet (40 mg) by Oral or Feeding Tube route every evening     clopidogrel (PLAVIX) 75 MG tablet Take 1 tablet (75 mg) by mouth daily for 14 days     cyanocobalamin (VITAMIN B-12) 500 MCG tablet Take 500 mcg by mouth daily     magnesium 500 MG TABS Take 500 mg by mouth daily     Multiple Vitamins-Minerals (MULTIVITAMIN & MINERAL PO) Take 1 tablet by mouth daily.     senna-docusate (SENOKOT-S/PERICOLACE) 8.6-50 MG tablet Take 1 tablet by mouth 2 times daily     No current facility-administered medications for this visit.       ROS:  10 point ROS of systems including Constitutional, Eyes, Respiratory, Cardiovascular, Gastroenterology, Genitourinary, Integumentary, Musculoskeletal, Psychiatric were all negative except for pertinent positives noted in my HPI.    Vitals:  BP (!) 160/68   Pulse 78   Temp 97.6  F (36.4  C)   Resp 18   Ht 1.727 m (5' 8\")   LMP  (LMP Unknown)   SpO2 100%   BMI 25.71 kg/m    Exam:  GENERAL APPEARANCE:  Alert, in no distress, pleasant, cooperative, oriented x self place and recent events  EYES:  EOM, lids, pupils and irises normal, sclera clear and conjunctiva normal, no discharge or mattering on lids or lashes noted  ENT:  Mouth normal, moist mucous membranes, nose normal without drainage or crusting, external ears without lesions, hearing acuity intact  NECK: supple, symmetrical, trachea midline  RESP:  respiratory effort normal, no chest wall tenderness, no respiratory distress, Lung sounds clear, patient is on room air  CV:  Auscultation of heart done, rate and rhythm controlled and regular, no murmur, no rub or gallop. Edema none pitting bilateral lower extremities  ABDOMEN:  " normal bowel sounds, soft, nontender, no palpable masses.  M/S:   Gait and station walks with assist , no tenderness or swelling of the joints; able to move all extremities, digits normal  NEURO: cranial nerves 2-12 grossly intact, no facial asymmetry, no speech deficits and able to follow directions, moves all extremities symmetrically  PSYCH:  insight and judgement and memory appear at baseline, affect and mood normal     Lab/Diagnostic data:  Most Recent 3 CBC's:  Recent Labs   Lab Test 10/11/22  0751 10/10/22  0629 10/09/22  1739 10/08/22  1648 10/08/22  1050 10/07/22  1817 10/06/22  0809   WBC 7.0  --   --   --  10.8  --  6.6   HGB 8.5* 8.7* 8.5*   < > 10.2*   < > 9.6*   MCV 91  --   --   --  92  --  93     --   --   --  362  --  315    < > = values in this interval not displayed.     Most Recent 3 BMP's:  Recent Labs   Lab Test 10/13/22  0627 10/13/22  0304 10/12/22  0925 10/12/22  0201 10/11/22  2229 10/11/22  0751 10/10/22  0629 10/09/22  1057 10/08/22  1154 10/08/22  1050   NA  --   --   --   --   --  134  --  135  --  131*   POTASSIUM  --   --  3.7  --   --  3.9 4.1 4.1  --  3.9   CHLORIDE  --   --   --   --   --  102  --  103  --  100   CO2  --   --   --   --   --  26  --  24  --  25   BUN  --   --   --   --   --  17  --  15  --  16   CR  --   --   --   --   --  0.74  --  0.68  --  0.70   ANIONGAP  --   --   --   --   --  6  --  8  --  6   RAINE  --   --   --   --   --  8.9  --  8.6  --  8.6   GLC 98 107*  --  109*   < > 89  --  100*   < > 131*    < > = values in this interval not displayed.       ASSESSMENT/PLAN:  Cerebrovascular accident (CVA), unspecified mechanism (H)  Moyamoya disease  Fall, subsequent encounter  Physical deconditioning  Acute, ongoing. Continue asa 81 mg daily, Plavix 75 mg daily x 14 days, neurology and cardiac even monitor as recommended. Therapies as ordered, f/u status next visit.     Hyponatremia  Resolved. Na 134 on 10/11. BMP check 10/18.     Acute on chronic  anemia  Recent Hgb 8 range. Continue vit B-12 500 mcg daily, MVI daily, CBC check 10/18. Hematology f/u as planned.     Hypertension, unspecified type  Chronic, managed with atenolol 25 mg daily, BPs somewhat elevated at this time. Monitor vs, review ranges next visit and adjust meds as needed.     Dyslipidemia  Continue PTA Lipitor 40 mg daily, monitor per PCP routine.     Slow transit constipation  New complaint, continue senna s 1 tab BID, Staff to update provider if not effective.     Advanced directives, counseling/discussion  Reviewed and completed POLST: DNR/DNI desired.     Orders:  1. DNR/DNI  2. Senna s 2 tabs daily  3. CBC and BMP check on 10/18    Total unit/floor time of 36 minutes spent consisted of the following: examination of patient, reviewing the record including pertinent labs and imaging. More than 50% of this time was spent in coordination of care with the patient, nursing staff, and other healthcare providers. This time was spent on discussing the care plan including labs, discharge/safe placement, and specialty follow up. Additional specific discussion included review of code status and completion of POLST, management of constipation, monitoring of Hgb and Na levels, expected TCU course/routine/discharge planning and clarification of meds/orders with nursing for a total of over 19 min.    Electronically signed by:  ALFREDO Dillon CNP

## 2022-10-14 NOTE — PROGRESS NOTES
Clinic Care Coordination Contact  Care Team Conversations    Situation: RN Clinical Product Navigator following patient through TCU care progression.     Background: Patient was inpatient at [facility] 10/5/22-10/13/22 due to acute ischemic right frontal lobe stroke, Moyamoya disease, falls, hyponatremia, hypochloremia, acute on chronic anemia.   [facility]    Assessment: Prior to hospitalization Patient was living in a house with spouse; 2 stairs to enter, all needs met on main level.  Children live out of state; son in [place] and daughter in [place].  Patient was independent in ADLs prior to hospital admission and has a standard walker at home if needed.    During hospitalization patient underwent a cerebral angiogram on 10/7/22.  Did not tolerate reintroduction of amlodipine due to dizziness.    Discharge recommendations  Daily weights; call provider for weight gain of >2 lbs per day or >5 lbs per week  Follow-up with Neurology in 6 weeks If you don t hear from a representative within 2 business days, please call [phone].  ASA 81 mg daily with Plavix 21 mg daily X21 days, then ASA 81 mg daily  Simvastatin changed to atorvastatin 40 mg daily  Neurology recommends 30 day event monitor at discharge  Bone marrow biopsy planned in the next 4 weeks  Beta blocker restarted at discharge, depending on response can gradually add losartan and titrate up to prior to admission dose of 50 mg    Plan/Recommendations: RN Clinical Product Navigator will send TCU Care Team Care Management hand in communication and request involvement in discharge planning and coordination of care.      Melissa Behl BSN, RN, PHN, CCM  RN Clinical Product Navigator  [phone]

## 2022-10-14 NOTE — PROGRESS NOTES
"Physical Therapy Discharge Summary    Reason for therapy discharge:    Discharged to transitional care facility.    Progress towards therapy goal(s). See goals on Care Plan in [facility] electronic health record for goal details.  Goals partially met.  Barriers to achieving goals:   discharge from facility.    Therapy recommendation(s):    Continued therapy is recommended.  Rationale/Recommendations:  TCU.    Last PT rx: \"Pt is high falls risk, frequent falls at home, spouse injured from helping the pt up after fall. Pt does not appear safe to d/c home at this time, TCU recommended to address L LE strength and nm control, dynamic balance, improve indep w/fn mobility. If d/c home, pt would require use of walker w/all fn mob and 24/7 Ax1, but again pt's spouse now w/injury after assisting pt and this is not a safe option. Would require up to moderate Ax1 from someone other than her spouse, and HHPT to reduce falls risk.\"      "

## 2022-10-15 LAB
QUANTIFERON MITOGEN: 10 IU/ML
QUANTIFERON NIL TUBE: 0.15 IU/ML
QUANTIFERON TB1 TUBE: 0.13 IU/ML
QUANTIFERON TB2 TUBE: 0.12

## 2022-10-16 ENCOUNTER — LAB REQUISITION (OUTPATIENT)
Dept: LAB | Facility: CLINIC | Age: 82
End: 2022-10-16

## 2022-10-16 DIAGNOSIS — D64.9 ANEMIA, UNSPECIFIED: ICD-10-CM

## 2022-10-16 LAB
GAMMA INTERFERON BACKGROUND BLD IA-ACNC: 0.15 IU/ML
M TB IFN-G BLD-IMP: NEGATIVE
M TB IFN-G CD4+ BCKGRND COR BLD-ACNC: 9.85 IU/ML
MITOGEN IGNF BCKGRD COR BLD-ACNC: -0.02 IU/ML
MITOGEN IGNF BCKGRD COR BLD-ACNC: -0.03 IU/ML

## 2022-10-17 ENCOUNTER — TRANSITIONAL CARE UNIT VISIT (OUTPATIENT)
Dept: GERIATRICS | Facility: CLINIC | Age: 82
End: 2022-10-17
Payer: MEDICARE

## 2022-10-17 VITALS
WEIGHT: 164.3 LBS | OXYGEN SATURATION: 100 % | HEIGHT: 68 IN | TEMPERATURE: 97.9 F | HEART RATE: 60 BPM | RESPIRATION RATE: 18 BRPM | DIASTOLIC BLOOD PRESSURE: 71 MMHG | SYSTOLIC BLOOD PRESSURE: 165 MMHG | BODY MASS INDEX: 24.9 KG/M2

## 2022-10-17 DIAGNOSIS — R53.81 PHYSICAL DECONDITIONING: ICD-10-CM

## 2022-10-17 DIAGNOSIS — I67.5 MOYAMOYA DISEASE: ICD-10-CM

## 2022-10-17 DIAGNOSIS — Z86.73 RECENT CEREBROVASCULAR ACCIDENT (CVA): Primary | ICD-10-CM

## 2022-10-17 DIAGNOSIS — E11.69 TYPE 2 DIABETES MELLITUS WITH OTHER SPECIFIED COMPLICATION, WITHOUT LONG-TERM CURRENT USE OF INSULIN (H): ICD-10-CM

## 2022-10-17 DIAGNOSIS — I10 PRIMARY HYPERTENSION: ICD-10-CM

## 2022-10-17 DIAGNOSIS — Z86.73 HISTORY OF CVA (CEREBROVASCULAR ACCIDENT): ICD-10-CM

## 2022-10-17 DIAGNOSIS — R60.0 BILATERAL LEG EDEMA: ICD-10-CM

## 2022-10-17 DIAGNOSIS — D63.8 ANEMIA IN OTHER CHRONIC DISEASES CLASSIFIED ELSEWHERE: ICD-10-CM

## 2022-10-17 DIAGNOSIS — K59.01 SLOW TRANSIT CONSTIPATION: ICD-10-CM

## 2022-10-17 LAB
ANION GAP SERPL CALCULATED.3IONS-SCNC: 6 MMOL/L (ref 3–14)
BUN SERPL-MCNC: 33 MG/DL (ref 7–30)
CALCIUM SERPL-MCNC: 8.9 MG/DL (ref 8.5–10.1)
CHLORIDE BLD-SCNC: 100 MMOL/L (ref 94–109)
CO2 SERPL-SCNC: 28 MMOL/L (ref 20–32)
CREAT SERPL-MCNC: 0.95 MG/DL (ref 0.52–1.04)
ERYTHROCYTE [DISTWIDTH] IN BLOOD BY AUTOMATED COUNT: 13 % (ref 10–15)
GFR SERPL CREATININE-BSD FRML MDRD: 60 ML/MIN/1.73M2
GLUCOSE BLD-MCNC: 86 MG/DL (ref 70–99)
HCT VFR BLD AUTO: 27.5 % (ref 35–47)
HGB BLD-MCNC: 8.9 G/DL (ref 11.7–15.7)
MCH RBC QN AUTO: 30.2 PG (ref 26.5–33)
MCHC RBC AUTO-ENTMCNC: 32.4 G/DL (ref 31.5–36.5)
MCV RBC AUTO: 93 FL (ref 78–100)
PLATELET # BLD AUTO: 360 10E3/UL (ref 150–450)
POTASSIUM BLD-SCNC: 3.7 MMOL/L (ref 3.4–5.3)
RBC # BLD AUTO: 2.95 10E6/UL (ref 3.8–5.2)
SODIUM SERPL-SCNC: 134 MMOL/L (ref 133–144)
WBC # BLD AUTO: 8.9 10E3/UL (ref 4–11)

## 2022-10-17 PROCEDURE — 36415 COLL VENOUS BLD VENIPUNCTURE: CPT | Performed by: NURSE PRACTITIONER

## 2022-10-17 PROCEDURE — 80048 BASIC METABOLIC PNL TOTAL CA: CPT | Performed by: NURSE PRACTITIONER

## 2022-10-17 PROCEDURE — P9604 ONE-WAY ALLOW PRORATED TRIP: HCPCS | Performed by: NURSE PRACTITIONER

## 2022-10-17 PROCEDURE — 99305 1ST NF CARE MODERATE MDM 35: CPT | Performed by: INTERNAL MEDICINE

## 2022-10-17 PROCEDURE — 85014 HEMATOCRIT: CPT | Performed by: NURSE PRACTITIONER

## 2022-10-17 RX ORDER — AMOXICILLIN 250 MG
1 CAPSULE ORAL 2 TIMES DAILY
COMMUNITY
End: 2022-11-08

## 2022-10-17 NOTE — PROGRESS NOTES
[facility] GERIATRICS  INITIAL VISIT NOTE  October 17, 2022      PRIMARY CARE PROVIDER AND CLINIC:  Chani Peoples [address] [facility] / [facility] [address]    [facility] Medical Record Number:  [id number]  Place of Service where encounter took place:  [facility] ([facility]) [[id number]]    Chief Complaint   Patient presents with     Hospital F/U       HPI:    Candida Rose is a 82 year old  (1940) female seen today at [facility] after suffering a right frontal CVA on 10/5/22. 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      History obtained from: [facility] chart review & patient report.    Chart Review & Hospital Course:   Patient was evaluated in the ED on 10/1 for leg shakiness and UA was concerning for UTI. She was given IV Rocephin in the ED and discharged home on a 7 day course of Keflex.    Patient returned to the ED via EMS on 10/4 for left-sided UE & LE weakness resulting in 2 falls over the preceding 3 days. Brain MR in the ED notable for right petrous and cavernous internal artery occlusion with an acute right frontal lobe infarct, right MCA occlusion (consistent with Moyamoya), multiple chronic infarcts, small vessel disease. CXR and left shoulder XR unremarkable.     She was transferred and admitted to [facility] from 10/5-10/13. She had a cerebral angiogram on 10/7 and developed a post-op right groin hematoma; abd/pelvic CT negative for hemorrhage. She had hyponatremia and hypochloremia that resolved during admission. Patient also had acute on chronic anemia, which she is following with outpatient hematology/oncology for with bone marrow biopsy planned within the next 2 weeks.   Of note, she did report hip pain during admission and had an unremarkable XR.       She was discharged to [facility] on 10/13 with the following:    PTA ASA 81mg QD and Plavix 75mg QD x21 days, followed by  ASA 81mg QD monotherapy   Simvastatin switched to atorvastatin   Event monitoring x30 days   Keflex discontinued   Amlodipine and Losartan discontinued    She was admitted to this facility for nursing, medical management and rehab.      Today: Reports generalized weakness and increased bilateral LE edema. Denies pain, numbness, tingling, weakness. She is anxious to get OT/PT started and go home.     [Today, Ms. Rose is seen in her room with her spouse. SLUMS 15/30 at [facility]. She is a former  and has a lot of cognitive reserve. She wants to get home and get back to driving, etc. Discussed she needs further cognitive testing as her deficits may be hidden and that, per her PCP, a driving test through [employer] would be the step prior to resuming driving. Reviewed med changes. She has lower extremity edema. There are thigh high TEDS on her dresser and she is open to nursing putting these on in the mornings. Also reviewed the purpose of the event monitor to look for underlying arrhythmia that could predispose to strokes as that would then necessitate a discussion about anticoagulation].    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 F/u dr. Soto     OA (osteoarthritis)     s/p bilat total hips      Other chronic pain     Joint pain for many years     Unspecified cerebral artery occlusion with cerebral  "infarction     After Moyamoya surgery > 10 yrs ago.     Vitamin D deficiencies        PAST SURGICAL HISTORY:   Past Surgical History:   Procedure Laterality Date     ARTHROPLASTY KNEE Left 2017    Procedure: ARTHROPLASTY KNEE;  Left total knee arthroplasty;  Surgeon: Chidi Jenkins MD;  Location:  OR     COLONOSCOPY      normal     COLONOSCOPY  2014    Procedure: COLONOSCOPY;  Surgeon: Raul Nolan DO;  Location:  GI     CRANIOTOMY      MOJAMOJA  \"Pt had repair of blood flow.\"     IR CAROTID ANGIOGRAM  10/30/2006     IR CAROTID ANGIOGRAM  2010     IR CAROTID ANGIOGRAM  2010     IR CAROTID ANGIOGRAM  2010     IR CAROTID ANGIOGRAM  2011     IR CAROTID ANGIOGRAM  2011     IR CAROTID ANGIOGRAM  2011     IR CAROTID ANGIOGRAM  2012     IR CAROTID ANGIOGRAM  2012     IR CAROTID ANGIOGRAM  2012     IR MISCELLANEOUS PROCEDURE  2010     IR MISCELLANEOUS PROCEDURE  2010     IR MISCELLANEOUS PROCEDURE  2011     IR MISCELLANEOUS PROCEDURE  2012     RECONSTRUCT FOREFOOT WITH METATARSOPHALANGEAL (MTP) FUSION Left 2014    Procedure: RECONSTRUCT FOREFOOT WITH METATARSOPHALANGEAL (MTP) FUSION;  Surgeon: Tres Merlos DPM;  Location:  OR     [facility] NONSPECIFIC PROCEDURE  10/30/06    neurosurgery Dr Soto     [facility] NONSPECIFIC PROCEDURE      bilateral total hips approx      [facility] NONSPECIFIC PROCEDURE  3/07    neurosurgery        FAMILY HISTORY:   Family History   Problem Relation Age of Onset     Obesity Sister         gastric by-pass age age 60, heart murmur.     Cancer - colorectal Sister 69     Heart Disease Father         mi,  age 48     Family History Negative Mother         killed in MVA age 54     Cancer Maternal Aunt         lung cancer ,non-smoker     Lung Cancer Maternal Aunt      Breast Cancer Daughter 48     Ovarian Cancer No family hx of        SOCIAL HISTORY:   Patient's living condition: lives with " "spouse   Occupation: She is a retired elementary school principle. She also worked in [employer] in the past.    Family: She and her  have been  for 55 years. Her daughter lives in [place] and works as a . Her son lives in [place].    Exercise: Prior to this hospital admission, she walked 300 min/week and reports a 70lb weight loss with this. She is anxious to get back to walking and plans to use a walker at home.     MEDICATIONS:  Post Discharge Medication Reconciliation Status: discharge medications reconciled and changed, per note/orders.  Current Outpatient Medications   Medication Sig Dispense Refill     aspirin 81 MG tablet Take 1 tablet (81 mg) by mouth daily 30 tablet      atenolol (TENORMIN) 25 MG tablet TAKE 1 TABLET BY MOUTH EVERY DAY 90 tablet 1     atorvastatin (LIPITOR) 40 MG tablet 1 tablet (40 mg) by Oral or Feeding Tube route every evening       clopidogrel (PLAVIX) 75 MG tablet Take 1 tablet (75 mg) by mouth daily for 14 days 14 tablet 1     cyanocobalamin (VITAMIN B-12) 500 MCG tablet Take 500 mcg by mouth daily       magnesium 500 MG TABS Take 500 mg by mouth daily       Multiple Vitamins-Minerals (MULTIVITAMIN & MINERAL PO) Take 1 tablet by mouth daily.       senna-docusate (SENOKOT-S/PERICOLACE) 8.6-50 MG tablet Take 1 tablet by mouth 2 times daily         ROS:  10 point ROS neg other than the symptoms noted above in the HPI.    PHYSICAL EXAM:  BP (!) 165/71   Pulse 60   Temp 97.9  F (36.6  C)   Resp 18   Ht 1.727 m (5' 8\")   Wt 74.5 kg (164 lb 4.8 oz)   LMP  (LMP Unknown)   SpO2 100%   BMI 24.98 kg/m    Gen: sitting in bed, alert, cooperative and in no acute distress  HEENT: Well-healed bilateral parietal craniotomy defects. Good hearing acuity with bilateral hearing aids in place; moist mucous membranes in mouth; eyes without scleral icterus or scleral injection  Card: RRR, S1, S2, no murmurs. Event monitor in place over left anterior chest.   Resp: " lungs clear to auscultation bilaterally  GI: abdomen soft, not-tender, non-distended, +BS  Ext: 1+ edema to bilateral lower legs and ankles.   Neuro: CX II-XII grossly in tact; ROM in all four extremities grossly in tact  Psych: alert and oriented to self and general situation; SLUMS 15/30; normal affect     LABORATORY/IMAGING DATA:  Reviewed as per [facility] and/or [facility]    ASSESSMENT/PLAN:    Acute Right Frontal CVA (10/4/22)  Bilateral [place], R PCA and R MCA CVAs (10/6/22)  Right MCA Occlusion   History of Prior Bilateral MCA CVAs  Moyamoya Disease  No notable physical deficits; however, does seem to have some executive function impact. SLUMS 15/30.   - Continue DAPT (ASA 81mg QD and Plavix 75mg QD x21 days), followed by ASA 81mg QD monotherapy  - atorvastatin 40mg QD  - Event Monitor x30 days to evaluate for A Fib  - OT following for cog assessment - expect they will do a CPT  -- PT/OT  -- follow up with neurology as scheduled     HTN  SBPs 120s. HR 60s-70s. Weight stable around 165 lbs. Amlodipine and lisinopril discontinued during hospitalization.   - atenolol 25mg QD  - follow BPs and adjust medications as needed    Bilateral LE Edema  Dependent in setting of IV fluids, decreased mobility  -- TEDs to bilateral LEs (she has thigh high) - on qam and off at bedtime  -- talked with her about ankle pumps    Chronic Anemia  Baseline Hgb low 8s. Had consult with hematology/oncology and plan is for bone marrow biopsy -- follow clinically     DM, Type II  Hgb A1c 5.4. Diet controlled  -- no need to follow sugars    Slow Transit Constipation  -- Senna-S 1 tab BID  -- adjust bowel regimen as needed    Physical Deconditioning  In setting of hospitalization and underlying medical conditions  -- ongoing PT/OT      Electronically signed by:  Vee Martinez MD       Scribe Disclosure:   Tessie HOWELL, MS3, am serving as a scribe; to document services personally performed by Dr. Vee Martinez- -based on data  collection and the provider's statements to me.     Provider Disclosure:  I agree with above History, Review of Systems, Physical exam and Plan.  I have reviewed the content of the documentation and have edited it as needed. I have personally performed the services documented here and the documentation accurately represents those services and the decisions I have made.

## 2022-10-17 NOTE — LETTER
10/17/2022        RE: Candida Rose  [address]  [facility] [id number]        [facility] GERIATRICS  INITIAL VISIT NOTE  October 17, 2022      PRIMARY CARE PROVIDER AND CLINIC:  Chani Peoples [address] [facility] / [facility] [address]    [facility] Medical Record Number:  [id number]  Place of Service where encounter took place:  [facility] ([facility]) [[id number]]    Chief Complaint   Patient presents with     Hospital F/U       HPI:    Candida Rose is a 82 year old  (1940) female seen today at [facility] after suffering a right frontal CVA on 10/5/22. 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      History obtained from: [facility] chart review & patient report.    Chart Review & Hospital Course:   Patient was evaluated in the ED on 10/1 for leg shakiness and UA was concerning for UTI. She was given IV Rocephin in the ED and discharged home on a 7 day course of Keflex.    Patient returned to the ED via EMS on 10/4 for left-sided UE & LE weakness resulting in 2 falls over the preceding 3 days. Brain MR in the ED notable for right petrous and cavernous internal artery occlusion with an acute right frontal lobe infarct, right MCA occlusion (consistent with Moyamoya), multiple chronic infarcts, small vessel disease. CXR and left shoulder XR unremarkable.     She was transferred and admitted to [facility] from 10/5-10/13. She had a cerebral angiogram on 10/7 and developed a post-op right groin hematoma; abd/pelvic CT negative for hemorrhage. She had hyponatremia and hypochloremia that resolved during admission. Patient also had acute on chronic anemia, which she is following with outpatient hematology/oncology for with bone marrow biopsy planned within the next 2 weeks.   Of note, she did report hip pain during admission and had an unremarkable XR.       She was discharged to [facility]  on 10/13 with the following:    PTA ASA 81mg QD and Plavix 75mg QD x21 days, followed by ASA 81mg QD monotherapy   Simvastatin switched to atorvastatin   Event monitoring x30 days   Keflex discontinued   Amlodipine and Losartan discontinued    She was admitted to this facility for nursing, medical management and rehab.      Today: Reports generalized weakness and increased bilateral LE edema. Denies pain, numbness, tingling, weakness. She is anxious to get OT/PT started and go home.     [Today, Ms. Rose is seen in her room with her spouse. SLUMS 15/30 at [facility]. She is a former  and has a lot of cognitive reserve. She wants to get home and get back to driving, etc. Discussed she needs further cognitive testing as her deficits may be hidden and that, per her PCP, a driving test through [employer] would be the step prior to resuming driving. Reviewed med changes. She has lower extremity edema. There are thigh high TEDS on her dresser and she is open to nursing putting these on in the mornings. Also reviewed the purpose of the event monitor to look for underlying arrhythmia that could predispose to strokes as that would then necessitate a discussion about anticoagulation].    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 F/u dr. Soto     OA (osteoarthritis)     s/p bilat total hips      Other chronic pain      "Joint pain for many years     Unspecified cerebral artery occlusion with cerebral infarction     After Moyamoya surgery > 10 yrs ago.     Vitamin D deficiencies        PAST SURGICAL HISTORY:   Past Surgical History:   Procedure Laterality Date     ARTHROPLASTY KNEE Left 2017    Procedure: ARTHROPLASTY KNEE;  Left total knee arthroplasty;  Surgeon: Chidi Jenkins MD;  Location:  OR     COLONOSCOPY      normal     COLONOSCOPY  2014    Procedure: COLONOSCOPY;  Surgeon: Raul Nolan DO;  Location:  GI     CRANIOTOMY      MOJAMOJA  \"Pt had repair of blood flow.\"     IR CAROTID ANGIOGRAM  10/30/2006     IR CAROTID ANGIOGRAM  2010     IR CAROTID ANGIOGRAM  2010     IR CAROTID ANGIOGRAM  2010     IR CAROTID ANGIOGRAM  2011     IR CAROTID ANGIOGRAM  2011     IR CAROTID ANGIOGRAM  2011     IR CAROTID ANGIOGRAM  2012     IR CAROTID ANGIOGRAM  2012     IR CAROTID ANGIOGRAM  2012     IR MISCELLANEOUS PROCEDURE  2010     IR MISCELLANEOUS PROCEDURE  2010     IR MISCELLANEOUS PROCEDURE  2011     IR MISCELLANEOUS PROCEDURE  2012     RECONSTRUCT FOREFOOT WITH METATARSOPHALANGEAL (MTP) FUSION Left 2014    Procedure: RECONSTRUCT FOREFOOT WITH METATARSOPHALANGEAL (MTP) FUSION;  Surgeon: Tres Merlos DPM;  Location:  OR     [facility] NONSPECIFIC PROCEDURE  10/30/06    neurosurgery Dr Soto     [facility] NONSPECIFIC PROCEDURE      bilateral total hips approx      [facility] NONSPECIFIC PROCEDURE  3/07    neurosurgery        FAMILY HISTORY:   Family History   Problem Relation Age of Onset     Obesity Sister         gastric by-pass age age 60, heart murmur.     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    Family: She and her  have been  for 55 years. Her daughter lives in [place] and works as a . Her son lives in [place].    Exercise: Prior to this hospital admission, she walked 300 min/week and reports a 70lb weight loss with this. She is anxious to get back to walking and plans to use a walker at home.     MEDICATIONS:  Post Discharge Medication Reconciliation Status: discharge medications reconciled and changed, per note/orders.  Current Outpatient Medications   Medication Sig Dispense Refill     aspirin 81 MG tablet Take 1 tablet (81 mg) by mouth daily 30 tablet      atenolol (TENORMIN) 25 MG tablet TAKE 1 TABLET BY MOUTH EVERY DAY 90 tablet 1     atorvastatin (LIPITOR) 40 MG tablet 1 tablet (40 mg) by Oral or Feeding Tube route every evening       clopidogrel (PLAVIX) 75 MG tablet Take 1 tablet (75 mg) by mouth daily for 14 days 14 tablet 1     cyanocobalamin (VITAMIN B-12) 500 MCG tablet Take 500 mcg by mouth daily       magnesium 500 MG TABS Take 500 mg by mouth daily       Multiple Vitamins-Minerals (MULTIVITAMIN & MINERAL PO) Take 1 tablet by mouth daily.       senna-docusate (SENOKOT-S/PERICOLACE) 8.6-50 MG tablet Take 1 tablet by mouth 2 times daily         ROS:  10 point ROS neg other than the symptoms noted above in the HPI.    PHYSICAL EXAM:  BP (!) 165/71   Pulse 60   Temp 97.9  F (36.6  C)   Resp 18   Ht 1.727 m (5' 8\")   Wt 74.5 kg (164 lb 4.8 oz)   LMP  (LMP Unknown)   SpO2 100%   BMI 24.98 kg/m    Gen: sitting in bed, alert, cooperative and in no acute distress  HEENT: Well-healed bilateral parietal craniotomy defects. Good hearing acuity with bilateral hearing aids in place; moist mucous membranes in mouth; eyes without scleral icterus or scleral injection  Card: RRR, S1, S2, " no murmurs. Event monitor in place over left anterior chest.   Resp: lungs clear to auscultation bilaterally  GI: abdomen soft, not-tender, non-distended, +BS  Ext: 1+ edema to bilateral lower legs and ankles.   Neuro: CX II-XII grossly in tact; ROM in all four extremities grossly in tact  Psych: alert and oriented to self and general situation; SLUMS 15/30; normal affect     LABORATORY/IMAGING DATA:  Reviewed as per [facility] and/or [facility]    ASSESSMENT/PLAN:    Acute Right Frontal CVA (10/4/22)  Bilateral [place], R PCA and R MCA CVAs (10/6/22)  Right MCA Occlusion   History of Prior Bilateral MCA CVAs  Moyamoya Disease  No notable physical deficits; however, does seem to have some executive function impact. SLUMS 15/30.   - Continue DAPT (ASA 81mg QD and Plavix 75mg QD x21 days), followed by ASA 81mg QD monotherapy  - atorvastatin 40mg QD  - Event Monitor x30 days to evaluate for A Fib  - OT following for cog assessment - expect they will do a CPT  -- PT/OT  -- follow up with neurology as scheduled     HTN  SBPs 120s. HR 60s-70s. Weight stable around 165 lbs. Amlodipine and lisinopril discontinued during hospitalization.   - atenolol 25mg QD  - follow BPs and adjust medications as needed    Bilateral LE Edema  Dependent in setting of IV fluids, decreased mobility  -- TEDs to bilateral LEs (she has thigh high) - on qam and off at bedtime  -- talked with her about ankle pumps    Chronic Anemia  Baseline Hgb low 8s. Had consult with hematology/oncology and plan is for bone marrow biopsy -- follow clinically     DM, Type II  Hgb A1c 5.4. Diet controlled  -- no need to follow sugars    Slow Transit Constipation  -- Senna-S 1 tab BID  -- adjust bowel regimen as needed    Physical Deconditioning  In setting of hospitalization and underlying medical conditions  -- ongoing PT/OT      Electronically signed by:  Vee Martinez MD       Scribe Disclosure:   Tessie HOWELL, MS3, am serving as a scribe; to  document services personally performed by Dr. Vee Martinez- -based on data collection and the provider's statements to me.     Provider Disclosure:  I agree with above History, Review of Systems, Physical exam and Plan.  I have reviewed the content of the documentation and have edited it as needed. I have personally performed the services documented here and the documentation accurately represents those services and the decisions I have made.                           Sincerely,        Vee Martinez MD

## 2022-10-26 ENCOUNTER — DISCHARGE SUMMARY NURSING HOME (OUTPATIENT)
Dept: GERIATRICS | Facility: CLINIC | Age: 82
End: 2022-10-26
Payer: MEDICARE

## 2022-10-26 VITALS
DIASTOLIC BLOOD PRESSURE: 54 MMHG | HEART RATE: 61 BPM | BODY MASS INDEX: 25.04 KG/M2 | WEIGHT: 165.2 LBS | RESPIRATION RATE: 18 BRPM | OXYGEN SATURATION: 99 % | SYSTOLIC BLOOD PRESSURE: 142 MMHG | HEIGHT: 68 IN | TEMPERATURE: 97.1 F

## 2022-10-26 DIAGNOSIS — I67.5 MOYAMOYA DISEASE: ICD-10-CM

## 2022-10-26 DIAGNOSIS — E87.1 HYPONATREMIA: ICD-10-CM

## 2022-10-26 DIAGNOSIS — K59.01 SLOW TRANSIT CONSTIPATION: ICD-10-CM

## 2022-10-26 DIAGNOSIS — R53.81 PHYSICAL DECONDITIONING: ICD-10-CM

## 2022-10-26 DIAGNOSIS — E78.5 DYSLIPIDEMIA: ICD-10-CM

## 2022-10-26 DIAGNOSIS — D64.9 ACUTE ON CHRONIC ANEMIA: ICD-10-CM

## 2022-10-26 DIAGNOSIS — I10 HYPERTENSION, UNSPECIFIED TYPE: ICD-10-CM
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
## 2022-10-26 DIAGNOSIS — I63.9 CEREBROVASCULAR ACCIDENT (CVA), UNSPECIFIED MECHANISM (H): Primary | ICD-10-CM

## 2022-10-26 PROCEDURE — 99316 NF DSCHRG MGMT 30 MIN+: CPT | Performed by: NURSE PRACTITIONER

## 2022-10-26 NOTE — PROGRESS NOTES
[facility] GERIATRICS DISCHARGE SUMMARY  PATIENT'S NAME: Candida Rose  YOB: 1940  MEDICAL RECORD NUMBER:  [id number]  Place of Service where encounter took place:  [facility] ([facility]) [[id number]]    PRIMARY CARE PROVIDER AND CLINIC RESPONSIBLE AFTER TRANSFER:   Chani Peoples MD, [address] [facility] [address]    [facility] [place]     Transferring providers: ALFREDO Dillon CNP, Mamta Washington MD  Recent Hospitalization/ED:  [facility] stay 10/05/22 through 10/13/22.  Date of SNF Admission: October 13, 2022  Date of SNF (anticipated) Discharge: 10/28/22  Discharged to: previous independent home  Cognitive Scores: SLUMS: 15/30  Physical Function: Ambulating 250 ft with SBA  DME: No new DME needed    CODE STATUS/ADVANCE DIRECTIVES DISCUSSION:  No CPR- Do NOT Intubate   ALLERGIES: Lisinopril    NURSING FACILITY COURSE   Medication Changes/Rationale:     Added senna s due to constipation    Per recent TCU provider progress notes:   82-year-old female with a history of moyamoya disease, HTN, HLD, stroke, diabetes, osteoarthritis, alcohol use disorder and chronic pain hospitalized with an acute stroke right frontal lobe. MRI 10/9: numerous new tiny punctate and small patchy foci of acute infarction within the bilateral anterior cerebral artery territories (right greater than left), right posterior cerebral artery territory and right middle cerebral artery territory since 10/06/2022. Managed with ASA, Plavix x 21 days then life long ASA. On Lipitor and neuro recommend 30 day event monitor as outpatient. Hyponatremia resolved. Acute on chronic anemia: sees hematology, bone marrow biopsy in 4 weeks. HTN on betablocker. UA abnormal, received few days of keflex but stopped due to UC negative. To TCU for rehab.     Patient seen for discharge visit. No new concerns. BP range 127-149/54-79 and sats 99% room air. Home with home care as ordered below.      Summary of nursing facility stay:   Cerebrovascular accident (CVA), unspecified mechanism (H)  Moyamoya disease  Fall, subsequent encounter  Physical deconditioning  Acute, ongoing. Continue asa 81 mg daily, Plavix 75 mg daily x 14 days, neurology f/u and cardiac even monitor as recommended. Home with home care as ordered.     Hyponatremia  Resolved. Na 134 on 10/11    Acute on chronic anemia  Recent Hgb 8 range. Continue vit B-12 500 mcg daily, MVI daily. Hematology f/u as planned.     Hypertension, unspecified type  Chronic, managed with atenolol 25 mg daily, BPs somewhat elevated at this time. Monitor vs, review ranges next PCP visit and adjust meds as needed.     Dyslipidemia  Continue PTA Lipitor 40 mg daily, monitor per PCP routine.     Slow transit constipation  Continue senna s 1 tab BID    Discharge Medications:  MED REC REQUIRED  Post Medication Reconciliation Status:  Discharge medications reconciled, continue medications without change         Current Outpatient Medications   Medication Sig Dispense Refill     aspirin 81 MG tablet Take 1 tablet (81 mg) by mouth daily 30 tablet      atenolol (TENORMIN) 25 MG tablet Take 1 tablet (25 mg) by mouth daily 30 tablet 0     atorvastatin (LIPITOR) 40 MG tablet 1 tablet (40 mg) by Oral or Feeding Tube route every evening 30 tablet 0     clopidogrel (PLAVIX) 75 MG tablet Take 1 tablet (75 mg) by mouth daily for 14 days 14 tablet 1     cyanocobalamin (VITAMIN B-12) 500 MCG tablet Take 500 mcg by mouth daily       magnesium 500 MG TABS Take 500 mg by mouth daily       Multiple Vitamins-Minerals (MULTIVITAMIN & MINERAL PO) Take 1 tablet by mouth daily.       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 F/u dr. Soto     OA (osteoarthritis)     s/p bilat total hips      Other chronic pain     Joint pain for many years     Unspecified cerebral artery occlusion with cerebral infarction     After Moyamoya surgery > 10 yrs ago.     Vitamin D deficiencies      Physical Exam:   Vitals: BP (!) 142/54   Pulse 61   Temp 97.1  F (36.2  C)   Resp 18   Ht 1.727 m (5' 8\")   Wt 74.9 kg (165 lb 3.2 oz)   LMP  (LMP Unknown)   SpO2 99%   BMI 25.12 kg/m    BMI: Body mass index is 25.12 kg/m .  GENERAL APPEARANCE:  Alert, in no distress, cooperative  ENT:  Mouth normal, moist mucous membranes, normal hearing acuity  EYES:  Conjunctiva and lids normal  RESP:  no respiratory distress, on room air  NEURO:   No facial asymmetry, speech clear  PSYCH:  oriented X 3, affect and mood normal     SNF labs:   Most Recent 3 CBC's:Recent Labs   Lab Test 10/11/22  0751 10/10/22  0629 10/09/22  1739 10/08/22  1648 10/08/22  1050 10/07/22  1817 10/06/22  0809   WBC 7.0  --   --   --  10.8  --  6.6   HGB 8.5* 8.7* 8.5*   < > 10.2*   < > 9.6*   MCV 91  --   --   --  92  --  93     --   --   --  362  --  315    < > = values in this interval not displayed.     Most Recent 3 BMP's:Recent Labs   Lab Test 10/13/22  0627 10/13/22  0304 10/12/22  0925 10/12/22  0201 10/11/22  2229 10/11/22  0751 10/10/22  0629 10/09/22  1057 10/08/22  1154 10/08/22  1050   NA  --   --   --   --   --  134  --  135  --  131*   POTASSIUM  --   --  3.7  --   --  3.9 4.1 4.1  --  3.9   CHLORIDE  --   --   --   --   --  102  --  103  --  100   CO2  --   --   --   --   --  26  --  24  --  25   BUN  -- "   --   --   --   --  17  --  15  --  16   CR  --   --   --   --   --  0.74  --  0.68  --  0.70   ANIONGAP  --   --   --   --   --  6  --  8  --  6   RAINE  --   --   --   --   --  8.9  --  8.6  --  8.6   GLC 98 107*  --  109*   < > 89  --  100*   < > 131*    < > = values in this interval not displayed.       DISCHARGE PLAN:    Follow up labs: No labs orders/due    Medical Follow Up:      Follow up with primary care provider in 2-3 weeks  Follow up with specialist neuro as recommended     Current [place] scheduled appointments:  Next 5 appointments (look out 90 days)    Nov 09, 2022  1:30 PM  Return Visit with Kary Johns [facility] [address] [place]  [address]  [facility] [id number] [phone]           Discharge Services: Home Care:  Occupational Therapy, Physical Therapy, Registered Nurse, Home Health Aide and From:  [facility]    Discharge Instructions Verbalized to Patient at Discharge:     None    TOTAL DISCHARGE TIME:   Greater than 30 minutes  Electronically signed by:  ALFREDO Dillon CNP     Home care Face to Face documentation done in [place] attached to Home care orders for [facility].

## 2022-10-27 ENCOUNTER — TELEPHONE (OUTPATIENT)
Dept: NEUROLOGY | Facility: CLINIC | Age: 82
End: 2022-10-27

## 2022-10-27 DIAGNOSIS — I10 HTN, GOAL BELOW 140/90: Chronic | ICD-10-CM

## 2022-10-27 DIAGNOSIS — I63.9 CEREBROVASCULAR ACCIDENT (CVA), UNSPECIFIED MECHANISM (H): ICD-10-CM

## 2022-10-27 RX ORDER — ATENOLOL 25 MG/1
25 TABLET ORAL DAILY
Qty: 30 TABLET | Refills: 0 | Status: SHIPPED | OUTPATIENT
Start: 2022-10-27 | End: 2022-10-31

## 2022-10-27 RX ORDER — ATORVASTATIN CALCIUM 40 MG/1
40 TABLET, FILM COATED ORAL EVERY EVENING
Qty: 30 TABLET | Refills: 0 | Status: SHIPPED | OUTPATIENT
Start: 2022-10-27 | End: 2022-10-31

## 2022-10-27 NOTE — TELEPHONE ENCOUNTER
[facility] Call Center    Phone Message    May a detailed message be left on voicemail: yes     Reason for Call: Other: Garry from [facility] called to notify clinic that information regarding heart monitor has been posted on site. Please call him with any questions at [phone] option 1.    Action Taken: Message routed to:  Other: neurology    Travel Screening: Not Applicable

## 2022-10-27 NOTE — TELEPHONE ENCOUNTER
10/27/22 Urgent [employer] tracing dated 10/26/22 at 21:23:59 CDT with  reviewed by Dr Segundo who stated tracing appears to be Afib Will forward back to Neurology   [place] [address] pm

## 2022-10-28 ENCOUNTER — PATIENT OUTREACH (OUTPATIENT)
Dept: CARE COORDINATION | Facility: CLINIC | Age: 82
End: 2022-10-28

## 2022-10-28 ENCOUNTER — TELEPHONE (OUTPATIENT)
Dept: INTERNAL MEDICINE | Facility: CLINIC | Age: 82
End: 2022-10-28

## 2022-10-28 DIAGNOSIS — I63.9 CEREBROVASCULAR ACCIDENT (CVA), UNSPECIFIED MECHANISM (H): Primary | ICD-10-CM

## 2022-10-28 DIAGNOSIS — I63.9 STROKE (H): ICD-10-CM

## 2022-10-28 NOTE — TELEPHONE ENCOUNTER
Discussed with vascular attending, Dr. Aguayo. Given new atrial fibrillation in setting of recent stroke, discontinuation of antiplatelet therapy and initiation of anticoagulation (Eliquis preferred if covered) would be appropriate from a stroke perspective. Patient was referred to general neurology for follow up, recommend this still be arranged, ideally in next 2-4 weeks. In the meantime, should obtain a ASAP appointment with PCP to discuss initiation of anticoagulation for secondary stroke prevention.

## 2022-10-28 NOTE — TELEPHONE ENCOUNTER
Akosua from [facility] calls on Roxi's behalf ([phone]) for verbal orders too Delay of Care until 10/31/22 or after.  They have been unable to reach patient.  Ok to leave a detailed message.

## 2022-10-28 NOTE — TELEPHONE ENCOUNTER
Called pt this afternoon. She was just getting home from the TCU as she discharged today. I briefly informed her of the heart monitor showing atrial fibrillation and that we would like her to see her PCP clinic as well as neurology in the coming weeks to discuss changing her medications to a blood thinner. Pt was agreeable to me setting up recommended appts and told me just to call her back once they have been scheduled. I told her that I will follow up on Monday or Tuesday with the appt information.    Update: Given current availability in [facility] general neurology is booking into March 2023, got pt scheduled with [facility] in [place] on 12/7 with Dr. Irene as that was soonest new pt appt available. Called [facility] scheduling for PCP appt. Was able to speak with a RN at the [facility] and they scheduled pt for hospital follow up on 10/31 at 2:00pm. Called pt's home number and spoke with spouse. Confirmed both appts and provided clinic information for [facility]. He did not have any additional questions or concerns.    Routing to pt's PCP and Dr. Ogden who pt is scheduled to see on 10/31 for their awareness on recommendation for pt to be initiated on anticoagulation for secondary stroke prevention.      Jacklyn Ruano BS, RN, SCRN  RN Stroke Neurology Care Coordinator  [facility] Neuroscience Service Line

## 2022-10-28 NOTE — PROGRESS NOTES
S-(situation): TCU Discharge    B-(background): Patient was inpatient at [facility] 10/5/22-10/13/22 due to acute ischemic right frontal lobe stroke, Moyamoya disease, falls, hyponatremia, hypochloremia, acute on chronic anemia.   Patient was discharged to [facility] TCU for rehabilitation.    A-(assessment): Patient will discharge to home on 10/28/22 with [facility] care, RN, HHA, PT and OT.    R-(recommendations/plan): Care Coordination program initiated and [facility] referral placed.  CPN will monitor for any newly identified care navigation needs in 1 month.    Melissa Behl BSN, RN, PHN, CCM  RN Clinical Product Navigator  [phone]

## 2022-10-28 NOTE — TELEPHONE ENCOUNTER
Routing to stroke KAYLA team. Pt is not yet scheduled for stroke follow up. Per the referral, there were 3 unsuccessful attempts made to reach pt directly, but she appears to be anticipated to discharge from TCU today 10/28 back to her home.      Jacklyn ARREDONDO, RN, SCRN  RN Stroke Neurology Care Coordinator  [facility] Neuroscience Service Line

## 2022-10-30 ENCOUNTER — MEDICAL CORRESPONDENCE (OUTPATIENT)
Dept: HEALTH INFORMATION MANAGEMENT | Facility: CLINIC | Age: 82
End: 2022-10-30

## 2022-10-31 ENCOUNTER — OFFICE VISIT (OUTPATIENT)
Dept: INTERNAL MEDICINE | Facility: CLINIC | Age: 82
End: 2022-10-31
Payer: MEDICARE

## 2022-10-31 ENCOUNTER — TELEPHONE (OUTPATIENT)
Dept: INTERNAL MEDICINE | Facility: CLINIC | Age: 82
End: 2022-10-31

## 2022-10-31 ENCOUNTER — PATIENT OUTREACH (OUTPATIENT)
Dept: CARE COORDINATION | Facility: CLINIC | Age: 82
End: 2022-10-31

## 2022-10-31 VITALS
RESPIRATION RATE: 18 BRPM | WEIGHT: 169.5 LBS | DIASTOLIC BLOOD PRESSURE: 68 MMHG | SYSTOLIC BLOOD PRESSURE: 126 MMHG | BODY MASS INDEX: 25.69 KG/M2 | TEMPERATURE: 96.8 F | OXYGEN SATURATION: 100 % | HEIGHT: 68 IN | HEART RATE: 54 BPM

## 2022-10-31 DIAGNOSIS — I63.10 CEREBROVASCULAR ACCIDENT (CVA) DUE TO EMBOLISM OF PRECEREBRAL ARTERY (H): ICD-10-CM

## 2022-10-31 DIAGNOSIS — I10 HTN, GOAL BELOW 140/90: Chronic | ICD-10-CM

## 2022-10-31 DIAGNOSIS — Z09 HOSPITAL DISCHARGE FOLLOW-UP: Primary | ICD-10-CM

## 2022-10-31 DIAGNOSIS — I48.0 PAROXYSMAL ATRIAL FIBRILLATION (H): ICD-10-CM

## 2022-10-31 DIAGNOSIS — D64.9 ANEMIA, UNSPECIFIED TYPE: ICD-10-CM

## 2022-10-31 DIAGNOSIS — E11.69 TYPE 2 DIABETES MELLITUS WITH OTHER SPECIFIED COMPLICATION, WITHOUT LONG-TERM CURRENT USE OF INSULIN (H): ICD-10-CM

## 2022-10-31 DIAGNOSIS — I63.9 CEREBROVASCULAR ACCIDENT (CVA), UNSPECIFIED MECHANISM (H): ICD-10-CM
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
## 2022-10-31 PROBLEM — K59.01 SLOW TRANSIT CONSTIPATION: Status: ACTIVE | Noted: 2022-10-13

## 2022-10-31 PROCEDURE — 99495 TRANSJ CARE MGMT MOD F2F 14D: CPT | Performed by: INTERNAL MEDICINE

## 2022-10-31 RX ORDER — ATORVASTATIN CALCIUM 40 MG/1
40 TABLET, FILM COATED ORAL EVERY EVENING
Qty: 90 TABLET | Refills: 1 | Status: SHIPPED | OUTPATIENT
Start: 2022-10-31 | End: 2023-05-11

## 2022-10-31 RX ORDER — ATENOLOL 25 MG/1
25 TABLET ORAL DAILY
Qty: 90 TABLET | Refills: 1 | Status: SHIPPED | OUTPATIENT
Start: 2022-10-31 | End: 2023-06-01

## 2022-10-31 ASSESSMENT — PAIN SCALES - GENERAL: PAINLEVEL: NO PAIN (0)

## 2022-10-31 NOTE — TELEPHONE ENCOUNTER
Concepción with [facility] calls for verbal order      Physical Therapy / Occupational Therapy evaluations  SKILLED NURSING  2x1wk  1x3wk  1x everyother for 5wk    Best number to call back  [phone]

## 2022-10-31 NOTE — PATIENT INSTRUCTIONS
Plan:  1. Xarelto 20 mg daily -- blood thinner medication   2. Stop Aspirin when you start Xarelto   3. Monitor the blood pressure at home  4. If the blood pressure is constantly lower than 130 stop the Atenolol   5. Cardiology referral -- To schedule this test please call [facility] at: [phone]   6. Follow up in January -- ( SD, or 7:30)

## 2022-10-31 NOTE — PROGRESS NOTES
Patient's instructions / PLAN:                                                        Plan:  1. Xarelto 20 mg daily -- blood thinner medication   2. Stop Aspirin when you start Xarelto   3. Monitor the blood pressure at home  4. If the blood pressure is constantly lower than 130 stop the Atenolol   5. Cardiology referral -- To schedule this test please call [facility] at: [phone]   6. Follow up in January -- ( SD, or 7:30)         ASSESSMENT & PLAN:                                                      82-year-old woman with past medical history significant for moyamoya disease (stable for at least 5 years), hypertension, hyperlipidemia, chronic anemia with negative work-up was admitted at the beginning of October 2022 with multiple small CVAs mostly on the right side.  She has some right-sided weakness and she is scheduled with physical therapy.  She finished 21 days of Plavix and now she takes aspirin 81 mg.  She is scheduled to see neurology.  She has a heart monitor on her chest and I do not have the final report.  Cardiology nurse called on Friday that the patient has atrial fibrillation on the monitor.  The patient was scheduled later today with one of my partners, but I was able to fit her in my schedule this morning.  We discussed about the atrial fibrillation and the risk for strokes.  We discussed about anticoagulation and will restart Xarelto.  She is scheduled to see hematology, Dr. Johns on November 9.  She thought she might have a bone marrow biopsy on that day.  I will send a message to Dr. Johns about A. fib and Xarelto.  We would like her blood pressure to be in 130s 140s and since she does not have tachycardia, she will hold the atenolol as above    (Z09) Hospital discharge follow-up  (primary encounter diagnosis)  Comment:   Plan:     (I63.10) Cerebrovascular accident (CVA) due to embolism of precerebral artery (H)  Comment:   Plan: rivaroxaban ANTICOAGULANT (XARELTO) 20 MG TABS         tablet      "       (I63.9) Cerebrovascular accident (CVA), unspecified mechanism (H)  Comment:   Plan: atorvastatin (LIPITOR) 40 MG tablet            (I48.0) Paroxysmal atrial fibrillation (H)  Comment:   Plan: rivaroxaban ANTICOAGULANT (XARELTO) 20 MG TABS         tablet, Adult Cardiology [facility]         Referral            (I10) HTN, goal below 140/90  Comment: Controlled    Plan: atenolol (TENORMIN) 25 MG tablet        as above     (E11.69) Type 2 diabetes mellitus with other specified complication, without long-term current use of insulin (H)  Comment: Controlled  By diet   Plan:     (D64.9) Anemia, unspecified type  Comment:   Plan: f/u w onco             Chief Complaint:                                                      Hosp f/u    present       SUBJECTIVE:                                                    History of present illness                   Anemia  -- referral to hematology done -- she has appointment w dr Kary Johns on Nov 9     ROS:                                                      ROS: negative for fever, chills, cough, wheezes, chest pain, shortness of breath, vomiting, abdominal pain, leg swelling     OBJECTIVE:                                                    Physical Exam :    Blood pressure 126/68, pulse 54, temperature 96.8  F (36  C), temperature source Tympanic, resp. rate 18, height 1.727 m (5' 8\"), weight 76.9 kg (169 lb 8 oz), SpO2 100 %, not currently breastfeeding.   NAD, appears comfortable  Skin: no rashes   Neck: supple, no JVD, No thyroidmegaly. Lymph nodes nonpalpable cervical and supraclavicular.  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 F/u dr. Soto     OA (osteoarthritis)     s/p bilat total hips      Other chronic pain     Joint pain for many years     Unspecified cerebral artery occlusion with cerebral infarction     After Moyamoya surgery > 10 yrs ago.     Vitamin D deficiencies       PSHx: reviewed  Past Surgical History:   Procedure Laterality Date     ARTHROPLASTY KNEE Left 4/17/2017    Procedure: ARTHROPLASTY KNEE;  Left total knee arthroplasty;  Surgeon: Chidi Jenkins MD;  Location: [facility] OR     COLONOSCOPY  2008    normal     COLONOSCOPY  7/17/2014    Procedure: COLONOSCOPY;  Surgeon: Raul Nolan DO;  Location:  GI     CRANIOTOMY      MOJAMOJA  \"Pt had repair of blood flow.\"     IR CAROTID ANGIOGRAM  10/30/2006     IR CAROTID ANGIOGRAM  6/6/2010     IR CAROTID ANGIOGRAM  6/6/2010     IR CAROTID ANGIOGRAM  6/6/2010     IR CAROTID ANGIOGRAM  5/12/2011     IR CAROTID ANGIOGRAM  5/12/2011     IR CAROTID ANGIOGRAM  5/12/2011     IR CAROTID ANGIOGRAM  6/5/2012     IR CAROTID ANGIOGRAM  6/5/2012     IR CAROTID ANGIOGRAM  6/5/2012     IR CAROTID CEREBRAL ANGIOGRAM BILATERAL  10/7/2022     IR MISCELLANEOUS PROCEDURE  6/6/2010     IR MISCELLANEOUS PROCEDURE  6/6/2010     IR MISCELLANEOUS PROCEDURE  5/12/2011     IR MISCELLANEOUS PROCEDURE  6/5/2012     RECONSTRUCT FOREFOOT WITH METATARSOPHALANGEAL (MTP) FUSION Left 8/21/2014    Procedure: RECONSTRUCT FOREFOOT WITH METATARSOPHALANGEAL (MTP) FUSION;  Surgeon: Tres Merlos DPM;  Location: [facility] OR     ZZC NONSPECIFIC PROCEDURE  10/30/06    neurosurgery Dr Soto "     [facility] NONSPECIFIC PROCEDURE      bilateral total hips approx 2002     [facility] NONSPECIFIC PROCEDURE  3/07    neurosurgery         Meds: reviewed  Current Outpatient Medications   Medication Sig Dispense Refill     aspirin 81 MG tablet Take 1 tablet (81 mg) by mouth daily 30 tablet      atenolol (TENORMIN) 25 MG tablet Take 1 tablet (25 mg) by mouth daily 30 tablet 0     atorvastatin (LIPITOR) 40 MG tablet 1 tablet (40 mg) by Oral or Feeding Tube route every evening 30 tablet 0     clopidogrel (PLAVIX) 75 MG tablet Take 1 tablet (75 mg) by mouth daily for 14 days 14 tablet 1     cyanocobalamin (VITAMIN B-12) 500 MCG tablet Take 500 mcg by mouth daily       magnesium 500 MG TABS Take 500 mg by mouth daily       Multiple Vitamins-Minerals (MULTIVITAMIN & MINERAL PO) Take 1 tablet by mouth daily.       senna-docusate (SENOKOT-S/PERICOLACE) 8.6-50 MG tablet Take 1 tablet by mouth 2 times daily         Soc Hx: reviewed  Fam Hx: reviewed      Chart documentation was completed, in part, with [employer] voice-recognition software. Even though reviewed, some grammatical, spelling, and word errors may remain.      Chani Farrell MD  Internal Medicine       Hospital Follow-up Visit:    Hospital/Nursing Home/IP Rehab Facility: [facility]  Date of Admission: 10/05/2022  Date of Discharge: 10/13/2022  Reason(s) for Admission: Hospital Problem List       Stroke (H)      Was your hospitalization related to COVID-19? No   Problems taking medications regularly:  None  Medication changes since discharge: as above   Problems adhering to non-medication therapy:  None    Summary of hospitalization:  [facility] discharge summary reviewed  Diagnostic Tests/Treatments reviewed.  Follow up needed: as above   Other Healthcare Providers Involved in Patient s Care:         neuro  Update since discharge: stable.         Plan of care communicated with patient and family           51 minutes spent on the date of  the encounter doing   chart review,   review of outside records,   review of test results,   interpretation of tests,   patient visit,   documentation,   discussion with family

## 2022-10-31 NOTE — PROGRESS NOTES
Clinic Care Coordination Contact  [facility]/Voicemail     Clinical Data: Care Coordinator Outreach  Outreach attempted x 1.  Left message on patient's voicemail with call back information and requested return call.  Plan: Care Coordinator will try to reach patient again in 1-2 business days.    ALEXANDER Ramos/[facility]  Social Work Care Coordinator  [facility], [place], and [place]  Phone: [phone]

## 2022-11-01 SDOH — ECONOMIC STABILITY: FOOD INSECURITY: WITHIN THE PAST 12 MONTHS, YOU WORRIED THAT YOUR FOOD WOULD RUN OUT BEFORE YOU GOT MONEY TO BUY MORE.: NEVER TRUE

## 2022-11-01 SDOH — ECONOMIC STABILITY: TRANSPORTATION INSECURITY
IN THE PAST 12 MONTHS, HAS LACK OF TRANSPORTATION KEPT YOU FROM MEETINGS, WORK, OR FROM GETTING THINGS NEEDED FOR DAILY LIVING?: NO

## 2022-11-01 SDOH — ECONOMIC STABILITY: TRANSPORTATION INSECURITY
IN THE PAST 12 MONTHS, HAS THE LACK OF TRANSPORTATION KEPT YOU FROM MEDICAL APPOINTMENTS OR FROM GETTING MEDICATIONS?: NO

## 2022-11-01 SDOH — ECONOMIC STABILITY: FOOD INSECURITY: WITHIN THE PAST 12 MONTHS, THE FOOD YOU BOUGHT JUST DIDN'T LAST AND YOU DIDN'T HAVE MONEY TO GET MORE.: NEVER TRUE

## 2022-11-01 SDOH — ECONOMIC STABILITY: INCOME INSECURITY: IN THE LAST 12 MONTHS, WAS THERE A TIME WHEN YOU WERE NOT ABLE TO PAY THE MORTGAGE OR RENT ON TIME?: NO

## 2022-11-01 ASSESSMENT — SOCIAL DETERMINANTS OF HEALTH (SDOH): HOW HARD IS IT FOR YOU TO PAY FOR THE VERY BASICS LIKE FOOD, HOUSING, MEDICAL CARE, AND HEATING?: NOT HARD AT ALL

## 2022-11-01 ASSESSMENT — ACTIVITIES OF DAILY LIVING (ADL): DEPENDENT_IADLS:: TRANSPORTATION;LAUNDRY

## 2022-11-01 NOTE — PROGRESS NOTES
Clinic Care Coordination Contact    Clinic Care Coordination Contact  OUTREACH    Referral Information:  Referral Source: SNF/TCU  Primary Diagnosis: Psychosocial    Chief Complaint   Patient presents with     Clinic Care Coordination - Initial     Universal Utilization: Reviewed 11/1/2022  Clinic Utilization  Difficulty keeping appointments:: No  Compliance Concerns: No  No-Show Concerns: No  No PCP office visit in Past Year: No  Utilization    Hospital Admissions  1             ED Visits  2             No Show Count (past year)  2                Current as of: 11/1/2022  8:40 AM            Clinical Concerns:  Current Medical Concerns:    Patient Active Problem List   Diagnosis     Moyamoya disease     Hyperlipidemia LDL goal <130     Vitamin D deficiency     ACP (advance care planning) - scanned in the chart 2018     Osteopenia     Family hx of colon cancer     HTN, goal below 140/90     CKD 3     Osteoarthritis of left knee     Arthropathy     Depressive disorder     Cerebrovascular accident (CVA), unspecified mechanism (H)     Stroke (H)     Anemia, unspecified     Cerebral infarction, unspecified (H)     Slow transit constipation     Unspecified fall, subsequent encounter   Current Behavioral Concerns: see above     Education Provided to patient: DESTINY CC role and reason for call.    Pain  Pain (GOAL):: No  Health Maintenance Reviewed: Due/Overdue   Health Maintenance Due   Topic Date Due     DIABETIC FOOT EXAM  Never done     EYE EXAM  08/30/2018     COVID-19 Vaccine (5 - Booster for Pfizer series) 06/10/2022   Clinical Pathway: None    Medication Management:  Medication review status: Medications reviewed and no changes reported per patient.        Current Outpatient Medications   Medication     atenolol (TENORMIN) 25 MG tablet     atorvastatin (LIPITOR) 40 MG tablet     cyanocobalamin (VITAMIN B-12) 500 MCG tablet     magnesium 500 MG TABS     Multiple Vitamins-Minerals (MULTIVITAMIN & MINERAL PO)      rivaroxaban ANTICOAGULANT (XARELTO) 20 MG TABS tablet     senna-docusate (SENOKOT-S/PERICOLACE) 8.6-50 MG tablet     No current facility-administered medications for this visit.     Functional Status:  Dependent ADLs:: Ambulation-walker, Bathing  Dependent IADLs:: Transportation, Laundry  Bed or wheelchair confined:: No  Mobility Status: Independent w/Device    Living Situation:  Current living arrangement:: I live in a private home with family  Type of residence:: Private home - no stairs    Lifestyle & Psychosocial Needs:  Social Determinants of Health     Tobacco Use: Low Risk      Smoking Tobacco Use: Never     Smokeless Tobacco Use: Never     Passive Exposure: Never   Alcohol Use: Not on file   Financial Resource Strain: Low Risk      Difficulty of Paying Living Expenses: Not hard at all   Food Insecurity: No Food Insecurity     Worried About Running Out of Food in the Last Year: Never true     Ran Out of Food in the Last Year: Never true   Transportation Needs: No Transportation Needs     Lack of Transportation (Medical): No     Lack of Transportation (Non-Medical): No   Physical Activity: Not on file   Stress: Not on file   Social Connections: Not on file   Intimate Partner Violence: Not At Risk     Fear of Current or Ex-Partner: No     Emotionally Abused: No     Physically Abused: No     Sexually Abused: No   Depression: Not at risk     PHQ-2 Score: 0   Housing Stability: Unknown     Unable to Pay for Housing in the Last Year: No     Number of Places Lived in the Last Year: Not on file     Unstable Housing in the Last Year: No     Diet:: Regular  Inadequate nutrition (GOAL):: No  Tube Feeding: No  Inadequate activity/exercise (GOAL):: No  Significant changes in sleep pattern (GOAL): No  Transportation means:: Family, Regular car     Moravian or spiritual beliefs that impact treatment:: No  Mental health DX:: Yes  Mental health DX how managed:: None  Mental health management concern (GOAL):: No  Chemical  Dependency Status: No Current Concerns  Informal Support system:: Family     CC spoke with patient to introduce self and offer Care Coordination services. Per report, patient hs been doing well at home. She is using a walker and shower seat with some assistance from her spouse. Patient shared her adult children live across the country but so far her spouse has been helping her as needed. Patient shared homecare started last week with PT,OT, RN, and HHA. Patient denied any housing, food, or transportation needs. 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 Patient expressed questions regarding medication affordability, however, stated they are meeting with MTM next week to review potential options. Patient shared she has not been able to log into [employer] and expressed frustration with password rest not working. Offered to call support together, patient in agreement with plan. Support was successful in helping patient with resetting password/account and patient was able to log in. Patient denied any further/ongoing CC needs. Encouraged patient to follow-up with any needs that may arise, patient expressed understanding. Patient politely declined Care Coordination services at this time and expressed preference is to call [facility] if/when ready to enroll.      Resources and Interventions:  Current Resources:   Skilled Home Care Services: Skilled Nursing, Home Health Aid, Physical Therapy, Occupational Therapy  Community Resources: None  Equipment Currently Used at Home: walker, standard, shower chair (owns but walk not using PTA)  Employment Status: retired  Advance Care Plan/Directive  Advanced Care Plans/Directives on file:: Yes  Type Advanced Care Plans/Directives: Advanced Directive - On File  Referrals Placed: None    Care Plan: None      Future Appointments              In 1 week Stephen Echavarria MD [facility] CLIN    In 1 week Lenka Herrera,  RPH [place], [place]    In 1 week Kary Johns DO [facility] RID    In 2 months Chani Peoples MD [place], [place]        Plan: Patient was provided with SW CC's contact information and encouraged to call with questions, concerns, support needs. SW CC will remain available as needed. No further care coordination outreaches will be made at this time.     ALEXANDER Ramos/[facility] CenterDESTINY  Social Work Care Coordinator  [facility], [place], and [place]  Phone: [phone]

## 2022-11-04 ENCOUNTER — TELEPHONE (OUTPATIENT)
Dept: INTERNAL MEDICINE | Facility: CLINIC | Age: 82
End: 2022-11-04

## 2022-11-04 NOTE — TELEPHONE ENCOUNTER
[facility] calls for orders.     1 x a week for 2 weeks,   Then 1 x every other week for 4 weeks.     Per previous enc, Dr Farrell ok orders.     Verbal order given to approve visits until certification received for MD signature.

## 2022-11-07 ENCOUNTER — TELEPHONE (OUTPATIENT)
Dept: INTERNAL MEDICINE | Facility: CLINIC | Age: 82
End: 2022-11-07

## 2022-11-07 DIAGNOSIS — I10 HTN, GOAL BELOW 140/90: Chronic | ICD-10-CM

## 2022-11-07 DIAGNOSIS — E78.5 HYPERLIPIDEMIA LDL GOAL <130: Chronic | ICD-10-CM

## 2022-11-07 NOTE — TELEPHONE ENCOUNTER
11/4/22 SKILLED NURSING / OCCUPATIONAL THERAPY order received via fax. Form in your mailbox to be signed.

## 2022-11-07 NOTE — PROGRESS NOTES
Medication Therapy Management (MTM) Encounter    ASSESSMENT:                            Medication Adherence/Access: No issues identified    Cerebrovascular accident: Patient instructed to stop Xarelto due to history of moyamoya and risk of hemhorrage. However, patient may benefit from restarting low dose Aspirin. Contacted cardiology to discuss if patient should restart or hold aspirin at this time.     Hypertension: Patient is meeting blood pressure goal of < 140/90mmHg. Per Dr. Farrell's note, advised patient to discontinue Atenolol if systolic consistently below 130mmHg. Discussed use of blood pressure monitor at home, patient will monitor.    Hyperlipidemia: Patient is on high intensity statin which is indicated based on 2019 ACC/AHA guidelines for lipid management.      Constipation: Stable.     Supplements: Patient endorsed Magnesium improved leg cramps/aches. Discussed patient may consider restarting Magnesium supplement if found benefit.     Osteopenia: Patient is near meeting RDI of calcium 1200mg/day. Patient is not meeting RDI of Vitamin D 1000 IU/day. Patient would benefit from: restarting Vitamin D supplement and increased calcium intake in diet. Will discuss with patient option to restart Vitamin D for bone health. Discussed increasing calcium intake in diet with green leafy veggies, milk, and yogurt to meet calcium goals. May consider addition of calcium supplement at future visit. Recommend patient follow-up with Dr. Farrell to discuss bone health at future visit.     Vaccines: Due for bivalent Covid-19 booster per ACIP Guidelines.     PLAN:                            1. I will reach out to your cardiologist to discuss if you should stay off or restart low dose Aspirin. Continue to follow closely with cardiology.   2. May start the over-the-counter Magnesium 500mg tablet if you find it helpful for leg cramps/aches.   3. It is okay for you to remain off Vitamin B12 if you don't notice any benefits.  Your last vitamin B12 levels were above the normal range.   4. Bone Health: Can take the daily Multivitamin for general health and added calcium    Consider discussing/reviewing bone scan with Dr. Farrell at future visit     Try to get 1200 mg of Calcium per day in diet. You are getting 200mg in Multivitamin, plus Leafy greens, milk, and yogurt are good options.     May restart Vitamin D3 1000 units daily. Your last Vitamin D levels were at the lower end of range. Vitamin D is also important for bone health.   5. Vaccines: Due for bivalent Covid-19 booster     Follow-up: Return in about 6 months (around 5/8/2023).     Addendum (11/10/22): Chuyita Luna, PharmD, received written/verbal confirmation from Dr. Stephen Echavarria MD (Cardiology) to restart patient on Aspirin 81 mg daily. Spoke with Candida and Chris on phone to discuss restarting aspirin, agreeable to plan. Patient also noted Multivitamin contains Vitamin D3 25 mcg per tablet. Will continue with Multivitamin and remain off additional Vitamin D supplement at this time.     SUBJECTIVE/OBJECTIVE:                          Candida Rose is a 82 year old female coming in for a transitions of care visit. She was discharged from [facility] on 10/28/22 following admit to [facility] for an acute stroke/CVA. Patient was accompanied by spouse, Olu.      Reason for visit: Just left cardiologist, was stopped on Xarelto. Wondering about restarting vitamins .    Allergies/ADRs: Reviewed in chart  Past Medical History: Reviewed in chart. History of moyamoya disease, HTN, HLD, stroke, diabetes, osteoarthritis, alcohol use disorder and chronic pain hospitalized with an acute stroke right frontal lobe  Tobacco: She reports that she has never smoked. She has never been exposed to tobacco smoke. She has never used smokeless tobacco.  Alcohol: not currently using since stroke, 2-3 glasses of wine per night  Caffeine: 2 cups coffee/day, tea occasionally in  evening (lemon)     Medication Adherence/Access:    Patient takes medications directly from bottles. Previously used pillbox when taking more medications.   Medication barriers: Xarelto was expensive, $500/month.     Cerebrovascular accident: Currently taking no anticoagulation medications. Patient presents following visit with cardiologist. They recommended she stop Xarelto moving forward. Was not restarted on Aspirin 81 mg daily, wondering if she should be taking. Notes experienced some fatigue with Xarelto, thinks it could also be related to recent stroke.     Finished 21 day course of dual-antiplatelet therapy, Plavix 75mg daily and Aspirin 81mg daily. Off both now.      Reports noticeable shaking in left leg, will give out on her. Would like to start exercising in near future (previously walking regularly). Has PT & OT coming to house.     Specialist: Dr. Stephen Echavarria, Cardiology.  Last visit on 11/8/22, the following was recommended:   No clear evidence of atrial fibrillation.  Given this and the fact that the patient's with moyamoya disease have increased risk of intracerebral hemorrhage will discontinue anticoagulation. Generally, long-term anticoagulation in these patients is not advised.  However, I would recommend further monitoring with another 30-day Zio patch event monitor.  If there is clear evidence of atrial fibrillation seen on the monitor, then would touch base with patient's Moyamoya to further discuss risks of bleed from anticoagulation versus stroke prevention    Hypertension: Current medications include Atenolol 25mg daily. Patient occasionally self-monitors blood pressure. Has had orthostatic hypotension in past, aware of symptoms to watch for.     BP Readings from Last 3 Encounters:   11/08/22 127/72   10/31/22 126/68   10/26/22 (!) 142/54     Progress Note (10/31/22): Dr. Farrell  If the blood pressure is constantly lower than 130 stop the Atenolol     Previous Medications:   Amlodipine 5mg  "daily, discontinued per TCU discharge summary  Losartan 100 mg daily, discontinued per TCU discharge summary  Reports previously taking \"little green pill\" that made her lightheaded, may have been propanolol. Given outside medication report, may have been losartan.     Dyslipidemia: Current therapy includes Atorvastatin 40mg daily.  Patient reports no significant myalgias or other side effects.    Recent Labs   Lab Test 10/04/22  1950 12/31/21  0710 05/24/16  0947 06/16/15  1127   CHOL 166 150   < > 197   HDL 82 66   < > 62   LDL 71 68   < > 106   TRIG 65 82   < > 143   CHOLHDLRATIO  --   --   --  3.2    < > = values in this interval not displayed.     Previous Medications:   Simvastatin - switched to atorvastatin/high intensity after stroke    Constipation: No current medications. Stopped on Senna-S after regular bowel movements resumed post-discharge. Reports daily bowel movements, no concerns with hard or soft stool.     Supplements: Currently taking no supplements. Stopped on Vitamin B-12 500 mcg daily, Vitamin D 1000 units daily, MV daily, and Magnesium 500mg daily. Feels that magnesium was helpful for reducing leg aches/cramps. No reported issues at this time.     Date Vitamin B12   (200-1100 pg/mL)    7/13/22 1759 (H)   2/17/21 1436 (H)   5/30/07 299     Date Vitamin D   (20-75 ug/L)   7/13/22 30   1/4/21 31   2/11/19 29   1/26/19 22     Hemoglobin   Date Value Ref Range Status   10/11/2022 8.5 (L) 11.7 - 15.7 g/dL Final   05/17/2021 11.3 (L) 11.7 - 15.7 g/dL Final     Osteopenia: Current therapy includes: none. Wondering if she should restart her MV and Vitamin D. Prior to hospitalization taking Vitamin D 1000 units daily.    DEXA History (8/18/22):   FINDINGS:               Lumbar Spine (L1-L4)      T-score:  1.5                            Forearm (radius 33%)      T-score:  -2.6  IMPRESSION  Osteoporosis., Degenerative changes of the lumbar spine which may falsely elevate results.  - There has been no " significant change in bone density of the lumbar spine. There has been significant decrease in bone density of the forearm.   - Recommendations include ensuring adequate Calcium and Vitamin D.    Patient is getting the following sources of dietary calcium: milk, green leafy vegetables and MV (200 mg)   Risk factors: post-menopausal  Last vitamin D level:  Lab Results   Component Value Date    VITDT 30 07/13/2022    VITDT 31 01/04/2021    VITDT 29 02/11/2019    VITDT 22 01/26/2018    VITDT 21 04/11/2017     Vaccines:   Immunization History   Administered Date(s) Administered     COVID-19,PF,Pfizer (12+ Yrs) 02/24/2021, 03/17/2021, 10/11/2021, 04/15/2022     COVID-19,PF,Pfizer 12+ Yrs (2022 and After) 04/15/2022     Influenza (H1N1) 12/17/2009     Influenza (High Dose) 3 valent vaccine 09/09/2013, 09/19/2015, 09/20/2016, 09/15/2017, 08/31/2018, 08/15/2019     Influenza (IIV3) PF 11/10/2008, 12/17/2009, 09/27/2010, 09/06/2012, 09/09/2013, 11/01/2014     Influenza Vaccine, 6+MO IM (QUADRIVALENT W/PRESERVATIVES) 08/29/2022     Influenza, Quad, High Dose, Pf, 65yr+ (Fluzone HD) 09/14/2020, 09/16/2021, 10/11/2021, 08/29/2022     Pneumo Conj 13-V (2010&after) 04/11/2017     Pneumococcal 23 valent 01/01/2006, 10/07/2011     TD (ADULT, 7+) 10/06/2008     TDAP Vaccine (Adacel) 01/13/2017     Zoster vaccine recombinant adjuvanted (SHINGRIX) 06/03/2019, 08/10/2019     Zoster vaccine, live 08/18/2011     Creatinine   Date Value Ref Range Status   10/11/2022 0.74 0.52 - 1.04 mg/dL Final   05/17/2021 1.23 (H) 0.52 - 1.04 mg/dL Final     GFR Estimate   Date Value Ref Range Status   10/11/2022 81 >60 mL/min/1.73m2 Final     Comment:     Effective December 21, 2021 eGFRcr in adults is calculated using the 2021 CKD-EPI creatinine equation which includes age and gender (Trent et al., NEJM, DOI: 10.1056/TXLCor7081279)   05/17/2021 42 (L) >60 mL/min/[1.73_m2] Final     Comment:     Non  GFR Calc  Starting 12/18/2018, serum  "creatinine based estimated GFR (eGFR) will be   calculated using the Chronic Kidney Disease Epidemiology Collaboration   (CKD-EPI) equation.       Estimated Creatinine Clearance: 66.1 mL/min (based on SCr of 0.74 mg/dL).  Today's Vitals:  BP Readings from Last 1 Encounters:   11/08/22 127/72     Pulse Readings from Last 1 Encounters:   11/08/22 56     Wt Readings from Last 1 Encounters:   11/08/22 168 lb (76.2 kg)     Ht Readings from Last 1 Encounters:   11/08/22 5' 8\" (1.727 m)     Estimated body mass index is 25.54 kg/m  as calculated from the following:    Height as of an earlier encounter on 11/8/22: 5' 8\" (1.727 m).    Weight as of an earlier encounter on 11/8/22: 168 lb (76.2 kg).    Temp Readings from Last 1 Encounters:   10/31/22 96.8  F (36  C) (Tympanic)   ----------------  Post Discharge Medication Reconciliation Status: discharge medications reconciled and changed, per note/orders.    I spent 50 minutes with this patient today. All changes were made via verbal approval with Chani Peoples MD. A copy of the visit note was provided to the patient's provider(s).    The patient was given a summary of these recommendations.     Candida Rose was seen independently by Dr. Luna. I have reviewed and agree with the resident note and plan of care.  Lenka Herrera, PharmD    Chuyita Luna, PharmD  Medication Therapy Management Resident  Pager: [phone]     Medication Therapy Recommendations  Vaccine counseling    Rationale: Preventive therapy - Needs additional medication therapy - Indication   Recommendation: Order Vaccine - Due for bivalent Covid-19 vaccine.   Status: Patient Agreed - Adherence/Education              "

## 2022-11-08 ENCOUNTER — OFFICE VISIT (OUTPATIENT)
Dept: CARDIOLOGY | Facility: CLINIC | Age: 82
End: 2022-11-08
Attending: INTERNAL MEDICINE
Payer: MEDICARE

## 2022-11-08 ENCOUNTER — OFFICE VISIT (OUTPATIENT)
Dept: PHARMACY | Facility: CLINIC | Age: 82
End: 2022-11-08
Attending: INTERNAL MEDICINE
Payer: COMMERCIAL

## 2022-11-08 VITALS
WEIGHT: 168 LBS | SYSTOLIC BLOOD PRESSURE: 127 MMHG | DIASTOLIC BLOOD PRESSURE: 72 MMHG | HEIGHT: 68 IN | BODY MASS INDEX: 25.46 KG/M2 | HEART RATE: 56 BPM

## 2022-11-08 DIAGNOSIS — Z78.9 TAKES DIETARY SUPPLEMENTS: ICD-10-CM

## 2022-11-08 DIAGNOSIS — Z71.85 VACCINE COUNSELING: ICD-10-CM

## 2022-11-08 DIAGNOSIS — K59.01 SLOW TRANSIT CONSTIPATION: ICD-10-CM

## 2022-11-08 DIAGNOSIS — M85.80 OSTEOPENIA, UNSPECIFIED LOCATION: Chronic | ICD-10-CM

## 2022-11-08 DIAGNOSIS — E78.5 HYPERLIPIDEMIA LDL GOAL <130: Chronic | ICD-10-CM

## 2022-11-08 DIAGNOSIS — Z86.73 HISTORY OF CVA (CEREBROVASCULAR ACCIDENT): Primary | ICD-10-CM

## 2022-11-08 DIAGNOSIS — I63.9 CEREBROVASCULAR ACCIDENT (CVA), UNSPECIFIED MECHANISM (H): Primary | ICD-10-CM

## 2022-11-08 DIAGNOSIS — I10 HTN, GOAL BELOW 140/90: Chronic | ICD-10-CM

## 2022-11-08 PROCEDURE — 99204 OFFICE O/P NEW MOD 45 MIN: CPT | Performed by: INTERNAL MEDICINE

## 2022-11-08 PROCEDURE — 99607 MTMS BY PHARM ADDL 15 MIN: CPT | Performed by: PHARMACIST

## 2022-11-08 PROCEDURE — 99605 MTMS BY PHARM NP 15 MIN: CPT | Performed by: PHARMACIST

## 2022-11-08 NOTE — LETTER
11/8/2022    Chani Peoples MD  [address] [facility] [address]    RE: Candida Rose       Dear Colleague,     I had the pleasure of seeing Candida Rose in the [facility].  HPI and Plan:   The patient is a very pleasant 82-year-old female with history of Moyamoya disease, hypertension, hyperlipidemia, diabetes on multiple prior strokes was referred for further evaluation regarding possible atrial fibrillation.    The patient was hospitalized last month with acute left frontal lobe ischemic stroke.  She had echocardiogram which showed normal LV function and wall motion.  Patient was discharged on aspirin and Plavix and event monitor although her stroke was likely related to her underlying moyamoya disease.  She was subsequent placed on Xarelto based on concerns for possible atrial fibrillation on the event monitor.  She denies any palpitations or heart racing.    I reviewed the patient's event monitor through October 31.  She was supposed to wear it through November 11 but had technical difficulties.  I do not see evidence of atrial fibrillation on the event monitor.  The rhythm was predominantly sinus with occasional runs of PACs and PVCs.    IMPRESSION/PLAN:  I reviewed the patient's event monitor for the duration mentioned above.  No clear evidence of atrial fibrillation.  Given this and the fact that the patient's with moyamoya disease have increased risk of intracerebral hemorrhage will discontinue anticoagulation.  Generally, long-term anticoagulation in these patients is not advised.  However, I would recommend further monitoring with another 30-day Zio patch event monitor.  If there is clear evidence of atrial fibrillation seen on the monitor, then would touch base with patient's Moyamoya to further discuss risks of bleed from anticoagulation versus stroke prevention.    Will contact patient once the results of the monitor become available.  I appreciate the  opportunity to participate in her care.    Stephen Echavarria MD    Today's clinic visit entailed:  45 minutes spent on the date of the encounter doing chart review, history and exam, documentation and further activities per the note  Provider  Link to MDM Help Grid       Orders Placed This Encounter   Procedures     Follow-Up with Cardiology     Leadless EKG Monitor 8 to 14 Days       No orders of the defined types were placed in this encounter.      Medications Discontinued During This Encounter   Medication Reason     rivaroxaban ANTICOAGULANT (XARELTO) 20 MG TABS tablet          Encounter Diagnosis   Name Primary?     History of CVA (cerebrovascular accident) Yes       CURRENT MEDICATIONS:  Current Outpatient Medications   Medication Sig Dispense Refill     atenolol (TENORMIN) 25 MG tablet Take 1 tablet (25 mg) by mouth daily 90 tablet 1     atorvastatin (LIPITOR) 40 MG tablet Take 1 tablet (40 mg) by mouth every evening 90 tablet 1     cyanocobalamin (VITAMIN B-12) 500 MCG tablet Take 500 mcg by mouth daily (Patient not taking: Reported on 11/8/2022)       magnesium 500 MG TABS Take 500 mg by mouth daily (Patient not taking: Reported on 11/8/2022)       Multiple Vitamins-Minerals (MULTIVITAMIN & MINERAL PO) Take 1 tablet by mouth daily. 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 F/u dr. Soto     OA (osteoarthritis)     s/p bilat total hips      Other chronic pain     Joint pain for many years     Unspecified cerebral artery occlusion with cerebral infarction     After Moyamoya surgery > 10 yrs ago.     Vitamin D deficiencies        PAST SURGICAL HISTORY:  Past Surgical History:   Procedure Laterality Date     ARTHROPLASTY KNEE Left 4/17/2017    Procedure: ARTHROPLASTY KNEE;  Left total knee arthroplasty;  Surgeon: Chidi Jenkins MD;  Location:  OR     COLONOSCOPY  2008    normal     COLONOSCOPY  7/17/2014    Procedure: COLONOSCOPY;  Surgeon: Raul Nolan DO;  Location:  GI     CRANIOTOMY      MOJAMOJA  \"Pt had repair of blood flow.\"     IR CAROTID ANGIOGRAM  10/30/2006     IR CAROTID ANGIOGRAM  6/6/2010     IR CAROTID ANGIOGRAM  6/6/2010     IR CAROTID ANGIOGRAM  6/6/2010     IR CAROTID ANGIOGRAM  5/12/2011     IR CAROTID ANGIOGRAM  5/12/2011     IR CAROTID ANGIOGRAM  5/12/2011     IR CAROTID ANGIOGRAM  6/5/2012     IR CAROTID ANGIOGRAM  6/5/2012     IR CAROTID ANGIOGRAM  6/5/2012     IR CAROTID CEREBRAL ANGIOGRAM BILATERAL  10/7/2022     IR MISCELLANEOUS PROCEDURE  6/6/2010     IR MISCELLANEOUS PROCEDURE  6/6/2010     IR MISCELLANEOUS PROCEDURE  5/12/2011     IR MISCELLANEOUS PROCEDURE  6/5/2012     RECONSTRUCT FOREFOOT WITH METATARSOPHALANGEAL (MTP) FUSION Left 8/21/2014    Procedure: RECONSTRUCT FOREFOOT WITH METATARSOPHALANGEAL (MTP) FUSION;  Surgeon: Tres Merlos DPM;  Location:  OR     [facility] NONSPECIFIC PROCEDURE  10/30/06    neurosurgery Dr Soto     [facility] NONSPECIFIC PROCEDURE      bilateral total hips approx 2002     [facility] NONSPECIFIC PROCEDURE  3/07    neurosurgery        FAMILY HISTORY:  Family History   Problem Relation Age of Onset     Obesity Sister         gastric by-pass age age 60, heart murmur.     Cancer - colorectal Sister 69     Heart " "Disease Father         mi,  age 48     Family History Negative Mother         killed in MVA age 54     Cancer Maternal Aunt         lung cancer ,non-smoker     Lung Cancer Maternal Aunt      Breast Cancer Daughter 48     Ovarian Cancer No family hx of        SOCIAL HISTORY:  Social History     Socioeconomic History     Marital status:      Spouse name: Olu      Number of children: 2     Years of education: None     Highest education level: None   Occupational History     Employer: RETIRED   Tobacco Use     Smoking status: Never     Passive exposure: Never     Smokeless tobacco: Never   Vaping Use     Vaping Use: Never used   Substance and Sexual Activity     Alcohol use: Not Currently     Drug use: No     Sexual activity: Never     Partners: Male     Social Determinants of Health     Financial Resource Strain: Low Risk      Difficulty of Paying Living Expenses: Not hard at all   Food Insecurity: No Food Insecurity     Worried About Running Out of Food in the Last Year: Never true     Ran Out of Food in the Last Year: Never true   Transportation Needs: No Transportation Needs     Lack of Transportation (Medical): No     Lack of Transportation (Non-Medical): No   Intimate Partner Violence: Not At Risk     Fear of Current or Ex-Partner: No     Emotionally Abused: No     Physically Abused: No     Sexually Abused: No   Housing Stability: Unknown     Unable to Pay for Housing in the Last Year: No     Unstable Housing in the Last Year: No       Review of Systems:  Skin:          Eyes:         ENT:         Respiratory:  Negative       Cardiovascular:    edema;Positive for    Gastroenterology:        Genitourinary:         Musculoskeletal:         Neurologic:         Psychiatric:         Heme/Lymph/Imm:         Endocrine:           Physical Exam:  Vitals: /72   Pulse 56   Ht 1.727 m (5' 8\")   Wt 76.2 kg (168 lb)   LMP  (LMP Unknown)   BMI 25.54 kg/m      Constitutional:  cooperative;in no acute " distress        Skin:  warm and dry to the touch          Head:  normocephalic        Eyes:  conjunctivae and lids unremarkable        Lymph:      ENT:  no pallor or cyanosis        Neck:  JVP normal;no carotid bruit        Respiratory:  clear to auscultation         Cardiac: regular rhythm;no murmurs, gallops or rubs detected                pulses full and equal                                        GI:  abdomen soft;non-tender        Extremities and Muscular Skeletal:  no edema              Neurological:  no gross motor deficits        Psych:  Alert and Oriented x 3        CC  Chani Peoples MD  [address] [place] [address]    Thank you for allowing me to participate in the care of your patient.      Sincerely,     Stephen Echavarria MD, MD     [facility]

## 2022-11-08 NOTE — PROGRESS NOTES
HPI and Plan:   The patient is a very pleasant 82-year-old female with history of Moyamoya disease, hypertension, hyperlipidemia, diabetes on multiple prior strokes was referred for further evaluation regarding possible atrial fibrillation.    The patient was hospitalized last month with acute left frontal lobe ischemic stroke.  She had echocardiogram which showed normal LV function and wall motion.  Patient was discharged on aspirin and Plavix and event monitor although her stroke was likely related to her underlying moyamoya disease.  She was subsequent placed on Xarelto based on concerns for possible atrial fibrillation on the event monitor.  She denies any palpitations or heart racing.    I reviewed the patient's event monitor through October 31.  She was supposed to wear it through November 11 but had technical difficulties.  I do not see evidence of atrial fibrillation on the event monitor.  The rhythm was predominantly sinus with occasional runs of PACs and PVCs.    IMPRESSION/PLAN:  I reviewed the patient's event monitor for the duration mentioned above.  No clear evidence of atrial fibrillation.  Given this and the fact that the patient's with moyamoya disease have increased risk of intracerebral hemorrhage will discontinue anticoagulation.  Generally, long-term anticoagulation in these patients is not advised.  However, I would recommend further monitoring with another 30-day Zio patch event monitor.  If there is clear evidence of atrial fibrillation seen on the monitor, then would touch base with patient's Moyamoya to further discuss risks of bleed from anticoagulation versus stroke prevention.    Will contact patient once the results of the monitor become available.  I appreciate the opportunity to participate in her care.    Stephen Echavarria MD    Today's clinic visit entailed:  45 minutes spent on the date of the encounter doing chart review, history and exam, documentation and further activities per the  note  Provider  Link to [facility] Help Grid       Orders Placed This Encounter   Procedures     Follow-Up with Cardiology     Leadless EKG Monitor 8 to 14 Days       No orders of the defined types were placed in this encounter.      Medications Discontinued During This Encounter   Medication Reason     rivaroxaban ANTICOAGULANT (XARELTO) 20 MG TABS tablet          Encounter Diagnosis   Name Primary?     History of CVA (cerebrovascular accident) Yes       CURRENT MEDICATIONS:  Current Outpatient Medications   Medication Sig Dispense Refill     atenolol (TENORMIN) 25 MG tablet Take 1 tablet (25 mg) by mouth daily 90 tablet 1     atorvastatin (LIPITOR) 40 MG tablet Take 1 tablet (40 mg) by mouth every evening 90 tablet 1     cyanocobalamin (VITAMIN B-12) 500 MCG tablet Take 500 mcg by mouth daily (Patient not taking: Reported on 11/8/2022)       magnesium 500 MG TABS Take 500 mg by mouth daily (Patient not taking: Reported on 11/8/2022)       Multiple Vitamins-Minerals (MULTIVITAMIN & MINERAL PO) Take 1 tablet by mouth daily. 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 F/u dr. Soto     OA (osteoarthritis)     s/p bilat total hips      Other chronic pain     Joint pain for many  "years     Unspecified cerebral artery occlusion with cerebral infarction     After Moyamoya surgery > 10 yrs ago.     Vitamin D deficiencies        PAST SURGICAL HISTORY:  Past Surgical History:   Procedure Laterality Date     ARTHROPLASTY KNEE Left 2017    Procedure: ARTHROPLASTY KNEE;  Left total knee arthroplasty;  Surgeon: Chidi Jenkins MD;  Location:  OR     COLONOSCOPY  2008    normal     COLONOSCOPY  2014    Procedure: COLONOSCOPY;  Surgeon: Raul Nolan DO;  Location:  GI     CRANIOTOMY      MOJAMOJA  \"Pt had repair of blood flow.\"     IR CAROTID ANGIOGRAM  10/30/2006     IR CAROTID ANGIOGRAM  2010     IR CAROTID ANGIOGRAM  2010     IR CAROTID ANGIOGRAM  2010     IR CAROTID ANGIOGRAM  2011     IR CAROTID ANGIOGRAM  2011     IR CAROTID ANGIOGRAM  2011     IR CAROTID ANGIOGRAM  2012     IR CAROTID ANGIOGRAM  2012     IR CAROTID ANGIOGRAM  2012     IR CAROTID CEREBRAL ANGIOGRAM BILATERAL  10/7/2022     IR MISCELLANEOUS PROCEDURE  2010     IR MISCELLANEOUS PROCEDURE  2010     IR MISCELLANEOUS PROCEDURE  2011     IR MISCELLANEOUS PROCEDURE  2012     RECONSTRUCT FOREFOOT WITH METATARSOPHALANGEAL (MTP) FUSION Left 2014    Procedure: RECONSTRUCT FOREFOOT WITH METATARSOPHALANGEAL (MTP) FUSION;  Surgeon: Tres Merlos DPM;  Location:  OR     [facility] NONSPECIFIC PROCEDURE  10/30/06    neurosurgery Dr Soto     [facility] NONSPECIFIC PROCEDURE      bilateral total hips approx      [facility] NONSPECIFIC PROCEDURE  3/07    neurosurgery        FAMILY HISTORY:  Family History   Problem Relation Age of Onset     Obesity Sister         gastric by-pass age age 60, heart murmur.     Cancer - colorectal Sister 69     Heart Disease Father         mi,  age 48     Family History Negative Mother         killed in MVA age 54     Cancer Maternal Aunt         lung cancer ,non-smoker     Lung Cancer Maternal Aunt      Breast Cancer Daughter " "48     Ovarian Cancer No family hx of        SOCIAL HISTORY:  Social History     Socioeconomic History     Marital status:      Spouse name: Olu      Number of children: 2     Years of education: None     Highest education level: None   Occupational History     Employer: RETIRED   Tobacco Use     Smoking status: Never     Passive exposure: Never     Smokeless tobacco: Never   Vaping Use     Vaping Use: Never used   Substance and Sexual Activity     Alcohol use: Not Currently     Drug use: No     Sexual activity: Never     Partners: Male     Social Determinants of Health     Financial Resource Strain: Low Risk      Difficulty of Paying Living Expenses: Not hard at all   Food Insecurity: No Food Insecurity     Worried About Running Out of Food in the Last Year: Never true     Ran Out of Food in the Last Year: Never true   Transportation Needs: No Transportation Needs     Lack of Transportation (Medical): No     Lack of Transportation (Non-Medical): No   Intimate Partner Violence: Not At Risk     Fear of Current or Ex-Partner: No     Emotionally Abused: No     Physically Abused: No     Sexually Abused: No   Housing Stability: Unknown     Unable to Pay for Housing in the Last Year: No     Unstable Housing in the Last Year: No       Review of Systems:  Skin:          Eyes:         ENT:         Respiratory:  Negative       Cardiovascular:    edema;Positive for    Gastroenterology:        Genitourinary:         Musculoskeletal:         Neurologic:         Psychiatric:         Heme/Lymph/Imm:         Endocrine:           Physical Exam:  Vitals: /72   Pulse 56   Ht 1.727 m (5' 8\")   Wt 76.2 kg (168 lb)   LMP  (LMP Unknown)   BMI 25.54 kg/m      Constitutional:  cooperative;in no acute distress        Skin:  warm and dry to the touch          Head:  normocephalic        Eyes:  conjunctivae and lids unremarkable        Lymph:      ENT:  no pallor or cyanosis        Neck:  JVP normal;no carotid bruit    "     Respiratory:  clear to auscultation         Cardiac: regular rhythm;no murmurs, gallops or rubs detected                pulses full and equal                                        GI:  abdomen soft;non-tender        Extremities and Muscular Skeletal:  no edema              Neurological:  no gross motor deficits        Psych:  Alert and Oriented x 3        CC  Chani Peoples MD  [address] [place] [address]

## 2022-11-08 NOTE — PATIENT INSTRUCTIONS
"Recommendations from today's MTM visit:                                                    MTM (medication therapy management) is a service provided by a clinical pharmacist designed to help you get the most of out of your medicines.   Today we reviewed what your medicines are for, how to know if they are working, that your medicines are safe and how to make your medicine regimen as easy as possible.      I will reach out to your cardiologist to discuss if you should stay off or restart low dose Aspirin. Continue to follow closely with cardiology.   May start the over-the-counter Magnesium 500mg tablet if you find it helpful for leg cramps/aches.   It is okay for you to remain off Vitamin B12 if you don't notice any benefits. Your last vitamin B levels were above the normal range.   Bone Health: Can take the daily Multivitamin for general health and added calcium  Consider discussing/reviewing bone scan with Dr. Farrell at future visit   Try to get 1200 mg of Calcium per day in diet. You are getting 200mg in Multivitamin, plus Leafy greens, milk, and yogurt are good options.   May restart Vitamin D3 1000 units daily. Your last Vitamin D levels were at the lower end of range. Vitamin D is also important for bone health.   Vaccines: Due for bivalent Covid-19 booster     Follow-up: Return in about 6 months (around 5/8/2023).    It was great speaking with you today.  I value your experience and would be very thankful for your time in providing feedback in our clinic survey. In the next few days, you may receive an email or text message from [employer] with a link to a survey related to your  clinical pharmacist.\"     To schedule another MTM appointment, please call the clinic directly or you may call the MTM scheduling line at [phone] or toll-free at [phone].     My Clinical Pharmacist's contact information:                                                      Please feel free to contact me with any " questions or concerns you have.      Chuyita Luna, PharmD  Medication Therapy Management Resident  Pager: [phone]

## 2022-11-09 ENCOUNTER — TELEPHONE (OUTPATIENT)
Dept: INTERNAL MEDICINE | Facility: CLINIC | Age: 82
End: 2022-11-09

## 2022-11-09 ENCOUNTER — MEDICAL CORRESPONDENCE (OUTPATIENT)
Dept: HEALTH INFORMATION MANAGEMENT | Facility: CLINIC | Age: 82
End: 2022-11-09

## 2022-11-09 ENCOUNTER — ONCOLOGY VISIT (OUTPATIENT)
Dept: ONCOLOGY | Facility: CLINIC | Age: 82
End: 2022-11-09
Attending: INTERNAL MEDICINE
Payer: MEDICARE

## 2022-11-09 VITALS
SYSTOLIC BLOOD PRESSURE: 127 MMHG | HEIGHT: 68 IN | RESPIRATION RATE: 16 BRPM | OXYGEN SATURATION: 97 % | HEART RATE: 53 BPM | DIASTOLIC BLOOD PRESSURE: 53 MMHG | BODY MASS INDEX: 25.49 KG/M2 | TEMPERATURE: 97.5 F | WEIGHT: 168.2 LBS

## 2022-11-09 DIAGNOSIS — D64.9 ANEMIA, UNSPECIFIED TYPE: ICD-10-CM

## 2022-11-09 PROCEDURE — 99214 OFFICE O/P EST MOD 30 MIN: CPT | Performed by: INTERNAL MEDICINE

## 2022-11-09 PROCEDURE — G0463 HOSPITAL OUTPT CLINIC VISIT: HCPCS

## 2022-11-09 ASSESSMENT — PAIN SCALES - GENERAL: PAINLEVEL: NO PAIN (0)

## 2022-11-09 NOTE — TELEPHONE ENCOUNTER
[facility] calling for orders. PHYSICAL THERAPY  3 visits every other week starting the week of 11/21/22.  Today her pulse was 56 and her bp 152/54. Patient saw cardiologist yesterday and her xarelto was discontinued.    Mireille [phone]

## 2022-11-09 NOTE — TELEPHONE ENCOUNTER
Pending Prescriptions:                       Disp   Refills    simvastatin (ZOCOR) 20 MG tablet [Pharmacy*90 tab*0        Sig: TAKE 1 TABLET BY MOUTH EVERYDAY AT BEDTIME    Routing refill request to provider for review/approval because:  Drug not active on patient's medication list

## 2022-11-09 NOTE — LETTER
11/9/2022         RE: Candida Rose  [address] [facility] [id number]        Dear Colleague,    Thank you for referring your patient, Candida Rose, to the [facility]. Please see a copy of my visit note below.    [facility]    Hematology/Oncology Established Patient Note      Today's Date: 11/9/22    Reason for Consultation: Anemia, unspecified  Referring Provider: Chani Peoples MD      HISTORY OF PRESENT ILLNESS: Candida Rose is an 82 year old female who presents with anemia. 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    Patient has evidence of chronic NC/NC anemia since 2017 with ofelia of 7.8 in April 2017. On 7/13/22, WBC 7.2, Hb 10.3, MCV 92, . B12 1759, ferritin 110, folate 32.8, iron 50, TIBC 341, iron sat 15%.    She has had intentional 70 lb weight loss via a clinical trial and weight watchers over 2 years. No hematura, hematochezia/melena. No vaginal bleed.     Cancer screening:  Mammogram UTD and no history of abnormal/breast biopsy.  Colonoscopies UTD 2014 (normal; no further follow up).    No history of abnormal pap smears.     Sister was diagnosed with colon cancer at age 60 and passed away soon after diagnosis.     She is a retired .       INTERIM HISTORY:  CT brain had returned with multiple infarcts and she was hospitalized for this. She was discharged on aspirin and plavix. Her underlying stroke was thought to be due to moyamoya. She was placed on Xarelto for possible Afib, but this has since been discontinued. A zio patch is soon to be placed.     She denies gross evidence of bleed. She is currently off of antiplatelet/anticoag therapy.      REVIEW OF SYSTEMS:   A 14 point ROS was reviewed with pertinent positives and negatives in the HPI.        HOME MEDICATIONS:  Current Outpatient  "Medications   Medication Sig Dispense Refill     atenolol (TENORMIN) 25 MG tablet Take 1 tablet (25 mg) by mouth daily 90 tablet 1     atorvastatin (LIPITOR) 40 MG tablet Take 1 tablet (40 mg) by mouth every evening 90 tablet 1     magnesium 500 MG TABS Take 500 mg by mouth daily       Multiple Vitamins-Minerals (MULTIVITAMIN & MINERAL PO) Take 1 tablet by mouth daily           ALLERGIES:  Allergies   Allergen Reactions     Lisinopril      Persistent cough         PAST MEDICAL HISTORY:  Past Medical History:   Diagnosis Date     Anxiety state, unspecified     inactive     Cataract      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 F/u dr. Soto     OA (osteoarthritis)     s/p bilat total hips      Other chronic pain     Joint pain for many years     Unspecified cerebral artery occlusion with cerebral infarction     After Moyamoya surgery > 10 yrs ago.     Vitamin D deficiencies          PAST SURGICAL HISTORY:  Past Surgical History:   Procedure Laterality Date     ARTHROPLASTY KNEE Left 4/17/2017    Procedure: ARTHROPLASTY KNEE;  Left total knee arthroplasty;  Surgeon: Chidi Jenkins MD;  Location:  OR     COLONOSCOPY  2008    normal     COLONOSCOPY  7/17/2014    Procedure: COLONOSCOPY;  Surgeon: Raul Nolan DO;  Location:  GI     CRANIOTOMY      [facility]  \"Pt had repair of blood flow.\"     IR CAROTID ANGIOGRAM  10/30/2006     IR CAROTID ANGIOGRAM  6/6/2010     IR CAROTID ANGIOGRAM  6/6/2010     IR CAROTID ANGIOGRAM  6/6/2010     IR CAROTID ANGIOGRAM  5/12/2011     IR CAROTID ANGIOGRAM  5/12/2011     IR CAROTID ANGIOGRAM  5/12/2011     IR CAROTID " ANGIOGRAM  6/5/2012     IR CAROTID ANGIOGRAM  6/5/2012     IR CAROTID ANGIOGRAM  6/5/2012     IR CAROTID CEREBRAL ANGIOGRAM BILATERAL  10/7/2022     IR MISCELLANEOUS PROCEDURE  6/6/2010     IR MISCELLANEOUS PROCEDURE  6/6/2010     IR MISCELLANEOUS PROCEDURE  5/12/2011     IR MISCELLANEOUS PROCEDURE  6/5/2012     RECONSTRUCT FOREFOOT WITH METATARSOPHALANGEAL (MTP) FUSION Left 8/21/2014    Procedure: RECONSTRUCT FOREFOOT WITH METATARSOPHALANGEAL (MTP) FUSION;  Surgeon: Tres Merlos DPM;  Location: [facility] OR     ZZC NONSPECIFIC PROCEDURE  10/30/06    neurosurgery Dr Soto     [facility] NONSPECIFIC PROCEDURE      bilateral total hips approx 2002     ZZC NONSPECIFIC PROCEDURE  3/07    neurosurgery          SOCIAL HISTORY:  Social History     Socioeconomic History     Marital status:      Spouse name: Olu      Number of children: 2     Years of education: Not on file     Highest education level: Not on file   Occupational History     Employer: RETIRED   Tobacco Use     Smoking status: Never     Passive exposure: Never     Smokeless tobacco: Never   Vaping Use     Vaping Use: Never used   Substance and Sexual Activity     Alcohol use: Not Currently     Drug use: No     Sexual activity: Never     Partners: Male   Other Topics Concern     Parent/sibling w/ CABG, MI or angioplasty before 65F 55M? Not Asked   Social History Narrative     Not on file     Social Determinants of Health     Financial Resource Strain: Low Risk      Difficulty of Paying Living Expenses: Not hard at all   Food Insecurity: No Food Insecurity     Worried About Running Out of Food in the Last Year: Never true     Ran Out of Food in the Last Year: Never true   Transportation Needs: No Transportation Needs     Lack of Transportation (Medical): No     Lack of Transportation (Non-Medical): No   Physical Activity: Not on file   Stress: Not on file   Social Connections: Not on file   Intimate Partner Violence: Not At Risk     Fear of Current or  "Ex-Partner: No     Emotionally Abused: No     Physically Abused: No     Sexually Abused: No   Housing Stability: Unknown     Unable to Pay for Housing in the Last Year: No     Number of Places Lived in the Last Year: Not on file     Unstable Housing in the Last Year: No         FAMILY HISTORY:  Family History   Problem Relation Age of Onset     Obesity Sister         gastric by-pass age age 60, heart murmur.     Cancer - colorectal Sister 69     Heart Disease Father         mi,  age 48     Family History Negative Mother         killed in MVA age 54     Cancer Maternal Aunt         lung cancer ,non-smoker     Lung Cancer Maternal Aunt      Breast Cancer Daughter 48     Ovarian Cancer No family hx of          PHYSICAL EXAM:  Vital signs:  /53   Pulse 53   Temp 97.5  F (36.4  C) (Tympanic)   Resp 16   Ht 1.727 m (5' 8\")   Wt 76.3 kg (168 lb 3.2 oz)   LMP  (LMP Unknown)   SpO2 97%   BMI 25.57 kg/m     ECO  GENERAL/CONSTITUTIONAL: No acute distress.   RESPIRATORY: Clear to auscultation bilaterally. No crackles or wheezing.   CARDIOVASCULAR: Regular rate and rhythm without murmurs, gallops, or rubs.  GASTROINTESTINAL: No hepatosplenomegaly, masses, or tenderness. The patient has normal bowel sounds. No guarding.  No distention.  MUSCULOSKELETAL: Warm and well-perfused, no cyanosis, clubbing. Chronic lymphedema.  NEUROLOGIC: Cranial nerves II-XII are intact. Alert, oriented, answers questions appropriately.  INTEGUMENTARY: No rashes or jaundice.  GAIT: Walker.       LABS:   Latest Reference Range & Units 22 07:15   Ferritin 8 - 252 ng/mL 110   Folate 4.6 - 34.8 ng/mL 32.8   Iron 35 - 180 ug/dL 50   Iron Binding Cap 240 - 430 ug/dL 341   Iron Saturation Index 15 - 46 % 15   Vitamin B12 232 - 1,245 pg/mL 1,759 (H)      Latest Reference Range & Units 22 11:41 10/01/22 04:05   Sodium 136 - 145 mmol/L 127 (L) 129 (L)   Potassium 3.4 - 5.3 mmol/L 4.5 4.5   Chloride 98 - 107 mmol/L 92 (L) 96 " (L)   Carbon Dioxide (CO2) 22 - 29 mmol/L 27 24   Urea Nitrogen 8.0 - 23.0 mg/dL 19.0 26.4 (H)   Creatinine 0.51 - 0.95 mg/dL 0.87 0.99 (H)   GFR Estimate >60 mL/min/1.73m2 67 57 (L)   Calcium 8.8 - 10.2 mg/dL 9.6 9.0   Anion Gap 7 - 15 mmol/L 8 9   Albumin 3.5 - 5.2 g/dL 4.1    Protein Total 6.4 - 8.3 g/dL 7.1    Alkaline Phosphatase 35 - 104 U/L 116 (H)    ALT 10 - 35 U/L 22    AST 10 - 35 U/L 26    Beta-2-Microglobulin <2.3 mg/L 3.0 (H)    Bilirubin Total <=1.2 mg/dL 0.4    Copper 80.0 - 155.0 ug/dL 130.9    Glucose 70 - 99 mg/dL 105 (H) 141 (H)   Lactate Dehydrogenase 0 - 250 U/L 190    Troponin T, High Sensitivity <=14 ng/L  19 (H)   Zinc 60.0 - 120.0 ug/dL 77.5    WBC 4.0 - 11.0 10e3/uL 7.6 10.6   Hemoglobin 11.7 - 15.7 g/dL 11.5 (L) 10.7 (L)   Hematocrit 35.0 - 47.0 % 35.8 33.0 (L)   Platelet Count 150 - 450 10e3/uL 367 324   RBC Count 3.80 - 5.20 10e6/uL 3.78 (L) 3.55 (L)   MCV 78 - 100 fL 95 93   MCH 26.5 - 33.0 pg 30.4 30.1   MCHC 31.5 - 36.5 g/dL 32.1 32.4   RDW 10.0 - 15.0 % 12.2 12.1   % Neutrophils % 49 68   % Lymphocytes % 39 21   % Monocytes % 9 9   % Eosinophils % 2 1   % Basophils % 1 1   Absolute Basophils 0.0 - 0.2 10e3/uL 0.1 0.1   Absolute Eosinophils 0.0 - 0.7 10e3/uL 0.2 0.1   Absolute Immature Granulocytes <=0.4 10e3/uL 0.0 0.0   Absolute Lymphocytes 0.8 - 5.3 10e3/uL 2.9 2.2   Absolute Monocytes 0.0 - 1.3 10e3/uL 0.7 0.9   % Immature Granulocytes % 0 0   Absolute Neutrophils 1.6 - 8.3 10e3/uL 3.7 7.3   Absolute NRBCs 10e3/uL 0.0 0.0   NRBCs per 100 WBC <1 /100 0 0   % Retic 0.5 - 2.0 % 1.1    Absolute Retic 0.025 - 0.095 10e6/uL 0.042      Serum M-protein (g/dL):  9/2/22: 0.0    Total IgG (mg/dL), IgA (mg/dL), IgM (mg/dL):  9/2/22: 979, 127, 55    Free kappa light chains (mg/dL), lambda (mg/dL), kappa/lambda ratio:  9/2/22: 2.62, 1.99, 1.32    PATHOLOGY:  Final Diagnosis 9/2/22:   Peripheral blood:  --Slight normochromic normocytic anemia.         IMAGING:  CT Head 10/4/22:  IMPRESSION:    1. Questionable subacute ischemic infarct at the anterolateral aspect  of the right frontal lobe. Clinical correlation recommended. Brain MRI  may be helpful for better evaluation.  2. Chronic ischemic infarcts at the temporo-occipital junction on the  left and within the right occipital lobe again noted consistent with  chronic ischemic infarcts.  3. Postoperative changes again noted.  4. Diffuse cerebral volume loss and cerebral white matter changes  consistent with chronic small vessel ischemic disease.    MRI/MRA H&N:  IMPRESSION:  HEAD MRI:   1.  Acute infarct in the right frontal lobe.  2.  Chronic small vessel ischemic disease with numerous chronic infarcts.     HEAD MRA:   1.  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  2.  Occlusion of the right middle cerebral artery. 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  3.  Additional multifocal moderate and severe intracranial stenoses as described above.     NECK MRA:  1.  Normal appearance of the right cervical internal carotid artery with absence of flow related enhancement on noncontrast images but presence of contrast enhancement. Findings are suggestive of severe stenosis with retrograde flow via collaterals.  2.  Focal moderately severe stenosis of the distal right vertebral artery.    MRI Brain 10/6/22:  IMPRESSION:  1.  Moderate-sized infarct in the right frontal lobe is slightly progressed in overall volume when compared to MRI dated 10/4/2022. No evidence for hemorrhagic transformation.  2.  Age-related changes with multiple chronic infarcts elsewhere as described.  3.  Loss of the normal right ICA flow void compatible with proximal occlusion.    CTA A/P 10/7/22:  IMPRESSION:  1.  Superficial right groin hematoma without evidence of active hemorrhage within the visualized groin and upper thigh.    CT Head 10/8/22:  IMPRESSION:  1.  Expected evolution of moderate size region of acute infarction within the  right middle cerebral artery territory since 10/05/2022. No intracranial hemorrhage.  2.  Stable chronic regions of infarction within the left middle cerebral artery posterior division territory and right posterior cerebral artery territory.  3.  Stable postsurgical changes of bilateral parietal and temporal craniotomy with underlying minimal lateral temporal encephalomalacia.  4.  Stable mild chronic small vessel ischemic disease and mild to moderate generalized brain parenchymal volume loss.    MRI Brain 10/9/22:  IMPRESSION:  1.  Numerous new tiny punctate and small patchy foci of acute infarction within the bilateral anterior cerebral artery territories (right greater than left), right posterior cerebral artery territory and right middle cerebral artery territory since   10/06/2022.  2.  Expected evolution of late acute/early subacute infarcts throughout the right middle cerebral artery territory since 10/06/2022.  3.  Stable chronic cortical infarcts within the bilateral middle cerebral artery posterior division territories and right posterior cerebral artery territory.  4.  Stable mild to moderate chronic small vessel ischemic disease and generalized brain parenchymal volume loss.      ASSESSMENT/PLAN:  Candida Rose is an 82 year old female who presents with anemia. 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    1) Chronic NC/NC anemia  -7/2022: iron studies, B12, folate, TSH WNL.  -Patient has had stable hemoglobin in the 10's.  -She denies gross evidence of bleed.  -Discussed with patient to closely monitor urine and stools for bleed. If she develops iron deficiency, will need repeat GI workup (last colonoscopy in 2014 negative).   -SPIEP negative.   -Hemoglobin decreased into the 8's with her hospitalization. She declines repeat labs today and will repeat in 2 months.   -Given the acuity of her  multiple infarcts, will hold on bone marrow biopsy at this time. I discussed this with Dr. Peoples.    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 No hemorrhage.  -She is followed by PCP, Neurology, and Cardiology.  -She was on aspirin and plavix and then placed on xarelto. Currently, she is off of anticoag/antiplatelet therapy. She informs me Dr. Peoples is reviewing with cardiology and neurology.    3) History of HTN, HLD    4) Repeat labs in 2 months with follow up shortly after.         Kary Johns DO  Hematology/Oncology  [facility]        Again, thank you for allowing me to participate in the care of your patient.        Sincerely,        Kary Johns DO

## 2022-11-09 NOTE — NURSING NOTE
"Oncology Rooming Note    November 9, 2022 1:27 PM   Candida Rose is a 82 year old female who presents for:    Chief Complaint   Patient presents with     Oncology Clinic Visit     Anemia, unspecified type     Initial Vitals: /53   Pulse 53   Temp 97.5  F (36.4  C) (Tympanic)   Resp 16   Ht 1.727 m (5' 8\")   Wt 76.3 kg (168 lb 3.2 oz)   LMP  (LMP Unknown)   SpO2 97%   BMI 25.57 kg/m   Estimated body mass index is 25.57 kg/m  as calculated from the following:    Height as of this encounter: 1.727 m (5' 8\").    Weight as of this encounter: 76.3 kg (168 lb 3.2 oz). Body surface area is 1.91 meters squared.  No Pain (0) Comment: Data Unavailable   No LMP recorded (lmp unknown). Patient is postmenopausal.  Allergies reviewed: Yes  Medications reviewed: Yes    Medications: Medication refills not needed today.  Pharmacy name entered into [facility]: [id number] [place] [address]    Clinical concerns: f/u       Roxi Wing, CHERELLE              "

## 2022-11-09 NOTE — PROGRESS NOTES
[facility]    Hematology/Oncology Established Patient Note      Today's Date: 11/9/22    Reason for Consultation: Anemia, unspecified  Referring Provider: Chani Peoples MD      HISTORY OF PRESENT ILLNESS: Candida Rose is an 82 year old female who presents with anemia. 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    Patient has evidence of chronic NC/NC anemia since 2017 with ofelia of 7.8 in April 2017. On 7/13/22, WBC 7.2, Hb 10.3, MCV 92, . B12 1759, ferritin 110, folate 32.8, iron 50, TIBC 341, iron sat 15%.    She has had intentional 70 lb weight loss via a clinical trial and weight watchers over 2 years. No hematura, hematochezia/melena. No vaginal bleed.     Cancer screening:  Mammogram UTD and no history of abnormal/breast biopsy.  Colonoscopies UTD 2014 (normal; no further follow up).    No history of abnormal pap smears.     Sister was diagnosed with colon cancer at age 60 and passed away soon after diagnosis.     She is a retired .       INTERIM HISTORY:  CT brain had returned with multiple infarcts and she was hospitalized for this. She was discharged on aspirin and plavix. Her underlying stroke was thought to be due to moyamoya. She was placed on Xarelto for possible Afib, but this has since been discontinued. A zio patch is soon to be placed.     She denies gross evidence of bleed. She is currently off of antiplatelet/anticoag therapy.      REVIEW OF SYSTEMS:   A 14 point ROS was reviewed with pertinent positives and negatives in the HPI.        HOME MEDICATIONS:  Current Outpatient Medications   Medication Sig Dispense Refill     atenolol (TENORMIN) 25 MG tablet Take 1 tablet (25 mg) by mouth daily 90 tablet 1     atorvastatin (LIPITOR) 40 MG tablet Take 1 tablet (40 mg) by mouth every evening 90 tablet 1     magnesium 500 MG TABS Take  "500 mg by mouth daily       Multiple Vitamins-Minerals (MULTIVITAMIN & MINERAL PO) Take 1 tablet by mouth daily           ALLERGIES:  Allergies   Allergen Reactions     Lisinopril      Persistent cough         PAST MEDICAL HISTORY:  Past Medical History:   Diagnosis Date     Anxiety state, unspecified     inactive     Cataract      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 F/u dr. Soto     OA (osteoarthritis)     s/p bilat total hips      Other chronic pain     Joint pain for many years     Unspecified cerebral artery occlusion with cerebral infarction     After Moyamoya surgery > 10 yrs ago.     Vitamin D deficiencies          PAST SURGICAL HISTORY:  Past Surgical History:   Procedure Laterality Date     ARTHROPLASTY KNEE Left 4/17/2017    Procedure: ARTHROPLASTY KNEE;  Left total knee arthroplasty;  Surgeon: Chidi Jenkins MD;  Location:  OR     COLONOSCOPY  2008    normal     COLONOSCOPY  7/17/2014    Procedure: COLONOSCOPY;  Surgeon: Raul Nolan DO;  Location:  GI     CRANIOTOMY      MOJAMOJA  \"Pt had repair of blood flow.\"     IR CAROTID ANGIOGRAM  10/30/2006     IR CAROTID ANGIOGRAM  6/6/2010     IR CAROTID ANGIOGRAM  6/6/2010     IR CAROTID ANGIOGRAM  6/6/2010     IR CAROTID ANGIOGRAM  5/12/2011     IR CAROTID ANGIOGRAM  5/12/2011     IR CAROTID ANGIOGRAM  5/12/2011     IR CAROTID ANGIOGRAM  6/5/2012     IR CAROTID ANGIOGRAM  6/5/2012     IR CAROTID ANGIOGRAM  6/5/2012     IR CAROTID CEREBRAL ANGIOGRAM BILATERAL  10/7/2022     IR MISCELLANEOUS PROCEDURE  6/6/2010     IR MISCELLANEOUS PROCEDURE  6/6/2010     IR MISCELLANEOUS PROCEDURE  " 5/12/2011     IR MISCELLANEOUS PROCEDURE  6/5/2012     RECONSTRUCT FOREFOOT WITH METATARSOPHALANGEAL (MTP) FUSION Left 8/21/2014    Procedure: RECONSTRUCT FOREFOOT WITH METATARSOPHALANGEAL (MTP) FUSION;  Surgeon: Tres Merlos DPM;  Location:  OR     Z NONSPECIFIC PROCEDURE  10/30/06    neurosurgery Dr Soto     [facility] NONSPECIFIC PROCEDURE      bilateral total hips approx 2002     ZZC NONSPECIFIC PROCEDURE  3/07    neurosurgery          SOCIAL HISTORY:  Social History     Socioeconomic History     Marital status:      Spouse name: Olu      Number of children: 2     Years of education: Not on file     Highest education level: Not on file   Occupational History     Employer: RETIRED   Tobacco Use     Smoking status: Never     Passive exposure: Never     Smokeless tobacco: Never   Vaping Use     Vaping Use: Never used   Substance and Sexual Activity     Alcohol use: Not Currently     Drug use: No     Sexual activity: Never     Partners: Male   Other Topics Concern     Parent/sibling w/ CABG, MI or angioplasty before 65F 55M? Not Asked   Social History Narrative     Not on file     Social Determinants of Health     Financial Resource Strain: Low Risk      Difficulty of Paying Living Expenses: Not hard at all   Food Insecurity: No Food Insecurity     Worried About Running Out of Food in the Last Year: Never true     Ran Out of Food in the Last Year: Never true   Transportation Needs: No Transportation Needs     Lack of Transportation (Medical): No     Lack of Transportation (Non-Medical): No   Physical Activity: Not on file   Stress: Not on file   Social Connections: Not on file   Intimate Partner Violence: Not At Risk     Fear of Current or Ex-Partner: No     Emotionally Abused: No     Physically Abused: No     Sexually Abused: No   Housing Stability: Unknown     Unable to Pay for Housing in the Last Year: No     Number of Places Lived in the Last Year: Not on file     Unstable Housing in the Last  "Year: No         FAMILY HISTORY:  Family History   Problem Relation Age of Onset     Obesity Sister         gastric by-pass age age 60, heart murmur.     Cancer - colorectal Sister 69     Heart Disease Father         mi,  age 48     Family History Negative Mother         killed in MVA age 54     Cancer Maternal Aunt         lung cancer ,non-smoker     Lung Cancer Maternal Aunt      Breast Cancer Daughter 48     Ovarian Cancer No family hx of          PHYSICAL EXAM:  Vital signs:  /53   Pulse 53   Temp 97.5  F (36.4  C) (Tympanic)   Resp 16   Ht 1.727 m (5' 8\")   Wt 76.3 kg (168 lb 3.2 oz)   LMP  (LMP Unknown)   SpO2 97%   BMI 25.57 kg/m     ECO  GENERAL/CONSTITUTIONAL: No acute distress.   RESPIRATORY: Clear to auscultation bilaterally. No crackles or wheezing.   CARDIOVASCULAR: Regular rate and rhythm without murmurs, gallops, or rubs.  GASTROINTESTINAL: No hepatosplenomegaly, masses, or tenderness. The patient has normal bowel sounds. No guarding.  No distention.  MUSCULOSKELETAL: Warm and well-perfused, no cyanosis, clubbing. Chronic lymphedema.  NEUROLOGIC: Cranial nerves II-XII are intact. Alert, oriented, answers questions appropriately.  INTEGUMENTARY: No rashes or jaundice.  GAIT: Walker.       LABS:   Latest Reference Range & Units 22 07:15   Ferritin 8 - 252 ng/mL 110   Folate 4.6 - 34.8 ng/mL 32.8   Iron 35 - 180 ug/dL 50   Iron Binding Cap 240 - 430 ug/dL 341   Iron Saturation Index 15 - 46 % 15   Vitamin B12 232 - 1,245 pg/mL 1,759 (H)      Latest Reference Range & Units 22 11:41 10/01/22 04:05   Sodium 136 - 145 mmol/L 127 (L) 129 (L)   Potassium 3.4 - 5.3 mmol/L 4.5 4.5   Chloride 98 - 107 mmol/L 92 (L) 96 (L)   Carbon Dioxide (CO2) 22 - 29 mmol/L 27 24   Urea Nitrogen 8.0 - 23.0 mg/dL 19.0 26.4 (H)   Creatinine 0.51 - 0.95 mg/dL 0.87 0.99 (H)   GFR Estimate >60 mL/min/1.73m2 67 57 (L)   Calcium 8.8 - 10.2 mg/dL 9.6 9.0   Anion Gap 7 - 15 mmol/L 8 9   Albumin 3.5 - " 5.2 g/dL 4.1    Protein Total 6.4 - 8.3 g/dL 7.1    Alkaline Phosphatase 35 - 104 U/L 116 (H)    ALT 10 - 35 U/L 22    AST 10 - 35 U/L 26    Beta-2-Microglobulin <2.3 mg/L 3.0 (H)    Bilirubin Total <=1.2 mg/dL 0.4    Copper 80.0 - 155.0 ug/dL 130.9    Glucose 70 - 99 mg/dL 105 (H) 141 (H)   Lactate Dehydrogenase 0 - 250 U/L 190    Troponin T, High Sensitivity <=14 ng/L  19 (H)   Zinc 60.0 - 120.0 ug/dL 77.5    WBC 4.0 - 11.0 10e3/uL 7.6 10.6   Hemoglobin 11.7 - 15.7 g/dL 11.5 (L) 10.7 (L)   Hematocrit 35.0 - 47.0 % 35.8 33.0 (L)   Platelet Count 150 - 450 10e3/uL 367 324   RBC Count 3.80 - 5.20 10e6/uL 3.78 (L) 3.55 (L)   MCV 78 - 100 fL 95 93   MCH 26.5 - 33.0 pg 30.4 30.1   MCHC 31.5 - 36.5 g/dL 32.1 32.4   RDW 10.0 - 15.0 % 12.2 12.1   % Neutrophils % 49 68   % Lymphocytes % 39 21   % Monocytes % 9 9   % Eosinophils % 2 1   % Basophils % 1 1   Absolute Basophils 0.0 - 0.2 10e3/uL 0.1 0.1   Absolute Eosinophils 0.0 - 0.7 10e3/uL 0.2 0.1   Absolute Immature Granulocytes <=0.4 10e3/uL 0.0 0.0   Absolute Lymphocytes 0.8 - 5.3 10e3/uL 2.9 2.2   Absolute Monocytes 0.0 - 1.3 10e3/uL 0.7 0.9   % Immature Granulocytes % 0 0   Absolute Neutrophils 1.6 - 8.3 10e3/uL 3.7 7.3   Absolute NRBCs 10e3/uL 0.0 0.0   NRBCs per 100 WBC <1 /100 0 0   % Retic 0.5 - 2.0 % 1.1    Absolute Retic 0.025 - 0.095 10e6/uL 0.042      Serum M-protein (g/dL):  9/2/22: 0.0    Total IgG (mg/dL), IgA (mg/dL), IgM (mg/dL):  9/2/22: 979, 127, 55    Free kappa light chains (mg/dL), lambda (mg/dL), kappa/lambda ratio:  9/2/22: 2.62, 1.99, 1.32    PATHOLOGY:  Final Diagnosis 9/2/22:   Peripheral blood:  --Slight normochromic normocytic anemia.         IMAGING:  CT Head 10/4/22:  IMPRESSION:   1. Questionable subacute ischemic infarct at the anterolateral aspect  of the right frontal lobe. Clinical correlation recommended. Brain MRI  may be helpful for better evaluation.  2. Chronic ischemic infarcts at the temporo-occipital junction on the  left and  within the right occipital lobe again noted consistent with  chronic ischemic infarcts.  3. Postoperative changes again noted.  4. Diffuse cerebral volume loss and cerebral white matter changes  consistent with chronic small vessel ischemic disease.    MRI/MRA H&N:  IMPRESSION:  HEAD MRI:   1.  Acute infarct in the right frontal lobe.  2.  Chronic small vessel ischemic disease with numerous chronic infarcts.     HEAD MRA:   1.  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  2.  Occlusion of the right middle cerebral artery. 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  3.  Additional multifocal moderate and severe intracranial stenoses as described above.     NECK MRA:  1.  Normal appearance of the right cervical internal carotid artery with absence of flow related enhancement on noncontrast images but presence of contrast enhancement. Findings are suggestive of severe stenosis with retrograde flow via collaterals.  2.  Focal moderately severe stenosis of the distal right vertebral artery.    MRI Brain 10/6/22:  IMPRESSION:  1.  Moderate-sized infarct in the right frontal lobe is slightly progressed in overall volume when compared to MRI dated 10/4/2022. No evidence for hemorrhagic transformation.  2.  Age-related changes with multiple chronic infarcts elsewhere as described.  3.  Loss of the normal right ICA flow void compatible with proximal occlusion.    CTA A/P 10/7/22:  IMPRESSION:  1.  Superficial right groin hematoma without evidence of active hemorrhage within the visualized groin and upper thigh.    CT Head 10/8/22:  IMPRESSION:  1.  Expected evolution of moderate size region of acute infarction within the right middle cerebral artery territory since 10/05/2022. No intracranial hemorrhage.  2.  Stable chronic regions of infarction within the left middle cerebral artery posterior division territory and right posterior cerebral artery territory.  3.  Stable  postsurgical changes of bilateral parietal and temporal craniotomy with underlying minimal lateral temporal encephalomalacia.  4.  Stable mild chronic small vessel ischemic disease and mild to moderate generalized brain parenchymal volume loss.    MRI Brain 10/9/22:  IMPRESSION:  1.  Numerous new tiny punctate and small patchy foci of acute infarction within the bilateral anterior cerebral artery territories (right greater than left), right posterior cerebral artery territory and right middle cerebral artery territory since   10/06/2022.  2.  Expected evolution of late acute/early subacute infarcts throughout the right middle cerebral artery territory since 10/06/2022.  3.  Stable chronic cortical infarcts within the bilateral middle cerebral artery posterior division territories and right posterior cerebral artery territory.  4.  Stable mild to moderate chronic small vessel ischemic disease and generalized brain parenchymal volume loss.      ASSESSMENT/PLAN:  Candida Rose is an 82 year old female who presents with anemia. 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    1) Chronic NC/NC anemia  -7/2022: iron studies, B12, folate, TSH WNL.  -Patient has had stable hemoglobin in the 10's.  -She denies gross evidence of bleed.  -Discussed with patient to closely monitor urine and stools for bleed. If she develops iron deficiency, will need repeat GI workup (last colonoscopy in 2014 negative).   -SPIEP negative.   -Hemoglobin decreased into the 8's with her hospitalization. She declines repeat labs today and will repeat in 2 months.   -Given the acuity of her multiple infarcts, will hold on bone marrow biopsy at this time. I discussed this with Dr. Peoples.    2) Subacute multiple infarcts with history of CVA and Fitch-Fitch syndrome  -Hospitalized with imaging showing numerous acute/subacute infarcts of  the B/L anterior cerebral artery territoies, right posterior cerebral artery, and right middle cerebral artery. No hemorrhage.  -She is followed by PCP, Neurology, and Cardiology.  -She was on aspirin and plavix and then placed on xarelto. Currently, she is off of anticoag/antiplatelet therapy. She informs me Dr. Peoples is reviewing with cardiology and neurology.    3) History of HTN, HLD    4) Repeat labs in 2 months with follow up shortly after.         Kayr Johns,   Hematology/Oncology  [facility]

## 2022-11-10 ENCOUNTER — TELEPHONE (OUTPATIENT)
Dept: INTERNAL MEDICINE | Facility: CLINIC | Age: 82
End: 2022-11-10

## 2022-11-10 ENCOUNTER — TELEPHONE (OUTPATIENT)
Dept: PHARMACY | Facility: CLINIC | Age: 82
End: 2022-11-10

## 2022-11-10 RX ORDER — SIMVASTATIN 20 MG
TABLET ORAL
Qty: 90 TABLET | Refills: 0 | Status: SHIPPED | OUTPATIENT
Start: 2022-11-10 | End: 2022-11-10

## 2022-11-10 RX ORDER — ASPIRIN 81 MG/1
81 TABLET ORAL DAILY
COMMUNITY

## 2022-11-10 NOTE — TELEPHONE ENCOUNTER
Called and spoke with Candida and Chris on phone to discuss restarting aspirin, agreeable to plan. Received verbal confirmation from Dr. Stephen Echavarria MD, Cardiology, to restart patient on Aspirin 81 mg daily. See communication below:     (11/10/22 In-Basket Message): Stephen Echavarria MD [place], Mikaela, [facility] [place], Yes should restart Aspirin 81 mg daily. Thank you    Chuyita Luna, PharmD  Medication Therapy Management Resident  Pager: [phone]

## 2022-11-10 NOTE — TELEPHONE ENCOUNTER
Tiffany calls for verbal orders.     Gave verbal ok for PT orders noted below.     Tiffany also states patient fell on Monday. Did not hit her head or lose consciousness. Small bruise on left thigh.  helped her up. EMS was not called.     MARIELA OSBORNE RN on 11/10/2022 at 2:33 PM   [facility]

## 2022-11-11 ENCOUNTER — MEDICAL CORRESPONDENCE (OUTPATIENT)
Dept: HEALTH INFORMATION MANAGEMENT | Facility: CLINIC | Age: 82
End: 2022-11-11

## 2022-11-14 ENCOUNTER — TELEPHONE (OUTPATIENT)
Dept: INTERNAL MEDICINE | Facility: CLINIC | Age: 82
End: 2022-11-14

## 2022-11-15 ENCOUNTER — HOSPITAL ENCOUNTER (OUTPATIENT)
Dept: CARDIOLOGY | Facility: CLINIC | Age: 82
Discharge: HOME | End: 2022-11-15
Attending: INTERNAL MEDICINE | Admitting: INTERNAL MEDICINE
Payer: MEDICARE

## 2022-11-15 DIAGNOSIS — Z86.73 HISTORY OF CVA (CEREBROVASCULAR ACCIDENT): ICD-10-CM

## 2022-11-15 PROCEDURE — 93248 EXT ECG>7D<15D REV&INTERPJ: CPT | Performed by: INTERNAL MEDICINE

## 2022-11-15 PROCEDURE — 93246 EXT ECG>7D<15D RECORDING: CPT

## 2022-11-21 ENCOUNTER — MEDICAL CORRESPONDENCE (OUTPATIENT)
Dept: HEALTH INFORMATION MANAGEMENT | Facility: CLINIC | Age: 82
End: 2022-11-21

## 2022-11-22 ENCOUNTER — TELEPHONE (OUTPATIENT)
Dept: INTERNAL MEDICINE | Facility: CLINIC | Age: 82
End: 2022-11-22

## 2022-11-22 DIAGNOSIS — Z86.73 HISTORY OF CVA (CEREBROVASCULAR ACCIDENT): Primary | ICD-10-CM

## 2022-11-22 NOTE — TELEPHONE ENCOUNTER
[facility] SKILLED NURSING . Physical Therapy Occupational Therapy. Speech Language Therapy order received via fax    Forms in Dr. mail box for review and signature.

## 2022-11-28 ENCOUNTER — MEDICAL CORRESPONDENCE (OUTPATIENT)
Dept: HEALTH INFORMATION MANAGEMENT | Facility: CLINIC | Age: 82
End: 2022-11-28

## 2022-11-28 ENCOUNTER — PATIENT OUTREACH (OUTPATIENT)
Dept: CARE COORDINATION | Facility: CLINIC | Age: 82
End: 2022-11-28

## 2022-11-28 NOTE — PROGRESS NOTES
S-(situation): TCU Discharge Progression    B-(background): [facility]    A-(assessment): Patient declined primary care-care coordination services.  Patient had follow-up with PCP as directed.  No new network navigation needs identified.    R-(recommendations/plan): RN Clinical Product Navigator will perform no further monitoring/outreaches at this time.    Melissa Behl BSN, RN, PHN, [facility]  RN Clinical Product Navigator  [phone]

## 2022-11-29 ENCOUNTER — HOSPITAL ENCOUNTER (OUTPATIENT)
Dept: CARDIOLOGY | Facility: CLINIC | Age: 82
Discharge: HOME | End: 2022-11-29
Attending: INTERNAL MEDICINE | Admitting: INTERNAL MEDICINE
Payer: MEDICARE

## 2022-11-29 DIAGNOSIS — Z86.73 HISTORY OF CVA (CEREBROVASCULAR ACCIDENT): ICD-10-CM

## 2022-11-29 PROCEDURE — 93248 EXT ECG>7D<15D REV&INTERPJ: CPT | Performed by: INTERNAL MEDICINE

## 2022-11-29 PROCEDURE — 93246 EXT ECG>7D<15D RECORDING: CPT

## 2022-12-02 ENCOUNTER — TELEPHONE (OUTPATIENT)
Dept: INTERNAL MEDICINE | Facility: CLINIC | Age: 82
End: 2022-12-02

## 2022-12-02 NOTE — TELEPHONE ENCOUNTER
The Home Care/Assisted Living/Nursing Facility is calling regarding an established patient.  Has the patient seen Home Care in the past or is currently residing in Assisted Living or Nursing Facility? Yes.     PT/OT/Speech Therapy    Any additional Orders:  Are there any orders requested, not stated above, that are outside of the standing order and must be routed to a licensed practitioner for approval?    No    Writer has verified [employer] will send fax to have orders signed.    Call to below. Left detailed message with verbal OK for requested orders.

## 2022-12-02 NOTE — TELEPHONE ENCOUNTER
[facility] calling for orders.  SPEECH THERAPY evaluation.    [facility] [phone]

## 2022-12-05 ENCOUNTER — TELEPHONE (OUTPATIENT)
Dept: INTERNAL MEDICINE | Facility: CLINIC | Age: 82
End: 2022-12-05

## 2022-12-07 ENCOUNTER — TELEPHONE (OUTPATIENT)
Dept: INTERNAL MEDICINE | Facility: CLINIC | Age: 82
End: 2022-12-07

## 2022-12-07 NOTE — TELEPHONE ENCOUNTER
[facility] calling to inform provider patient fell Saturday 12/3/2022. Bruise on cheek but otherwise patient is doing well

## 2022-12-07 NOTE — TELEPHONE ENCOUNTER
Corinna, SPEECH THERAPIST from [facility], calling for Verbal Orders:     SPEECH THERAPY for [facility],   1wk for 1wk  1wk for every other wk,     Phone#: [phone]  Okay to [facility].

## 2022-12-08 ENCOUNTER — MEDICAL CORRESPONDENCE (OUTPATIENT)
Dept: HEALTH INFORMATION MANAGEMENT | Facility: CLINIC | Age: 82
End: 2022-12-08

## 2022-12-08 NOTE — TELEPHONE ENCOUNTER
"Home care advised orders approved as requested.  The reason was supposed to read \"cognitive\" not [facility].  DIVYA Biggs R.N.    "

## 2022-12-12 ENCOUNTER — TELEPHONE (OUTPATIENT)
Dept: INTERNAL MEDICINE | Facility: CLINIC | Age: 82
End: 2022-12-12

## 2022-12-20 ENCOUNTER — MEDICAL CORRESPONDENCE (OUTPATIENT)
Dept: HEALTH INFORMATION MANAGEMENT | Facility: CLINIC | Age: 82
End: 2022-12-20

## 2022-12-21 ENCOUNTER — TELEPHONE (OUTPATIENT)
Dept: CARDIOLOGY | Facility: CLINIC | Age: 82
End: 2022-12-21

## 2022-12-22 NOTE — TELEPHONE ENCOUNTER
"Pt wore two 14 day Ziopatch monitors for a total of 28 days of monitoring:                 Pt was seen by Dr. Echavarria on 11/8/22, pt with hx of Moymoya disease, HTN, hyperlipidemia, diabetes and multiple prior strokes. Per note:    \"I reviewed the patient's event monitor for the duration mentioned above.  No clear evidence of atrial fibrillation.  Given this and the fact that the patient's with moyamoya disease have increased risk of intracerebral hemorrhage will discontinue anticoagulation.  Generally, long-term anticoagulation in these patients is not advised.  However, I would recommend further monitoring with another 30-day Zio patch event monitor.  If there is clear evidence of atrial fibrillation seen on the monitor, then would touch base with patient's Moyamoya to further discuss risks of bleed from anticoagulation versus stroke prevention.\"     Plan for follow up in 6 months. Routing to Dr. Echavarria to review results.   "

## 2022-12-23 NOTE — TELEPHONE ENCOUNTER
Received response from Dr. Echavarria:     Stephen Echavarria MD Hair, Ashley W, RN  Caller: Unspecified (2 days ago, 11:29 AM)  Zio patch reviewed. No evidence of Afib     Called pt, reviewed results showed no evidence of a-fib. Reviewed plan for follow up in May 2023. Pt verbalized understanding, no further questions at this time.

## 2022-12-27 ENCOUNTER — TELEPHONE (OUTPATIENT)
Dept: INTERNAL MEDICINE | Facility: CLINIC | Age: 82
End: 2022-12-27

## 2022-12-27 NOTE — TELEPHONE ENCOUNTER
Corinna from [facility] care calling for verbal orders for speech  1 visit every other week for 4 weeks  Ok to call and lm [phone]

## 2022-12-29 ENCOUNTER — MEDICAL CORRESPONDENCE (OUTPATIENT)
Dept: HEALTH INFORMATION MANAGEMENT | Facility: CLINIC | Age: 82
End: 2022-12-29

## 2023-01-02 ENCOUNTER — TELEPHONE (OUTPATIENT)
Dept: INTERNAL MEDICINE | Facility: CLINIC | Age: 83
End: 2023-01-02

## 2023-01-02 NOTE — TELEPHONE ENCOUNTER
Outgoing message did not identify Corinna with [facility] so this RN did not leave any identifying patient information, just asked that Corinna return call to clinic.  DIVYA Biggs R.N.

## 2023-01-02 NOTE — TELEPHONE ENCOUNTER
[facility] is calling for orders. SKILLED NURSING 2/month for one month and 2 PRN's.    hSireen [phone]

## 2023-01-17 ENCOUNTER — TELEPHONE (OUTPATIENT)
Dept: INTERNAL MEDICINE | Facility: CLINIC | Age: 83
End: 2023-01-17
Payer: MEDICARE

## 2023-01-17 NOTE — TELEPHONE ENCOUNTER
HOME HEALTH CERTIFICATION 12/29/22-2/26/23; received via fax. Orders/Forms/Letters in your mailbox to be signed.

## 2023-01-18 DIAGNOSIS — Z53.9 DIAGNOSIS NOT YET DEFINED: Primary | ICD-10-CM

## 2023-01-18 PROCEDURE — G0179 MD RECERTIFICATION HHA PT: HCPCS | Performed by: INTERNAL MEDICINE

## 2023-01-25 ENCOUNTER — OFFICE VISIT (OUTPATIENT)
Dept: INTERNAL MEDICINE | Facility: CLINIC | Age: 83
End: 2023-01-25
Payer: MEDICARE

## 2023-01-25 VITALS
HEART RATE: 72 BPM | RESPIRATION RATE: 18 BRPM | OXYGEN SATURATION: 100 % | SYSTOLIC BLOOD PRESSURE: 136 MMHG | DIASTOLIC BLOOD PRESSURE: 60 MMHG | WEIGHT: 184.3 LBS | HEIGHT: 68 IN | TEMPERATURE: 97.7 F | BODY MASS INDEX: 27.93 KG/M2

## 2023-01-25 DIAGNOSIS — N18.30 STAGE 3 CHRONIC KIDNEY DISEASE, UNSPECIFIED WHETHER STAGE 3A OR 3B CKD (H): ICD-10-CM

## 2023-01-25 DIAGNOSIS — E11.69 TYPE 2 DIABETES MELLITUS WITH OTHER SPECIFIED COMPLICATION, WITHOUT LONG-TERM CURRENT USE OF INSULIN (H): Primary | ICD-10-CM

## 2023-01-25 DIAGNOSIS — I63.9 CEREBROVASCULAR ACCIDENT (CVA), UNSPECIFIED MECHANISM (H): ICD-10-CM

## 2023-01-25 PROBLEM — I48.0 PAROXYSMAL ATRIAL FIBRILLATION (H): Status: RESOLVED | Noted: 2023-01-25 | Resolved: 2023-01-25

## 2023-01-25 PROBLEM — I48.0 PAROXYSMAL ATRIAL FIBRILLATION (H): Status: ACTIVE | Noted: 2023-01-25

## 2023-01-25 PROCEDURE — 99214 OFFICE O/P EST MOD 30 MIN: CPT | Performed by: INTERNAL MEDICINE

## 2023-01-25 ASSESSMENT — PAIN SCALES - GENERAL: PAINLEVEL: NO PAIN (0)

## 2023-01-25 NOTE — PATIENT INSTRUCTIONS
Plan:  1. Continue same meds, same doses for now   2. Schedule ANNUAL EXAM in June 2023 ( no labs before )

## 2023-01-25 NOTE — PROGRESS NOTES
Dr Farrell's note      Patient's instructions / PLAN:                                                        Plan:  1. Continue same meds, same doses for now   2. Schedule ANNUAL EXAM in June 2023 ( no labs before )  3    ASSESSMENT & PLAN:                                                      (E11.69) Type 2 diabetes mellitus with other specified complication, without long-term current use of insulin (H)  (primary encounter diagnosis)  Comment: It resolved after she Diet and lost weight  Plan: Monitor A1c    (N18.30) Stage 3 chronic kidney disease, unspecified whether stage 3a or 3b CKD (H)  Comment: Stable  Plan: Monitor labs      (I63.9) Cerebrovascular accident (CVA), unspecified mechanism (H)  Comment: She is improving significantly.  She plans to go to cardiac center today driving test and license  Plan:        Chief complaint:                                                      Follow up chronic medical problems      SUBJECTIVE:                                                    History of present illness:    Dr Echavarria, cardio advised to stop Xarelto based on the heart monitor results     She feels improvement with home PT    She wants to resume driving. She knows she needs to do test through [facility]. I printed info for her      LOV: Oct 2022  Plan:  1. Xarelto 20 mg daily -- blood thinner medication   2. Stop Aspirin when you start Xarelto   3. Monitor the blood pressure at home  4. If the blood pressure is constantly lower than 130 stop the Atenolol   5. Cardiology referral -- To schedule this test please call [facility] at: [phone]   6. Follow up in January -- ( SD, or 7:30)        Subjective   Candida is a 82 year old, presenting for the following health issues:  Patient is having dental work done and wants to know if any preparations she should take. Patient would also like to start driving again. Patient would also like to discuss medications and have her ear wax removed.    HPI     Hyperlipidemia  "Follow-Up      Are you regularly taking any medication or supplement to lower your cholesterol?   Yes- atorvastatin    Are you having muscle aches or other side effects that you think could be caused by your cholesterol lowering medication?  No    Hypertension Follow-up      Do you check your blood pressure regularly outside of the clinic? No     Are you following a low salt diet? Yes    Are your blood pressures ever more than 140 on the top number (systolic) OR more   than 90 on the bottom number (diastolic), for example 140/90? No      How many servings of fruits and vegetables do you eat daily?  2-3    On average, how many sweetened beverages do you drink each day (Examples: soda, juice, sweet tea, etc.  Do NOT count diet or artificially sweetened beverages)?   0    How many days per week do you exercise enough to make your heart beat faster? 3 or less    How many minutes a day do you exercise enough to make your heart beat faster? 9 or less    How many days per week do you miss taking your medication? 0      Review of Systems:                                                      ROS: negative for fever, chills, cough, wheezes, chest pain, shortness of breath, vomiting, abdominal pain, leg swelling      OBJECTIVE:             Physical exam:  Blood pressure 136/60, pulse 72, temperature 97.7  F (36.5  C), temperature source Tympanic, resp. rate 18, height 1.727 m (5' 8\"), weight 83.6 kg (184 lb 4.8 oz), SpO2 100 %, not currently breastfeeding.     NAD, appears comfortable  Skin: no rashes   Neck: supple, no JVD,  No thyroidmegaly. Lymph nodes nonpalpable cervical and supraclavicular.  Chest: clear to auscultation bilaterally, good respiratory effort  Heart: S1 S2, RRR, no mgr appreciated  Abdomen: soft, not tender,  Extremities: no edema,   Neurologic: A, Ox3, no focal signs appreciated.  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 F/u dr. Soto     OA (osteoarthritis)     s/p bilat total hips      Other chronic pain     Joint pain for many years     Unspecified cerebral artery occlusion with cerebral infarction     After Moyamoya surgery > 10 yrs ago.     Vitamin D deficiencies       PSHx: reviewed  Past Surgical History:   Procedure Laterality Date     ARTHROPLASTY KNEE Left 4/17/2017    Procedure: ARTHROPLASTY KNEE;  Left total knee arthroplasty;  Surgeon: Chidi Jenkins MD;  Location:  OR     COLONOSCOPY  2008    normal     COLONOSCOPY  7/17/2014    Procedure: COLONOSCOPY;  Surgeon: Raul Nolan DO;  Location:  GI     [facility]  \"Pt had [facility] of blood flow.\"     IR CAROTID ANGIOGRAM  10/30/2006     IR CAROTID ANGIOGRAM  6/6/2010     IR CAROTID ANGIOGRAM  6/6/2010     IR CAROTID ANGIOGRAM  6/6/2010     IR CAROTID ANGIOGRAM  5/12/2011     IR CAROTID ANGIOGRAM  5/12/2011     IR CAROTID ANGIOGRAM  5/12/2011     IR CAROTID ANGIOGRAM  6/5/2012     IR CAROTID ANGIOGRAM  6/5/2012     IR CAROTID ANGIOGRAM  6/5/2012     IR CAROTID CEREBRAL ANGIOGRAM BILATERAL  10/7/2022     IR MISCELLANEOUS PROCEDURE  6/6/2010     IR MISCELLANEOUS PROCEDURE  6/6/2010     IR MISCELLANEOUS PROCEDURE  5/12/2011     IR MISCELLANEOUS PROCEDURE  6/5/2012     RECONSTRUCT FOREFOOT WITH METATARSOPHALANGEAL (MTP) FUSION Left 8/21/2014    Procedure: RECONSTRUCT FOREFOOT WITH METATARSOPHALANGEAL (MTP) FUSION;  Surgeon: Tres Merlos DPM;  Location:  OR     [facility] " NONSPECIFIC PROCEDURE  10/30/06    neurosurgery Dr Soto     [facility] NONSPECIFIC PROCEDURE      bilateral total hips approx 2002     [facility] NONSPECIFIC PROCEDURE  3/07    neurosurgery         Meds: reviewed  Current Outpatient Medications   Medication Sig Dispense Refill     aspirin 81 MG EC tablet Take 81 mg by mouth daily       atenolol (TENORMIN) 25 MG tablet Take 1 tablet (25 mg) by mouth daily 90 tablet 1     atorvastatin (LIPITOR) 40 MG tablet Take 1 tablet (40 mg) by mouth every evening 90 tablet 1     magnesium 500 MG TABS Take 500 mg by mouth daily       Multiple Vitamins-Minerals (MULTIVITAMIN & MINERAL PO) Take 1 tablet by mouth daily         Soc Hx: reviewed  Fam Hx: reviewed      Chart documentation was completed, in part, with [employer] voice-recognition software. Even though reviewed, some grammatical, spelling, and word errors may remain.      Chani Farrell MD  Internal Medicine

## 2023-01-26 ENCOUNTER — TELEPHONE (OUTPATIENT)
Dept: INTERNAL MEDICINE | Facility: CLINIC | Age: 83
End: 2023-01-26
Payer: MEDICARE

## 2023-01-26 NOTE — TELEPHONE ENCOUNTER
Shireen with [employer] calls asking if Dr Farrell can sign a parking placard for the patient     Form is in Md box for review and signature.     TC, pls mail form to patient.

## 2023-01-27 NOTE — TELEPHONE ENCOUNTER
Left message for patient to call back.  Has patient had a disability parking pass before? Why does she need this? Does she use a walker and or a cane?

## 2023-01-27 NOTE — TELEPHONE ENCOUNTER
Patient calls back. Home Health Care gave her the idea that would be a good idea to get a handicap parking permit. She uses a walker, she actually doesn't drive her  drives her everywhere. She thinks with her health problems, age and the weather here it would be a good idea.

## 2023-02-06 NOTE — TELEPHONE ENCOUNTER
Patient returned call. She had a parking placard about 10 to 20 years ago after hip replacement. But nothing more recent     She uses a walker now for stabliltiy after 2 strokes but her spouse drives her everywhere.     *pl see additional notes in previous encounter

## 2023-02-08 ENCOUNTER — LAB (OUTPATIENT)
Dept: ONCOLOGY | Facility: CLINIC | Age: 83
End: 2023-02-08
Attending: INTERNAL MEDICINE
Payer: MEDICARE

## 2023-02-08 DIAGNOSIS — D64.9 ANEMIA, UNSPECIFIED TYPE: ICD-10-CM

## 2023-02-08 LAB
BASOPHILS # BLD AUTO: 0 10E3/UL (ref 0–0.2)
BASOPHILS NFR BLD AUTO: 0 %
EOSINOPHIL # BLD AUTO: 0.1 10E3/UL (ref 0–0.7)
EOSINOPHIL NFR BLD AUTO: 1 %
ERYTHROCYTE [DISTWIDTH] IN BLOOD BY AUTOMATED COUNT: 12.3 % (ref 10–15)
FERRITIN SERPL-MCNC: 47 NG/ML (ref 11–328)
FOLATE SERPL-MCNC: 36.9 NG/ML (ref 4.6–34.8)
HCT VFR BLD AUTO: 38.6 % (ref 35–47)
HGB BLD-MCNC: 12.1 G/DL (ref 11.7–15.7)
IMM GRANULOCYTES # BLD: 0 10E3/UL
IMM GRANULOCYTES NFR BLD: 0 %
IRON BINDING CAPACITY (ROCHE): 366 UG/DL (ref 240–430)
IRON SATN MFR SERPL: 20 % (ref 15–46)
IRON SERPL-MCNC: 75 UG/DL (ref 37–145)
LDH SERPL L TO P-CCNC: 168 U/L (ref 0–250)
LYMPHOCYTES # BLD AUTO: 3.2 10E3/UL (ref 0.8–5.3)
LYMPHOCYTES NFR BLD AUTO: 41 %
MCH RBC QN AUTO: 30.1 PG (ref 26.5–33)
MCHC RBC AUTO-ENTMCNC: 31.3 G/DL (ref 31.5–36.5)
MCV RBC AUTO: 96 FL (ref 78–100)
MONOCYTES # BLD AUTO: 0.7 10E3/UL (ref 0–1.3)
MONOCYTES NFR BLD AUTO: 8 %
NEUTROPHILS # BLD AUTO: 3.7 10E3/UL (ref 1.6–8.3)
NEUTROPHILS NFR BLD AUTO: 50 %
NRBC # BLD AUTO: 0 10E3/UL
NRBC BLD AUTO-RTO: 0 /100
PLATELET # BLD AUTO: 312 10E3/UL (ref 150–450)
RBC # BLD AUTO: 4.02 10E6/UL (ref 3.8–5.2)
VIT B12 SERPL-MCNC: 1040 PG/ML (ref 232–1245)
WBC # BLD AUTO: 7.7 10E3/UL (ref 4–11)

## 2023-02-08 PROCEDURE — 82607 VITAMIN B-12: CPT | Performed by: INTERNAL MEDICINE

## 2023-02-08 PROCEDURE — 83615 LACTATE (LD) (LDH) ENZYME: CPT | Performed by: INTERNAL MEDICINE

## 2023-02-08 PROCEDURE — 83550 IRON BINDING TEST: CPT | Performed by: INTERNAL MEDICINE

## 2023-02-08 PROCEDURE — 82746 ASSAY OF FOLIC ACID SERUM: CPT | Performed by: INTERNAL MEDICINE

## 2023-02-08 PROCEDURE — 82728 ASSAY OF FERRITIN: CPT | Performed by: INTERNAL MEDICINE

## 2023-02-08 PROCEDURE — 85004 AUTOMATED DIFF WBC COUNT: CPT | Performed by: INTERNAL MEDICINE

## 2023-02-08 PROCEDURE — 36415 COLL VENOUS BLD VENIPUNCTURE: CPT

## 2023-02-08 NOTE — PROGRESS NOTES
Medical Assistant Note:  Candida Rose presents today for blood draw.    Patient seen by provider today: No.   present during visit today: Not Applicable.    Concerns: No Concerns.    Procedure:  Labs drawn    Post Assessment:  Labs drawn without difficulty: Yes.    Discharge Plan:  Departure Mode: Ambulatory.    Face to Face Time: 10 min.    Liya Clark CMA

## 2023-02-08 NOTE — TELEPHONE ENCOUNTER
Please schedule a video appointment, and I will explain the patient's the options she has regarding her request.    Form not signed

## 2023-02-10 ENCOUNTER — ONCOLOGY VISIT (OUTPATIENT)
Dept: ONCOLOGY | Facility: CLINIC | Age: 83
End: 2023-02-10
Attending: INTERNAL MEDICINE
Payer: MEDICARE

## 2023-02-10 VITALS
BODY MASS INDEX: 27.54 KG/M2 | DIASTOLIC BLOOD PRESSURE: 68 MMHG | TEMPERATURE: 97.9 F | SYSTOLIC BLOOD PRESSURE: 166 MMHG | RESPIRATION RATE: 16 BRPM | OXYGEN SATURATION: 100 % | HEIGHT: 68 IN | WEIGHT: 181.7 LBS | HEART RATE: 50 BPM

## 2023-02-10 DIAGNOSIS — D64.9 ANEMIA, UNSPECIFIED TYPE: Primary | ICD-10-CM

## 2023-02-10 DIAGNOSIS — I63.9 RECURRENT STROKES (H): ICD-10-CM

## 2023-02-10 PROCEDURE — G0463 HOSPITAL OUTPT CLINIC VISIT: HCPCS | Performed by: INTERNAL MEDICINE

## 2023-02-10 PROCEDURE — 99214 OFFICE O/P EST MOD 30 MIN: CPT | Performed by: INTERNAL MEDICINE

## 2023-02-10 ASSESSMENT — PAIN SCALES - GENERAL: PAINLEVEL: NO PAIN (0)

## 2023-02-10 NOTE — NURSING NOTE
"Oncology Rooming Note    February 10, 2023 11:27 AM   Candida Rose is a 82 year old female who presents for:    Chief Complaint   Patient presents with     Oncology Clinic Visit     Anemia, unspecified     Initial Vitals: BP (!) 166/68   Pulse 50   Temp 97.9  F (36.6  C) (Tympanic)   Resp 16   Ht 1.727 m (5' 8\")   Wt 82.4 kg (181 lb 11.2 oz)   LMP  (LMP Unknown)   SpO2 100%   BMI 27.63 kg/m   Estimated body mass index is 27.63 kg/m  as calculated from the following:    Height as of this encounter: 1.727 m (5' 8\").    Weight as of this encounter: 82.4 kg (181 lb 11.2 oz). Body surface area is 1.99 meters squared.  No Pain (0) Comment: Data Unavailable   No LMP recorded (lmp unknown). Patient is postmenopausal.  Allergies reviewed: Yes  Medications reviewed: Yes    Medications: Medication refills not needed today.  Pharmacy name entered into [facility]: [id number] [place], [place] - [address] [place] [address]    Clinical concerns: f/u       Roxi Wing, CMA            '  "

## 2023-02-10 NOTE — LETTER
2/10/2023         RE: Candida Rose  [address] [facility] [id number]        Dear Colleague,    Thank you for referring your patient, Candida Rose, to the [facility]. Please see a copy of my visit note below.    [facility]    Hematology/Oncology Established Patient Note      Today's Date: 2/10/23    Reason for Consultation: Anemia, unspecified  Referring Provider: Chani Peoples MD      HISTORY OF PRESENT ILLNESS: Candida Rose is an 82 year old female who presents with anemia. 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    Patient has evidence of chronic NC/NC anemia since 2017 with ofelia of 7.8 in April 2017. On 7/13/22, WBC 7.2, Hb 10.3, MCV 92, . B12 1759, ferritin 110, folate 32.8, iron 50, TIBC 341, iron sat 15%.    She has had intentional 70 lb weight loss via a clinical trial and weight watchers over 2 years. No hematura, hematochezia/melena. No vaginal bleed.     Cancer screening:  Mammogram UTD and no history of abnormal/breast biopsy.  Colonoscopies UTD 2014 (normal; no further follow up).    No history of abnormal pap smears.     Sister was diagnosed with colon cancer at age 60 and passed away soon after diagnosis.     She is a retired .       INTERIM HISTORY:  She denies gross evidence of bleed. She is on aspirin.    No falls. She is working with PT/OT.      REVIEW OF SYSTEMS:   A 14 point ROS was reviewed with pertinent positives and negatives in the HPI.        HOME MEDICATIONS:  Current Outpatient Medications   Medication Sig Dispense Refill     aspirin 81 MG EC tablet Take 81 mg by mouth daily       atenolol (TENORMIN) 25 MG tablet Take 1 tablet (25 mg) by mouth daily 90 tablet 1     atorvastatin (LIPITOR) 40 MG tablet Take 1 tablet (40 mg) by mouth every evening 90 tablet 1     magnesium  "500 MG TABS Take 500 mg by mouth daily       Multiple Vitamins-Minerals (MULTIVITAMIN & MINERAL PO) Take 1 tablet by mouth daily           ALLERGIES:  Allergies   Allergen Reactions     Lisinopril      Persistent cough         PAST MEDICAL HISTORY:  Past Medical History:   Diagnosis Date     Anxiety state, unspecified     inactive     Cataract      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 F/u dr. Soto     OA (osteoarthritis)     s/p bilat total hips      Other chronic pain     Joint pain for many years     Unspecified cerebral artery occlusion with cerebral infarction     After Moyamoya surgery > 10 yrs ago.     Vitamin D deficiencies          PAST SURGICAL HISTORY:  Past Surgical History:   Procedure Laterality Date     ARTHROPLASTY KNEE Left 4/17/2017    Procedure: ARTHROPLASTY KNEE;  Left total knee arthroplasty;  Surgeon: Chidi Jenkins MD;  Location:  OR     COLONOSCOPY  2008    normal     COLONOSCOPY  7/17/2014    Procedure: COLONOSCOPY;  Surgeon: Raul Nolan DO;  Location:  GI     [facility]  \"Pt had repair of blood flow.\"     IR CAROTID ANGIOGRAM  10/30/2006     IR CAROTID ANGIOGRAM  6/6/2010     IR CAROTID ANGIOGRAM  6/6/2010     IR CAROTID ANGIOGRAM  6/6/2010     IR CAROTID ANGIOGRAM  5/12/2011     IR CAROTID ANGIOGRAM  5/12/2011     IR CAROTID ANGIOGRAM  5/12/2011     IR CAROTID ANGIOGRAM  6/5/2012     IR CAROTID ANGIOGRAM  6/5/2012     IR CAROTID ANGIOGRAM  6/5/2012     IR CAROTID CEREBRAL ANGIOGRAM BILATERAL  10/7/2022     IR MISCELLANEOUS PROCEDURE  6/6/2010     IR MISCELLANEOUS PROCEDURE  6/6/2010     IR MISCELLANEOUS " PROCEDURE  5/12/2011     IR MISCELLANEOUS PROCEDURE  6/5/2012     RECONSTRUCT FOREFOOT WITH METATARSOPHALANGEAL (MTP) FUSION Left 8/21/2014    Procedure: RECONSTRUCT FOREFOOT WITH METATARSOPHALANGEAL (MTP) FUSION;  Surgeon: Tres Merlos DPM;  Location:  OR     [facility] NONSPECIFIC PROCEDURE  10/30/06    neurosurgery Dr Soto     [facility] NONSPECIFIC PROCEDURE      bilateral total hips approx 2002     [facility] NONSPECIFIC PROCEDURE  3/07    neurosurgery          SOCIAL HISTORY:  Social History     Socioeconomic History     Marital status:      Spouse name: Olu      Number of children: 2     Years of education: Not on file     Highest education level: Not on file   Occupational History     Employer: RETIRED   Tobacco Use     Smoking status: Never     Passive exposure: Never     Smokeless tobacco: Never   Vaping Use     Vaping Use: Never used   Substance and Sexual Activity     Alcohol use: Not Currently     Drug use: No     Sexual activity: Never     Partners: Male   Other Topics Concern     Parent/sibling w/ CABG, MI or angioplasty before 65F 55M? Not Asked   Social History Narrative     Not on file     Social Determinants of Health     Financial Resource Strain: Low Risk      Difficulty of Paying Living Expenses: Not hard at all   Food Insecurity: No Food Insecurity     Worried About Running Out of Food in the Last Year: Never true     Ran Out of Food in the Last Year: Never true   Transportation Needs: No Transportation Needs     Lack of Transportation (Medical): No     Lack of Transportation (Non-Medical): No   Physical Activity: Not on file   Stress: Not on file   Social Connections: Not on file   Intimate Partner Violence: Not At Risk     Fear of Current or Ex-Partner: No     Emotionally Abused: No     Physically Abused: No     Sexually Abused: No   Housing Stability: Unknown     Unable to Pay for Housing in the Last Year: No     Number of Places Lived in the Last Year: Not on file     Unstable Housing in  "the Last Year: No         FAMILY HISTORY:  Family History   Problem Relation Age of Onset     Obesity Sister         gastric by-pass age age 60, heart murmur.     Cancer - colorectal Sister 69     Heart Disease Father         mi,  age 48     Family History Negative Mother         killed in MVA age 54     Cancer Maternal Aunt         lung cancer ,non-smoker     Lung Cancer Maternal Aunt      Breast Cancer Daughter 48     Ovarian Cancer No family hx of          PHYSICAL EXAM:  Vital signs:  BP (!) 166/68   Pulse 50   Temp 97.9  F (36.6  C) (Tympanic)   Resp 16   Ht 1.727 m (5' 8\")   Wt 82.4 kg (181 lb 11.2 oz)   LMP  (LMP Unknown)   SpO2 100%   BMI 27.63 kg/m     ECO  GENERAL/CONSTITUTIONAL: No acute distress.   RESPIRATORY: Clear to auscultation bilaterally. No crackles or wheezing.   CARDIOVASCULAR: Regular rate and rhythm without murmurs, gallops, or rubs.  GASTROINTESTINAL: No hepatosplenomegaly, masses, or tenderness. The patient has normal bowel sounds. No guarding.  No distention.  MUSCULOSKELETAL: Warm and well-perfused, no cyanosis, clubbing. Chronic lymphedema.  NEUROLOGIC: Cranial nerves II-XII are intact. Alert, oriented, answers questions appropriately.  INTEGUMENTARY: No rashes or jaundice.  GAIT: Walker.       LABS:   Latest Reference Range & Units 23 08:26   Ferritin 11 - 328 ng/mL 47   Folate 4.6 - 34.8 ng/mL 36.9 (H)   Iron 37 - 145 ug/dL 75   Iron Binding Capacity 240 - 430 ug/dL 366   Iron Sat Index 15 - 46 % 20   Lactate Dehydrogenase 0 - 250 U/L 168   Vitamin B12 232 - 1,245 pg/mL 1,040   WBC 4.0 - 11.0 10e3/uL 7.7   Hemoglobin 11.7 - 15.7 g/dL 12.1   Hematocrit 35.0 - 47.0 % 38.6   Platelet Count 150 - 450 10e3/uL 312   RBC Count 3.80 - 5.20 10e6/uL 4.02   MCV 78 - 100 fL 96   MCH 26.5 - 33.0 pg 30.1   MCHC 31.5 - 36.5 g/dL 31.3 (L)   RDW 10.0 - 15.0 % 12.3   % Neutrophils % 50   % Lymphocytes % 41   % Monocytes % 8   % Eosinophils % 1   % Basophils % 0   Absolute " Basophils 0.0 - 0.2 10e3/uL 0.0   Absolute Eosinophils 0.0 - 0.7 10e3/uL 0.1   Absolute Immature Granulocytes <=0.4 10e3/uL 0.0   Absolute Lymphocytes 0.8 - 5.3 10e3/uL 3.2   Absolute Monocytes 0.0 - 1.3 10e3/uL 0.7   % Immature Granulocytes % 0   Absolute Neutrophils 1.6 - 8.3 10e3/uL 3.7   Absolute NRBCs 10e3/uL 0.0   NRBCs per 100 WBC <1 /100 0       Serum M-protein (g/dL):  9/2/22: 0.0    Total IgG (mg/dL), IgA (mg/dL), IgM (mg/dL):  9/2/22: 979, 127, 55    Free kappa light chains (mg/dL), lambda (mg/dL), kappa/lambda ratio:  9/2/22: 2.62, 1.99, 1.32    PATHOLOGY:  Final Diagnosis 9/2/22:   Peripheral blood:  --Slight normochromic normocytic anemia.         IMAGING:  CT Head 10/4/22:  IMPRESSION:   1. Questionable subacute ischemic infarct at the anterolateral aspect  of the right frontal lobe. Clinical correlation recommended. Brain MRI  may be helpful for better evaluation.  2. Chronic ischemic infarcts at the temporo-occipital junction on the  left and within the right occipital lobe again noted consistent with  chronic ischemic infarcts.  3. Postoperative changes again noted.  4. Diffuse cerebral volume loss and cerebral white matter changes  consistent with chronic small vessel ischemic disease.    MRI/MRA H&N:  IMPRESSION:  HEAD MRI:   1.  Acute infarct in the right frontal lobe.  2.  Chronic small vessel ischemic disease with numerous chronic infarcts.     HEAD MRA:   1.  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  2.  Occlusion of the right middle cerebral artery. 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  3.  Additional multifocal moderate and severe intracranial stenoses as described above.     NECK MRA:  1.  Normal appearance of the right cervical internal carotid artery with absence of flow related enhancement on noncontrast images but presence of contrast enhancement. Findings are suggestive of severe stenosis with retrograde flow via collaterals.  2.   Focal moderately severe stenosis of the distal right vertebral artery.    MRI Brain 10/6/22:  IMPRESSION:  1.  Moderate-sized infarct in the right frontal lobe is slightly progressed in overall volume when compared to MRI dated 10/4/2022. No evidence for hemorrhagic transformation.  2.  Age-related changes with multiple chronic infarcts elsewhere as described.  3.  Loss of the normal right ICA flow void compatible with proximal occlusion.    CTA A/P 10/7/22:  IMPRESSION:  1.  Superficial right groin hematoma without evidence of active hemorrhage within the visualized groin and upper thigh.    CT Head 10/8/22:  IMPRESSION:  1.  Expected evolution of moderate size region of acute infarction within the right middle cerebral artery territory since 10/05/2022. No intracranial hemorrhage.  2.  Stable chronic regions of infarction within the left middle cerebral artery posterior division territory and right posterior cerebral artery territory.  3.  Stable postsurgical changes of bilateral parietal and temporal craniotomy with underlying minimal lateral temporal encephalomalacia.  4.  Stable mild chronic small vessel ischemic disease and mild to moderate generalized brain parenchymal volume loss.    MRI Brain 10/9/22:  IMPRESSION:  1.  Numerous new tiny punctate and small patchy foci of acute infarction within the bilateral anterior cerebral artery territories (right greater than left), right posterior cerebral artery territory and right middle cerebral artery territory since   10/06/2022.  2.  Expected evolution of late acute/early subacute infarcts throughout the right middle cerebral artery territory since 10/06/2022.  3.  Stable chronic cortical infarcts within the bilateral middle cerebral artery posterior division territories and right posterior cerebral artery territory.  4.  Stable mild to moderate chronic small vessel ischemic disease and generalized brain parenchymal volume loss.      ASSESSMENT/PLAN:  Candida RUCKER  Milton is an 82 year old female who presents with anemia. 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    1) Chronic NC/NC anemia, improved and stable  -7/2022: iron studies, B12, folate, TSH WNL.  -She denies gross evidence of bleed.  -Discussed with patient to closely monitor urine and stools for bleed. If she develops iron deficiency, will need repeat GI workup (last colonoscopy in 2014 negative).   -SPIEP negative.   -Hemoglobin decreased into the 8's with her hospitalization.   -CBC is fully recovered on 2/8/23 with WBC 7.7, normal diff, Hb 2.1, MCV 96, .     2) Subacute multiple infarcts with history of CVA and Fitch-Fitch syndrome  -Hospitalized with imaging showing numerous acute/subacute infarcts of the B/L anterior cerebral artery territories, right posterior cerebral artery, and right middle cerebral artery. No hemorrhage.  -She is followed by PCP, Neurology, and Cardiology.  -She was on aspirin and plavix and then placed on xarelto. At our last visit, she was off of anticoag/antiplatelet therapy. She remains on aspirin 81 mg daily at this time.  -I reviewed with patient her history of infarct. She states she had infarct with her first surgery for Fitch-Fitch. She denies history of PE/DVT/VTE. She has no history of miscarriage. No family history of VTE. I reviewed with her the possibility of an underlying hypercoag disorder. We can test these labs with our next visit. She is agreeable to this.    3) History of HTN, HLD    4) Repeat labs in 6 months with CBC, iron studies, hypercoag workup with follow up shortly after.         Kary Johns DO  Hematology/Oncology  [facility]        Again, thank you for allowing me to participate in the care of your patient.        Sincerely,        Kary Johns DO

## 2023-02-10 NOTE — PROGRESS NOTES
[facility]    Hematology/Oncology Established Patient Note      Today's Date: 2/10/23    Reason for Consultation: Anemia, unspecified  Referring Provider: Chani Peoples MD      HISTORY OF PRESENT ILLNESS: Candida Roes is an 82 year old female who presents with anemia. 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    Patient has evidence of chronic NC/NC anemia since 2017 with ofelia of 7.8 in April 2017. On 7/13/22, WBC 7.2, Hb 10.3, MCV 92, . B12 1759, ferritin 110, folate 32.8, iron 50, TIBC 341, iron sat 15%.    She has had intentional 70 lb weight loss via a clinical trial and weight watchers over 2 years. No hematura, hematochezia/melena. No vaginal bleed.     Cancer screening:  Mammogram UTD and no history of abnormal/breast biopsy.  Colonoscopies UTD 2014 (normal; no further follow up).    No history of abnormal pap smears.     Sister was diagnosed with colon cancer at age 60 and passed away soon after diagnosis.     She is a retired .       INTERIM HISTORY:  She denies gross evidence of bleed. She is on aspirin.    No falls. She is working with PT/OT.      REVIEW OF SYSTEMS:   A 14 point ROS was reviewed with pertinent positives and negatives in the HPI.        HOME MEDICATIONS:  Current Outpatient Medications   Medication Sig Dispense Refill     aspirin 81 MG EC tablet Take 81 mg by mouth daily       atenolol (TENORMIN) 25 MG tablet Take 1 tablet (25 mg) by mouth daily 90 tablet 1     atorvastatin (LIPITOR) 40 MG tablet Take 1 tablet (40 mg) by mouth every evening 90 tablet 1     magnesium 500 MG TABS Take 500 mg by mouth daily       Multiple Vitamins-Minerals (MULTIVITAMIN & MINERAL PO) Take 1 tablet by mouth daily           ALLERGIES:  Allergies   Allergen Reactions     Lisinopril      Persistent cough         PAST MEDICAL HISTORY:  Past  "Medical History:   Diagnosis Date     Anxiety state, unspecified     inactive     Cataract      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 F/u dr. Soto     OA (osteoarthritis)     s/p bilat total hips      Other chronic pain     Joint pain for many years     Unspecified cerebral artery occlusion with cerebral infarction     After Moyamoya surgery > 10 yrs ago.     Vitamin D deficiencies          PAST SURGICAL HISTORY:  Past Surgical History:   Procedure Laterality Date     ARTHROPLASTY KNEE Left 4/17/2017    Procedure: ARTHROPLASTY KNEE;  Left total knee arthroplasty;  Surgeon: Chidi Jenkins MD;  Location: [facility] OR     COLONOSCOPY  2008    normal     COLONOSCOPY  7/17/2014    Procedure: COLONOSCOPY;  Surgeon: Raul Nolan DO;  Location:  GI     CRANIOTOMY      [facility]  \"Pt had repair of blood flow.\"     IR CAROTID ANGIOGRAM  10/30/2006     IR CAROTID ANGIOGRAM  6/6/2010     IR CAROTID ANGIOGRAM  6/6/2010     IR CAROTID ANGIOGRAM  6/6/2010     IR CAROTID ANGIOGRAM  5/12/2011     IR CAROTID ANGIOGRAM  5/12/2011     IR CAROTID ANGIOGRAM  5/12/2011     IR CAROTID ANGIOGRAM  6/5/2012     IR CAROTID ANGIOGRAM  6/5/2012     IR CAROTID ANGIOGRAM  6/5/2012     IR CAROTID CEREBRAL ANGIOGRAM BILATERAL  10/7/2022     IR MISCELLANEOUS PROCEDURE  6/6/2010     IR MISCELLANEOUS PROCEDURE  6/6/2010     IR MISCELLANEOUS PROCEDURE  5/12/2011     IR MISCELLANEOUS PROCEDURE  6/5/2012     RECONSTRUCT FOREFOOT WITH METATARSOPHALANGEAL (MTP) FUSION Left 8/21/2014    Procedure: RECONSTRUCT FOREFOOT WITH METATARSOPHALANGEAL (MTP) FUSION;  Surgeon: Tres Merlos, DPOLEG;  " Location:  OR     ZZC NONSPECIFIC PROCEDURE  10/30/06    neurosurgery Dr Soto     [facility] NONSPECIFIC PROCEDURE      bilateral total hips approx 2002     ZZ NONSPECIFIC PROCEDURE  3/07    neurosurgery          SOCIAL HISTORY:  Social History     Socioeconomic History     Marital status:      Spouse name: Olu      Number of children: 2     Years of education: Not on file     Highest education level: Not on file   Occupational History     Employer: RETIRED   Tobacco Use     Smoking status: Never     Passive exposure: Never     Smokeless tobacco: Never   Vaping Use     Vaping Use: Never used   Substance and Sexual Activity     Alcohol use: Not Currently     Drug use: No     Sexual activity: Never     Partners: Male   Other Topics Concern     Parent/sibling w/ CABG, MI or angioplasty before 65F 55M? Not Asked   Social History Narrative     Not on file     Social Determinants of Health     Financial Resource Strain: Low Risk      Difficulty of Paying Living Expenses: Not hard at all   Food Insecurity: No Food Insecurity     Worried About Running Out of Food in the Last Year: Never true     Ran Out of Food in the Last Year: Never true   Transportation Needs: No Transportation Needs     Lack of Transportation (Medical): No     Lack of Transportation (Non-Medical): No   Physical Activity: Not on file   Stress: Not on file   Social Connections: Not on file   Intimate Partner Violence: Not At Risk     Fear of Current or Ex-Partner: No     Emotionally Abused: No     Physically Abused: No     Sexually Abused: No   Housing Stability: Unknown     Unable to Pay for Housing in the Last Year: No     Number of Places Lived in the Last Year: Not on file     Unstable Housing in the Last Year: No         FAMILY HISTORY:  Family History   Problem Relation Age of Onset     Obesity Sister         gastric by-pass age age 60, heart murmur.     Cancer - colorectal Sister 69     Heart Disease Father         mi,  age 48      "Family History Negative Mother         killed in MVA age 54     Cancer Maternal Aunt         lung cancer ,non-smoker     Lung Cancer Maternal Aunt      Breast Cancer Daughter 48     Ovarian Cancer No family hx of          PHYSICAL EXAM:  Vital signs:  BP (!) 166/68   Pulse 50   Temp 97.9  F (36.6  C) (Tympanic)   Resp 16   Ht 1.727 m (5' 8\")   Wt 82.4 kg (181 lb 11.2 oz)   LMP  (LMP Unknown)   SpO2 100%   BMI 27.63 kg/m     ECO  GENERAL/CONSTITUTIONAL: No acute distress.   RESPIRATORY: Clear to auscultation bilaterally. No crackles or wheezing.   CARDIOVASCULAR: Regular rate and rhythm without murmurs, gallops, or rubs.  GASTROINTESTINAL: No hepatosplenomegaly, masses, or tenderness. The patient has normal bowel sounds. No guarding.  No distention.  MUSCULOSKELETAL: Warm and well-perfused, no cyanosis, clubbing. Chronic lymphedema.  NEUROLOGIC: Cranial nerves II-XII are intact. Alert, oriented, answers questions appropriately.  INTEGUMENTARY: No rashes or jaundice.  GAIT: Walker.       LABS:   Latest Reference Range & Units 23 08:26   Ferritin 11 - 328 ng/mL 47   Folate 4.6 - 34.8 ng/mL 36.9 (H)   Iron 37 - 145 ug/dL 75   Iron Binding Capacity 240 - 430 ug/dL 366   Iron Sat Index 15 - 46 % 20   Lactate Dehydrogenase 0 - 250 U/L 168   Vitamin B12 232 - 1,245 pg/mL 1,040   WBC 4.0 - 11.0 10e3/uL 7.7   Hemoglobin 11.7 - 15.7 g/dL 12.1   Hematocrit 35.0 - 47.0 % 38.6   Platelet Count 150 - 450 10e3/uL 312   RBC Count 3.80 - 5.20 10e6/uL 4.02   MCV 78 - 100 fL 96   MCH 26.5 - 33.0 pg 30.1   MCHC 31.5 - 36.5 g/dL 31.3 (L)   RDW 10.0 - 15.0 % 12.3   % Neutrophils % 50   % Lymphocytes % 41   % Monocytes % 8   % Eosinophils % 1   % Basophils % 0   Absolute Basophils 0.0 - 0.2 10e3/uL 0.0   Absolute Eosinophils 0.0 - 0.7 10e3/uL 0.1   Absolute Immature Granulocytes <=0.4 10e3/uL 0.0   Absolute Lymphocytes 0.8 - 5.3 10e3/uL 3.2   Absolute Monocytes 0.0 - 1.3 10e3/uL 0.7   % Immature Granulocytes % 0 "   Absolute Neutrophils 1.6 - 8.3 10e3/uL 3.7   Absolute NRBCs 10e3/uL 0.0   NRBCs per 100 WBC <1 /100 0       Serum M-protein (g/dL):  9/2/22: 0.0    Total IgG (mg/dL), IgA (mg/dL), IgM (mg/dL):  9/2/22: 979, 127, 55    Free kappa light chains (mg/dL), lambda (mg/dL), kappa/lambda ratio:  9/2/22: 2.62, 1.99, 1.32    PATHOLOGY:  Final Diagnosis 9/2/22:   Peripheral blood:  --Slight normochromic normocytic anemia.         IMAGING:  CT Head 10/4/22:  IMPRESSION:   1. Questionable subacute ischemic infarct at the anterolateral aspect  of the right frontal lobe. Clinical correlation recommended. Brain MRI  may be helpful for better evaluation.  2. Chronic ischemic infarcts at the temporo-occipital junction on the  left and within the right occipital lobe again noted consistent with  chronic ischemic infarcts.  3. Postoperative changes again noted.  4. Diffuse cerebral volume loss and cerebral white matter changes  consistent with chronic small vessel ischemic disease.    MRI/MRA H&N:  IMPRESSION:  HEAD MRI:   1.  Acute infarct in the right frontal lobe.  2.  Chronic small vessel ischemic disease with numerous chronic infarcts.     HEAD MRA:   1.  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  2.  Occlusion of the right middle cerebral artery. 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  3.  Additional multifocal moderate and severe intracranial stenoses as described above.     NECK MRA:  1.  Normal appearance of the right cervical internal carotid artery with absence of flow related enhancement on noncontrast images but presence of contrast enhancement. Findings are suggestive of severe stenosis with retrograde flow via collaterals.  2.  Focal moderately severe stenosis of the distal right vertebral artery.    MRI Brain 10/6/22:  IMPRESSION:  1.  Moderate-sized infarct in the right frontal lobe is slightly progressed in overall volume when compared to MRI dated 10/4/2022. No  evidence for hemorrhagic transformation.  2.  Age-related changes with multiple chronic infarcts elsewhere as described.  3.  Loss of the normal right ICA flow void compatible with proximal occlusion.    CTA A/P 10/7/22:  IMPRESSION:  1.  Superficial right groin hematoma without evidence of active hemorrhage within the visualized groin and upper thigh.    CT Head 10/8/22:  IMPRESSION:  1.  Expected evolution of moderate size region of acute infarction within the right middle cerebral artery territory since 10/05/2022. No intracranial hemorrhage.  2.  Stable chronic regions of infarction within the left middle cerebral artery posterior division territory and right posterior cerebral artery territory.  3.  Stable postsurgical changes of bilateral parietal and temporal craniotomy with underlying minimal lateral temporal encephalomalacia.  4.  Stable mild chronic small vessel ischemic disease and mild to moderate generalized brain parenchymal volume loss.    MRI Brain 10/9/22:  IMPRESSION:  1.  Numerous new tiny punctate and small patchy foci of acute infarction within the bilateral anterior cerebral artery territories (right greater than left), right posterior cerebral artery territory and right middle cerebral artery territory since   10/06/2022.  2.  Expected evolution of late acute/early subacute infarcts throughout the right middle cerebral artery territory since 10/06/2022.  3.  Stable chronic cortical infarcts within the bilateral middle cerebral artery posterior division territories and right posterior cerebral artery territory.  4.  Stable mild to moderate chronic small vessel ischemic disease and generalized brain parenchymal volume loss.      ASSESSMENT/PLAN:  Candida Rose is an 82 year old female who presents with anemia. 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    1) Chronic NC/NC anemia,  improved and stable  -7/2022: iron studies, B12, folate, TSH WNL.  -She denies gross evidence of bleed.  -Discussed with patient to closely monitor urine and stools for bleed. If she develops iron deficiency, will need repeat GI workup (last colonoscopy in 2014 negative).   -SPIEP negative.   -Hemoglobin decreased into the 8's with her hospitalization.   -CBC is fully recovered on 2/8/23 with WBC 7.7, normal diff, Hb 2.1, MCV 96, .     2) Subacute multiple infarcts with history of CVA and Barbour-Barbour syndrome  -Hospitalized with imaging showing numerous acute/subacute infarcts of the B/L anterior cerebral artery territories, right posterior cerebral artery, and right middle cerebral artery. No hemorrhage.  -She is followed by PCP, Neurology, and Cardiology.  -She was on aspirin and plavix and then placed on xarelto. At our last visit, she was off of anticoag/antiplatelet therapy. She remains on aspirin 81 mg daily at this time.  -I reviewed with patient her history of infarct. She states she had infarct with her first surgery for Barbour-Barbour. She denies history of PE/DVT/VTE. She has no history of miscarriage. No family history of VTE. I reviewed with her the possibility of an underlying hypercoag disorder. We can test these labs with our next visit. She is agreeable to this.    3) History of HTN, HLD    4) Repeat labs in 6 months with CBC, iron studies, hypercoag workup with follow up shortly after.         Kary Johns,   Hematology/Oncology  [facility]

## 2023-02-13 ENCOUNTER — HOSPITAL ENCOUNTER (OUTPATIENT)
Dept: MAMMOGRAPHY | Facility: CLINIC | Age: 83
Discharge: HOME | End: 2023-02-13
Attending: INTERNAL MEDICINE | Admitting: INTERNAL MEDICINE
Payer: MEDICARE

## 2023-02-13 DIAGNOSIS — Z12.31 VISIT FOR SCREENING MAMMOGRAM: ICD-10-CM

## 2023-02-13 PROCEDURE — 77067 SCR MAMMO BI INCL CAD: CPT

## 2023-02-13 NOTE — TELEPHONE ENCOUNTER
Atenolol      Last Written Prescription Date: 12/01/16  Last Fill Quantity: 90, # refills: 1    Last Office Visit with [facility], [facility] or [facility] prescribing provider:  05/18/17 Trygstaerin   Future Office Visit:    Next 5 appointments (look out 90 days)     May 25, 2017 10:00 AM CDT   Return Visit with Carlos Paz PA-C   [facility] ([facility])    [address] [address] [address]   [phone]                    BP Readings from Last 3 Encounters:   05/18/17 150/78   05/04/17 98/60   04/28/17 96/66       
Routing refill request to provider for review/approval because:  Labs out of range:  /78        
Intoxication

## 2023-03-26 ENCOUNTER — HEALTH MAINTENANCE LETTER (OUTPATIENT)
Age: 83
End: 2023-03-26

## 2023-04-17 ENCOUNTER — TELEPHONE (OUTPATIENT)
Dept: INTERNAL MEDICINE | Facility: CLINIC | Age: 83
End: 2023-04-17
Payer: MEDICARE

## 2023-04-17 NOTE — TELEPHONE ENCOUNTER
Patient Quality Outreach    Patient is due for the following:   Hypertension -  BP check    Next Steps:   Schedule a office visit for blood pressure follow up.    Type of outreach:    Phone, left message for patient/parent to call back.    Next Steps:  Reach out within 90 days via [employer].    Max number of attempts reached: No. Will try again in 90 days if patient still on fail list.    Questions for provider review:    None     Regina Zhao             Family mother/Family

## 2023-05-17 ENCOUNTER — OFFICE VISIT (OUTPATIENT)
Dept: CARDIOLOGY | Facility: CLINIC | Age: 83
End: 2023-05-17
Attending: INTERNAL MEDICINE
Payer: MEDICARE

## 2023-05-17 VITALS
SYSTOLIC BLOOD PRESSURE: 136 MMHG | DIASTOLIC BLOOD PRESSURE: 70 MMHG | HEIGHT: 68 IN | WEIGHT: 193.2 LBS | HEART RATE: 60 BPM | BODY MASS INDEX: 29.28 KG/M2

## 2023-05-17 DIAGNOSIS — Z86.73 HISTORY OF CVA (CEREBROVASCULAR ACCIDENT): ICD-10-CM

## 2023-05-17 PROCEDURE — 99214 OFFICE O/P EST MOD 30 MIN: CPT | Performed by: INTERNAL MEDICINE

## 2023-05-17 NOTE — LETTER
5/17/2023    Chani Peoples MD  [address] [facility] [address]    RE: Candida Rose       Dear Colleague,     I had the pleasure of seeing Candida Rose in the [facility].  HPI and Plan:   The patient is a very pleasant 82-year-old female with history of Moyamoya disease, hypertension, hyperlipidemia, diabetes on multiple prior strokes who is here for follow up.     I saw her this past fall for an evaluation of possible atrial fibrillation.She was hospitalized in October 2022 with acute left frontal lobe ischemic stroke.  She had echocardiogram which showed normal LV function and wall motion.  Patient was discharged on aspirin and Plavix and event monitor although her stroke was likely related to her underlying moyamoya disease.  She was subsequent placed on Xarelto based on concerns for possible atrial fibrillation on the event monitor.  She denied any palpitations or heart racing.    I reviewed the patient's event monitor through October 31.  She was supposed to wear it through November 11 but had technical difficulties.  I did not see evidence of atrial fibrillation on the event monitor.  The rhythm was predominantly sinus with occasional runs of PACs and PVCs. I placed on her another event monitor which revealed no evidence of atrial fibrillation.     IMPRESSION/PLAN:  No clear evidence of atrial fibrillation on two prior event monitoring. Given this and the fact that the patient's with moyamoya disease have increased risk of intracerebral hemorrhage we discontinued anticoagulation. She has done well from cardiac point of view since our last visit.     Follow up as needed. I appreciate the opportunity to participate in her care.    Stephen Echavarria MD      Today's clinic visit entailed:  30 minutes spent by me on the date of the encounter doing chart review, history and exam, documentation and further activities per the note  Provider  Link to [place] Help Grid       Orders  Placed This Encounter   Procedures    Follow-Up with Cardiologist       Orders Placed This Encounter   Medications    UNKNOWN TO PATIENT     Sig: Take 1 tablet by mouth daily Allergy medication - name unknown       There are no discontinued medications.      Encounter Diagnosis   Name Primary?    History of CVA (cerebrovascular accident)        CURRENT MEDICATIONS:  Current Outpatient Medications   Medication Sig Dispense Refill    aspirin 81 MG EC tablet Take 81 mg by mouth daily      atenolol (TENORMIN) 25 MG tablet Take 1 tablet (25 mg) by mouth daily 90 tablet 1    atorvastatin (LIPITOR) 40 MG tablet TAKE 1 TABLET BY MOUTH EVERY EVENING 90 tablet 0    magnesium 500 MG TABS Take 500 mg by mouth daily      Multiple Vitamins-Minerals (MULTIVITAMIN & MINERAL PO) Take 1 tablet by mouth daily      UNKNOWN TO PATIENT Take 1 tablet by mouth daily Allergy medication - name unknown         ALLERGIES     Allergies   Allergen Reactions    Lisinopril      Persistent cough       PAST MEDICAL HISTORY:  Past Medical History:   Diagnosis Date    Anxiety state, unspecified     inactive    Cataract     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 F/u dr. Soto    OA (osteoarthritis)     s/p bilat total hips     Other chronic pain     Joint pain for many years    Unspecified cerebral artery occlusion with cerebral infarction     After Moyamoya surgery > 10 yrs ago.    Vitamin D deficiencies        PAST SURGICAL HISTORY:  Past Surgical History:   Procedure Laterality Date    ARTHROPLASTY KNEE Left 4/17/2017    Procedure: ARTHROPLASTY KNEE;  Left total knee arthroplasty;  Surgeon: Gregory  "Chidi Raines MD;  Location: [facility] OR    COLONOSCOPY      normal    COLONOSCOPY  2014    Procedure: COLONOSCOPY;  Surgeon: Raul Nolan DO;  Location: [facility] GI    CRANIOTOMY      MOJAMOJA  \"Pt had repair of blood flow.\"    IR CAROTID ANGIOGRAM  10/30/2006    IR CAROTID ANGIOGRAM  2010    IR CAROTID ANGIOGRAM  2010    IR CAROTID ANGIOGRAM  2010    IR CAROTID ANGIOGRAM  2011    IR CAROTID ANGIOGRAM  2011    IR CAROTID ANGIOGRAM  2011    IR CAROTID ANGIOGRAM  2012    IR CAROTID ANGIOGRAM  2012    IR CAROTID ANGIOGRAM  2012    IR CAROTID CEREBRAL ANGIOGRAM BILATERAL  10/7/2022    IR MISCELLANEOUS PROCEDURE  2010    IR MISCELLANEOUS PROCEDURE  2010    IR MISCELLANEOUS PROCEDURE  2011    IR MISCELLANEOUS PROCEDURE  2012    RECONSTRUCT FOREFOOT WITH METATARSOPHALANGEAL (MTP) FUSION Left 2014    Procedure: RECONSTRUCT FOREFOOT WITH METATARSOPHALANGEAL (MTP) FUSION;  Surgeon: Tres Merlos DPM;  Location:  OR    ZZC NONSPECIFIC PROCEDURE  10/30/06    neurosurgery Dr Soto    [facility] NONSPECIFIC PROCEDURE      bilateral total hips approx     ZZ NONSPECIFIC PROCEDURE  3/07    neurosurgery        FAMILY HISTORY:  Family History   Problem Relation Age of Onset    Obesity Sister         gastric by-pass age age 60, heart murmur.    Cancer - colorectal Sister 69    Heart Disease Father         mi,  age 48    Family History Negative Mother         killed in MVA age 54    Cancer Maternal Aunt         lung cancer ,non-smoker    Lung Cancer Maternal Aunt     Breast Cancer Daughter 48    Ovarian Cancer No family hx of        SOCIAL HISTORY:  Social History     Socioeconomic History    Marital status:      Spouse name: Olu     Number of children: 2    Years of education: None    Highest education level: None   Occupational History     Employer: RETIRED   Tobacco Use    Smoking status: Never     Passive exposure: Never    Smokeless tobacco: " "Never   Vaping Use    Vaping status: Never Used     Passive vaping exposure: Yes   Substance and Sexual Activity    Alcohol use: Yes     Comment: 1 beer or wine daily    Drug use: No    Sexual activity: Never     Partners: Male     Social Determinants of Health     Financial Resource Strain: Low Risk  (11/1/2022)    Overall Financial Resource Strain (CARDIA)     Difficulty of Paying Living Expenses: Not hard at all   Food Insecurity: No Food Insecurity (11/1/2022)    Hunger Vital Sign     Worried About Running Out of Food in the Last Year: Never true     Ran Out of Food in the Last Year: Never true   Transportation Needs: No Transportation Needs (11/1/2022)    PRAPARE - Transportation     Lack of Transportation (Medical): No     Lack of Transportation (Non-Medical): No   Intimate Partner Violence: Not At Risk (9/2/2022)    Humiliation, Afraid, Rape, and Kick questionnaire     Fear of Current or Ex-Partner: No     Emotionally Abused: No     Physically Abused: No     Sexually Abused: No   Housing Stability: Unknown (11/1/2022)    Housing Stability Vital Sign     Unable to Pay for Housing in the Last Year: No     Unstable Housing in the Last Year: No       Review of Systems:  Skin:  not assessed       Eyes:  not assessed      ENT:  not assessed      Respiratory:  Negative       Cardiovascular:         Gastroenterology: not assessed      Genitourinary:  not assessed      Musculoskeletal:  not assessed      Neurologic:  not assessed      Psychiatric:  not assessed      Heme/Lymph/Imm:  not assessed      Endocrine:  not assessed        Physical Exam:  Vitals: /70   Pulse 60   Ht 1.727 m (5' 8\")   Wt 87.6 kg (193 lb 3.2 oz)   LMP  (LMP Unknown)   BMI 29.38 kg/m      Constitutional:  cooperative;in no acute distress        Skin:  warm and dry to the touch          Head:  normocephalic        Eyes:  conjunctivae and lids unremarkable        Lymph:      ENT:  no pallor or cyanosis        Neck:  JVP normal;no " carotid bruit        Respiratory:  clear to auscultation         Cardiac: regular rhythm;no murmurs, gallops or rubs detected                pulses full and equal                                        GI:  abdomen soft;non-tender        Extremities and Muscular Skeletal:  no edema              Neurological:  no gross motor deficits        Psych:  Alert and Oriented x 3        CC  Stephen Echavarria MD  [address]  [place] [address]    Thank you for allowing me to participate in the care of your patient.      Sincerely,     Stephen Echavarria MD, MD     [facility]

## 2023-05-27 ASSESSMENT — ENCOUNTER SYMPTOMS
HEARTBURN: 0
COUGH: 0
DIARRHEA: 0
CONSTIPATION: 0
HEADACHES: 0
HEMATURIA: 0
SORE THROAT: 0
FEVER: 0
DYSURIA: 0
WEAKNESS: 0
BREAST MASS: 0
ARTHRALGIAS: 0
MYALGIAS: 0
JOINT SWELLING: 0
SHORTNESS OF BREATH: 0
NAUSEA: 0
HEMATOCHEZIA: 0
PARESTHESIAS: 0
FREQUENCY: 1
DIZZINESS: 0
ABDOMINAL PAIN: 0
NERVOUS/ANXIOUS: 0
CHILLS: 0
PALPITATIONS: 0
EYE PAIN: 0

## 2023-05-27 ASSESSMENT — ACTIVITIES OF DAILY LIVING (ADL): CURRENT_FUNCTION: NO ASSISTANCE NEEDED

## 2023-05-31 NOTE — PROGRESS NOTES
HPI and Plan:   The patient is a very pleasant 82-year-old female with history of Moyamoya disease, hypertension, hyperlipidemia, diabetes on multiple prior strokes who is here for follow up.     I saw her this past fall for an evaluation of possible atrial fibrillation.She was hospitalized in October 2022 with acute left frontal lobe ischemic stroke.  She had echocardiogram which showed normal LV function and wall motion.  Patient was discharged on aspirin and Plavix and event monitor although her stroke was likely related to her underlying moyamoya disease.  She was subsequent placed on Xarelto based on concerns for possible atrial fibrillation on the event monitor.  She denied any palpitations or heart racing.    I reviewed the patient's event monitor through October 31.  She was supposed to wear it through November 11 but had technical difficulties.  I did not see evidence of atrial fibrillation on the event monitor.  The rhythm was predominantly sinus with occasional runs of PACs and PVCs. I placed on her another event monitor which revealed no evidence of atrial fibrillation.     IMPRESSION/PLAN:  No clear evidence of atrial fibrillation on two prior event monitoring. Given this and the fact that the patient's with moyamoya disease have increased risk of intracerebral hemorrhage we discontinued anticoagulation. She has done well from cardiac point of view since our last visit.     Follow up as needed. I appreciate the opportunity to participate in her care.    Stephen Echavarria MD      Today's clinic visit entailed:  30 minutes spent by me on the date of the encounter doing chart review, history and exam, documentation and further activities per the note  Provider  Link to [facility] Help Grid       Orders Placed This Encounter   Procedures     Follow-Up with Cardiologist       Orders Placed This Encounter   Medications     UNKNOWN TO PATIENT     Sig: Take 1 tablet by mouth daily Allergy medication - name unknown        There are no discontinued medications.      Encounter Diagnosis   Name Primary?     History of CVA (cerebrovascular accident)        CURRENT MEDICATIONS:  Current Outpatient Medications   Medication Sig Dispense Refill     aspirin 81 MG EC tablet Take 81 mg by mouth daily       atenolol (TENORMIN) 25 MG tablet Take 1 tablet (25 mg) by mouth daily 90 tablet 1     atorvastatin (LIPITOR) 40 MG tablet TAKE 1 TABLET BY MOUTH EVERY EVENING 90 tablet 0     magnesium 500 MG TABS Take 500 mg by mouth daily       Multiple Vitamins-Minerals (MULTIVITAMIN & MINERAL PO) Take 1 tablet by mouth daily       UNKNOWN TO PATIENT Take 1 tablet by mouth daily Allergy medication - name unknown         ALLERGIES     Allergies   Allergen Reactions     Lisinopril      Persistent cough       PAST MEDICAL HISTORY:  Past Medical History:   Diagnosis Date     Anxiety state, unspecified     inactive     Cataract      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 F/u dr. Soto     OA (osteoarthritis)     s/p bilat total hips      Other chronic pain     Joint pain for many years     Unspecified cerebral artery occlusion with cerebral infarction     After Moyamoya surgery > 10 yrs ago.     Vitamin D deficiencies        PAST SURGICAL HISTORY:  Past Surgical History:   Procedure Laterality Date     ARTHROPLASTY KNEE Left 4/17/2017    Procedure: ARTHROPLASTY KNEE;  Left total knee arthroplasty;  Surgeon: Chidi Jenkins MD;  Location:  OR     COLONOSCOPY  2008    normal     COLONOSCOPY  7/17/2014    Procedure: COLONOSCOPY;  Surgeon: Raul Nolan DO;  Location:  GI     CRANIOTOMY       "MOJAMOJA  \"Pt had repair of blood flow.\"     IR CAROTID ANGIOGRAM  10/30/2006     IR CAROTID ANGIOGRAM  2010     IR CAROTID ANGIOGRAM  2010     IR CAROTID ANGIOGRAM  2010     IR CAROTID ANGIOGRAM  2011     IR CAROTID ANGIOGRAM  2011     IR CAROTID ANGIOGRAM  2011     IR CAROTID ANGIOGRAM  2012     IR CAROTID ANGIOGRAM  2012     IR CAROTID ANGIOGRAM  2012     IR CAROTID CEREBRAL ANGIOGRAM BILATERAL  10/7/2022     IR MISCELLANEOUS PROCEDURE  2010     IR MISCELLANEOUS PROCEDURE  2010     IR MISCELLANEOUS PROCEDURE  2011     IR MISCELLANEOUS PROCEDURE  2012     RECONSTRUCT FOREFOOT WITH METATARSOPHALANGEAL (MTP) FUSION Left 2014    Procedure: RECONSTRUCT FOREFOOT WITH METATARSOPHALANGEAL (MTP) FUSION;  Surgeon: Tres Merlos DPM;  Location:  OR     [facility] NONSPECIFIC PROCEDURE  10/30/06    neurosurgery Dr Soto     [facility] NONSPECIFIC PROCEDURE      bilateral total hips approx      ZZ NONSPECIFIC PROCEDURE  3/07    neurosurgery        FAMILY HISTORY:  Family History   Problem Relation Age of Onset     Obesity Sister         gastric by-pass age age 60, heart murmur.     Cancer - colorectal Sister 69     Heart Disease Father         mi,  age 48     Family History Negative Mother         killed in MVA age 54     Cancer Maternal Aunt         lung cancer ,non-smoker     Lung Cancer Maternal Aunt      Breast Cancer Daughter 48     Ovarian Cancer No family hx of        SOCIAL HISTORY:  Social History     Socioeconomic History     Marital status:      Spouse name: Olu      Number of children: 2     Years of education: None     Highest education level: None   Occupational History     Employer: RETIRED   Tobacco Use     Smoking status: Never     Passive exposure: Never     Smokeless tobacco: Never   Vaping Use     Vaping status: Never Used     Passive vaping exposure: Yes   Substance and Sexual Activity     Alcohol use: Yes     Comment: 1 beer " "or wine daily     Drug use: No     Sexual activity: Never     Partners: Male     Social Determinants of Health     Financial Resource Strain: Low Risk  (11/1/2022)    Overall Financial Resource Strain (CARDIA)      Difficulty of Paying Living Expenses: Not hard at all   Food Insecurity: No Food Insecurity (11/1/2022)    Hunger Vital Sign      Worried About Running Out of Food in the Last Year: Never true      Ran Out of Food in the Last Year: Never true   Transportation Needs: No Transportation Needs (11/1/2022)    PRAPARE - Transportation      Lack of Transportation (Medical): No      Lack of Transportation (Non-Medical): No   Intimate Partner Violence: Not At Risk (9/2/2022)    Humiliation, Afraid, Rape, and Kick questionnaire      Fear of Current or Ex-Partner: No      Emotionally Abused: No      Physically Abused: No      Sexually Abused: No   Housing Stability: Unknown (11/1/2022)    Housing Stability Vital Sign      Unable to Pay for Housing in the Last Year: No      Unstable Housing in the Last Year: No       Review of Systems:  Skin:  not assessed       Eyes:  not assessed      ENT:  not assessed      Respiratory:  Negative       Cardiovascular:         Gastroenterology: not assessed      Genitourinary:  not assessed      Musculoskeletal:  not assessed      Neurologic:  not assessed      Psychiatric:  not assessed      Heme/Lymph/Imm:  not assessed      Endocrine:  not assessed        Physical Exam:  Vitals: /70   Pulse 60   Ht 1.727 m (5' 8\")   Wt 87.6 kg (193 lb 3.2 oz)   LMP  (LMP Unknown)   BMI 29.38 kg/m      Constitutional:  cooperative;in no acute distress        Skin:  warm and dry to the touch          Head:  normocephalic        Eyes:  conjunctivae and lids unremarkable        Lymph:      ENT:  no pallor or cyanosis        Neck:  JVP normal;no carotid bruit        Respiratory:  clear to auscultation         Cardiac: regular rhythm;no murmurs, gallops or rubs detected              "   pulses full and equal                                        GI:  abdomen soft;non-tender        Extremities and Muscular Skeletal:  no edema              Neurological:  no gross motor deficits        Psych:  Alert and Oriented x 3        CC  Stephen Echavarria MD  [address]  [place] [id number]

## 2023-06-01 ENCOUNTER — OFFICE VISIT (OUTPATIENT)
Dept: INTERNAL MEDICINE | Facility: CLINIC | Age: 83
End: 2023-06-01
Payer: MEDICARE

## 2023-06-01 ENCOUNTER — HEALTH MAINTENANCE LETTER (OUTPATIENT)
Age: 83
End: 2023-06-01

## 2023-06-01 VITALS
HEIGHT: 68 IN | HEART RATE: 69 BPM | OXYGEN SATURATION: 100 % | DIASTOLIC BLOOD PRESSURE: 72 MMHG | WEIGHT: 190.5 LBS | SYSTOLIC BLOOD PRESSURE: 132 MMHG | BODY MASS INDEX: 28.87 KG/M2 | RESPIRATION RATE: 16 BRPM | TEMPERATURE: 97.2 F

## 2023-06-01 DIAGNOSIS — I63.9 CEREBROVASCULAR ACCIDENT (CVA), UNSPECIFIED MECHANISM (H): ICD-10-CM

## 2023-06-01 DIAGNOSIS — I67.5 MOYAMOYA DISEASE: Chronic | ICD-10-CM

## 2023-06-01 DIAGNOSIS — N18.31 STAGE 3A CHRONIC KIDNEY DISEASE (H): Chronic | ICD-10-CM

## 2023-06-01 DIAGNOSIS — E11.69 TYPE 2 DIABETES MELLITUS WITH OTHER SPECIFIED COMPLICATION, WITHOUT LONG-TERM CURRENT USE OF INSULIN (H): ICD-10-CM

## 2023-06-01 DIAGNOSIS — D64.9 ANEMIA, UNSPECIFIED TYPE: ICD-10-CM

## 2023-06-01 DIAGNOSIS — Z00.00 ROUTINE GENERAL MEDICAL EXAMINATION AT A HEALTH CARE FACILITY: Primary | ICD-10-CM

## 2023-06-01 DIAGNOSIS — E55.9 VITAMIN D DEFICIENCY: ICD-10-CM

## 2023-06-01 DIAGNOSIS — R35.1 NOCTURIA: ICD-10-CM

## 2023-06-01 DIAGNOSIS — I10 HTN, GOAL BELOW 140/90: Chronic | ICD-10-CM

## 2023-06-01 DIAGNOSIS — E78.5 HYPERLIPIDEMIA LDL GOAL <130: Chronic | ICD-10-CM

## 2023-06-01 DIAGNOSIS — R30.0 DYSURIA: ICD-10-CM

## 2023-06-01 PROCEDURE — G0439 PPPS, SUBSEQ VISIT: HCPCS | Performed by: INTERNAL MEDICINE

## 2023-06-01 PROCEDURE — 99214 OFFICE O/P EST MOD 30 MIN: CPT | Mod: 25 | Performed by: INTERNAL MEDICINE

## 2023-06-01 RX ORDER — ATORVASTATIN CALCIUM 40 MG/1
40 TABLET, FILM COATED ORAL EVERY EVENING
Qty: 90 TABLET | Refills: 3 | Status: SHIPPED | OUTPATIENT
Start: 2023-06-01 | End: 2024-06-03 | Stop reason: SINTOL

## 2023-06-01 RX ORDER — ATENOLOL 25 MG/1
25 TABLET ORAL DAILY
Qty: 90 TABLET | Refills: 3 | Status: SHIPPED | OUTPATIENT
Start: 2023-06-01 | End: 2024-04-25

## 2023-06-01 ASSESSMENT — ENCOUNTER SYMPTOMS
HEARTBURN: 0
ABDOMINAL PAIN: 0
PALPITATIONS: 0
EYE PAIN: 0
MYALGIAS: 0
PARESTHESIAS: 0
HEMATOCHEZIA: 0
DIARRHEA: 0
SORE THROAT: 0
FREQUENCY: 1
FEVER: 0
NERVOUS/ANXIOUS: 0
WEAKNESS: 0
CHILLS: 0
CONSTIPATION: 0
DIZZINESS: 0
SHORTNESS OF BREATH: 0
COUGH: 0
ARTHRALGIAS: 0
JOINT SWELLING: 0
HEADACHES: 0
BREAST MASS: 0
NAUSEA: 0
DYSURIA: 0
HEMATURIA: 0

## 2023-06-01 ASSESSMENT — ACTIVITIES OF DAILY LIVING (ADL): CURRENT_FUNCTION: NO ASSISTANCE NEEDED

## 2023-06-01 ASSESSMENT — PAIN SCALES - GENERAL: PAINLEVEL: NO PAIN (0)

## 2023-06-01 NOTE — PATIENT INSTRUCTIONS
Plan:  1.  Labs today - [address]   2. Do dr Farrell labs when you do the labs for dr Johns  3. Continue same meds, same doses for now   4. Follow up in Nov-Dec

## 2023-06-01 NOTE — PROGRESS NOTES
Dr Farrell's note    Patient's instructions / PLAN:                                                        Plan:  1.  Labs today - [address]   2. Do dr Farrell labs when you do the labs for dr Johns  3. Continue same meds, same doses for now   4. Follow up in Nov-Dec      ASSESSMENT & PLAN:                                                      (Z00.00) Routine general medical examination at a health care facility  (primary encounter diagnosis)  Comment:   Plan: Lipid panel reflex to direct LDL Fasting,         Hemoglobin A1c, TSH with free T4 reflex,         Comprehensive metabolic panel, CK total            (E11.69) Type 2 diabetes mellitus with other specified complication, without long-term current use of insulin (H)  Comment: Controlled    Plan: Lipid panel reflex to direct LDL Fasting,         Hemoglobin A1c, TSH with free T4 reflex,         Comprehensive metabolic panel, CK total        Continue diet and exercise     (R30.0) Dysuria  Comment:   Plan: UA with Microscopic reflex to Culture, Adult         Urology  Referral, Lipid panel reflex         to direct LDL Fasting, Hemoglobin A1c, TSH with        free T4 reflex, Comprehensive metabolic panel,         CK total            (R35.1) Nocturia  Comment:   Plan: Lipid panel reflex to direct LDL Fasting,         Hemoglobin A1c, TSH with free T4 reflex,         Comprehensive metabolic panel, CK total        Urol ref     (I10) HTN, goal below 140/90  Comment: Controlled    Plan: Lipid panel reflex to direct LDL Fasting,         Hemoglobin A1c, TSH with free T4 reflex,         Comprehensive metabolic panel, CK total,         atenolol (TENORMIN) 25 MG tablet            (E78.5) Hyperlipidemia LDL goal <130  Comment:   Plan: Lipid panel reflex to direct LDL Fasting,         Hemoglobin A1c, TSH with free T4 reflex,         Comprehensive metabolic panel, CK total            (E55.9) Vitamin D deficiency  Comment:   Plan: Lipid panel reflex to direct LDL Fasting,          Hemoglobin A1c, TSH with free T4 reflex,         Comprehensive metabolic panel, CK total            (I67.5) Moyamoya disease  Comment:   Plan: Lipid panel reflex to direct LDL Fasting,         Hemoglobin A1c, TSH with free T4 reflex,         Comprehensive metabolic panel, CK total            (N18.31) Stage 3a chronic kidney disease (H)  Comment:   Plan: Lipid panel reflex to direct LDL Fasting,         Hemoglobin A1c, TSH with free T4 reflex,         Comprehensive metabolic panel, CK total            (D64.9) Anemia, unspecified type  Comment: obs   Plan: Lipid panel reflex to direct LDL Fasting,         Hemoglobin A1c, TSH with free T4 reflex,         Comprehensive metabolic panel, CK total        F/u w hematology    (I63.9) Cerebrovascular accident (CVA), unspecified mechanism (H)  Comment: 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 F/u dr. Soto     OA (osteoarthritis)     s/p bilat total hips      Other chronic pain     Joint pain for many years     Unspecified cerebral artery occlusion with cerebral infarction     After Moyamoya surgery > 10 yrs ago.     Vitamin D deficiencies        PSHx: reviewed  Past Surgical History:   Procedure Laterality Date     ARTHROPLASTY KNEE Left 4/17/2017    Procedure: ARTHROPLASTY KNEE;  Left total knee arthroplasty;  Surgeon: Chidi Jenkins MD;  Location: [facility] OR     COLONOSCOPY  2008    normal     COLONOSCOPY  7/17/2014    Procedure: COLONOSCOPY;  Surgeon: Raul Nolan DO;  Location:  GI     CRANIOTOMY      MOJAMOJA  \"Pt had repair of blood flow.\"     IR CAROTID ANGIOGRAM  10/30/2006     IR CAROTID ANGIOGRAM  6/6/2010     IR CAROTID ANGIOGRAM  6/6/2010     IR CAROTID ANGIOGRAM  6/6/2010     IR CAROTID ANGIOGRAM  5/12/2011     IR CAROTID ANGIOGRAM  5/12/2011     IR CAROTID ANGIOGRAM  5/12/2011     IR CAROTID ANGIOGRAM  6/5/2012     IR CAROTID ANGIOGRAM  6/5/2012     IR CAROTID ANGIOGRAM  6/5/2012     IR CAROTID CEREBRAL ANGIOGRAM BILATERAL  10/7/2022     IR MISCELLANEOUS PROCEDURE  6/6/2010     IR MISCELLANEOUS PROCEDURE  6/6/2010     IR MISCELLANEOUS PROCEDURE  5/12/2011     IR MISCELLANEOUS PROCEDURE  6/5/2012     RECONSTRUCT FOREFOOT WITH METATARSOPHALANGEAL (MTP) FUSION Left 8/21/2014    Procedure: RECONSTRUCT FOREFOOT WITH METATARSOPHALANGEAL (MTP) FUSION;  Surgeon: Tres Merlos DPM;  Location:  OR     [facility] NONSPECIFIC PROCEDURE  10/30/06    neurosurgery Dr Soto     [facility] NONSPECIFIC PROCEDURE      bilateral total hips approx 2002     ZZ NONSPECIFIC PROCEDURE  3/07    neurosurgery         Soc Hx: No daily alcohol, no smoking  Social History     Socioeconomic History     Marital status:      Spouse name: Olu      Number of children: 2     Years of education: Not on file     " Highest education level: Not on file   Occupational History     Employer: RETIRED   Tobacco Use     Smoking status: Never     Passive exposure: Never     Smokeless tobacco: Never   Vaping Use     Vaping status: Never Used     Passive vaping exposure: Yes   Substance and Sexual Activity     Alcohol use: Yes     Comment: 1 beer or wine daily     Drug use: No     Sexual activity: Never     Partners: Male   Other Topics Concern     Parent/sibling w/ CABG, MI or angioplasty before 65F 55M? Not Asked   Social History Narrative     Not on file     Social Determinants of Health     Financial Resource Strain: Low Risk  (2022)    Overall Financial Resource Strain (CARDIA)      Difficulty of Paying Living Expenses: Not hard at all   Food Insecurity: No Food Insecurity (2022)    Hunger Vital Sign      Worried About Running Out of Food in the Last Year: Never true      Ran Out of Food in the Last Year: Never true   Transportation Needs: No Transportation Needs (2022)    PRAPARE - Transportation      Lack of Transportation (Medical): No      Lack of Transportation (Non-Medical): No   Physical Activity: Not on file   Stress: Not on file   Social Connections: Not on file   Intimate Partner Violence: Not At Risk (2022)    Humiliation, Afraid, Rape, and Kick questionnaire      Fear of Current or Ex-Partner: No      Emotionally Abused: No      Physically Abused: No      Sexually Abused: No   Housing Stability: Unknown (2022)    Housing Stability Vital Sign      Unable to Pay for Housing in the Last Year: No      Number of Places Lived in the Last Year: Not on file      Unstable Housing in the Last Year: No        Fam Hx: reviewed  Family History   Problem Relation Age of Onset     Obesity Sister         gastric by-pass age age 60, heart murmur.     Cancer - colorectal Sister 69     Heart Disease Father         mi,  age 48     Family History Negative Mother         killed in MVA age 54     Cancer Maternal  "Aunt         lung cancer ,non-smoker     Lung Cancer Maternal Aunt      Breast Cancer Daughter 48     Ovarian Cancer No family hx of          Screening: reviewed      All: reviewed    Meds: reviewed  Current Outpatient Medications   Medication Sig Dispense Refill     aspirin 81 MG EC tablet Take 81 mg by mouth daily       atenolol (TENORMIN) 25 MG tablet Take 1 tablet (25 mg) by mouth daily 90 tablet 1     atorvastatin (LIPITOR) 40 MG tablet TAKE 1 TABLET BY MOUTH EVERY EVENING 90 tablet 0     magnesium 500 MG TABS Take 500 mg by mouth daily       Multiple Vitamins-Minerals (MULTIVITAMIN & MINERAL PO) Take 1 tablet by mouth daily       UNKNOWN TO PATIENT Take 1 tablet by mouth daily Allergy medication - name unknown         OBJECTIVE:                                                    Physical Exam :  Blood pressure 132/72, pulse 69, temperature 97.2  F (36.2  C), temperature source Tympanic, resp. rate 16, height 1.727 m (5' 8\"), weight 86.4 kg (190 lb 8 oz), SpO2 100 %, not currently breastfeeding.     NAD, appears comfortable  Skin clear, no rashes    Neck: supple, no JVD,  no thyroidmegaly  Lymph nodes non palpable in the cervical, supraclavicular axillaries,   Chest: clear to auscultation with good respiratory effort  Cardiac: S1S2, RRR, no mgr appreciated  Abdomen: soft, not tender, not distended, audible bowel sound, no hepatosplenomegaly, no palpable masses, no abdominal bruits  Extremities: no cyanosis, clubbing or edema. No cyanosis, clubbing No edema. Good pedal pulses. No skin lesions. Monofilament skin sensation is intact  Neuro: A, Ox3, no focal signs.  Breast exam in supine and erect position: they are symmetrical, no skin changes, no tenderness or nodes on palpation. Nipples are erect, no skin lesions, no discharge on pressure.    Pelvic exam: deferred, s/p menopause, no symptoms, no hx of abnormal pap         Chani Farrell MD  Internal Medicine         SUBJECTIVE:   Candida is a 82 year old who " "presents for Preventive Visit.      6/1/2023     6:48 AM   Additional Questions   Roomed by Sandra     Are you in the first 12 months of your Medicare coverage?  No    Healthy Habits:     In general, how would you rate your overall health?  Good    Frequency of exercise:  2-3 days/week    Duration of exercise:  15-30 minutes    Do you usually eat at least 4 servings of fruit and vegetables a day, include whole grains    & fiber and avoid regularly eating high fat or \"junk\" foods?  Yes    Taking medications regularly:  Yes    Medication side effects:  None    Ability to successfully perform activities of daily living:  No assistance needed    Home Safety:  No safety concerns identified    Hearing Impairment:  Difficulty following a conversation in a noisy restaurant or crowded room, difficulty following dialogue in the theater, need to ask people to speak up or repeat themselves, difficulty understanding soft or whispered speech and difficulty understanding speech on the telephone    In the past 6 months, have you been bothered by leaking of urine? Yes    In general, how would you rate your overall mental or emotional health?  Good      PHQ-2 Total Score: 0    Additional concerns today:  No        Have you ever done Advance Care Planning? (For example, a Health Directive, POLST, or a discussion with a medical provider or your loved ones about your wishes): Yes, advance care planning is on file.       Fall risk  Fallen 2 or more times in the past year?: Yes  Any fall with injury in the past year?: No    Cognitive Screening   1) Repeat 3 items (Leader, Season, Table)      2) Clock draw:   NORMAL  3) 3 item recall: Recalls 1 object   Results: NORMAL clock, 1-2 items recalled: COGNITIVE IMPAIRMENT LESS LIKELY    Mini-CogTM Copyright [employer]. Licensed by the author for use in [facility]; reprinted with permission (law@.[facility]). All rights reserved.      Do you have sleep apnea, excessive snoring or daytime " drowsiness?: no    Reviewed and updated as needed this visit by clinical staff   Tobacco  Allergies  Meds              Reviewed and updated as needed this visit by Provider                 Social History     Tobacco Use     Smoking status: Never     Passive exposure: Never     Smokeless tobacco: Never   Vaping Use     Vaping status: Never Used     Passive vaping exposure: Yes   Substance Use Topics     Alcohol use: Yes     Comment: 1 beer or wine daily             5/27/2023     2:18 PM   Alcohol Use   Prescreen: >3 drinks/day or >7 drinks/week? No     Do you have a current opioid prescription? No  Do you use any other controlled substances or medications that are not prescribed by a provider? None          Diabetes Follow-up      How often are you checking your blood sugar? Not at all    What concerns do you have today about your diabetes? None     Do you have any of these symptoms? (Select all that apply)  No numbness or tingling in feet.  No redness, sores or blisters on feet.  No complaints of excessive thirst.  No reports of blurry vision.  No significant changes to weight.    Have you had a diabetic eye exam in the last 12 months? No        BP Readings from Last 2 Encounters:   06/01/23 132/72   05/17/23 136/70     Hemoglobin A1C (%)   Date Value   10/04/2022 5.4   01/26/2018 5.8   04/11/2017 5.8     LDL Cholesterol Calculated (mg/dL)   Date Value   10/04/2022 71   12/31/2021 68   01/04/2021 106 (H)   02/11/2019 87           Hyperlipidemia Follow-Up      Are you regularly taking any medication or supplement to lower your cholesterol?   Yes- Lipitor    Are you having muscle aches or other side effects that you think could be caused by your cholesterol lowering medication?  No      Current providers sharing in care for this patient include:   Patient Care Team:  Chani Peoples MD as PCP - General (Internal Medicine)  Chani Peoples MD as Assigned PCP  Kary Johns DO as MD  (Hematology & Oncology)  Florecita Lainez, RN as Specialty Care Coordinator (Hematology & Oncology)  Kary Johns DO as Assigned Cancer Care Provider  Stephen Echavarria MD as MD (Cardiovascular Disease)  Lenka Herrera RP as Pharmacist (Pharmacist)  Chuyita Luna RPH as Pharmacist (Pharmacist)  Lenka Herrera RPH as Assigned MTM Pharmacist  Stephen Echavarria MD as Assigned Heart and Vascular Provider  Imtiaz Griffin MD as Assigned Nephrology Provider  Johnathan Willett MD as Assigned Musculoskeletal Provider    The following health maintenance items are reviewed in Epic and correct as of today:  Health Maintenance   Topic Date Due     DIABETIC FOOT EXAM  Never done     EYE EXAM  08/30/2018     COVID-19 Vaccine (6 - Pfizer series) 06/10/2022     MEDICARE ANNUAL WELLNESS VISIT  01/07/2023     A1C  04/04/2023     MICROALBUMIN  07/13/2023     ANNUAL REVIEW OF HM ORDERS  08/11/2023     LIPID  10/04/2023     BMP  10/11/2023     HEMOGLOBIN  02/08/2024     FALL RISK ASSESSMENT  06/01/2024     COLORECTAL CANCER SCREENING  08/01/2024     DTAP/TDAP/TD IMMUNIZATION (2 - Td or Tdap) 01/13/2027     ADVANCE CARE PLANNING  06/01/2028     DEXA  08/18/2037     PHQ-2 (once per calendar year)  Completed     INFLUENZA VACCINE  Completed     Pneumococcal Vaccine: 65+ Years  Completed     URINALYSIS  Completed     ZOSTER IMMUNIZATION  Completed     IPV IMMUNIZATION  Aged Out     MENINGITIS IMMUNIZATION  Aged Out     Labs reviewed in EPIC        Pertinent mammograms are reviewed under the imaging tab.    Review of Systems   Constitutional: Negative for chills and fever.   HENT: Negative for congestion, ear pain, hearing loss and sore throat.    Eyes: Negative for pain and visual disturbance.   Respiratory: Negative for cough and shortness of breath.    Cardiovascular: Negative for chest pain, palpitations and peripheral edema.   Gastrointestinal: Negative for abdominal pain, constipation, diarrhea, heartburn,  "hematochezia and nausea.   Breasts:  Negative for tenderness, breast mass and discharge.   Genitourinary: Positive for frequency and urgency. Negative for dysuria, genital sores, hematuria, pelvic pain, vaginal bleeding and vaginal discharge.   Musculoskeletal: Negative for arthralgias, joint swelling and myalgias.   Skin: Negative for rash.   Neurological: Negative for dizziness, weakness, headaches and paresthesias.   Psychiatric/Behavioral: Negative for mood changes. The patient is not nervous/anxious.          Patient has been advised of split billing requirements and indicates understanding: Yes At the check in, at the        COUNSELING:  Reviewed preventive health counseling, as reflected in patient instructions       Regular exercise       Healthy diet/nutrition      BMI:   Estimated body mass index is 28.97 kg/m  as calculated from the following:    Height as of this encounter: 1.727 m (5' 8\").    Weight as of this encounter: 86.4 kg (190 lb 8 oz).         She reports that she has never smoked. She has never been exposed to tobacco smoke. She has never used smokeless tobacco.      Appropriate preventive services were discussed with this patient, including applicable screening as appropriate for cardiovascular disease, diabetes, osteopenia/osteoporosis, and glaucoma.  As appropriate for age/gender, discussed screening for colorectal cancer, prostate cancer, breast cancer, and cervical cancer. Checklist reviewing preventive services available has been given to the patient.    Reviewed patients plan of care and provided an AVS. The Basic Care Plan (routine screening as documented in Health Maintenance) for Candida meets the Care Plan requirement. This Care Plan has been established and reviewed with the Patient.          Chani Peoples MD  [facility]    Identified Health Risks:    I have reviewed Opioid Use Disorder and Substance Use Disorder risk factors and made " any needed referrals.

## 2023-06-19 ENCOUNTER — OFFICE VISIT (OUTPATIENT)
Dept: PHARMACY | Facility: CLINIC | Age: 83
End: 2023-06-19
Payer: COMMERCIAL

## 2023-06-19 ENCOUNTER — LAB REQUISITION (OUTPATIENT)
Dept: LAB | Facility: CLINIC | Age: 83
End: 2023-06-19
Payer: MEDICARE

## 2023-06-19 VITALS
SYSTOLIC BLOOD PRESSURE: 132 MMHG | BODY MASS INDEX: 28.86 KG/M2 | HEART RATE: 70 BPM | DIASTOLIC BLOOD PRESSURE: 74 MMHG | WEIGHT: 189.8 LBS

## 2023-06-19 DIAGNOSIS — Z78.9 TAKES DIETARY SUPPLEMENTS: ICD-10-CM

## 2023-06-19 DIAGNOSIS — Z71.85 VACCINE COUNSELING: ICD-10-CM

## 2023-06-19 DIAGNOSIS — R32 UNSPECIFIED URINARY INCONTINENCE: ICD-10-CM

## 2023-06-19 DIAGNOSIS — J30.2 SEASONAL ALLERGIC RHINITIS, UNSPECIFIED TRIGGER: ICD-10-CM

## 2023-06-19 DIAGNOSIS — E78.5 HYPERLIPIDEMIA LDL GOAL <130: ICD-10-CM

## 2023-06-19 DIAGNOSIS — I10 HTN, GOAL BELOW 140/90: ICD-10-CM

## 2023-06-19 DIAGNOSIS — I63.9 CEREBROVASCULAR ACCIDENT (CVA), UNSPECIFIED MECHANISM (H): Primary | ICD-10-CM

## 2023-06-19 PROCEDURE — 87086 URINE CULTURE/COLONY COUNT: CPT | Mod: ORL | Performed by: OBSTETRICS & GYNECOLOGY

## 2023-06-19 PROCEDURE — 99207 PR NO CHARGE LOS: CPT

## 2023-06-19 RX ORDER — CETIRIZINE HYDROCHLORIDE 10 MG/1
10 TABLET ORAL DAILY
COMMUNITY
End: 2023-12-01

## 2023-06-19 NOTE — PATIENT INSTRUCTIONS
"Recommendations from today's MTM visit:                                                       Continue your current medication regimen   Consider getting your Covid-19 booster and influenza vaccine this fall     Follow-up: Return in about 30 weeks (around 1/15/2024) for Follow up, Medication Therapy Management.    It was great speaking with you today.  I value your experience and would be very thankful for your time in providing feedback in our clinic survey. In the next few days, you may receive an email or text message from [facility] with a link to a survey related to your  clinical pharmacist.\"     To schedule another MTM appointment, please call the clinic directly or you may call the MTM scheduling line at [phone] or toll-free at [phone].     My Clinical Pharmacist's contact information:                                                      Please feel free to contact me with any questions or concerns you have.      Chuyita Luna, PharmD  MTM Pharmacist Resident  [facility]      "

## 2023-06-19 NOTE — PROGRESS NOTES
Medication Therapy Management (MTM) Encounter    ASSESSMENT:                            Medication Adherence/Access: No issues identified    Cerebrovascular accident: Stable.     Hypertension: Patient is meeting blood pressure goal of < 140/90mmHg.     Hyperlipidemia: Patient is on high intensity statin which is indicated based on 2019 ACC/AHA guidelines for lipid management.      Supplements: Stable.       Allergic rhinitis: Stable.    Vaccines: Due for bivalent Covid-19 booster per ACIP Guidelines. Discussed receiving vaccine, patient in favor of receiving with annual influenza vaccine this fall.     PLAN:                            1. Continue your current medication regimen   2. Consider getting your Covid-19 booster and influenza vaccine this fall    Follow-up: Return in about 30 weeks (around 1/15/2024) for Follow up, Medication Therapy Management.     SUBJECTIVE/OBJECTIVE:                          Candida Rose is a 82 year old female coming in for an initial visit of 2023.  Today's visit is a follow-up MTM visit from 11/8/22   Reason for visit: annual medication review. Has an appt with OB/GYN specialist after this & plans to discuss urinary incontinence symptoms. Also has an appt with Urologist in August.     Allergies/ADRs: Reviewed in chart  Past Medical History: Reviewed in chart. Moyamoya disease, hypertension, hyperlipidemia, diabetes on multiple prior strokes   Tobacco: She reports that she has never smoked. She has never been exposed to tobacco smoke. She has never used smokeless tobacco.  Alcohol: Reviewed in chart   Caffeine: 2 cups coffee/day, tea occasionally in evening (lemon)     Medication Adherence/Access:    Patient uses pillboxes for AM & PM medications. Pt reports her  helps make sure she takes her medications each day.   Medication barriers: none at this time     Cerebrovascular accident:   Aspirin 81 mg daily.  No concerns with bleeding or bruising.  Recently joined a stroke support  group at a local senior center.     Specialist: Dr. Stephen Echavarria, Cardiology.  Last visit on 5/17/23, the following was recommended:   -- No clear evidence of atrial fibrillation on 2 prior event monitoring   -- Given this and increased risk of intracerebral hemorrhage with moyamoya disease, discontinued anticoagulation.    Hypertension:   Atenolol 25mg daily. No concerns with side effects.   Patient occasionally self-monitors blood pressure, reports readings of 120-130's systolic and 70-80 diastolic.    Has had orthostatic hypotension in past, aware of symptoms to watch for. No recent concerns with this.     BP Readings from Last 3 Encounters:   06/19/23 132/74   06/01/23 132/72   05/17/23 136/70     Pulse Readings from Last 3 Encounters:   06/19/23 70   06/01/23 69   05/17/23 60     Progress Note (10/31/22): Dr. Farrell  If the blood pressure is constantly lower than 130 stop the Atenolol     Previous Medications:   Amlodipine 5mg daily  Losartan 100 mg daily    Dyslipidemia:   Atorvastatin 40mg daily.    Patient reports no significant myalgias or other side effects.  Recent Labs   Lab Test 10/04/22  1950 12/31/21  0710 05/24/16  0947 06/16/15  1127   CHOL 166 150   < > 197   HDL 82 66   < > 62   LDL 71 68   < > 106   TRIG 65 82   < > 143   CHOLHDLRATIO  --   --   --  3.2    < > = values in this interval not displayed.     Previous Medications:   Simvastatin - switched to atorvastatin/high intensity after stroke    Supplements:   Multivitamin once daily   Magnesium 500mg daily- helpful for reducing leg aches/cramps.   No reported issues at this time.     Lab Results   Component Value Date    VITDT 30 07/13/2022    VITDT 31 01/04/2021    VITDT 29 02/11/2019    VITDT 22 01/26/2018    VITDT 21 04/11/2017     Allergic Rhinitis:   Cetirizine 10 mg once daily  No current medication side effects.  Patient feels that current therapy is effective.     Vaccines:  Due for Covid-19 booster, patient would like to receive this in  the fall with flu vaccine.   Immunization History   Administered Date(s) Administered     COVID-19 MONOVALENT 12+ (Pfizer) 02/24/2021, 03/17/2021, 10/11/2021, 04/15/2022     COVID-19 Monovalent 12+ (Pfizer 2022) 04/15/2022     Influenza (H1N1) 12/17/2009     Influenza (High Dose) 3 valent vaccine 09/09/2013, 09/19/2015, 09/20/2016, 09/15/2017, 08/31/2018, 08/15/2019     Influenza (IIV3) PF 11/10/2008, 12/17/2009, 09/27/2010, 09/06/2012, 09/09/2013, 11/01/2014     Influenza Vaccine 65+ (Fluzone HD) 09/14/2020, 09/16/2021, 10/11/2021, 08/29/2022     Influenza Vaccine, 6+MO IM (QUADRIVALENT W/PRESERVATIVES) 08/29/2022     Pneumo Conj 13-V (2010&after) 04/11/2017     Pneumococcal 23 valent 01/01/2006, 10/07/2011     TD,PF 7+ (Tenivac) 10/06/2008     TDAP Vaccine (Adacel) 01/13/2017     Zoster recombinant adjuvanted (SHINGRIX) 06/03/2019, 08/10/2019     Zoster vaccine, live 08/18/2011     Today's Vitals: /74   Pulse 70   Wt 189 lb 12.8 oz (86.1 kg)   LMP  (LMP Unknown)   BMI 28.86 kg/m    ----------------    I spent 30 minutes with this patient today. All changes were made via collaborative practice agreement with Chani Peoples MD. A copy of the visit note was provided to the patient's provider(s).    A summary of these recommendations was given to the patient.    Chuyita Luna PharmD  Medication Therapy Management Resident  Pager: [phone]    Candida Rose was seen independently by Dr. Chuyita Luna. I have reviewed and agree with the resident note and plan of care.      Francesca Early PharmD [facility]  Medication Therapy Management Provider  Pager #[phone]     Medication Therapy Recommendations  Vaccine counseling    Rationale: Preventive therapy - Needs additional medication therapy - Indication   Recommendation: Order Vaccine - Pfizer COVID-19 Vac Bivalent 30 MCG/0.3ML Susp - Receive bivalent Covid-19 booster and influenza vaccine this fall.   Status: Patient Agreed -  Adherence/Education

## 2023-06-20 LAB — BACTERIA UR CULT: ABNORMAL

## 2023-07-05 ENCOUNTER — TELEPHONE (OUTPATIENT)
Dept: NEPHROLOGY | Facility: CLINIC | Age: 83
End: 2023-07-05
Payer: MEDICARE

## 2023-07-06 ENCOUNTER — TELEPHONE (OUTPATIENT)
Dept: NEPHROLOGY | Facility: CLINIC | Age: 83
End: 2023-07-06
Payer: MEDICARE

## 2023-07-06 NOTE — TELEPHONE ENCOUNTER
[facility] Call Center    Phone Message    May a detailed message be left on voicemail: yes     Reason for Call: Order(s): Lab Orders   Reason for requested: Patient has a lab appointment on 08/23  Date needed: August  Provider name: Pam Lin      Please place lab orders for patient. Thank you      Action Taken: Message routed to:  [facility] ([facility]): Nephrology    Travel Screening: Not Applicable

## 2023-08-08 ENCOUNTER — LAB (OUTPATIENT)
Dept: ONCOLOGY | Facility: CLINIC | Age: 83
End: 2023-08-08
Attending: INTERNAL MEDICINE
Payer: MEDICARE

## 2023-08-08 DIAGNOSIS — I63.9 RECURRENT STROKES (H): ICD-10-CM

## 2023-08-08 LAB
AT III ACT/NOR PPP CHRO: 112 % (ref 85–135)
FACTOR 2 INTERPRETATION: NORMAL
FACTOR V INTERPRETATION: NORMAL
LAB DIRECTOR COMMENTS: NORMAL
LAB DIRECTOR DISCLAIMER: NORMAL
LAB DIRECTOR INTERPRETATION: NORMAL
LAB DIRECTOR METHODOLOGY: NORMAL
LAB DIRECTOR RESULTS: NORMAL
PROT C ACT/NOR PPP CHRO: 103 % (ref 70–170)
PROT S FREE AG ACT/NOR PPP IA: 70 % (ref 55–125)
SPECIMEN DESCRIPTION: NORMAL

## 2023-08-08 PROCEDURE — 36415 COLL VENOUS BLD VENIPUNCTURE: CPT

## 2023-08-08 PROCEDURE — 85390 FIBRINOLYSINS SCREEN I&R: CPT | Mod: 26 | Performed by: PATHOLOGY

## 2023-08-08 PROCEDURE — 86146 BETA-2 GLYCOPROTEIN ANTIBODY: CPT | Performed by: INTERNAL MEDICINE

## 2023-08-08 PROCEDURE — 86147 CARDIOLIPIN ANTIBODY EA IG: CPT | Performed by: INTERNAL MEDICINE

## 2023-08-08 PROCEDURE — 85730 THROMBOPLASTIN TIME PARTIAL: CPT | Performed by: INTERNAL MEDICINE

## 2023-08-08 PROCEDURE — 85306 CLOT INHIBIT PROT S FREE: CPT | Performed by: INTERNAL MEDICINE

## 2023-08-08 PROCEDURE — G0452 MOLECULAR PATHOLOGY INTERPR: HCPCS | Mod: 26 | Performed by: STUDENT IN AN ORGANIZED HEALTH CARE EDUCATION/TRAINING PROGRAM

## 2023-08-08 PROCEDURE — 85303 CLOT INHIBIT PROT C ACTIVITY: CPT

## 2023-08-08 PROCEDURE — 85300 ANTITHROMBIN III ACTIVITY: CPT

## 2023-08-08 PROCEDURE — 81240 F2 GENE: CPT | Performed by: INTERNAL MEDICINE

## 2023-08-08 NOTE — PROGRESS NOTES
Medical Assistant Note:  Candida Rose presents today for blood draw.    Patient seen by provider today: No.   present during visit today: Not Applicable.    Concerns: No Concerns.    Procedure:  Lab draw site: lt antecub, Needle type: butterfly, Gauge: 23.    Post Assessment:  Labs drawn without difficulty: Yes.    Discharge Plan:  Departure Mode: Ambulatory.    Face to Face Time: 10.    Akosua Maravilla CMA

## 2023-08-09 LAB
B2 GLYCOPROT1 IGG SERPL IA-ACNC: 1.2 U/ML
B2 GLYCOPROT1 IGM SERPL IA-ACNC: <2.4 U/ML
CARDIOLIPIN IGG SER IA-ACNC: <2 GPL-U/ML
CARDIOLIPIN IGG SER IA-ACNC: NEGATIVE
CARDIOLIPIN IGM SER IA-ACNC: <2 MPL-U/ML
CARDIOLIPIN IGM SER IA-ACNC: NEGATIVE
DRVVT SCREEN RATIO: 0.82
INR PPP: 0.91 (ref 0.85–1.15)
LA PPP-IMP: NEGATIVE
LUPUS INTERPRETATION: NORMAL
PTT RATIO: 1.05
THROMBIN TIME: 16.9 SECONDS (ref 13–19)

## 2023-08-15 ENCOUNTER — TELEPHONE (OUTPATIENT)
Dept: UROLOGY | Facility: CLINIC | Age: 83
End: 2023-08-15
Payer: MEDICARE

## 2023-08-15 ENCOUNTER — ONCOLOGY VISIT (OUTPATIENT)
Dept: ONCOLOGY | Facility: CLINIC | Age: 83
End: 2023-08-15
Attending: INTERNAL MEDICINE
Payer: MEDICARE

## 2023-08-15 VITALS
OXYGEN SATURATION: 100 % | RESPIRATION RATE: 18 BRPM | HEIGHT: 68 IN | SYSTOLIC BLOOD PRESSURE: 172 MMHG | BODY MASS INDEX: 29.25 KG/M2 | HEART RATE: 53 BPM | WEIGHT: 193 LBS | DIASTOLIC BLOOD PRESSURE: 76 MMHG | TEMPERATURE: 98.2 F

## 2023-08-15 DIAGNOSIS — D64.9 ANEMIA, UNSPECIFIED TYPE: ICD-10-CM

## 2023-08-15 PROCEDURE — G0463 HOSPITAL OUTPT CLINIC VISIT: HCPCS | Performed by: INTERNAL MEDICINE

## 2023-08-15 PROCEDURE — 99214 OFFICE O/P EST MOD 30 MIN: CPT | Performed by: INTERNAL MEDICINE

## 2023-08-15 ASSESSMENT — PAIN SCALES - GENERAL: PAINLEVEL: NO PAIN (0)

## 2023-08-15 NOTE — TELEPHONE ENCOUNTER
[facility] Call Center    Phone Message    May a detailed message be left on voicemail: yes     Reason for Call: Other: .Pt accidentally canceled apt she had on 08/18, she would like that apt back. Writer does not see this as an option- pt is asking for a message to be sent.      Action Taken: Other: URO    Travel Screening: Not Applicable

## 2023-08-15 NOTE — LETTER
8/15/2023         RE: Candida Rose  [address] [facility] [address]        Dear Colleague,    Thank you for referring your patient, Candida Rose, to the [facility]. Please see a copy of my visit note below.    [facility]    Hematology/Oncology Established Patient Note      Today's Date: 8/15/23    Reason for Consultation: Anemia, unspecified  Referring Provider: Chani Peoples MD      HISTORY OF PRESENT ILLNESS: Candida Rose is an 83 year old female who presents with anemia. 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    Patient has evidence of chronic NC/NC anemia since 2017 with ofelia of 7.8 in April 2017. On 7/13/22, WBC 7.2, Hb 10.3, MCV 92, . B12 1759, ferritin 110, folate 32.8, iron 50, TIBC 341, iron sat 15%.    She has had intentional 70 lb weight loss via a clinical trial and weight watchers over 2 years. No hematura, hematochezia/melena. No vaginal bleed.     Cancer screening:  Mammogram UTD and no history of abnormal/breast biopsy.  Colonoscopies UTD 2014 (normal; no further follow up).    No history of abnormal pap smears.     Sister was diagnosed with colon cancer at age 60 and passed away soon after diagnosis.     She is a retired .       INTERIM HISTORY:  She denies gross evidence of bleed. She is on aspirin.    No falls. She is working with PT/OT. She states she continues to work on her progress in the home. She feels quite frustrated as she has not yet been cleared to drive.       REVIEW OF SYSTEMS:   A 14 point ROS was reviewed with pertinent positives and negatives in the HPI.        HOME MEDICATIONS:  Current Outpatient Medications   Medication Sig Dispense Refill     aspirin 81 MG EC tablet Take 81 mg by mouth daily       atenolol (TENORMIN) 25 MG tablet Take 1 tablet (25 mg) by  "mouth daily 90 tablet 3     atorvastatin (LIPITOR) 40 MG tablet Take 1 tablet (40 mg) by mouth every evening 90 tablet 3     cetirizine (ZYRTEC) 10 MG tablet Take 10 mg by mouth daily       magnesium 500 MG TABS Take 500 mg by mouth daily       Multiple Vitamins-Minerals (MULTIVITAMIN & MINERAL PO) Take 1 tablet by mouth daily           ALLERGIES:  Allergies   Allergen Reactions     Lisinopril      Persistent cough         PAST MEDICAL HISTORY:  Past Medical History:   Diagnosis Date     Anxiety state, unspecified     inactive     Cataract      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 F/u dr. Soto     OA (osteoarthritis)     s/p bilat total hips      Other chronic pain     Joint pain for many years     Unspecified cerebral artery occlusion with cerebral infarction     After Moyamoya surgery > 10 yrs ago.     Vitamin D deficiencies          PAST SURGICAL HISTORY:  Past Surgical History:   Procedure Laterality Date     ARTHROPLASTY KNEE Left 4/17/2017    Procedure: ARTHROPLASTY KNEE;  Left total knee arthroplasty;  Surgeon: Chidi Jenkins MD;  Location:  OR     COLONOSCOPY  2008    normal     COLONOSCOPY  7/17/2014    Procedure: COLONOSCOPY;  Surgeon: Raul Nolan DO;  Location:  GI     CRANIOTOMY      [facility]  \"Pt had repair of blood flow.\"     IR CAROTID ANGIOGRAM  10/30/2006     IR CAROTID ANGIOGRAM  6/6/2010     IR CAROTID ANGIOGRAM  6/6/2010     IR CAROTID ANGIOGRAM  6/6/2010     IR CAROTID ANGIOGRAM  5/12/2011     IR CAROTID ANGIOGRAM  5/12/2011     IR CAROTID ANGIOGRAM  5/12/2011     IR CAROTID ANGIOGRAM  6/5/2012     IR CAROTID ANGIOGRAM  " 6/5/2012     IR CAROTID ANGIOGRAM  6/5/2012     IR CAROTID CEREBRAL ANGIOGRAM BILATERAL  10/7/2022     IR MISCELLANEOUS PROCEDURE  6/6/2010     IR MISCELLANEOUS PROCEDURE  6/6/2010     IR MISCELLANEOUS PROCEDURE  5/12/2011     IR MISCELLANEOUS PROCEDURE  6/5/2012     RECONSTRUCT FOREFOOT WITH METATARSOPHALANGEAL (MTP) FUSION Left 8/21/2014    Procedure: RECONSTRUCT FOREFOOT WITH METATARSOPHALANGEAL (MTP) FUSION;  Surgeon: Tres Merlos DPM;  Location:  OR     [facility] NONSPECIFIC PROCEDURE  10/30/06    neurosurgery Dr Soto     [facility] NONSPECIFIC PROCEDURE      bilateral total hips approx 2002     ZZC NONSPECIFIC PROCEDURE  3/07    neurosurgery          SOCIAL HISTORY:  Social History     Socioeconomic History     Marital status:      Spouse name: Olu      Number of children: 2     Years of education: Not on file     Highest education level: Not on file   Occupational History     Employer: RETIRED   Tobacco Use     Smoking status: Never     Passive exposure: Never     Smokeless tobacco: Never   Vaping Use     Vaping Use: Never used   Substance and Sexual Activity     Alcohol use: Yes     Comment: 1 beer or wine daily     Drug use: No     Sexual activity: Never     Partners: Male   Other Topics Concern     Parent/sibling w/ CABG, MI or angioplasty before 65F 55M? Not Asked   Social History Narrative     Not on file     Social Determinants of Health     Financial Resource Strain: Low Risk  (11/1/2022)    Overall Financial Resource Strain (CARDIA)      Difficulty of Paying Living Expenses: Not hard at all   Food Insecurity: No Food Insecurity (11/1/2022)    Hunger Vital Sign      Worried About Running Out of Food in the Last Year: Never true      Ran Out of Food in the Last Year: Never true   Transportation Needs: No Transportation Needs (11/1/2022)    PRAPARE - Transportation      Lack of Transportation (Medical): No      Lack of Transportation (Non-Medical): No   Physical Activity: Not on file   Stress:  "Not on file   Social Connections: Not on file   Intimate Partner Violence: Not At Risk (2022)    Humiliation, Afraid, Rape, and Kick questionnaire      Fear of Current or Ex-Partner: No      Emotionally Abused: No      Physically Abused: No      Sexually Abused: No   Housing Stability: Unknown (2022)    Housing Stability Vital Sign      Unable to Pay for Housing in the Last Year: No      Number of Places Lived in the Last Year: Not on file      Unstable Housing in the Last Year: No         FAMILY HISTORY:  Family History   Problem Relation Age of Onset     Obesity Sister         gastric by-pass age age 60, heart murmur.     Cancer - colorectal Sister 69     Heart Disease Father         mi,  age 48     Family History Negative Mother         killed in MVA age 54     Cancer Maternal Aunt         lung cancer ,non-smoker     Lung Cancer Maternal Aunt      Breast Cancer Daughter 48     Ovarian Cancer No family hx of          PHYSICAL EXAM:  Vital signs:  BP (!) 172/76 (Cuff Size: Adult Large)   Pulse 53   Temp 98.2  F (36.8  C) (Oral)   Resp 18   Ht 1.727 m (5' 8\")   Wt 87.5 kg (193 lb)   LMP  (LMP Unknown)   SpO2 100%   BMI 29.35 kg/m     ECO  GENERAL/CONSTITUTIONAL: No acute distress.   MUSCULOSKELETAL: Warm and well-perfused, no cyanosis, clubbing. Chronic lymphedema.  NEUROLOGIC: Cranial nerves II-XII are intact. Alert, oriented, answers questions appropriately.  INTEGUMENTARY: No rashes or jaundice.  GAIT: Walker. Improved ambulation.      LABS:   Latest Reference Range & Units 23 08:26   Ferritin 11 - 328 ng/mL 47   Folate 4.6 - 34.8 ng/mL 36.9 (H)   Iron 37 - 145 ug/dL 75   Iron Binding Capacity 240 - 430 ug/dL 366   Iron Sat Index 15 - 46 % 20   Lactate Dehydrogenase 0 - 250 U/L 168   Vitamin B12 232 - 1,245 pg/mL 1,040   WBC 4.0 - 11.0 10e3/uL 7.7   Hemoglobin 11.7 - 15.7 g/dL 12.1   Hematocrit 35.0 - 47.0 % 38.6   Platelet Count 150 - 450 10e3/uL 312   RBC Count 3.80 - 5.20 " 10e6/uL 4.02   MCV 78 - 100 fL 96   MCH 26.5 - 33.0 pg 30.1   MCHC 31.5 - 36.5 g/dL 31.3 (L)   RDW 10.0 - 15.0 % 12.3   % Neutrophils % 50   % Lymphocytes % 41   % Monocytes % 8   % Eosinophils % 1   % Basophils % 0   Absolute Basophils 0.0 - 0.2 10e3/uL 0.0   Absolute Eosinophils 0.0 - 0.7 10e3/uL 0.1   Absolute Immature Granulocytes <=0.4 10e3/uL 0.0   Absolute Lymphocytes 0.8 - 5.3 10e3/uL 3.2   Absolute Monocytes 0.0 - 1.3 10e3/uL 0.7   % Immature Granulocytes % 0   Absolute Neutrophils 1.6 - 8.3 10e3/uL 3.7   Absolute NRBCs 10e3/uL 0.0   NRBCs per 100 WBC <1 /100 0      [facility] Reference Range & Units 08/08/23 08:14   Cardiolipin IgG Alexandra Negative  Negative   Cardiolipin IgM Alexandra Negative  Negative   Cardiolipin Alexandra IgG Instrument Value <10.0 GPL-U/mL <2.0   Cardiolipin Alexandra IgM Instrument Value <10.0 MPL-U/mL <2.0   INR 0.85 - 1.15  0.91   Thrombin Time 13.0 - 19.0 Seconds 16.9   Antithrombin III Chromogenic 85 - 135 % 112   Prot C Chromogenic 70 - 170 % 103   LUPUS ANTICOAGULANT PANEL  Rpt   Lupus Result Negative  Negative   Protein S Antigen Free 55 - 125 % 70   Beta 2 Glycoprotein 1 Antibody IgG <7.0 U/mL 1.2   Beta 2 Glycoprotein 1 Antibody IgM <7.0 U/mL <2.4     Factor II and IV mutation pending    Serum M-protein (g/dL):  9/2/22: 0.0    Total IgG (mg/dL), IgA (mg/dL), IgM (mg/dL):  9/2/22: 979, 127, 55    Free kappa light chains (mg/dL), lambda (mg/dL), kappa/lambda ratio:  9/2/22: 2.62, 1.99, 1.32    PATHOLOGY:  Final Diagnosis 9/2/22:   Peripheral blood:  --Slight normochromic normocytic anemia.         IMAGING:  CT Head 10/4/22:  IMPRESSION:   1. Questionable subacute ischemic infarct at the anterolateral aspect  of the right frontal lobe. Clinical correlation recommended. Brain MRI  may be helpful for better evaluation.  2. Chronic ischemic infarcts at the temporo-occipital junction on the  left and within the right occipital lobe again noted consistent with  chronic ischemic infarcts.  3. Postoperative  changes again noted.  4. Diffuse cerebral volume loss and cerebral white matter changes  consistent with chronic small vessel ischemic disease.    MRI/MRA H&N:  IMPRESSION:  HEAD MRI:   1.  Acute infarct in the right frontal lobe.  2.  Chronic small vessel ischemic disease with numerous chronic infarcts.     HEAD MRA:   1.  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  2.  Occlusion of the right middle cerebral artery. 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  3.  Additional multifocal moderate and severe intracranial stenoses as described above.     NECK MRA:  1.  Normal appearance of the right cervical internal carotid artery with absence of flow related enhancement on noncontrast images but presence of contrast enhancement. Findings are suggestive of severe stenosis with retrograde flow via collaterals.  2.  Focal moderately severe stenosis of the distal right vertebral artery.    MRI Brain 10/6/22:  IMPRESSION:  1.  Moderate-sized infarct in the right frontal lobe is slightly progressed in overall volume when compared to MRI dated 10/4/2022. No evidence for hemorrhagic transformation.  2.  Age-related changes with multiple chronic infarcts elsewhere as described.  3.  Loss of the normal right ICA flow void compatible with proximal occlusion.    CTA A/P 10/7/22:  IMPRESSION:  1.  Superficial right groin hematoma without evidence of active hemorrhage within the visualized groin and upper thigh.    CT Head 10/8/22:  IMPRESSION:  1.  Expected evolution of moderate size region of acute infarction within the right middle cerebral artery territory since 10/05/2022. No intracranial hemorrhage.  2.  Stable chronic regions of infarction within the left middle cerebral artery posterior division territory and right posterior cerebral artery territory.  3.  Stable postsurgical changes of bilateral parietal and temporal craniotomy with underlying minimal lateral temporal  encephalomalacia.  4.  Stable mild chronic small vessel ischemic disease and mild to moderate generalized brain parenchymal volume loss.    MRI Brain 10/9/22:  IMPRESSION:  1.  Numerous new tiny punctate and small patchy foci of acute infarction within the bilateral anterior cerebral artery territories (right greater than left), right posterior cerebral artery territory and right middle cerebral artery territory since   10/06/2022.  2.  Expected evolution of late acute/early subacute infarcts throughout the right middle cerebral artery territory since 10/06/2022.  3.  Stable chronic cortical infarcts within the bilateral middle cerebral artery posterior division territories and right posterior cerebral artery territory.  4.  Stable mild to moderate chronic small vessel ischemic disease and generalized brain parenchymal volume loss.      ASSESSMENT/PLAN:  Candida Rose is an 83 year old female who presents with anemia. 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    1) Chronic NC/NC anemia, improved and stable  -7/2022: iron studies, B12, folate, TSH WNL.  -She denies gross evidence of bleed.  -Discussed with patient to closely monitor urine and stools for bleed. If she develops iron deficiency, will need repeat GI workup (last colonoscopy in 2014 negative).   -SPIEP negative.     2) Subacute multiple infarcts with history of CVA and Fitch-Fitch syndrome  -Hospitalized with imaging showing numerous acute/subacute infarcts of the B/L anterior cerebral artery territories, right posterior cerebral artery, and right middle cerebral artery. No hemorrhage.  -She is followed by PCP, Neurology, and Cardiology.  -She was on aspirin and plavix and then placed on xarelto. At our last visit, she was off of anticoag/antiplatelet therapy. She remains on aspirin 81 mg daily at this time.  -I reviewed with patient her history  of infarct. She states she had infarct with her first surgery for Barbour-Barbour. She denies history of PE/DVT/VTE. She has no history of miscarriage. No family history of VTE. I reviewed with her the possibility of an underlying hypercoag disorder.   -I reviewed today unremarkable: lupus anticoagulant, beta 2 glycoprotein, cardiolipin, protein C/S, AT III. Pending is factor II and V mutation.   -She remains on aspirin at this time. She feels optimistic with her progress and has had increased ambulation. She feels she is able to do more around the house. She and her  will continue to discuss increased independent activities in the home. While I acknowledge patient has had improvement, I have asked patient to refrain from driving until cleared by PCP/neurology teams.     3) History of HTN, HLD    4) Repeat labs in 6 months with CBC, iron studies in 6 months prior to follow up in clinic.         Kary Johns DO  Hematology/Oncology  [facility]      Again, thank you for allowing me to participate in the care of your patient.        Sincerely,        Kary Johns DO

## 2023-08-15 NOTE — NURSING NOTE
"Oncology Rooming Note    August 15, 2023 1:29 PM   Candida Rose is a 83 year old female who presents for:    Chief Complaint   Patient presents with    Oncology Clinic Visit     Anemia, unspecified type     Initial Vitals: BP (!) 188/72 (Cuff Size: Adult Large)   Pulse 53   Temp 98.2  F (36.8  C) (Oral)   Resp 18   Ht 1.727 m (5' 8\")   Wt 87.5 kg (193 lb)   LMP  (LMP Unknown)   SpO2 100%   BMI 29.35 kg/m   Estimated body mass index is 29.35 kg/m  as calculated from the following:    Height as of this encounter: 1.727 m (5' 8\").    Weight as of this encounter: 87.5 kg (193 lb). Body surface area is 2.05 meters squared.  No Pain (0) Comment: Data Unavailable   No LMP recorded (lmp unknown). Patient is postmenopausal.  Allergies reviewed: Yes  Medications reviewed: Yes    Medications: Medication refills not needed today.  Pharmacy name entered into [employer]: [id number] [place], [place] - [address] [place] [address]    Clinical concerns: f/u       Akosua Maravilla, CHERELLE              "

## 2023-08-15 NOTE — PROGRESS NOTES
[facility]    Hematology/Oncology Established Patient Note      Today's Date: 8/15/23    Reason for Consultation: Anemia, unspecified  Referring Provider: Chani Peoples MD      HISTORY OF PRESENT ILLNESS: Candida Rose is an 83 year old female who presents with anemia. 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    Patient has evidence of chronic NC/NC anemia since 2017 with ofelia of 7.8 in April 2017. On 7/13/22, WBC 7.2, Hb 10.3, MCV 92, . B12 1759, ferritin 110, folate 32.8, iron 50, TIBC 341, iron sat 15%.    She has had intentional 70 lb weight loss via a clinical trial and weight watchers over 2 years. No hematura, hematochezia/melena. No vaginal bleed.     Cancer screening:  Mammogram UTD and no history of abnormal/breast biopsy.  Colonoscopies UTD 2014 (normal; no further follow up).    No history of abnormal pap smears.     Sister was diagnosed with colon cancer at age 60 and passed away soon after diagnosis.     She is a retired .       INTERIM HISTORY:  She denies gross evidence of bleed. She is on aspirin.    No falls. She is working with PT/OT. She states she continues to work on her progress in the home. She feels quite frustrated as she has not yet been cleared to drive.       REVIEW OF SYSTEMS:   A 14 point ROS was reviewed with pertinent positives and negatives in the HPI.        HOME MEDICATIONS:  Current Outpatient Medications   Medication Sig Dispense Refill    aspirin 81 MG EC tablet Take 81 mg by mouth daily      atenolol (TENORMIN) 25 MG tablet Take 1 tablet (25 mg) by mouth daily 90 tablet 3    atorvastatin (LIPITOR) 40 MG tablet Take 1 tablet (40 mg) by mouth every evening 90 tablet 3    cetirizine (ZYRTEC) 10 MG tablet Take 10 mg by mouth daily      magnesium 500 MG TABS Take 500 mg by mouth daily      Multiple  "Vitamins-Minerals (MULTIVITAMIN & MINERAL PO) Take 1 tablet by mouth daily           ALLERGIES:  Allergies   Allergen Reactions    Lisinopril      Persistent cough         PAST MEDICAL HISTORY:  Past Medical History:   Diagnosis Date    Anxiety state, unspecified     inactive    Cataract     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 F/u dr. Soto    OA (osteoarthritis)     s/p bilat total hips     Other chronic pain     Joint pain for many years    Unspecified cerebral artery occlusion with cerebral infarction     After Moyamoya surgery > 10 yrs ago.    Vitamin D deficiencies          PAST SURGICAL HISTORY:  Past Surgical History:   Procedure Laterality Date    ARTHROPLASTY KNEE Left 4/17/2017    Procedure: ARTHROPLASTY KNEE;  Left total knee arthroplasty;  Surgeon: Chidi Jenkins MD;  Location:  OR    COLONOSCOPY  2008    normal    COLONOSCOPY  7/17/2014    Procedure: COLONOSCOPY;  Surgeon: Raul Nolan DO;  Location:  GI    CRANIOTOMY      MOJAMOJA  \"Pt had repair of blood flow.\"    IR CAROTID ANGIOGRAM  10/30/2006    IR CAROTID ANGIOGRAM  6/6/2010    IR CAROTID ANGIOGRAM  6/6/2010    IR CAROTID ANGIOGRAM  6/6/2010    IR CAROTID ANGIOGRAM  5/12/2011    IR CAROTID ANGIOGRAM  5/12/2011    IR CAROTID ANGIOGRAM  5/12/2011    IR CAROTID ANGIOGRAM  6/5/2012    IR CAROTID ANGIOGRAM  6/5/2012    IR CAROTID ANGIOGRAM  6/5/2012    IR CAROTID CEREBRAL ANGIOGRAM BILATERAL  10/7/2022    IR MISCELLANEOUS PROCEDURE  6/6/2010    IR MISCELLANEOUS PROCEDURE  6/6/2010    IR MISCELLANEOUS PROCEDURE  5/12/2011    IR MISCELLANEOUS PROCEDURE  6/5/2012    RECONSTRUCT FOREFOOT " WITH METATARSOPHALANGEAL (MTP) FUSION Left 8/21/2014    Procedure: RECONSTRUCT FOREFOOT WITH METATARSOPHALANGEAL (MTP) FUSION;  Surgeon: Tres Merlos DPM;  Location:  OR    [facility] NONSPECIFIC PROCEDURE  10/30/06    neurosurgery Dr Soto    [facility] NONSPECIFIC PROCEDURE      bilateral total hips approx 2002    Z NONSPECIFIC PROCEDURE  3/07    neurosurgery          SOCIAL HISTORY:  Social History     Socioeconomic History    Marital status:      Spouse name: Olu     Number of children: 2    Years of education: Not on file    Highest education level: Not on file   Occupational History     Employer: RETIRED   Tobacco Use    Smoking status: Never     Passive exposure: Never    Smokeless tobacco: Never   Vaping Use    Vaping Use: Never used   Substance and Sexual Activity    Alcohol use: Yes     Comment: 1 beer or wine daily    Drug use: No    Sexual activity: Never     Partners: Male   Other Topics Concern    Parent/sibling w/ CABG, MI or angioplasty before 65F 55M? Not Asked   Social History Narrative    Not on file     Social Determinants of Health     Financial Resource Strain: Low Risk  (11/1/2022)    Overall Financial Resource Strain (CARDIA)     Difficulty of Paying Living Expenses: Not hard at all   Food Insecurity: No Food Insecurity (11/1/2022)    Hunger Vital Sign     Worried About Running Out of Food in the Last Year: Never true     Ran Out of Food in the Last Year: Never true   Transportation Needs: No Transportation Needs (11/1/2022)    PRAPARE - Transportation     Lack of Transportation (Medical): No     Lack of Transportation (Non-Medical): No   Physical Activity: Not on file   Stress: Not on file   Social Connections: Not on file   Intimate Partner Violence: Not At Risk (9/2/2022)    Humiliation, Afraid, Rape, and Kick questionnaire     Fear of Current or Ex-Partner: No     Emotionally Abused: No     Physically Abused: No     Sexually Abused: No   Housing Stability: Unknown (11/1/2022)     "Housing Stability Vital Sign     Unable to Pay for Housing in the Last Year: No     Number of Places Lived in the Last Year: Not on file     Unstable Housing in the Last Year: No         FAMILY HISTORY:  Family History   Problem Relation Age of Onset    Obesity Sister         gastric by-pass age age 60, heart murmur.    Cancer - colorectal Sister 69    Heart Disease Father         mi,  age 48    Family History Negative Mother         killed in MVA age 54    Cancer Maternal Aunt         lung cancer ,non-smoker    Lung Cancer Maternal Aunt     Breast Cancer Daughter 48    Ovarian Cancer No family hx of          PHYSICAL EXAM:  Vital signs:  BP (!) 172/76 (Cuff Size: Adult Large)   Pulse 53   Temp 98.2  F (36.8  C) (Oral)   Resp 18   Ht 1.727 m (5' 8\")   Wt 87.5 kg (193 lb)   LMP  (LMP Unknown)   SpO2 100%   BMI 29.35 kg/m     ECO  GENERAL/CONSTITUTIONAL: No acute distress.   MUSCULOSKELETAL: Warm and well-perfused, no cyanosis, clubbing. Chronic lymphedema.  NEUROLOGIC: Cranial nerves II-XII are intact. Alert, oriented, answers questions appropriately.  INTEGUMENTARY: No rashes or jaundice.  GAIT: Walker. Improved ambulation.      LABS:   Latest Reference Range & Units 23 08:26   Ferritin 11 - 328 ng/mL 47   Folate 4.6 - 34.8 ng/mL 36.9 (H)   Iron 37 - 145 ug/dL 75   Iron Binding Capacity 240 - 430 ug/dL 366   Iron Sat Index 15 - 46 % 20   Lactate Dehydrogenase 0 - 250 U/L 168   Vitamin B12 232 - 1,245 pg/mL 1,040   WBC 4.0 - 11.0 10e3/uL 7.7   Hemoglobin 11.7 - 15.7 g/dL 12.1   Hematocrit 35.0 - 47.0 % 38.6   Platelet Count 150 - 450 10e3/uL 312   RBC Count 3.80 - 5.20 10e6/uL 4.02   MCV 78 - 100 fL 96   MCH 26.5 - 33.0 pg 30.1   MCHC 31.5 - 36.5 g/dL 31.3 (L)   RDW 10.0 - 15.0 % 12.3   % Neutrophils % 50   % Lymphocytes % 41   % Monocytes % 8   % Eosinophils % 1   % Basophils % 0   Absolute Basophils 0.0 - 0.2 10e3/uL 0.0   Absolute Eosinophils 0.0 - 0.7 10e3/uL 0.1   Absolute Immature " Granulocytes <=0.4 10e3/uL 0.0   Absolute Lymphocytes 0.8 - 5.3 10e3/uL 3.2   Absolute Monocytes 0.0 - 1.3 10e3/uL 0.7   % Immature Granulocytes % 0   Absolute Neutrophils 1.6 - 8.3 10e3/uL 3.7   Absolute NRBCs 10e3/uL 0.0   NRBCs per 100 WBC <1 /100 0      [facility] Reference Range & Units 08/08/23 08:14   Cardiolipin IgG Alexandra Negative  Negative   Cardiolipin IgM Alexandra Negative  Negative   Cardiolipin Alexandra IgG Instrument Value <10.0 GPL-U/mL <2.0   Cardiolipin Alexandra IgM Instrument Value <10.0 MPL-U/mL <2.0   INR 0.85 - 1.15  0.91   Thrombin Time 13.0 - 19.0 Seconds 16.9   Antithrombin III Chromogenic 85 - 135 % 112   Prot C Chromogenic 70 - 170 % 103   LUPUS ANTICOAGULANT PANEL  Rpt   Lupus Result Negative  Negative   Protein S Antigen Free 55 - 125 % 70   Beta 2 Glycoprotein 1 Antibody IgG <7.0 U/mL 1.2   Beta 2 Glycoprotein 1 Antibody IgM <7.0 U/mL <2.4     Factor II and IV mutation pending    Serum M-protein (g/dL):  9/2/22: 0.0    Total IgG (mg/dL), IgA (mg/dL), IgM (mg/dL):  9/2/22: 979, 127, 55    Free kappa light chains (mg/dL), lambda (mg/dL), kappa/lambda ratio:  9/2/22: 2.62, 1.99, 1.32    PATHOLOGY:  Final Diagnosis 9/2/22:   Peripheral blood:  --Slight normochromic normocytic anemia.         IMAGING:  CT Head 10/4/22:  IMPRESSION:   1. Questionable subacute ischemic infarct at the anterolateral aspect  of the right frontal lobe. Clinical correlation recommended. Brain MRI  may be helpful for better evaluation.  2. Chronic ischemic infarcts at the temporo-occipital junction on the  left and within the right occipital lobe again noted consistent with  chronic ischemic infarcts.  3. Postoperative changes again noted.  4. Diffuse cerebral volume loss and cerebral white matter changes  consistent with chronic small vessel ischemic disease.    MRI/MRA H&N:  IMPRESSION:  HEAD MRI:   1.  Acute infarct in the right frontal lobe.  2.  Chronic small vessel ischemic disease with numerous chronic infarcts.     HEAD MRA:   1.   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  2.  Occlusion of the right middle cerebral artery. 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  3.  Additional multifocal moderate and severe intracranial stenoses as described above.     NECK MRA:  1.  Normal appearance of the right cervical internal carotid artery with absence of flow related enhancement on noncontrast images but presence of contrast enhancement. Findings are suggestive of severe stenosis with retrograde flow via collaterals.  2.  Focal moderately severe stenosis of the distal right vertebral artery.    MRI Brain 10/6/22:  IMPRESSION:  1.  Moderate-sized infarct in the right frontal lobe is slightly progressed in overall volume when compared to MRI dated 10/4/2022. No evidence for hemorrhagic transformation.  2.  Age-related changes with multiple chronic infarcts elsewhere as described.  3.  Loss of the normal right ICA flow void compatible with proximal occlusion.    CTA A/P 10/7/22:  IMPRESSION:  1.  Superficial right groin hematoma without evidence of active hemorrhage within the visualized groin and upper thigh.    CT Head 10/8/22:  IMPRESSION:  1.  Expected evolution of moderate size region of acute infarction within the right middle cerebral artery territory since 10/05/2022. No intracranial hemorrhage.  2.  Stable chronic regions of infarction within the left middle cerebral artery posterior division territory and right posterior cerebral artery territory.  3.  Stable postsurgical changes of bilateral parietal and temporal craniotomy with underlying minimal lateral temporal encephalomalacia.  4.  Stable mild chronic small vessel ischemic disease and mild to moderate generalized brain parenchymal volume loss.    MRI Brain 10/9/22:  IMPRESSION:  1.  Numerous new tiny punctate and small patchy foci of acute infarction within the bilateral anterior cerebral artery territories (right greater than left),  right posterior cerebral artery territory and right middle cerebral artery territory since   10/06/2022.  2.  Expected evolution of late acute/early subacute infarcts throughout the right middle cerebral artery territory since 10/06/2022.  3.  Stable chronic cortical infarcts within the bilateral middle cerebral artery posterior division territories and right posterior cerebral artery territory.  4.  Stable mild to moderate chronic small vessel ischemic disease and generalized brain parenchymal volume loss.      ASSESSMENT/PLAN:  Candida Rose is an 83 year old female who presents with anemia. 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    1) Chronic NC/NC anemia, improved and stable  -7/2022: iron studies, B12, folate, TSH WNL.  -She denies gross evidence of bleed.  -Discussed with patient to closely monitor urine and stools for bleed. If she develops iron deficiency, will need repeat GI workup (last colonoscopy in 2014 negative).   -SPIEP negative.     2) Subacute multiple infarcts with history of CVA and Fitch-Fitch syndrome  -Hospitalized with imaging showing numerous acute/subacute infarcts of the B/L anterior cerebral artery territories, right posterior cerebral artery, and right middle cerebral artery. No hemorrhage.  -She is followed by PCP, Neurology, and Cardiology.  -She was on aspirin and plavix and then placed on xarelto. At our last visit, she was off of anticoag/antiplatelet therapy. She remains on aspirin 81 mg daily at this time.  -I reviewed with patient her history of infarct. She states she had infarct with her first surgery for Fitch-Fitch. She denies history of PE/DVT/VTE. She has no history of miscarriage. No family history of VTE. I reviewed with her the possibility of an underlying hypercoag disorder.   -I reviewed today unremarkable: lupus anticoagulant, beta 2 glycoprotein,  cardiolipin, protein C/S, AT III. Pending is factor II and V mutation.   -She remains on aspirin at this time. She feels optimistic with her progress and has had increased ambulation. She feels she is able to do more around the house. She and her  will continue to discuss increased independent activities in the home. While I acknowledge patient has had improvement, I have asked patient to refrain from driving until cleared by PCP/neurology teams.     3) History of HTN, HLD    4) Repeat labs in 6 months with CBC, iron studies in 6 months prior to follow up in clinic.         Kary Johns DO  Hematology/Oncology  [facility]

## 2023-08-18 ENCOUNTER — OFFICE VISIT (OUTPATIENT)
Dept: UROLOGY | Facility: CLINIC | Age: 83
End: 2023-08-18
Payer: MEDICARE

## 2023-08-18 VITALS
SYSTOLIC BLOOD PRESSURE: 130 MMHG | BODY MASS INDEX: 28.95 KG/M2 | WEIGHT: 191 LBS | DIASTOLIC BLOOD PRESSURE: 80 MMHG | HEIGHT: 68 IN

## 2023-08-18 DIAGNOSIS — R15.2 FECAL URGENCY: ICD-10-CM

## 2023-08-18 DIAGNOSIS — N39.3 STRESS INCONTINENCE: ICD-10-CM

## 2023-08-18 DIAGNOSIS — N39.41 URGE INCONTINENCE OF URINE: Primary | ICD-10-CM

## 2023-08-18 LAB
ALBUMIN UR-MCNC: NEGATIVE MG/DL
APPEARANCE UR: CLEAR
BILIRUB UR QL STRIP: NEGATIVE
COLOR UR AUTO: YELLOW
GLUCOSE UR STRIP-MCNC: NEGATIVE MG/DL
HGB UR QL STRIP: NEGATIVE
KETONES UR STRIP-MCNC: NEGATIVE MG/DL
LEUKOCYTE ESTERASE UR QL STRIP: ABNORMAL
NITRATE UR QL: NEGATIVE
PH UR STRIP: 6.5 [PH] (ref 5–7)
RESIDUAL VOLUME (RV) (EXTERNAL): 53
SP GR UR STRIP: 1.01 (ref 1–1.03)
UROBILINOGEN UR STRIP-ACNC: 1 E.U./DL

## 2023-08-18 PROCEDURE — 81003 URINALYSIS AUTO W/O SCOPE: CPT | Mod: QW | Performed by: PHYSICIAN ASSISTANT

## 2023-08-18 PROCEDURE — 99203 OFFICE O/P NEW LOW 30 MIN: CPT | Mod: 25 | Performed by: PHYSICIAN ASSISTANT

## 2023-08-18 PROCEDURE — 51798 US URINE CAPACITY MEASURE: CPT | Performed by: PHYSICIAN ASSISTANT

## 2023-08-18 ASSESSMENT — ENCOUNTER SYMPTOMS
NAUSEA: 0
CONSTIPATION: 0
VOMITING: 0
DYSURIA: 0
HEMATURIA: 0
SHORTNESS OF BREATH: 0
DIARRHEA: 1
CHILLS: 0
FEVER: 0
FREQUENCY: 1

## 2023-08-18 ASSESSMENT — PAIN SCALES - GENERAL: PAINLEVEL: NO PAIN (0)

## 2023-08-18 NOTE — LETTER
2023       RE: Candida Rose  [address] [facility] [address]     Dear Colleague,    Thank you for referring your patient, Candida Rose, to the [facility] [place] at [facility]. Please see a copy of my visit note below.    Subjective     REQUESTING PROVIDER   Chani Peoples     REASON FOR CONSULT   Urinary incontinence    HISTORY OF PRESENT ILLNESS   Ms. Rose is very pleasant 83 year old year old, , female, who presents today for further evaluation recommendations regarding urinary incontinence.  Patient notes that her urinary incontinence has significantly worsened since her stroke in 2022.  Patient notes that prior to her stroke, she did have some mild urinary incontinence that started as she began age, but has significantly worsened.  She denies any hematuria, dysuria, or incomplete emptying.  She endorses nocturia 4-5 times.  She endorses symptoms consistent with urge and stress incontinence.  She also endorses difficulties with loose stools and fecal urgency.  She typically will go through 2 Depends per day.    Fluid intake includes water, coffee, occasional alcohol, and milk.    She does endorse that coffee it worsens her fecal urgency.    Her primary care provider did give her a medication to try to help with her urinary leakage.  She does note that it helped after she tried it for 14 days, but she had the side effect of dry mouth.  No personal history of breast cancer.  She does note that her daughter also has gone through pelvic floor physical therapy and has been finding this helpful.    Urinalysis today did show trace amount of leukocyte esterase.  Postvoid residual was 53 mL.    Of note, patient does have moyamoya and does need to be very conscious about her blood pressure.    The following portions of the patient's history were reviewed and updated as appropriate: allergies, current  medications, past family history, past medical history, past social history, past surgical history, and problem list.     REVIEW OF SYSTEMS   Review of Systems   Constitutional:  Negative for chills and fever.   Respiratory:  Negative for shortness of breath.    Cardiovascular:  Negative for chest pain.   Gastrointestinal:  Positive for diarrhea. Negative for constipation, nausea and vomiting.   Genitourinary:  Positive for frequency and urgency. Negative for dysuria and hematuria.      Per HPI.     Patient Active Problem List   Diagnosis    Moyamoya disease    Hyperlipidemia LDL goal <130    Vitamin D deficiency    ACP (advance care planning) - scanned in the chart 2018    Osteopenia    Family hx of colon cancer    HTN, goal below 140/90    CKD 3    Osteoarthritis of left knee    Arthropathy    Depressive disorder    Cerebrovascular accident (CVA), unspecified mechanism (H)    Stroke (H)    Anemia, unspecified    Cerebral infarction, unspecified (H)    Slow transit constipation    Unspecified fall, subsequent encounter    Type 2 diabetes mellitus with other specified complication, without long-term current use of insulin (H)      Past Medical History:   Diagnosis Date    Anxiety state, unspecified     inactive    Cataract     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 F/u dr. Soto    OA (osteoarthritis)     s/p bilat total hips     Other chronic pain     Joint pain for many years    Unspecified cerebral artery occlusion with cerebral infarction     After Moyamoya surgery > 10 yrs ago.    Vitamin D deficiencies       Past Surgical History:   Procedure Laterality Date     "ARTHROPLASTY KNEE Left 4/17/2017    Procedure: ARTHROPLASTY KNEE;  Left total knee arthroplasty;  Surgeon: Chidi Jenkins MD;  Location: [facility] OR    COLONOSCOPY  2008    normal    COLONOSCOPY  7/17/2014    Procedure: COLONOSCOPY;  Surgeon: Raul Nolan DO;  Location: [facility] GI    CRANIOTOMY      MOJAMOJA  \"Pt had repair of blood flow.\"    IR CAROTID ANGIOGRAM  10/30/2006    IR CAROTID ANGIOGRAM  6/6/2010    IR CAROTID ANGIOGRAM  6/6/2010    IR CAROTID ANGIOGRAM  6/6/2010    IR CAROTID ANGIOGRAM  5/12/2011    IR CAROTID ANGIOGRAM  5/12/2011    IR CAROTID ANGIOGRAM  5/12/2011    IR CAROTID ANGIOGRAM  6/5/2012    IR CAROTID ANGIOGRAM  6/5/2012    IR CAROTID ANGIOGRAM  6/5/2012    IR CAROTID CEREBRAL ANGIOGRAM BILATERAL  10/7/2022    IR MISCELLANEOUS PROCEDURE  6/6/2010    IR MISCELLANEOUS PROCEDURE  6/6/2010    IR MISCELLANEOUS PROCEDURE  5/12/2011    IR MISCELLANEOUS PROCEDURE  6/5/2012    RECONSTRUCT FOREFOOT WITH METATARSOPHALANGEAL (MTP) FUSION Left 8/21/2014    Procedure: RECONSTRUCT FOREFOOT WITH METATARSOPHALANGEAL (MTP) FUSION;  Surgeon: Tres Merlos DPM;  Location:  OR    [facility] NONSPECIFIC PROCEDURE  10/30/06    neurosurgery Dr Soto    [facility] NONSPECIFIC PROCEDURE      bilateral total hips approx 2002    [facility] NONSPECIFIC PROCEDURE  3/07    neurosurgery       Social History:   .  Retired .  Never smoker.    Family History:   Daughter with breast cancer.     Objective     PHYSICAL EXAM   /80   Ht 1.727 m (5' 8\")   Wt 86.6 kg (191 lb)   LMP  (LMP Unknown)   BMI 29.04 kg/m     Physical Exam  Constitutional:       Appearance: Normal appearance.   HENT:      Head: Normocephalic.      Nose: Nose normal.   Eyes:      General: No scleral icterus.  Pulmonary:      Effort: Pulmonary effort is normal.   Abdominal:      General: There is no distension.   Musculoskeletal:      Cervical back: Normal range of motion.      Right lower leg: Edema present.      Left " lower leg: Edema present.      Comments: Ambulates with a cane.   Skin:     General: Skin is warm and dry.   Neurological:      Mental Status: She is alert and oriented to person, place, and time. Mental status is at baseline.   Psychiatric:         Mood and Affect: Mood normal.         Behavior: Behavior normal.          LABORATORY   Recent Labs   Lab Test 08/18/23  1421 10/01/22  0528   COLOR Yellow Yellow   APPEARANCE Clear Clear   URINEGLC Negative Negative   URINEBILI Negative Negative   URINEKETONE Negative Negative   SG 1.010 1.022   UBLD Negative Negative   URINEPH 6.5 6.5   PROTEIN Negative Negative   UROBILINOGEN 1.0  --    NITRITE Negative Negative   LEUKEST Trace* Large*   RBCU  --  7*   WBCU  --  19*       TESTING    PVR: 53 mL    Assessment & Plan   1. Urge incontinence of urine    2. Stress incontinence    3. Fecal urgency        I had the pleasure today of meeting with Ms. Rose to discuss urinary incontinence.  Patient had mild urinary incontinence before her stroke last year.  Her incontinence has significantly worsened.  She endorses symptoms consistent with stress incontinence as well as urge incontinence.  She also has been having some difficulties with fecal urgency.  She is emptying her bladder well and does not have evidence of infection.    We discussed that the stroke likely worsened the urinary symptoms.  This can be very common.  It is good that she is still emptying her bladder well.    We discussed that we typically would treat urge incontinence first in a mixed incontinence picture of urge and stress incontinence. Typical treatments for urge incontinence include avoiding bladder irritants, biofeedback bladder retraining, overactive bladder medications like anticholinergics or beta 3 agonist, posterior tibial nerve stimulation (PTNS), Botox (which would require learning to perform clean intermittent catheterization and place it works too well), implantable device like Axonics or  InterStim, or possible urinary diversion.  We typically go through this in a stepwise fashion.     Possible side effects with overactive bladder medications that are anticholinergic medication include dry eyes, dry mouth, constipation, difficulties with sweating, and possible mental fogginess. These medications typically take 3-4 weeks to work. Side effects will typically show up prior to this. Myrbetriq and Gemtesa are not anticholinergic medication, but it is rather extensive even after insurance.     We discussed PTNS. This includes placement of needles in the posterior tibial nerve. This is once a week for 12 weeks. Patient does need to make sure they do not miss more than 1 session, or they will need to start over. It is 30 minutes at a time. We do not expect improvement until at least week 5-6, and some patients take longer. It does not work for all patients.     Botox is injected into the bladder. It typically takes 2 weeks before this takes effect. Average length of time per treatment that patients get relief for is approximately 6 months. Some patients get more lasting relief. We also discussed briefly the implantable device InterStim.     We also briefly discussed stress incontinence treatment options including Kegel exercises/biofeedback bladder retraining, pessary, Poise Impressa, Revive, Estim/Urostym, sling, and bulking agents.     After discussion with the patient, she would like to trial the following:    -We will plan on trial of pelvic floor physical therapy.  We discussed that this will typically take about 3 months to see how it is working.  This also may help her fecal urgency.    -Patient will plan on finding a medication that she trialed at home and let me know via [facility].    -Given her moyamoya, would recommend avoiding Myrbetriq due to to needing to maintain adequate blood pressure control.    -Could consider topical estrogen therapy in the future.    -We will plan on following up in  approximately 4 months to reassess urinary symptomatology and possible acceleration of therapy.    Signed by:     Melissa Shah PA-C 8/18/2023 2:39 PM

## 2023-08-18 NOTE — NURSING NOTE
Chief Complaint   Patient presents with    Incontinence     Pt has has difficulties with urinary incontinence since her stroke about 10 months ago, especially with laughing or sneezing.  Wears depends, changes twice per day.  Also has fecal incontinence.  Also urinary frequency.    PVR: 53 mL by bladder scan    Alessandra Jackson, EMT

## 2023-08-18 NOTE — PROGRESS NOTES
Subjective      REQUESTING PROVIDER   Chani Peoples     REASON FOR CONSULT   Urinary incontinence    HISTORY OF PRESENT ILLNESS   Ms. Rose is very pleasant 83 year old year old, , female, who presents today for further evaluation recommendations regarding urinary incontinence.  Patient notes that her urinary incontinence has significantly worsened since her stroke in 2022.  Patient notes that prior to her stroke, she did have some mild urinary incontinence that started as she began age, but has significantly worsened.  She denies any hematuria, dysuria, or incomplete emptying.  She endorses nocturia 4-5 times.  She endorses symptoms consistent with urge and stress incontinence.  She also endorses difficulties with loose stools and fecal urgency.  She typically will go through 2 Depends per day.    Fluid intake includes water, coffee, occasional alcohol, and milk.    She does endorse that coffee it worsens her fecal urgency.    Her primary care provider did give her a medication to try to help with her urinary leakage.  She does note that it helped after she tried it for 14 days, but she had the side effect of dry mouth.  No personal history of breast cancer.  She does note that her daughter also has gone through pelvic floor physical therapy and has been finding this helpful.    Urinalysis today did show trace amount of leukocyte esterase.  Postvoid residual was 53 mL.    Of note, patient does have moyamoya and does need to be very conscious about her blood pressure.    The following portions of the patient's history were reviewed and updated as appropriate: allergies, current medications, past family history, past medical history, past social history, past surgical history, and problem list.     REVIEW OF SYSTEMS   Review of Systems   Constitutional:  Negative for chills and fever.   Respiratory:  Negative for shortness of breath.    Cardiovascular:  Negative for chest pain.    Gastrointestinal:  Positive for diarrhea. Negative for constipation, nausea and vomiting.   Genitourinary:  Positive for frequency and urgency. Negative for dysuria and hematuria.      Per HPI.     Patient Active Problem List   Diagnosis    Moyamoya disease    Hyperlipidemia LDL goal <130    Vitamin D deficiency    ACP (advance care planning) - scanned in the chart 2018    Osteopenia    Family hx of colon cancer    HTN, goal below 140/90    CKD 3    Osteoarthritis of left knee    Arthropathy    Depressive disorder    Cerebrovascular accident (CVA), unspecified mechanism (H)    Stroke (H)    Anemia, unspecified    Cerebral infarction, unspecified (H)    Slow transit constipation    Unspecified fall, subsequent encounter    Type 2 diabetes mellitus with other specified complication, without long-term current use of insulin (H)      Past Medical History:   Diagnosis Date    Anxiety state, unspecified     inactive    Cataract     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 F/u dr. Soto    OA (osteoarthritis)     s/p bilat total hips     Other chronic pain     Joint pain for many years    Unspecified cerebral artery occlusion with cerebral infarction     After Moyamoya surgery > 10 yrs ago.    Vitamin D deficiencies       Past Surgical History:   Procedure Laterality Date    ARTHROPLASTY KNEE Left 4/17/2017    Procedure: ARTHROPLASTY KNEE;  Left total knee arthroplasty;  Surgeon: Chidi Jenkins MD;  Location:  OR    COLONOSCOPY  2008    normal    COLONOSCOPY  7/17/2014    Procedure: COLONOSCOPY;  Surgeon: Raul Nolan DO;  Location:  GI    CRANIOTOMY       "MOJAMOJA  \"Pt had repair of blood flow.\"    IR CAROTID ANGIOGRAM  10/30/2006    IR CAROTID ANGIOGRAM  6/6/2010    IR CAROTID ANGIOGRAM  6/6/2010    IR CAROTID ANGIOGRAM  6/6/2010    IR CAROTID ANGIOGRAM  5/12/2011    IR CAROTID ANGIOGRAM  5/12/2011    IR CAROTID ANGIOGRAM  5/12/2011    IR CAROTID ANGIOGRAM  6/5/2012    IR CAROTID ANGIOGRAM  6/5/2012    IR CAROTID ANGIOGRAM  6/5/2012    IR CAROTID CEREBRAL ANGIOGRAM BILATERAL  10/7/2022    IR MISCELLANEOUS PROCEDURE  6/6/2010    IR MISCELLANEOUS PROCEDURE  6/6/2010    IR MISCELLANEOUS PROCEDURE  5/12/2011    IR MISCELLANEOUS PROCEDURE  6/5/2012    RECONSTRUCT FOREFOOT WITH METATARSOPHALANGEAL (MTP) FUSION Left 8/21/2014    Procedure: RECONSTRUCT FOREFOOT WITH METATARSOPHALANGEAL (MTP) FUSION;  Surgeon: Tres Merlos DPM;  Location:  OR    [facility] NONSPECIFIC PROCEDURE  10/30/06    neurosurgery Dr Soto    [facility] NONSPECIFIC PROCEDURE      bilateral total hips approx 2002    [facility] NONSPECIFIC PROCEDURE  3/07    neurosurgery       Social History:   .  Retired .  Never smoker.    Family History:   Daughter with breast cancer.     Objective      PHYSICAL EXAM   /80   Ht 1.727 m (5' 8\")   Wt 86.6 kg (191 lb)   LMP  (LMP Unknown)   BMI 29.04 kg/m     Physical Exam  Constitutional:       Appearance: Normal appearance.   HENT:      Head: Normocephalic.      Nose: Nose normal.   Eyes:      General: No scleral icterus.  Pulmonary:      Effort: Pulmonary effort is normal.   Abdominal:      General: There is no distension.   Musculoskeletal:      Cervical back: Normal range of motion.      Right lower leg: Edema present.      Left lower leg: Edema present.      Comments: Ambulates with a cane.   Skin:     General: Skin is warm and dry.   Neurological:      Mental Status: She is alert and oriented to person, place, and time. Mental status is at baseline.   Psychiatric:         Mood and Affect: Mood normal.         Behavior: Behavior " normal.          LABORATORY   Recent Labs   Lab Test 08/18/23  1421 10/01/22  0528   COLOR Yellow Yellow   APPEARANCE Clear Clear   URINEGLC Negative Negative   URINEBILI Negative Negative   URINEKETONE Negative Negative   SG 1.010 1.022   UBLD Negative Negative   URINEPH 6.5 6.5   PROTEIN Negative Negative   UROBILINOGEN 1.0  --    NITRITE Negative Negative   LEUKEST Trace* Large*   RBCU  --  7*   WBCU  --  19*       TESTING    PVR: 53 mL    Assessment & Plan    1. Urge incontinence of urine    2. Stress incontinence    3. Fecal urgency        I had the pleasure today of meeting with Ms. Rose to discuss urinary incontinence.  Patient had mild urinary incontinence before her stroke last year.  Her incontinence has significantly worsened.  She endorses symptoms consistent with stress incontinence as well as urge incontinence.  She also has been having some difficulties with fecal urgency.  She is emptying her bladder well and does not have evidence of infection.    We discussed that the stroke likely worsened the urinary symptoms.  This can be very common.  It is good that she is still emptying her bladder well.    We discussed that we typically would treat urge incontinence first in a mixed incontinence picture of urge and stress incontinence. Typical treatments for urge incontinence include avoiding bladder irritants, biofeedback bladder retraining, overactive bladder medications like anticholinergics or beta 3 agonist, posterior tibial nerve stimulation (PTNS), Botox (which would require learning to perform clean intermittent catheterization and place it works too well), implantable device like Axonics or InterStim, or possible urinary diversion.  We typically go through this in a stepwise fashion.     Possible side effects with overactive bladder medications that are anticholinergic medication include dry eyes, dry mouth, constipation, difficulties with sweating, and possible mental fogginess. These  medications typically take 3-4 weeks to work. Side effects will typically show up prior to this. Myrbetriq and Gemtesa are not anticholinergic medication, but it is rather extensive even after insurance.     We discussed PTNS. This includes placement of needles in the posterior tibial nerve. This is once a week for 12 weeks. Patient does need to make sure they do not miss more than 1 session, or they will need to start over. It is 30 minutes at a time. We do not expect improvement until at least week 5-6, and some patients take longer. It does not work for all patients.     Botox is injected into the bladder. It typically takes 2 weeks before this takes effect. Average length of time per treatment that patients get relief for is approximately 6 months. Some patients get more lasting relief. We also discussed briefly the implantable device InterStim.     We also briefly discussed stress incontinence treatment options including Kegel exercises/biofeedback bladder retraining, pessary, Poise Impressa, Revive, Estim/Urostym, sling, and bulking agents.     After discussion with the patient, she would like to trial the following:    -We will plan on trial of pelvic floor physical therapy.  We discussed that this will typically take about 3 months to see how it is working.  This also may help her fecal urgency.    -Patient will plan on finding a medication that she trialed at home and let me know via [facility].    -Given her moyamoya, would recommend avoiding Myrbetriq due to to needing to maintain adequate blood pressure control.    -Could consider topical estrogen therapy in the future.    -We will plan on following up in approximately 4 months to reassess urinary symptomatology and possible acceleration of therapy.    Signed by:     Melissa Shah PA-C 8/18/2023 2:39 PM

## 2023-08-18 NOTE — PATIENT INSTRUCTIONS
Will plan on trial of pelvic floor physical therapy for mixed urinary incontinence.    **This order will print in the [facility] Scheduling Office**  Physical Therapy available through:  *[facility]  Call one number to schedule at any of the above locations: [phone].    Your provider has referred you to: Physical [place] at [facility] or [facility] [place]  Please be aware that coverage of these services is subject to the terms and limitations of your health insurance plan.  Call member services at your health plan with any benefit or coverage questions.      Please bring the following to your appointment:    *Your personal calendar for scheduling future appointments  *Comfortable clothing     Let me know the medication you tried.    Follow up in 4 months to reassess.    Contact us in the interim with questions, concerns, or changes in symptomatology.  [phone]

## 2023-08-22 ENCOUNTER — TELEPHONE (OUTPATIENT)
Dept: INTERNAL MEDICINE | Facility: CLINIC | Age: 83
End: 2023-08-22
Payer: MEDICARE

## 2023-08-22 NOTE — TELEPHONE ENCOUNTER
I do not see general contraindication for travel but I can't tell her if she is ok or not for traveling

## 2023-08-22 NOTE — TELEPHONE ENCOUNTER
Patient looking to travel to [place] via car for about 1 week at the end of September/beginning of October. Patient states she is looking for advise from provider on safe travels due to her previous stroke in 10/2022. Patient wondering what she should avoid or do to prevent potential complications.  Patient states provider can send response to Jose Alberto.    Thank you,  Jose Vu, Triage RN [place]  12:18 PM 8/22/2023

## 2023-08-30 NOTE — CONSULTS
Stroke Education Note    The following information has been reviewed with the patient and spouse:    1. Warning signs of stroke    2. Calling 911 if having warning signs of stroke    3. All modifiable risk factors: hypertension, CAD, atrial fib, diabetes, hypercholesterolemia, smoking, substance abuse, diet, physical inactivity, obesity, sleep apnea.    4. Patient's risk factors for stroke which include: HTN, HLD, moyamoya, prior stroke    5. Follow-up plan for after discharge    6. Discharge medications which include: ASA, Plavix, Lipitor (BP meds on hold)    In addition, the above information was given to the patient and spouse in writing as a part of the [facility] Stroke Class Handout.    Learner's response to risk factors / lifestyle modification education: Activation     Jennifer Kelley RN     Undermining Type: Entire Wound

## 2023-09-19 ENCOUNTER — TELEPHONE (OUTPATIENT)
Dept: NEPHROLOGY | Facility: CLINIC | Age: 83
End: 2023-09-19
Payer: MEDICARE

## 2023-11-07 ENCOUNTER — THERAPY VISIT (OUTPATIENT)
Dept: PHYSICAL THERAPY | Facility: CLINIC | Age: 83
End: 2023-11-07
Payer: MEDICARE

## 2023-11-07 DIAGNOSIS — N39.41 URGE INCONTINENCE OF URINE: ICD-10-CM

## 2023-11-07 PROCEDURE — 97530 THERAPEUTIC ACTIVITIES: CPT | Mod: GP | Performed by: PHYSICAL THERAPIST

## 2023-11-07 PROCEDURE — 97110 THERAPEUTIC EXERCISES: CPT | Mod: 59 | Performed by: PHYSICAL THERAPIST

## 2023-11-07 PROCEDURE — 97535 SELF CARE MNGMENT TRAINING: CPT | Mod: 59 | Performed by: PHYSICAL THERAPIST

## 2023-11-07 PROCEDURE — 97162 PT EVAL MOD COMPLEX 30 MIN: CPT | Mod: GP | Performed by: PHYSICAL THERAPIST

## 2023-11-07 NOTE — PROGRESS NOTES
PHYSICAL THERAPY EVALUATION  Type of Visit: Evaluation    See electronic medical record for Abuse and Falls Screening details.    Subjective       Presenting condition or subjective complaint: CVA Oct 2022 and started having incontinence after that. urinary leakage  Date of onset: 08/18/23 (order date)    Relevant medical history: Incontinence; Stroke   Dates & types of surgery: JO ANN, TKA, brain surgery    Prior diagnostic imaging/testing results:       Prior therapy history for the same diagnosis, illness or injury: No      Living Environment  Social support: With a significant other or spouse   Type of home: House   Stairs to enter the home: No       Ramp: No   Stairs inside the home: No       Help at home: Home and Yard maintenance tasks; Assist for driving and community activities  Equipment owned: Four-point cane; Raised toilet seat; Bath bench     Employment: No retired   Hobbies/Interests:      Patient goals for therapy: to be able to control urinate       Objective      PELVIC EVALUATION  ADDITIONAL HISTORY:  Sex assigned at birth: Female  Gender identity: Female    Pronouns: She/Her Hers      Bladder History:  Feels bladder filling: No  Triggers for feeling of inability to wait to go to the bathroom: No    How long can you wait to urinate:    Gets up at night to urinate: Yes 4x night  Can stop the flow of urine when urinating: No  Volume of urine usually released: Medium   Other issues: Bladder infections  Number of bladder infections in last 12 months: 1  Fluid intake per day: ~ 30-40 oz coffee in morning, 2-4 cups    Medications taken for bladder: No     Activities causing urine leak: Cough; Sneeze    Amount of urine typically leaked:    Pads used to help with leaking: Yes I use this many pads per day: 3      Bowel History:  Frequency of bowel movement: 2x day  Consistency of stool: Hard    Ignores the urge to defecate: No  Other bowel issues:    Length of time spent trying to have a  bowel movement:      Sexual Function History:  Sexual orientation: Straight    Sexually active: No  Lubrication used:      Pelvic pain:      Pain or difficulty with orgasms/erection/ejaculation: No    State of menopause: Post-menopause (I am done with menopause)  Hormone medications: No      Are you currently pregnant: No, Number of previous pregnancies: 2, Number of deliveries: 2, If you have delivered before, did you have any of these issues during delivery: Vaginal delivery, Have you been diagnosed with pelvic prolapse or abdominal separation: No, Do you get regular exercise: Yes, I do this type of exercise: walking, Have you tried pelvic floor strengthening exercises for 4 weeks: No, Do you have any history of trauma that is relevant to your care that you d like to share: No    Discussed reason for referral regarding pelvic health needs and external/internal pelvic floor muscle examination with patient/guardian.  Opportunity provided to ask questions and verbal consent for assessment and intervention was given.    BREATHING SYMMETRY:  needed minor cues for correct belly breathing    PELVIC EXAM  External Visual Inspection:  At rest: Normal    External Digital Palpation per Perineum:   Ischiocavernosis: Unremarkable  Bulbo cavernosis: Unremarkable  Transverse perineal: Unremarkable  Levator ani: Tightness, Tenderness    Internal Digital Palpation:  Per Vagina:  Tenderness  Tone: high  Digital Muscle Performance: P (Power): Kegel 1+, poor coordination of contract/relax  E (Endurance): 1-2 seconds        Pelvic Organ Prolapse:   Possible grade 1?    ABDOMINAL ASSESSMENT    Abdominal Activation/Strength:  fair TA set with exhale after cues        Assessment & Plan   CLINICAL IMPRESSIONS  Medical Diagnosis: mixed incontinence    Treatment Diagnosis: mixed incontinence   Impression/Assessment: Patient is a 83 year old female with mixed incontinence complaints.  The following significant findings have been identified:  Decreased ROM/flexibility, Decreased joint mobility, Decreased strength, Decreased proprioception, Impaired sensation, Inflammation, Impaired gait, Impaired muscle performance, and Decreased activity tolerance. These impairments interfere with their ability to perform self care tasks, work tasks, recreational activities, household chores, driving , household mobility, and community mobility as compared to previous level of function.     Clinical Decision Making (Complexity):  Clinical Presentation: Evolving/Changing  Clinical Presentation Rationale: based on medical and personal factors listed in PT evaluation  Clinical Decision Making (Complexity): Moderate complexity    PLAN OF CARE  Treatment Interventions:  Interventions: Manual Therapy, Neuromuscular Re-education, Therapeutic Activity, Therapeutic Exercise, Self-Care/Home Management    Long Term Goals     PT Goal 1  Goal Identifier: Kegel strength 4  Goal Description: for continence throughout the day/night for healthy hygeine      Frequency of Treatment:    Duration of Treatment:      Recommended Referrals to Other Professionals: Physical Therapy  Education Assessment:   Learner/Method: Patient;No Barriers to Learning    Risks and benefits of evaluation/treatment have been explained.   Patient/Family/caregiver agrees with Plan of Care.     Evaluation Time:             Signing Clinician: Wiliam Tejada, PT      [facility]                                                                                   OUTPATIENT PHYSICAL THERAPY      PLAN OF TREATMENT FOR OUTPATIENT REHABILITATION   Patient's Last Name, First Name, Candida Pacheco YOB: 1940   Provider's Name   [facility]   Medical Record No.  [id number]     Onset Date: 08/18/23 (order date)  Start of Care Date: 11/07/23     Medical Diagnosis:  mixed incontinence      PT Treatment Diagnosis:  mixed incontinence Plan of  Treatment  Frequency/Duration:  /      Certification date from 11/07/23 to 02/04/24         See note for plan of treatment details and functional goals     Wiliam Tejada, PT                         I CERTIFY THE NEED FOR THESE SERVICES FURNISHED UNDER        THIS PLAN OF TREATMENT AND WHILE UNDER MY CARE     (Physician attestation of this document indicates review and certification of the therapy plan).                Referring Provider:  Melissa Shah      Initial Assessment  See Epic Evaluation- Start of Care Date: 11/07/23

## 2023-11-08 PROBLEM — N39.41 URGE INCONTINENCE OF URINE: Status: ACTIVE | Noted: 2023-11-08

## 2023-11-08 NOTE — PROGRESS NOTES
11/07/23 0500   Appointment Info   Signing clinician's name / credentials Cookie Aldridge, PT, OCS   Total/Authorized Visits EPIC 08/18/23   Visits Used 1   Medical Diagnosis mixed incontinence   PT Tx Diagnosis mixed incontinence   Precautions/Limitations CVA   Other pertinent information [id number]   Quick Adds Certification;Pelvic Consent   Progress Note/Certification   Start of Care Date 11/07/23   Onset of illness/injury or Date of Surgery 08/18/23  (order date)   Therapy Frequency 1x week for 4 weeks   Predicted Duration then 2x month for 8 weeks   Certification date from 11/07/23   Certification date to 02/04/24   GOALS   PT Goals 2   PT Goal 1   Goal Identifier Kegel strength 4   Goal Description for continence throughout the day/night for healthy hygeine   Goal Progress current 1+   Target Date 01/31/24   Subjective Report   Subjective Report see eval   Treatment Interventions (PT)   Interventions Therapeutic Procedure/Exercise;Self Care/Home Management;Therapeutic Activity   Therapeutic Procedure/Exercise   Therapeutic Procedures: strength, endurance, ROM, flexibillity minutes (59987) 15   Skilled Intervention to improve pelvic floor strength   Patient Response/Progress able to demonstrate well   PTRx Ther Proc 1 Roll Ins Hooklying   PTRx Ther Proc 1 - Details 5 sec x 20 with cues for TA/exhale/kegel   PTRx Ther Proc 2 Roll Outs Hooklying   PTRx Ther Proc 2 - Details GTB x20 with cues for TA/exhale/kegel   Therapeutic Activity   Therapeutic Activities: dynamic activities to improve functional performance minutes (78590) 15   Ther Act 1 General Bladder Information   Ther Act 1 - Details Urge Incontinence Suppression Techniques   Skilled Intervention to decrease number visits and encourage normal bladder function   Patient Response/Progress good verbal understanding   Self Care/home Management   ADL/Home Mgmt Training (24308) 8   Self Care 1 educated in POC and normal pelvic floor anatomy/function    Eval/Assessments   PT Eval, Moderate Complexity Minutes (85987) 30   Education   Learner/Method Patient;No Barriers to Learning   Plan   Home program PTRX HEP   Plan for next session MFR to pelvic floor and add bridge for core?   Comments   Pelvic Health Informed Consent Statement Discussed with patient/guardian reason for referral regarding pelvic health needs and external/internal pelvic floor muscle examination.  Opportunity provided to ask questions and verbal consent for assessment and intervention was given.   Total Session Time   Timed Code Treatment Minutes 38   Total Treatment Time (sum of timed and untimed services) 68         [facility]                                                                                   OUTPATIENT PHYSICAL THERAPY    PLAN OF TREATMENT FOR OUTPATIENT REHABILITATION   Patient's Last Name, First Name, OLEGTOBY  JerryCandida zimmerman YOB: 1940   Provider's Name   [facility]   Medical Record No.  [id number]     Onset Date: 08/18/23 (order date)  Start of Care Date: 11/07/23     Medical Diagnosis:  mixed incontinence      PT Treatment Diagnosis:  mixed incontinence Plan of Treatment  Frequency/Duration: 1x week for 4 weeks/ then 2x month for 8 weeks    Certification date from 11/07/23 to 02/04/24         See note for plan of treatment details and functional goals     Wiliam Tejada, PT                         I CERTIFY THE NEED FOR THESE SERVICES FURNISHED UNDER        THIS PLAN OF TREATMENT AND WHILE UNDER MY CARE     (Physician attestation of this document indicates review and certification of the therapy plan).                Referring Provider:  Melissa Shah      Initial Assessment  See Epic Evaluation- Start of Care Date: 11/07/23

## 2023-11-14 ENCOUNTER — THERAPY VISIT (OUTPATIENT)
Dept: PHYSICAL THERAPY | Facility: CLINIC | Age: 83
End: 2023-11-14
Attending: PHYSICIAN ASSISTANT
Payer: MEDICARE

## 2023-11-14 DIAGNOSIS — N39.41 URGE INCONTINENCE OF URINE: Primary | ICD-10-CM

## 2023-11-14 PROCEDURE — 97140 MANUAL THERAPY 1/> REGIONS: CPT | Mod: GP | Performed by: PHYSICAL THERAPIST

## 2023-11-14 PROCEDURE — 97110 THERAPEUTIC EXERCISES: CPT | Mod: GP | Performed by: PHYSICAL THERAPIST

## 2023-11-21 ENCOUNTER — THERAPY VISIT (OUTPATIENT)
Dept: PHYSICAL THERAPY | Facility: CLINIC | Age: 83
End: 2023-11-21
Attending: PHYSICIAN ASSISTANT
Payer: MEDICARE

## 2023-11-21 DIAGNOSIS — N39.41 URGE INCONTINENCE OF URINE: Primary | ICD-10-CM

## 2023-11-21 PROCEDURE — 97140 MANUAL THERAPY 1/> REGIONS: CPT | Mod: GP | Performed by: PHYSICAL THERAPIST

## 2023-11-21 PROCEDURE — 97110 THERAPEUTIC EXERCISES: CPT | Mod: GP | Performed by: PHYSICAL THERAPIST

## 2023-11-28 ENCOUNTER — LAB (OUTPATIENT)
Dept: LAB | Facility: CLINIC | Age: 83
End: 2023-11-28
Payer: MEDICARE

## 2023-11-28 ENCOUNTER — THERAPY VISIT (OUTPATIENT)
Dept: PHYSICAL THERAPY | Facility: CLINIC | Age: 83
End: 2023-11-28
Attending: PHYSICIAN ASSISTANT
Payer: MEDICARE

## 2023-11-28 DIAGNOSIS — R35.1 NOCTURIA: ICD-10-CM

## 2023-11-28 DIAGNOSIS — N18.31 STAGE 3A CHRONIC KIDNEY DISEASE (H): Chronic | ICD-10-CM

## 2023-11-28 DIAGNOSIS — E11.69 TYPE 2 DIABETES MELLITUS WITH OTHER SPECIFIED COMPLICATION, WITHOUT LONG-TERM CURRENT USE OF INSULIN (H): ICD-10-CM

## 2023-11-28 DIAGNOSIS — D64.9 ANEMIA, UNSPECIFIED TYPE: ICD-10-CM

## 2023-11-28 DIAGNOSIS — I63.9 CEREBROVASCULAR ACCIDENT (CVA), UNSPECIFIED MECHANISM (H): ICD-10-CM

## 2023-11-28 DIAGNOSIS — Z00.00 ROUTINE GENERAL MEDICAL EXAMINATION AT A HEALTH CARE FACILITY: ICD-10-CM

## 2023-11-28 DIAGNOSIS — N39.41 URGE INCONTINENCE OF URINE: Primary | ICD-10-CM

## 2023-11-28 DIAGNOSIS — E55.9 VITAMIN D DEFICIENCY: ICD-10-CM

## 2023-11-28 DIAGNOSIS — I10 HTN, GOAL BELOW 140/90: Chronic | ICD-10-CM

## 2023-11-28 DIAGNOSIS — I67.5 MOYAMOYA DISEASE: Chronic | ICD-10-CM

## 2023-11-28 DIAGNOSIS — E78.5 HYPERLIPIDEMIA LDL GOAL <130: Chronic | ICD-10-CM

## 2023-11-28 DIAGNOSIS — R30.0 DYSURIA: ICD-10-CM

## 2023-11-28 LAB
ALBUMIN SERPL BCG-MCNC: 3.9 G/DL (ref 3.5–5.2)
ALBUMIN UR-MCNC: ABNORMAL MG/DL
ALP SERPL-CCNC: 132 U/L (ref 40–150)
ALT SERPL W P-5'-P-CCNC: 28 U/L (ref 0–50)
ANION GAP SERPL CALCULATED.3IONS-SCNC: 9 MMOL/L (ref 7–15)
APPEARANCE UR: CLEAR
AST SERPL W P-5'-P-CCNC: 33 U/L (ref 0–45)
BACTERIA #/AREA URNS HPF: ABNORMAL /HPF
BASOPHILS # BLD AUTO: 0 10E3/UL (ref 0–0.2)
BASOPHILS NFR BLD AUTO: 0 %
BILIRUB SERPL-MCNC: 0.4 MG/DL
BILIRUB UR QL STRIP: NEGATIVE
BUN SERPL-MCNC: 28.9 MG/DL (ref 8–23)
CALCIUM SERPL-MCNC: 9.1 MG/DL (ref 8.8–10.2)
CHLORIDE SERPL-SCNC: 103 MMOL/L (ref 98–107)
CHOLEST SERPL-MCNC: 177 MG/DL
CK SERPL-CCNC: 62 U/L (ref 26–192)
COLOR UR AUTO: YELLOW
CREAT SERPL-MCNC: 1.17 MG/DL (ref 0.51–0.95)
DEPRECATED HCO3 PLAS-SCNC: 26 MMOL/L (ref 22–29)
EGFRCR SERPLBLD CKD-EPI 2021: 47 ML/MIN/1.73M2
EOSINOPHIL # BLD AUTO: 0.1 10E3/UL (ref 0–0.7)
EOSINOPHIL NFR BLD AUTO: 1 %
ERYTHROCYTE [DISTWIDTH] IN BLOOD BY AUTOMATED COUNT: 12.6 % (ref 10–15)
FERRITIN SERPL-MCNC: 48 NG/ML (ref 11–328)
GLUCOSE SERPL-MCNC: 82 MG/DL (ref 70–99)
GLUCOSE UR STRIP-MCNC: NEGATIVE MG/DL
HBA1C MFR BLD: 5.6 % (ref 0–5.6)
HCT VFR BLD AUTO: 36.4 % (ref 35–47)
HDLC SERPL-MCNC: 75 MG/DL
HGB BLD-MCNC: 11.9 G/DL (ref 11.7–15.7)
HGB UR QL STRIP: NEGATIVE
IMM GRANULOCYTES # BLD: 0 10E3/UL
IMM GRANULOCYTES NFR BLD: 0 %
IRON BINDING CAPACITY (ROCHE): 339 UG/DL (ref 240–430)
IRON SATN MFR SERPL: 20 % (ref 15–46)
IRON SERPL-MCNC: 68 UG/DL (ref 37–145)
KETONES UR STRIP-MCNC: NEGATIVE MG/DL
LDLC SERPL CALC-MCNC: 88 MG/DL
LEUKOCYTE ESTERASE UR QL STRIP: ABNORMAL
LYMPHOCYTES # BLD AUTO: 3.4 10E3/UL (ref 0.8–5.3)
LYMPHOCYTES NFR BLD AUTO: 42 %
MCH RBC QN AUTO: 30.7 PG (ref 26.5–33)
MCHC RBC AUTO-ENTMCNC: 32.7 G/DL (ref 31.5–36.5)
MCV RBC AUTO: 94 FL (ref 78–100)
MONOCYTES # BLD AUTO: 0.6 10E3/UL (ref 0–1.3)
MONOCYTES NFR BLD AUTO: 8 %
NEUTROPHILS # BLD AUTO: 3.9 10E3/UL (ref 1.6–8.3)
NEUTROPHILS NFR BLD AUTO: 49 %
NITRATE UR QL: NEGATIVE
NONHDLC SERPL-MCNC: 102 MG/DL
PH UR STRIP: 7 [PH] (ref 5–7)
PLATELET # BLD AUTO: 271 10E3/UL (ref 150–450)
POTASSIUM SERPL-SCNC: 4.2 MMOL/L (ref 3.4–5.3)
PROT SERPL-MCNC: 6.4 G/DL (ref 6.4–8.3)
RBC # BLD AUTO: 3.88 10E6/UL (ref 3.8–5.2)
RBC #/AREA URNS AUTO: ABNORMAL /HPF
SODIUM SERPL-SCNC: 138 MMOL/L (ref 135–145)
SP GR UR STRIP: 1.02 (ref 1–1.03)
SQUAMOUS #/AREA URNS AUTO: ABNORMAL /LPF
TRIGL SERPL-MCNC: 70 MG/DL
TSH SERPL DL<=0.005 MIU/L-ACNC: 3.07 UIU/ML (ref 0.3–4.2)
UROBILINOGEN UR STRIP-ACNC: 0.2 E.U./DL
VIT D+METAB SERPL-MCNC: 22 NG/ML (ref 20–50)
WBC # BLD AUTO: 8 10E3/UL (ref 4–11)
WBC #/AREA URNS AUTO: ABNORMAL /HPF

## 2023-11-28 PROCEDURE — 81001 URINALYSIS AUTO W/SCOPE: CPT | Performed by: INTERNAL MEDICINE

## 2023-11-28 PROCEDURE — 82728 ASSAY OF FERRITIN: CPT

## 2023-11-28 PROCEDURE — 84443 ASSAY THYROID STIM HORMONE: CPT | Performed by: INTERNAL MEDICINE

## 2023-11-28 PROCEDURE — 80061 LIPID PANEL: CPT | Performed by: INTERNAL MEDICINE

## 2023-11-28 PROCEDURE — 83540 ASSAY OF IRON: CPT

## 2023-11-28 PROCEDURE — 36415 COLL VENOUS BLD VENIPUNCTURE: CPT | Performed by: INTERNAL MEDICINE

## 2023-11-28 PROCEDURE — 87086 URINE CULTURE/COLONY COUNT: CPT | Performed by: INTERNAL MEDICINE

## 2023-11-28 PROCEDURE — 83550 IRON BINDING TEST: CPT

## 2023-11-28 PROCEDURE — 85025 COMPLETE CBC W/AUTO DIFF WBC: CPT

## 2023-11-28 PROCEDURE — 80053 COMPREHEN METABOLIC PANEL: CPT | Performed by: INTERNAL MEDICINE

## 2023-11-28 PROCEDURE — 82306 VITAMIN D 25 HYDROXY: CPT | Performed by: INTERNAL MEDICINE

## 2023-11-28 PROCEDURE — 97110 THERAPEUTIC EXERCISES: CPT | Mod: GP | Performed by: PHYSICAL THERAPIST

## 2023-11-28 PROCEDURE — 82550 ASSAY OF CK (CPK): CPT | Performed by: INTERNAL MEDICINE

## 2023-11-28 PROCEDURE — 83036 HEMOGLOBIN GLYCOSYLATED A1C: CPT | Performed by: INTERNAL MEDICINE

## 2023-11-29 LAB — BACTERIA UR CULT: NORMAL

## 2023-12-01 ENCOUNTER — OFFICE VISIT (OUTPATIENT)
Dept: INTERNAL MEDICINE | Facility: CLINIC | Age: 83
End: 2023-12-01
Payer: MEDICARE

## 2023-12-01 VITALS
WEIGHT: 199.5 LBS | HEART RATE: 72 BPM | OXYGEN SATURATION: 97 % | BODY MASS INDEX: 30.23 KG/M2 | HEIGHT: 68 IN | SYSTOLIC BLOOD PRESSURE: 120 MMHG | DIASTOLIC BLOOD PRESSURE: 66 MMHG | RESPIRATION RATE: 18 BRPM | TEMPERATURE: 97.2 F

## 2023-12-01 DIAGNOSIS — F41.9 ANXIETY: ICD-10-CM

## 2023-12-01 DIAGNOSIS — R26.81 GAIT INSTABILITY: ICD-10-CM

## 2023-12-01 DIAGNOSIS — N18.30 STAGE 3 CHRONIC KIDNEY DISEASE, UNSPECIFIED WHETHER STAGE 3A OR 3B CKD (H): ICD-10-CM

## 2023-12-01 DIAGNOSIS — R45.7 EMOTIONAL STRESS: ICD-10-CM

## 2023-12-01 DIAGNOSIS — E11.69 TYPE 2 DIABETES MELLITUS WITH OTHER SPECIFIED COMPLICATION, WITHOUT LONG-TERM CURRENT USE OF INSULIN (H): Primary | ICD-10-CM

## 2023-12-01 PROCEDURE — 99214 OFFICE O/P EST MOD 30 MIN: CPT | Performed by: INTERNAL MEDICINE

## 2023-12-01 ASSESSMENT — PAIN SCALES - GENERAL: PAINLEVEL: NO PAIN (0)

## 2023-12-01 NOTE — PATIENT INSTRUCTIONS
Plan:  Physical therapy   2. Sertraline 50 mg  -- start with 1/2 tablet daily for 7 - 10 days and then take 1 tablet daily   3. Continue the other meds, same doses for now.  4. Schedule ANNUAL EXAM  after June 2. 2024  5. Please make a lab appointment for fasting labs  few days before

## 2023-12-01 NOTE — PROGRESS NOTES
Dr Farrell's note      Patient's instructions / PLAN:                                                        Plan:  Physical therapy   2. Sertraline 50 mg  -- start with 1/2 tablet daily for 7 - 10 days and then take 1 tablet daily   3. Continue the other meds, same doses for now.  4. Schedule ANNUAL EXAM  after June 2. 2024  5. Please make a lab appointment for fasting labs  few days before          ASSESSMENT & PLAN:                                                      (E11.69) Type 2 diabetes mellitus with other specified complication, without long-term current use of insulin (H)  (primary encounter diagnosis)  Comment: Controlled  by diet  Plan: CBC with platelets, Lipid panel reflex to         direct LDL Fasting, Comprehensive metabolic         panel, Hemoglobin A1c, TSH with free T4 reflex,        Albumin Random Urine Quantitative with Creat         Ratio            (N18.30) Stage 3 chronic kidney disease, unspecified whether stage 3a or 3b CKD (H)  Comment: stable   Plan: CBC with platelets, Lipid panel reflex to         direct LDL Fasting, Comprehensive metabolic         panel, Hemoglobin A1c, TSH with free T4 reflex,        Albumin Random Urine Quantitative with Creat         Ratio            (R26.81) Gait instability  Comment:   Plan: Physical Therapy Referral, CBC with platelets,         Lipid panel reflex to direct LDL Fasting,         Comprehensive metabolic panel, Hemoglobin A1c,         TSH with free T4 reflex, Albumin Random Urine         Quantitative with Creat Ratio            (F41.9) Anxiety  (R45.7) Emotional stress  Comment: We discussed about the new meds, advantages and potential side effects. The patient will read also the info from the pharmacy and call back if questions.   Plan: Adult Mental Health  Referral,         sertraline (ZOLOFT) 50 MG tablet, CBC with         platelets, Lipid panel reflex to direct LDL         Fasting, Comprehensive metabolic panel,         Hemoglobin  A1c, TSH with free T4 reflex,         Albumin Random Urine Quantitative with Creat         Ratio               Chief complaint:                                                      Follow up chronic medical problems      SUBJECTIVE:                                                    History of present illness:    She made a lot of progress since CVA. She wants to walk independently. She still uses cane and walker when she goes outside    She gets frustrated easily, she can't drive and she feels depressed.    -- she is open for meds and counseling     Subjective   Candida is a 83 year old, presenting for the following health issues:  Patient is being seen for an follow up.      12/1/2023     8:12 AM   Additional Questions   Roomed by Regina Zhao         Hyperlipidemia Follow-Up    Are you regularly taking any medication or supplement to lower your cholesterol?   Yes- atorvastatin  Are you having muscle aches or other side effects that you think could be caused by your cholesterol lowering medication?  No    Hypertension Follow-up    Do you check your blood pressure regularly outside of the clinic? Yes   Are you following a low salt diet? Yes  Are your blood pressures ever more than 140 on the top number (systolic) OR more   than 90 on the bottom number (diastolic), for example 140/90? No  How many servings of fruits and vegetables do you eat daily?  2-3  On average, how many sweetened beverages do you drink each day (Examples: soda, juice, sweet tea, etc.  Do NOT count diet or artificially sweetened beverages)?   0  How many days per week do you exercise enough to make your heart beat faster? 3 or less  How many minutes a day do you exercise enough to make your heart beat faster? 9 or less  How many days per week do you miss taking your medication? 0      Review of Systems:                                                      ROS: negative for fever, chills, cough, wheezes, chest pain, shortness of breath, vomiting,  "abdominal pain, leg swelling          OBJECTIVE:             Physical exam:  Blood pressure 120/66, pulse 72, temperature 97.2  F (36.2  C), temperature source Tympanic, resp. rate 18, height 1.727 m (5' 8\"), weight 90.5 kg (199 lb 8 oz), SpO2 97%, not currently breastfeeding.     NAD, appears comfortable  Skin: no rashes   Neck: supple, no JVD,  No thyroidmegaly. Lymph nodes nonpalpable cervical and supraclavicular.  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 F/u dr. Soto    OA (osteoarthritis)     s/p bilat total hips     Other chronic pain     Joint pain for many years    Unspecified cerebral artery occlusion with cerebral infarction     After Moyamoya surgery > 10 yrs ago.    Vitamin D deficiencies       PSHx: reviewed  Past Surgical History:   Procedure Laterality Date    ARTHROPLASTY KNEE Left 4/17/2017    Procedure: ARTHROPLASTY KNEE;  Left total knee arthroplasty;  Surgeon: Chidi Jenkins MD;  Location:  OR    COLONOSCOPY  2008    normal    COLONOSCOPY  7/17/2014    Procedure: COLONOSCOPY;  Surgeon: Raul Nolan DO;  Location:  GI    CRANIOTOMY      MOJAMOJA  \"Pt had repair of blood flow.\"    IR CAROTID ANGIOGRAM  10/30/2006    IR CAROTID " ANGIOGRAM  6/6/2010    IR CAROTID ANGIOGRAM  6/6/2010    IR CAROTID ANGIOGRAM  6/6/2010    IR CAROTID ANGIOGRAM  5/12/2011    IR CAROTID ANGIOGRAM  5/12/2011    IR CAROTID ANGIOGRAM  5/12/2011    IR CAROTID ANGIOGRAM  6/5/2012    IR CAROTID ANGIOGRAM  6/5/2012    IR CAROTID ANGIOGRAM  6/5/2012    IR CAROTID CEREBRAL ANGIOGRAM BILATERAL  10/7/2022    IR MISCELLANEOUS PROCEDURE  6/6/2010    IR MISCELLANEOUS PROCEDURE  6/6/2010    IR MISCELLANEOUS PROCEDURE  5/12/2011    IR MISCELLANEOUS PROCEDURE  6/5/2012    RECONSTRUCT FOREFOOT WITH METATARSOPHALANGEAL (MTP) FUSION Left 8/21/2014    Procedure: RECONSTRUCT FOREFOOT WITH METATARSOPHALANGEAL (MTP) FUSION;  Surgeon: Tres Merlos DPM;  Location:  OR    [facility] NONSPECIFIC PROCEDURE  10/30/06    neurosurgery Dr Soto    [facility] NONSPECIFIC PROCEDURE      bilateral total hips approx 2002    [facility] NONSPECIFIC PROCEDURE  3/07    neurosurgery         Meds: reviewed  Current Outpatient Medications   Medication Sig Dispense Refill    aspirin 81 MG EC tablet Take 81 mg by mouth daily      atenolol (TENORMIN) 25 MG tablet Take 1 tablet (25 mg) by mouth daily 90 tablet 3    atorvastatin (LIPITOR) 40 MG tablet Take 1 tablet (40 mg) by mouth every evening 90 tablet 3    magnesium 500 MG TABS Take 500 mg by mouth daily      Multiple Vitamins-Minerals (MULTIVITAMIN & MINERAL PO) Take 1 tablet by mouth daily         Soc Hx: reviewed  Fam Hx: reviewed      Chart documentation was completed, in part, with Hinge voice-recognition software. Even though reviewed, some grammatical, spelling, and word errors may remain.      Chani Farrell MD  Internal Medicine

## 2024-01-08 ENCOUNTER — THERAPY VISIT (OUTPATIENT)
Dept: PHYSICAL THERAPY | Facility: CLINIC | Age: 84
End: 2024-01-08
Attending: INTERNAL MEDICINE
Payer: MEDICARE

## 2024-01-08 DIAGNOSIS — I63.9 CEREBROVASCULAR ACCIDENT (CVA), UNSPECIFIED MECHANISM (H): Primary | ICD-10-CM

## 2024-01-08 DIAGNOSIS — R26.81 GAIT INSTABILITY: ICD-10-CM

## 2024-01-08 PROCEDURE — 97161 PT EVAL LOW COMPLEX 20 MIN: CPT | Mod: GP

## 2024-01-08 PROCEDURE — 97112 NEUROMUSCULAR REEDUCATION: CPT | Mod: GP

## 2024-01-08 NOTE — PROGRESS NOTES
01/08/24 1300   Signing Clinician's Name / Credentials   Signing clinician's name / credentials Roxi Stone, PT, DPT   Belle Balance Scale (CLARK RIVERA, RAMY S, GIOVANNA FAGAN, ADEBAYO SIMON: MEASURING BALANCE IN THE ELDERLY: VALIDATION OF AN INSTRUMENT. CAN. J. PUB. HEALTH, JULY/AUGUST SUPPLEMENT 2:S7-11, 1992.)   Sit To Stand 4   Standing Unsupported 4   Sitting Unsupported 4   Stand to Sit 4   Transfers 4   Standing with Eyes Closed 4  (mild postural sway)   Standing Unsupported, Feet Together 4   Reach Forward With Outstretched Arm 3  (6.6)   Retrieve Object From Floor 3   Turning to Look Behind 4   Turn 360 Degrees 3   Placing Alternate Foot on Stool (4-6 inches) 3  (27s)   Unsupported Tandem Stand (Demonstrate to Subject) 0   One Leg Stand 1   Total Score (A score of 45 or less has been correlated with an increased risk of falls)   Total Score (out of 56) 45     Belle Balance Scale (BBS) Cutoff Scores: A score of < 45 /56 indicates an increased risk for falls.     The BBS is a measure of static and dynamic standing balance that has been validated in community dwelling elderly individuals and individuals who have Parkinson's Disease, MS, and those who are s/p CVA and TBI. The test is administered without an assistive device. Scores from the Belle are used to determine the probability of falling based on the patient's previous history of falls and their test performance.     Minimal Detectable Change = 6.5   & Minimal Detectable Change (Parkinson's Disease) = 5 according to Madi & Johaney 2008    Assessment (rationale for performing, application to patient s function & care plan): Pt is at increased risk for falls, she would benefit from skilled PT services to improve balance and functional mobility.   (Minutes billed as physical performance test) 18
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
## 2024-01-09 ENCOUNTER — THERAPY VISIT (OUTPATIENT)
Dept: PHYSICAL THERAPY | Facility: CLINIC | Age: 84
End: 2024-01-09
Payer: MEDICARE

## 2024-01-09 DIAGNOSIS — N39.41 URGE INCONTINENCE OF URINE: Primary | ICD-10-CM

## 2024-01-09 PROCEDURE — 97110 THERAPEUTIC EXERCISES: CPT | Mod: 59 | Performed by: PHYSICAL THERAPIST

## 2024-01-09 PROCEDURE — 97530 THERAPEUTIC ACTIVITIES: CPT | Mod: GP | Performed by: PHYSICAL THERAPIST

## 2024-01-09 NOTE — PROGRESS NOTES
01/09/24 0500   Appointment Info   Signing clinician's name / credentials Cookie Aldridge, PT, OCS   Total/Authorized Visits EPIC 08/18/23   Visits Used 5   Medical Diagnosis mixed incontinence   PT Tx Diagnosis mixed incontinence   Precautions/Limitations CVA   Other pertinent information [id number]   Quick Adds Certification;Pelvic Consent   Progress Note/Certification   Start of Care Date 11/07/23   Onset of illness/injury or Date of Surgery 08/18/23  (order date)   Therapy Frequency 1x week for 4 weeks   Predicted Duration then 2x month for 8 weeks   Certification date from 11/07/23   Certification date to 02/04/24   GOALS   PT Goals 2   PT Goal 1   Goal Identifier Kegel strength 4   Goal Description for continence throughout the day/night for healthy hygeine   Goal Progress current 2+   Target Date 01/31/24   Subjective Report   Subjective Report Started PT for gait/balance yesterday and really happy with how she's hoping it will go well and get rid of walker. Very consistent with HEP for pelvic floor also. Urgency/frequency is much better. Feels much better with control. Wears 1-2 briefs/day but not soaked, just to freshen up. BM's urgency is harder to trust as much.Has been able to delay void 2+ hours, using urge suppression prn.   Objective Measures   Objective Measures Objective Measure 1;Objective Measure 2   Objective Measure 1   Objective Measure Discussed [place] scale , goal to keep around 3.   Objective Measure 2   Objective Measure Kegel strength 2+.   Treatment Interventions (PT)   Interventions Therapeutic Procedure/Exercise   Therapeutic Procedure/Exercise   Therapeutic Procedures: strength, endurance, ROM, flexibillity minutes (54378) 15   Therapeutic Procedures Ther Proc 2   Ther Proc 1 supine butterly 30 sec x 3 B   Ther Proc 1 - Details bridge 5 s ec x20   Ther Proc 2 standing hip abduction x 15-20 each leg   Ther Proc 2 - Details SLS by counter top 30 sec x 2 each leg   PTRx Ther Proc  1 Roll Ins Sitting   PTRx Ther Proc 1 - Details 5 sec x 20 with cues for TA/exhale/kegel   PTRx Ther Proc 2 Roll Outs Sitting   PTRx Ther Proc 2 - Details GTB x20 with cues for TA/exhale/kegel   Skilled Intervention to improve pelvic floor strength   Patient Response/Progress able to demonstrate well   Therapeutic Activity   Therapeutic Activities: dynamic activities to improve functional performance minutes (26990) 15   Ther Act 1 General Bladder Information   Ther Act 1 - Details Urge Incontinence Suppression Techniques   Skilled Intervention to decrease number visits and encourage normal bladder function   Patient Response/Progress good verbal understanding   Self Care/home Management   Self Care 1 educated in POC and normal pelvic floor anatomy/function   Education   Learner/Method Patient;No Barriers to Learning   Plan   Home program PTRX HEP   Plan for next session DC to HEP 2-3 x week.   Comments   Pelvic Health Informed Consent Statement Discussed with patient/guardian reason for referral regarding pelvic health needs and external/internal pelvic floor muscle examination.  Opportunity provided to ask questions and verbal consent for assessment and intervention was given.   Total Session Time   Timed Code Treatment Minutes 30   Total Treatment Time (sum of timed and untimed services) 30         DISCHARGE  Reason for Discharge: Patient will continue on her own with HEP.    Equipment Issued: NA    Discharge Plan: Patient to continue home program.    Referring Provider:  Melissa Shah

## 2024-01-15 ENCOUNTER — THERAPY VISIT (OUTPATIENT)
Dept: PHYSICAL THERAPY | Facility: CLINIC | Age: 84
End: 2024-01-15
Attending: INTERNAL MEDICINE
Payer: MEDICARE

## 2024-01-15 DIAGNOSIS — R26.81 GAIT INSTABILITY: Primary | ICD-10-CM

## 2024-01-15 PROCEDURE — 97116 GAIT TRAINING THERAPY: CPT | Mod: GP | Performed by: PHYSICAL THERAPIST

## 2024-01-23 ENCOUNTER — THERAPY VISIT (OUTPATIENT)
Dept: PHYSICAL THERAPY | Facility: CLINIC | Age: 84
End: 2024-01-23
Attending: INTERNAL MEDICINE
Payer: MEDICARE

## 2024-01-23 DIAGNOSIS — R26.81 GAIT INSTABILITY: Primary | ICD-10-CM

## 2024-01-23 PROCEDURE — 97110 THERAPEUTIC EXERCISES: CPT | Mod: GP | Performed by: PHYSICAL THERAPIST

## 2024-01-26 ENCOUNTER — TELEPHONE (OUTPATIENT)
Dept: INTERNAL MEDICINE | Facility: CLINIC | Age: 84
End: 2024-01-26
Payer: MEDICARE

## 2024-01-26 NOTE — TELEPHONE ENCOUNTER
Call received from patient stating she stopped in to Dr. Johns's office to ask when she is supposed to follow up with him. They printed patient a list of needed appointments. On that list was the following:    Patient asking if this is something that needs to be scheduled. Advised this is from 2022 so as long as patient has been seen by Neurology since that time she can disregard this reminder. Reminder removed from EPIC.

## 2024-01-29 ENCOUNTER — THERAPY VISIT (OUTPATIENT)
Dept: PHYSICAL THERAPY | Facility: CLINIC | Age: 84
End: 2024-01-29
Attending: INTERNAL MEDICINE
Payer: MEDICARE

## 2024-01-29 DIAGNOSIS — R26.81 GAIT INSTABILITY: Primary | ICD-10-CM

## 2024-01-29 PROCEDURE — 97112 NEUROMUSCULAR REEDUCATION: CPT | Mod: GP

## 2024-01-29 PROCEDURE — 97110 THERAPEUTIC EXERCISES: CPT | Mod: GP

## 2024-02-07 ENCOUNTER — THERAPY VISIT (OUTPATIENT)
Dept: PHYSICAL THERAPY | Facility: CLINIC | Age: 84
End: 2024-02-07
Attending: INTERNAL MEDICINE
Payer: MEDICARE

## 2024-02-07 DIAGNOSIS — R26.81 GAIT INSTABILITY: Primary | ICD-10-CM

## 2024-02-07 PROCEDURE — 97110 THERAPEUTIC EXERCISES: CPT | Mod: GP | Performed by: PHYSICAL THERAPIST

## 2024-02-07 NOTE — PROGRESS NOTES
Medication Therapy Management (MTM) Encounter    ASSESSMENT:                            Medication Adherence/Access: No issues identified    Cerebrovascular accident: Encouraged patient to discuss leg shakiness with PCP.       Hypertension: Stable. Patient is meeting blood pressure goal of < 140/90mmHg.      Hyperlipidemia: Stable. Patient is on high intensity statin which is indicated based on 2019 ACC/AHA guidelines for lipid management.       Supplements: Recommend vitamin B12 lab to determine if patient needs to be taking this.      Depression/Anxiety:   Discussed it is unlikely sertraline is affecting her blood pressure, especially since blood pressure today and at physical therapy visit were both good and consistent. Discussed sertraline can cause tremors, but I am not sure that is what she is experiencing.      PLAN:                            Ordered vitamin B12 lab to check if you need this supplement    Follow-up: Return in about 6 months (around 8/8/2024) for Medication Therapy Management.    SUBJECTIVE/OBJECTIVE:                          Candida Rose is a 83 year old female coming in for a follow-up visit from 6/19/23. Patient was accompanied by .    Visit incorrectly schedule for 30 min.    Reason for visit: med review.    Allergies/ADRs: Reviewed in chart  Past Medical History: Reviewed in chart  Tobacco: She reports that she has never smoked. She has never been exposed to tobacco smoke. She has never used smokeless tobacco.  Alcohol: not currently using    Medication Adherence/Access: no issues reported    Cerebrovascular accident:   Aspirin 81 mg daily.  No concerns with bleeding or bruising.  Completing physical therapy. Exercising a lot more than normal now. Uses a cane to walk. Noticed some shakiness in legs from time to time, unsure if this is a result of increased exercise.    Hypertension   Atenolol 25 mg daily.   No concerns with side effects.   Patient occasionally self-monitors blood  pressure, reports readings have been very up and down at home. Reports she had blood pressure check at physical therapy visit and it was 118/62.  Has had orthostatic hypotension in past, aware of symptoms to watch for. No recent concerns with this.      BP Readings from Last 3 Encounters:   02/08/24 118/56   12/01/23 120/66   08/18/23 130/80     Pulse Readings from Last 3 Encounters:   12/01/23 72   08/15/23 53   06/19/23 70     Hyperlipidemia   Atorvastatin 40mg daily.    Patient reports no significant myalgias or other side effects.  The ASCVD Risk score (Frandy SANDY, et al., 2019) failed to calculate for the following reasons:    The 2019 ASCVD risk score is only valid for ages 40 to 79    The patient has a prior MI or stroke diagnosis       Recent Labs   Lab Test 11/28/23  0815 10/04/22  1950   CHOL 177 166   HDL 75 82   LDL 88 71   TRIG 70 65       Supplements:   Multivitamin once daily   Magnesium 500mg daily- helpful for reducing leg aches/cramps.   Vitamin B12 daily - wondering if this okay and if she should be taking it. Started on her own and doesn't remember why  No reported issues at this time.     Lab Results   Component Value Date    VITDT 22 11/28/2023    VITDT 30 07/13/2022    VITDT 31 01/04/2021    VITDT 29 02/11/2019    VITDT 22 01/26/2018       Depression/Anxiety:  Sertraline 50 mg once daily.   Patient reports it does make her sleepy so she takes it at night. It does give her some lucid dreams. Wondering if it could affect her blood pressure, since she had some variable blood pressures since starting it, but these were only documented on home cuff, which she isn't sure is accurate. Also reports some leg shakiness but doesn't know if this started with sertraline or with exercising more. Declines to call them tremors.  Reports this has really helped her anxiety. Has noticed an improvement so she does like to be on it.      11/3/2017     4:15 PM 2/10/2019     6:48 PM 10/21/2019     8:52 AM   PHQ-9  SCORE   PHQ-9 Total Score MyChart  0 1 (Minimal depression)   PHQ-9 Total Score 0 0 1         11/3/2017     4:15 PM 2/10/2019     6:49 PM 10/21/2019     8:52 AM   CAMILA-7 SCORE   Total Score  0 (minimal anxiety) 0 (minimal anxiety)   Total Score 0 0 0           Today's Vitals: /56   LMP  (LMP Unknown)   ----------------      I spent 25 minutes with this patient today. All changes were made via collaborative practice agreement with Chani Peoples MD. A copy of the visit note was provided to the patient's provider(s).    A summary of these recommendations was given to the patient.    Donna Willard, PharmD  Medication Therapy Management Pharmacist  Voicemail: [phone]           Medication Therapy Recommendations  Takes dietary supplements    Current Medication: cyanocobalamin (VITAMIN B-12) 1000 MCG tablet   Rationale: Medication requires monitoring - Needs additional monitoring   Recommendation: Order Lab   Status: Accepted per CPA

## 2024-02-08 ENCOUNTER — OFFICE VISIT (OUTPATIENT)
Dept: PHARMACY | Facility: CLINIC | Age: 84
End: 2024-02-08
Payer: COMMERCIAL

## 2024-02-08 VITALS — SYSTOLIC BLOOD PRESSURE: 118 MMHG | DIASTOLIC BLOOD PRESSURE: 56 MMHG

## 2024-02-08 DIAGNOSIS — I63.9 CEREBROVASCULAR ACCIDENT (CVA), UNSPECIFIED MECHANISM (H): ICD-10-CM

## 2024-02-08 DIAGNOSIS — Z78.9 TAKES DIETARY SUPPLEMENTS: Primary | ICD-10-CM

## 2024-02-08 DIAGNOSIS — I10 HTN, GOAL BELOW 140/90: ICD-10-CM

## 2024-02-08 DIAGNOSIS — E78.5 HYPERLIPIDEMIA LDL GOAL <130: ICD-10-CM

## 2024-02-08 DIAGNOSIS — F41.9 ANXIETY: ICD-10-CM

## 2024-02-08 DIAGNOSIS — F32.A DEPRESSION, UNSPECIFIED DEPRESSION TYPE: ICD-10-CM

## 2024-02-08 PROCEDURE — 99207 PR NO CHARGE LOS: CPT | Performed by: PHARMACIST

## 2024-02-08 RX ORDER — LANOLIN ALCOHOL/MO/W.PET/CERES
1000 CREAM (GRAM) TOPICAL DAILY
COMMUNITY

## 2024-02-08 NOTE — PATIENT INSTRUCTIONS
"Recommendations from today's MTM visit:                                                         Ordered vitamin B12 lab to check if you need this supplement    Follow-up: Return in about 6 months (around 8/8/2024) for Medication Therapy Management.    It was great speaking with you today.  I value your experience and would be very thankful for your time in providing feedback in our clinic survey. In the next few days, you may receive an email or text message from [facility] [employer] with a link to a survey related to your  clinical pharmacist.\"     To schedule another MTM appointment, please call the clinic directly or you may call the MTM scheduling line at [phone] or toll-free at [phone].     My Clinical Pharmacist's contact information:                                                      Please feel free to contact me with any questions or concerns you have.      Donna Willard, PharmD  Medication Therapy Management Pharmacist  Voicemail: [phone]     "

## 2024-02-12 ENCOUNTER — LAB (OUTPATIENT)
Dept: ONCOLOGY | Facility: CLINIC | Age: 84
End: 2024-02-12
Attending: INTERNAL MEDICINE
Payer: MEDICARE

## 2024-02-12 DIAGNOSIS — D64.9 ANEMIA, UNSPECIFIED TYPE: ICD-10-CM

## 2024-02-12 LAB
ALBUMIN SERPL BCG-MCNC: 4 G/DL (ref 3.5–5.2)
ALP SERPL-CCNC: 125 U/L (ref 40–150)
ALT SERPL W P-5'-P-CCNC: 19 U/L (ref 0–50)
ANION GAP SERPL CALCULATED.3IONS-SCNC: 8 MMOL/L (ref 7–15)
AST SERPL W P-5'-P-CCNC: 26 U/L (ref 0–45)
BASOPHILS # BLD AUTO: 0 10E3/UL (ref 0–0.2)
BASOPHILS NFR BLD AUTO: 0 %
BILIRUB SERPL-MCNC: 0.4 MG/DL
BUN SERPL-MCNC: 32.1 MG/DL (ref 8–23)
CALCIUM SERPL-MCNC: 9 MG/DL (ref 8.8–10.2)
CHLORIDE SERPL-SCNC: 103 MMOL/L (ref 98–107)
CREAT SERPL-MCNC: 1.15 MG/DL (ref 0.51–0.95)
DEPRECATED HCO3 PLAS-SCNC: 27 MMOL/L (ref 22–29)
EGFRCR SERPLBLD CKD-EPI 2021: 48 ML/MIN/1.73M2
EOSINOPHIL # BLD AUTO: 0.1 10E3/UL (ref 0–0.7)
EOSINOPHIL NFR BLD AUTO: 1 %
ERYTHROCYTE [DISTWIDTH] IN BLOOD BY AUTOMATED COUNT: 12.4 % (ref 10–15)
FERRITIN SERPL-MCNC: 36 NG/ML (ref 11–328)
FOLATE SERPL-MCNC: 29.6 NG/ML (ref 4.6–34.8)
GLUCOSE SERPL-MCNC: 94 MG/DL (ref 70–99)
HCT VFR BLD AUTO: 38.1 % (ref 35–47)
HGB BLD-MCNC: 12.1 G/DL (ref 11.7–15.7)
IMM GRANULOCYTES # BLD: 0 10E3/UL
IMM GRANULOCYTES NFR BLD: 0 %
IRON BINDING CAPACITY (ROCHE): 338 UG/DL (ref 240–430)
IRON SATN MFR SERPL: 18 % (ref 15–46)
IRON SERPL-MCNC: 61 UG/DL (ref 37–145)
LYMPHOCYTES # BLD AUTO: 2.8 10E3/UL (ref 0.8–5.3)
LYMPHOCYTES NFR BLD AUTO: 34 %
MCH RBC QN AUTO: 29.8 PG (ref 26.5–33)
MCHC RBC AUTO-ENTMCNC: 31.8 G/DL (ref 31.5–36.5)
MCV RBC AUTO: 94 FL (ref 78–100)
MONOCYTES # BLD AUTO: 0.6 10E3/UL (ref 0–1.3)
MONOCYTES NFR BLD AUTO: 7 %
NEUTROPHILS # BLD AUTO: 4.5 10E3/UL (ref 1.6–8.3)
NEUTROPHILS NFR BLD AUTO: 58 %
NRBC # BLD AUTO: 0 10E3/UL
NRBC BLD AUTO-RTO: 0 /100
PLATELET # BLD AUTO: 295 10E3/UL (ref 150–450)
POTASSIUM SERPL-SCNC: 4.5 MMOL/L (ref 3.4–5.3)
PROT SERPL-MCNC: 6.6 G/DL (ref 6.4–8.3)
RBC # BLD AUTO: 4.06 10E6/UL (ref 3.8–5.2)
SODIUM SERPL-SCNC: 138 MMOL/L (ref 135–145)
VIT B12 SERPL-MCNC: 1142 PG/ML (ref 232–1245)
WBC # BLD AUTO: 8.1 10E3/UL (ref 4–11)

## 2024-02-12 PROCEDURE — 82040 ASSAY OF SERUM ALBUMIN: CPT | Performed by: INTERNAL MEDICINE

## 2024-02-12 PROCEDURE — 36415 COLL VENOUS BLD VENIPUNCTURE: CPT

## 2024-02-12 PROCEDURE — 82607 VITAMIN B-12: CPT | Performed by: INTERNAL MEDICINE

## 2024-02-12 PROCEDURE — 85025 COMPLETE CBC W/AUTO DIFF WBC: CPT | Performed by: INTERNAL MEDICINE

## 2024-02-12 PROCEDURE — 82728 ASSAY OF FERRITIN: CPT | Performed by: INTERNAL MEDICINE

## 2024-02-12 PROCEDURE — 83550 IRON BINDING TEST: CPT | Performed by: INTERNAL MEDICINE

## 2024-02-12 PROCEDURE — 82746 ASSAY OF FOLIC ACID SERUM: CPT | Performed by: INTERNAL MEDICINE

## 2024-02-12 NOTE — PROGRESS NOTES
Medical Assistant Note:  Candida Rose presents today for blood draw.    Patient seen by provider today: No.   present during visit today: Not Applicable.    Concerns: No Concerns.    Procedure:  Lab draw site: left antecub, Needle type: butterfly, Gauge: 23.    Post Assessment:  Labs drawn without difficulty: Yes.    Discharge Plan:  Departure Mode: cane.    Face to Face Time: 10 minutes.    Angela Lovelace MA

## 2024-02-14 ENCOUNTER — HOSPITAL ENCOUNTER (OUTPATIENT)
Dept: MAMMOGRAPHY | Facility: CLINIC | Age: 84
Discharge: HOME | End: 2024-02-14
Attending: INTERNAL MEDICINE | Admitting: INTERNAL MEDICINE
Payer: MEDICARE

## 2024-02-14 ENCOUNTER — THERAPY VISIT (OUTPATIENT)
Dept: PHYSICAL THERAPY | Facility: CLINIC | Age: 84
End: 2024-02-14
Attending: INTERNAL MEDICINE
Payer: MEDICARE

## 2024-02-14 DIAGNOSIS — Z12.31 VISIT FOR SCREENING MAMMOGRAM: ICD-10-CM

## 2024-02-14 DIAGNOSIS — R26.81 GAIT INSTABILITY: Primary | ICD-10-CM

## 2024-02-14 PROCEDURE — 97530 THERAPEUTIC ACTIVITIES: CPT | Mod: GP | Performed by: PHYSICAL THERAPIST

## 2024-02-14 PROCEDURE — 77067 SCR MAMMO BI INCL CAD: CPT

## 2024-02-16 ENCOUNTER — ONCOLOGY VISIT (OUTPATIENT)
Dept: ONCOLOGY | Facility: CLINIC | Age: 84
End: 2024-02-16
Attending: INTERNAL MEDICINE
Payer: MEDICARE

## 2024-02-16 VITALS
WEIGHT: 208.2 LBS | HEART RATE: 53 BPM | SYSTOLIC BLOOD PRESSURE: 147 MMHG | TEMPERATURE: 97 F | BODY MASS INDEX: 31.66 KG/M2 | OXYGEN SATURATION: 98 % | RESPIRATION RATE: 18 BRPM | DIASTOLIC BLOOD PRESSURE: 62 MMHG

## 2024-02-16 DIAGNOSIS — D64.9 ANEMIA, UNSPECIFIED TYPE: ICD-10-CM

## 2024-02-16 PROCEDURE — 99213 OFFICE O/P EST LOW 20 MIN: CPT | Performed by: INTERNAL MEDICINE

## 2024-02-16 PROCEDURE — G0463 HOSPITAL OUTPT CLINIC VISIT: HCPCS | Performed by: INTERNAL MEDICINE

## 2024-02-16 ASSESSMENT — PAIN SCALES - GENERAL: PAINLEVEL: NO PAIN (0)

## 2024-02-16 NOTE — NURSING NOTE
"Oncology Rooming Note    February 16, 2024 9:01 AM   Candida Rose is a 83 year old female who presents for:    Chief Complaint   Patient presents with    Oncology Clinic Visit     Initial Vitals: BP (!) 147/62   Pulse 53   Temp 97  F (36.1  C) (Oral)   Resp 18   Wt 94.4 kg (208 lb 3.2 oz)   LMP  (LMP Unknown)   SpO2 98%   BMI 31.66 kg/m   Estimated body mass index is 31.66 kg/m  as calculated from the following:    Height as of 12/1/23: 1.727 m (5' 8\").    Weight as of this encounter: 94.4 kg (208 lb 3.2 oz). Body surface area is 2.13 meters squared.  No Pain (0) Comment: Data Unavailable   No LMP recorded (lmp unknown). Patient is postmenopausal.  Allergies reviewed: Yes  Medications reviewed: Yes    Medications: Medication refills not needed today.  Pharmacy name entered into [email or web address]: [id number] [place], [place] - [address]    Frailty Screening:   Is the patient here for a new oncology consult visit in cancer care? 2. No      Clinical concerns: follow up        Paula Pena            "
Detail Level: Simple

## 2024-02-16 NOTE — PROGRESS NOTES
[facility]    Hematology/Oncology Established Patient Note      Today's Date: 2/16/24    Reason for Consultation: Anemia, unspecified  Referring Provider: Chani Peoples MD      HISTORY OF PRESENT ILLNESS: Candida Rose is an 83 year old female who presents with anemia. 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    Patient has evidence of chronic NC/NC anemia since 2017 with ofelia of 7.8 in April 2017. On 7/13/22, WBC 7.2, Hb 10.3, MCV 92, . B12 1759, ferritin 110, folate 32.8, iron 50, TIBC 341, iron sat 15%.    She has had intentional 70 lb weight loss via a clinical trial and weight watchers over 2 years. No hematura, hematochezia/melena. No vaginal bleed.     Cancer screening:  Mammogram UTD and no history of abnormal/breast biopsy.  Colonoscopies UTD 2014 (normal; no further follow up).    No history of abnormal pap smears.     Sister was diagnosed with colon cancer at age 60 and passed away soon after diagnosis.     She is a retired .       INTERIM HISTORY:  She denies gross evidence of bleed. She is on aspirin.      REVIEW OF SYSTEMS:   A 14 point ROS was reviewed with pertinent positives and negatives in the HPI.        HOME MEDICATIONS:  Current Outpatient Medications   Medication Sig Dispense Refill    aspirin 81 MG EC tablet Take 81 mg by mouth daily      atenolol (TENORMIN) 25 MG tablet Take 1 tablet (25 mg) by mouth daily 90 tablet 3    atorvastatin (LIPITOR) 40 MG tablet Take 1 tablet (40 mg) by mouth every evening 90 tablet 3    cyanocobalamin (VITAMIN B-12) 1000 MCG tablet Take 1,000 mcg by mouth daily      magnesium 500 MG TABS Take 500 mg by mouth daily      Multiple Vitamins-Minerals (MULTIVITAMIN & MINERAL PO) Take 1 tablet by mouth daily      sertraline (ZOLOFT) 50 MG tablet Take 1/2 tab daily for 7 -10 days then take it daily 30  "tablet 3         ALLERGIES:  Allergies   Allergen Reactions    Lisinopril      Persistent cough         PAST MEDICAL HISTORY:  Past Medical History:   Diagnosis Date    Anxiety state, unspecified     inactive    Cataract     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 F/u dr. Soto    OA (osteoarthritis)     s/p bilat total hips     Other chronic pain     Joint pain for many years    Unspecified cerebral artery occlusion with cerebral infarction     After Moyamoya surgery > 10 yrs ago.    Vitamin D deficiencies          PAST SURGICAL HISTORY:  Past Surgical History:   Procedure Laterality Date    ARTHROPLASTY KNEE Left 4/17/2017    Procedure: ARTHROPLASTY KNEE;  Left total knee arthroplasty;  Surgeon: Chidi Jenkins MD;  Location: [facility] OR    COLONOSCOPY  2008    normal    COLONOSCOPY  7/17/2014    Procedure: COLONOSCOPY;  Surgeon: Raul Nolan DO;  Location:  GI    CRANIOTOMY      MOJAMOJA  \"Pt had repair of blood flow.\"    IR CAROTID ANGIOGRAM  10/30/2006    IR CAROTID ANGIOGRAM  6/6/2010    IR CAROTID ANGIOGRAM  6/6/2010    IR CAROTID ANGIOGRAM  6/6/2010    IR CAROTID ANGIOGRAM  5/12/2011    IR CAROTID ANGIOGRAM  5/12/2011    IR CAROTID ANGIOGRAM  5/12/2011    IR CAROTID ANGIOGRAM  6/5/2012    IR CAROTID ANGIOGRAM  6/5/2012    IR CAROTID ANGIOGRAM  6/5/2012    IR CAROTID CEREBRAL ANGIOGRAM BILATERAL  10/7/2022    IR MISCELLANEOUS PROCEDURE  6/6/2010    IR MISCELLANEOUS PROCEDURE  6/6/2010    IR MISCELLANEOUS PROCEDURE  5/12/2011    IR MISCELLANEOUS PROCEDURE  6/5/2012    RECONSTRUCT FOREFOOT WITH METATARSOPHALANGEAL (MTP) FUSION Left 8/21/2014    Procedure: " RECONSTRUCT FOREFOOT WITH METATARSOPHALANGEAL (MTP) FUSION;  Surgeon: Tres Merlos DPM;  Location: [facility] OR    [facility] NONSPECIFIC PROCEDURE  10/30/06    neurosurgery Dr Soto    [facility] NONSPECIFIC PROCEDURE      bilateral total hips approx 2002    ZZC NONSPECIFIC PROCEDURE  3/07    neurosurgery          SOCIAL HISTORY:  Social History     Socioeconomic History    Marital status:      Spouse name: Olu     Number of children: 2    Years of education: Not on file    Highest education level: Not on file   Occupational History     Employer: RETIRED   Tobacco Use    Smoking status: Never     Passive exposure: Never    Smokeless tobacco: Never   Vaping Use    Vaping Use: Never used   Substance and Sexual Activity    Alcohol use: Yes     Comment: 1 beer or wine daily    Drug use: No    Sexual activity: Never     Partners: Male   Other Topics Concern    Parent/sibling w/ CABG, MI or angioplasty before 65F 55M? Not Asked   Social History Narrative    Not on file     Social Determinants of Health     Financial Resource Strain: Low Risk  (11/1/2022)    Overall Financial Resource Strain (CARDIA)     Difficulty of Paying Living Expenses: Not hard at all   Food Insecurity: No Food Insecurity (11/1/2022)    Hunger Vital Sign     Worried About Running Out of Food in the Last Year: Never true     Ran Out of Food in the Last Year: Never true   Transportation Needs: No Transportation Needs (11/1/2022)    PRAPARE - Transportation     Lack of Transportation (Medical): No     Lack of Transportation (Non-Medical): No   Physical Activity: Not on file   Stress: Not on file   Social Connections: Not on file   Interpersonal Safety: Low Risk  (12/1/2023)    Interpersonal Safety     Do you feel physically and emotionally safe where you currently live?: Yes     Within the past 12 months, have you been hit, slapped, kicked or otherwise physically hurt by someone?: No     Within the past 12 months, have you been humiliated or emotionally  abused in other ways by your partner or ex-partner?: No   Housing Stability: Unknown (2022)    Housing Stability Vital Sign     Unable to Pay for Housing in the Last Year: No     Number of Places Lived in the Last Year: Not on file     Unstable Housing in the Last Year: No         FAMILY HISTORY:  Family History   Problem Relation Age of Onset    Obesity Sister         gastric by-pass age age 60, heart murmur.    Cancer - colorectal Sister 69    Heart Disease Father         mi,  age 48    Family History Negative Mother         killed in MVA age 54    Cancer Maternal Aunt         lung cancer ,non-smoker    Lung Cancer Maternal Aunt     Breast Cancer Daughter 48    Ovarian Cancer No family hx of          PHYSICAL EXAM:  Vital signs:  BP (!) 147/62   Pulse 53   Temp 97  F (36.1  C) (Oral)   Resp 18   Wt 94.4 kg (208 lb 3.2 oz)   LMP  (LMP Unknown)   SpO2 98%   BMI 31.66 kg/m     ECO  GENERAL/CONSTITUTIONAL: No acute distress.   MUSCULOSKELETAL: Warm and well-perfused, no cyanosis, clubbing. Chronic lymphedema.  NEUROLOGIC: Cranial nerves II-XII are intact. Alert, oriented, answers questions appropriately.  INTEGUMENTARY: No rashes or jaundice.  GAIT: Improved ambulation. Cane.      LABS: Reviewed with patient today.   Latest Reference Range & Units 24 09:12   Sodium 135 - 145 mmol/L 138   Potassium 3.4 - 5.3 mmol/L 4.5   Chloride 98 - 107 mmol/L 103   Carbon Dioxide (CO2) 22 - 29 mmol/L 27   Urea Nitrogen 8.0 - 23.0 mg/dL 32.1 (H)   Creatinine 0.51 - 0.95 mg/dL 1.15 (H)   GFR Estimate >60 mL/min/1.73m2 48 (L)   Calcium 8.8 - 10.2 mg/dL 9.0   Anion Gap 7 - 15 mmol/L 8   Albumin 3.5 - 5.2 g/dL 4.0   Protein Total 6.4 - 8.3 g/dL 6.6   Alkaline Phosphatase 40 - 150 U/L 125   ALT 0 - 50 U/L 19   AST 0 - 45 U/L 26   Bilirubin Total <=1.2 mg/dL 0.4   Ferritin 11 - 328 ng/mL 36   Folate 4.6 - 34.8 ng/mL 29.6   Glucose 70 - 99 mg/dL 94   Iron 37 - 145 ug/dL 61   Iron Binding Capacity 240 - 430 ug/dL  338   Iron Sat Index 15 - 46 % 18   Vitamin B12 232 - 1,245 pg/mL 1,142   WBC 4.0 - 11.0 10e3/uL 8.1   Hemoglobin 11.7 - 15.7 g/dL 12.1   Hematocrit 35.0 - 47.0 % 38.1   Platelet Count 150 - 450 10e3/uL 295   RBC Count 3.80 - 5.20 10e6/uL 4.06   MCV 78 - 100 fL 94   MCH 26.5 - 33.0 pg 29.8   MCHC 31.5 - 36.5 g/dL 31.8   RDW 10.0 - 15.0 % 12.4   % Neutrophils % 58   % Lymphocytes % 34   % Monocytes % 7   % Eosinophils % 1   % Basophils % 0   Absolute Basophils 0.0 - 0.2 10e3/uL 0.0   Absolute Eosinophils 0.0 - 0.7 10e3/uL 0.1   Absolute Immature Granulocytes <=0.4 10e3/uL 0.0   Absolute Lymphocytes 0.8 - 5.3 10e3/uL 2.8   Absolute Monocytes 0.0 - 1.3 10e3/uL 0.6   % Immature Granulocytes % 0   Absolute Neutrophils 1.6 - 8.3 10e3/uL 4.5   Absolute NRBCs 10e3/uL 0.0   NRBCs per 100 WBC <1 /100 0      [facility] Reference Range & Units 08/08/23 08:14   Cardiolipin IgG Alexandra Negative  Negative   Cardiolipin IgM Alexandra Negative  Negative   Cardiolipin Alexandra IgG Instrument Value <10.0 GPL-U/mL <2.0   Cardiolipin Alexandra IgM Instrument Value <10.0 MPL-U/mL <2.0   INR 0.85 - 1.15  0.91   Thrombin Time 13.0 - 19.0 Seconds 16.9   Antithrombin III Chromogenic 85 - 135 % 112   Prot C Chromogenic 70 - 170 % 103   LUPUS ANTICOAGULANT PANEL  Rpt   Lupus Result Negative  Negative   Protein S Antigen Free 55 - 125 % 70   Beta 2 Glycoprotein 1 Antibody IgG <7.0 U/mL 1.2   Beta 2 Glycoprotein 1 Antibody IgM <7.0 U/mL <2.4         Component    METHODOLOGY    The regions of genomic DNA containing the F5 gene mutation R506Q(1691G>A) and the Factor 2 (Prothrombin [id number]) gene mutation were simultaneously amplified using the polymerase chain reaction. The amplified products were digested with restriction endonuclease TaqI and products were analyzed by gel electrophoresis.     RESULTS       Factor V 1691G>A ([place])  RESULTS:  Mutation analyzed: 1691G>A  Factor V 1691G>A ([place])  Interpretation:  ABSENT  Factor V 1691G>A ([place]) mutation  genotype:   G/G     FACTOR 2/PROTHROMBIN RESULTS:  Mutation analyzed: 87999C>A  Factor 2 Mutation Interpretation:  ABSENT  Factor 2 Mutation genotype:  G/G      INTERPRETATION    The patient is negative for the Factor V 1691G>A ([place]) and negative for the Factor 2 mutation.       Serum M-protein (g/dL):  9/2/22: 0.0    Total IgG (mg/dL), IgA (mg/dL), IgM (mg/dL):  9/2/22: 979, 127, 55    Free kappa light chains (mg/dL), lambda (mg/dL), kappa/lambda ratio:  9/2/22: 2.62, 1.99, 1.32    PATHOLOGY:  Final Diagnosis 9/2/22:   Peripheral blood:  --Slight normochromic normocytic anemia.         IMAGING:  CT Head 10/4/22:  IMPRESSION:   1. Questionable subacute ischemic infarct at the anterolateral aspect  of the right frontal lobe. Clinical correlation recommended. Brain MRI  may be helpful for better evaluation.  2. Chronic ischemic infarcts at the temporo-occipital junction on the  left and within the right occipital lobe again noted consistent with  chronic ischemic infarcts.  3. Postoperative changes again noted.  4. Diffuse cerebral volume loss and cerebral white matter changes  consistent with chronic small vessel ischemic disease.    MRI/MRA H&N:  IMPRESSION:  HEAD MRI:   1.  Acute infarct in the right frontal lobe.  2.  Chronic small vessel ischemic disease with numerous chronic infarcts.     HEAD MRA:   1.  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  2.  Occlusion of the right middle cerebral artery. 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  3.  Additional multifocal moderate and severe intracranial stenoses as described above.     NECK MRA:  1.  Normal appearance of the right cervical internal carotid artery with absence of flow related enhancement on noncontrast images but presence of contrast enhancement. Findings are suggestive of severe stenosis with retrograde flow via collaterals.  2.  Focal moderately severe stenosis of the distal right vertebral artery.    MRI Brain  10/6/22:  IMPRESSION:  1.  Moderate-sized infarct in the right frontal lobe is slightly progressed in overall volume when compared to MRI dated 10/4/2022. No evidence for hemorrhagic transformation.  2.  Age-related changes with multiple chronic infarcts elsewhere as described.  3.  Loss of the normal right ICA flow void compatible with proximal occlusion.    CTA A/P 10/7/22:  IMPRESSION:  1.  Superficial right groin hematoma without evidence of active hemorrhage within the visualized groin and upper thigh.    CT Head 10/8/22:  IMPRESSION:  1.  Expected evolution of moderate size region of acute infarction within the right middle cerebral artery territory since 10/05/2022. No intracranial hemorrhage.  2.  Stable chronic regions of infarction within the left middle cerebral artery posterior division territory and right posterior cerebral artery territory.  3.  Stable postsurgical changes of bilateral parietal and temporal craniotomy with underlying minimal lateral temporal encephalomalacia.  4.  Stable mild chronic small vessel ischemic disease and mild to moderate generalized brain parenchymal volume loss.    MRI Brain 10/9/22:  IMPRESSION:  1.  Numerous new tiny punctate and small patchy foci of acute infarction within the bilateral anterior cerebral artery territories (right greater than left), right posterior cerebral artery territory and right middle cerebral artery territory since   10/06/2022.  2.  Expected evolution of late acute/early subacute infarcts throughout the right middle cerebral artery territory since 10/06/2022.  3.  Stable chronic cortical infarcts within the bilateral middle cerebral artery posterior division territories and right posterior cerebral artery territory.  4.  Stable mild to moderate chronic small vessel ischemic disease and generalized brain parenchymal volume loss.      ASSESSMENT/PLAN:  Candida Rose is an 83 year old female who presents with anemia. 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    1) Chronic NC/NC anemia, improved and stable  -7/2022: iron studies, B12, folate, TSH WNL.  -She denies gross evidence of bleed.  -Discussed with patient to closely monitor urine and stools for bleed. If she develops iron deficiency, will need repeat GI workup (last colonoscopy in 2014 negative).   -SPIEP negative.   -CBC is completely stable.    2) Subacute multiple infarcts with history of CVA and Fitch-Fitch syndrome  -Hospitalized with imaging showing numerous acute/subacute infarcts of the B/L anterior cerebral artery territories, right posterior cerebral artery, and right middle cerebral artery. No hemorrhage.  -She is followed by PCP, Neurology, and Cardiology.  -She was on aspirin and plavix and then placed on xarelto. At our last visit, she was off of anticoag/antiplatelet therapy. She remains on aspirin 81 mg daily at this time.  -I reviewed with patient her history of infarct. She states she had infarct with her first surgery for Fitch-Fitch. She denies history of PE/DVT/VTE. She has no history of miscarriage. No family history of VTE. I reviewed with her the possibility of an underlying hypercoag disorder.   -I reviewed today unremarkable: lupus anticoagulant, beta 2 glycoprotein, cardiolipin, protein C/S, AT III, Factor II and V mutation.  -She remains on aspirin at this time. She feels optimistic with her progress and has had increased ambulation. She feels she is able to do more around the house. She and her  will continue to discuss increased independent activities in the home. While I acknowledge patient has had improvement, I have asked patient to refrain from driving until cleared by PCP/neurology teams.     3) History of HTN, HLD    4) I discussed with patient my planned departure from [facility] at the end of this month. Given stability, she will continue to follow  with PCP. She is encouraged to contact clinic with any CBC concerns in the future.        Kary Johns DO  Hematology/Oncology  [facility]

## 2024-02-19 NOTE — PLAN OF CARE
"Patient is here today for a Mantoux (TST) test placement.    Is there a current order in the chart? No. Placed order according to standing order (reference the \"Skin Test- Tuberculosis Screening- Ambulatory Care\" standing order in [employer]). Review the Inclusion and Exclusion Criteria.        Inclusion Criteria  Pre-employment screening for healthcare workers and correctional facility staff - Administer two-step TST. Patient to return for second test in 1-3 weeks after first test is read.     Exclusion Criteria  None - Place order for Mantoux (TST) test per standing order.    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 No - form not needed per patient.    Instructed patient to wait for 15 minutes post injection and to report any reactions immediately to staff.    Told patient to return to clinic in 48-72 hours to have Mantoux (TST) read.          " SLP: Orders received and chart reviewed. Per patient's nurse is not appropriate for an evaluation due to lethargy.

## 2024-02-21 ENCOUNTER — THERAPY VISIT (OUTPATIENT)
Dept: PHYSICAL THERAPY | Facility: CLINIC | Age: 84
End: 2024-02-21
Attending: INTERNAL MEDICINE
Payer: MEDICARE

## 2024-02-21 DIAGNOSIS — R26.81 GAIT INSTABILITY: Primary | ICD-10-CM

## 2024-02-21 PROCEDURE — 97530 THERAPEUTIC ACTIVITIES: CPT | Mod: GP | Performed by: PHYSICAL THERAPIST

## 2024-02-21 PROCEDURE — 97110 THERAPEUTIC EXERCISES: CPT | Mod: GP | Performed by: PHYSICAL THERAPIST

## 2024-02-22 ENCOUNTER — OFFICE VISIT (OUTPATIENT)
Dept: UROLOGY | Facility: CLINIC | Age: 84
End: 2024-02-22
Payer: MEDICARE

## 2024-02-22 VITALS
WEIGHT: 208 LBS | BODY MASS INDEX: 31.52 KG/M2 | SYSTOLIC BLOOD PRESSURE: 122 MMHG | DIASTOLIC BLOOD PRESSURE: 72 MMHG | HEIGHT: 68 IN

## 2024-02-22 DIAGNOSIS — N39.3 STRESS INCONTINENCE: ICD-10-CM

## 2024-02-22 DIAGNOSIS — N39.41 URGE INCONTINENCE OF URINE: Primary | ICD-10-CM

## 2024-02-22 PROCEDURE — 99213 OFFICE O/P EST LOW 20 MIN: CPT | Performed by: PHYSICIAN ASSISTANT

## 2024-02-22 ASSESSMENT — PAIN SCALES - GENERAL: PAINLEVEL: NO PAIN (0)

## 2024-02-22 NOTE — PROGRESS NOTES
"Subjective      CHIEF COMPLAINT/REASON FOR VISIT   Follow up on urinary incontinence    HISTORY OF PRESENT ILLNESS   Ms. Rose is very pleasant 83 year old year old female, who presents today for follow-up regarding urinary incontinence.  I initially saw her in August 2023.  Her incontinence had significantly worsened after her stroke in October 2022.  Prior to that, she had had mild urinary incontinence.  She was emptying her bladder adequately with a postvoid residual of 53 mL.    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    After our discussion, we discussed a trial of pelvic floor therapy.  We discussed that this would take approximately 3 months to work.  We also discussed possible consideration of medication in the future.    Patient does note that she went to several sessions of pelvic floor physical therapy with Cookie.  She is continuing to do her exercises and does feel that it significantly helps.  She is only occasionally wearing pads.  She has nocturia 0-1 times.  This is improved from 4-5 times.  She also notes that she is doing \"much much better.\"    She is continuing to do PT for neurology.  Overall, feels like she is doing relatively well from a urinary standpoint.    The following portions of the patient's history were reviewed and updated as appropriate: allergies, current medications, past family history, past medical history, past social history, past surgical history, and problem list.     REVIEW OF SYSTEMS   Review of Systems   Constitutional: Negative.    Genitourinary:  Negative for difficulty urinating, dysuria and hematuria.      Per HPI.     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 F/u dr. Soto    OA (osteoarthritis)     s/p bilat total hips     Other chronic pain     Joint pain for many years    Unspecified cerebral artery occlusion with cerebral infarction     After Moyamoya surgery > 10 yrs ago.    Vitamin D deficiencies         Objective      PHYSICAL EXAM   /72   Ht 1.727 m (5' 8\")   Wt 94.3 kg (208 lb)   LMP  (LMP Unknown)   BMI 31.63 kg/m     Physical Exam  Constitutional:       Appearance: Normal appearance.   HENT:      Head: Normocephalic.   Eyes:      General: No scleral icterus.  Pulmonary:      Effort: Pulmonary effort is normal.   Musculoskeletal:      Comments: Ambulates with a 4 prong cane.   Skin:     Findings: No rash.   Neurological:      Mental Status: She is alert and oriented to person, place, and time. Mental status is at baseline.      Motor: Tremor present.   Psychiatric:         Mood and Affect: Mood normal.         Behavior: Behavior normal.         TESTING    PVR (previous): 53 mL    Assessment & Plan    1. Urge incontinence of urine    2. Stress incontinence        I had the pleasure today of meeting with Ms. Rose to discuss her urinary incontinence.  In discussion with the patient, she is very happy with her current urination.  She does feel like the pelvic floor physical therapy has " significantly helped her urinary symptoms and has a reduction in urinary incontinence as well as nocturia.  She is continuing to do her exercises.  She has been told by PT that she can contact them for refresher sessions if needed in the future.    Will plan on the following:    -Continue with pelvic floor PT exercises.      -Follow up as need with Urology or refresher PT as needed.    If  issues in the future, would consider refresher PT session, trial of possible Gemtesa or trospium, or possible topical estrogen therapy.    Contact us in the interim with questions, concerns, or changes in symptomatology.    Signed by:       Melissa Shah PA-C 2/22/2024 1:36 PM

## 2024-02-22 NOTE — PATIENT INSTRUCTIONS
Continue with pelvic floor PT exercises.      Follow up as need with Urology or refresher PT as needed.    Contact us in the interim with questions, concerns, or changes in symptomatology.  [phone]

## 2024-02-22 NOTE — LETTER
"2/22/2024       RE: Candida Rose  [address] [id number]     Dear Colleague,    Thank you for referring your patient, Candida Rose, to the [facility] [place] at [facility]. Please see a copy of my visit note below.    Subjective     CHIEF COMPLAINT/REASON FOR VISIT   Follow up on urinary incontinence    HISTORY OF PRESENT ILLNESS   Ms. Rose is very pleasant 83 year old year old female, who presents today for follow-up regarding urinary incontinence.  I initially saw her in August 2023.  Her incontinence had significantly worsened after her stroke in October 2022.  Prior to that, she had had mild urinary incontinence.  She was emptying her bladder adequately with a postvoid residual of 53 mL.    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    After our discussion, we discussed a trial of pelvic floor therapy.  We discussed that this would take approximately 3 months to work.  We also discussed possible consideration of medication in the future.    Patient does note that she went to several sessions of pelvic floor physical therapy with Cookie.  She is continuing to do her exercises and does feel that it significantly helps.  She is only occasionally wearing pads.  She has nocturia 0-1 times.  This is improved from 4-5 times.  She also notes that she is doing \"much much better.\"    She is continuing to do PT for neurology.  Overall, feels like she is doing relatively well from a urinary standpoint.    The following portions of the patient's history were reviewed and updated as appropriate: allergies, current medications, past family history, past medical history, past social history, past surgical history, and problem list.     REVIEW OF SYSTEMS   Review of Systems   Constitutional: Negative.    Genitourinary:  Negative for difficulty urinating, dysuria and hematuria.      Per HPI. 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 F/u dr. Soto    OA (osteoarthritis)     s/p bilat total hips     Other chronic pain     Joint pain for many years    Unspecified cerebral artery occlusion with cerebral infarction     After Moyamoya surgery > 10 yrs ago.    Vitamin D deficiencies         Objective     PHYSICAL EXAM   /72   Ht 1.727 m (5' 8\")   Wt 94.3 kg (208 lb)   LMP  (LMP Unknown)   BMI 31.63 kg/m     Physical Exam  Constitutional:       Appearance: Normal appearance.   HENT:      Head: Normocephalic.   Eyes:      General: No scleral icterus.  Pulmonary:      Effort: Pulmonary effort is normal.   Musculoskeletal:      Comments: Ambulates with a 4 prong cane.   Skin:     Findings: No rash.   Neurological:      Mental Status: She is alert and oriented to person, place, and time. Mental status is at baseline.      Motor: Tremor present.   Psychiatric:         Mood and Affect: Mood normal.         Behavior: Behavior normal.         TESTING    PVR (previous): 53 mL    Assessment & Plan "   1. Urge incontinence of urine    2. Stress incontinence        I had the pleasure today of meeting with Ms. Rose to discuss her urinary incontinence.  In discussion with the patient, she is very happy with her current urination.  She does feel like the pelvic floor physical therapy has significantly helped her urinary symptoms and has a reduction in urinary incontinence as well as nocturia.  She is continuing to do her exercises.  She has been told by PT that she can contact them for refresher sessions if needed in the future.    Will plan on the following:    -Continue with pelvic floor PT exercises.      -Follow up as need with Urology or refresher PT as needed.    If  issues in the future, would consider refresher PT session, trial of possible Gemtesa or trospium, or possible topical estrogen therapy.    Contact us in the interim with questions, concerns, or changes in symptomatology.    Signed by:       Melissa Shah PA-C 2/22/2024 1:36 PM

## 2024-02-22 NOTE — NURSING NOTE
Chief Complaint   Patient presents with    Incontinence     Pt here for follow up      Pt doing well. Pt states PT went well, has no concerns today    Cielo Denis CMA

## 2024-02-27 ENCOUNTER — THERAPY VISIT (OUTPATIENT)
Dept: PHYSICAL THERAPY | Facility: CLINIC | Age: 84
End: 2024-02-27
Attending: INTERNAL MEDICINE
Payer: MEDICARE

## 2024-02-27 DIAGNOSIS — R26.81 GAIT INSTABILITY: Primary | ICD-10-CM

## 2024-02-27 PROCEDURE — 97110 THERAPEUTIC EXERCISES: CPT | Mod: GP | Performed by: PHYSICAL THERAPIST

## 2024-02-27 ASSESSMENT — ENCOUNTER SYMPTOMS
CONSTITUTIONAL NEGATIVE: 1
DIFFICULTY URINATING: 0
DYSURIA: 0
HEMATURIA: 0

## 2024-03-05 ENCOUNTER — THERAPY VISIT (OUTPATIENT)
Dept: PHYSICAL THERAPY | Facility: CLINIC | Age: 84
End: 2024-03-05
Attending: INTERNAL MEDICINE
Payer: MEDICARE

## 2024-03-05 DIAGNOSIS — R26.81 GAIT INSTABILITY: Primary | ICD-10-CM

## 2024-03-05 PROCEDURE — 97110 THERAPEUTIC EXERCISES: CPT | Mod: GP | Performed by: PHYSICAL THERAPIST

## 2024-03-12 ENCOUNTER — THERAPY VISIT (OUTPATIENT)
Dept: PHYSICAL THERAPY | Facility: CLINIC | Age: 84
End: 2024-03-12
Attending: INTERNAL MEDICINE
Payer: MEDICARE

## 2024-03-12 DIAGNOSIS — R26.81 GAIT INSTABILITY: Primary | ICD-10-CM

## 2024-03-12 PROCEDURE — 97110 THERAPEUTIC EXERCISES: CPT | Mod: GP | Performed by: PHYSICAL THERAPIST

## 2024-03-19 ENCOUNTER — THERAPY VISIT (OUTPATIENT)
Dept: PHYSICAL THERAPY | Facility: CLINIC | Age: 84
End: 2024-03-19
Attending: INTERNAL MEDICINE
Payer: MEDICARE

## 2024-03-19 DIAGNOSIS — R26.81 GAIT INSTABILITY: Primary | ICD-10-CM

## 2024-03-19 PROCEDURE — 97110 THERAPEUTIC EXERCISES: CPT | Mod: GP | Performed by: PHYSICAL THERAPIST

## 2024-03-28 DIAGNOSIS — F41.9 ANXIETY: ICD-10-CM

## 2024-03-28 DIAGNOSIS — R45.7 EMOTIONAL STRESS: ICD-10-CM

## 2024-03-28 NOTE — TELEPHONE ENCOUNTER
"Pending Prescriptions:                       Disp   Refills    sertraline (ZOLOFT) 50 MG tablet [Pharmac*90 tab*1            Sig: TAKE 1/2 TAB DAILY FOR 7 -10 DAYS THEN TAKE IT           DAILY    Per routing comment from refill RN:    Please call to confirm dose sig is for taper     Spoke with patient.  States she is taking 1 tab at night (makes her tired so takes before bedtime) and reports \"its really working\".    Please send new prescription with updated directions.  Has one tab left for tonight.    Prescription pended.  "

## 2024-04-08 ENCOUNTER — THERAPY VISIT (OUTPATIENT)
Dept: PHYSICAL THERAPY | Facility: CLINIC | Age: 84
End: 2024-04-08
Attending: INTERNAL MEDICINE
Payer: MEDICARE

## 2024-04-08 ENCOUNTER — HOSPITAL ENCOUNTER (EMERGENCY)
Facility: CLINIC | Age: 84
End: 2024-04-08
Payer: MEDICARE

## 2024-04-08 DIAGNOSIS — R26.81 GAIT INSTABILITY: Primary | ICD-10-CM

## 2024-04-08 PROCEDURE — 97110 THERAPEUTIC EXERCISES: CPT | Mod: GP | Performed by: PHYSICAL THERAPIST

## 2024-04-15 ENCOUNTER — THERAPY VISIT (OUTPATIENT)
Dept: PHYSICAL THERAPY | Facility: CLINIC | Age: 84
End: 2024-04-15
Attending: INTERNAL MEDICINE
Payer: MEDICARE

## 2024-04-15 DIAGNOSIS — R26.81 GAIT INSTABILITY: Primary | ICD-10-CM

## 2024-04-15 PROCEDURE — 97110 THERAPEUTIC EXERCISES: CPT | Mod: GP | Performed by: PHYSICAL THERAPIST

## 2024-04-16 ENCOUNTER — TELEPHONE (OUTPATIENT)
Dept: INTERNAL MEDICINE | Facility: CLINIC | Age: 84
End: 2024-04-16
Payer: MEDICARE

## 2024-04-16 NOTE — TELEPHONE ENCOUNTER
Patient reports that she has been working with Josie Potter for physical therapy who states that patient should connect with her primary care provider to discuss her blood pressure medication regarding orthostatic hypotension. Patient reports she has been having shakiness in her left leg and feeling faint upon standing at times for the last month.     Patient reports that during a visit with physical therapy her blood pressure dropped 20 points when she went from sitting to standing position at her appointment.     Patient has been monitoring blood pressure at home and got blood reading of 136/72.    Patient denies changes in heart rate or palpitations.     Appointment set up for next week.     Appointments in Next Year      Apr 25, 2024  9:00 AM  (Arrive by 8:40 AM)  Provider Visit with Chani Peoples MD  [facility] ([facility] ) [phone]     Advised patient to contact clinic if symptoms worsen or she has further concerns.    Thank you,  Jose Vu, Triage RN [facility]  9:17 AM 4/16/2024

## 2024-04-23 ENCOUNTER — THERAPY VISIT (OUTPATIENT)
Dept: PHYSICAL THERAPY | Facility: CLINIC | Age: 84
End: 2024-04-23
Attending: INTERNAL MEDICINE
Payer: MEDICARE

## 2024-04-23 DIAGNOSIS — R26.81 GAIT INSTABILITY: Primary | ICD-10-CM

## 2024-04-23 PROCEDURE — 97750 PHYSICAL PERFORMANCE TEST: CPT | Mod: GP | Performed by: PHYSICAL THERAPIST

## 2024-04-25 ENCOUNTER — OFFICE VISIT (OUTPATIENT)
Dept: INTERNAL MEDICINE | Facility: CLINIC | Age: 84
End: 2024-04-25
Payer: MEDICARE

## 2024-04-25 VITALS
BODY MASS INDEX: 32.34 KG/M2 | HEART RATE: 60 BPM | OXYGEN SATURATION: 100 % | RESPIRATION RATE: 18 BRPM | SYSTOLIC BLOOD PRESSURE: 139 MMHG | HEIGHT: 68 IN | DIASTOLIC BLOOD PRESSURE: 81 MMHG | WEIGHT: 213.4 LBS | TEMPERATURE: 96.7 F

## 2024-04-25 DIAGNOSIS — I95.1 ORTHOSTATIC HYPOTENSION: ICD-10-CM

## 2024-04-25 DIAGNOSIS — D64.9 ANEMIA, UNSPECIFIED TYPE: ICD-10-CM

## 2024-04-25 DIAGNOSIS — E78.5 HYPERLIPIDEMIA LDL GOAL <130: Chronic | ICD-10-CM

## 2024-04-25 DIAGNOSIS — E11.69 TYPE 2 DIABETES MELLITUS WITH OTHER SPECIFIED COMPLICATION, WITHOUT LONG-TERM CURRENT USE OF INSULIN (H): Primary | ICD-10-CM

## 2024-04-25 DIAGNOSIS — R45.7 EMOTIONAL STRESS: ICD-10-CM

## 2024-04-25 DIAGNOSIS — N18.30 STAGE 3 CHRONIC KIDNEY DISEASE, UNSPECIFIED WHETHER STAGE 3A OR 3B CKD (H): Chronic | ICD-10-CM

## 2024-04-25 DIAGNOSIS — F41.9 ANXIETY: ICD-10-CM

## 2024-04-25 DIAGNOSIS — I63.9 CEREBROVASCULAR ACCIDENT (CVA), UNSPECIFIED MECHANISM (H): ICD-10-CM

## 2024-04-25 DIAGNOSIS — I10 HTN, GOAL BELOW 140/90: Chronic | ICD-10-CM

## 2024-04-25 PROCEDURE — 99214 OFFICE O/P EST MOD 30 MIN: CPT | Performed by: INTERNAL MEDICINE

## 2024-04-25 PROCEDURE — G2211 COMPLEX E/M VISIT ADD ON: HCPCS | Performed by: INTERNAL MEDICINE

## 2024-04-25 PROCEDURE — 93000 ELECTROCARDIOGRAM COMPLETE: CPT | Performed by: INTERNAL MEDICINE

## 2024-04-25 RX ORDER — ROSUVASTATIN CALCIUM 10 MG/1
10 TABLET, COATED ORAL DAILY
Qty: 90 TABLET | Refills: 1 | Status: SHIPPED | OUTPATIENT
Start: 2024-04-25 | End: 2024-08-05

## 2024-04-25 RX ORDER — ATENOLOL 25 MG/1
25 TABLET ORAL DAILY
Qty: 90 TABLET | Refills: 1 | Status: SHIPPED | OUTPATIENT
Start: 2024-04-25 | End: 2024-06-03

## 2024-04-25 ASSESSMENT — PAIN SCALES - GENERAL: PAINLEVEL: NO PAIN (0)

## 2024-04-25 NOTE — PROGRESS NOTES
Dr Farrell's note      Patient's instructions / PLAN:                                                        Plan:  Stop Atorvastatin and start Rosuvastatin 10 mg daily at bed time for cholesterol  -- stop it if leg shakiness  2. Continue Atenolol same dose  3. Keep the June appointments      ASSESSMENT & PLAN:                                                      (E11.69) Type 2 diabetes mellitus with other specified complication, without long-term current use of insulin (H)  (primary encounter diagnosis)  Comment: Controlled  Plan: Low-carb diet, monitor A1c    (I95.1) Orthostatic hypotension  Comment: Blood pressure in the dropped, see below but she has no symptoms  Plan: EKG 12-lead complete w/read - Clinics            (I10) HTN, goal below 140/90  Comment: Controlled  Plan: atenolol (TENORMIN) 25 MG tablet            (I63.9) Cerebrovascular accident (CVA), unspecified mechanism (H)  Comment: Some unsteady gait  Plan: Uses a walker    (F41.9) Anxiety  (R45.7) Emotional stress  Comment: Stable  Plan: sertraline (ZOLOFT) 50 MG tablet            (E78.5) Hyperlipidemia LDL goal <130  Comment: Controlled, but diet weakness.  We will try rosuvastatin  Plan: Comprehensive metabolic panel, rosuvastatin         (CRESTOR) 10 MG tablet, Lipid panel reflex to         direct LDL Fasting            (N18.30) Stage 3 chronic kidney disease, unspecified whether stage 3a or 3b CKD (H)  Comment:   Plan: Comprehensive metabolic panel            (D64.9) Anemia, unspecified type  Comment: No hematuria no blood in stools  Plan: CBC with platelets                 Chief complaint:                                                      Follow up chronic medical problems      SUBJECTIVE:                                                    History of present illness:    Labs Feb 2024 -- Discussed      CKD3  -- creat is stable     L leg shakiness   -- she noticed that when she was getting out of the car the L leg started shakiness. If she  doesn't sit back she feels very unsteady  -- she run out of Atorvastatin about a week ago and she noticed that the leg shakiness improved considerably  -- she works with PT     HTN  -- home BP: I don't think she recorded correctly since it shows 107/206  -- lyin/72,  54  -- sitting  139/68  52  -- standing 127/66   58      Deconditioning after CVA  -- PT helped a lot and she will continue to do the prescribed exercises    HLipidemia  -- well controlled on Atorvastatin 40    Subjective   Candida is a 83 year old, presenting for the following health issues:  Patient is being seen to discuss orthostatic hypotension.      2024     8:36 AM   Additional Questions   Roomed by Regina Zhao     HPI       Hyperlipidemia Follow-Up    Are you regularly taking any medication or supplement to lower your cholesterol?   Yes- atorvastatin  Are you having muscle aches or other side effects that you think could be caused by your cholesterol lowering medication?  Yes-      Hypertension Follow-up    Do you check your blood pressure regularly outside of the clinic? Yes   Are you following a low salt diet? Yes  Are your blood pressures ever more than 140 on the top number (systolic) OR more   than 90 on the bottom number (diastolic), for example 140/90? No  How many servings of fruits and vegetables do you eat daily?  2-3  On average, how many sweetened beverages do you drink each day (Examples: soda, juice, sweet tea, etc.  Do NOT count diet or artificially sweetened beverages)?   0  How many days per week do you exercise enough to make your heart beat faster? 3 or less  How many minutes a day do you exercise enough to make your heart beat faster? 9 or less  How many days per week do you miss taking your medication? 0      Review of Systems:                                                      ROS: negative for fever, chills, cough, wheezes, chest pain, shortness of breath, vomiting, abdominal pain, leg swelling       OBJECTIVE:    "          Physical exam:  Blood pressure 139/81, pulse 60, temperature (!) 96.7  F (35.9  C), temperature source Tympanic, resp. rate 18, height 1.727 m (5' 8\"), weight 96.8 kg (213 lb 6.4 oz), SpO2 100%, not currently breastfeeding.     NAD, appears comfortable  Skin: no rashes   Neck: supple, no JVD,  No thyroidmegaly. Lymph nodes nonpalpable cervical and supraclavicular.  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 F/u dr. Soto    OA (osteoarthritis)     s/p bilat total hips     Other chronic pain     Joint pain for many years    Unspecified cerebral artery occlusion with cerebral infarction     After Moyamoya surgery > 10 yrs ago.    Vitamin D deficiencies       PSHx: reviewed  Past Surgical History:   Procedure Laterality Date    ARTHROPLASTY KNEE Left 4/17/2017    Procedure: ARTHROPLASTY KNEE;  Left total knee arthroplasty;  Surgeon: Chidi Jenkins MD;  Location:  OR    COLONOSCOPY  2008    normal    COLONOSCOPY  7/17/2014    Procedure: COLONOSCOPY;  Surgeon: Raul Nolan DO;  Location:  GI    CRANIOTOMY      [facility]  \"Pt had repair of blood flow.\"    IR CAROTID ANGIOGRAM  10/30/2006    IR CAROTID ANGIOGRAM  6/6/2010    IR CAROTID ANGIOGRAM  6/6/2010    IR CAROTID ANGIOGRAM  6/6/2010    IR CAROTID ANGIOGRAM  " 5/12/2011    IR CAROTID ANGIOGRAM  5/12/2011    IR CAROTID ANGIOGRAM  5/12/2011    IR CAROTID ANGIOGRAM  6/5/2012    IR CAROTID ANGIOGRAM  6/5/2012    IR CAROTID ANGIOGRAM  6/5/2012    IR CAROTID CEREBRAL ANGIOGRAM BILATERAL  10/7/2022    IR MISCELLANEOUS PROCEDURE  6/6/2010    IR MISCELLANEOUS PROCEDURE  6/6/2010    IR MISCELLANEOUS PROCEDURE  5/12/2011    IR MISCELLANEOUS PROCEDURE  6/5/2012    RECONSTRUCT FOREFOOT WITH METATARSOPHALANGEAL (MTP) FUSION Left 8/21/2014    Procedure: RECONSTRUCT FOREFOOT WITH METATARSOPHALANGEAL (MTP) FUSION;  Surgeon: Tres Merlos DPM;  Location:  OR    [facility] NONSPECIFIC PROCEDURE  10/30/06    neurosurgery Dr Soto    [facility] NONSPECIFIC PROCEDURE      bilateral total hips approx 2002    [facility] NONSPECIFIC PROCEDURE  3/07    neurosurgery         Meds: reviewed  Current Outpatient Medications   Medication Sig Dispense Refill    aspirin 81 MG EC tablet Take 81 mg by mouth daily      atenolol (TENORMIN) 25 MG tablet Take 1 tablet (25 mg) by mouth daily 90 tablet 3    atorvastatin (LIPITOR) 40 MG tablet Take 1 tablet (40 mg) by mouth every evening 90 tablet 3    cyanocobalamin (VITAMIN B-12) 1000 MCG tablet Take 1,000 mcg by mouth daily      magnesium 500 MG TABS Take 500 mg by mouth daily      Multiple Vitamins-Minerals (MULTIVITAMIN & MINERAL PO) Take 1 tablet by mouth daily      sertraline (ZOLOFT) 50 MG tablet Take 1 tablet (50 mg) by mouth daily 90 tablet 1       Soc Hx: reviewed  Fam Hx: reviewed      Chart documentation was completed, in part, with [employer] voice-recognition software. Even though reviewed, some grammatical, spelling, and word errors may remain.      Chani Farrell MD  Internal Medicine       Signed Electronically by: Chani Peoples MD

## 2024-04-25 NOTE — PATIENT INSTRUCTIONS
Plan:  Stop Atorvastatin and start  10 mg daily at bed time for cholesterol  -- stop it if leg shakiness  2. Continue Atenolol same dose  3. Keep the [place] appointments

## 2024-05-14 ENCOUNTER — THERAPY VISIT (OUTPATIENT)
Dept: PHYSICAL THERAPY | Facility: CLINIC | Age: 84
End: 2024-05-14
Attending: INTERNAL MEDICINE
Payer: MEDICARE

## 2024-05-14 DIAGNOSIS — R26.81 GAIT INSTABILITY: Primary | ICD-10-CM

## 2024-05-14 PROCEDURE — 97750 PHYSICAL PERFORMANCE TEST: CPT | Mod: GP | Performed by: PHYSICAL THERAPIST

## 2024-05-20 ENCOUNTER — NURSE TRIAGE (OUTPATIENT)
Dept: INTERNAL MEDICINE | Facility: CLINIC | Age: 84
End: 2024-05-20
Payer: MEDICARE

## 2024-05-20 NOTE — TELEPHONE ENCOUNTER
Attempt # 1  Called Phone # [phone]      Was Call answered? no     Non-detailed voicemail left on May 20, 2024 2:54 PM to call clinic at: [phone].     On Call Back:     Please relay Dr. Lee's message.     Thank you,  Jose Vu, Triage RN [facility]  2:54 PM 5/20/2024

## 2024-05-20 NOTE — TELEPHONE ENCOUNTER
Please advise patient to stop rosuvastatin and monitor her symptoms,  patient has appointment with PCP in 2 weeks.

## 2024-05-20 NOTE — TELEPHONE ENCOUNTER
Routing to pcp for review and recommendations   Also routing to [place] triage.     Protocol is call back by pcp today.     Situation   My doctor told me to call if my legs became weak or more shaky after starting the rosuvastatin.     Patient is asking should she stop or keep taking Rosuvastatin?     Background     Shaky feeling is happening more frequently than previous.my pcp is aware of this.     Assessment   Denied injury or hitting her head     On Sunday her left leg started feeling shaky and weak. she tried to get to the chair fast and missed the chair and fell to the floor.   Did not have cane or walker by her.   Denied loosing consciousness, denied dizziness or lightheaded   Patient states this has been ongoing- going to pt     Denied left leg feeling weak each time she walks.   Today- left leg shaking x1, shaky feeling lasts a couple minutes   The shaking goes away when she sits or lays down.   Shaking feeling once a day for the past 2 days.     Blood pressure today in am was 181-84    While on the phone BP was 170/84- 110pm- takes atenolol in the evening.       Future Appointments 5/20/2024 - 11/16/2024        Date Visit Type Length Department Provider     5/28/2024  8:30 AM LAB 15 min [facility] LABORATORY [facility] LAB    Location Instructions:     The clinic is located at [address], [address] in [place].&nbsp; We offer free parking in our on-site lot.              6/3/2024  7:00 AM ANNUAL WELLNESS 30 min [facility] Chani Sadler MD    Location Instructions:     [facility] is in the [facility] at [address] next to [facility]. This is near the [address] and the [address] exits off [address]. To reach the clinic from [place] [address], turn north onto [address], then turn east on [address]. Clinic parking is available next to the [address], which is just east of the hospital s main entrance.                       Reason for Disposition   Caller has NON-URGENT question and triager unable to answer question    Additional Information   Negative: Major injury from dangerous force (e.g., fall > 10 feet or 3 meters)   Negative: Major bleeding (e.g., actively dripping or spurting) and can't be stopped   Negative: Shock suspected (e.g., cold/pale/clammy skin, too weak to stand)   Negative: Difficult to awaken or acting confused (e.g., disoriented, slurred speech)   Negative: SEVERE weakness (i.e., unable to walk or barely able to walk, requires support) and new-onset or worsening   Negative: Can't stand (bear weight) or walk and new-onset after fall   Negative: Sounds like a life-threatening emergency to the triager   Negative: Fainted (passed out)   Negative: New-onset or worsening weakness of the face, arm or leg on one side of the body   Negative: New-onset or worsening dizziness and described as spinning or off balance (i.e., vertigo)   Negative: New-onset or worsening dizziness and NO spinning sensation or trouble with balance   Negative: Pregnant and fall   Negative: Patient has a concerning injury to a specific part of the body (e.g., chest, leg, head)   Negative: Patient has a wound (abrasion, cut, puncture, other skin injury or tear)   Negative: Injury (or injuries) that need emergency care   Negative: Sounds like a serious injury to the triager   Negative: Muscle pain and dark (cola colored) or red-colored urine   Negative: Unable to get up until help (e.g., caregiver, family, friend) arrived and on the ground 1 hour or more   Negative: Patient sounds very sick or weak to the triager   Negative: MODERATE weakness (i.e., interferes with work, school, normal activities) and new-onset or worsening   Negative: Fever > 101 F (38.3 C) and age > 60 years   Negative: Fever > 100.0 F (37.8 C) and bedridden (e.g., CVA, chronic illness, recovering from surgery)   Negative: Fever > 100.0 F (37.8 C) and diabetes  mellitus or weak immune system (e.g., HIV positive, cancer chemo, splenectomy, organ transplant, chronic steroids)   Negative: Suspicious history for the fall   Negative: Caller has URGENT question and triager unable to answer question   Negative: New-onset or worsening pale skin (pallor)   Negative: No prior tetanus shots (or is not fully vaccinated) and any wound (e.g., cut or scrape)   Negative: HIV positive or severe immunodeficiency (severely weak immune system) and DIRTY cut or scrape    Protocols used: Falls and Rhinaou-E-IZ

## 2024-05-22 NOTE — TELEPHONE ENCOUNTER
Called and spoke with patient to relay provider message. Patient verbalizes understanding of instructions and indicates no further questions at this time.    Thank you,  Jose Vu, Triage RN [facility]  11:32 AM 5/22/2024

## 2024-05-28 ENCOUNTER — LAB (OUTPATIENT)
Dept: LAB | Facility: CLINIC | Age: 84
End: 2024-05-28
Payer: MEDICARE

## 2024-05-28 DIAGNOSIS — E11.69 TYPE 2 DIABETES MELLITUS WITH OTHER SPECIFIED COMPLICATION, WITHOUT LONG-TERM CURRENT USE OF INSULIN (H): ICD-10-CM

## 2024-05-28 DIAGNOSIS — Z78.9 TAKES DIETARY SUPPLEMENTS: ICD-10-CM

## 2024-05-28 DIAGNOSIS — D64.9 ANEMIA, UNSPECIFIED TYPE: ICD-10-CM

## 2024-05-28 DIAGNOSIS — R45.7 EMOTIONAL STRESS: ICD-10-CM

## 2024-05-28 DIAGNOSIS — E78.5 HYPERLIPIDEMIA LDL GOAL <130: Chronic | ICD-10-CM

## 2024-05-28 DIAGNOSIS — N18.30 STAGE 3 CHRONIC KIDNEY DISEASE, UNSPECIFIED WHETHER STAGE 3A OR 3B CKD (H): ICD-10-CM

## 2024-05-28 DIAGNOSIS — F41.9 ANXIETY: ICD-10-CM

## 2024-05-28 DIAGNOSIS — R26.81 GAIT INSTABILITY: ICD-10-CM

## 2024-05-28 LAB
ALBUMIN SERPL BCG-MCNC: 4 G/DL (ref 3.5–5.2)
ALP SERPL-CCNC: 122 U/L (ref 40–150)
ALT SERPL W P-5'-P-CCNC: 15 U/L (ref 0–50)
ANION GAP SERPL CALCULATED.3IONS-SCNC: 10 MMOL/L (ref 7–15)
AST SERPL W P-5'-P-CCNC: 22 U/L (ref 0–45)
BILIRUB SERPL-MCNC: 0.4 MG/DL
BUN SERPL-MCNC: 24.4 MG/DL (ref 8–23)
CALCIUM SERPL-MCNC: 9.1 MG/DL (ref 8.8–10.2)
CHLORIDE SERPL-SCNC: 103 MMOL/L (ref 98–107)
CHOLEST SERPL-MCNC: 190 MG/DL
CREAT SERPL-MCNC: 1.16 MG/DL (ref 0.51–0.95)
CREAT UR-MCNC: 258 MG/DL
DEPRECATED HCO3 PLAS-SCNC: 25 MMOL/L (ref 22–29)
EGFRCR SERPLBLD CKD-EPI 2021: 47 ML/MIN/1.73M2
ERYTHROCYTE [DISTWIDTH] IN BLOOD BY AUTOMATED COUNT: 12.5 % (ref 10–15)
FASTING STATUS PATIENT QL REPORTED: YES
FASTING STATUS PATIENT QL REPORTED: YES
GLUCOSE SERPL-MCNC: 98 MG/DL (ref 70–99)
HBA1C MFR BLD: 5.7 % (ref 0–5.6)
HCT VFR BLD AUTO: 37.4 % (ref 35–47)
HDLC SERPL-MCNC: 71 MG/DL
HGB BLD-MCNC: 12.2 G/DL (ref 11.7–15.7)
LDLC SERPL CALC-MCNC: 101 MG/DL
MCH RBC QN AUTO: 30.3 PG (ref 26.5–33)
MCHC RBC AUTO-ENTMCNC: 32.6 G/DL (ref 31.5–36.5)
MCV RBC AUTO: 93 FL (ref 78–100)
MICROALBUMIN UR-MCNC: 33.7 MG/L
MICROALBUMIN/CREAT UR: 13.06 MG/G CR (ref 0–25)
NONHDLC SERPL-MCNC: 119 MG/DL
PLATELET # BLD AUTO: 290 10E3/UL (ref 150–450)
POTASSIUM SERPL-SCNC: 4.3 MMOL/L (ref 3.4–5.3)
PROT SERPL-MCNC: 6.7 G/DL (ref 6.4–8.3)
RBC # BLD AUTO: 4.03 10E6/UL (ref 3.8–5.2)
SODIUM SERPL-SCNC: 138 MMOL/L (ref 135–145)
TRIGL SERPL-MCNC: 88 MG/DL
TSH SERPL DL<=0.005 MIU/L-ACNC: 1.42 UIU/ML (ref 0.3–4.2)
VIT B12 SERPL-MCNC: 1380 PG/ML (ref 232–1245)
WBC # BLD AUTO: 8.2 10E3/UL (ref 4–11)

## 2024-05-28 PROCEDURE — 82607 VITAMIN B-12: CPT

## 2024-05-28 PROCEDURE — 84443 ASSAY THYROID STIM HORMONE: CPT

## 2024-05-28 PROCEDURE — 80061 LIPID PANEL: CPT

## 2024-05-28 PROCEDURE — 82570 ASSAY OF URINE CREATININE: CPT

## 2024-05-28 PROCEDURE — 82043 UR ALBUMIN QUANTITATIVE: CPT

## 2024-05-28 PROCEDURE — 85027 COMPLETE CBC AUTOMATED: CPT

## 2024-05-28 PROCEDURE — 80053 COMPREHEN METABOLIC PANEL: CPT

## 2024-05-28 PROCEDURE — 83036 HEMOGLOBIN GLYCOSYLATED A1C: CPT

## 2024-05-28 PROCEDURE — 36415 COLL VENOUS BLD VENIPUNCTURE: CPT

## 2024-05-30 SDOH — HEALTH STABILITY: PHYSICAL HEALTH: ON AVERAGE, HOW MANY DAYS PER WEEK DO YOU ENGAGE IN MODERATE TO STRENUOUS EXERCISE (LIKE A BRISK WALK)?: 7 DAYS

## 2024-05-30 SDOH — HEALTH STABILITY: PHYSICAL HEALTH: ON AVERAGE, HOW MANY MINUTES DO YOU ENGAGE IN EXERCISE AT THIS LEVEL?: 30 MIN

## 2024-05-30 ASSESSMENT — SOCIAL DETERMINANTS OF HEALTH (SDOH): HOW OFTEN DO YOU GET TOGETHER WITH FRIENDS OR RELATIVES?: MORE THAN THREE TIMES A WEEK

## 2024-06-03 ENCOUNTER — OFFICE VISIT (OUTPATIENT)
Dept: INTERNAL MEDICINE | Facility: CLINIC | Age: 84
End: 2024-06-03
Payer: MEDICARE

## 2024-06-03 VITALS
BODY MASS INDEX: 32.02 KG/M2 | SYSTOLIC BLOOD PRESSURE: 140 MMHG | OXYGEN SATURATION: 99 % | WEIGHT: 211.3 LBS | HEIGHT: 68 IN | TEMPERATURE: 96.8 F | HEART RATE: 78 BPM | RESPIRATION RATE: 18 BRPM | DIASTOLIC BLOOD PRESSURE: 67 MMHG

## 2024-06-03 DIAGNOSIS — I10 HTN, GOAL BELOW 140/90: Chronic | ICD-10-CM

## 2024-06-03 DIAGNOSIS — Z00.00 ROUTINE GENERAL MEDICAL EXAMINATION AT A HEALTH CARE FACILITY: Primary | ICD-10-CM

## 2024-06-03 DIAGNOSIS — R45.7 EMOTIONAL STRESS: ICD-10-CM

## 2024-06-03 DIAGNOSIS — E78.5 HYPERLIPIDEMIA LDL GOAL <130: ICD-10-CM

## 2024-06-03 DIAGNOSIS — R21 SKIN RASH: ICD-10-CM

## 2024-06-03 DIAGNOSIS — F41.9 ANXIETY: ICD-10-CM

## 2024-06-03 DIAGNOSIS — I63.9 CEREBROVASCULAR ACCIDENT (CVA), UNSPECIFIED MECHANISM (H): ICD-10-CM

## 2024-06-03 PROCEDURE — G0439 PPPS, SUBSEQ VISIT: HCPCS | Performed by: INTERNAL MEDICINE

## 2024-06-03 PROCEDURE — 99214 OFFICE O/P EST MOD 30 MIN: CPT | Mod: 25 | Performed by: INTERNAL MEDICINE

## 2024-06-03 RX ORDER — ATENOLOL 25 MG/1
25 TABLET ORAL DAILY
Qty: 90 TABLET | Refills: 1 | Status: SHIPPED | OUTPATIENT
Start: 2024-06-03

## 2024-06-03 RX ORDER — LOSARTAN POTASSIUM 50 MG/1
50 TABLET ORAL DAILY
Qty: 90 TABLET | Refills: 1 | Status: SHIPPED | OUTPATIENT
Start: 2024-06-03 | End: 2024-06-06

## 2024-06-03 ASSESSMENT — PAIN SCALES - GENERAL: PAINLEVEL: NO PAIN (0)

## 2024-06-03 NOTE — PROGRESS NOTES
Dr Farrell's note    Patient's instructions / PLAN:                                                        Plan:  You may decrease Sertraline to 25 mg daily for 1-2 months then you will decide if you want to stop it  2. Start Losartan 50 mg daily for the blood pressure  3. Continue Atenolol 25 mg daily   4. Schedule in office appointment on July 3 at 07:10  5. Please make a lab appointment for fasting labs  few days before  6. You will benefit from Pneumonia 20 vacc        ASSESSMENT & PLAN:                                                      (E78.5) Hyperlipidemia LDL goal <130  (primary encounter diagnosis)  Comment: Controlled    Plan: Lipid panel reflex to direct LDL Fasting            (I10) HTN, goal below 140/90  Comment: Not controlled   Plan: atenolol (TENORMIN) 25 MG tablet, losartan         (COZAAR) 50 MG tablet            (I63.9) Cerebrovascular accident (CVA), unspecified mechanism (H)  Comment: doing much better  Plan: enrolled in [facility]    (F41.9) Anxiety  (R45.7) Emotional stress  Comment: much better w Sertraline. SHe would like to stop it if possible   Plan: as above     (R21) Skin rash  Comment: face: cheeks, nose   Skin is very fair  Plan: Adult Dermatology  Referral               Chief Complaint:                                                        Annual exam  Follow up chronic medical problems      SUBJECTIVE:                                                    History of present illness     We reviewed the chronic medical problems as above.   I reviewed the recent tests results in Epic     Hx of CVA  -- enrolled to CVA study at [facility]  -- now she has BP machine    HTN  -- home BP: 139/81   60, lying flat: 146/72  54, sittin/68  52, standing 127/66   58  -- more recently: 196/84    HLip  -- stopped Atorvastatin  -- she hasn't started Rosuvastatin    B12 def  -- low level 2017  -- she was advised to stop the supplements  -- I advised her to resume it every other day    Skin  rash face cheeks     ROS:     See below    General: Negative for fever, chills, major weight changes, fatigue  Skin: Negative for rashes, abnormal spots  Eyes: Negative for blurred or double vision  ENT/mouth: Negative for sinuses discomfort, earache, sore throat  Respiratory: Negative for cough, wheezes, chronic lung disease  Cardiovascular: Negative for rest or exertional chest pain, shortness of breath, palpitations, leg edema,   Gastrointestinal: Negative for vomiting, abdominal pain, heartburn, blood in stool, diarrhea, constipation  Genitourinary: Negative for urinary frequency, blood in urine, history of kidney stones  Female: Negative for abnormal vaginal bleeding, vaginal discharge  Neuro: Negative for headaches, numbness, tingling, weakness in arms or legs, history of seizure, recent syncope. Pos for Hx CVA  Psychiatry: Negative for depression, anxiety, suicidal thoughts  Endo: Negative for known thyroid disease, diabetes.  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 F/u dr. Soto    OA (osteoarthritis)     s/p bilat total hips     Other chronic pain     Joint pain for many years    Unspecified cerebral artery occlusion with cerebral infarction     After Moyamoya surgery > 10 yrs ago.    Vitamin D deficiencies        PSHx: reviewed  Past Surgical History:   Procedure Laterality Date    ARTHROPLASTY KNEE Left  "4/17/2017    Procedure: ARTHROPLASTY KNEE;  Left total knee arthroplasty;  Surgeon: Chidi Jenkins MD;  Location:  OR    COLONOSCOPY  2008    normal    COLONOSCOPY  7/17/2014    Procedure: COLONOSCOPY;  Surgeon: Raul Nolan DO;  Location:  GI    CRANIOTOMY      MOJAMOJA  \"Pt had repair of blood flow.\"    IR CAROTID ANGIOGRAM  10/30/2006    IR CAROTID ANGIOGRAM  6/6/2010    IR CAROTID ANGIOGRAM  6/6/2010    IR CAROTID ANGIOGRAM  6/6/2010    IR CAROTID ANGIOGRAM  5/12/2011    IR CAROTID ANGIOGRAM  5/12/2011    IR CAROTID ANGIOGRAM  5/12/2011    IR CAROTID ANGIOGRAM  6/5/2012    IR CAROTID ANGIOGRAM  6/5/2012    IR CAROTID ANGIOGRAM  6/5/2012    IR CAROTID CEREBRAL ANGIOGRAM BILATERAL  10/7/2022    IR MISCELLANEOUS PROCEDURE  6/6/2010    IR MISCELLANEOUS PROCEDURE  6/6/2010    IR MISCELLANEOUS PROCEDURE  5/12/2011    IR MISCELLANEOUS PROCEDURE  6/5/2012    RECONSTRUCT FOREFOOT WITH METATARSOPHALANGEAL (MTP) FUSION Left 8/21/2014    Procedure: RECONSTRUCT FOREFOOT WITH METATARSOPHALANGEAL (MTP) FUSION;  Surgeon: Tres Merlos DPM;  Location:  OR    ZZC NONSPECIFIC PROCEDURE  10/30/06    neurosurgery Dr Soto    [facility] NONSPECIFIC PROCEDURE      bilateral total hips approx 2002    ZZ NONSPECIFIC PROCEDURE  3/07    neurosurgery         Soc Hx: No daily alcohol, no smoking  Social History     Socioeconomic History    Marital status:      Spouse name: Olu     Number of children: 2    Years of education: Not on file    Highest education level: Not on file   Occupational History     Employer: RETIRED   Tobacco Use    Smoking status: Never     Passive exposure: Never    Smokeless tobacco: Never   Vaping Use    Vaping status: Never Used   Substance and Sexual Activity    Alcohol use: Yes     Comment: 1 beer or wine daily    Drug use: No    Sexual activity: Never     Partners: Male   Other Topics Concern    Parent/sibling w/ CABG, MI or angioplasty before 65F 55M? Not Asked   Social History " Narrative    Not on file     Social Determinants of Health     Financial Resource Strain: Low Risk  (5/30/2024)    Financial Resource Strain     Within the past 12 months, have you or your family members you live with been unable to get utilities (heat, electricity) when it was really needed?: No   Food Insecurity: Low Risk  (5/30/2024)    Food Insecurity     Within the past 12 months, did you worry that your food would run out before you got money to buy more?: No     Within the past 12 months, did the food you bought just not last and you didn t have money to get more?: No   Transportation Needs: Low Risk  (5/30/2024)    Transportation Needs     Within the past 12 months, has lack of transportation kept you from medical appointments, getting your medicines, non-medical meetings or appointments, work, or from getting things that you need?: No   Physical Activity: Sufficiently Active (5/30/2024)    Exercise Vital Sign     Days of Exercise per Week: 7 days     Minutes of Exercise per Session: 30 min   Stress: No Stress Concern Present (5/30/2024)    [employer] - Occupational Stress Questionnaire     Feeling of Stress : Not at all   Social Connections: Unknown (5/30/2024)    Social Connection and Isolation Panel [NHANES]     Frequency of Communication with Friends and Family: Not on file     Frequency of Social Gatherings with Friends and Family: More than three times a week     Attends Oriental orthodox Services: Not on file     Active Member of Clubs or Organizations: Not on file     Attends Club or Organization Meetings: Not on file     Marital Status: Not on file   Interpersonal Safety: Low Risk  (6/3/2024)    Interpersonal Safety     Do you feel physically and emotionally safe where you currently live?: Yes     Within the past 12 months, have you been hit, slapped, kicked or otherwise physically hurt by someone?: No     Within the past 12 months, have you been humiliated or emotionally  "abused in other ways by your partner or ex-partner?: No   Housing Stability: Low Risk  (2024)    Housing Stability     Do you have housing? : Yes     Are you worried about losing your housing?: No        Fam Hx: reviewed  Family History   Problem Relation Age of Onset    Obesity Sister         gastric by-pass age age 60, heart murmur.    Cancer - colorectal Sister 69    Heart Disease Father         mi,  age 48    Family History Negative Mother         killed in MVA age 54    Cancer Maternal Aunt         lung cancer ,non-smoker    Lung Cancer Maternal Aunt     Breast Cancer Daughter 48    Ovarian Cancer No family hx of          Screening: reviewed      All: reviewed    Meds: reviewed  Current Outpatient Medications   Medication Sig Dispense Refill    aspirin 81 MG EC tablet Take 81 mg by mouth daily      atenolol (TENORMIN) 25 MG tablet Take 1 tablet (25 mg) by mouth daily 90 tablet 1    atorvastatin (LIPITOR) 40 MG tablet Take 1 tablet (40 mg) by mouth every evening 90 tablet 3    cyanocobalamin (VITAMIN B-12) 1000 MCG tablet Take 1,000 mcg by mouth daily      magnesium 500 MG TABS Take 500 mg by mouth daily      Multiple Vitamins-Minerals (MULTIVITAMIN & MINERAL PO) Take 1 tablet by mouth daily      rosuvastatin (CRESTOR) 10 MG tablet Take 1 tablet (10 mg) by mouth daily 90 tablet 1    sertraline (ZOLOFT) 50 MG tablet Take 1 tablet (50 mg) by mouth daily 90 tablet 1       OBJECTIVE:                                                    Physical Exam :  Blood pressure (!) 140/67, pulse 78, temperature 96.8  F (36  C), temperature source Tympanic, resp. rate 18, height 1.727 m (5' 8\"), weight 95.8 kg (211 lb 4.8 oz), SpO2 99%, not currently breastfeeding.     NAD, appears comfortable  Skin clear, no rashes  Neck: supple, no JVD,  no thyroidmegaly  Lymph nodes non palpable in the cervical, supraclavicular axillaries,   Chest: clear to auscultation with good respiratory effort  Cardiac: S1S2, RRR, no mgr " appreciated  Abdomen: soft, not tender, not distended, audible bowel sound, no hepatosplenomegaly, no palpable masses, no abdominal bruits  Extremities: no cyanosis, clubbing or edema.   Neuro: A, Ox3, no focal signs.  Breast exam in supine and erect position: they are symmetrical, no skin changes, no tenderness or nodes on palpation. Nipples are erect, no skin lesions, no discharge on pressure.    Pelvic exam: deferred, s/p menopause, no symptoms, no hx of abnormal pap        Patient has been advised of split billing requirements and indicates understanding: Yes.  At the check in, at the      Chani Farrell MD  Internal Medicine       ##################################    Preventive Care Visit  [facility]  Chani Peoples MD, Internal Medicine  Eb[place] 3, 2024          Danielle Tirado is a 83 year old, presenting for the following:  Wellness Visit        6/3/2024     6:55 AM   Additional Questions   Roomed by Regina Zhao         Health Care Directive  Patient has a Health Care Directive on file  Advance care planning document is on file and is current.    HPI      Hyperlipidemia Follow-Up    Are you regularly taking any medication or supplement to lower your cholesterol?   Yes- Rosuvastatin not sure if she is taking medication  Are you having muscle aches or other side effects that you think could be caused by your cholesterol lowering medication?  Yes- muscle aches    Hypertension Follow-up    Do you check your blood pressure regularly outside of the clinic? Yes   Are you following a low salt diet? Yes  Are your blood pressures ever more than 140 on the top number (systolic) OR more   than 90 on the bottom number (diastolic), for example 140/90? Yes        5/30/2024   General Health   How would you rate your overall physical health? Good   Feel stress (tense, anxious, or unable to sleep) Not at all         5/30/2024   Nutrition   Diet: Regular (no restrictions)          5/30/2024   Exercise   Days per week of moderate/strenous exercise 7 days   Average minutes spent exercising at this level 30 min         5/30/2024   Social Factors   Frequency of gathering with friends or relatives More than three times a week   Worry food won't last until get money to buy more No   Food not last or not have enough money for food? No   Do you have housing?  Yes   Are you worried about losing your housing? No   Lack of transportation? No   Unable to get utilities (heat,electricity)? No         5/30/2024   Fall Risk   Fallen 2 or more times in the past year? Yes    No   Trouble with walking or balance? Yes    Yes           5/30/2024   Activities of Daily Living- Home Safety   Needs help with the following daily activites None of the above   Safety concerns in the home None of the above         5/30/2024   Dental   Dentist two times every year? Yes         5/30/2024   Hearing Screening   Hearing concerns? (!) I NEED TO ASK PEOPLE TO SPEAK UP OR REPEAT THEMSELVES.    (!) TROUBLE UNDERSTANDING SPEECH ON THE TELEPHONE         5/30/2024   Driving Risk Screening   Patient/family members have concerns about driving No         5/30/2024   General Alertness/Fatigue Screening   Have you been more tired than usual lately? (!) YES         5/30/2024   Urinary Incontinence Screening   Bothered by leaking urine in past 6 months Yes         5/30/2024   TB Screening   Were you born outside of the [place]? No         Today's PHQ-2 Score:       6/3/2024     6:37 AM   PHQ-2 ( 1999 [employer])   Q1: Little interest or pleasure in doing things 0   Q2: Feeling down, depressed or hopeless 0   PHQ-2 Score 0   Q1: Little interest or pleasure in doing things Not at all   Q2: Feeling down, depressed or hopeless Not at all   PHQ-2 Score 0           5/30/2024   Substance Use   Alcohol more than 3/day or more than 7/wk No   Do you have a current opioid prescription? No   How severe/bad is pain from 1 to 10? 0/10 (No Pain)   Do you use any  other substances recreationally? (!) ALCOHOL     Social History     Tobacco Use    Smoking status: Never     Passive exposure: Never    Smokeless tobacco: Never   Vaping Use    Vaping status: Never Used   Substance Use Topics    Alcohol use: Yes     Comment: 1 beer or wine daily    Drug use: No           2/14/2024   LAST FHS-7 RESULTS   1st degree relative breast or ovarian cancer Yes   Any relative bilateral breast cancer No   Any male have breast cancer No   Any ONE woman have BOTH breast AND ovarian cancer No   Any woman with breast cancer before 50yrs Yes   2 or more relatives with breast AND/OR ovarian cancer Yes   2 or more relatives with breast AND/OR bowel cancer No                      Reviewed and updated as needed this visit by Provider                      Current providers sharing in care for this patient include:  Patient Care Team:  Chani Peoples MD as PCP - General (Internal Medicine)  Chani Peoples MD as Assigned PCP  Kary Johns DO as MD (Hematology & Oncology)  Kary Johns DO as Assigned Cancer Care Provider  Stephen Echavarria MD as MD (Cardiovascular Disease)  Stephen Echavarria MD as Assigned Heart and Vascular Provider  Melissa Shah PA-C as Physician Assistant (Urology)  Pam Lin NP as Nurse Practitioner (Nephrology)  Donna Willard RPH as Pharmacist (Pharmacist)  Melissa Shah PA-C as Assigned Surgical Provider  Donna Willard RPH as Assigned MTM Pharmacist    The following health maintenance items are reviewed in Epic and correct as of today:  Health Maintenance   Topic Date Due    DIABETIC FOOT EXAM  Never done    ANNUAL REVIEW OF HM ORDERS  08/11/2023    MEDICARE ANNUAL WELLNESS VISIT  06/01/2024    COLORECTAL CANCER SCREENING  08/01/2024    A1C  11/28/2024    EYE EXAM  03/15/2025    BMP  05/28/2025    LIPID  05/28/2025    MICROALBUMIN  05/28/2025    HEMOGLOBIN  05/28/2025    FALL RISK ASSESSMENT   "06/03/2025    DTAP/TDAP/TD IMMUNIZATION (2 - Td or Tdap) 01/13/2027    ADVANCE CARE PLANNING  06/01/2028    DEXA  08/18/2037    PHQ-2 (once per calendar year)  Completed    INFLUENZA VACCINE  Completed    Pneumococcal Vaccine: 65+ Years  Completed    URINALYSIS  Completed    ZOSTER IMMUNIZATION  Completed    RSV VACCINE (Pregnancy & 60+)  Completed    COVID-19 Vaccine  Completed    IPV IMMUNIZATION  Aged Out    HPV IMMUNIZATION  Aged Out    MENINGITIS IMMUNIZATION  Aged Out    RSV MONOCLONAL ANTIBODY  Aged Out            Objective    Exam  Pulse 78   Temp 96.8  F (36  C) (Tympanic)   Resp 18   Ht 1.727 m (5' 8\")   Wt 95.8 kg (211 lb 4.8 oz)   LMP  (LMP Unknown)   SpO2 99%   Breastfeeding No   BMI 32.13 kg/m     Estimated body mass index is 32.13 kg/m  as calculated from the following:    Height as of this encounter: 1.727 m (5' 8\").    Weight as of this encounter: 95.8 kg (211 lb 4.8 oz).    Physical Exam          6/3/2024   Mini Cog   Clock Draw Score 2 Normal   3 Item Recall 2 objects recalled   Mini Cog Total Score 4              Signed Electronically by: Chani Peoples MD    "

## 2024-06-03 NOTE — PATIENT INSTRUCTIONS
Plan:  You may decrease Sertraline to 25 mg daily for 1-2 months then you will decide if you want to stop it  2. Start Losartan 50 mg daily for the blood pressure  3. Continue Atenolol 25 mg daily   4. Schedule in office appointment on July 3 at 07:10  5. Please make a lab appointment for fasting labs  few days before  6. You will benefit from Pneumonia 20 vaccine

## 2024-06-05 DIAGNOSIS — I10 HTN, GOAL BELOW 140/90: Chronic | ICD-10-CM

## 2024-06-05 RX ORDER — LOSARTAN POTASSIUM 50 MG/1
50 TABLET ORAL 2 TIMES DAILY
Qty: 180 TABLET | Refills: 1 | OUTPATIENT
Start: 2024-06-05

## 2024-06-06 RX ORDER — LOSARTAN POTASSIUM 50 MG/1
50 TABLET ORAL DAILY
Qty: 90 TABLET | Refills: 1 | Status: CANCELLED | OUTPATIENT
Start: 2024-06-06

## 2024-06-06 RX ORDER — LOSARTAN POTASSIUM 50 MG/1
50 TABLET ORAL DAILY
Qty: 90 TABLET | Refills: 1 | Status: SHIPPED | OUTPATIENT
Start: 2024-06-06 | End: 2024-07-03 | Stop reason: DRUGHIGH

## 2024-06-26 NOTE — PLAN OF CARE
Problem: Goal Outcome Summary  Goal: Goal Outcome Summary  OT:  eval complete and treatment initiated. Patient is a 76 year old female admitted with L knee OA who underwent L TKA.  She reported to be independent in ADLs and mobility without AD at baseline.  Pt lives in a single level home with her  and he does all cooking, homemaking, laundry and driving.  On this date Pt alert, oriented and able to follow commands.  Supine>sit completed with min A.  Good sitting balance at EOB for education in use of AE for LE dressing.  Pt needed mod A to don socks/pants and L KI with plans to have  assist if needed.  Sit<>stand and functional mobility to bathroom completed with CGA using FWW.  OT provided education in safe technique for toilet transfer.  Pt now able to complete with min A using FWW and elevated commode over toilet.  Pt needed cueing for safety, sequencing and proper hand placement.  Grooming/hygiene performed in standing with set-up and CGA.  Pt forgetful at times and often looks to  for answers or reassurance.  Pt's  very supportive and planning to be her  at home.     Surgeon Discharge Plan: not identified.  Pt planning to go home with OP PT.     Current Functional Status: Pt required mod A with LE dressing and Min-SBA for functional transfers/mobility.     Barriers to Plan/Home: Pt has stairs to enter, but then can stay on one level with walk in shower and 's assist.  No barriers identified.                
Problem: Goal Outcome Summary  Goal: Goal Outcome Summary  Outcome: Improving  Flat affect. [place], but denied having any issues with hearing,. VS stable, afebrile. Desatting on room air, 94% on 2.5 L.   Pain managed with iv dilaudid and flexeril. Absent dorsiflexion , weak plantarflexion, and numbness to the left leg. Not able to move toes.  Dressing dry and intact. Knee immobilizer on until straight leg raises. Bladder scanned for 222 at 2 pm. Due to void. Able to tolerate clear liquids. Continue to monitor.       
Problem: Goal Outcome Summary  Goal: Goal Outcome Summary  Outcome: Improving  Vital signs stable.  Lungs clear, encouraged inspirometer use.  Bowel sounds hypoactive, passing flatus, voiding.  CMS intact to lower extremities, mild edema to ankles and feet.  Dressing intact to left knee, small amount dried drainage noted.Ice pack applied to left knee.  Pt was noted to be forgetful early this evening,Bed alarm on.  Pain controlled with tylenol and po dilaudid.  Plan of care reviewed with pt.                                                                         
Problem: Goal Outcome Summary  Goal: Goal Outcome Summary  Outcome: Improving  philipp PO well, up with assist of 1, gait belt and walker, ambulated in reyn[place], no KI needed per PT, PO pain meds managing pain, voiding,      
Problem: Goal Outcome Summary  Goal: Goal Outcome Summary  Outcome: No Change  Pt alert and oriented. Up to bsc with walker, belt, immobilizer and assist of 2. Moved well.   Dorsi and plantar moderate, dressing ace wrap intact. Appeared to sleep between cares.       
Problem: Goal Outcome Summary  Goal: Goal Outcome Summary  PT: Order received; Initial evaluation completed and treatment initiated. Prior to admit patient reports L knee pain but independence with functional mobility without an assistive device. Has 2 steps to enter home. Lives with a spouse in a rambler style home. Has a walker and cane.  Surgeon Discharge Plan: Not noted; patient plans to discharge to home with assist of spouse and OP PT     Current Functional Status: Min/mod A for bed mobility; mod A x 2 to stand at bedside; min A x 1-2 for gait with a rolling walker and L knee immobilizer; some decreased command following at times (reports baseline aphasia from a prior brain surgery). Lightheaded but otherwise not symptomatic with OOB mobility     Barriers to Plan/Home: Assist needed; stairs to enter; will have assist of spouse             
Problem: Goal Outcome Summary  Goal: Goal Outcome Summary  Surgeon Discharge Plan: home      Current Functional Status: sleepy this AM, cues to stay awake when doing exercises lying down, ambulated with walker, slow liseth but SBA with walker, stairs with 1 rail and SEC, spouse plans to assist on one side at home to enter the house (2 steps), ROM 6-68     Barriers to Plan/Home: none anticipated, slow progress today but is improving, spouse and pt would feel more comfortable with discharging tomorrow  Recommend home with OP PT            
Problem: Goal Outcome Summary  Goal: Goal Outcome Summary  Surgeon Discharge Plan: home     Current Functional Status: ambulating SBA with walker, stairs with 1 rail and SEC, flexion to 75 degrees, mod to max A for SLR and SAQ exercise     Barriers to Plan/Home: none, spouse will assist  Rec OP PT          Physical Therapy Discharge Summary    Reason for therapy discharge:    Discharged to home with outpatient therapy.    Progress towards therapy goal(s). See goals on Care Plan in [facility] electronic health record for goal details.  Goals met    Therapy recommendation(s):    Continued therapy is recommended.  Rationale/Recommendations:  ROM, quad strengthening, gait.      
Problem: Goal Outcome Summary  Goal: Goal Outcome Summary  Surgeon Discharge Plan: home today with spouse     Current Functional Status: OT: Reviewed RTS education and technique, pt continued to require MOD cues to complete with proper hand placement and LE placement. Pt able to compelte 2 transfers with SBA. Discussed bathing set up, agreed upon use of tub shower with ext tub bench and grab bars until pt with increased standing balance and LE strength to use walk in shower. Pt able to use ext tub bench to complete transfer with MIN A for LE management. Educated on leg  but continued to require A to lift LE. Spouse to A with bathing transfer and task. Functional mobility bacl to room with FWW and SBA. Pt able to follow cues for walk in shower transfer with stepping in backwards with FWW and CGA. Unable to stand I in shower, transfered out with CGA. Reviewed all recommendations.      Barriers to Plan/Home: Pt will need A with dressing. Recommend RTS with B rails for toileting. Pt to use ext tub bench with tub/shower with spouse assist. Anticipate no barriers.      Occupational Therapy Discharge Summary     Reason for therapy discharge:    Discharged to home.     Progress towards therapy goal(s). See goals on Care Plan in [facility] electronic health record for goal details.  Goals partially met.  Barriers to achieving goals:   Dressing not addressed as spouse to dress pt as needed. Pt required MIN A to lift LE into tub and spouse to assist. Adequate for d/c home with spouse assist. .     Therapy recommendation(s):    No further therapy is recommended.                      
Problem: Goal Outcome Summary  Goal: Goal Outcome Summary  Surgeon Discharge Plan: home today/tomorrow, spouse and pt wanting to d/c tomorrow.     Current Functional Status: OT: Pt very sleepy this AM, direct handoff from PT. Pt has standard toilet at home with vanity (not near the toilet) Pt unable to complete transfer, OT modified with RTS, pt required MOD cues for hand palcement and LE placement for transfer, able to complete transfer with CGA. Recommend RTS with B rails for increased I and safety. Pt attempted RTS with B rails but with difficult time following cues for hand placement- will trial again tomorrow. Pt returned to room with w/c. Pt and spouse reports that spoue will assist with LB dressing. Pt and spouse aware of AE (reacher and sock aid) from pts prior hip surgery. Sit/stand with CGA, sit to supine with MOD A for LE. Discussed bathing set up- pt wanting to use walk in shower with ext bath bench vs tub/shower with grab bars. Will address tomorrow due to pts fatigue for safety. Bed alarm on.      Barriers to Plan/Home: Pt will need A with dressing. Recommend RTS with B rails for toileting. Will address bathing transfer and task prior to d/c for safe recommendations. Spouse able to assist as needed.      Recommend home tomorrow with spouse assist.                    
Problem: Goal Outcome Summary  Goal: Goal Outcome Summary  Surgeon Discharge Plan: home tomorrow     Current Functional Status: mod A to lift leg into bed and for SLR exercise, walking SBA with walker 100 ft x2, went over exercise program for home     Barriers to Plan/Home: will have assist at home from spouse, OP PT scheduled, order placed for raised toilet seat, they will have to go  at medical store, OT went over with spouse earlier today            
Problem: Goal Outcome Summary  Goal: Goal Outcome Summary  Surgeon Discharge Plan: not documented     Current Functional Status: using KI as pt can do partial SLR but not fully off the bed, no buckling, ambulated 100 ft with walker, limited knee flexion tolerance     Barriers to Plan/Home: none            
Problem: Goal Outcome Summary  Goal: Goal Outcome Summary  Surgeon Discharge Plan: not documented, pt planning home with OP PT     Current Functional Status: able to ambulate 110 ft with walker and no KI, no buckling, did 4 steps with 2 rails SBA, increased pain this PM, better flexion tolerance sitting EOB this PM, ext lacking 8 degrees     Barriers to Plan/Home: none anticipated            
Problem: Individualization  Goal: Patient Preferences  Outcome: Improving  Pt being discharged to home today with the help of her  explained DC paperwork including s/s to monitor for, diet, activity, dressing changes and wound care. ACE wrap to leg for swelling, and dc instructions for this. New medications were discussed and general questions answered.  Pt will follow up with MD as directed.      
Problem: Knee Replacement, Total (Adult)  Goal: Signs and Symptoms of Listed Potential Problems Will be Absent or Manageable (Knee Replacement, Total)  Signs and symptoms of listed potential problems will be absent or manageable by discharge/transition of care (reference Knee Replacement, Total (Adult) CPG).   Outcome: Improving  A&O x4. VSS. LS CTA all fields. BS active x4, large loose BM x1. ACE removed, Aquacel applied to L knee incision. Incision is CDI with mild bruising noted. Moderate Dorsi/Plantar flexion, equal to R side. Edema +2 to BLE. philipp PO well. up with SBA and walker. PO dilaudid managing pain, voiding in good amts. plans to dc home today or tomorrow.      
Problem: Knee Replacement, Total (Adult)  Goal: Signs and Symptoms of Listed Potential Problems Will be Absent or Manageable (Knee Replacement, Total)  Signs and symptoms of listed potential problems will be absent or manageable by discharge/transition of care (reference Knee Replacement, Total (Adult) CPG).   Outcome: Improving  A&O x4. VSS. LS CTA all fields. BS active x4. Aquacel dressing to L knee has small amount of dried drainage, CMS intact. Edema +2 to BLE L>R. philipp PO well. up with A1 and walker. Unable to get L leg in and out of bed independently. PO dilaudid managing pain. voiding in good amts. plans to dc home today with .      
Problem: Knee Replacement, Total (Adult)  Goal: Signs and Symptoms of Listed Potential Problems Will be Absent or Manageable (Knee Replacement, Total)  Signs and symptoms of listed potential problems will be absent or manageable by discharge/transition of care (reference Knee Replacement, Total (Adult) CPG).   Outcome: Improving  Patient up to shower  With assist of one, tolerated well, up to therapy, vss, saline lock removed.        
Requested that [facility] (formerly [facility]) do Nasal PCR test for MRSA/MSSA order placed in EPIC and clinic called to notify. Clinic did Nasal culture not PRC, results will not be back in time to treat if positive. Will order Nasal antisepsis.  
3

## 2024-07-01 ENCOUNTER — LAB (OUTPATIENT)
Dept: LAB | Facility: CLINIC | Age: 84
End: 2024-07-01
Payer: MEDICARE

## 2024-07-01 DIAGNOSIS — E78.5 HYPERLIPIDEMIA LDL GOAL <130: ICD-10-CM

## 2024-07-01 LAB
CHOLEST SERPL-MCNC: 222 MG/DL
FASTING STATUS PATIENT QL REPORTED: YES
HDLC SERPL-MCNC: 58 MG/DL
LDLC SERPL CALC-MCNC: 144 MG/DL
NONHDLC SERPL-MCNC: 164 MG/DL
TRIGL SERPL-MCNC: 99 MG/DL

## 2024-07-01 PROCEDURE — 80061 LIPID PANEL: CPT

## 2024-07-01 PROCEDURE — 36415 COLL VENOUS BLD VENIPUNCTURE: CPT

## 2024-07-03 ENCOUNTER — OFFICE VISIT (OUTPATIENT)
Dept: INTERNAL MEDICINE | Facility: CLINIC | Age: 84
End: 2024-07-03
Payer: MEDICARE

## 2024-07-03 VITALS
RESPIRATION RATE: 18 BRPM | DIASTOLIC BLOOD PRESSURE: 67 MMHG | TEMPERATURE: 97.1 F | WEIGHT: 198.8 LBS | BODY MASS INDEX: 30.13 KG/M2 | HEART RATE: 91 BPM | OXYGEN SATURATION: 98 % | HEIGHT: 68 IN | SYSTOLIC BLOOD PRESSURE: 107 MMHG

## 2024-07-03 DIAGNOSIS — G47.10 EXCESSIVE SLEEPINESS: ICD-10-CM

## 2024-07-03 DIAGNOSIS — I63.9 CEREBROVASCULAR ACCIDENT (CVA), UNSPECIFIED MECHANISM (H): ICD-10-CM

## 2024-07-03 DIAGNOSIS — I67.5 MOYAMOYA DISEASE: Chronic | ICD-10-CM

## 2024-07-03 DIAGNOSIS — R42 LIGHTHEADEDNESS: ICD-10-CM

## 2024-07-03 DIAGNOSIS — I10 HTN, GOAL BELOW 140/90: Primary | Chronic | ICD-10-CM

## 2024-07-03 LAB
ALBUMIN UR-MCNC: ABNORMAL MG/DL
APPEARANCE UR: CLEAR
BACTERIA #/AREA URNS HPF: ABNORMAL /HPF
BILIRUB UR QL STRIP: ABNORMAL
COLOR UR AUTO: YELLOW
GLUCOSE UR STRIP-MCNC: NEGATIVE MG/DL
HGB UR QL STRIP: NEGATIVE
KETONES UR STRIP-MCNC: NEGATIVE MG/DL
LEUKOCYTE ESTERASE UR QL STRIP: ABNORMAL
NITRATE UR QL: NEGATIVE
PH UR STRIP: 5.5 [PH] (ref 5–7)
RBC #/AREA URNS AUTO: ABNORMAL /HPF
SP GR UR STRIP: >=1.03 (ref 1–1.03)
SQUAMOUS #/AREA URNS AUTO: ABNORMAL /LPF
UROBILINOGEN UR STRIP-ACNC: 0.2 E.U./DL
WBC #/AREA URNS AUTO: ABNORMAL /HPF

## 2024-07-03 PROCEDURE — G2211 COMPLEX E/M VISIT ADD ON: HCPCS | Performed by: INTERNAL MEDICINE

## 2024-07-03 PROCEDURE — 81001 URINALYSIS AUTO W/SCOPE: CPT | Performed by: INTERNAL MEDICINE

## 2024-07-03 PROCEDURE — 99214 OFFICE O/P EST MOD 30 MIN: CPT | Performed by: INTERNAL MEDICINE

## 2024-07-03 RX ORDER — LOSARTAN POTASSIUM 25 MG/1
25 TABLET ORAL DAILY
Qty: 90 TABLET | Refills: 1 | Status: SHIPPED | OUTPATIENT
Start: 2024-07-03 | End: 2024-08-06 | Stop reason: DRUGHIGH

## 2024-07-03 ASSESSMENT — PAIN SCALES - GENERAL: PAINLEVEL: NO PAIN (0)

## 2024-07-03 NOTE — PROGRESS NOTES
Dr Farrell's note      Patient's instructions / PLAN:                                                        Plan:  Decrease Losartan to 25 mg daily  2. Continue Atenolol 25 mg daily  3. Try to increase the fluid intake  4. Goal for blood pressure 135-150  5. Urine test today  6. Schedule in office appointment Aug 6 at 07:40      ASSESSMENT & PLAN:                                                      (I10) HTN, goal below 140/90  (primary encounter diagnosis)    Comment: Controlled, but I think systolic blood pressure in the 120s is too low for her, considering the moyamoya disease and recent CVA.  She also has orthostatic lightheadedness with orthostatic drop in blood pressure of 20 points.  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 Lightheaded when she stands Shaky legs     HTN:    -- home BP: 123-153 / 68 - 91. Most of the numbers low 120s. 3 episodes w high -170 but she admits she was upset. Rechecked later, BP improved. Pulse 59 - 71    128/58  102/52      LOV June3, 2024: Plan:  You may decrease Sertraline to 25 mg daily for 1-2 months then you will decide if you want to stop it  2. Start Losartan 50 mg daily for the blood pressure  3. Continue Atenolol 25 mg daily   4. Schedule in office appointment on July 3 at 07:10  5. Please make a lab appointment for fasting labs  few days before  6. You will benefit from Pneumonia 20 vacc       Subjective   Candida is a 83  "year old, presenting for the following health issues:  Follow Up      7/3/2024     6:53 AM   Additional Questions   Roomed by Regina DEVRIES       Hyperlipidemia Follow-Up    Are you regularly taking any medication or supplement to lower your cholesterol?   Yes- rosuvastatin  Are you having muscle aches or other side effects that you think could be caused by your cholesterol lowering medication?  No    Hypertension Follow-up    Do you check your blood pressure regularly outside of the clinic? Yes   Are you following a low salt diet? Yes  Are your blood pressures ever more than 140 on the top number (systolic) OR more   than 90 on the bottom number (diastolic), for example 140/90? Yes  How many servings of fruits and vegetables do you eat daily?  0-1  On average, how many sweetened beverages do you drink each day (Examples: soda, juice, sweet tea, etc.  Do NOT count diet or artificially sweetened beverages)?   1  How many days per week do you exercise enough to make your heart beat faster? 3 or less  How many minutes a day do you exercise enough to make your heart beat faster? 9 or less  How many days per week do you miss taking your medication? 0        Review of Systems:                                                      ROS: negative for fever, chills, cough, wheezes, chest pain, shortness of breath, vomiting, abdominal pain, leg swelling       OBJECTIVE:             Physical exam:  Blood pressure 107/67, pulse 91, temperature 97.1  F (36.2  C), temperature source Tympanic, resp. rate 18, height 1.727 m (5' 8\"), weight 90.2 kg (198 lb 12.8 oz), SpO2 98%, not currently breastfeeding.     NAD, appears comfortable, but she looks more tired than usual  Skin: no rashes   Neck: supple, no JVD,  No thyroidmegaly. Lymph nodes nonpalpable cervical and supraclavicular.  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 F/u dr. Soto     OA (osteoarthritis)     s/p bilat total hips      Other chronic pain     Joint pain for many years     Unspecified cerebral artery occlusion with cerebral infarction     After Moyamoya surgery > 10 yrs ago.     Vitamin D deficiencies       PSHx: reviewed  Past Surgical History:   Procedure Laterality Date     ARTHROPLASTY KNEE Left 4/17/2017    Procedure: ARTHROPLASTY KNEE;  Left total knee arthroplasty;  Surgeon: Chidi Jenkins MD;  Location: [facility] OR     COLONOSCOPY  2008    normal     COLONOSCOPY  7/17/2014    Procedure: COLONOSCOPY;  Surgeon: Raul Nolan DO;  Location: [facility] GI     CRANIOTOMY      MOJAMOJA  \"Pt had repair of blood flow.\"     IR CAROTID ANGIOGRAM  10/30/2006     IR CAROTID ANGIOGRAM  6/6/2010     IR CAROTID ANGIOGRAM  6/6/2010     IR CAROTID ANGIOGRAM  6/6/2010     IR CAROTID ANGIOGRAM  5/12/2011     IR CAROTID ANGIOGRAM  5/12/2011     IR CAROTID ANGIOGRAM  5/12/2011     IR CAROTID ANGIOGRAM  6/5/2012     IR CAROTID ANGIOGRAM  6/5/2012     IR CAROTID ANGIOGRAM  6/5/2012     IR CAROTID CEREBRAL ANGIOGRAM BILATERAL  10/7/2022     IR MISCELLANEOUS PROCEDURE  6/6/2010     IR MISCELLANEOUS PROCEDURE  6/6/2010     IR MISCELLANEOUS PROCEDURE  5/12/2011     IR MISCELLANEOUS PROCEDURE  6/5/2012     RECONSTRUCT FOREFOOT WITH METATARSOPHALANGEAL (MTP) FUSION Left 8/21/2014    Procedure: RECONSTRUCT FOREFOOT WITH " METATARSOPHALANGEAL (MTP) FUSION;  Surgeon: Tres Merlos DPM;  Location:  OR     [facility] NONSPECIFIC PROCEDURE  10/30/06    neurosurgery Dr Soto     [facility] NONSPECIFIC PROCEDURE      bilateral total hips approx 2002     [facility] NONSPECIFIC PROCEDURE  3/07    neurosurgery         Meds: reviewed  Current Outpatient Medications   Medication Sig Dispense Refill     aspirin 81 MG EC tablet Take 81 mg by mouth daily       atenolol (TENORMIN) 25 MG tablet Take 1 tablet (25 mg) by mouth daily 90 tablet 1     cyanocobalamin (VITAMIN B-12) 1000 MCG tablet Take 1,000 mcg by mouth daily       losartan (COZAAR) 50 MG tablet Take 1 tablet (50 mg) by mouth daily 90 tablet 1     magnesium 500 MG TABS Take 500 mg by mouth daily       Multiple Vitamins-Minerals (MULTIVITAMIN & MINERAL PO) Take 1 tablet by mouth daily       rosuvastatin (CRESTOR) 10 MG tablet Take 1 tablet (10 mg) by mouth daily 90 tablet 1     sertraline (ZOLOFT) 50 MG tablet Take 1 tablet (50 mg) by mouth daily 90 tablet 1       Soc Hx: reviewed  Fam Hx: reviewed      Chart documentation was completed, in part, with [employer] voice-recognition software. Even though reviewed, some grammatical, spelling, and word errors may remain.      Chani Farrell MD  Internal Medicine           Signed Electronically by: Chani Peoples MD

## 2024-07-03 NOTE — PATIENT INSTRUCTIONS
Plan:  Decrease Losartan to 25 mg daily  2. Continue Atenolol 25 mg daily  3. Try to increase the fluid intake  4. Goal for blood pressure 135-150  5. Urine test today  6. Schedule in office appointment Aug 6 at 07:40

## 2024-07-18 ENCOUNTER — APPOINTMENT (OUTPATIENT)
Dept: CT IMAGING | Facility: CLINIC | Age: 84
End: 2024-07-18
Attending: EMERGENCY MEDICINE
Payer: MEDICARE

## 2024-07-18 ENCOUNTER — HOSPITAL ENCOUNTER (EMERGENCY)
Facility: CLINIC | Age: 84
Discharge: HOME | End: 2024-07-19
Attending: EMERGENCY MEDICINE | Admitting: EMERGENCY MEDICINE
Payer: MEDICARE
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
## 2024-07-18 DIAGNOSIS — S09.90XA CLOSED HEAD INJURY, INITIAL ENCOUNTER: Primary | ICD-10-CM

## 2024-07-18 DIAGNOSIS — E04.1 THYROID NODULE: ICD-10-CM

## 2024-07-18 LAB
HOLD SPECIMEN: NORMAL

## 2024-07-18 PROCEDURE — 99285 EMERGENCY DEPT VISIT HI MDM: CPT | Mod: 25

## 2024-07-18 PROCEDURE — 72125 CT NECK SPINE W/O DYE: CPT | Mod: ME

## 2024-07-18 PROCEDURE — 250N000011 HC RX IP 250 OP 636: Performed by: EMERGENCY MEDICINE

## 2024-07-18 PROCEDURE — 70450 CT HEAD/BRAIN W/O DYE: CPT | Mod: ME

## 2024-07-18 PROCEDURE — 93005 ELECTROCARDIOGRAM TRACING: CPT

## 2024-07-18 PROCEDURE — 250N000013 HC RX MED GY IP 250 OP 250 PS 637: Performed by: EMERGENCY MEDICINE

## 2024-07-18 RX ORDER — ACETAMINOPHEN 500 MG
1000 TABLET ORAL ONCE
Status: COMPLETED | OUTPATIENT
Start: 2024-07-18 | End: 2024-07-18

## 2024-07-18 RX ORDER — ONDANSETRON 4 MG/1
4 TABLET, ORALLY DISINTEGRATING ORAL EVERY 8 HOURS PRN
Qty: 10 TABLET | Refills: 0 | Status: SHIPPED | OUTPATIENT
Start: 2024-07-18 | End: 2024-07-21

## 2024-07-18 RX ORDER — ONDANSETRON 4 MG/1
4 TABLET, ORALLY DISINTEGRATING ORAL ONCE
Status: COMPLETED | OUTPATIENT
Start: 2024-07-18 | End: 2024-07-18

## 2024-07-18 RX ADMIN — ACETAMINOPHEN 1000 MG: 500 TABLET, FILM COATED ORAL at 23:14

## 2024-07-18 RX ADMIN — ONDANSETRON 4 MG: 4 TABLET, ORALLY DISINTEGRATING ORAL at 23:14

## 2024-07-18 ASSESSMENT — ACTIVITIES OF DAILY LIVING (ADL)
ADLS_ACUITY_SCORE: 38
ADLS_ACUITY_SCORE: 38
ADLS_ACUITY_SCORE: 36

## 2024-07-18 ASSESSMENT — COLUMBIA-SUICIDE SEVERITY RATING SCALE - C-SSRS
6. HAVE YOU EVER DONE ANYTHING, STARTED TO DO ANYTHING, OR PREPARED TO DO ANYTHING TO END YOUR LIFE?: NO
1. IN THE PAST MONTH, HAVE YOU WISHED YOU WERE DEAD OR WISHED YOU COULD GO TO SLEEP AND NOT WAKE UP?: NO
2. HAVE YOU ACTUALLY HAD ANY THOUGHTS OF KILLING YOURSELF IN THE PAST MONTH?: NO

## 2024-07-19 VITALS
RESPIRATION RATE: 16 BRPM | DIASTOLIC BLOOD PRESSURE: 64 MMHG | OXYGEN SATURATION: 98 % | SYSTOLIC BLOOD PRESSURE: 144 MMHG | TEMPERATURE: 97.5 F | HEART RATE: 60 BPM

## 2024-07-19 LAB
ATRIAL RATE - MUSE: 68 BPM
DIASTOLIC BLOOD PRESSURE - MUSE: NORMAL MMHG
INTERPRETATION ECG - MUSE: NORMAL
P AXIS - MUSE: 32 DEGREES
PR INTERVAL - MUSE: 224 MS
QRS DURATION - MUSE: 82 MS
QT - MUSE: 408 MS
QTC - MUSE: 433 MS
R AXIS - MUSE: -22 DEGREES
SYSTOLIC BLOOD PRESSURE - MUSE: NORMAL MMHG
T AXIS - MUSE: 41 DEGREES
VENTRICULAR RATE- MUSE: 68 BPM

## 2024-07-19 NOTE — ED PROVIDER NOTES
Emergency Department Note      History of Present Illness     Chief Complaint  Fall and Head Injury    HPI  Candida Rose is a 84 year old female with history of hypertension and CVA who presents to the ED with her  for evaluation of head injury and fall. Her  reports patient came home and was getting out of the car when her legs gave out and she fell backwards.  Patient hit the back of her head but denies loss of consciousness, back pain, vision changes, chest pain, difficulty breathing, or neck pain. Patient uses a walker and cane but was only using a cane when getting out of the car. This happened around 1:30PM.  As the day progressed, her  states patient had more head pain, headache and nausea. Patient was icing her head but no use of pain medications. Patient takes baby aspirin. No changes in mentation per .     Independent Historian   as detailed above.    Review of External Notes  7/3/24 office visit  Past Medical History   Medical History and Problem List  Anxiety   Cataract  CVA (cerebral infarction)  Diabetes   Hypertension   Hyperlipidemia   Moyamoya disease  OA (osteoarthritis)  Unspecified cerebral artery occlusion with cerebral infarction  Vitamin D deficiency     Medications  Aspirin 81 mg  Tenormin  Losartan  Crestor  Zoloft   Norvasc   Zocor  Plavix     Surgical History   Left knee arthroplasty   Colonoscopy x2  Carotid angiogram   Reconstruct forefoot with MIP fusion - left  Crainotomy   Total hips  Physical Exam   Patient Vitals for the past 24 hrs:   BP Temp Pulse Resp SpO2   07/18/24 2330 (!) 179/54 -- 65 16 99 %   07/18/24 2317 (!) 193/58 -- 67 -- --   07/18/24 2200 (!) 204/83 -- 70 -- --   07/18/24 2115 (!) 196/84 -- -- 16 --   07/18/24 2055 (!) 209/86 -- -- -- --   07/18/24 2052 -- 97.3  F (36.3  C) 65 16 100 %     Physical Exam  Nursing note and vitals reviewed.  Constitutional: Well nourished.  Head: atraumatic  Eyes: PERRL, EOMI, no nystagmus.  No  scleral icterus or conjunctival injection.    ENT:  Moist mucus membranes, posterior oropharynx clear without erythema or exudates. TM normal bilaterally  Neck: Supple with full range of motion  Respiratory:  Lungs clear to auscultation bilaterally, no crackles/rubs/wheezes.  Good air movement  CV: Normal rate and rhythm, no murmurs/rubs/gallops  GI:  Abdomen soft and non-distended.  No tenderness, guarding or rebound  Skin: Warm, dry.  No rashes or petechiae  MSK: No peripheral edema or calf tenderness  Neuro: Alert.  No aphasia/facial droop/dysarthria.  Tongue midline, normal strength at SCM/trapezius/BUE/BLE.  Normal finger to nose. Normal gait. Sensation intact over face/BUE/BLE  Skin: Skin is warm and dry. No rash noted.   Psychiatric: Normal affect.     Diagnostics   Lab Results   Labs Ordered and Resulted from Time of ED Arrival to Time of ED Departure - No data to display    Imaging  CT Cervical Spine w/o Contrast   Final Result   IMPRESSION:   HEAD CT:   1.  No acute intracranial process.      CERVICAL SPINE CT:   1.  No CT evidence for acute fracture or post traumatic subluxation.   2.  2.5 cm nodule inferior to the right thyroid gland, appears separate from the gland. This probably represents an enlarged lymph node. Ultrasound recommended in further evaluation, if not done previously.      Head CT w/o contrast   Final Result   IMPRESSION:   HEAD CT:   1.  No acute intracranial process.      CERVICAL SPINE CT:   1.  No CT evidence for acute fracture or post traumatic subluxation.   2.  2.5 cm nodule inferior to the right thyroid gland, appears separate from the gland. This probably represents an enlarged lymph node. Ultrasound recommended in further evaluation, if not done previously.        Report per radiology    EKG   ECG results from 07/18/24   EKG 12-lead, tracing only     Value    Systolic Blood Pressure     Diastolic Blood Pressure     Ventricular Rate 68    Atrial Rate 68    CA Interval 224    QRS  Duration 82        QTc 433    P Axis 32    R AXIS -22    T Axis 41    Interpretation ECG      Sinus rhythm with 1st degree A-V block  Minimal voltage criteria for LVH, may be normal variant ( R in aVL )  Borderline ECG  When compared with ECG of 04-OCT-2022 18:56,  Sinus rhythm has replaced Junctional rhythm  Nonspecific T wave abnormality no longer evident in Inferior leads       Independent Interpretation  CXR: No pneumothorax or infiltrate.  ED Course    Medications Administered  Medications   acetaminophen (TYLENOL) tablet 1,000 mg (1,000 mg Oral $Given 7/18/24 2314)   ondansetron (ZOFRAN ODT) ODT tab 4 mg (4 mg Oral $Given 7/18/24 2314)     Procedures  Procedures     Discussion of Management  None    Optional/Additional Documentation  None    ED Course  ED Course as of 07/19/24 0415   Thu Jul 18, 2024 2231 I spoke with her  for history when patient was at imaging.    2300 I obtained history and examined the patient as noted above.    2352 I rechecked the patient and explained findings. I prepared the patient to be discharged home.      Medical Decision Making / Diagnosis   CMS Diagnoses: None    MIPS     None    [facility]  Candida Rose is a 84 year old female presents after a fall where her legs gave out she reports.  History and clinical exam consistent with concussion.  The differential diagnosis includes skull fracture, epidural hematoma, subdural hematoma, intracerebral hemorrhage, and traumatic subarachnoid hemorrhage; all of these are highly unlikely in this clinical setting.  This patient denies seizure, and has no focal neurological findings.  The patient did not have prolonged LOC, sleepiness, repeated emesis, poor orientation, or significant irritability.  The patient does report, however, a severe headache.  I have discussed the risk/benefit analysis with the patient and family regarding CT imaging.  We decided to obtain CT imaging at this time given the severity of the headache.   "Fortunately this was negative.  CT cervical spine negative as well; incidental thyroid nodule identified and discussed with patient.  Remainder of trauma exam is unremarkable. No prodromal symptoms discussed; strong suspicion more mechanical fall.  The patient/family understand that they must return if any \"red flags\" appear/develop in the coming hours/days, as this may represent an indication to perform a CT scan.  I have noted that \"red flags\" include: headaches that get worse, increased drowsiness, strange behavior, repetitive speech, seizures, repeated vomiting, growing confusion, increased irritability, slurred speech, weakness or numbness, and loss of responsiveness.  This information will also be provided in writing at discharge.  I have discussed the second impact syndrome, and the importance of not sustaining repeated concussion in the next 1-2 weeks.  Post concussive syndrome was also discussed.      Disposition  The patient was discharged.     ICD-10 Codes:    ICD-10-CM    1. Closed head injury, initial encounter  S09.90XA       2. Thyroid nodule  E04.1       3. Fall, initial encounter  W19.XXXA          Discharge Medications  New Prescriptions    ONDANSETRON (ZOFRAN ODT) 4 MG ODT TAB    Take 1 tablet (4 mg) by mouth every 8 hours as needed for nausea     Scribe Disclosure:  I, Mansi Henriquez, am serving as a scribe at 10:37 PM on 7/18/2024 to document services personally performed by Chetna Palacios DO based on my observations and the provider's statements to me.      Chetna Palacios DO  07/19/24 0435    "

## 2024-07-19 NOTE — ED NOTES
Pt discharged home with . Pt states she feels much better, looks well, VSS.  and writer explained AVS to pt and pt agreeable- will  meds. Pt  assisting with pt clothes. Pt ambulatory with walker to exit alongside .

## 2024-07-19 NOTE — ED TRIAGE NOTES
Fall due to unstable baseline gait after stroke 1 year ago. Patient reports that she hit the back of her head. No LOC. No obvious wound or active bleeding. No thinners. Patient is hypertensive in triage. No chest pain or shortness of breath.

## 2024-08-05 DIAGNOSIS — E78.5 HYPERLIPIDEMIA LDL GOAL <130: Chronic | ICD-10-CM

## 2024-08-05 RX ORDER — ROSUVASTATIN CALCIUM 10 MG/1
10 TABLET, COATED ORAL DAILY
Qty: 90 TABLET | Refills: 0 | Status: SHIPPED | OUTPATIENT
Start: 2024-08-05

## 2024-08-05 RX ORDER — ROSUVASTATIN CALCIUM 10 MG/1
10 TABLET, COATED ORAL DAILY
Qty: 90 TABLET | Refills: 1 | Status: CANCELLED | OUTPATIENT
Start: 2024-08-05

## 2024-08-06 ENCOUNTER — OFFICE VISIT (OUTPATIENT)
Dept: INTERNAL MEDICINE | Facility: CLINIC | Age: 84
End: 2024-08-06
Payer: MEDICARE

## 2024-08-06 VITALS
TEMPERATURE: 98.1 F | HEART RATE: 87 BPM | RESPIRATION RATE: 18 BRPM | OXYGEN SATURATION: 96 % | BODY MASS INDEX: 30.99 KG/M2 | HEIGHT: 68 IN | WEIGHT: 204.5 LBS | SYSTOLIC BLOOD PRESSURE: 137 MMHG | DIASTOLIC BLOOD PRESSURE: 62 MMHG

## 2024-08-06 DIAGNOSIS — I69.354 HEMIPARESIS AFFECTING LEFT SIDE AS LATE EFFECT OF STROKE (H): Primary | ICD-10-CM

## 2024-08-06 DIAGNOSIS — R26.81 GAIT INSTABILITY: ICD-10-CM

## 2024-08-06 DIAGNOSIS — I10 HTN, GOAL BELOW 140/90: Chronic | ICD-10-CM
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
## 2024-08-06 PROCEDURE — 99214 OFFICE O/P EST MOD 30 MIN: CPT | Performed by: INTERNAL MEDICINE

## 2024-08-06 RX ORDER — LOSARTAN POTASSIUM 50 MG/1
50 TABLET ORAL 2 TIMES DAILY
Qty: 180 TABLET | Refills: 0 | Status: SHIPPED | OUTPATIENT
Start: 2024-08-06

## 2024-08-06 NOTE — PROGRESS NOTES
Dr Farrell's note      Patient's instructions / PLAN:                                                        Plan:  Increase Losartan to 50 mg twice a day  2. Continue the other meds, same doses for now.  3. Physical therapy  4. Schedule in office follow up appointment Oct 3 at 07:10        ASSESSMENT & PLAN:                                                      (I69.354) Hemiparesis affecting left side as late effect of stroke (H)  (primary encounter diagnosis)  (W19.XXXA) Fall, initial encounter  (R26.81) Gait instability  Comment: high risk for recurrent falls   Plan: Physical Therapy  Referral            (I10) HTN, goal below 140/90  Comment: Not controlled   Plan: losartan (COZAAR) 50 MG tablet                 Chief complaint:                                                      Fall  Follow up chronic medical problems      SUBJECTIVE:                                                    History of present illness:    July 18 2024  -- fell because shakiness of the legs sec CVA    HTN  -- she didn't bring BP records today, but she remembers 140, 150      LOV June 2024 Plan:  You may decrease Sertraline to 25 mg daily for 1-2 months then you will decide if you want to stop it  2. Start Losartan 50 mg daily for the blood pressure  3. Continue Atenolol 25 mg daily   4. Schedule in office appointment on July 3 at 07:10  5. Please make a lab appointment for fasting labs  few days before  6. You will benefit from Pneumonia 20 vacc    Subjective   Candida is a 84 year old, presenting for the following health issues:  Recheck Medication        8/6/2024     7:52 AM   Additional Questions   Roomed by leandramit   Accompanied by self         8/6/2024     7:52 AM   Patient Reported Additional Medications   Patient reports taking the following new medications none     History of Present Illness       Reason for visit:  Fell and had to go to the emergency room.    She eats 2-3 servings of fruits and vegetables daily.She  "consumes 0 sweetened beverage(s) daily.She exercises with enough effort to increase her heart rate 30 to 60 minutes per day.  She exercises with enough effort to increase her heart rate 5 days per week.   She is taking medications regularly.         Review of Systems:                                                      ROS: negative for fever, chills, cough, wheezes, chest pain, shortness of breath, vomiting, abdominal pain, leg swelling       OBJECTIVE:             Physical exam:  Blood pressure 137/62, pulse 87, temperature 98.1  F (36.7  C), temperature source Oral, resp. rate 18, height 1.727 m (5' 8\"), weight 92.8 kg (204 lb 8 oz), SpO2 96%, not currently breastfeeding.   Rechecked BP: 152/68  NAD, appears comfortable  Skin: no rashes   Neck: supple, no JVD,  No thyroidmegaly. Lymph nodes nonpalpable cervical and supraclavicular.  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 F/u dr. Soto    OA (osteoarthritis)     s/p bilat total hips     Other chronic pain     Joint pain for many years    Unspecified cerebral artery occlusion with cerebral infarction     After Moyamoya surgery > 10 yrs ago.    Vitamin D deficiencies       PSHx: reviewed  Past Surgical History:   Procedure Laterality Date    ARTHROPLASTY KNEE Left 4/17/2017    Procedure: ARTHROPLASTY KNEE;  Left total knee arthroplasty;  Surgeon: Gregory" "Chidi Raiens MD;  Location: [facility] OR    COLONOSCOPY  2008    normal    COLONOSCOPY  7/17/2014    Procedure: COLONOSCOPY;  Surgeon: Raul Nolan DO;  Location: [facility] GI    CRANIOTOMY      MOJAMOJA  \"Pt had repair of blood flow.\"    IR CAROTID ANGIOGRAM  10/30/2006    IR CAROTID ANGIOGRAM  6/6/2010    IR CAROTID ANGIOGRAM  6/6/2010    IR CAROTID ANGIOGRAM  6/6/2010    IR CAROTID ANGIOGRAM  5/12/2011    IR CAROTID ANGIOGRAM  5/12/2011    IR CAROTID ANGIOGRAM  5/12/2011    IR CAROTID ANGIOGRAM  6/5/2012    IR CAROTID ANGIOGRAM  6/5/2012    IR CAROTID ANGIOGRAM  6/5/2012    IR CAROTID CEREBRAL ANGIOGRAM BILATERAL  10/7/2022    IR MISCELLANEOUS PROCEDURE  6/6/2010    IR MISCELLANEOUS PROCEDURE  6/6/2010    IR MISCELLANEOUS PROCEDURE  5/12/2011    IR MISCELLANEOUS PROCEDURE  6/5/2012    RECONSTRUCT FOREFOOT WITH METATARSOPHALANGEAL (MTP) FUSION Left 8/21/2014    Procedure: RECONSTRUCT FOREFOOT WITH METATARSOPHALANGEAL (MTP) FUSION;  Surgeon: Tres Merlos DPM;  Location:  OR    Z NONSPECIFIC PROCEDURE  10/30/06    neurosurgery Dr Soto    [facility] NONSPECIFIC PROCEDURE      bilateral total hips approx 2002    [facility] NONSPECIFIC PROCEDURE  3/07    neurosurgery         Meds: reviewed  Current Outpatient Medications   Medication Sig Dispense Refill    aspirin 81 MG EC tablet Take 81 mg by mouth daily      atenolol (TENORMIN) 25 MG tablet Take 1 tablet (25 mg) by mouth daily 90 tablet 1    cyanocobalamin (VITAMIN B-12) 1000 MCG tablet Take 1,000 mcg by mouth daily      losartan (COZAAR) 25 MG tablet Take 1 tablet (25 mg) by mouth daily 90 tablet 1    magnesium 500 MG TABS Take 500 mg by mouth daily      Multiple Vitamins-Minerals (MULTIVITAMIN & MINERAL PO) Take 1 tablet by mouth daily      rosuvastatin (CRESTOR) 10 MG tablet Take 1 tablet (10 mg) by mouth daily 90 tablet 0    sertraline (ZOLOFT) 50 MG tablet Take 1 tablet (50 mg) by mouth daily 90 tablet 1       Soc Hx: reviewed  Fam Hx: reviewed      Chart " documentation was completed, in part, with [employer] voice-recognition software. Even though reviewed, some grammatical, spelling, and word errors may remain.      Chani Farrell MD  Internal Medicine           Signed Electronically by: Chani Peoples MD

## 2024-08-06 NOTE — PATIENT INSTRUCTIONS
Plan:  Increase Losartan to 50 mg twice a day  2. Continue the other meds, same doses for now.  3. Physical therapy  4. Schedule in office follow up appointment Oct 3 at 07:10

## 2024-09-04 ENCOUNTER — THERAPY VISIT (OUTPATIENT)
Dept: PHYSICAL THERAPY | Facility: CLINIC | Age: 84
End: 2024-09-04
Attending: INTERNAL MEDICINE
Payer: MEDICARE
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
## 2024-09-04 DIAGNOSIS — I69.354 HEMIPARESIS AFFECTING LEFT SIDE AS LATE EFFECT OF STROKE (H): ICD-10-CM

## 2024-09-04 DIAGNOSIS — R26.81 GAIT INSTABILITY: Primary | ICD-10-CM

## 2024-09-04 PROCEDURE — 97530 THERAPEUTIC ACTIVITIES: CPT | Mod: GP

## 2024-09-04 PROCEDURE — 97161 PT EVAL LOW COMPLEX 20 MIN: CPT | Mod: GP

## 2024-09-04 NOTE — PROGRESS NOTES
"PHYSICAL THERAPY EVALUATION  Type of Visit: Evaluation    Fall Risk Screen:  Fall screen completed by: PT  Have you fallen 2 or more times in the past year?: Yes  Have you fallen and had an injury in the past year?: Yes  Is patient a fall risk?: Yes    Subjective   Patient is a 84 year old female with a history of Moyamoya and CVA who presents with a referral diagnosis including gait instability. She completed a bout of physical therapy from January-May of this year for similar difficulties in which she has been independently continuing with HEP since then. She had a fall in July and hit the back of her head, resulting in minor concussion, per patient. Since she finished PT in May, she feels her \"shakiness\" has gotten worse and she has not been consistent with her exercise program. She would like to improve her balance and reduce risk of falls.         Presenting condition or subjective complaint: i have hemiparesis which has me falling more often.  Date of onset: 08/06/24 (referral date)    Relevant medical history: Arthritis; Bladder or bowel problems; Concussions   Dates & types of surgery:      Prior diagnostic imaging/testing results: MRI     Prior therapy history for the same diagnosis, illness or injury:        Prior Level of Function  Transfers: Independent  Ambulation: Independent  ADL: Assistive equipment - grab bar when showering, does have a bath bench but does not always use   IADL:   does most of house work and laundry. Patient assists with unloading  and cooking.     Living Environment  Social support: With a significant other or spouse   Type of home: House; 1 level   Stairs to enter the home:         Ramp: No   Stairs inside the home: No       Help at home: Self Cares (home health aide/personal care attendant, family, etc); Home management tasks (cooking, cleaning); Medication and/or finances; Home and Yard maintenance tasks; Assist for driving and community activities  Equipment " owned: Straight Cane; Walker; Walker with wheels; Raised toilet seat; Bath bench     Employment: No    Hobbies/Interests: reading,swimming,going to the [place],visiting family and friends.    Patient goals for therapy: I want to find out if there are certain exercises that will help build the muscle weakness on one side of the body,left side,which results in loss of balance,diffulty with coordination.    Pain assessment: Pain denied     Objective      Cognitive Status Examination  Orientation: Oriented to person, place and time   Level of Consciousness: Alert  Follows Commands and Answers Questions: 100% of the time  Personal Safety and Judgement: Intact  Memory:  Endorses some slight changes in memory, uses calendars and writes things down to remember things     OBSERVATION: Patient ambulating with 4WW.  INTEGUMENTARY:  Patient with significant swelling in BLE.  Requested referral for lymphedema.   POSTURE:  Rounded shoulders and forward head  PALPATION: NT will assess as indicated  RANGE OF MOTION: LE ROM WFL  STRENGTH:  To be assessed formally at next session, does note some weakness that she has noted    BED MOBILITY: Independent    TRANSFERS: Independent    WHEELCHAIR MOBILITY: NA    GAIT:   Level of [place]:  Lila  Assistive Device(s): Walker (four wheeled)  Gait Deviations: Base of support decreased  Stride length decreased  Beverly decreased  Gait Distance: within clinic  Stairs: NT will assess as indicated     BALANCE:  To formally assess as next session     SENSATION: LE Sensation WNL    COORDINATION: To formally assess as next session      Assessment & Plan   CLINICAL IMPRESSIONS  Medical Diagnosis: I69.354 (ICD-10-CM) - Hemiparesis affecting left side as late effect of stroke (H)  W19.XXXA (ICD-10-CM) - Fall, initial encounter  R26.81 (ICD-10-CM) - Gait instability    Treatment Diagnosis: Force production deficits, balance deficits   Impression/Assessment:  Patient presenting to physical therapy with  concerns for continued weakness and balance deficits following CVA in 2022. She completed bout of PT from January-May of this year and was discharged with extensive home program. Evaluation limited today due to patient with GI issues. Will complete physical outcome measures at next session to determine plan of care.     Clinical Decision Making (Complexity):  Clinical Presentation: Stable/Uncomplicated  Clinical Presentation Rationale: based on medical and personal factors listed in PT evaluation  Clinical Decision Making (Complexity): Low complexity    PLAN OF CARE  Treatment Interventions:  Interventions: Gait Training, Manual Therapy, Neuromuscular Re-education, Therapeutic Activity, Therapeutic Exercise    Long Term Goals     PT Goal 1  Goal Identifier: HEP  Goal Description: Pt will be able to demonstrate HEP activities without need for cues from provider to demonstrate understanding and independence with HEP.  Rationale: to maximize safety and independence with performance of ADLs and functional tasks;to maximize safety and independence within the home;to maximize safety and independence within the community  Target Date: 12/02/24  PT Goal 2  Goal Identifier: BBS  Goal Description: Pt will demonstrate a 3 point improvement on the BBS to demonstrate clinically significant difference in score to demonstrate improved safety with balance and decrease risk of falls.  Rationale: to maximize safety and independence within the home;to maximize safety and independence with performance of ADLs and functional tasks;to maximize safety and independence within the community  Target Date: 12/02/24  PT Goal 3  Goal Identifier: Gait speed  Goal Description: Pt will demonstrate a 0.12 second improvement in gait speed to demonstrate minimum clinically significant difference in score to demonstrate improved gait velocity.  Rationale: to maximize safety and independence with performance of ADLs and functional tasks;to maximize  safety and independence within the home;to maximize safety and independence within the community  Target Date: 12/02/24  PT Goal 4  Goal Identifier: 5xSTS  Goal Description: Pt will demonstrate a 2.3 seconds improvement on 5xSTS to demonstrate minimum clinically significant difference in score to demonstrate improved LE strength.  Rationale: to maximize safety and independence with performance of ADLs and functional tasks;to maximize safety and independence within the home;to maximize safety and independence within the community  Target Date: 12/02/24      Frequency of Treatment: 1x/week  Duration of Treatment: 6 visits    Recommended Referrals to Other Professionals:  Lymphedema  Education Assessment:   Learner/Method: Patient;Demonstration;Listening  Education Comments: Pt verbalizes/demos understanding of education.    Risks and benefits of evaluation/treatment have been explained.   Patient/Family/caregiver agrees with Plan of Care.     Evaluation Time:     PT Eval, Low Complexity Minutes (81417): 25     Signing Clinician: Juliana Cameron, PT, DPT        [facility]                                                                                   OUTPATIENT PHYSICAL THERAPY      PLAN OF TREATMENT FOR OUTPATIENT REHABILITATION   Patient's Last Name, First Name, Candida Pacheco YOB: 1940   Provider's Name   [facility]   Medical Record No.  [id number]     Onset Date: 08/06/24 (referral date)  Start of Care Date: 09/04/24     Medical Diagnosis:  I69.354 (ICD-10-CM) - Hemiparesis affecting left side as late effect of stroke (H)  W19.XXXA (ICD-10-CM) - Fall, initial encounter  R26.81 (ICD-10-CM) - Gait instability      PT Treatment Diagnosis:  Force production deficits, balance deficits Plan of Treatment  Frequency/Duration: 1x/week/ 6 visits    Certification date from 09/04/24 to 12/02/24         See note for plan of treatment details and  functional goals     Juliana Cameron, PT                         I CERTIFY THE NEED FOR THESE SERVICES FURNISHED UNDER        THIS PLAN OF TREATMENT AND WHILE UNDER MY CARE     (Physician attestation of this document indicates review and certification of the therapy plan).              Referring Provider:  Chani Peoples    Initial Assessment  See Epic Evaluation- Start of Care Date: 09/04/24

## 2024-09-13 ENCOUNTER — OFFICE VISIT (OUTPATIENT)
Dept: INTERNAL MEDICINE | Facility: CLINIC | Age: 84
End: 2024-09-13
Payer: MEDICARE

## 2024-09-13 ENCOUNTER — NURSE TRIAGE (OUTPATIENT)
Dept: INTERNAL MEDICINE | Facility: CLINIC | Age: 84
End: 2024-09-13

## 2024-09-13 VITALS
DIASTOLIC BLOOD PRESSURE: 82 MMHG | OXYGEN SATURATION: 98 % | HEIGHT: 68 IN | TEMPERATURE: 97.7 F | SYSTOLIC BLOOD PRESSURE: 180 MMHG | HEART RATE: 62 BPM | BODY MASS INDEX: 29.57 KG/M2 | RESPIRATION RATE: 16 BRPM | WEIGHT: 195.1 LBS

## 2024-09-13 DIAGNOSIS — R19.7 DIARRHEA, UNSPECIFIED TYPE: Primary | ICD-10-CM

## 2024-09-13 LAB
ANION GAP SERPL CALCULATED.3IONS-SCNC: 11 MMOL/L (ref 7–15)
BASOPHILS # BLD AUTO: 0 10E3/UL (ref 0–0.2)
BASOPHILS NFR BLD AUTO: 0 %
BUN SERPL-MCNC: 16.6 MG/DL (ref 8–23)
CALCIUM SERPL-MCNC: 9.1 MG/DL (ref 8.8–10.4)
CHLORIDE SERPL-SCNC: 101 MMOL/L (ref 98–107)
CREAT SERPL-MCNC: 1 MG/DL (ref 0.51–0.95)
EGFRCR SERPLBLD CKD-EPI 2021: 56 ML/MIN/1.73M2
EOSINOPHIL # BLD AUTO: 0.2 10E3/UL (ref 0–0.7)
EOSINOPHIL NFR BLD AUTO: 2 %
ERYTHROCYTE [DISTWIDTH] IN BLOOD BY AUTOMATED COUNT: 12.7 % (ref 10–15)
GLUCOSE SERPL-MCNC: 100 MG/DL (ref 70–99)
HCO3 SERPL-SCNC: 25 MMOL/L (ref 22–29)
HCT VFR BLD AUTO: 39.7 % (ref 35–47)
HGB BLD-MCNC: 13 G/DL (ref 11.7–15.7)
IMM GRANULOCYTES # BLD: 0 10E3/UL
IMM GRANULOCYTES NFR BLD: 0 %
LYMPHOCYTES # BLD AUTO: 2.9 10E3/UL (ref 0.8–5.3)
LYMPHOCYTES NFR BLD AUTO: 35 %
MCH RBC QN AUTO: 30 PG (ref 26.5–33)
MCHC RBC AUTO-ENTMCNC: 32.7 G/DL (ref 31.5–36.5)
MCV RBC AUTO: 92 FL (ref 78–100)
MONOCYTES # BLD AUTO: 0.6 10E3/UL (ref 0–1.3)
MONOCYTES NFR BLD AUTO: 8 %
NEUTROPHILS # BLD AUTO: 4.7 10E3/UL (ref 1.6–8.3)
NEUTROPHILS NFR BLD AUTO: 55 %
PLATELET # BLD AUTO: 306 10E3/UL (ref 150–450)
POTASSIUM SERPL-SCNC: 4.2 MMOL/L (ref 3.4–5.3)
RBC # BLD AUTO: 4.33 10E6/UL (ref 3.8–5.2)
SODIUM SERPL-SCNC: 137 MMOL/L (ref 135–145)
WBC # BLD AUTO: 8.5 10E3/UL (ref 4–11)

## 2024-09-13 PROCEDURE — 99213 OFFICE O/P EST LOW 20 MIN: CPT | Performed by: NURSE PRACTITIONER

## 2024-09-13 PROCEDURE — 85025 COMPLETE CBC W/AUTO DIFF WBC: CPT | Performed by: NURSE PRACTITIONER

## 2024-09-13 PROCEDURE — 80048 BASIC METABOLIC PNL TOTAL CA: CPT | Performed by: NURSE PRACTITIONER

## 2024-09-13 PROCEDURE — 36415 COLL VENOUS BLD VENIPUNCTURE: CPT | Performed by: NURSE PRACTITIONER

## 2024-09-13 ASSESSMENT — ENCOUNTER SYMPTOMS
NAUSEA: 0
DIARRHEA: 1
BLOOD IN STOOL: 0
FEVER: 0
ABDOMINAL PAIN: 0
VOMITING: 0
CHILLS: 0

## 2024-09-13 NOTE — PROGRESS NOTES
"  Assessment & Plan     Diarrhea, unspecified type  -Has had symptoms almost 2 weeks, will order stool studies recommend bland diet, avoiding dairy, staying well-hydrated, discussed can try Metamucil to see if this will help bulk stool up, can also try adding probiotic  -Continue Imodium as needed, will check for C. difficile and stop if this is positive  -Other chronic kidney disease will check kidney function and labs today    - C. difficile Toxin B PCR with reflex to C. difficile Antigen and Toxins A/B EIA; Future  - CBC with platelets and differential; Future  - Basic metabolic panel  (Ca, Cl, CO2, Creat, Gluc, K, Na, BUN); Future  - Enteric Bacteria and Virus Panel by [facility] Stool; Future  - C. difficile Toxin B PCR with reflex to C. difficile Antigen and Toxins A/B EIA  - CBC with platelets and differential  - Basic metabolic panel  (Ca, Cl, CO2, Creat, Gluc, K, Na, BUN)  - Enteric Bacteria and Virus Panel by [facility] Stool      Follow-up if symptoms do not improve or worsen.    BMI  Estimated body mass index is 29.66 kg/m  as calculated from the following:    Height as of this encounter: 1.727 m (5' 8\").    Weight as of this encounter: 88.5 kg (195 lb 1.6 oz).             Subjective   Candida is a 84 year old, presenting for the following health issues:  Diarrhea (X2 weeks, unknown cause )      9/13/2024     2:46 PM   Additional Questions   Roomed by LARRY Mckeon   Accompanied by Self         9/13/2024     2:46 PM   Patient Reported Additional Medications   Patient reports taking the following new medications None     History of Present Illness       Reason for visit:  Diarrhea  Symptom onset:  3-4 weeks ago   She is taking medications regularly.     Candida presents to the clinic today with complaints of diarrhea.  She reports symptoms initially started on September 4, 2024.  She reports that it started with extremely loose stool that she was having several times a day.  She reports over the past few days it has became a " little bit thicker but still loose.  No normal stools in the past 2 weeks.  She reports that she has no less than 2 stools a day but usually does not have more than 4/day.  She is currently taking Imodium which has helped slow things down.  She is drinking water and eating okay, urinating normally.  Denies any abdominal pain, nausea or vomiting, no fever or chills.  Denies any recent travel, no recent antibiotic use.            Review of Systems   Constitutional:  Negative for chills and fever.   Gastrointestinal:  Positive for diarrhea. Negative for abdominal pain, blood in stool, nausea and vomiting.         Objective    LMP  (LMP Unknown)   There is no height or weight on file to calculate BMI.  Physical Exam  Vitals and nursing note reviewed.   Constitutional:       General: She is not in acute distress.     Appearance: Normal appearance. She is not ill-appearing.   HENT:      Head: Normocephalic and atraumatic.      Mouth/Throat:      Mouth: Mucous membranes are moist.   Cardiovascular:      Rate and Rhythm: Normal rate and regular rhythm.      Pulses: Normal pulses.      Heart sounds: Normal heart sounds.   Pulmonary:      Effort: Pulmonary effort is normal. No respiratory distress.      Breath sounds: Normal breath sounds.   Abdominal:      General: Bowel sounds are normal. There is no distension.      Palpations: Abdomen is soft. There is no mass.      Tenderness: There is no abdominal tenderness. There is no guarding.   Neurological:      General: No focal deficit present.      Mental Status: She is alert and oriented to person, place, and time. Mental status is at baseline.                    Signed Electronically by: Brooklynn Hernandez CNP

## 2024-09-13 NOTE — TELEPHONE ENCOUNTER
Reason for Disposition   MILD diarrhea (e.g., 1-3 or more stools than normal in past 24 hours) diarrhea without known cause and present > 7 days    Additional Information   Negative: Shock suspected (e.g., cold/pale/clammy skin, too weak to stand, low BP, rapid pulse)   Negative: Difficult to awaken or acting confused (e.g., disoriented, slurred speech)   Negative: Sounds like a life-threatening emergency to the triager   Negative: Vomiting also present and worse than the diarrhea   Negative: Blood in stool and without diarrhea   Negative: SEVERE abdominal pain (e.g., excruciating) and present > 1 hour   Negative: SEVERE abdominal pain and age > 60 years   Negative: Bloody, black, or tarry bowel movements  (Exception: Chronic-unchanged black-grey bowel movements and is taking iron pills or Pepto-Bismol.)   Negative: SEVERE diarrhea (e.g., 7 or more times / day more than normal) and age > 60 years   Negative: Constant abdominal pain lasting > 2 hours   Negative: Drinking very little and dehydration suspected (e.g., no urine > 12 hours, very dry mouth, very lightheaded)   Negative: Patient sounds very sick or weak to the triager   Negative: SEVERE diarrhea (e.g., 7 or more times / day more than normal) and present > 24 hours (1 day)   Negative: MODERATE diarrhea (e.g., 4-6 times / day more than normal) and present > 48 hours (2 days)   Negative: MODERATE diarrhea (e.g., 4-6 times / day more than normal) and age > 70 years   Negative: Abdominal pain (Exception: Pain clears completely with each passage of diarrhea stool.)   Negative: Fever > 101 F (38.3 C)   Negative: Blood in the stool  (Exception: Only on toilet paper. Reason: Diarrhea can cause rectal irritation with blood on wiping.)   Negative: Mucus or pus in stool has been present > 2 days and diarrhea is more than mild   Negative: Weak immune system (e.g., HIV positive, cancer chemo, splenectomy, organ transplant, chronic steroids)   Negative: Travel to a  "foreign country in past month   Negative: Recent antibiotic therapy (i.e., within last 2 months) and diarrhea present > 3 days since antibiotic was stopped   Negative: Recent hospitalization and diarrhea present > 3 days   Negative: Tube feedings (e.g., nasogastric, g-tube, j-tube)    Answer Assessment - Initial Assessment Questions  1. DIARRHEA SEVERITY: \"How bad is the diarrhea?\" \"How many more stools have you had in the past 24 hours than normal?\"     - NO DIARRHEA (SCALE 0)    - MILD (SCALE 1-3): Few loose or mushy BMs; increase of 1-3 stools over normal daily number of stools; mild increase in ostomy output.    -  MODERATE (SCALE 4-7): Increase of 4-6 stools daily over normal; moderate increase in ostomy output.    -  SEVERE (SCALE 8-10; OR \"WORST POSSIBLE\"): Increase of 7 or more stools daily over normal; moderate increase in ostomy output; incontinence.      Mild  2. ONSET: \"When did the diarrhea begin?\"       2 weeks   3. BM CONSISTENCY: \"How loose or watery is the diarrhea?\"       Explosive diarrhea watery at first now is more loose/soft  4. VOMITING: \"Are you also vomiting?\" If Yes, ask: \"How many times in the past 24 hours?\"       No  5. ABDOMEN PAIN: \"Are you having any abdomen pain?\" If Yes, ask: \"What does it feel like?\" (e.g., crampy, dull, intermittent, constant)       No  6. ABDOMEN PAIN SEVERITY: If present, ask: \"How bad is the pain?\"  (e.g., Scale 1-10; mild, moderate, or severe)    - MILD (1-3): doesn't interfere with normal activities, abdomen soft and not tender to touch     - MODERATE (4-7): interferes with normal activities or awakens from sleep, abdomen tender to touch     - SEVERE (8-10): excruciating pain, doubled over, unable to do any normal activities        No  7. ORAL INTAKE: If vomiting, \"Have you been able to drink liquids?\" \"How much liquids have you had in the past 24 hours?\"      Good   8. HYDRATION: \"Any signs of dehydration?\" (e.g., dry mouth [not just dry lips], too weak to " "stand, dizziness, new weight loss) \"When did you last urinate?\"      No  9. EXPOSURE: \"Have you traveled to a foreign country recently?\" \"Have you been exposed to anyone with diarrhea?\" \"Could you have eaten any food that was spoiled?\"      No  10. ANTIBIOTIC USE: \"Are you taking antibiotics now or have you taken antibiotics in the past 2 months?\"        No  11. OTHER SYMPTOMS: \"Do you have any other symptoms?\" (e.g., fever, blood in stool)        No  12. PREGNANCY: \"Is there any chance you are pregnant?\" \"When was your last menstrual period?\"        No    Protocols used: Diarrhea-A-OH    "

## 2024-09-13 NOTE — TELEPHONE ENCOUNTER
Called patient and assisted in scheduling appointment for today.    Next 5 appointments (look out 90 days)      Sep 13, 2024 3:00 PM  (Arrive by 2:40 PM)  Provider Visit with Brooklynn Hernandez CNP  [facility] ([facility] ) [address]  [address]  [facility] [address]  [phone]     Oct 03, 2024 7:30 AM  (Arrive by 7:10 AM)  Provider Visit with Chani Peoples MD  [facility] ([facility] ) [address]  [address]  [facility] [address]  [phone]

## 2024-09-16 ENCOUNTER — TELEPHONE (OUTPATIENT)
Dept: INTERNAL MEDICINE | Facility: CLINIC | Age: 84
End: 2024-09-16
Payer: MEDICARE

## 2024-09-16 LAB — C DIFF TOX B STL QL: NEGATIVE

## 2024-09-16 PROCEDURE — 87493 C DIFF AMPLIFIED PROBE: CPT | Performed by: NURSE PRACTITIONER

## 2024-09-16 NOTE — RESULT ENCOUNTER NOTE
Left VM for pt to call back.  Telephone encounter created. Also sent note on [employer].    Aysha Fox MA

## 2024-09-16 NOTE — TELEPHONE ENCOUNTER
Left Vm for pt to call back. Aysha Fox MA      ----- Message from Brooklynn Hernandez sent at 9/16/2024  9:18 AM CDT -----  Team - please call patient with results.    Let patient know that kidney function came back okay, similar to her baseline.  Electrolytes look okay.  Complete blood count looked okay.

## 2024-09-17 ENCOUNTER — THERAPY VISIT (OUTPATIENT)
Dept: PHYSICAL THERAPY | Facility: CLINIC | Age: 84
End: 2024-09-17
Attending: INTERNAL MEDICINE
Payer: MEDICARE

## 2024-09-17 DIAGNOSIS — R26.81 GAIT INSTABILITY: Primary | ICD-10-CM

## 2024-09-17 PROCEDURE — 97530 THERAPEUTIC ACTIVITIES: CPT | Mod: GP | Performed by: PHYSICAL THERAPIST

## 2024-09-17 PROCEDURE — 97750 PHYSICAL PERFORMANCE TEST: CPT | Mod: GP,XU | Performed by: PHYSICAL THERAPIST

## 2024-09-19 LAB
ADV 40+41 DNA STL QL NAA+NON-PROBE: NEGATIVE
ASTRO TYP 1-8 RNA STL QL NAA+NON-PROBE: NEGATIVE
C CAYETANENSIS DNA STL QL NAA+NON-PROBE: NEGATIVE
CAMPYLOBACTER DNA SPEC NAA+PROBE: NEGATIVE
CRYPTOSP DNA STL QL NAA+NON-PROBE: NEGATIVE
E COLI O157 DNA STL QL NAA+NON-PROBE: NORMAL
E HISTOLYT DNA STL QL NAA+NON-PROBE: NEGATIVE
EAEC ASTA GENE ISLT QL NAA+PROBE: NEGATIVE
EC STX1+STX2 GENES STL QL NAA+NON-PROBE: NEGATIVE
EPEC EAE GENE STL QL NAA+NON-PROBE: NEGATIVE
ETEC LTA+ST1A+ST1B TOX ST NAA+NON-PROBE: NEGATIVE
G LAMBLIA DNA STL QL NAA+NON-PROBE: NEGATIVE
NOROVIRUS GI+II RNA STL QL NAA+NON-PROBE: NEGATIVE
P SHIGELLOIDES DNA STL QL NAA+NON-PROBE: NEGATIVE
RVA RNA STL QL NAA+NON-PROBE: NEGATIVE
SALMONELLA SP RPOD STL QL NAA+PROBE: NEGATIVE
SAPO I+II+IV+V RNA STL QL NAA+NON-PROBE: NEGATIVE
SHIGELLA SP+EIEC IPAH ST NAA+NON-PROBE: NEGATIVE
V CHOLERAE DNA SPEC QL NAA+PROBE: NEGATIVE
VIBRIO DNA SPEC NAA+PROBE: NEGATIVE
Y ENTEROCOL DNA STL QL NAA+PROBE: NEGATIVE

## 2024-09-19 PROCEDURE — 87507 IADNA-DNA/RNA PROBE TQ 12-25: CPT | Mod: GZ | Performed by: NURSE PRACTITIONER

## 2024-09-25 NOTE — TELEPHONE ENCOUNTER
LVM for pt to call back re: stool test.  It is negative. Also sent [employer] message.  Aysha Fox MA

## 2024-09-25 NOTE — RESULT ENCOUNTER NOTE
Left VM for pt to call back.  [employer] message sent and telephone encounter created.    Aysha Fox MA

## 2024-10-02 ENCOUNTER — THERAPY VISIT (OUTPATIENT)
Dept: PHYSICAL THERAPY | Facility: CLINIC | Age: 84
End: 2024-10-02
Attending: INTERNAL MEDICINE
Payer: MEDICARE

## 2024-10-02 DIAGNOSIS — R26.81 GAIT INSTABILITY: Primary | ICD-10-CM

## 2024-10-02 DIAGNOSIS — I69.354 HEMIPARESIS AFFECTING LEFT SIDE AS LATE EFFECT OF STROKE (H): ICD-10-CM

## 2024-10-02 PROCEDURE — 97530 THERAPEUTIC ACTIVITIES: CPT | Mod: GP | Performed by: PHYSICAL THERAPIST

## 2024-10-02 PROCEDURE — 97110 THERAPEUTIC EXERCISES: CPT | Mod: GP | Performed by: PHYSICAL THERAPIST

## 2024-10-03 ENCOUNTER — OFFICE VISIT (OUTPATIENT)
Dept: INTERNAL MEDICINE | Facility: CLINIC | Age: 84
End: 2024-10-03
Payer: MEDICARE

## 2024-10-03 VITALS
SYSTOLIC BLOOD PRESSURE: 120 MMHG | HEART RATE: 63 BPM | DIASTOLIC BLOOD PRESSURE: 62 MMHG | OXYGEN SATURATION: 100 % | TEMPERATURE: 97.8 F | HEIGHT: 68 IN | WEIGHT: 195 LBS | BODY MASS INDEX: 29.55 KG/M2

## 2024-10-03 DIAGNOSIS — F41.9 ANXIETY: ICD-10-CM

## 2024-10-03 DIAGNOSIS — R19.7 DIARRHEA, UNSPECIFIED TYPE: ICD-10-CM

## 2024-10-03 DIAGNOSIS — I10 HTN, GOAL BELOW 140/90: Chronic | ICD-10-CM

## 2024-10-03 DIAGNOSIS — R45.7 EMOTIONAL STRESS: ICD-10-CM

## 2024-10-03 DIAGNOSIS — I69.359 CVA, OLD, HEMIPARESIS (H): Primary | ICD-10-CM

## 2024-10-03 DIAGNOSIS — E78.5 HYPERLIPIDEMIA LDL GOAL <130: Chronic | ICD-10-CM

## 2024-10-03 PROCEDURE — 99214 OFFICE O/P EST MOD 30 MIN: CPT | Performed by: INTERNAL MEDICINE

## 2024-10-03 PROCEDURE — G2211 COMPLEX E/M VISIT ADD ON: HCPCS | Performed by: INTERNAL MEDICINE

## 2024-10-03 NOTE — NURSING NOTE
"Chief Complaint   Patient presents with    RECHECK     initial /62   Pulse 63   Temp 97.8  F (36.6  C) (Oral)   Ht 1.727 m (5' 8\")   Wt 88.5 kg (195 lb)   LMP  (LMP Unknown)   SpO2 100%   BMI 29.65 kg/m   Estimated body mass index is 29.65 kg/m  as calculated from the following:    Height as of this encounter: 1.727 m (5' 8\").    Weight as of this encounter: 88.5 kg (195 lb)..  bp completed using cuff size large  KAYLA RODRIGUEZ LPN  "

## 2024-10-03 NOTE — PATIENT INSTRUCTIONS
Plan:  Hold the Sertraline for 2-3 weeks  -- if the diarrhea persists you may resume Sertraline  -- if the diarrhea stops, write/call and I will prescribe Citalopram 20 mg daily  2. Continue the other meds, same doses for now.  3. Schedule a follow up appointment in January

## 2024-10-03 NOTE — PROGRESS NOTES
Patient's instructions / PLAN:                                                        Plan:  Hold the Sertraline for 2-3 weeks  -- if the diarrhea persists you may resume Sertraline  -- if the diarrhea stops, write/call and I will prescribe Citalopram 20 mg daily  2. Continue the other meds, same doses for now.  3. 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  Follow-up with physical therapy    (F41.9) Anxiety  Comment: Controlled  Plan: As above    (R45.7) Emotional stress  Comment: Overall much better  Plan: As above    (I10) HTN, goal below 140/90  Comment: Controlled  Plan: Continue same meds, same doses for now     (E78.5) Hyperlipidemia LDL goal <130  Comment: Controlled  Plan: Continue same meds, same doses for now     (R19.7) Diarrhea, unspecified type  Comment: Negative stool test.  Possible related with sertraline  Plan: As above       Chief Complaint:                                                      Follow up chronic medical problems         SUBJECTIVE:                                                    History of present illness     Diarrhea  -- persistent but less since she started the metamucil   -- neg stool test    HTN  -- home BP: 132/88 - 150/86,  177/99,  P 57-64    Falls  -- PT suggested to have the walker closed to her bed and and the car  -- L leg weakness sec CVA    Stress/Anxiety  -- takes Sertraline 50 mg daily and it helps      Aug 2024 LOV: Plan:  Increase Losartan to 50 mg twice a day  2. Continue the other meds, same doses for now.  3. Physical therapy  4. Schedule in office follow up appointment Oct 3 at 07:10    ROS:                                                      ROS: negative for fever, chills, cough, wheezes, chest pain, shortness of breath, vomiting, abdominal pain, leg swelling  "      OBJECTIVE:                                                    Physical Exam :    Blood pressure 120/62, pulse 63, temperature 97.8  F (36.6  C), temperature source Oral, height 1.727 m (5' 8\"), weight 88.5 kg (195 lb), SpO2 100%, not currently breastfeeding.   NAD, appears comfortable  Skin: no rashes   Neck: supple, no JVD, No thyroidmegaly. Lymph nodes nonpalpable cervical and supraclavicular.  Chest: clear to auscultation bilaterally, good respiratory effort  Heart: S1 S2, RRR, no mgr appreciated  Abdomen: soft, not tender,   Extremities: no edema,   Neurologic: A, Ox3, some weakness on the left leg after the stroke.  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 F/u dr. Soto    OA (osteoarthritis)     s/p bilat total hips     Other chronic pain     Joint pain for many years    Unspecified cerebral artery occlusion with cerebral infarction     After Moyamoya surgery > 10 yrs ago.    Vitamin D deficiencies       PSHx: reviewed  Past Surgical History:   Procedure Laterality Date    ARTHROPLASTY KNEE Left 4/17/2017    Procedure: ARTHROPLASTY KNEE;  Left total knee arthroplasty;  Surgeon: Chidi Jenkins MD;  Location:  OR    COLONOSCOPY  2008    normal    COLONOSCOPY  7/17/2014    Procedure: COLONOSCOPY;  Surgeon: Raul Nolan DO;  Location:  GI    CRANIOTOMY      MOJAMOJA  \"Pt had repair of blood flow.\"    IR CAROTID ANGIOGRAM  10/30/2006    IR CAROTID ANGIOGRAM  6/6/2010    IR CAROTID ANGIOGRAM  6/6/2010    IR CAROTID " ANGIOGRAM  6/6/2010    IR CAROTID ANGIOGRAM  5/12/2011    IR CAROTID ANGIOGRAM  5/12/2011    IR CAROTID ANGIOGRAM  5/12/2011    IR CAROTID ANGIOGRAM  6/5/2012    IR CAROTID ANGIOGRAM  6/5/2012    IR CAROTID ANGIOGRAM  6/5/2012    IR CAROTID CEREBRAL ANGIOGRAM BILATERAL  10/7/2022    IR MISCELLANEOUS PROCEDURE  6/6/2010    IR MISCELLANEOUS PROCEDURE  6/6/2010    IR MISCELLANEOUS PROCEDURE  5/12/2011    IR MISCELLANEOUS PROCEDURE  6/5/2012    RECONSTRUCT FOREFOOT WITH METATARSOPHALANGEAL (MTP) FUSION Left 8/21/2014    Procedure: RECONSTRUCT FOREFOOT WITH METATARSOPHALANGEAL (MTP) FUSION;  Surgeon: Tres Merlos DPM;  Location:  OR    [facility] NONSPECIFIC PROCEDURE  10/30/06    neurosurgery Dr Soto    [facility] NONSPECIFIC PROCEDURE      bilateral total hips approx 2002    [facility] NONSPECIFIC PROCEDURE  3/07    neurosurgery         Meds: reviewed  Current Outpatient Medications   Medication Sig Dispense Refill    aspirin 81 MG EC tablet Take 81 mg by mouth daily      atenolol (TENORMIN) 25 MG tablet Take 1 tablet (25 mg) by mouth daily 90 tablet 1    cyanocobalamin (VITAMIN B-12) 1000 MCG tablet Take 1,000 mcg by mouth daily      losartan (COZAAR) 50 MG tablet Take 1 tablet (50 mg) by mouth 2 times daily 180 tablet 0    magnesium 500 MG TABS Take 500 mg by mouth daily      Multiple Vitamins-Minerals (MULTIVITAMIN & MINERAL PO) Take 1 tablet by mouth daily      rosuvastatin (CRESTOR) 10 MG tablet Take 1 tablet (10 mg) by mouth daily 90 tablet 0       Soc Hx: reviewed  Fam Hx: reviewed      Chart documentation was completed, in part, with [employer] voice-recognition software. Even though reviewed, some grammatical, spelling, and word errors may remain.      Chani Farrell MD  Internal Medicine

## 2024-10-08 ENCOUNTER — THERAPY VISIT (OUTPATIENT)
Dept: PHYSICAL THERAPY | Facility: CLINIC | Age: 84
End: 2024-10-08
Attending: INTERNAL MEDICINE
Payer: MEDICARE

## 2024-10-08 DIAGNOSIS — R26.81 GAIT INSTABILITY: Primary | ICD-10-CM

## 2024-10-08 PROCEDURE — 97110 THERAPEUTIC EXERCISES: CPT | Mod: GP | Performed by: PHYSICAL THERAPIST

## 2024-10-09 NOTE — PROGRESS NOTES
Medication Therapy Management (MTM) Encounter    ASSESSMENT:                            Medication Adherence/Access: Patient has duplicate pill bottles. Encouraged her to dispose of extra pills to help prevent confusion.     Cerebrovascular accident: Stable.     Hypertension: Stable. Patient is meeting blood pressure goal of < 140/90mmHg.      Hyperlipidemia: Recommend repeat cholesterol labs since patient did not have rosuvastatin with her, but active Rx is on med list, unclear if taking. Rosuvastatin was last filled on 7/22 fir 90 days, so looks like she is taking it, but cholesterol labs may help confirm.     Supplements: Recommend vitamin B12 every other day as recommended by PCP.      Depression/Anxiety:   Will notify provider patient has stopped sertraline and seen an improvement in diarrhea. Did not replace this with anything today since mood was doing okay, but will notify PCP to see if she wants to start Celexa as mentioned in her visit.    Allergies:  Recommended patient switch to Zyrtec which will likely have greater efficacy with less side effects compared to chlorpheniramine.    PLAN:                            I will let Dr. Farrell know that you have been off sertraline for a week and diarrhea has improved and has not noticed a change in mood.  Make sure you aren't doubling up your atenolol or losartan since you had duplicate bottles of these  I would consider stopping your current Allergy Relief and try Zyrtec (cetrizine) 10 mg daily for allergies  Repeat your cholesterol labs to see if we need to restart rosuvastatin - cholesterol labs came back elevated, so likely josefina wasn't taking rosuvastatin, sent in a new refill of this to her pharmacy.  Take vitamin B12 every other day based on last lab results    Follow-up: Follow up depending on lab results    SUBJECTIVE/OBJECTIVE:                          Candida Rose is a 84 year old female seen for a follow-up visit. Patient was accompanied by Olu.       Reason for visit: med review.    Allergies/ADRs: Reviewed in chart  Past Medical History: Reviewed in chart  Tobacco: She reports that she has never smoked. She has never been exposed to tobacco smoke. She has never used smokeless tobacco.  Alcohol: not currently using    Medication Adherence/Access: no issues reported.    Cerebrovascular accident:   Aspirin 81 mg daily.  No concerns with bleeding or bruising.  Uses a cane/walker to walk. Noticed some shakiness in legs from time to time, unsure what this is from.       Hypertension   Atenolol 25 mg daily.   Losartan 50 mg daily  No concerns with side effects.   Patient occasionally self-monitors blood pressure, reports readings have been very up and down at home.   Has had orthostatic hypotension in past, aware of symptoms to watch for. No recent concerns with this. Uses walker and cane to make sure she is steady.     BP Readings from Last 3 Encounters:   10/03/24 120/62   09/13/24 (!) 180/82   08/06/24 137/62       Hyperlipidemia   Rosuvastatin 10 mg daily  Patient reports no significant myalgias or other side effects.  Stopped atorvastatin because it was thought to be causing leg shakiness, started rosuvastatin in June 2024, no change in shakiness.  The ASCVD Risk score ([place] DK, et al., 2019) failed to calculate for the following reasons:    The 2019 ASCVD risk score is only valid for ages 40 to 79    The patient has a prior MI or stroke diagnosis     Recent Labs   Lab Test 07/01/24  0844 05/28/24  0811   CHOL 222* 190   HDL 58 71   * 101*   TRIG 99 88       Mental Health   Depression/Anxiety:  Reports she stopped sertraline (sounds like she has been off of it for a week, but memory a little fuzzy) and diarrhea has improved since stopping. No worsening mood/anxiety since stopping. Hasn't fallen since stopping it, but still a little shaky. Did find out one leg is shorter than the other.           Supplements   Multivitamin once daily   Magnesium 250  mg daily- helpful for reducing leg aches/cramps.   Vitamin B12 1000 mcg daily - at visit patient stated PCP told her to continue taking this. After reading PCP note from June, was told to take every other day due to high vitamin B12 level.  No reported issues at this time.        Allergy   Chlorpheniramine 4 mg as needed   Patient reports no current medication side effects.    Recently started allergy medication to help with allergy symptoms this fall. Does not work that well for the patient.  Primary symptoms are nasal congestion, runny nose, and sneezing.  Primary triggers are pollens.   Patient feels that current therapy is not effective.        Today's Vitals: LMP  (LMP Unknown)   ----------------      I spent 30 minutes with this patient today. All changes were made via collaborative practice agreement with Chani Peoples MD. A copy of the visit note was provided to the patient's provider(s).    A summary of these recommendations was given to the patient.    Donna Willard, PharmD  Medication Therapy Management Pharmacist  Voicemail: [phone]           Medication Therapy Recommendations  Hyperlipidemia LDL goal <130    Current Medication: rosuvastatin (CRESTOR) 10 MG tablet   Rationale: Incorrect administration - Dosage too low - Effectiveness   Recommendation: Order Lab   Status: Accepted per CPA         Seasonal allergic rhinitis, unspecified trigger    Current Medication: chlorpheniramine (CHLOR-TRIMETON) 4 MG tablet (Discontinued)   Rationale: Unsafe medication for the patient - Adverse medication event - Safety   Recommendation: Change Medication   Status: Accepted - no CPA Needed         Takes dietary supplements    Current Medication: cyanocobalamin (VITAMIN B-12) 1000 MCG tablet   Rationale: Does not understand instructions - Adherence - Adherence   Recommendation: Provide Education   Status: Patient Agreed - Adherence/Education

## 2024-10-10 ENCOUNTER — LAB (OUTPATIENT)
Dept: LAB | Facility: CLINIC | Age: 84
End: 2024-10-10
Payer: MEDICARE

## 2024-10-10 ENCOUNTER — OFFICE VISIT (OUTPATIENT)
Dept: PHARMACY | Facility: CLINIC | Age: 84
End: 2024-10-10
Payer: COMMERCIAL

## 2024-10-10 DIAGNOSIS — F41.9 ANXIETY: ICD-10-CM

## 2024-10-10 DIAGNOSIS — J30.2 SEASONAL ALLERGIC RHINITIS, UNSPECIFIED TRIGGER: ICD-10-CM

## 2024-10-10 DIAGNOSIS — I63.9 CEREBROVASCULAR ACCIDENT (CVA), UNSPECIFIED MECHANISM (H): ICD-10-CM

## 2024-10-10 DIAGNOSIS — Z78.9 TAKES DIETARY SUPPLEMENTS: ICD-10-CM

## 2024-10-10 DIAGNOSIS — E78.5 HYPERLIPIDEMIA LDL GOAL <130: Primary | ICD-10-CM

## 2024-10-10 DIAGNOSIS — E78.5 HYPERLIPIDEMIA LDL GOAL <130: ICD-10-CM

## 2024-10-10 DIAGNOSIS — I10 HTN, GOAL BELOW 140/90: ICD-10-CM

## 2024-10-10 DIAGNOSIS — F32.A DEPRESSION, UNSPECIFIED DEPRESSION TYPE: ICD-10-CM

## 2024-10-10 LAB
CHOLEST SERPL-MCNC: 228 MG/DL
FASTING STATUS PATIENT QL REPORTED: YES
HDLC SERPL-MCNC: 47 MG/DL
LDLC SERPL CALC-MCNC: 155 MG/DL
NONHDLC SERPL-MCNC: 181 MG/DL
TRIGL SERPL-MCNC: 130 MG/DL

## 2024-10-10 PROCEDURE — 36415 COLL VENOUS BLD VENIPUNCTURE: CPT

## 2024-10-10 PROCEDURE — 99207 PR NO CHARGE LOS: CPT | Performed by: PHARMACIST

## 2024-10-10 PROCEDURE — 80061 LIPID PANEL: CPT

## 2024-10-10 RX ORDER — HYDROXYZINE HYDROCHLORIDE 10 MG/5ML
4 SYRUP ORAL EVERY 6 HOURS PRN
COMMUNITY
End: 2024-10-10

## 2024-10-10 RX ORDER — CETIRIZINE HYDROCHLORIDE 10 MG/1
10 TABLET ORAL DAILY
COMMUNITY

## 2024-10-10 RX ORDER — ONDANSETRON 4 MG/1
4 TABLET, ORALLY DISINTEGRATING ORAL EVERY 8 HOURS PRN
COMMUNITY
End: 2024-10-10

## 2024-10-10 NOTE — Clinical Note
Hello,  I just wanted to let you know patient has been off the sertraline for about a week. Diarrhea has improved. Maybe slight improvement in shakiness, bu unclear. No change in mood reported.  Donna Willard, PharmD Medication Therapy Management Pharmacist Voicemail: [phone]

## 2024-10-10 NOTE — PATIENT INSTRUCTIONS
"Recommendations from today's MTM visit:                                                       I will let Dr. Farrell know that you have been off sertraline for a week and diarrhea has improved and has not noticed a change in mood.  Make sure you aren't doubling up your atenolol or losartan since you had duplicate bottle of this  I would consider stopping your current Allergy Relief and try Zyrtec (cetrizine) 10 mg daily for allergies  Repeat your cholesterol labs to see if we need to restart rosuvastatin  Take vitamin B12 every other day based on last lab results    Follow-up: Follow up depending on lab results    It was great speaking with you today.  I value your experience and would be very thankful for your time in providing feedback in our clinic survey. In the next few days, you may receive an email or text message from [employer] with a link to a survey related to your  clinical pharmacist.\"     To schedule another MTM appointment, please call the clinic directly or you may call the MTM scheduling line at [phone] or toll-free at [phone].     My Clinical Pharmacist's contact information:                                                      Please feel free to contact me with any questions or concerns you have.      Donna Willard, PharmD  Medication Therapy Management Pharmacist  Voicemail: [phone]     "

## 2024-10-11 RX ORDER — ROSUVASTATIN CALCIUM 10 MG/1
10 TABLET, COATED ORAL DAILY
Qty: 90 TABLET | Refills: 1 | Status: SHIPPED | OUTPATIENT
Start: 2024-10-11

## 2024-10-24 ENCOUNTER — THERAPY VISIT (OUTPATIENT)
Dept: PHYSICAL THERAPY | Facility: CLINIC | Age: 84
End: 2024-10-24
Attending: INTERNAL MEDICINE
Payer: MEDICARE

## 2024-10-24 DIAGNOSIS — R26.81 GAIT INSTABILITY: Primary | ICD-10-CM

## 2024-10-24 PROCEDURE — 97110 THERAPEUTIC EXERCISES: CPT | Mod: GP | Performed by: PHYSICAL THERAPIST

## 2024-10-31 ENCOUNTER — THERAPY VISIT (OUTPATIENT)
Dept: PHYSICAL THERAPY | Facility: CLINIC | Age: 84
End: 2024-10-31
Attending: INTERNAL MEDICINE
Payer: MEDICARE

## 2024-10-31 DIAGNOSIS — R26.81 GAIT INSTABILITY: Primary | ICD-10-CM

## 2024-10-31 PROCEDURE — 97112 NEUROMUSCULAR REEDUCATION: CPT | Mod: GP | Performed by: PHYSICAL THERAPIST

## 2024-11-02 DIAGNOSIS — I10 HTN, GOAL BELOW 140/90: Chronic | ICD-10-CM

## 2024-11-04 RX ORDER — LOSARTAN POTASSIUM 50 MG/1
50 TABLET ORAL 2 TIMES DAILY
Qty: 180 TABLET | Refills: 0 | Status: SHIPPED | OUTPATIENT
Start: 2024-11-04

## 2024-11-06 ENCOUNTER — THERAPY VISIT (OUTPATIENT)
Dept: PHYSICAL THERAPY | Facility: CLINIC | Age: 84
End: 2024-11-06
Attending: INTERNAL MEDICINE
Payer: MEDICARE

## 2024-11-06 DIAGNOSIS — R26.81 GAIT INSTABILITY: Primary | ICD-10-CM

## 2024-11-06 PROCEDURE — 97750 PHYSICAL PERFORMANCE TEST: CPT | Mod: GP | Performed by: PHYSICAL THERAPIST

## 2024-11-28 ENCOUNTER — HOSPITAL ENCOUNTER (INPATIENT)
Facility: CLINIC | Age: 84
DRG: 480 | End: 2024-11-28
Attending: EMERGENCY MEDICINE | Admitting: INTERNAL MEDICINE
Payer: MEDICARE

## 2024-11-28 ENCOUNTER — APPOINTMENT (OUTPATIENT)
Dept: GENERAL RADIOLOGY | Facility: CLINIC | Age: 84
DRG: 480 | End: 2024-11-28
Attending: STUDENT IN AN ORGANIZED HEALTH CARE EDUCATION/TRAINING PROGRAM
Payer: MEDICARE

## 2024-11-28 VITALS
BODY MASS INDEX: 30.84 KG/M2 | WEIGHT: 203.48 LBS | DIASTOLIC BLOOD PRESSURE: 53 MMHG | OXYGEN SATURATION: 98 % | HEIGHT: 68 IN | SYSTOLIC BLOOD PRESSURE: 143 MMHG | HEART RATE: 63 BPM | TEMPERATURE: 97.4 F | RESPIRATION RATE: 16 BRPM

## 2024-11-28 DIAGNOSIS — S72.352A CLOSED DISPLACED COMMINUTED FRACTURE OF SHAFT OF LEFT FEMUR, INITIAL ENCOUNTER (H): Primary | ICD-10-CM

## 2024-11-28 DIAGNOSIS — I63.9 APHASIA DUE TO ACUTE STROKE (H): ICD-10-CM

## 2024-11-28 DIAGNOSIS — S72.402A CLOSED FRACTURE OF LEFT DISTAL FEMUR (H): ICD-10-CM
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
## 2024-11-28 DIAGNOSIS — F01.53 VASCULAR DEMENTIA WITH DEPRESSED MOOD (H): ICD-10-CM

## 2024-11-28 DIAGNOSIS — M25.552 HIP PAIN, LEFT: ICD-10-CM

## 2024-11-28 DIAGNOSIS — I63.89 CEREBRAL INFARCTION DUE TO OTHER MECHANISM (H): ICD-10-CM

## 2024-11-28 DIAGNOSIS — I63.9 ACUTE STROKE DUE TO ISCHEMIA (H): ICD-10-CM

## 2024-11-28 DIAGNOSIS — R47.01 APHASIA DUE TO ACUTE STROKE (H): ICD-10-CM

## 2024-11-28 LAB
ANION GAP SERPL CALCULATED.3IONS-SCNC: 13 MMOL/L (ref 7–15)
ATRIAL RATE - MUSE: 264 BPM
BASOPHILS # BLD AUTO: 0 10E3/UL (ref 0–0.2)
BASOPHILS NFR BLD AUTO: 0 %
BUN SERPL-MCNC: 25.8 MG/DL (ref 8–23)
CALCIUM SERPL-MCNC: 8.9 MG/DL (ref 8.8–10.4)
CHLORIDE SERPL-SCNC: 98 MMOL/L (ref 98–107)
CREAT SERPL-MCNC: 1.01 MG/DL (ref 0.51–0.95)
DIASTOLIC BLOOD PRESSURE - MUSE: NORMAL MMHG
EGFRCR SERPLBLD CKD-EPI 2021: 55 ML/MIN/1.73M2
EOSINOPHIL # BLD AUTO: 0.1 10E3/UL (ref 0–0.7)
EOSINOPHIL NFR BLD AUTO: 1 %
ERYTHROCYTE [DISTWIDTH] IN BLOOD BY AUTOMATED COUNT: 12.5 % (ref 10–15)
GLUCOSE SERPL-MCNC: 129 MG/DL (ref 70–99)
HCO3 SERPL-SCNC: 21 MMOL/L (ref 22–29)
HCT VFR BLD AUTO: 38.6 % (ref 35–47)
HGB BLD-MCNC: 12.4 G/DL (ref 11.7–15.7)
IMM GRANULOCYTES # BLD: 0 10E3/UL
IMM GRANULOCYTES NFR BLD: 0 %
INTERPRETATION ECG - MUSE: NORMAL
LYMPHOCYTES # BLD AUTO: 3.5 10E3/UL (ref 0.8–5.3)
LYMPHOCYTES NFR BLD AUTO: 32 %
MAGNESIUM SERPL-MCNC: 1.8 MG/DL (ref 1.7–2.3)
MCH RBC QN AUTO: 29.8 PG (ref 26.5–33)
MCHC RBC AUTO-ENTMCNC: 32.1 G/DL (ref 31.5–36.5)
MCV RBC AUTO: 93 FL (ref 78–100)
MONOCYTES # BLD AUTO: 0.8 10E3/UL (ref 0–1.3)
MONOCYTES NFR BLD AUTO: 7 %
NEUTROPHILS # BLD AUTO: 6.5 10E3/UL (ref 1.6–8.3)
NEUTROPHILS NFR BLD AUTO: 60 %
NRBC # BLD AUTO: 0 10E3/UL
NRBC BLD AUTO-RTO: 0 /100
P AXIS - MUSE: NORMAL DEGREES
PHOSPHATE SERPL-MCNC: 3.2 MG/DL (ref 2.5–4.5)
PLATELET # BLD AUTO: 275 10E3/UL (ref 150–450)
POTASSIUM SERPL-SCNC: 4.2 MMOL/L (ref 3.4–5.3)
PR INTERVAL - MUSE: NORMAL MS
QRS DURATION - MUSE: 80 MS
QT - MUSE: 322 MS
QTC - MUSE: 491 MS
R AXIS - MUSE: -15 DEGREES
RBC # BLD AUTO: 4.16 10E6/UL (ref 3.8–5.2)
SODIUM SERPL-SCNC: 132 MMOL/L (ref 135–145)
SYSTOLIC BLOOD PRESSURE - MUSE: NORMAL MMHG
T AXIS - MUSE: 51 DEGREES
VENTRICULAR RATE- MUSE: 140 BPM
WBC # BLD AUTO: 11 10E3/UL (ref 4–11)

## 2024-11-28 PROCEDURE — 250N000013 HC RX MED GY IP 250 OP 250 PS 637: Performed by: INTERNAL MEDICINE

## 2024-11-28 PROCEDURE — 250N000011 HC RX IP 250 OP 636: Performed by: STUDENT IN AN ORGANIZED HEALTH CARE EDUCATION/TRAINING PROGRAM

## 2024-11-28 PROCEDURE — 96374 THER/PROPH/DIAG INJ IV PUSH: CPT

## 2024-11-28 PROCEDURE — 84100 ASSAY OF PHOSPHORUS: CPT | Performed by: PHYSICIAN ASSISTANT

## 2024-11-28 PROCEDURE — 99223 1ST HOSP IP/OBS HIGH 75: CPT | Performed by: PHYSICIAN ASSISTANT

## 2024-11-28 PROCEDURE — 120N000001 HC R&B MED SURG/OB

## 2024-11-28 PROCEDURE — 73502 X-RAY EXAM HIP UNI 2-3 VIEWS: CPT

## 2024-11-28 PROCEDURE — 99207 PR NO CHARGE LOS: CPT | Performed by: INTERNAL MEDICINE

## 2024-11-28 PROCEDURE — 73552 X-RAY EXAM OF FEMUR 2/>: CPT | Mod: LT

## 2024-11-28 PROCEDURE — 73560 X-RAY EXAM OF KNEE 1 OR 2: CPT | Mod: LT

## 2024-11-28 PROCEDURE — 36415 COLL VENOUS BLD VENIPUNCTURE: CPT | Performed by: STUDENT IN AN ORGANIZED HEALTH CARE EDUCATION/TRAINING PROGRAM

## 2024-11-28 PROCEDURE — 99285 EMERGENCY DEPT VISIT HI MDM: CPT | Mod: 25

## 2024-11-28 PROCEDURE — 250N000013 HC RX MED GY IP 250 OP 250 PS 637: Performed by: PHYSICIAN ASSISTANT

## 2024-11-28 PROCEDURE — 258N000003 HC RX IP 258 OP 636: Performed by: PHYSICIAN ASSISTANT

## 2024-11-28 PROCEDURE — 80048 BASIC METABOLIC PNL TOTAL CA: CPT | Performed by: STUDENT IN AN ORGANIZED HEALTH CARE EDUCATION/TRAINING PROGRAM

## 2024-11-28 PROCEDURE — 83735 ASSAY OF MAGNESIUM: CPT | Performed by: PHYSICIAN ASSISTANT

## 2024-11-28 PROCEDURE — 93005 ELECTROCARDIOGRAM TRACING: CPT

## 2024-11-28 PROCEDURE — 85025 COMPLETE CBC W/AUTO DIFF WBC: CPT | Performed by: STUDENT IN AN ORGANIZED HEALTH CARE EDUCATION/TRAINING PROGRAM

## 2024-11-28 PROCEDURE — 82565 ASSAY OF CREATININE: CPT | Performed by: STUDENT IN AN ORGANIZED HEALTH CARE EDUCATION/TRAINING PROGRAM

## 2024-11-28 RX ORDER — AMOXICILLIN 250 MG
1 CAPSULE ORAL 2 TIMES DAILY PRN
Status: DISCONTINUED | OUTPATIENT
Start: 2024-11-28 | End: 2024-11-30

## 2024-11-28 RX ORDER — NALOXONE HYDROCHLORIDE 0.4 MG/ML
0.4 INJECTION, SOLUTION INTRAMUSCULAR; INTRAVENOUS; SUBCUTANEOUS
Status: DISCONTINUED | OUTPATIENT
Start: 2024-11-28 | End: 2024-12-07 | Stop reason: HOSPADM

## 2024-11-28 RX ORDER — ROSUVASTATIN CALCIUM 10 MG/1
10 TABLET, COATED ORAL AT BEDTIME
Status: DISCONTINUED | OUTPATIENT
Start: 2024-11-28 | End: 2024-11-30

## 2024-11-28 RX ORDER — NALOXONE HYDROCHLORIDE 0.4 MG/ML
0.2 INJECTION, SOLUTION INTRAMUSCULAR; INTRAVENOUS; SUBCUTANEOUS
Status: DISCONTINUED | OUTPATIENT
Start: 2024-11-28 | End: 2024-12-07 | Stop reason: HOSPADM

## 2024-11-28 RX ORDER — LIDOCAINE 40 MG/G
CREAM TOPICAL
Status: DISCONTINUED | OUTPATIENT
Start: 2024-11-28 | End: 2024-11-30

## 2024-11-28 RX ORDER — OXYCODONE HYDROCHLORIDE 5 MG/1
5 TABLET ORAL EVERY 4 HOURS PRN
Status: DISCONTINUED | OUTPATIENT
Start: 2024-11-28 | End: 2024-11-29

## 2024-11-28 RX ORDER — HYDROMORPHONE HCL IN WATER/PF 6 MG/30 ML
0.4 PATIENT CONTROLLED ANALGESIA SYRINGE INTRAVENOUS
Status: DISCONTINUED | OUTPATIENT
Start: 2024-11-28 | End: 2024-11-29

## 2024-11-28 RX ORDER — SODIUM CHLORIDE 9 MG/ML
INJECTION, SOLUTION INTRAVENOUS CONTINUOUS
Status: ACTIVE | OUTPATIENT
Start: 2024-11-28 | End: 2024-11-28

## 2024-11-28 RX ORDER — HYDROMORPHONE HYDROCHLORIDE 1 MG/ML
0.5 INJECTION, SOLUTION INTRAMUSCULAR; INTRAVENOUS; SUBCUTANEOUS
Status: DISCONTINUED | OUTPATIENT
Start: 2024-11-28 | End: 2024-11-28

## 2024-11-28 RX ORDER — HYDROMORPHONE HCL IN WATER/PF 6 MG/30 ML
0.2 PATIENT CONTROLLED ANALGESIA SYRINGE INTRAVENOUS
Status: DISCONTINUED | OUTPATIENT
Start: 2024-11-28 | End: 2024-11-29

## 2024-11-28 RX ORDER — HYDROMORPHONE HYDROCHLORIDE 1 MG/ML
0.5 INJECTION, SOLUTION INTRAMUSCULAR; INTRAVENOUS; SUBCUTANEOUS EVERY 30 MIN PRN
Status: DISCONTINUED | OUTPATIENT
Start: 2024-11-28 | End: 2024-11-28

## 2024-11-28 RX ORDER — ONDANSETRON 4 MG/1
4 TABLET, ORALLY DISINTEGRATING ORAL EVERY 6 HOURS PRN
Status: DISCONTINUED | OUTPATIENT
Start: 2024-11-28 | End: 2024-11-29

## 2024-11-28 RX ORDER — AMOXICILLIN 250 MG
2 CAPSULE ORAL 2 TIMES DAILY PRN
Status: DISCONTINUED | OUTPATIENT
Start: 2024-11-28 | End: 2024-11-30

## 2024-11-28 RX ORDER — MAGNESIUM OXIDE 400 MG/1
400 TABLET ORAL EVERY 4 HOURS
Status: COMPLETED | OUTPATIENT
Start: 2024-11-28 | End: 2024-11-28

## 2024-11-28 RX ORDER — ATENOLOL 25 MG/1
25 TABLET ORAL DAILY
Status: DISCONTINUED | OUTPATIENT
Start: 2024-11-29 | End: 2024-11-30

## 2024-11-28 RX ORDER — ACETAMINOPHEN 325 MG/1
975 TABLET ORAL 3 TIMES DAILY
Status: DISCONTINUED | OUTPATIENT
Start: 2024-11-28 | End: 2024-11-29

## 2024-11-28 RX ORDER — ONDANSETRON 2 MG/ML
4 INJECTION INTRAMUSCULAR; INTRAVENOUS EVERY 6 HOURS PRN
Status: DISCONTINUED | OUTPATIENT
Start: 2024-11-28 | End: 2024-11-29

## 2024-11-28 RX ADMIN — ROSUVASTATIN 10 MG: 10 TABLET, FILM COATED ORAL at 21:33

## 2024-11-28 RX ADMIN — ACETAMINOPHEN 975 MG: 325 TABLET, FILM COATED ORAL at 19:29

## 2024-11-28 RX ADMIN — HYDROMORPHONE HYDROCHLORIDE 0.5 MG: 1 INJECTION, SOLUTION INTRAMUSCULAR; INTRAVENOUS; SUBCUTANEOUS at 16:21

## 2024-11-28 RX ADMIN — Medication 400 MG: at 19:29

## 2024-11-28 RX ADMIN — Medication 400 MG: at 23:44

## 2024-11-28 RX ADMIN — SERTRALINE HYDROCHLORIDE 50 MG: 50 TABLET ORAL at 21:33

## 2024-11-28 RX ADMIN — OXYCODONE HYDROCHLORIDE 5 MG: 5 TABLET ORAL at 18:30

## 2024-11-28 RX ADMIN — SODIUM CHLORIDE: 9 INJECTION, SOLUTION INTRAVENOUS at 18:32

## 2024-11-28 RX ADMIN — HYDROMORPHONE HYDROCHLORIDE 0.5 MG: 1 INJECTION, SOLUTION INTRAMUSCULAR; INTRAVENOUS; SUBCUTANEOUS at 14:32

## 2024-11-28 ASSESSMENT — COLUMBIA-SUICIDE SEVERITY RATING SCALE - C-SSRS
1. IN THE PAST MONTH, HAVE YOU WISHED YOU WERE DEAD OR WISHED YOU COULD GO TO SLEEP AND NOT WAKE UP?: NO
6. HAVE YOU EVER DONE ANYTHING, STARTED TO DO ANYTHING, OR PREPARED TO DO ANYTHING TO END YOUR LIFE?: NO
2. HAVE YOU ACTUALLY HAD ANY THOUGHTS OF KILLING YOURSELF IN THE PAST MONTH?: NO

## 2024-11-28 ASSESSMENT — ACTIVITIES OF DAILY LIVING (ADL)
PATIENT'S_PREFERRED_MEANS_OF_COMMUNICATION: ENGLISH SPEAKER WITH HEARING LOSS, NO SPEECH PROBLEMS.
HEARING_MANAGEMENT: HEARING AIDS
DIFFICULTY_EATING/SWALLOWING: NO
DRESSING/BATHING_DIFFICULTY: NO
ADLS_ACUITY_SCORE: 40
DIFFICULTY_COMMUNICATING: NO
ADLS_ACUITY_SCORE: 40
CHANGE_IN_FUNCTIONAL_STATUS_SINCE_ONSET_OF_CURRENT_ILLNESS/INJURY: YES
WALKING_OR_CLIMBING_STAIRS: AMBULATION DIFFICULTY, REQUIRES EQUIPMENT
WERE_AUXILIARY_AIDS_OFFERED?: YES
ADLS_ACUITY_SCORE: 43
WALKING_OR_CLIMBING_STAIRS_DIFFICULTY: YES
FALL_HISTORY_WITHIN_LAST_SIX_MONTHS: YES
ADLS_ACUITY_SCORE: 54
ADLS_ACUITY_SCORE: 49
ADLS_ACUITY_SCORE: 54
EQUIPMENT_CURRENTLY_USED_AT_HOME: CANE, QUAD;WALKER, ROLLING
ADLS_ACUITY_SCORE: 54
WEAR_GLASSES_OR_BLIND: YES
DOING_ERRANDS_INDEPENDENTLY_DIFFICULTY: YES
ADLS_ACUITY_SCORE: 54
NUMBER_OF_TIMES_PATIENT_HAS_FALLEN_WITHIN_LAST_SIX_MONTHS: 5
DESCRIBE_HEARING_LOSS: BILATERAL HEARING LOSS
USE_OF_HEARING_ASSISTIVE_DEVICES: BILATERAL HEARING AIDS
ADLS_ACUITY_SCORE: 54
THE_FOLLOWING_AIDS_WERE_PROVIDED;: PATIENT DECLINED OFFER OF AUXILIARY AIDS
HEARING_DIFFICULTY_OR_DEAF: YES
TOILETING_ISSUES: NO
CONCENTRATING,_REMEMBERING_OR_MAKING_DECISIONS_DIFFICULTY: NO

## 2024-11-28 NOTE — ED PROVIDER NOTES
Emergency Department Note      History of Present Illness     Chief Complaint   Fall and Leg Pain      HPI   Candida Rose is a 84 year old female with a history of osteopenia, hyperlipidemia, CVA with hemiparesis affecting left side, who presents after mechanical fall at home with left hip pain.  Patient states that she has been experiencing some lower extremity shakes at times which she has seen her primary care provider for and has not received diagnosis.  Experienced these shaking symptoms to her lower extremities earlier today causing her to fall to the floor with left leg underneath her.  She denies hitting her head or loss of consciousness.  She reports history of bilateral hip replacements and states that her left extremity has always been shorter than her right.  Denies any pain outside of left hip pain today.    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    OA (osteoarthritis)     Other chronic pain     Unspecified cerebral artery occlusion with cerebral infarction     Vitamin D deficiencies        Medications   aspirin 81 MG EC tablet  atenolol (TENORMIN) 25 MG tablet  cetirizine (ZYRTEC) 10 MG tablet  cyanocobalamin (VITAMIN B-12) 1000 MCG tablet  losartan (COZAAR) 50 MG tablet  magnesium 500 MG TABS  Multiple Vitamins-Minerals (MULTIVITAMIN & MINERAL PO)  rosuvastatin (CRESTOR) 10 MG tablet        Surgical History   Past Surgical History:   Procedure Laterality Date    ARTHROPLASTY KNEE Left 4/17/2017     "Procedure: ARTHROPLASTY KNEE;  Left total knee arthroplasty;  Surgeon: Chidi Jenkins MD;  Location:  OR    COLONOSCOPY  2008    normal    COLONOSCOPY  7/17/2014    Procedure: COLONOSCOPY;  Surgeon: Raul Nolan DO;  Location:  GI    CRANIOTOMY      MOJAMOJA  \"Pt had repair of blood flow.\"    IR CAROTID ANGIOGRAM  10/30/2006    IR CAROTID ANGIOGRAM  6/6/2010    IR CAROTID ANGIOGRAM  6/6/2010    IR CAROTID ANGIOGRAM  6/6/2010    IR CAROTID ANGIOGRAM  5/12/2011    IR CAROTID ANGIOGRAM  5/12/2011    IR CAROTID ANGIOGRAM  5/12/2011    IR CAROTID ANGIOGRAM  6/5/2012    IR CAROTID ANGIOGRAM  6/5/2012    IR CAROTID ANGIOGRAM  6/5/2012    IR CAROTID CEREBRAL ANGIOGRAM BILATERAL  10/7/2022    IR MISCELLANEOUS PROCEDURE  6/6/2010    IR MISCELLANEOUS PROCEDURE  6/6/2010    IR MISCELLANEOUS PROCEDURE  5/12/2011    IR MISCELLANEOUS PROCEDURE  6/5/2012    RECONSTRUCT FOREFOOT WITH METATARSOPHALANGEAL (MTP) FUSION Left 8/21/2014    Procedure: RECONSTRUCT FOREFOOT WITH METATARSOPHALANGEAL (MTP) FUSION;  Surgeon: Tres Merlos DPM;  Location:  OR    Z NONSPECIFIC PROCEDURE  10/30/06    neurosurgery Dr Soto    [facility] NONSPECIFIC PROCEDURE      bilateral total hips approx 2002    [facility] NONSPECIFIC PROCEDURE  3/07    neurosurgery        Physical Exam     Patient Vitals for the past 24 hrs:   BP Temp Pulse Resp SpO2   11/28/24 1446 -- 98.5  F (36.9  C) -- -- --   11/28/24 1420 (!) 133/104 -- 52 18 100 %     Physical Exam  General: Alert and cooperative with exam. Patient in no apparent distress. Normal mentation.  Head:  Scalp is NC/AT  Eyes:  No scleral icterus, PERRL  ENT:  The external nose and ears are normal. The oropharynx is normal and without erythema; mucus membranes are moist. Uvula midline, no evidence of deep space infection.  Neck:  Normal range of motion without rigidity.  CV:  Regular rate and rhythm    No pathologic murmur   Resp:  Breath sounds are clear bilaterally    Non-labored, no " retractions or accessory muscle use  GI:  Abdomen is soft, no distension, no tenderness. No peritoneal signs  MS:  LLE internally rotated and shortened. Pain to palpation over left glenohumeral joint and proximal femur. No TTP over distal femur, knee, tib/fib, ankle. RLE pain free with normal ROM. Bilaterally upper extremities with normal ROM. Normal sensation and motor function to LLE. Unable to palpate dorsalis pedis pulses bilaterally however able to find equal pulse with use of doppler.   Skin:  Warm and dry, No rash or lesions noted.  Neuro:  Oriented x 3. No gross motor deficits.      Diagnostics     Lab Results   Labs Ordered and Resulted from Time of ED Arrival to Time of ED Departure   BASIC METABOLIC PANEL - Abnormal       Result Value    Sodium 132 (*)     Potassium 4.2      Chloride 98      Carbon Dioxide (CO2) 21 (*)     Anion Gap 13      Urea Nitrogen 25.8 (*)     Creatinine 1.01 (*)     GFR Estimate 55 (*)     Calcium 8.9      Glucose 129 (*)    CBC WITH PLATELETS AND DIFFERENTIAL    WBC Count 11.0      RBC Count 4.16      Hemoglobin 12.4      Hematocrit 38.6      MCV 93      MCH 29.8      MCHC 32.1      RDW 12.5      Platelet Count 275      % Neutrophils 60      % Lymphocytes 32      % Monocytes 7      % Eosinophils 1      % Basophils 0      % Immature Granulocytes 0      NRBCs per 100 WBC 0      Absolute Neutrophils 6.5      Absolute Lymphocytes 3.5      Absolute Monocytes 0.8      Absolute Eosinophils 0.1      Absolute Basophils 0.0      Absolute Immature Granulocytes 0.0      Absolute NRBCs 0.0         Imaging   XR Knee Left 1/2 Views   Final Result   IMPRESSION:      There is an acute comminuted fracture of the distal femoral diaphysis with displaced fracture fragments (at least one shaft width posterior displacement of distal fragment) and foreshortening (at least 6 cm). There is with associated varus angulation of    the fracture. Total knee arthroplasty without evidence of complication. Total  hip arthroplasty partially visualized.         NOTE: ABNORMAL REPORT      THE DICTATION ABOVE DESCRIBES AN ABNORMALITY FOR WHICH FOLLOW-UP IS NEEDED.       XR Femur Left 2 Views   Final Result   IMPRESSION:      There is an acute comminuted fracture of the distal femoral diaphysis with displaced fracture fragments (at least one shaft width posterior displacement of distal fragment) and foreshortening (at least 6 cm). There is with associated varus angulation of    the fracture. Total knee arthroplasty without evidence of complication. Total hip arthroplasty partially visualized.         NOTE: ABNORMAL REPORT      THE DICTATION ABOVE DESCRIBES AN ABNORMALITY FOR WHICH FOLLOW-UP IS NEEDED.       XR Pelvis w Hip Left 1 View   Final Result   IMPRESSION: Bilateral hip arthroplasties without evidence of hardware complication. Heterotopic ossification adjacent to the right hip arthroplasty. No evidence of acute fracture or dislocation within the limitations of osteopenia. Degenerative changes    in the lumbar spine and sacroiliac joints.            Independent Interpretation   X-ray of left hip shows no acute fracture or dislocation, hardware in place  Xray of left knee shows distal displaced femur fracture  ED Course      Medications Administered   Medications   HYDROmorphone (PF) (DILAUDID) injection 0.5 mg (0.5 mg Intravenous $Given 11/28/24 1621)   HYDROmorphone (PF) (DILAUDID) injection 0.5 mg (has no administration in time range)       Procedures   Procedures     Discussion of Management   Admitting Hospitalist, Juliana Franklin PA-C  Orthopedics, Dr. Smart with [facility]    ED Course   ED Course as of 11/28/24 1644   Thu Nov 28, 2024   1519 Attempted bedside roadtest with walker, patient endorsing severe pain just proximal to L patella   1609 Consulted with Dr. Smart with [facility] regarding patient, plan for surgical repair tomorrow. NPO at midnight   1609 Consulted with Juliana Franklin PA-C with inpatient hospitalist service        Additional Documentation  None    Medical Decision Making / Diagnosis     CMS Diagnoses: None    MIPS       None    [facility]   Candida Rose is a 84 year old female who presents after mechanical fall with left lower extremity pain.  Upon arrival to ER, patient was complaining of left hip pain.  It did appear internally rotated and shortened however patient does report chronic shortening to this extremity.  History of bilateral hip replacements as well as knee replacement.  She does have tenderness to palpation over the glenohumeral joint of left hip.  Full range of motion of the hip and knee due to pain.  Unable to palpate pulses to bilateral dorsalis pedis however am able to appreciate strong pulses bilaterally with use of Doppler.  Patient vehemently denies hitting her head or loss of consciousness.  X-ray was obtained of the left hip and does not show any evidence of acute fracture or dislocation.  Pelvis intact.  Patient attempted to ambulate with use of walker however [place] significant pain to distal femur and left knee.  Patient was brought back to x-ray for further imaging which confirms distal femur fracture, displaced.  Hardware to knee is intact.  Patient was provided Dilaudid for pain control and basic labs obtained.  I consulted with Dr. Smart with [facility] who recommends patient being placed in knee immobilizer, n.p.o. at midnight, and plan for surgical repair tomorrow.  I then consulted with Juliana FRYE with patient hospitalist service who graciously except patient's admission.  As needed Dilaudid orders placed.  Knee immobilizer placed while in the ER, angulation much improved and patient tolerated this quite well.      Disposition   The patient was admitted to the hospital.     Diagnosis     ICD-10-CM    1. Fall at home, initial encounter  W19.XXXA     Y92.009       2. Hip pain, left  M25.552       3. Closed fracture of left distal femur (H)  S72.402A            Discharge Medications   New Prescriptions     No medications on file         MELVA Farr Lauren R, PA-C  11/28/24 8382     yes

## 2024-11-28 NOTE — ED TRIAGE NOTES
Pt arrives with fall at home, states she had some shakes in her left when her leg folded behind her. Pt states pain in left hip. EMS states shortening and rotation noted on arrival.      Triage Assessment (Adult)       Row Name 11/28/24 1419          Triage Assessment    Airway WDL WDL        Respiratory WDL    Respiratory WDL WDL        Peripheral/Neurovascular WDL    Peripheral Neurovascular WDL WDL

## 2024-11-28 NOTE — ED NOTES
Attempted ambulation trial, pt was able to stand but states increased pain in left knee when attempting to get out of bed. Pt states pain is to grate to attempt to walk at this time. Provider at bedside and ordered further imaging.

## 2024-11-28 NOTE — PHARMACY-ADMISSION MEDICATION HISTORY
Pharmacist Admission Medication History    Admission medication history is complete. The information provided in this note is only as accurate as the sources available at the time of the update.    Information Source(s): Patient via in-person    Pertinent Information: outside meds    Changes made to PTA medication list:  Added: zoloft  Deleted: None  Changed: None    Allergies reviewed with patient and updates made in EHR: yes    Medication History Completed By: Yasir March RPH 11/28/2024 5:02 PM    PTA Med List   Medication Sig Last Dose/Taking    aspirin 81 MG EC tablet Take 81 mg by mouth daily 11/28/2024 Morning    atenolol (TENORMIN) 25 MG tablet Take 1 tablet (25 mg) by mouth daily 11/28/2024 Morning    cetirizine (ZYRTEC) 10 MG tablet Take 10 mg by mouth daily. 11/28/2024 Morning    cyanocobalamin (VITAMIN B-12) 1000 MCG tablet Take 1,000 mcg by mouth every other day. 11/28/2024 Morning    losartan (COZAAR) 50 MG tablet TAKE 1 TABLET BY MOUTH TWICE A DAY 11/28/2024 Morning    magnesium 500 MG TABS Take 250 mg by mouth daily. 11/28/2024 Morning    Multiple Vitamins-Minerals (MULTIVITAMIN & MINERAL PO) Take 1 tablet by mouth daily 11/27/2024 Bedtime    rosuvastatin (CRESTOR) 10 MG tablet Take 1 tablet (10 mg) by mouth daily. 11/27/2024 Bedtime    sertraline (ZOLOFT) 50 MG tablet Take 50 mg by mouth at bedtime. 11/27/2024 Bedtime

## 2024-11-28 NOTE — PROGRESS NOTES
Orthopedic surgery trauma staff    Called from the emergency room Re patient with periprosthetic left femur fracture.  Femoral diaphysis fracture above a total knee arthroplasty and distal to a total hip arthroplasty.  Recommended placement of a knee immobilizer in the emergency room.  Will require surgical fixation tomorrow.  Plan for open reduction internal fixation with my partner, Dr. Clark assuming care of the orthopedic traumatology service.  Orders placed for surgical fixation.  Full consultation to follow    Abdi Smart MD

## 2024-11-28 NOTE — ED NOTES
RECEIVING UNIT ED HANDOFF REVIEW    Above ED Nurse Handoff Report was reviewed: Yes  Reviewed by: Maude Bucio, RN on November 28, 2024 at 4:53 PM   DANTE Velasco called the ED to inform them the note was read: No   [facility]  ED Nurse Handoff Report    ED Chief complaint: Fall and Leg Pain  . ED Diagnosis:   Final diagnoses:   Fall at home, initial encounter   Hip pain, left       Allergies:   Allergies   Allergen Reactions    Lisinopril      Persistent cough       Code Status: Full Code    Activity level - Baseline/Home:  independent.  Activity Level - Current:   assist of 1.   Lift room needed: No.   Bariatric: No   Needed: No   Isolation: No.   Infection: Not Applicable.     Respiratory status: Room air    Vital Signs (within 30 minutes):   Vitals:    11/28/24 1420 11/28/24 1446   BP: (!) 133/104    Pulse: 52    Resp: 18    Temp:  98.5  F (36.9  C)   SpO2: 100%        Cardiac Rhythm:  ,      Pain level:    Patient confused: No.   Patient Falls Risk: arm band in place and patient and family education.   Elimination Status: Has voided     Patient Report - Initial Complaint: fall.   Focused Assessment:   Pt arrives with fall at home, states she had some shakes in her left when her leg folded behind her. Pt states pain in left hip. EMS states shortening and rotation noted on arrival.        Abnormal Results:   Labs Ordered and Resulted from Time of ED Arrival to Time of ED Departure   BASIC METABOLIC PANEL - Abnormal       Result Value    Sodium 132 (*)     Potassium 4.2      Chloride 98      Carbon Dioxide (CO2) 21 (*)     Anion Gap 13      Urea Nitrogen 25.8 (*)     Creatinine 1.01 (*)     GFR Estimate 55 (*)     Calcium 8.9      Glucose 129 (*)    CBC WITH PLATELETS AND DIFFERENTIAL    WBC Count 11.0      RBC Count 4.16      Hemoglobin 12.4      Hematocrit 38.6      MCV 93      MCH 29.8      MCHC 32.1      RDW 12.5      Platelet Count 275      % Neutrophils 60      %  Lymphocytes 32      % Monocytes 7      % Eosinophils 1      % Basophils 0      % Immature Granulocytes 0      NRBCs per 100 WBC 0      Absolute Neutrophils 6.5      Absolute Lymphocytes 3.5      Absolute Monocytes 0.8      Absolute Eosinophils 0.1      Absolute Basophils 0.0      Absolute Immature Granulocytes 0.0      Absolute NRBCs 0.0          XR Pelvis w Hip Left 1 View   Final Result   IMPRESSION: Bilateral hip arthroplasties without evidence of hardware complication. Heterotopic ossification adjacent to the right hip arthroplasty. No evidence of acute fracture or dislocation within the limitations of osteopenia. Degenerative changes    in the lumbar spine and sacroiliac joints.      XR Knee Left 3 Views    (Results Pending)   XR Femur Left 2 Views    (Results Pending)       Treatments provided: See MAR  Family Comments: family at bedside  OBS brochure/video discussed/provided to patient:  Yes  ED Medications:   Medications   HYDROmorphone (PF) (DILAUDID) injection 0.5 mg (0.5 mg Intravenous $Given 11/28/24 [id number])   HYDROmorphone (PF) (DILAUDID) injection 0.5 mg (has no administration in time range)       Drips infusing:  No  For the majority of the shift this patient was Green.   Interventions performed were N/A.    Sepsis treatment initiated: No    Cares/treatment/interventions/medications to be completed following ED care: N/A    ED Nurse Name: Suma Sellers RN  4:15 PM

## 2024-11-28 NOTE — H&P
[facility]  Internal Medicine  History and Physical      Patient Name: Candida Rose MRN# [id number]   Age: 84 year old YOB: 1940     Date of Admission:11/28/2024    Primary care provider: Chani Peoples  Date of Service: 11/28/2024         Assessment and Plan:   Candida Rose is a 84 year old female with a history of HTN, HLD, Moyamoya Disease, Anxiety, OA, Catarat, CVA with Left Hemiparesis, CKD, Obesity who presented to the ED today with left leg pain after a mechanical fall at home.      Left Distal Femur Fracture  S/p Mechanical Fall  Trauma imaging reveals an acute comminuted fracture of the distal femoral diaphysis with displaced fracture fragments.  Knee immobilizer placed.   - Orthopedic Surgery consulted with plans for surgical repair tomorrow  - npo after midnight, IVF, antiemetics, analgesics prn  - non weight bearing to LLE  - CMS checks  - check preop screening EKG and type and screen    Mild Hyponatremia  CKD  Sodium 132, creatinine 1.01 at baseline.  - IVF hydration  - repeat bmp in am    Hx of CVA with Left Hemiparesis  Hx Moyamoya Disease  Vascular Dementia  PMH of moyamoya s/p bilateral STA-MCA bypasses in 2006.  Hx of CVA in 2006, 2010 and 2022.  Uses a walker and cane for ambulation.  Lives in a single family home with her .  Has mild memory impairment at baseline.  - hold ASA prior to surgery on 11/29    Hx possible Paroxysmal Atrial Fibrillation  HTN  HLD  Afib diagnosed 10/2022 at the time of patient's CVA.  Followed up with Cardiology who reviewed the event monitor and did not see clear afib, but PAC/PVCs.  Per Cardiology, given the fact that the patient's with moyamoya disease have increased risk of intracerebral hemorrhage patient's anticoagulation was discontinued.      - continue pta Rosuvastatin and Atenolol  - hold ASA and Losartan prior to surgery on 11/29  - obtain preop EKG  - monitor on telemetry    Depression  Anxiety  - continue  pta Sertraline    Obesity, BMI 30      CODE: DNR/DNI confirmed with patient and  at the bedside  Diet/IVF: regular, npo after midnight  DVT ppx: scd  Disposition Plan: anticipate 2-3 days of hospitalization      Juliana Franklin MS, PA-C  Physician Assistant   Hospitalist Service                Chief Complaint:   Fall and Leg Pain          HPI:   84 year old female with a history of HTN, HLD, Moyamoya Disease, Anxiety, OA, Catarat, CVA with Left Hemiparesis, CKD, Obesity who presented to the ED today with left leg pain after a mechanical fall at home.    History obtained from patient, patient's , chart review and verbal discussion with the ED provider.    Patient lives in a single family home with her .  She has LLE weakness due to a stroke and uses a walker and cane to ambulate.  For the past one year, she has had increased tremors/shaking of her left side.  Today, while ambulating to the bathroom with her cane, she fell onto her left side and had left leg pain.  She denies any LOC or head trauma.  Denies any recent illnesses and denies any other injuries.         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 F/u dr. Soto    OA (osteoarthritis)     s/p bilat total hips     Other chronic pain     Joint pain for many years    Unspecified cerebral artery occlusion with cerebral infarction     After Moyamoya surgery > 10 yrs ago.    Vitamin D deficiencies           Past Surgical History:     Past Surgical History:   Procedure Laterality Date  "   ARTHROPLASTY KNEE Left 4/17/2017    Procedure: ARTHROPLASTY KNEE;  Left total knee arthroplasty;  Surgeon: Chidi Jenkins MD;  Location: [facility] OR    COLONOSCOPY  2008    normal    COLONOSCOPY  7/17/2014    Procedure: COLONOSCOPY;  Surgeon: Raul Nolan DO;  Location:  GI    CRANIOTOMY      MOJAMOJA  \"Pt had repair of blood flow.\"    IR CAROTID ANGIOGRAM  10/30/2006    IR CAROTID ANGIOGRAM  6/6/2010    IR CAROTID ANGIOGRAM  6/6/2010    IR CAROTID ANGIOGRAM  6/6/2010    IR CAROTID ANGIOGRAM  5/12/2011    IR CAROTID ANGIOGRAM  5/12/2011    IR CAROTID ANGIOGRAM  5/12/2011    IR CAROTID ANGIOGRAM  6/5/2012    IR CAROTID ANGIOGRAM  6/5/2012    IR CAROTID ANGIOGRAM  6/5/2012    IR CAROTID CEREBRAL ANGIOGRAM BILATERAL  10/7/2022    IR MISCELLANEOUS PROCEDURE  6/6/2010    IR MISCELLANEOUS PROCEDURE  6/6/2010    IR MISCELLANEOUS PROCEDURE  5/12/2011    IR MISCELLANEOUS PROCEDURE  6/5/2012    RECONSTRUCT FOREFOOT WITH METATARSOPHALANGEAL (MTP) FUSION Left 8/21/2014    Procedure: RECONSTRUCT FOREFOOT WITH METATARSOPHALANGEAL (MTP) FUSION;  Surgeon: Tres Merlos DPM;  Location:  OR    ZZC NONSPECIFIC PROCEDURE  10/30/06    neurosurgery Dr Soto    Z NONSPECIFIC PROCEDURE      bilateral total hips approx 2002    ZZC NONSPECIFIC PROCEDURE  3/07    neurosurgery           Social History:     Social History     Socioeconomic History    Marital status:      Spouse name: Olu     Number of children: 2    Years of education: Not on file    Highest education level: Not on file   Occupational History     Employer: RETIRED   Tobacco Use    Smoking status: Never     Passive exposure: Never    Smokeless tobacco: Never   Vaping Use    Vaping status: Never Used   Substance and Sexual Activity    Alcohol use: Yes     Comment: 1 beer or wine daily    Drug use: No    Sexual activity: Never     Partners: Male   Other Topics Concern    Parent/sibling w/ CABG, MI or angioplasty before 65F 55M? Not Asked "   Social History Narrative    Not on file     Social Drivers of Health     Financial Resource Strain: Low Risk  (5/30/2024)    Financial Resource Strain     Within the past 12 months, have you or your family members you live with been unable to get utilities (heat, electricity) when it was really needed?: No   Food Insecurity: Low Risk  (5/30/2024)    Food Insecurity     Within the past 12 months, did you worry that your food would run out before you got money to buy more?: No     Within the past 12 months, did the food you bought just not last and you didn t have money to get more?: No   Transportation Needs: Low Risk  (5/30/2024)    Transportation Needs     Within the past 12 months, has lack of transportation kept you from medical appointments, getting your medicines, non-medical meetings or appointments, work, or from getting things that you need?: No   Physical Activity: Sufficiently Active (5/30/2024)    Exercise Vital Sign     Days of Exercise per Week: 7 days     Minutes of Exercise per Session: 30 min   Stress: No Stress Concern Present (5/30/2024)    Citizen of [place] of Occupational [employer] - Occupational Stress Questionnaire     Feeling of Stress : Not at all   Social Connections: Unknown (5/30/2024)    Social Connection and Isolation Panel [NHANES]     Frequency of Communication with Friends and Family: Not on file     Frequency of Social Gatherings with Friends and Family: More than three times a week     Attends Mosque Services: Not on file     Active Member of Clubs or Organizations: Not on file     Attends Club or Organization Meetings: Not on file     Marital Status: Not on file   Interpersonal Safety: Low Risk  (6/3/2024)    Interpersonal Safety     Do you feel physically and emotionally safe where you currently live?: Yes     Within the past 12 months, have you been hit, slapped, kicked or otherwise physically hurt by someone?: No     Within the past 12 months, have you been humiliated or  "emotionally abused in other ways by your partner or ex-partner?: No   Housing Stability: Low Risk  (2024)    Housing Stability     Do you have housing? : Yes     Are you worried about losing your housing?: No          Family History:     Family History   Problem Relation Age of Onset    Obesity Sister         gastric by-pass age age 60, heart murmur.    Cancer - colorectal Sister 69    Heart Disease Father         mi,  age 48    Family History Negative Mother         killed in MVA age 54    Cancer Maternal Aunt         lung cancer ,non-smoker    Lung Cancer Maternal Aunt     Breast Cancer Daughter 48    Ovarian Cancer No family hx of           Allergies:      Allergies   Allergen Reactions    Lisinopril      Persistent cough          Medications:     Prior to Admission medications    Medication Sig Last Dose Taking? Auth Provider Long Term End Date   aspirin 81 MG EC tablet Take 81 mg by mouth daily   Reported, Patient     atenolol (TENORMIN) 25 MG tablet Take 1 tablet (25 mg) by mouth daily   Chani Peoples MD Yes    cetirizine (ZYRTEC) 10 MG tablet Take 10 mg by mouth daily.   Reported, Patient No    cyanocobalamin (VITAMIN B-12) 1000 MCG tablet Take 1,000 mcg by mouth every other day.   Reported, Patient     losartan (COZAAR) 50 MG tablet TAKE 1 TABLET BY MOUTH TWICE A DAY   Chani Peoples MD Yes    magnesium 500 MG TABS Take 250 mg by mouth daily.   Chani Peoples MD     Multiple Vitamins-Minerals (MULTIVITAMIN & MINERAL PO) Take 1 tablet by mouth daily   Reported, Patient     rosuvastatin (CRESTOR) 10 MG tablet Take 1 tablet (10 mg) by mouth daily.   Chani Peoples MD Yes           Review of Systems:   A complete ROS was performed and is negative other than what is stated in the HPI.       Physical Exam:   Blood pressure 104/73, pulse 52, temperature 98.5  F (36.9  C), resp. rate 18, height 1.727 m (5' 8\"), weight 92.3 kg (203 lb " 7.8 oz), SpO2 98%, not currently breastfeeding.  General: Alert, interactive, NAD, lying in bed with her  at the bedside, hard of hearing, pleasant and cooperative.  HEENT: AT/NC  Chest/Resp: clear to auscultation bilaterally, no crackles or wheezes  Heart/CV: regular rate and rhythm, no murmur  Abdomen/GI: Soft, nontender, nondistended. +BS.  No rebound or guarding.  Extremities/MSK: LLE in an immobilizer.  ROM testing not performed  Skin: Warm and dry  Neuro: Alert & oriented x 3, moves all extremities equally         Labs:   ROUTINE ICU LABS (Last four results)  CMP  Recent Labs   Lab 11/28/24  1429   *   POTASSIUM 4.2   CHLORIDE 98   CO2 21*   ANIONGAP 13   *   BUN 25.8*   CR 1.01*   GFRESTIMATED 55*   RAINE 8.9     CBC  Recent Labs   Lab 11/28/24  1429   WBC 11.0   RBC 4.16   HGB 12.4   HCT 38.6   MCV 93   MCH 29.8   MCHC 32.1   RDW 12.5             Imaging/Procedures:     Results for orders placed or performed during the hospital encounter of 11/28/24   XR Pelvis w Hip Left 1 View    Narrative    EXAM: XR PELVIS AND HIP LEFT 1 VIEW  LOCATION: [facility]  DATE: 11/28/2024    INDICATION: left hip internally rotated and shortened, fall with L leg going under patient  COMPARISON: 10/6/2022.      Impression    IMPRESSION: Bilateral hip arthroplasties without evidence of hardware complication. Heterotopic ossification adjacent to the right hip arthroplasty. No evidence of acute fracture or dislocation within the limitations of osteopenia. Degenerative changes   in the lumbar spine and sacroiliac joints.

## 2024-11-28 NOTE — ED PROVIDER NOTES
"ED APC SUPERVISION NOTE:   I evaluated this patient in conjunction with *** {NP/PA:054487}  I have participated in the care of the patient and personally performed key elements of the history, exam, and medical decision making.      HPI:   Candida Rose is a 84 year old female ***      Independent Historian:   {CHAROA Independent Historian:508693::\"None\"}    Review of External Notes: ***      EXAM:   ***    Independent Interpretation (X-rays, CTs, rhythm strip):  {IndependentReview:550892::\"None\"}    Consultations/Discussion of Management or Tests:  {Consults/Care Discussions:962738::\"None\"}     MEDICAL DECISION MAKING/ASSESSMENT AND PLAN:   ***      DIAGNOSIS:     ICD-10-CM    1. Fall at home, initial encounter  W19.XXXA     Y92.009       2. Hip pain, left  M25.552       3. Closed fracture of left distal femur (H)  S72.402A             {Provider or scribe signature:382564}  11/28/2024  [facility] EMERGENCY DEPT     " kg/m    General: Elderly adult laying on the stretcher  Eyes: PERRL, Conjunctive within normal limits  ENT: Moist mucous membranes  Neck: Normal active range of motion  CV: Normal S1S2, no murmur, rub or gallop. Regular rate and rhythm.  Unable to palpate DP pulses in the bilateral feet.  Toes warm and well-perfused.    Resp: Clear to auscultation bilaterally, no wheezes, rales or rhonchi. Normal respiratory effort.  GI: Abdomen is soft, nontender and nondistended.  MSK: Unable to range at the left hip.  Tender diffusely at the left hip into the left femur.  Normal muscle tone.  No palpable crepitus or deformity noted.  Fullness in the left femur noted.  Skin: Warm and dry. No rashes or lesions or ecchymoses on visible skin.  Neuro: Alert and oriented. Responds appropriately to all questions and commands. No focal findings appreciated.  Sensation intact to light touch over all dermatomes of the left lower extremity.  Psych: Normal mood and affect. Pleasant.     Independent Interpretation (X-rays, CTs, rhythm strip):  X-ray left femur, left hip and pelvis, left knee demonstrate distal femoral fracture with displacement and comminution.     Consultations/Discussion of Management or Tests:  None     MEDICAL DECISION MAKING/ASSESSMENT AND PLAN:   Candida Grissom is a 84-year-old female who presents emergency department after fall that is thought to be mechanical secondary to chronic issues with mobility after past CVA who presents emergency department with left leg pain predominantly in the femur and hip.  There is no evidence of dislocation but there is evidence of distal femur fracture with comminution of the skin likely to need surgical repair.  Orthopedics was consulted by ERNESTINA Patel, who is primarily managing the patient with my supervision.  See her note for full details.  The patient appeared to be neurovascular intact with dopplerable pulses in the left leg.  She is denying any sensation problems.  There is no  other trauma noted by history or examination.  She will be admitted for definitive care through orthopedics.  Medical clearance by our hospitalist team who will be primarily admitting the patient.     DIAGNOSIS:     ICD-10-CM    1. Fall at home, initial encounter  W19.XXXA     Y92.009       2. Hip pain, left  M25.552       3. Closed fracture of left distal femur (H)  S72.402A             Park Block MD  11/28/2024  [facility] EMERGENCY DEPT        Park Block MD  11/29/24 1743

## 2024-11-29 ENCOUNTER — APPOINTMENT (OUTPATIENT)
Dept: CT IMAGING | Facility: CLINIC | Age: 84
DRG: 480 | End: 2024-11-29
Attending: STUDENT IN AN ORGANIZED HEALTH CARE EDUCATION/TRAINING PROGRAM
Payer: MEDICARE

## 2024-11-29 ENCOUNTER — APPOINTMENT (OUTPATIENT)
Dept: GENERAL RADIOLOGY | Facility: CLINIC | Age: 84
DRG: 480 | End: 2024-11-29
Attending: ORTHOPAEDIC SURGERY
Payer: MEDICARE

## 2024-11-29 LAB
ABO + RH BLD: NORMAL
ANION GAP SERPL CALCULATED.3IONS-SCNC: 13 MMOL/L (ref 7–15)
ATRIAL RATE - MUSE: 264 BPM
BLD GP AB SCN SERPL QL: NEGATIVE
BUN SERPL-MCNC: 28.5 MG/DL (ref 8–23)
CALCIUM SERPL-MCNC: 8.2 MG/DL (ref 8.8–10.4)
CHLORIDE SERPL-SCNC: 104 MMOL/L (ref 98–107)
CREAT SERPL-MCNC: 0.95 MG/DL (ref 0.51–0.95)
CREAT SERPL-MCNC: 1.22 MG/DL (ref 0.51–0.95)
DIASTOLIC BLOOD PRESSURE - MUSE: NORMAL MMHG
EGFRCR SERPLBLD CKD-EPI 2021: 44 ML/MIN/1.73M2
EGFRCR SERPLBLD CKD-EPI 2021: 60 ML/MIN/1.73M2
ERYTHROCYTE [DISTWIDTH] IN BLOOD BY AUTOMATED COUNT: 12.6 % (ref 10–15)
GLUCOSE BLDC GLUCOMTR-MCNC: 161 MG/DL (ref 70–99)
GLUCOSE BLDC GLUCOMTR-MCNC: 176 MG/DL (ref 70–99)
GLUCOSE SERPL-MCNC: 131 MG/DL (ref 70–99)
HCO3 SERPL-SCNC: 22 MMOL/L (ref 22–29)
HCT VFR BLD AUTO: 31.5 % (ref 35–47)
HGB BLD-MCNC: 10 G/DL (ref 11.7–15.7)
INTERPRETATION ECG - MUSE: NORMAL
MAGNESIUM SERPL-MCNC: 1.8 MG/DL (ref 1.7–2.3)
MCH RBC QN AUTO: 29.7 PG (ref 26.5–33)
MCHC RBC AUTO-ENTMCNC: 31.7 G/DL (ref 31.5–36.5)
MCV RBC AUTO: 94 FL (ref 78–100)
P AXIS - MUSE: NORMAL DEGREES
PHOSPHATE SERPL-MCNC: 3.8 MG/DL (ref 2.5–4.5)
PLATELET # BLD AUTO: 254 10E3/UL (ref 150–450)
POTASSIUM SERPL-SCNC: 4.2 MMOL/L (ref 3.4–5.3)
PR INTERVAL - MUSE: NORMAL MS
QRS DURATION - MUSE: 80 MS
QT - MUSE: 322 MS
QTC - MUSE: 491 MS
R AXIS - MUSE: -15 DEGREES
RBC # BLD AUTO: 3.37 10E6/UL (ref 3.8–5.2)
SODIUM SERPL-SCNC: 139 MMOL/L (ref 135–145)
SPECIMEN EXP DATE BLD: NORMAL
SYSTOLIC BLOOD PRESSURE - MUSE: NORMAL MMHG
T AXIS - MUSE: 51 DEGREES
VENTRICULAR RATE- MUSE: 140 BPM
WBC # BLD AUTO: 10.3 10E3/UL (ref 4–11)

## 2024-11-29 PROCEDURE — C1713 ANCHOR/SCREW BN/BN,TIS/BN: HCPCS | Performed by: ORTHOPAEDIC SURGERY

## 2024-11-29 PROCEDURE — 70450 CT HEAD/BRAIN W/O DYE: CPT | Mod: MG

## 2024-11-29 PROCEDURE — 80048 BASIC METABOLIC PNL TOTAL CA: CPT | Performed by: INTERNAL MEDICINE

## 2024-11-29 PROCEDURE — 84100 ASSAY OF PHOSPHORUS: CPT | Performed by: INTERNAL MEDICINE

## 2024-11-29 PROCEDURE — 83735 ASSAY OF MAGNESIUM: CPT | Performed by: INTERNAL MEDICINE

## 2024-11-29 PROCEDURE — 70496 CT ANGIOGRAPHY HEAD: CPT | Mod: MG

## 2024-11-29 PROCEDURE — 360N000083 HC SURGERY LEVEL 3 W/ FLUORO, PER MIN: Performed by: ORTHOPAEDIC SURGERY

## 2024-11-29 PROCEDURE — 258N000003 HC RX IP 258 OP 636: Performed by: ORTHOPAEDIC SURGERY

## 2024-11-29 PROCEDURE — 370N000017 HC ANESTHESIA TECHNICAL FEE, PER MIN: Performed by: ORTHOPAEDIC SURGERY

## 2024-11-29 PROCEDURE — 272N000002 HC OR SUPPLY OTHER OPNP: Performed by: ORTHOPAEDIC SURGERY

## 2024-11-29 PROCEDURE — 82374 ASSAY BLOOD CARBON DIOXIDE: CPT | Performed by: INTERNAL MEDICINE

## 2024-11-29 PROCEDURE — 250N000013 HC RX MED GY IP 250 OP 250 PS 637: Performed by: PHYSICIAN ASSISTANT

## 2024-11-29 PROCEDURE — 36415 COLL VENOUS BLD VENIPUNCTURE: CPT | Performed by: INTERNAL MEDICINE

## 2024-11-29 PROCEDURE — 250N000009 HC RX 250: Performed by: ORTHOPAEDIC SURGERY

## 2024-11-29 PROCEDURE — 0QSC04Z REPOSITION LEFT LOWER FEMUR WITH INTERNAL FIXATION DEVICE, OPEN APPROACH: ICD-10-PCS

## 2024-11-29 PROCEDURE — 250N000011 HC RX IP 250 OP 636: Performed by: STUDENT IN AN ORGANIZED HEALTH CARE EDUCATION/TRAINING PROGRAM

## 2024-11-29 PROCEDURE — 86923 COMPATIBILITY TEST ELECTRIC: CPT | Performed by: STUDENT IN AN ORGANIZED HEALTH CARE EDUCATION/TRAINING PROGRAM

## 2024-11-29 PROCEDURE — 710N000009 HC RECOVERY PHASE 1, LEVEL 1, PER MIN: Performed by: ORTHOPAEDIC SURGERY

## 2024-11-29 PROCEDURE — 258N000001 HC RX 258: Performed by: ORTHOPAEDIC SURGERY

## 2024-11-29 PROCEDURE — 250N000011 HC RX IP 250 OP 636: Mod: JZ | Performed by: ORTHOPAEDIC SURGERY

## 2024-11-29 PROCEDURE — 999N000141 HC STATISTIC PRE-PROCEDURE NURSING ASSESSMENT: Performed by: ORTHOPAEDIC SURGERY

## 2024-11-29 PROCEDURE — 120N000001 HC R&B MED SURG/OB

## 2024-11-29 PROCEDURE — 258N000003 HC RX IP 258 OP 636: Performed by: ANESTHESIOLOGY

## 2024-11-29 PROCEDURE — 0042T CT HEAD PERFUSION W CONTRAST: CPT

## 2024-11-29 PROCEDURE — 999N000179 XR SURGERY CARM FLUORO LESS THAN 5 MIN W STILLS: Mod: TC

## 2024-11-29 PROCEDURE — 250N000025 HC SEVOFLURANE, PER MIN: Performed by: ORTHOPAEDIC SURGERY

## 2024-11-29 PROCEDURE — 82565 ASSAY OF CREATININE: CPT | Performed by: ORTHOPAEDIC SURGERY

## 2024-11-29 PROCEDURE — 86900 BLOOD TYPING SEROLOGIC ABO: CPT | Performed by: ORTHOPAEDIC SURGERY

## 2024-11-29 PROCEDURE — 36415 COLL VENOUS BLD VENIPUNCTURE: CPT | Performed by: ORTHOPAEDIC SURGERY

## 2024-11-29 PROCEDURE — 250N000011 HC RX IP 250 OP 636: Performed by: ANESTHESIOLOGY

## 2024-11-29 PROCEDURE — 85027 COMPLETE CBC AUTOMATED: CPT | Performed by: INTERNAL MEDICINE

## 2024-11-29 PROCEDURE — 272N000001 HC OR GENERAL SUPPLY STERILE: Performed by: ORTHOPAEDIC SURGERY

## 2024-11-29 PROCEDURE — C1769 GUIDE WIRE: HCPCS | Performed by: ORTHOPAEDIC SURGERY

## 2024-11-29 PROCEDURE — 250N000011 HC RX IP 250 OP 636: Performed by: ORTHOPAEDIC SURGERY

## 2024-11-29 DEVICE — IMPLANTABLE DEVICE: Type: IMPLANTABLE DEVICE | Site: FEMUR | Status: FUNCTIONAL

## 2024-11-29 DEVICE — IMP SCR SYN CORTEX 4.5X40MM ST TI 414.840: Type: IMPLANTABLE DEVICE | Site: FEMUR | Status: FUNCTIONAL

## 2024-11-29 DEVICE — IMP CABLE SYN ORTHO COCR W/CRIMP 1.7X750MM 611.105.01S: Type: IMPLANTABLE DEVICE | Site: FEMUR | Status: FUNCTIONAL

## 2024-11-29 DEVICE — IMP SCR SYN OPTILINK LOK VA CAN 5.0X80MM 42.231.680: Type: IMPLANTABLE DEVICE | Site: FEMUR | Status: FUNCTIONAL

## 2024-11-29 RX ORDER — HYDROMORPHONE HYDROCHLORIDE 2 MG/1
4 TABLET ORAL EVERY 4 HOURS PRN
Status: DISCONTINUED | OUTPATIENT
Start: 2024-11-29 | End: 2024-11-30

## 2024-11-29 RX ORDER — NALOXONE HYDROCHLORIDE 0.4 MG/ML
0.1 INJECTION, SOLUTION INTRAMUSCULAR; INTRAVENOUS; SUBCUTANEOUS
Status: DISCONTINUED | OUTPATIENT
Start: 2024-11-29 | End: 2024-11-29 | Stop reason: HOSPADM

## 2024-11-29 RX ORDER — OXYCODONE HYDROCHLORIDE 5 MG/1
5 TABLET ORAL
Status: DISCONTINUED | OUTPATIENT
Start: 2024-11-29 | End: 2024-11-29 | Stop reason: HOSPADM

## 2024-11-29 RX ORDER — ONDANSETRON 4 MG/1
4 TABLET, ORALLY DISINTEGRATING ORAL EVERY 30 MIN PRN
Status: DISCONTINUED | OUTPATIENT
Start: 2024-11-29 | End: 2024-11-29 | Stop reason: HOSPADM

## 2024-11-29 RX ORDER — ONDANSETRON 2 MG/ML
4 INJECTION INTRAMUSCULAR; INTRAVENOUS EVERY 6 HOURS PRN
Status: DISCONTINUED | OUTPATIENT
Start: 2024-11-29 | End: 2024-11-30

## 2024-11-29 RX ORDER — AMOXICILLIN 250 MG
1 CAPSULE ORAL 2 TIMES DAILY
Status: DISCONTINUED | OUTPATIENT
Start: 2024-11-29 | End: 2024-11-30

## 2024-11-29 RX ORDER — DEXAMETHASONE SODIUM PHOSPHATE 4 MG/ML
4 INJECTION, SOLUTION INTRA-ARTICULAR; INTRALESIONAL; INTRAMUSCULAR; INTRAVENOUS; SOFT TISSUE
Status: DISCONTINUED | OUTPATIENT
Start: 2024-11-29 | End: 2024-11-29 | Stop reason: HOSPADM

## 2024-11-29 RX ORDER — HYDROMORPHONE HYDROCHLORIDE 2 MG/1
2 TABLET ORAL EVERY 4 HOURS PRN
Status: DISCONTINUED | OUTPATIENT
Start: 2024-11-29 | End: 2024-11-30

## 2024-11-29 RX ORDER — IOPAMIDOL 755 MG/ML
500 INJECTION, SOLUTION INTRAVASCULAR ONCE
Status: COMPLETED | OUTPATIENT
Start: 2024-11-29 | End: 2024-11-29

## 2024-11-29 RX ORDER — CEFAZOLIN SODIUM 2 G/100ML
2 INJECTION, SOLUTION INTRAVENOUS EVERY 8 HOURS
Status: COMPLETED | OUTPATIENT
Start: 2024-11-29 | End: 2024-11-30

## 2024-11-29 RX ORDER — ACETAMINOPHEN 325 MG/1
975 TABLET ORAL 3 TIMES DAILY
Status: DISCONTINUED | OUTPATIENT
Start: 2024-11-29 | End: 2024-11-30

## 2024-11-29 RX ORDER — OXYCODONE HYDROCHLORIDE 5 MG/1
10 TABLET ORAL
Status: DISCONTINUED | OUTPATIENT
Start: 2024-11-29 | End: 2024-11-29 | Stop reason: HOSPADM

## 2024-11-29 RX ORDER — ONDANSETRON 2 MG/ML
4 INJECTION INTRAMUSCULAR; INTRAVENOUS EVERY 30 MIN PRN
Status: DISCONTINUED | OUTPATIENT
Start: 2024-11-29 | End: 2024-11-29 | Stop reason: HOSPADM

## 2024-11-29 RX ORDER — PROCHLORPERAZINE MALEATE 5 MG/1
5 TABLET ORAL EVERY 6 HOURS PRN
Status: DISCONTINUED | OUTPATIENT
Start: 2024-11-29 | End: 2024-11-30

## 2024-11-29 RX ORDER — SODIUM CHLORIDE, SODIUM LACTATE, POTASSIUM CHLORIDE, CALCIUM CHLORIDE 600; 310; 30; 20 MG/100ML; MG/100ML; MG/100ML; MG/100ML
INJECTION, SOLUTION INTRAVENOUS CONTINUOUS
Status: DISCONTINUED | OUTPATIENT
Start: 2024-11-29 | End: 2024-11-29 | Stop reason: HOSPADM

## 2024-11-29 RX ORDER — HYDROMORPHONE HCL IN WATER/PF 6 MG/30 ML
0.4 PATIENT CONTROLLED ANALGESIA SYRINGE INTRAVENOUS
Status: DISCONTINUED | OUTPATIENT
Start: 2024-11-29 | End: 2024-12-07 | Stop reason: HOSPADM

## 2024-11-29 RX ORDER — HYDROMORPHONE HCL IN WATER/PF 6 MG/30 ML
0.2 PATIENT CONTROLLED ANALGESIA SYRINGE INTRAVENOUS EVERY 5 MIN PRN
Status: DISCONTINUED | OUTPATIENT
Start: 2024-11-29 | End: 2024-11-29 | Stop reason: HOSPADM

## 2024-11-29 RX ORDER — FENTANYL CITRATE 50 UG/ML
25 INJECTION, SOLUTION INTRAMUSCULAR; INTRAVENOUS EVERY 5 MIN PRN
Status: DISCONTINUED | OUTPATIENT
Start: 2024-11-29 | End: 2024-11-29 | Stop reason: HOSPADM

## 2024-11-29 RX ORDER — SODIUM CHLORIDE, SODIUM LACTATE, POTASSIUM CHLORIDE, CALCIUM CHLORIDE 600; 310; 30; 20 MG/100ML; MG/100ML; MG/100ML; MG/100ML
INJECTION, SOLUTION INTRAVENOUS CONTINUOUS
Status: DISCONTINUED | OUTPATIENT
Start: 2024-11-29 | End: 2024-12-01

## 2024-11-29 RX ORDER — ACETAMINOPHEN 325 MG/1
650 TABLET ORAL EVERY 4 HOURS PRN
Status: DISCONTINUED | OUTPATIENT
Start: 2024-12-02 | End: 2024-11-30

## 2024-11-29 RX ORDER — NALOXONE HYDROCHLORIDE 0.4 MG/ML
0.04 INJECTION, SOLUTION INTRAMUSCULAR; INTRAVENOUS; SUBCUTANEOUS EVERY 5 MIN PRN
Status: COMPLETED | OUTPATIENT
Start: 2024-11-29 | End: 2024-11-29

## 2024-11-29 RX ORDER — HYDROMORPHONE HCL IN WATER/PF 6 MG/30 ML
0.4 PATIENT CONTROLLED ANALGESIA SYRINGE INTRAVENOUS EVERY 5 MIN PRN
Status: DISCONTINUED | OUTPATIENT
Start: 2024-11-29 | End: 2024-11-29 | Stop reason: HOSPADM

## 2024-11-29 RX ORDER — MAGNESIUM HYDROXIDE 1200 MG/15ML
LIQUID ORAL PRN
Status: DISCONTINUED | OUTPATIENT
Start: 2024-11-29 | End: 2024-11-29 | Stop reason: HOSPADM

## 2024-11-29 RX ORDER — LABETALOL HYDROCHLORIDE 5 MG/ML
10 INJECTION, SOLUTION INTRAVENOUS EVERY 5 MIN PRN
Status: DISCONTINUED | OUTPATIENT
Start: 2024-11-29 | End: 2024-11-29 | Stop reason: HOSPADM

## 2024-11-29 RX ORDER — ONDANSETRON 4 MG/1
4 TABLET, ORALLY DISINTEGRATING ORAL EVERY 6 HOURS PRN
Status: DISCONTINUED | OUTPATIENT
Start: 2024-11-29 | End: 2024-11-30

## 2024-11-29 RX ORDER — POLYETHYLENE GLYCOL 3350 17 G/17G
17 POWDER, FOR SOLUTION ORAL DAILY
Status: DISCONTINUED | OUTPATIENT
Start: 2024-11-30 | End: 2024-11-30

## 2024-11-29 RX ORDER — TRANEXAMIC ACID 650 MG/1
1950 TABLET ORAL ONCE
Status: DISCONTINUED | OUTPATIENT
Start: 2024-11-29 | End: 2024-11-29 | Stop reason: HOSPADM

## 2024-11-29 RX ORDER — LIDOCAINE 40 MG/G
CREAM TOPICAL
Status: DISCONTINUED | OUTPATIENT
Start: 2024-11-29 | End: 2024-12-07 | Stop reason: HOSPADM

## 2024-11-29 RX ORDER — ACETAMINOPHEN 325 MG/1
975 TABLET ORAL ONCE
Status: DISCONTINUED | OUTPATIENT
Start: 2024-11-29 | End: 2024-11-29 | Stop reason: HOSPADM

## 2024-11-29 RX ORDER — CEFAZOLIN SODIUM/WATER 2 G/20 ML
2 SYRINGE (ML) INTRAVENOUS
Status: COMPLETED | OUTPATIENT
Start: 2024-11-29 | End: 2024-11-29

## 2024-11-29 RX ORDER — CEFAZOLIN SODIUM/WATER 2 G/20 ML
2 SYRINGE (ML) INTRAVENOUS SEE ADMIN INSTRUCTIONS
Status: DISCONTINUED | OUTPATIENT
Start: 2024-11-29 | End: 2024-11-29 | Stop reason: HOSPADM

## 2024-11-29 RX ORDER — BISACODYL 10 MG
10 SUPPOSITORY, RECTAL RECTAL DAILY PRN
Status: DISCONTINUED | OUTPATIENT
Start: 2024-12-02 | End: 2024-12-07 | Stop reason: HOSPADM

## 2024-11-29 RX ORDER — ENOXAPARIN SODIUM 100 MG/ML
40 INJECTION SUBCUTANEOUS EVERY 24 HOURS
Status: DISCONTINUED | OUTPATIENT
Start: 2024-11-30 | End: 2024-12-04

## 2024-11-29 RX ORDER — METHOCARBAMOL 500 MG/1
500 TABLET, FILM COATED ORAL EVERY 6 HOURS PRN
Status: DISCONTINUED | OUTPATIENT
Start: 2024-11-29 | End: 2024-11-30

## 2024-11-29 RX ORDER — HYDROMORPHONE HCL IN WATER/PF 6 MG/30 ML
0.2 PATIENT CONTROLLED ANALGESIA SYRINGE INTRAVENOUS
Status: DISCONTINUED | OUTPATIENT
Start: 2024-11-29 | End: 2024-12-07 | Stop reason: HOSPADM

## 2024-11-29 RX ORDER — FENTANYL CITRATE 50 UG/ML
50 INJECTION, SOLUTION INTRAMUSCULAR; INTRAVENOUS EVERY 5 MIN PRN
Status: DISCONTINUED | OUTPATIENT
Start: 2024-11-29 | End: 2024-11-29 | Stop reason: HOSPADM

## 2024-11-29 RX ADMIN — SODIUM CHLORIDE, POTASSIUM CHLORIDE, SODIUM LACTATE AND CALCIUM CHLORIDE: 600; 310; 30; 20 INJECTION, SOLUTION INTRAVENOUS at 22:15

## 2024-11-29 RX ADMIN — NALOXONE HYDROCHLORIDE 0.04 MG: 0.4 INJECTION, SOLUTION INTRAMUSCULAR; INTRAVENOUS; SUBCUTANEOUS at 15:59

## 2024-11-29 RX ADMIN — IOPAMIDOL 117 ML: 755 INJECTION, SOLUTION INTRAVENOUS at 20:26

## 2024-11-29 RX ADMIN — NALOXONE HYDROCHLORIDE 0.04 MG: 0.4 INJECTION, SOLUTION INTRAMUSCULAR; INTRAVENOUS; SUBCUTANEOUS at 16:19

## 2024-11-29 RX ADMIN — CEFAZOLIN SODIUM 2 G: 2 INJECTION, SOLUTION INTRAVENOUS at 22:16

## 2024-11-29 RX ADMIN — OXYCODONE HYDROCHLORIDE 2.5 MG: 5 TABLET ORAL at 05:00

## 2024-11-29 RX ADMIN — SODIUM CHLORIDE, POTASSIUM CHLORIDE, SODIUM LACTATE AND CALCIUM CHLORIDE: 600; 310; 30; 20 INJECTION, SOLUTION INTRAVENOUS at 13:27

## 2024-11-29 RX ADMIN — NALOXONE HYDROCHLORIDE 0.04 MG: 0.4 INJECTION, SOLUTION INTRAMUSCULAR; INTRAVENOUS; SUBCUTANEOUS at 16:08

## 2024-11-29 RX ADMIN — NALOXONE HYDROCHLORIDE 0.04 MG: 0.4 INJECTION, SOLUTION INTRAMUSCULAR; INTRAVENOUS; SUBCUTANEOUS at 14:36

## 2024-11-29 ASSESSMENT — ACTIVITIES OF DAILY LIVING (ADL)
ADLS_ACUITY_SCORE: 57
ADLS_ACUITY_SCORE: 57
ADLS_ACUITY_SCORE: 49
ADLS_ACUITY_SCORE: 57
ADLS_ACUITY_SCORE: 55
ADLS_ACUITY_SCORE: 57
ADLS_ACUITY_SCORE: 57
ADLS_ACUITY_SCORE: 55
ADLS_ACUITY_SCORE: 55
ADLS_ACUITY_SCORE: 49
ADLS_ACUITY_SCORE: 49
ADLS_ACUITY_SCORE: 55
ADLS_ACUITY_SCORE: 57
ADLS_ACUITY_SCORE: 57
ADLS_ACUITY_SCORE: 49
ADLS_ACUITY_SCORE: 57
ADLS_ACUITY_SCORE: 57
ADLS_ACUITY_SCORE: 55
ADLS_ACUITY_SCORE: 49

## 2024-11-29 NOTE — PROGRESS NOTES
[facility]    Medicine Progress Note - Hospitalist Service    Date of Admission:  11/28/2024    Assessment & Plan   Candida Rose is a 84 year old female with a history of HTN, HLD, Moyamoya Disease, Anxiety, OA, Catarat, CVA with Left Hemiparesis, CKD, Obesity who presented to the ED today with left leg pain after a mechanical fall at home.    Patient has been in OR all day undergoing fracture repair.  I have been unable to evaluate the patient in person.          Left Distal Femur Fracture  S/p Mechanical Fall  Trauma imaging reveals an acute comminuted fracture of the distal femoral diaphysis with displaced fracture fragments.  Knee immobilizer placed.   - Orthopedic Surgery consulted with plans for surgical repair today  - IVF, antiemetics, analgesics prn  - non weight bearing to LLE  - CMS checks     Mild Hyponatremia, resolved  CKD  Sodium 132, creatinine 1.01 at baseline.  Resolved with IVF.     Hx of CVA with Left Hemiparesis  Hx Moyamoya Disease  Vascular Dementia  PMH of moyamoya s/p bilateral STA-MCA bypasses in 2006.  Hx of CVA in 2006, 2010 and 2022.  Uses a walker and cane for ambulation.  Lives in a single family home with her .  Has mild memory impairment at baseline.  - Resume ASA once ok by ortho     Hx possible Paroxysmal Atrial Fibrillation  HTN  HLD  Afib diagnosed 10/2022 at the time of patient's CVA.  Followed up with Cardiology who reviewed the event monitor and did not see clear afib, but PAC/PVCs.  Per Cardiology, given the fact that the patient's with moyamoya disease have increased risk of intracerebral hemorrhage patient's anticoagulation was discontinued.      - continue pta Rosuvastatin and Atenolol  - hold ASA and Losartan for now  - monitor on telemetry     Depression  Anxiety  - continue pta Sertraline     Obesity, BMI 30        Diet: Advance Diet as Tolerated: Regular Diet Adult    DVT Prophylaxis: Enoxaparin (Lovenox) SQ  Abreu Catheter: PRESENT,  "indication: ?  (Error. Value could not be saved.), Surgical procedure  Lines: None     Cardiac Monitoring: ACTIVE order. Indication: Procedural area  Code Status: No CPR- Do NOT Intubate      Clinically Significant Risk Factors Present on Admission         # Hyponatremia: Lowest Na = 132 mmol/L in last 2 days, will monitor as appropriate         # Drug Induced Platelet Defect: home medication list includes an antiplatelet medication   # Hypertension: Noted on problem list      # Anemia: based on hgb <11       # Obesity: Estimated body mass index is 30.94 kg/m  as calculated from the following:    Height as of this encounter: 1.727 m (5' 8\").    Weight as of this encounter: 92.3 kg (203 lb 7.8 oz).              Social Drivers of Health    Social Connections: Unknown (5/30/2024)    Social Connection and Isolation Panel [NHANES]     Frequency of Social Gatherings with Friends and Family: More than three times a week          Disposition Plan     Medically Ready for Discharge: Anticipated in 2-4 Days             Wily Palmer DO  Hospitalist Service  [facility]  Securely message with [employer] (more info)  Text page via [employer] Paging/Directory   ______________________________________________________________________    Interval History   In OR all day. No reported problems overnight.     Physical Exam   Vital Signs: Temp: 97.3  F (36.3  C) Temp src: Core BP: 121/57 Pulse: 69   Resp: 16 SpO2: 98 % O2 Device: Nasal cannula Oxygen Delivery: 2 LPM  Weight: 203 lbs 7.75 oz    Unable to perform physical examination as she has been in OR.    Medical Decision Making       Non-billable visit. MINUTES SPENT BY ME on the date of service doing chart review, history, exam, documentation & further activities per the note.      Data   ------------------------- PAST 24 HR DATA REVIEWED -----------------------------------------------    I have personally reviewed the following data over the past 24 hrs:    10.3  \   " 10.0 (L)   / 254     139 104 28.5 (H) /  161 (H)   4.2 22 0.95 \       Imaging results reviewed over the past 24 hrs:   Recent Results (from the past 24 hours)   XR Femur Left 2 Views    Narrative    EXAM: XR KNEE LEFT 1/2 VIEWS, XR FEMUR LEFT 2 VIEWS  LOCATION: [facility]  DATE: 11/28/2024    INDICATION: distal L femur pain, unable to ambulate  COMPARISON: None.      Impression    IMPRESSION:    There is an acute comminuted fracture of the distal femoral diaphysis with displaced fracture fragments (at least one shaft width posterior displacement of distal fragment) and foreshortening (at least 6 cm). There is with associated varus angulation of   the fracture. Total knee arthroplasty without evidence of complication. Total hip arthroplasty partially visualized.      NOTE: ABNORMAL REPORT    THE DICTATION ABOVE DESCRIBES AN ABNORMALITY FOR WHICH FOLLOW-UP IS NEEDED.    XR Knee Left 1/2 Views    Narrative    EXAM: XR KNEE LEFT 1/2 VIEWS, XR FEMUR LEFT 2 VIEWS  LOCATION: [facility]  DATE: 11/28/2024    INDICATION: distal L femur pain, unable to ambulate  COMPARISON: None.      Impression    IMPRESSION:    There is an acute comminuted fracture of the distal femoral diaphysis with displaced fracture fragments (at least one shaft width posterior displacement of distal fragment) and foreshortening (at least 6 cm). There is with associated varus angulation of   the fracture. Total knee arthroplasty without evidence of complication. Total hip arthroplasty partially visualized.      NOTE: ABNORMAL REPORT    THE DICTATION ABOVE DESCRIBES AN ABNORMALITY FOR WHICH FOLLOW-UP IS NEEDED.    XR Surgery BHUPINDER L/T 5 Min Fluoro w Stills    Narrative    This exam was marked as non-reportable because it will not be read by a   radiologist or a [facility] non-radiologist provider.

## 2024-11-29 NOTE — CONSULTS
CONSULT NOTE  Location: [facility]     SUBJECTIVE  Candida Rose is a very pleasant 84-year-old female with a past medical history significant for Moyamoya disease, previous CVA with left-sided hemiparesis, CKD, and hypertension who presented to the emergency department on November 28 after a mechanical ground-level fall at home.  She was unable to bear weight after the fall and was brought to the emergency department radiographs demonstrated a left interprosthetic femur fracture.  Orthopedics was consulted for further evaluation and management.  I met with Candida this morning.  She denies pain anywhere else.  She did not hit her head and she did not lose consciousness.  She has a history of a CVA with left-sided hemiparesis.  At baseline, she ambulates with a walker.  She has motor function in the left lower extremity.  She denies pain anywhere else.  She denies any numbness or tingling the left lower extremity.  She lives in a single-family home with her .  She has a history of a left total knee arthroplasty and a left total hip arthroplasty.  She states both implants were doing well before she fell.      PAST MEDICAL HISTORY  CKD  History of CVA with left hemiparesis  Moyamoya disease   Hypertension  Paroxysmal atrial fibrillation, not on anticoagulation  Hyperlipidemia    PHYSICAL EXAM  General: No acute distress.  Alert and oriented.  Respiratory: Breathing comfortably on room air  Cardiac: Regular rate and rhythm  Skin: I do not appreciate any open wounds about the left thigh  Musculoskeletal: No pain with range of motion of bilateral upper extremities.  No tenderness palpation throughout bilateral upper extremities.  No pain with range of motion of the right lower extremity.  Neuro: She is able to fire TA, EHL, and gastroc in the left lower extremity.  Sensation intact to light touch throughout the left lower extremity.  Vascular: Palpable left PT pulse    IMAGING  Radiographs of the left femur  demonstrate cemented total hip arthroplasty with components in good position and appear well-fixed.  Cemented left total knee arthroplasty components in reasonable position that appear well-fixed.  Comminuted inter prosthetic fracture between the total knee and total hip arthroplasty components.    LABS  Hemoglobin: 10.3    ASSESSMENT/PLAN  #1  Left interprosthetic femur fracture  #2  History of CVA with left-sided hemiparesis  #3  Moyamoya disease     Candida is a pleasant 84-year-old female who unfortunately fell at home on November 28, 2024 and was unable to bear weight after the fall.  Radiographs obtained the emergency department demonstrate a left inter prosthetic femur fracture.  Orthopedics was consulted for further evaluation and management.  This is a closed injury and she is neurovascularly intact.  The had a long discussion with him regarding risk and benefits of both operative and nonoperative management.  My recommendation to proceed with surgery in the form of open reduction internal fixation.  She has a left total knee arthroplasty left total hip arthroplasty both of which were performing well prior to her fall.  I discussed the risks of surgery including but not limited to infection, wound healing problems, bleeding, nonunion, malunion, persistent pain, neurovascular damage, thromboembolic disease, and medical complications related anesthesia such as heart attack, stroke, even death.  After discussing the risks and benefits, decision was made to proceed with surgery.  She has been n.p.o. since midnight.  She has been medically optimized by the hospitalist team.

## 2024-11-29 NOTE — PROGRESS NOTES
"When pt came to PACU, was alert and responding verbally to questions with short yes/no answers, and followed commands. Approximately an hour later, pt was difficult to arouse, and was not verbally responding to questions, but still follows commands. Dr Pineda responded to pt bedside.   Pt started to respond verbally to questions a short time later, but speech illogical. Dr Pineda notified      Spoke with spouse, Olu. He informed me that it is normal for pt to have trouble finding words, and to speak \"nonsense\" words. He did say that he could not remember her not being able to speak. This happens because of her Moyamoya and dementia.      "

## 2024-11-29 NOTE — PLAN OF CARE
"Goal Outcome Evaluation:           Overall Patient Progress: no changeOverall Patient Progress: no change    Patient arrived on the unit at approximately 1730 from the ED accompanied by spouse. Is A&O but forgetful, is heard of hearing and hearing aids are at home. Pain managed with tylenol and oxycodone. Is bedrest, NPO after midnight. Surgical bath was done, plan is for surgery tomorrow. Straight catheterized for 550 at 2300  Problem: Adult Inpatient Plan of Care  Goal: Plan of Care Review  Description: The Plan of Care Review/Shift note should be completed every shift.  The Outcome Evaluation is a brief statement about your assessment that the patient is improving, declining, or no change.  This information will be displayed automatically on your shift  note.  Outcome: Not Progressing  Flowsheets (Taken 11/28/2024 2340)  Overall Patient Progress: no change  Goal: Patient-Specific Goal (Individualized)  Description: You can add care plan individualizations to a care plan. Examples of Individualization might be:  \"Parent requests to be called daily at 9am for status\", \"I have a hard time hearing out of my right ear\", or \"Do not touch me to wake me up as it startles  me\".  Outcome: Not Progressing  Goal: Absence of Hospital-Acquired Illness or Injury  Outcome: Not Progressing  Intervention: Identify and Manage Fall Risk  Recent Flowsheet Documentation  Taken 11/28/2024 1830 by Maude Bucio, RN  Safety Promotion/Fall Prevention:   activity supervised   assistive device/personal items within reach   clutter free environment maintained   nonskid shoes/slippers when out of bed  Intervention: Prevent Skin Injury  Recent Flowsheet Documentation  Taken 11/28/2024 1830 by Maude Bucio RN  Body Position: supine, head elevated  Goal: Optimal Comfort and Wellbeing  Outcome: Not Progressing  Goal: Readiness for Transition of Care  Outcome: Not Progressing  Intervention: Mutually Develop Transition Plan  Recent " Flowsheet Documentation  Taken 11/28/2024 1933 by Maude Bucio, RN  Equipment Currently Used at Home:   cane, quad   walker, rolling     Problem: Hip Fracture Medical Management  Goal: Optimal Coping with Change in Health Status  Outcome: Not Progressing  Goal: Absence of Bleeding  Outcome: Not Progressing  Goal: Effective Bowel Elimination  Outcome: Not Progressing  Goal: Baseline Cognitive Function Maintained  Outcome: Not Progressing  Goal: Absence of Embolism  Outcome: Not Progressing  Goal: Fracture Stability  Outcome: Not Progressing  Goal: Optimal Functional Performance  Outcome: Not Progressing  Intervention: Promote Optimal Functional Status  Recent Flowsheet Documentation  Taken 11/28/2024 1830 by Maude Bucio, RN  Activity Management: bedrest  Goal: Pain Control and Function  Outcome: Not Progressing  Goal: Effective Urinary Elimination  Outcome: Not Progressing

## 2024-11-29 NOTE — OP NOTE
OPERATIVE NOTE    Name: Candida Rose  MRN: [id number]  Age: 84 year old    YOB: 1940    Date of Procedure: 11/29/2024      Pre-operative Diagnosis:    Left comminuted interprosthetic femur fracture     Post-operative Diagnosis:    Left comminuted interprosthetic femur fracture     Procedure:    Open reduction and internal fixation left interprosthetic femur fracture     Assistant:  Yasir Bronson PA-C    A skilled first assistant was necessary for this procedure for assistance with patient positioning, prepping, draping, surgical visualization, wound closure, and application of the dressing.     Anesthesia:  1. General    Complications:  None    Estimated blood loss:   500cc     Transfusions:  None    Fluids:  Per anesthesia    Specimens:  None    Drain:  None      Indications:   Candida is a pleasant 84-year-old female who unfortunately fell at home on November 28, 2024 and was unable to bear weight after the fall.  Radiographs obtained the emergency department demonstrate a left inter prosthetic femur fracture.  Orthopedics was consulted for further evaluation and management.  This is a closed injury and she is neurovascularly intact.  The had a long discussion with him regarding risk and benefits of both operative and nonoperative management.  My recommendation to proceed with surgery in the form of open reduction internal fixation.  She has a left total knee arthroplasty left total hip arthroplasty both of which were performing well prior to her fall.  I discussed the risks of surgery including but not limited to infection, wound healing problems, bleeding, nonunion, malunion, persistent pain, neurovascular damage, thromboembolic disease, and medical complications related anesthesia such as heart attack, stroke, even death.  After discussing the risks and benefits, decision was made to proceed with surgery.  She has been n.p.o. since midnight.  She has been medically optimized by the hospitalist team.        Informed Consent:  Preoperatively, the risks, benefits, and possible complications of the procedure were discussed. The risks discussed included but were not limited to wound healing problems, infection, bleeding, transfusion, nonunion, malunion, hardware failure, persistent pain, thromboembolic disease, neurovascular damage, and medical complications related anesthesia such as heart attack, stroke, even death.    Components:  Synthes distal femoral locking plate  Synthes cables    Procedural Pause:   The patient's correct identity, side, site, and procedure to be performed was verified.  Intravenous antibiotics were administered prior to skin incision.    Description of Procedure:   Candida Rose was brought to the operating room.  General anesthesia was induced.  The patient was subsequent transferred to the operating room table.  All bony prominences were well-padded.  The patient was placed in a near lateral position.  The left lower extremities then prepped and draped in routine sterile fashion.  A procedural pause was performed as described above.  Incision was made from the distal femur extending along the midline of the femur laterally and connecting to her previous total hip arthroplasty incision proximally.  Sharp dissection was carried down to the level of the fascia.  The fascia was split in line with the skin incision.  A subvastus approach was then performed.  Any perforating vessels we encountered were cauterized.  The fracture was then exposed and reduced.  The fracture was then held in place with a Synthes cable.  Biplanar fluoroscopy confirmed adequate restoration of length and an acceptable reduction.  There was so much comminution anterior and medial that a perfect reduction could not be obtained and therefore I elected to proceed with a bridging type construct.  I left the Synthes cable to act as a reduction tool.  I then selected an appropriately sized plate that extended from the distal  femur to the vastus ridge proximally.  I placed the plate distally along the femur and pinned it in place.  I then turned my attention to the proximal end of the plate and confirmed satisfactory position of the plate on biplanar images and pinned the plate in place proximally.  I then placed locking screws distally.  I elected to use two bicortical nonlocking screws just proximal to the proximal send of the fracture in order to reduce the stiffness of the construct.  I then placed 3 unicortical locking screws proximally and 2 cables around the bone and plate.  I was unable to get a screw bicortically around the stem and cement mantle.  I determined that 5 points of fixation proximally were adequate.  I then obtained final AP and lateral images of the construct and fracture.  Satisfied with position of the hardware and the reduction, I proceeded to closure.  The wound was copiously irrigated with 2 L of normal saline and 500 cc of Betadine saline solution.  The fascia was closed with #1 Vicryl and #1 strata fix.  The subcutaneous layer was closed with 0 stratafix and 2-0 vicryl.  The skin was closed with staples.  A sterile dressing was then applied.  At the end of the case, all sponge and needle counts were correct.  The patient woke up from anesthesia without issue and transferred to PACU in stable condition.      Post-operative Plan:  Activity: Nonweightbearing to left lower extremity  Antibiotics: Weight-based Ancef x2 doses   DVT: Lovenox 40 mg daily  Wound: Change dressing in 7 days  Disposition: Inpatient      Andrea Clark MD  [facility]  Phone: [phone]  Fax: [phone]

## 2024-11-29 NOTE — PLAN OF CARE
"Goal Outcome Evaluation:      Plan of Care Reviewed With: patient    Overall Patient Progress: no changeOverall Patient Progress: no change    Outcome Evaluation: Plan for surgery this am      VSS    Diet:  NPO since 0000   Mental Status:  A+Ox 4   O2:  RA   Pain:  Low, ice applied   Mobility: bedrest     LDA's: L PIVSL     CMS: intact    Pulses: +2     Other Cares: On tele. K, mg, phos protocol. Mg replaced this shift.  L knee brace in place. Surgical bath and wipes complete.  Hard of hearing, hearing aids at home.   GI/:  DTV. Bladder scanned 142 at 0440.        Problem: Adult Inpatient Plan of Care  Goal: Plan of Care Review  Description: The Plan of Care Review/Shift note should be completed every shift.  The Outcome Evaluation is a brief statement about your assessment that the patient is improving, declining, or no change.  This information will be displayed automatically on your shift  note.  Outcome: Progressing  Flowsheets (Taken 11/29/2024 0135)  Outcome Evaluation: Plan for surgery this am  Plan of Care Reviewed With: patient  Overall Patient Progress: no change  Goal: Patient-Specific Goal (Individualized)  Description: You can add care plan individualizations to a care plan. Examples of Individualization might be:  \"Parent requests to be called daily at 9am for status\", \"I have a hard time hearing out of my right ear\", or \"Do not touch me to wake me up as it startles  me\".  Outcome: Progressing  Goal: Absence of Hospital-Acquired Illness or Injury  Outcome: Progressing  Intervention: Identify and Manage Fall Risk  Recent Flowsheet Documentation  Taken 11/28/2024 2350 by Oly Stevens, RN  Safety Promotion/Fall Prevention:   activity supervised   assistive device/personal items within reach   clutter free environment maintained   nonskid shoes/slippers when out of bed  Intervention: Prevent Skin Injury  Recent Flowsheet Documentation  Taken 11/28/2024 2350 by Oly Stevens, RN  Body Position: supine, " head elevated  Skin Protection: adhesive use limited  Intervention: Prevent Infection  Recent Flowsheet Documentation  Taken 11/28/2024 2350 by Oly Stevens RN  Infection Prevention: rest/sleep promoted  Goal: Optimal Comfort and Wellbeing  Outcome: Progressing  Intervention: Monitor Pain and Promote Comfort  Recent Flowsheet Documentation  Taken 11/28/2024 2347 by Oly Stevens RN  Pain Management Interventions: cold applied  Goal: Readiness for Transition of Care  Outcome: Progressing     Problem: Hip Fracture Medical Management  Goal: Optimal Coping with Change in Health Status  Outcome: Progressing  Intervention: Support Psychosocial Response to Injury  Recent Flowsheet Documentation  Taken 11/28/2024 2350 by Oly Stevens RN  Supportive Measures: active listening utilized  Goal: Absence of Bleeding  Outcome: Progressing  Goal: Effective Bowel Elimination  Outcome: Progressing  Goal: Baseline Cognitive Function Maintained  Outcome: Progressing  Goal: Absence of Embolism  Outcome: Progressing  Goal: Fracture Stability  Outcome: Progressing  Goal: Optimal Functional Performance  Outcome: Progressing  Intervention: Promote Optimal Functional Status  Recent Flowsheet Documentation  Taken 11/28/2024 2350 by Oly Stevens RN  Range of Motion: active ROM (range of motion) encouraged  Activity Management: bedrest  Goal: Pain Control and Function  Outcome: Progressing  Intervention: Manage Acute Orthopaedic-Related Pain  Recent Flowsheet Documentation  Taken 11/28/2024 2347 by Oly Stevens RN  Pain Management Interventions: cold applied  Goal: Effective Urinary Elimination  Outcome: Progressing

## 2024-11-30 ENCOUNTER — APPOINTMENT (OUTPATIENT)
Dept: GENERAL RADIOLOGY | Facility: CLINIC | Age: 84
DRG: 480 | End: 2024-11-30
Attending: STUDENT IN AN ORGANIZED HEALTH CARE EDUCATION/TRAINING PROGRAM
Payer: MEDICARE

## 2024-11-30 ENCOUNTER — APPOINTMENT (OUTPATIENT)
Dept: GENERAL RADIOLOGY | Facility: CLINIC | Age: 84
DRG: 480 | End: 2024-11-30
Attending: INTERNAL MEDICINE
Payer: MEDICARE

## 2024-11-30 ENCOUNTER — APPOINTMENT (OUTPATIENT)
Dept: CT IMAGING | Facility: CLINIC | Age: 84
DRG: 480 | End: 2024-11-30
Attending: STUDENT IN AN ORGANIZED HEALTH CARE EDUCATION/TRAINING PROGRAM
Payer: MEDICARE

## 2024-11-30 LAB
ALBUMIN SERPL BCG-MCNC: 2.8 G/DL (ref 3.5–5.2)
ALP SERPL-CCNC: 66 U/L (ref 40–150)
ALT SERPL W P-5'-P-CCNC: 13 U/L (ref 0–50)
ANION GAP SERPL CALCULATED.3IONS-SCNC: 11 MMOL/L (ref 7–15)
AST SERPL W P-5'-P-CCNC: 56 U/L (ref 0–45)
BASE EXCESS BLDV CALC-SCNC: 1.3 MMOL/L (ref -3–3)
BILIRUB DIRECT SERPL-MCNC: <0.2 MG/DL (ref 0–0.3)
BILIRUB SERPL-MCNC: <0.2 MG/DL
BLD PROD TYP BPU: NORMAL
BLD PROD TYP BPU: NORMAL
BLOOD COMPONENT TYPE: NORMAL
BLOOD COMPONENT TYPE: NORMAL
BUN SERPL-MCNC: 27.9 MG/DL (ref 8–23)
CALCIUM SERPL-MCNC: 7.6 MG/DL (ref 8.8–10.4)
CHLORIDE SERPL-SCNC: 102 MMOL/L (ref 98–107)
CODING SYSTEM: NORMAL
CODING SYSTEM: NORMAL
CREAT SERPL-MCNC: 1.2 MG/DL (ref 0.51–0.95)
CROSSMATCH: NORMAL
CROSSMATCH: NORMAL
EGFRCR SERPLBLD CKD-EPI 2021: 45 ML/MIN/1.73M2
ERYTHROCYTE [DISTWIDTH] IN BLOOD BY AUTOMATED COUNT: 12.8 % (ref 10–15)
EST. AVERAGE GLUCOSE BLD GHB EST-MCNC: 126 MG/DL
GLUCOSE SERPL-MCNC: 131 MG/DL (ref 70–99)
GLUCOSE SERPL-MCNC: 131 MG/DL (ref 70–99)
HBA1C MFR BLD: 6 %
HCO3 BLDV-SCNC: 27 MMOL/L (ref 21–28)
HCO3 SERPL-SCNC: 21 MMOL/L (ref 22–29)
HCT VFR BLD AUTO: 21.5 % (ref 35–47)
HGB BLD-MCNC: 6.8 G/DL (ref 11.7–15.7)
HGB BLD-MCNC: 9.3 G/DL (ref 11.7–15.7)
ISSUE DATE AND TIME: NORMAL
ISSUE DATE AND TIME: NORMAL
MAGNESIUM SERPL-MCNC: 1.7 MG/DL (ref 1.7–2.3)
MCH RBC QN AUTO: 30 PG (ref 26.5–33)
MCHC RBC AUTO-ENTMCNC: 31.8 G/DL (ref 31.5–36.5)
MCV RBC AUTO: 93 FL (ref 78–100)
O2/TOTAL GAS SETTING VFR VENT: 21 %
OXYHGB MFR BLDV: 77 % (ref 70–75)
PCO2 BLDV: 44 MM HG (ref 40–50)
PH BLDV: 7.39 [PH] (ref 7.32–7.43)
PHOSPHATE SERPL-MCNC: 3.2 MG/DL (ref 2.5–4.5)
PLATELET # BLD AUTO: 196 10E3/UL (ref 150–450)
PO2 BLDV: 43 MM HG (ref 25–47)
POTASSIUM SERPL-SCNC: 4.3 MMOL/L (ref 3.4–5.3)
POTASSIUM SERPL-SCNC: 4.3 MMOL/L (ref 3.4–5.3)
PROT SERPL-MCNC: 4.7 G/DL (ref 6.4–8.3)
RBC # BLD AUTO: 2.3 10E6/UL (ref 3.8–5.2)
SAO2 % BLDV: 79.6 % (ref 70–75)
SODIUM SERPL-SCNC: 134 MMOL/L (ref 135–145)
TROPONIN T SERPL HS-MCNC: 24 NG/L
UNIT ABO/RH: NORMAL
UNIT ABO/RH: NORMAL
UNIT NUMBER: NORMAL
UNIT NUMBER: NORMAL
UNIT STATUS: NORMAL
UNIT TYPE ISBT: 5100
UNIT TYPE ISBT: 5100
WBC # BLD AUTO: 11.8 10E3/UL (ref 4–11)

## 2024-11-30 PROCEDURE — 82947 ASSAY GLUCOSE BLOOD QUANT: CPT | Performed by: INTERNAL MEDICINE

## 2024-11-30 PROCEDURE — 74018 RADEX ABDOMEN 1 VIEW: CPT

## 2024-11-30 PROCEDURE — 85041 AUTOMATED RBC COUNT: CPT | Performed by: STUDENT IN AN ORGANIZED HEALTH CARE EDUCATION/TRAINING PROGRAM

## 2024-11-30 PROCEDURE — 80061 LIPID PANEL: CPT | Performed by: PHYSICIAN ASSISTANT

## 2024-11-30 PROCEDURE — 82374 ASSAY BLOOD CARBON DIOXIDE: CPT | Performed by: STUDENT IN AN ORGANIZED HEALTH CARE EDUCATION/TRAINING PROGRAM

## 2024-11-30 PROCEDURE — 70450 CT HEAD/BRAIN W/O DYE: CPT | Mod: MG

## 2024-11-30 PROCEDURE — 250N000009 HC RX 250: Performed by: STUDENT IN AN ORGANIZED HEALTH CARE EDUCATION/TRAINING PROGRAM

## 2024-11-30 PROCEDURE — 84100 ASSAY OF PHOSPHORUS: CPT | Performed by: INTERNAL MEDICINE

## 2024-11-30 PROCEDURE — 84520 ASSAY OF UREA NITROGEN: CPT | Performed by: STUDENT IN AN ORGANIZED HEALTH CARE EDUCATION/TRAINING PROGRAM

## 2024-11-30 PROCEDURE — 250N000011 HC RX IP 250 OP 636: Mod: JZ | Performed by: ORTHOPAEDIC SURGERY

## 2024-11-30 PROCEDURE — 83735 ASSAY OF MAGNESIUM: CPT | Performed by: INTERNAL MEDICINE

## 2024-11-30 PROCEDURE — 84450 TRANSFERASE (AST) (SGOT): CPT | Performed by: STUDENT IN AN ORGANIZED HEALTH CARE EDUCATION/TRAINING PROGRAM

## 2024-11-30 PROCEDURE — 99233 SBSQ HOSP IP/OBS HIGH 50: CPT | Performed by: STUDENT IN AN ORGANIZED HEALTH CARE EDUCATION/TRAINING PROGRAM

## 2024-11-30 PROCEDURE — 36415 COLL VENOUS BLD VENIPUNCTURE: CPT | Performed by: STUDENT IN AN ORGANIZED HEALTH CARE EDUCATION/TRAINING PROGRAM

## 2024-11-30 PROCEDURE — 82435 ASSAY OF BLOOD CHLORIDE: CPT | Performed by: STUDENT IN AN ORGANIZED HEALTH CARE EDUCATION/TRAINING PROGRAM

## 2024-11-30 PROCEDURE — 93010 ELECTROCARDIOGRAM REPORT: CPT | Performed by: INTERNAL MEDICINE

## 2024-11-30 PROCEDURE — 84132 ASSAY OF SERUM POTASSIUM: CPT | Performed by: INTERNAL MEDICINE

## 2024-11-30 PROCEDURE — 84155 ASSAY OF PROTEIN SERUM: CPT | Performed by: STUDENT IN AN ORGANIZED HEALTH CARE EDUCATION/TRAINING PROGRAM

## 2024-11-30 PROCEDURE — 36416 COLLJ CAPILLARY BLOOD SPEC: CPT | Performed by: STUDENT IN AN ORGANIZED HEALTH CARE EDUCATION/TRAINING PROGRAM

## 2024-11-30 PROCEDURE — 82805 BLOOD GASES W/O2 SATURATION: CPT | Performed by: STUDENT IN AN ORGANIZED HEALTH CARE EDUCATION/TRAINING PROGRAM

## 2024-11-30 PROCEDURE — 85018 HEMOGLOBIN: CPT | Performed by: STUDENT IN AN ORGANIZED HEALTH CARE EDUCATION/TRAINING PROGRAM

## 2024-11-30 PROCEDURE — 85014 HEMATOCRIT: CPT | Performed by: STUDENT IN AN ORGANIZED HEALTH CARE EDUCATION/TRAINING PROGRAM

## 2024-11-30 PROCEDURE — 999N000065 XR ABDOMEN PORT 1 VIEW

## 2024-11-30 PROCEDURE — P9016 RBC LEUKOCYTES REDUCED: HCPCS | Performed by: STUDENT IN AN ORGANIZED HEALTH CARE EDUCATION/TRAINING PROGRAM

## 2024-11-30 PROCEDURE — 83036 HEMOGLOBIN GLYCOSYLATED A1C: CPT | Performed by: PHYSICIAN ASSISTANT

## 2024-11-30 PROCEDURE — 36415 COLL VENOUS BLD VENIPUNCTURE: CPT | Performed by: INTERNAL MEDICINE

## 2024-11-30 PROCEDURE — G1010 CDSM STANSON: HCPCS

## 2024-11-30 PROCEDURE — 120N000001 HC R&B MED SURG/OB

## 2024-11-30 PROCEDURE — 84484 ASSAY OF TROPONIN QUANT: CPT | Performed by: STUDENT IN AN ORGANIZED HEALTH CARE EDUCATION/TRAINING PROGRAM

## 2024-11-30 PROCEDURE — 82565 ASSAY OF CREATININE: CPT | Performed by: STUDENT IN AN ORGANIZED HEALTH CARE EDUCATION/TRAINING PROGRAM

## 2024-11-30 PROCEDURE — 250N000011 HC RX IP 250 OP 636: Performed by: STUDENT IN AN ORGANIZED HEALTH CARE EDUCATION/TRAINING PROGRAM

## 2024-11-30 PROCEDURE — 83718 ASSAY OF LIPOPROTEIN: CPT | Performed by: PHYSICIAN ASSISTANT

## 2024-11-30 PROCEDURE — 82247 BILIRUBIN TOTAL: CPT | Performed by: STUDENT IN AN ORGANIZED HEALTH CARE EDUCATION/TRAINING PROGRAM

## 2024-11-30 PROCEDURE — G0427 INPT/ED TELECONSULT70: HCPCS | Mod: G0 | Performed by: PHYSICIAN ASSISTANT

## 2024-11-30 RX ORDER — MAGNESIUM SULFATE HEPTAHYDRATE 40 MG/ML
2 INJECTION, SOLUTION INTRAVENOUS ONCE
Status: COMPLETED | OUTPATIENT
Start: 2024-11-30 | End: 2024-11-30

## 2024-11-30 RX ORDER — HYDROMORPHONE HYDROCHLORIDE 2 MG/1
4 TABLET ORAL EVERY 4 HOURS PRN
Status: DISCONTINUED | OUTPATIENT
Start: 2024-11-30 | End: 2024-11-30

## 2024-11-30 RX ORDER — METOCLOPRAMIDE HYDROCHLORIDE 5 MG/ML
10 INJECTION INTRAMUSCULAR; INTRAVENOUS ONCE
Status: COMPLETED | OUTPATIENT
Start: 2024-11-30 | End: 2024-11-30

## 2024-11-30 RX ORDER — HYDROMORPHONE HYDROCHLORIDE 2 MG/1
2 TABLET ORAL EVERY 4 HOURS PRN
Status: DISCONTINUED | OUTPATIENT
Start: 2024-11-30 | End: 2024-11-30

## 2024-11-30 RX ORDER — ASPIRIN 81 MG/1
81 TABLET, CHEWABLE ORAL DAILY
Status: DISCONTINUED | OUTPATIENT
Start: 2024-11-30 | End: 2024-12-05

## 2024-11-30 RX ORDER — ONDANSETRON 4 MG/1
4 TABLET, ORALLY DISINTEGRATING ORAL EVERY 6 HOURS PRN
Status: DISCONTINUED | OUTPATIENT
Start: 2024-11-30 | End: 2024-12-07 | Stop reason: HOSPADM

## 2024-11-30 RX ORDER — AMOXICILLIN 250 MG
1 CAPSULE ORAL 2 TIMES DAILY
Status: DISCONTINUED | OUTPATIENT
Start: 2024-12-01 | End: 2024-12-07 | Stop reason: HOSPADM

## 2024-11-30 RX ORDER — HYDROMORPHONE HYDROCHLORIDE 2 MG/1
2 TABLET ORAL EVERY 4 HOURS PRN
Status: DISCONTINUED | OUTPATIENT
Start: 2024-11-30 | End: 2024-12-07 | Stop reason: HOSPADM

## 2024-11-30 RX ORDER — METHOCARBAMOL 500 MG/1
500 TABLET, FILM COATED ORAL EVERY 6 HOURS PRN
Status: DISCONTINUED | OUTPATIENT
Start: 2024-11-30 | End: 2024-12-07 | Stop reason: HOSPADM

## 2024-11-30 RX ORDER — ONDANSETRON 2 MG/ML
4 INJECTION INTRAMUSCULAR; INTRAVENOUS EVERY 6 HOURS PRN
Status: DISCONTINUED | OUTPATIENT
Start: 2024-11-30 | End: 2024-12-07 | Stop reason: HOSPADM

## 2024-11-30 RX ORDER — LIDOCAINE HYDROCHLORIDE 20 MG/ML
JELLY TOPICAL ONCE
Status: COMPLETED | OUTPATIENT
Start: 2024-11-30 | End: 2024-11-30

## 2024-11-30 RX ORDER — POLYETHYLENE GLYCOL 3350 17 G/17G
17 POWDER, FOR SOLUTION ORAL DAILY
Status: DISCONTINUED | OUTPATIENT
Start: 2024-12-01 | End: 2024-12-07 | Stop reason: HOSPADM

## 2024-11-30 RX ORDER — ATENOLOL 25 MG/1
25 TABLET ORAL DAILY
Status: DISCONTINUED | OUTPATIENT
Start: 2024-12-01 | End: 2024-12-07 | Stop reason: HOSPADM

## 2024-11-30 RX ORDER — AMOXICILLIN 250 MG
2 CAPSULE ORAL 2 TIMES DAILY PRN
Status: DISCONTINUED | OUTPATIENT
Start: 2024-11-30 | End: 2024-12-07 | Stop reason: HOSPADM

## 2024-11-30 RX ORDER — ACETAMINOPHEN 325 MG/1
650 TABLET ORAL EVERY 4 HOURS PRN
Status: DISCONTINUED | OUTPATIENT
Start: 2024-12-02 | End: 2024-12-07 | Stop reason: HOSPADM

## 2024-11-30 RX ORDER — HYDROMORPHONE HYDROCHLORIDE 2 MG/1
4 TABLET ORAL EVERY 4 HOURS PRN
Status: DISCONTINUED | OUTPATIENT
Start: 2024-11-30 | End: 2024-12-07 | Stop reason: HOSPADM

## 2024-11-30 RX ORDER — PROCHLORPERAZINE MALEATE 5 MG/1
5 TABLET ORAL EVERY 6 HOURS PRN
Status: DISCONTINUED | OUTPATIENT
Start: 2024-11-30 | End: 2024-12-07 | Stop reason: HOSPADM

## 2024-11-30 RX ORDER — ACETAMINOPHEN 325 MG/1
975 TABLET ORAL 3 TIMES DAILY
Status: COMPLETED | OUTPATIENT
Start: 2024-12-01 | End: 2024-12-02

## 2024-11-30 RX ORDER — AMOXICILLIN 250 MG
1 CAPSULE ORAL 2 TIMES DAILY PRN
Status: DISCONTINUED | OUTPATIENT
Start: 2024-11-30 | End: 2024-12-07 | Stop reason: HOSPADM

## 2024-11-30 RX ORDER — ROSUVASTATIN CALCIUM 10 MG/1
10 TABLET, COATED ORAL AT BEDTIME
Status: DISCONTINUED | OUTPATIENT
Start: 2024-12-01 | End: 2024-12-07 | Stop reason: HOSPADM

## 2024-11-30 RX ADMIN — HYDROMORPHONE HYDROCHLORIDE 0.2 MG: 0.2 INJECTION, SOLUTION INTRAMUSCULAR; INTRAVENOUS; SUBCUTANEOUS at 13:05

## 2024-11-30 RX ADMIN — LIDOCAINE HYDROCHLORIDE: 20 JELLY TOPICAL at 17:54

## 2024-11-30 RX ADMIN — METOCLOPRAMIDE 10 MG: 5 INJECTION, SOLUTION INTRAMUSCULAR; INTRAVENOUS at 17:49

## 2024-11-30 RX ADMIN — MAGNESIUM SULFATE HEPTAHYDRATE 2 G: 40 INJECTION, SOLUTION INTRAVENOUS at 15:49

## 2024-11-30 RX ADMIN — CEFAZOLIN SODIUM 2 G: 2 INJECTION, SOLUTION INTRAVENOUS at 05:33

## 2024-11-30 ASSESSMENT — ACTIVITIES OF DAILY LIVING (ADL)
ADLS_ACUITY_SCORE: 62
ADLS_ACUITY_SCORE: 57
ADLS_ACUITY_SCORE: 62
ADLS_ACUITY_SCORE: 60
ADLS_ACUITY_SCORE: 62
ADLS_ACUITY_SCORE: 60
ADLS_ACUITY_SCORE: 62
ADLS_ACUITY_SCORE: 60
ADLS_ACUITY_SCORE: 60
ADLS_ACUITY_SCORE: 62
ADLS_ACUITY_SCORE: 60
ADLS_ACUITY_SCORE: 62
ADLS_ACUITY_SCORE: 62
ADLS_ACUITY_SCORE: 60
ADLS_ACUITY_SCORE: 62

## 2024-11-30 NOTE — PROGRESS NOTES
Chart reviewed.  Patient seen at bedside.  Hgb 6.8.  Consented  over the phone for transfusion.  Will give 2 units with goal >8 in the setting of suspected stroke.  No obvious evidence of external bleeding.  Hold enoxaparin for now until we determine Hgb is stable.     Patient remains non-verbal with right sided hemineglect.  She squeezes her left hand but otherwise does not follow commands.  Fixed left sized gaze.  PERRL.     MRI needed but MRI machine is down at [facility].  Anticipate need for transfer to [facility] but will confirm with stroke neurology.     Otherwise remains vitally stable.  Confirmed DNR/DNI with .  Strict NPO.     Wily Palmer DO    Addendum:  Discussed with Dr. Rodas stroke neurology who recommends repeat STAT Ct head without contrast to assess for evolving stroke.     Wily Palmer DO

## 2024-11-30 NOTE — PLAN OF CARE
"Goal Outcome Evaluation:      Plan of Care Reviewed With: spouse    Overall Patient Progress: no changeOverall Patient Progress: no change    Patient returned to the unit at approximately 1915 from PACU. Patient not responding verbally or to commands but has her eyes open and follows movement with eyes spouse at bedside. Code stroke called and patient send for CT, code stroke deescalated per orders after patient returned to the unit from the CT. MRI ordered, called spouse because he had gone home and he helped fill out MRI check list over the phone. No s/s Unable assess CMS due to not responding, knee immobilizer on. Dressing CDI, patient on capno monitoring, LR running at 75ml/hr, will continue to follow plan of care.  Problem: Adult Inpatient Plan of Care  Goal: Plan of Care Review  Description: The Plan of Care Review/Shift note should be completed every shift.  The Outcome Evaluation is a brief statement about your assessment that the patient is improving, declining, or no change.  This information will be displayed automatically on your shift  note.  Outcome: Not Progressing  Flowsheets (Taken 11/29/2024 0495)  Plan of Care Reviewed With: spouse  Overall Patient Progress: no change  Goal: Patient-Specific Goal (Individualized)  Description: You can add care plan individualizations to a care plan. Examples of Individualization might be:  \"Parent requests to be called daily at 9am for status\", \"I have a hard time hearing out of my right ear\", or \"Do not touch me to wake me up as it startles  me\".  Outcome: Not Progressing  Goal: Absence of Hospital-Acquired Illness or Injury  Outcome: Not Progressing  Goal: Optimal Comfort and Wellbeing  Outcome: Not Progressing  Goal: Readiness for Transition of Care  Outcome: Not Progressing     Problem: Hip Fracture Medical Management  Goal: Optimal Coping with Change in Health Status  Outcome: Not Progressing  Goal: Absence of Bleeding  Outcome: Not Progressing  Goal: " Effective Bowel Elimination  Outcome: Not Progressing  Goal: Baseline Cognitive Function Maintained  Outcome: Not Progressing  Goal: Absence of Embolism  Outcome: Not Progressing  Goal: Fracture Stability  Outcome: Not Progressing  Goal: Optimal Functional Performance  Outcome: Not Progressing  Goal: Pain Control and Function  Outcome: Not Progressing  Goal: Effective Urinary Elimination  Outcome: Not Progressing     Problem: Hip Fracture Surgical Repair  Goal: Optimal Coping with Change in Health Status  Outcome: Not Progressing  Goal: Absence of Bleeding  Outcome: Not Progressing  Goal: Effective Bowel Elimination  Outcome: Not Progressing  Goal: Cognitive Function Maintained  Outcome: Not Progressing  Goal: Fluid and Electrolyte Balance  Outcome: Not Progressing  Goal: Absence of Infection Signs and Symptoms  Outcome: Not Progressing  Goal: Optimal Functional Ability  Outcome: Not Progressing  Goal: Anesthesia/Sedation Recovery  Outcome: Not Progressing  Goal: Optimal Pain Control and Function  Outcome: Not Progressing  Goal: Nausea and Vomiting Relief  Outcome: Not Progressing  Goal: Effective Urinary Elimination  Outcome: Not Progressing  Goal: Effective Oxygenation and Ventilation  Outcome: Not Progressing

## 2024-11-30 NOTE — PLAN OF CARE
"Pt is alert with eyes opened but not responding to questions. No observable signs or symptoms of pain. Pt on room air and capno on.    See flowsheet for neuro assessment. Right femur dressing is CDI, knee immobilizer on.    IV LR infusing at 75 ml/hr. IV Abx Ancef administered.    Abreu patent.     MRI still needed; MD made aware. Ongoing monitoring.    Plan: Neuro consult. Continue POC.    /58 (BP Location: Right arm, Patient Position: Semi-Adkins's, Cuff Size: Adult Regular)   Pulse 99   Temp 97.5  F (36.4  C) (Temporal)   Resp 24   Ht 1.727 m (5' 8\")   Wt 92.3 kg (203 lb 7.8 oz)   LMP  (LMP Unknown)   SpO2 95%   BMI 30.94 kg/m       Problem: Adult Inpatient Plan of Care  Goal: Plan of Care Review  Description: The Plan of Care Review/Shift note should be completed every shift.  The Outcome Evaluation is a brief statement about your assessment that the patient is improving, declining, or no change.  This information will be displayed automatically on your shift  note.  Outcome: Progressing  Flowsheets (Taken 11/30/2024 0656)  Outcome Evaluation:   Pt has eyes opened   not responding to questions.  Plan of Care Reviewed With: patient  Overall Patient Progress: no change  Goal: Patient-Specific Goal (Individualized)  Description: You can add care plan individualizations to a care plan. Examples of Individualization might be:  \"Parent requests to be called daily at 9am for status\", \"I have a hard time hearing out of my right ear\", or \"Do not touch me to wake me up as it startles  me\".  Outcome: Progressing  Goal: Absence of Hospital-Acquired Illness or Injury  Outcome: Progressing  Intervention: Identify and Manage Fall Risk  Recent Flowsheet Documentation  Taken 11/30/2024 0300 by Analia Moya RN  Safety Promotion/Fall Prevention: safety round/check completed  Taken 11/30/2024 0200 by Analia Moya RN  Safety Promotion/Fall Prevention: safety round/check completed  Taken 11/30/2024 0100 by " Griffin, Olubukola N, RN  Safety Promotion/Fall Prevention: safety round/check completed  Taken 11/30/2024 0059 by Analia Moya RN  Safety Promotion/Fall Prevention:   safety round/check completed   clutter free environment maintained   assistive device/personal items within reach   mobility aid in reach   lighting adjusted  Taken 11/30/2024 0000 by Analia Moya RN  Safety Promotion/Fall Prevention: safety round/check completed  Taken 11/29/2024 2300 by Analia Moya RN  Safety Promotion/Fall Prevention: safety round/check completed  Intervention: Prevent Skin Injury  Recent Flowsheet Documentation  Taken 11/30/2024 0520 by Analia Moya RN  Body Position:   heels elevated   left   turned  Taken 11/30/2024 0300 by Analia Moya RN  Body Position: (right heel; imobilizer to right knee)   supine, head elevated   heels elevated  Taken 11/30/2024 0059 by Analia Moya RN  Body Position:   heels elevated   upper extremity elevated   weight shifting   left  Intervention: Prevent and Manage VTE (Venous Thromboembolism) Risk  Recent Flowsheet Documentation  Taken 11/30/2024 0059 by Analia Moya RN  VTE Prevention/Management: (RLE) SCDs on (sequential compression devices)  Intervention: Prevent Infection  Recent Flowsheet Documentation  Taken 11/30/2024 0059 by Analia Moya RN  Infection Prevention:   rest/sleep promoted   hand hygiene promoted   single patient room provided  Goal: Optimal Comfort and Wellbeing  Outcome: Progressing  Goal: Readiness for Transition of Care  Outcome: Progressing     Problem: Hip Fracture Medical Management  Goal: Optimal Coping with Change in Health Status  Outcome: Progressing  Goal: Absence of Bleeding  Outcome: Progressing  Goal: Effective Bowel Elimination  Outcome: Progressing  Goal: Baseline Cognitive Function Maintained  Outcome: Progressing  Goal: Absence of Embolism  Outcome: Progressing  Intervention: Prevent or Manage Embolism Risk  Recent Flowsheet  Documentation  Taken 11/30/2024 0059 by Analia Moya RN  VTE Prevention/Management: (RLE) SCDs on (sequential compression devices)  Goal: Fracture Stability  Outcome: Progressing  Intervention: Promote Fracture Stability and Healing  Recent Flowsheet Documentation  Taken 11/30/2024 0059 by Analia Moya RN  Equipment: immobilizer  Goal: Optimal Functional Performance  Outcome: Progressing  Goal: Pain Control and Function  Outcome: Progressing  Goal: Effective Urinary Elimination  Outcome: Progressing     Problem: Hip Fracture Surgical Repair  Goal: Optimal Coping with Change in Health Status  Outcome: Progressing  Goal: Absence of Bleeding  Outcome: Progressing  Goal: Effective Bowel Elimination  Outcome: Progressing  Goal: Cognitive Function Maintained  Outcome: Progressing  Goal: Fluid and Electrolyte Balance  Outcome: Progressing  Goal: Absence of Infection Signs and Symptoms  Outcome: Progressing  Goal: Optimal Functional Ability  Outcome: Progressing  Intervention: Protect Joint Integrity  Recent Flowsheet Documentation  Taken 11/30/2024 0059 by Analia Moya RN  Equipment: immobilizer  Goal: Anesthesia/Sedation Recovery  Outcome: Progressing  Intervention: Optimize Anesthesia Recovery  Recent Flowsheet Documentation  Taken 11/30/2024 0300 by Analia Moya RN  Safety Promotion/Fall Prevention: safety round/check completed  Taken 11/30/2024 0200 by Analia Moya RN  Safety Promotion/Fall Prevention: safety round/check completed  Taken 11/30/2024 0100 by Analia Moya RN  Safety Promotion/Fall Prevention: safety round/check completed  Taken 11/30/2024 0059 by Analia Moya RN  Safety Promotion/Fall Prevention:   safety round/check completed   clutter free environment maintained   assistive device/personal items within reach   mobility aid in reach   lighting adjusted  Taken 11/30/2024 0000 by Analia Moya RN  Safety Promotion/Fall Prevention: safety round/check completed  Taken  11/29/2024 2300 by Analia Moya RN  Safety Promotion/Fall Prevention: safety round/check completed  Goal: Optimal Pain Control and Function  Outcome: Progressing  Goal: Nausea and Vomiting Relief  Outcome: Progressing  Goal: Effective Urinary Elimination  Outcome: Progressing  Goal: Effective Oxygenation and Ventilation  Outcome: Progressing  Intervention: Optimize Oxygenation and Ventilation  Recent Flowsheet Documentation  Taken 11/30/2024 0520 by Analia Moya RN  Head of Bed (HOB) Positioning: HOB at 20-30 degrees  Taken 11/30/2024 0300 by Analia Moya RN  Head of Bed ([facility]) Positioning: HOB at 20-30 degrees  Taken 11/30/2024 0059 by Analia Moya RN  Head of Bed ([facility] [place]) Positioning: HOB at 20-30 degrees   Goal Outcome Evaluation:      Plan of Care Reviewed With: patient    Overall Patient Progress: no changeOverall Patient Progress: no change    Outcome Evaluation: Pt has eyes opened; not responding to questions.

## 2024-11-30 NOTE — PROGRESS NOTES
Pt able to verbalize her choice of soup over salad at suppertime. Ate all of her supper.   Last narcan dose of 0.04 was given at 1620.   Pt now awake and moves all extremities but is non verbal. Unable to verbalize her name when questioned. Dr Og at bedside to re-evaluate her mental status . Pt able to opn eyes and track speaker but no verbal response.  No facial droop, moves all extremities.   Dr Og approves transfer to floor at this time.

## 2024-11-30 NOTE — PROGRESS NOTES
[facility]    Medicine Progress Note - Hospitalist Service    Date of Admission:  11/28/2024    Assessment & Plan   Candida Rose is a 84 year old female with a history of HTN, HLD, Moyamoya Disease, Anxiety, OA, Catarat, CVA with Left Hemiparesis, CKD, obesity who presented to the ED today with left leg pain after a mechanical fall at home.    She was found to have left femur fracture.  Ortho was consulted and the patient underwent surgical fix.     Unfortunately, post-op she became non-verbal and was found to have new right sided weakness.  Code stroke was called.  Initial CT showed multifocal prior ischemic stroke.  CTA head/heck showed bilateral ICA occlusion, bilateral PCA tiny caliber.  She was not given TPA given recent surgery.     Repeat CT head the following morning shows new bilateral frontal strokes which likely explain her new symptoms.        Acute bilateral frontal CVA & left basal ganglia cb aphasia, right sided weakness  Hx of CVA with Left Hemiparesis  Hx Moyamoya Disease  Vascular Dementia::  PMH of moyamoya s/p bilateral STA-MCA bypasses in 2006.  Hx of CVA in 2006, 2010 and 2022.  Uses a walker and cane for ambulation.  Lives in a single family home with her .  Has mild memory impairment at baseline.    Post-op she became non-verbal and was found to have new right sided weakness.  Code stroke was called.  Initial CT showed multifocal prior ischemic stroke.  CTA head/heck showed bilateral ICA occlusion, bilateral PCA tiny caliber.  She was not given TPA given recent surgery.   Repeat CT head the following morning shows new bilateral frontal strokes which likely explain her new symptoms.  On my exam she is awake but is non-verbal.  She does squeeze my hand on the left but is otherwise non-participatory in neurologic examination.  PERRL.  Left eye gaze deviation.  Eyes do not track.  Seems to have right sided sujatha-neglect.  Vitally stable.   -Stroke neuro  consulted  -Brain MRI  -EEG per stroke neuro to rule out seizure given left eye deviation  -EKG and trop per neuro  -ASA if repeat Hgb responds well to transfusion  -Historically not on therapeutic anticoagulation given increased risk of cerebral hemorrhage but will defer future recommendation to stroke neurology   -Ortho ok with ASA & plavix   -PT/OT/SLP  -NG tube placement given aphasia and inability to swallow  -Palliative care consultation for GOC.  Family has made it clear she is DNR/DNI, no cardioversion, no long term feeding tube.  Short term NG for meds and short trial of feeding may be ok but  to discuss with kids.     Mechanical fall cb left distal femur fracture now s/p surgical fix (11/30):  Trauma imaging reveals an acute comminuted fracture of the distal femoral diaphysis with displaced fracture fragments.    -Orthopedic surgery consulted, pt now s/p surgical repair  -IVF, antiemetics, analgesics prn  -non weight bearing to LLE  -CMS checks    Acute blood loss anemia, likely post-operative:   Hgb 12.4 --> 10.0 post-op --> 6.8 following morning.  No evidence for external bleeding.  Surgical dressing with some dried blood but not oozing blood.  Obvious consideration for vasile-operative hematoma.  Bilirubin normal so unlikely to be hemolysis.  -Consented for blood, type and screen  -Plan for 2 unit(s) pRBC with goal >8 in the setting of acute CVA  -Discussed with ortho who suspect that it was related to intra-operative blood loss and think it is less likely that she will have ongoing blood loss  -Consider formal leg imaging if Hgb continues to drop     Mild Hyponatremia  Mild Richie on CKD:  Cr baseline 0.9-1.1.  Now Cr 1.2 post-op.  Likely related to anemia and dehydration given operation.   -IVF     Hx possible Paroxysmal Atrial Fibrillation  HTN  HLD  Afib diagnosed 10/2022 at the time of patient's CVA.  Followed up with Cardiology who reviewed the event monitor and did not see clear afib, but  "PAC/PVCs.  Per Cardiology, given the fact that the patient's with moyamoya disease have increased risk of intracerebral hemorrhage patient's anticoagulation was discontinued.      -Will defer future recommendation of therapeutic anticoagulation to stroke neurology   -Continue pta Rosuvastatin and Atenolol  -Hold Losartan for now  -Monitor on telemetry     Depression  Anxiety  -Continue pta Sertraline     Obesity, BMI 30        Diet: Advance Diet as Tolerated: Regular Diet Adult    DVT Prophylaxis: Enoxaparin (Lovenox) SQ  Abreu Catheter: PRESENT, indication: ?  (Error. Value could not be saved.), Surgical procedure  Lines: None     Cardiac Monitoring: None  Code Status: No CPR- Do NOT Intubate      Clinically Significant Risk Factors         # Hyponatremia: Lowest Na = 134 mmol/L in last 2 days, will monitor as appropriate       # Hypoalbuminemia: Lowest albumin = 2.8 g/dL at 11/30/2024  6:48 AM, will monitor as appropriate     # Hypertension: Noted on problem list              # Obesity: Estimated body mass index is 30.94 kg/m  as calculated from the following:    Height as of this encounter: 1.727 m (5' 8\").    Weight as of this encounter: 92.3 kg (203 lb 7.8 oz)., PRESENT ON ADMISSION            Social Drivers of Health    Social Connections: Unknown (5/30/2024)    Social Connection and Isolation Panel [NHANES]     Frequency of Social Gatherings with Friends and Family: More than three times a week          Disposition Plan     Medically Ready for Discharge: Anticipated in 2-4 Days             Wily Palmer DO  Hospitalist Service  [facility]  Securely message with [employer] (more info)  Text page via [employer] Paging/Directory   ______________________________________________________________________    Interval History   Please see code stroke note for overnight events.  Still awake but non-verbal and does not interact meaningfully.  Updated at  at bedside.     Physical Exam   Vital " Signs: Temp: 97.8  F (36.6  C) Temp src: Temporal BP: 135/52 Pulse: 66   Resp: 20 SpO2: 100 % O2 Device: Oxymask Oxygen Delivery: 2 LPM  Weight: 203 lbs 7.75 oz  General Appearance: Patient awake.  Does not answer my questions.  No apparent distress.   Respiratory: Lungs are CTAB.  Work of breathing appears normal on room air.  Cardiovascular: Regular rate and rhythm.  No murmurs rubs or gallops.  There is no edema present.  GI: Benign.  Soft.  Non-tender.  Bowel sounds active.   Skin: No rashes or lesions exposed skin.  Neuro:  Fixed left sided gaze.  PERRL.  Does not track.  Good  strength to left hand.  Does not participate in exam of right side or left leg due to paralysis or encephalopathy.    Other: Patient appears comfortable.     Medical Decision Making       56 MINUTES SPENT BY ME on the date of service doing chart review, history, exam, documentation & further activities per the note.      Data   ------------------------- PAST 24 HR DATA REVIEWED -----------------------------------------------    I have personally reviewed the following data over the past 24 hrs:    11.8 (H)  \   6.8 (LL)   / 196     134 (L) 102 27.9 (H) /  131 (H); 131 (H)   4.3; 4.3 21 (L) 1.20 (H) \     ALT: 13 AST: 56 (H) AP: 66 TBILI: <0.2   ALB: 2.8 (L) TOT PROTEIN: 4.7 (L) LIPASE: N/A       Imaging results reviewed over the past 24 hrs:   Recent Results (from the past 24 hours)   CT Head w/o Contrast    Narrative    EXAM: CT HEAD W/O CONTRAST  LOCATION: [facility]  DATE: 11/29/2024    INDICATION: right sided weakness, aphasia, code stroke  COMPARISON: CT head 7/18/2024, MRI brain 10/9/2022  TECHNIQUE: Routine CT Head without IV contrast. Multiplanar reformats. Dose reduction techniques were used.    FINDINGS:  INTRACRANIAL CONTENTS: No intracranial hemorrhage, extraaxial collection, or mass effect.  No CT evidence of acute infarct. Mild to moderate presumed chronic small vessel ischemic changes. Mild to  moderate generalized volume loss. No hydrocephalus.     Left parietal occipital temporal region moderate sized area tissue loss and gliosis. Right frontal lobe demonstrate multiple mild to moderate sized areas of tissue loss and gliosis. Right occipital lobe small area of tissue loss and gliosis. Right   cerebellum small chronic infarcts redemonstrated.    VISUALIZED ORBITS/SINUSES/MASTOIDS: No intraorbital abnormality. No paranasal sinus mucosal disease. No middle ear or mastoid effusion.    BONES/SOFT TISSUES: No acute abnormality. Bilateral cranioplasties.      Impression    IMPRESSION:  1.  No acute intracranial hemorrhage.    2.  No CT evidence acute infarct. Aspect score 10.    3.  Chronic intracranial changes described above.      Dr Noonan was notified by Dr Rafa Cardoza at  8:42 PM 11/29/2024 CST/CDT.   CTA Head Neck with Contrast    Narrative    EXAM: CTA HEAD NECK W CONTRAST  LOCATION: [facility]  DATE: 11/29/2024    INDICATION: right sided weakness, aphasia, code stroke  COMPARISON: None.  CONTRAST: 67 mL Isovue 370  TECHNIQUE: Head and neck CT angiogram with IV contrast. Axial helical CT images of the head and neck vessels obtained during the arterial phase of intravenous contrast administration. Axial 2D reconstructed images and multiplanar 3D MIP reconstructed   images of the head and neck vessels were performed by the technologist. Dose reduction techniques were used. All stenosis measurements made according to NASCET criteria unless otherwise specified.    FINDINGS:   HEAD CTA:  Right horizontal petrous ICA focal severe stenosis likely due to noncalcified plaque. Right precavernous ICA severe stenosis. Right cavernous ICA moderate stenosis. Right distal carotid siphon and right supraclinoid ICA occlusion and areas of near   occlusion. Right carotid terminus demonstrates a thin wisp of enhancement.    Left precavernous ICA severe stenosis. Left distal carotid siphon occluded.  Left supraclinoid ICA and left carotid terminus thin wisp of enhancement.    Right M1 and proximal M2 segments are severely diminutive and poorly visualized with thin wisp of enhancement. Right mid to distal MCA branch arteries are visualized predominantly within the mid division.    Left M1 occluded. Left MCA bifurcation occluded. Left proximal, mid, and distal MCA branches predominantly within the mid division identified.    Hypoplastic right A1 segment versus severely diminutive right A1 segment may be occluded. Left A1 segment is diminutive although enhances.    Bilateral A2/A3 segments are severely diminutive and not well visualized. Areas of underlying segmental occlusions and severe stenoses likely present.    Right proximal V4 occluded. Right mid V4 small wisp of enhancement. Right distal V4 occluded.    Left proximal V4 mild to moderate stenosis.    Mid and inferior basilar artery demonstrate several mild to moderate stenoses.    Right PCA is severely diminutive and poorly visualized likely with areas of segmental occlusions and severe stenoses.    Left proximal to mid PCA is diminutive although visualized with no definitive significant stenosis or occlusion.    Bilateral cranioplasties. Please correlate for history of ICA ECA bypass.    No brain aneurysm. No AVM/AVF.      NECK CTA:  RIGHT CAROTID:   Atherosclerotic plaque results in 50-70% stenosis in the right carotid bulb although potentially underestimate given the smaller caliber of the right cervical ICA.     Right distal cervical artery short segment occlusion with reconstitution.    Right cervical ICA is small in caliber could suggest changes related to distal occlusion. Dissection possible although less likely.    LEFT CAROTID:   Left cervical ICA is relatively small in caliber. Dissection possible although less likely.    No significant stenosis/occlusion.     VERTEBRAL ARTERIES:  Right vertebral artery origin suboptimally evaluated obscured by  overlying streak artifact.    Left vertebral artery origin and left V1 segment occluded with reconstitution in the left proximal V2 segment.    No additional significant stenosis or occlusion. No dissection.      AORTIC ARCH: Classic aortic arch anatomy. Left proximal subclavian artery mild stenosis. Left distal subclavian artery mild-to-moderate stenosis. Right proximal subclavian artery moderate stenosis. Right distal subclavian artery mild stenosis.    ARTERIAL PLAQUE: Calcified/noncalcified plaque noted throughout portions of the arterial system.    NONVASCULAR STRUCTURES:   Diffuse bony mineralization. Multilevel spondylosis. Right inferior thyroid lobe nodule measuring 2.6 cm. Recommend nonemergent thyroid ultrasound.        Impression     IMPRESSION:   HEAD CTA:   1.  Multiple intracranial arterial stenoses and occlusions described above. Please see above for discussion.    2.  Bilateral cranioplasties. Please correlate for history of ICA ECA bypass.    NECK CTA:  1.  Right distal cervical artery short segment occlusion with reconstitution.    2.  Left vertebral artery origin and left V1 segment occluded with reconstitution in the left proximal V2 segment.    3.  Extensive cervical artery stenoses described above. Please above discussion.    4.  Thyroid nodule described above. Recommend nonemergent thyroid ultrasound.      Dr Noonan was notified by Dr Rafa Cardoza at  9:10 PM 11/29/2024 CST/CDT.   CT Head Perfusion w Contrast    Narrative    EXAM: CT HEAD PERFUSION W CONTRAST  LOCATION: [facility]  DATE: 11/29/2024    INDICATION: right sided weakness, aphasia, code stroke  COMPARISON: None.  TECHNIQUE: CT cerebral perfusion was performed utilizing a second contrast bolus. Perfusion data were post processed with generation of standard perfusion maps and estimation of ischemic/infarcted volumes utilizing standard threshold values. Dose   reduction techniques were used.   CONTRAST: 50 mL  Isovue 370    CT PERFUSION:  PERFUSION MAPS:   Large areas of Tmax >6 seconds within the bilateral frontal lobes STEPHEN and MCA territories and to a lesser extent right greater than left PCA territories. Findings correspond to areas of brain at risk.    No abnormal perfusion on CBF <30% to suggest core infarct.    However, qualitatively there is decreased CBV within the bilateral frontal lobes and left occipital temporal region.    RAPID ANALYSIS:  CBF<30%: 0 mL  Tmax>6sec: 221 mL  Mismatch volume: 221 mL  Mismatch ratio: Not applicable       CT Head w/o Contrast    Narrative    EXAM: CT HEAD WITHOUT CONTRAST  LOCATION: [facility]  DATE: 11/30/2024    INDICATION: Concern for CVA. aphasia. weakness.  COMPARISON: Head CT 11/29/2024.  TECHNIQUE: Routine CT Head without IV contrast. Multiplanar reformats. Dose reduction techniques were used.    FINDINGS:  INTRACRANIAL CONTENTS: No intracranial hemorrhage, extra-axial collection, or mass effect.  No CT evidence of acute infarct. Moderate presumed chronic small vessel ischemic changes. Areas of chronic encephalomalacia involving the anterolateral right   frontal lobe, right occipital lobe and left temporo-occipital junction again noted consistent with chronic ischemic infarcts. Subtle area of hypodensity in the left basal ganglia is again noted possibly representing a subacute ischemic infarct. Moderate   generalized volume loss. No hydrocephalus.     VISUALIZED ORBITS/SINUSES/MASTOIDS: No intraorbital abnormality. No paranasal sinus mucosal disease. No middle ear or mastoid effusion.    BONES/SOFT TISSUES: No acute abnormality.      Impression    IMPRESSION:  1.  Possible subacute ischemic infarct in the left basal ganglia again noted.  2.  No CT evidence for acute intracranial process.  3.  Brain atrophy and presumed chronic microvascular ischemic changes as above.

## 2024-11-30 NOTE — PROGRESS NOTES
Orthopedics Daily Progress Note  Candida Rose  11/30/2024  Date of Admission: 11/28/2024      Post Op Day: 1 Day Post-Op   Procedures: Procedure(s):  OPEN REDUCTION INTERNAL FIXATION, FRACTURE,LEFT FEMUR, DISTAL      Interval History:  Nonverbal with left sized gaze. Does increase heavy breathing with palpation to leg.   Otherwise, visit was limited.       Temp:  [96.6  F (35.9  C)-98.1  F (36.7  C)] 97.5  F (36.4  C)  Pulse:  [58-99] 99  Resp:  [] 24  BP: ()/(35-73) 114/58  SpO2:  [90 %-100 %] 95 %    Physical Exam:  Alert.  Breathing easily without wheeze  Dressing/wound c/d/I. Knee immobilizer in place.   Severe ankle and foot swelling.   Bilateral calves soft, compressible, non tender  Palpable dp pulse, normal cap refill    Labs/Imaging:  Results for orders placed or performed during the hospital encounter of 11/28/24 (from the past 24 hours)   XR Surgery BHUPINDER L/T 5 Min Fluoro w Stills    Narrative    This exam was marked as non-reportable because it will not be read by a   radiologist or a [place] non-radiologist provider.         Glucose by meter   Result Value Ref Range    GLUCOSE BY METER POCT 161 (H) 70 - 99 mg/dL   Glucose by meter   Result Value Ref Range    GLUCOSE BY METER POCT 176 (H) 70 - 99 mg/dL   Creatinine   Result Value Ref Range    Creatinine 1.22 (H) 0.51 - 0.95 mg/dL    GFR Estimate 44 (L) >60 mL/min/1.73m2   CT Head w/o Contrast    Narrative    EXAM: CT HEAD W/O CONTRAST  LOCATION: [facility]  DATE: 11/29/2024    INDICATION: right sided weakness, aphasia, code stroke  COMPARISON: CT head 7/18/2024, MRI brain 10/9/2022  TECHNIQUE: Routine CT Head without IV contrast. Multiplanar reformats. Dose reduction techniques were used.    FINDINGS:  INTRACRANIAL CONTENTS: No intracranial hemorrhage, extraaxial collection, or mass effect.  No CT evidence of acute infarct. Mild to moderate presumed chronic small vessel ischemic changes. Mild to moderate generalized  volume loss. No hydrocephalus.     Left parietal occipital temporal region moderate sized area tissue loss and gliosis. Right frontal lobe demonstrate multiple mild to moderate sized areas of tissue loss and gliosis. Right occipital lobe small area of tissue loss and gliosis. Right   cerebellum small chronic infarcts redemonstrated.    VISUALIZED ORBITS/SINUSES/MASTOIDS: No intraorbital abnormality. No paranasal sinus mucosal disease. No middle ear or mastoid effusion.    BONES/SOFT TISSUES: No acute abnormality. Bilateral cranioplasties.      Impression    IMPRESSION:  1.  No acute intracranial hemorrhage.    2.  No CT evidence acute infarct. Aspect score 10.    3.  Chronic intracranial changes described above.      Dr Noonan was notified by Dr Rafa Cardoza at  8:42 PM 11/29/2024 CST/CDT.   CTA Head Neck with Contrast    Narrative    EXAM: CTA HEAD NECK W CONTRAST  LOCATION: [facility]  DATE: 11/29/2024    INDICATION: right sided weakness, aphasia, code stroke  COMPARISON: None.  CONTRAST: 67 mL Isovue 370  TECHNIQUE: Head and neck CT angiogram with IV contrast. Axial helical CT images of the head and neck vessels obtained during the arterial phase of intravenous contrast administration. Axial 2D reconstructed images and multiplanar 3D MIP reconstructed   images of the head and neck vessels were performed by the technologist. Dose reduction techniques were used. All stenosis measurements made according to NASCET criteria unless otherwise specified.    FINDINGS:   HEAD CTA:  Right horizontal petrous ICA focal severe stenosis likely due to noncalcified plaque. Right precavernous ICA severe stenosis. Right cavernous ICA moderate stenosis. Right distal carotid siphon and right supraclinoid ICA occlusion and areas of near   occlusion. Right carotid terminus demonstrates a thin wisp of enhancement.    Left precavernous ICA severe stenosis. Left distal carotid siphon occluded. Left supraclinoid ICA  and left carotid terminus thin wisp of enhancement.    Right M1 and proximal M2 segments are severely diminutive and poorly visualized with thin wisp of enhancement. Right mid to distal MCA branch arteries are visualized predominantly within the mid division.    Left M1 occluded. Left MCA bifurcation occluded. Left proximal, mid, and distal MCA branches predominantly within the mid division identified.    Hypoplastic right A1 segment versus severely diminutive right A1 segment may be occluded. Left A1 segment is diminutive although enhances.    Bilateral A2/A3 segments are severely diminutive and not well visualized. Areas of underlying segmental occlusions and severe stenoses likely present.    Right proximal V4 occluded. Right mid V4 small wisp of enhancement. Right distal V4 occluded.    Left proximal V4 mild to moderate stenosis.    Mid and inferior basilar artery demonstrate several mild to moderate stenoses.    Right PCA is severely diminutive and poorly visualized likely with areas of segmental occlusions and severe stenoses.    Left proximal to mid PCA is diminutive although visualized with no definitive significant stenosis or occlusion.    Bilateral cranioplasties. Please correlate for history of ICA ECA bypass.    No brain aneurysm. No AVM/AVF.      NECK CTA:  RIGHT CAROTID:   Atherosclerotic plaque results in 50-70% stenosis in the right carotid bulb although potentially underestimate given the smaller caliber of the right cervical ICA.     Right distal cervical artery short segment occlusion with reconstitution.    Right cervical ICA is small in caliber could suggest changes related to distal occlusion. Dissection possible although less likely.    LEFT CAROTID:   Left cervical ICA is relatively small in caliber. Dissection possible although less likely.    No significant stenosis/occlusion.     VERTEBRAL ARTERIES:  Right vertebral artery origin suboptimally evaluated obscured by overlying streak  artifact.    Left vertebral artery origin and left V1 segment occluded with reconstitution in the left proximal V2 segment.    No additional significant stenosis or occlusion. No dissection.      AORTIC ARCH: Classic aortic arch anatomy. Left proximal subclavian artery mild stenosis. Left distal subclavian artery mild-to-moderate stenosis. Right proximal subclavian artery moderate stenosis. Right distal subclavian artery mild stenosis.    ARTERIAL PLAQUE: Calcified/noncalcified plaque noted throughout portions of the arterial system.    NONVASCULAR STRUCTURES:   Diffuse bony mineralization. Multilevel spondylosis. Right inferior thyroid lobe nodule measuring 2.6 cm. Recommend nonemergent thyroid ultrasound.        Impression     IMPRESSION:   HEAD CTA:   1.  Multiple intracranial arterial stenoses and occlusions described above. Please see above for discussion.    2.  Bilateral cranioplasties. Please correlate for history of ICA ECA bypass.    NECK CTA:  1.  Right distal cervical artery short segment occlusion with reconstitution.    2.  Left vertebral artery origin and left V1 segment occluded with reconstitution in the left proximal V2 segment.    3.  Extensive cervical artery stenoses described above. Please above discussion.    4.  Thyroid nodule described above. Recommend nonemergent thyroid ultrasound.      Dr Noonan was notified by Dr Rafa Cardoza at  9:10 PM 11/29/2024 CST/CDT.   CT Head Perfusion w Contrast    Narrative    EXAM: CT HEAD PERFUSION W CONTRAST  LOCATION: [facility] [place]  DATE: 11/29/2024    INDICATION: right sided weakness, aphasia, code stroke  COMPARISON: None.  TECHNIQUE: CT cerebral perfusion was performed utilizing a second contrast bolus. Perfusion data were post processed with generation of standard perfusion maps and estimation of ischemic/infarcted volumes utilizing standard threshold values. Dose   reduction techniques were used.   CONTRAST: 50 mL Isovue 370    CT  PERFUSION:  PERFUSION MAPS:   Large areas of Tmax >6 seconds within the bilateral frontal lobes STEPHEN and MCA territories and to a lesser extent right greater than left PCA territories. Findings correspond to areas of brain at risk.    No abnormal perfusion on CBF <30% to suggest core infarct.    However, qualitatively there is decreased CBV within the bilateral frontal lobes and left occipital temporal region.    RAPID ANALYSIS:  CBF<30%: 0 mL  Tmax>6sec: 221 mL  Mismatch volume: 221 mL  Mismatch ratio: Not applicable       Glucose   Result Value Ref Range    Glucose 131 (H) 70 - 99 mg/dL   Magnesium   Result Value Ref Range    Magnesium 1.7 1.7 - 2.3 mg/dL   Potassium   Result Value Ref Range    Potassium 4.3 3.4 - 5.3 mmol/L   Phosphorus   Result Value Ref Range    Phosphorus 3.2 2.5 - 4.5 mg/dL   CBC with platelets   Result Value Ref Range    WBC Count 11.8 (H) 4.0 - 11.0 10e3/uL    RBC Count 2.30 (L) 3.80 - 5.20 10e6/uL    Hemoglobin 6.8 (LL) 11.7 - 15.7 g/dL    Hematocrit 21.5 (L) 35.0 - 47.0 %    MCV 93 78 - 100 fL    MCH 30.0 26.5 - 33.0 pg    MCHC 31.8 31.5 - 36.5 g/dL    RDW 12.8 10.0 - 15.0 %    Platelet Count 196 150 - 450 10e3/uL   Hepatic panel   Result Value Ref Range    Protein Total 4.7 (L) 6.4 - 8.3 g/dL    Albumin 2.8 (L) 3.5 - 5.2 g/dL    Bilirubin Total <0.2 <=1.2 mg/dL    Alkaline Phosphatase 66 40 - 150 U/L    AST 56 (H) 0 - 45 U/L    ALT 13 0 - 50 U/L    Bilirubin Direct <0.20 0.00 - 0.30 mg/dL         A/P - 84 year old female s/p left interprosthetic femur ORIF    Hbg 6.8 with plan for infusion per primary.   May need transfer to [facility] 2/2 stoke concern      DVT proph: Lovenox IM.   Activity: NWB to LLE. PT/OT.  Dressing:  Keep intact.  Change if greater than 60% saturated. Otherwise, will plan to change dressing on POD7.  Pain Management: Continue pain regimen.  Encouraged multiple modalities, ice.  Encourage elevation.      Dispo: TBD, Pending PT progression and stable medically.        Michelle Hodgson PA-C  [facility]

## 2024-11-30 NOTE — CONSULTS
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  On PTA ASA 81 mg daily and Crestor 10 mg daily.    She was admitted to [facility] after coming in 11/28 after a fall/left femur fracture and underwent surgery in the morning of 11/29/2024.  Following procedure stroke code was called due to aphasia and right-sided weakness, encephalopathy/decreased responsiveness.  CT negative for acute pathology but showed chronic infarcts.  CTA with multivessel intracranial stenoses/occlusions.  Today she had persistent left gaze deviation and right-sided weakness.  MRI not able to be performed due to scanner being down until possibly Tuesday.  Repeat CT head showed concerns for new bilateral STEPHEN and left basal ganglia infarcts.  She also has had a drop in hemoglobin from 10 to 6.8.     reports that at baseline she has left leg shakiness/weakness that is residual from prior strokes.  She uses a cane inside and a walker outside.  She otherwise is pretty independent, made Thanksgiving dinner on Thursday.    Initially following her surgery and recovery she was speaking and then reportedly speech became \"gibberish\" followed by no verbal responsiveness.  She has not spoken at all to RN today.  No movement to right upper extremity despite noxious stimuli, has a left gaze deviation.  On telemedicine she is requires significant noxious stimuli to remain awake (she did receive Dilaudid about 1 hour prior to visit but RN states she has been drowsy all day).      Stroke Evaluation Summarized    MRI/Head CT MRI: Pending  Repeat CT head: new bilateral STEPHEN and left basal ganglia infarcts.basal ganglia strokes on vascular " neurology review  CT head: Chronic infarcts, negative for acute pathology   Intracranial Vasculature CTA head:Multivessel intracranial stenoses/occlusions, (most notably severe right ICA with occlusion supraclinoid region severe left intracranial ICA, severe right MCA, occluded left M1, possibly occluded right A1, diminutive L A1, right V4 occlusion, left V4 moderate stenosis, mild-moderate mid/inferior basilar artery, severe right PCA), bilateral cranioplasties   Cervical Vasculature CTA neck:Right distal cervical ICA short segment occlusion, LV 1 occluded, extensive cervical stenosis, thyroid nodule     Echocardiogram TTE: Pending   EKG/Telemetry Atrial fibrillation with variable AV block, nonspecific ST abnormality   Other Testing Routine EEG: Pending     LDL  No lab value available in past 30 days   A1C  No lab value available in past 90 days   Troponin No lab value available in past 48 hrs       Impression/Recommendations   #New bilateral STEPHEN and left basal ganglia ischemic infarcts suspect 2/2 hypoperfusion following general anesthesia/hypotension/anemia in setting of moyamoya disease, MRI pending to evaluate for other territories of infarct  #Encephalopathy, suspect multifactorial from bilateral frontal strokes, anesthesia/surgery, pain medication, and anemia  #Atrial fibrillation, [id number] score 7  #Anemia requiring blood transfusion  -Neuro checks and vitals every 4 hours  -Start ASA 81 mg daily if repeat hemoglobin following 2 units PRBC transfusion is improved and felt medically appropriate.    -Will consider adding Plavix tomorrow if hemoglobin remains stable and orthopedic team agrees.    -Will need to discuss risk/benefits of anticoagulation, do not recommend starting it at this time  -telemetry  -Await MRI brain with and without contrast (reportedly MRI is now back up and running)  -Ordered routine EEG  -Ordered TTE, rule out intracardiac thrombus given new A-fib  -Recommend checking troponins and  "EKG  -PT/OT/SPT, Dysphagia screen  -Euthermia, euglycemia, eunatremia  -Stroke Education, education provided on BEFAST stroke symptoms     #Hypertension  -appreciate management per primary team  -Inpatient SBP goal <180, avoid hypotension or sudden drops in blood pressure  -Outpatient BP goal <140/90, with tighter control if tolerated  -Recommend home blood pressure monitoring twice daily in AM and PM, keep log and bring to medical visits    #Hyperlipidemia  -Continue PTA Crestor 10 mg daily for now  -ordered lipid panel  -Recommend LDL goal 40-70, <40 increases risk of intracranial hemorrhage  -Recommend Mediterranean diet     #Diabetes mellitus type 2  -appreciate management per primary team  -blood glucose monitoring  -ordered hemoglobin A1c, long term goal <7%    # Mechanical fall leading to left femur fracture s/p surgical fixation 11/29      Discussed with vascular neurology attending, Dr. Rodas      Patient Follow-up    - final recommendation pending work-up    Thank you for this consult.  We will continue to follow peripherally for stability    Aviva Nieto PA-C  Vascular Neurology    To page me or covering stroke neurology team member, click here: [facility]COM  Choose \"On Call\" tab at top, then select \"NEUROLOGY/ALL SITES\" from middle drop-down box, press Enter, then look for \"stroke\" or \"telestroke\" for your site.  _____________________________________________________    Clinically Significant Risk Factors        # Hypertension: Noted on problem list            # Obesity: Estimated body mass index is 30.94 kg/m  as calculated from the following:    Height as of this encounter: 1.727 m (5' 8\").    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 F/u dr. Soto    OA (osteoarthritis)     s/p bilat total hips     Other chronic pain     Joint pain for many years    Unspecified cerebral artery occlusion with cerebral infarction     After Moyamoya surgery > 10 yrs ago.    Vitamin D deficiencies      Medications   Home Meds  Prior to Admission medications    Medication Sig Start Date End Date Taking? Authorizing Provider   aspirin 81 MG EC tablet Take 81 mg by mouth daily   Yes Reported, Patient   atenolol (TENORMIN) 25 MG tablet Take 1 tablet (25 mg) by mouth daily 6/3/24  Yes Chani Peoples MD   cetirizine (ZYRTEC) 10 MG tablet Take 10 mg by mouth daily.   Yes Reported, Patient   cyanocobalamin (VITAMIN B-12) 1000 MCG tablet Take 1,000 mcg by mouth every other day.   Yes Reported, Patient   losartan (COZAAR) 50 MG tablet TAKE 1 TABLET BY MOUTH TWICE A DAY 11/4/24  Yes Chani Peoples MD   magnesium 500 MG TABS Take 250 mg by mouth daily. 2/11/19  Yes Chani Peoples MD   Multiple Vitamins-Minerals (MULTIVITAMIN & MINERAL PO) Take 1 tablet by mouth daily   Yes Reported, Patient   rosuvastatin (CRESTOR) 10 MG tablet Take 1 tablet (10 mg) by mouth daily. 10/11/24  Yes Chani Peoples MD   sertraline (ZOLOFT) 50 MG tablet Take 50 mg by mouth at bedtime.   Yes Unknown, Entered By History       Scheduled Meds  Current Facility-Administered Medications   Medication Dose Route Frequency Provider Last Rate Last Admin    acetaminophen (TYLENOL) tablet 975 mg  975 mg Oral TID Andrea Clark MD        atenolol (TENORMIN) tablet 25 mg  25 mg Oral Daily Juliana Franklin PA-C        [Held by provider] enoxaparin  ANTICOAGULANT (LOVENOX) injection 40 mg  40 mg Subcutaneous Q24H Andrea Clark MD        magnesium sulfate 2 g in 50 mL sterile water intermittent infusion  2 g Intravenous Once Wily Palmer DO        polyethylene glycol (MIRALAX) Packet 17 g  17 g Oral Daily Andrea Clark MD        rosuvastatin (CRESTOR) tablet 10 mg  10 mg Oral At Bedtime Juliana Franklin PA-C   10 mg at 11/28/24 2133    senna-docusate (SENOKOT-S/PERICOLACE) 8.6-50 MG per tablet 1 tablet  1 tablet Oral BID Andrea Clark MD        sertraline (ZOLOFT) tablet 50 mg  50 mg Oral At Bedtime Juliana Franklin PA-C   50 mg at 11/28/24 2133    sodium chloride (PF) 0.9% PF flush 3 mL  3 mL Intracatheter Q8H Andrea Clark MD   3 mL at 11/30/24 1334    sodium chloride (PF) 0.9% PF flush 3 mL  3 mL Intracatheter Q8H Juliana Franklin PA-C   3 mL at 11/28/24 2344       Infusion Meds  Current Facility-Administered Medications   Medication Dose Route Frequency Provider Last Rate Last Admin    lactated ringers infusion   Intravenous Continuous Andrea Clark MD 75 mL/hr at 11/30/24 0821 Rate Verify at 11/30/24 0821       Allergies   Allergies   Allergen Reactions    Lisinopril      Persistent cough          PHYSICAL EXAMINATION   Temp:  [97.2  F (36.2  C)-98.5  F (36.9  C)] 97.8  F (36.6  C)  Pulse:  [59-99] 66  Resp:  [] 20  BP: ()/(35-66) 135/52  SpO2:  [88 %-100 %] 100 %    General Exam  General: Lying in bed, appears ill  HEENT:  normocephalic/atraumatic  Pulmonary:  no respiratory distress    Neuro Exam  Mental Status: Not alert and requires repeated stimulation to open her eyes and then closes them immediately following, does not follow commands, no verbal response  Cranial Nerves: Inconsistent blinking to noxious bilaterally (test by RN), left gaze deviation, does not follow commands to look to the right or cross midline at all spontaneously, does not follow commands to smile but face grossly symmetric, does not  follow commands to protrude tongue   Motor:   No movement to right upper extremity despite deep noxious stimuli, some effort against gravity to left upper extremity with noxious and spontaneously, does not follow commands to keep elevated, some withdrawal to noxious bilateral lower extremities but no effort against gravity (left leg is in brace from surgery)  Reflexes:  unable to test (telestroke)  Sensory: Appears to have total sensory loss to right upper extremity as does not grimace or move any extremities to deep noxious stimuli in that limb, does respond to noxious to all other extremities, minimal spatial extinction given left gaze deviation however does not follow commands for tactile or visual extinction testing   Coordination: Does not follow commands for testing   Station/Gait:  unable to test due to telestroke    Stroke Scales    NIHSS  1a. Level of Consciousness 2-->Not alert, requires repeated stimulation to attend, or is obtunded and requires strong or painful stimulation to make movements (not stereotyped)   1b. LOC Questions 2-->Answers neither question correctly   1c. LOC Commands 2-->Performs neither task correctly   2.   Best Gaze 2-->Forced deviation, or total gaze paresis not overcome by the oculocephalic maneuver   3.   Visual 0-->No visual loss   4.   Facial Palsy 0-->Normal symmetrical movements   5a. Motor Arm, Left 2-->Some effort against gravity, limb cannot get to or maintain (if cued) 90 (or 45) degrees, drifts down to bed, but has some effort against gravity   5b. Motor Arm, Right 4-->No movement   6a. Motor Leg, Left 3-->No effort against gravity, leg falls to bed immediately   6b. Motor Leg, right 3-->No effort against gravity, leg falls to bed immediately   7.   Limb Ataxia 0-->Absent   8.   Sensory 2-->Severe to total sensory loss, patient is not aware of being touched in the face, arm, and leg   9.   Best Language 3-->Mute, global aphasia, no usable speech or auditory comprehension    10. Dysarthria 2-->Severe dysarthria, patients speech is so slurred as to be unintelligible in the absence of or out of proportion to any dysphasia, or is mute/anarthric   11. Extinction and Inattention  1-->Visual, tactile, auditory, spatial, or personal inattention or extinction to bilateral simultaneous stimulation in one of the sensory modalities   Total 28 (11/30/24 1404)       Imaging  I personally reviewed all imaging; relevant findings per HPI.    Labs Data   CBC  Recent Labs   Lab 11/30/24  0648 11/29/24  0401 11/28/24  1429   WBC 11.8* 10.3 11.0   RBC 2.30* 3.37* 4.16   HGB 6.8* 10.0* 12.4   HCT 21.5* 31.5* 38.6    254 275     Basic Metabolic Panel   Recent Labs   Lab 11/30/24  0648 11/29/24  1950 11/29/24  1606 11/29/24  1226 11/29/24  0401 11/28/24  1429   *  --   --   --  139 132*   POTASSIUM 4.3  4.3  --   --   --  4.2 4.2   CHLORIDE 102  --   --   --  104 98   CO2 21*  --   --   --  22 21*   BUN 27.9*  --   --   --  28.5* 25.8*   CR 1.20* 1.22*  --   --  0.95 1.01*   *  131*  --  176* 161* 131* 129*   RAINE 7.6*  --   --   --  8.2* 8.9     Liver Panel  Recent Labs   Lab 11/30/24  0648   PROTTOTAL 4.7*   ALBUMIN 2.8*   BILITOTAL <0.2   ALKPHOS 66   AST 56*   ALT 13     INR    Recent Labs   Lab Test 08/08/23  0814   INR 0.91           Stroke Consult Data Data   Telestroke Service Details  (for non-emergent stroke consult with tele)  Video start time 11/30/24   1404   Video end time 11/30/24   1434   Type of service telemedicine diagnostic assessment of acute neurological changes   Reason telemedicine is appropriate patient requires assessment with a specialist for diagnosis and treatment of neurological symptoms   Mode of transmission secure interactive audio and video communication per Yoel   Originating site (patient location) [facility]    Distant site (provider location) [facility], [place]       I have personally spent a  total of 70 minutes providing care today, time spent in reviewing medical records and devising the plan as recorded above.     *All or a portion of this note was generated using voice recognition software and may contain transcription errors.

## 2024-11-30 NOTE — CONSULTS
[facility]    Stroke Telephone Note    I was called by Jimmie Correa on 11/29/24 regarding patient Candida Rose. 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  She had a fall and left femur fracture and went to surgery this morning.  After the surgery the patient was not recovering well because of drowsiness and confusion. Right side weakness noted. Based on unresponsiveness and concern for aphasia, stroke code was activated.    Vitals  BP: (!) 124/35   Pulse: 81   Resp: 18   Temp: 97.8  F (36.6  C)   Weight: 92.3 kg (203 lb 7.8 oz)    Stroke Code Data (for stroke code without tele)  Stroke code activated 11/29/24 2001   Stroke provider first response 11/29/24 2003   Last known normal 11/29/24  0700      Time of discovery (or onset of symptoms) 11/29/24  1900   Head CT read by Stroke Neuro Provider 11/29/24 2037   Was stroke code de-escalated? Yes  11/29/24 2112     Imaging Findings  CT head: Multifocal prior ischemic stroke  CTA head/neck: Bilateral ICA occlusion, basilar arteries patent, bilateral PCA tiny caliber    Intravenous Thrombolysis  Not given due to:   - surgery/trauma within the past 14 days  - unclear or unfavorable risk-benefit profile for extended window thrombolysis beyond the conventional 4.5 hour time window    Endovascular Treatment  Not initiated due to no evidence of new occlusion    Impression  Acute onset of aphasia, right side weakness  Encephalopathy versus new ischemic stroke  The patient has history of moyamoya disease with multiple ischemic strokes in the past, during orthopedic surgery there is a chance that general anesthesia may have lowered her blood pressure which increase the risk of hypoperfusion and subsequent ischemic stroke in this vulnerable patient.   "She has multifocal severe stenosis/occlusion from her baseline moyamoya disease but compared to prior vessel imaging there is no new large vessel occlusion.  Hence thrombectomy was not initiated.    Recommendations   De-escalated stroke code  Obtain MRI of the brain with and without contrast    My recommendations are based on the information provided over the phone by Candida Rose's in-person providers. They are not intended to replace the clinical judgment of her in-person providers. I was not requested to personally see or examine the patient at this time.     The Stroke Staff is Dr. Rodas.    Bharathi Saunders MD  Vascular Neurology Fellow    To page me or covering stroke neurology team member, click here: AMCOM  Choose \"On Call\" tab at top, then select \"NEUROLOGY/ALL SITES\" from middle drop-down box, press Enter, then look for \"stroke\" or \"telestroke\" for your site.    "

## 2024-11-30 NOTE — PROGRESS NOTES
I was asked to assess this patient as she is not responding verbally and not following commands ever since she is returned from PACU.  She received 2 doses of Narcan as well in the PACU.  Patient was examined at bedside.  She was alert with her eyes open.  She did not acknowledge my presence.  Did not respond to her name.  Upon further exam she was noted to have right-sided weakness, 0 out of 5 however this could be over estimation as patient is also having some trouble following commands.  She has history of known CVA with known left-sided weakness and upon exam she was noted to have strength 4 out of 5 on left side. She is aphasic.  Stroke code was called.  CT head without contrast, CT perfusion study, CT head and neck angiogram was ordered.    Addendum:  My Partner Dr. Cooper spoke with neurology as well as radiology.  Even if there is a stroke patient would not be a candidate for thrombolysis given recent surgery.  Per radiology initial CT head is without any acute pathology.  Patient is undergoing CT angiogram.     Addendum:  Stroke neurology recommended MRI brain with and without contrast.

## 2024-12-01 ENCOUNTER — APPOINTMENT (OUTPATIENT)
Dept: CARDIOLOGY | Facility: CLINIC | Age: 84
DRG: 480 | End: 2024-12-01
Attending: PHYSICIAN ASSISTANT
Payer: MEDICARE

## 2024-12-01 ENCOUNTER — APPOINTMENT (OUTPATIENT)
Dept: MRI IMAGING | Facility: CLINIC | Age: 84
DRG: 480 | End: 2024-12-01
Attending: STUDENT IN AN ORGANIZED HEALTH CARE EDUCATION/TRAINING PROGRAM
Payer: MEDICARE

## 2024-12-01 ENCOUNTER — APPOINTMENT (OUTPATIENT)
Dept: GENERAL RADIOLOGY | Facility: CLINIC | Age: 84
DRG: 480 | End: 2024-12-01
Attending: INTERNAL MEDICINE
Payer: MEDICARE

## 2024-12-01 LAB
ANION GAP SERPL CALCULATED.3IONS-SCNC: 12 MMOL/L (ref 7–15)
BUN SERPL-MCNC: 19.1 MG/DL (ref 8–23)
CALCIUM SERPL-MCNC: 8.1 MG/DL (ref 8.8–10.4)
CHLORIDE SERPL-SCNC: 101 MMOL/L (ref 98–107)
CHOLEST SERPL-MCNC: 107 MG/DL
CREAT SERPL-MCNC: 0.95 MG/DL (ref 0.51–0.95)
EGFRCR SERPLBLD CKD-EPI 2021: 60 ML/MIN/1.73M2
ERYTHROCYTE [DISTWIDTH] IN BLOOD BY AUTOMATED COUNT: 13.8 % (ref 10–15)
GLUCOSE SERPL-MCNC: 114 MG/DL (ref 70–99)
HCO3 SERPL-SCNC: 22 MMOL/L (ref 22–29)
HCT VFR BLD AUTO: 27.4 % (ref 35–47)
HDLC SERPL-MCNC: 43 MG/DL
HGB BLD-MCNC: 8.9 G/DL (ref 11.7–15.7)
LDLC SERPL CALC-MCNC: 49 MG/DL
LVEF ECHO: NORMAL
MAGNESIUM SERPL-MCNC: 2.1 MG/DL (ref 1.7–2.3)
MCH RBC QN AUTO: 29.5 PG (ref 26.5–33)
MCHC RBC AUTO-ENTMCNC: 32.5 G/DL (ref 31.5–36.5)
MCV RBC AUTO: 91 FL (ref 78–100)
NONHDLC SERPL-MCNC: 64 MG/DL
PHOSPHATE SERPL-MCNC: 3 MG/DL (ref 2.5–4.5)
PLATELET # BLD AUTO: 192 10E3/UL (ref 150–450)
POTASSIUM SERPL-SCNC: 4.3 MMOL/L (ref 3.4–5.3)
RBC # BLD AUTO: 3.02 10E6/UL (ref 3.8–5.2)
SODIUM SERPL-SCNC: 135 MMOL/L (ref 135–145)
TRIGL SERPL-MCNC: 75 MG/DL
WBC # BLD AUTO: 12.1 10E3/UL (ref 4–11)

## 2024-12-01 PROCEDURE — 250N000013 HC RX MED GY IP 250 OP 250 PS 637: Performed by: INTERNAL MEDICINE

## 2024-12-01 PROCEDURE — 258N000001 HC RX 258: Performed by: STUDENT IN AN ORGANIZED HEALTH CARE EDUCATION/TRAINING PROGRAM

## 2024-12-01 PROCEDURE — 82374 ASSAY BLOOD CARBON DIOXIDE: CPT | Performed by: STUDENT IN AN ORGANIZED HEALTH CARE EDUCATION/TRAINING PROGRAM

## 2024-12-01 PROCEDURE — 80048 BASIC METABOLIC PNL TOTAL CA: CPT | Performed by: STUDENT IN AN ORGANIZED HEALTH CARE EDUCATION/TRAINING PROGRAM

## 2024-12-01 PROCEDURE — A9585 GADOBUTROL INJECTION: HCPCS | Performed by: STUDENT IN AN ORGANIZED HEALTH CARE EDUCATION/TRAINING PROGRAM

## 2024-12-01 PROCEDURE — 99233 SBSQ HOSP IP/OBS HIGH 50: CPT | Performed by: STUDENT IN AN ORGANIZED HEALTH CARE EDUCATION/TRAINING PROGRAM

## 2024-12-01 PROCEDURE — 93325 DOPPLER ECHO COLOR FLOW MAPG: CPT | Mod: 26 | Performed by: INTERNAL MEDICINE

## 2024-12-01 PROCEDURE — 87040 BLOOD CULTURE FOR BACTERIA: CPT | Performed by: STUDENT IN AN ORGANIZED HEALTH CARE EDUCATION/TRAINING PROGRAM

## 2024-12-01 PROCEDURE — 36415 COLL VENOUS BLD VENIPUNCTURE: CPT | Performed by: STUDENT IN AN ORGANIZED HEALTH CARE EDUCATION/TRAINING PROGRAM

## 2024-12-01 PROCEDURE — 93321 DOPPLER ECHO F-UP/LMTD STD: CPT | Mod: 26 | Performed by: INTERNAL MEDICINE

## 2024-12-01 PROCEDURE — G1010 CDSM STANSON: HCPCS

## 2024-12-01 PROCEDURE — 255N000002 HC RX 255 OP 636: Performed by: STUDENT IN AN ORGANIZED HEALTH CARE EDUCATION/TRAINING PROGRAM

## 2024-12-01 PROCEDURE — 85027 COMPLETE CBC AUTOMATED: CPT | Performed by: STUDENT IN AN ORGANIZED HEALTH CARE EDUCATION/TRAINING PROGRAM

## 2024-12-01 PROCEDURE — 93308 TTE F-UP OR LMTD: CPT | Mod: 26 | Performed by: INTERNAL MEDICINE

## 2024-12-01 PROCEDURE — 70553 MRI BRAIN STEM W/O & W/DYE: CPT | Mod: MG

## 2024-12-01 PROCEDURE — 93325 DOPPLER ECHO COLOR FLOW MAPG: CPT

## 2024-12-01 PROCEDURE — 250N000011 HC RX IP 250 OP 636: Performed by: ORTHOPAEDIC SURGERY

## 2024-12-01 PROCEDURE — 84100 ASSAY OF PHOSPHORUS: CPT | Performed by: STUDENT IN AN ORGANIZED HEALTH CARE EDUCATION/TRAINING PROGRAM

## 2024-12-01 PROCEDURE — 83735 ASSAY OF MAGNESIUM: CPT | Performed by: STUDENT IN AN ORGANIZED HEALTH CARE EDUCATION/TRAINING PROGRAM

## 2024-12-01 PROCEDURE — 999N000065 XR ABDOMEN PORT 1 VIEW

## 2024-12-01 PROCEDURE — 250N000013 HC RX MED GY IP 250 OP 250 PS 637: Performed by: STUDENT IN AN ORGANIZED HEALTH CARE EDUCATION/TRAINING PROGRAM

## 2024-12-01 PROCEDURE — 120N000001 HC R&B MED SURG/OB

## 2024-12-01 RX ORDER — DEXTROSE MONOHYDRATE, SODIUM CHLORIDE, SODIUM LACTATE, POTASSIUM CHLORIDE, CALCIUM CHLORIDE 5; 600; 310; 179; 20 G/100ML; MG/100ML; MG/100ML; MG/100ML; MG/100ML
INJECTION, SOLUTION INTRAVENOUS CONTINUOUS
Status: DISCONTINUED | OUTPATIENT
Start: 2024-12-01 | End: 2024-12-03

## 2024-12-01 RX ORDER — GADOBUTROL 604.72 MG/ML
9 INJECTION INTRAVENOUS ONCE
Status: COMPLETED | OUTPATIENT
Start: 2024-12-01 | End: 2024-12-01

## 2024-12-01 RX ADMIN — ASPIRIN 81 MG CHEWABLE TABLET 81 MG: 81 TABLET CHEWABLE at 01:37

## 2024-12-01 RX ADMIN — ROSUVASTATIN 10 MG: 10 TABLET, FILM COATED ORAL at 21:08

## 2024-12-01 RX ADMIN — SENNOSIDES AND DOCUSATE SODIUM 1 TABLET: 8.6; 5 TABLET ORAL at 20:07

## 2024-12-01 RX ADMIN — SERTRALINE HYDROCHLORIDE 50 MG: 50 TABLET ORAL at 21:08

## 2024-12-01 RX ADMIN — DEXTROSE MONOHYDRATE, SODIUM CHLORIDE, SODIUM LACTATE, POTASSIUM CHLORIDE, CALCIUM CHLORIDE: 5; 600; 310; 179; 20 INJECTION, SOLUTION INTRAVENOUS at 08:14

## 2024-12-01 RX ADMIN — HYDROMORPHONE HYDROCHLORIDE 0.2 MG: 0.2 INJECTION, SOLUTION INTRAMUSCULAR; INTRAVENOUS; SUBCUTANEOUS at 13:16

## 2024-12-01 RX ADMIN — POLYETHYLENE GLYCOL 3350 17 G: 17 POWDER, FOR SOLUTION ORAL at 08:20

## 2024-12-01 RX ADMIN — ATENOLOL 25 MG: 25 TABLET ORAL at 08:19

## 2024-12-01 RX ADMIN — ACETAMINOPHEN 975 MG: 325 TABLET, FILM COATED ORAL at 08:20

## 2024-12-01 RX ADMIN — SENNOSIDES AND DOCUSATE SODIUM 1 TABLET: 50; 8.6 TABLET ORAL at 01:37

## 2024-12-01 RX ADMIN — ASPIRIN 81 MG CHEWABLE TABLET 81 MG: 81 TABLET CHEWABLE at 08:19

## 2024-12-01 RX ADMIN — ACETAMINOPHEN 975 MG: 325 TABLET, FILM COATED ORAL at 13:51

## 2024-12-01 RX ADMIN — ACETAMINOPHEN 975 MG: 325 TABLET, FILM COATED ORAL at 20:07

## 2024-12-01 RX ADMIN — GADOBUTROL 9 ML: 604.72 INJECTION INTRAVENOUS at 08:01

## 2024-12-01 RX ADMIN — DEXTROSE MONOHYDRATE, SODIUM CHLORIDE, SODIUM LACTATE, POTASSIUM CHLORIDE, CALCIUM CHLORIDE: 5; 600; 310; 179; 20 INJECTION, SOLUTION INTRAVENOUS at 21:07

## 2024-12-01 RX ADMIN — SENNOSIDES AND DOCUSATE SODIUM 1 TABLET: 8.6; 5 TABLET ORAL at 08:19

## 2024-12-01 ASSESSMENT — ACTIVITIES OF DAILY LIVING (ADL)
ADLS_ACUITY_SCORE: 60
ADLS_ACUITY_SCORE: 60
ADLS_ACUITY_SCORE: 64
ADLS_ACUITY_SCORE: 60
ADLS_ACUITY_SCORE: 64
DEPENDENT_IADLS:: TRANSPORTATION
ADLS_ACUITY_SCORE: 60
ADLS_ACUITY_SCORE: 64
ADLS_ACUITY_SCORE: 60
ADLS_ACUITY_SCORE: 64
ADLS_ACUITY_SCORE: 64
ADLS_ACUITY_SCORE: 60
ADLS_ACUITY_SCORE: 64
ADLS_ACUITY_SCORE: 64
ADLS_ACUITY_SCORE: 60
ADLS_ACUITY_SCORE: 60

## 2024-12-01 NOTE — PROGRESS NOTES
"MRI resulted positive for bifrontal, left basal ganglia, anterior medial left temporal ischemic infarcts, edematous appearing cerebral parenchyma left frontal lobe with mild effacement lateral ventricle.      Her acute anemia was suspected 2/2 blood loss during procedure.  Improved from 6.8 to 9.3 yesterday after 2 units PRBC transfusion.  Was started on ASA 81 mg daily.  Today hemoglobin 8.9.  Would hold off on plavix at this time. EKG showed atrial fibrillation, this appears to be true A-fib, hospitalist agrees then would recommend starting anticoagulation instead of aspirin 5 days poststroke if Ortho agrees and medically safe.      Will continue following peripherally for EEG and TTE and continue monitoring for any increasing edema.  Recommend maintaining normal sodium, glucose, and temperature. Please contact us with any questions or worsening neurologic status.      Aviva Nieto PA-C  Vascular Neurology    To page me or covering stroke neurology team member, click here: AMCOM  Choose \"On Call\" tab at top, then select \"NEUROLOGY/ALL SITES\" from middle drop-down box, press Enter, then look for \"stroke\" or \"telestroke\" for your site.     "

## 2024-12-01 NOTE — PROGRESS NOTES
[facility]    Medicine Progress Note - Hospitalist Service    Date of Admission:  11/28/2024    Assessment & Plan   Candida Rose is a 84 year old female with a history of HTN, HLD, Moyamoya Disease, Anxiety, OA, Catarat, CVA with Left Hemiparesis, CKD, obesity who presented to the ED today with left leg pain after a mechanical fall at home.    She was found to have left femur fracture.  Ortho was consulted and the patient underwent surgical fix.     Unfortunately, post-op she became non-verbal and was found to have new right sided weakness.  Code stroke was called.  Initial CT showed multifocal prior ischemic stroke.  CTA head/heck showed bilateral ICA occlusion, bilateral PCA tiny caliber.  She was not given TPA given recent surgery.     Repeat CT head the following morning shows new bilateral frontal stroke, basal ganglia CVA which explains her new symptoms.        Acute bilateral frontal CVA & left basal ganglia cb aphasia, right sided weakness  Hx of CVA with Left Hemiparesis  Hx Moyamoya Disease  Vascular Dementia::  PMH of moyamoya s/p bilateral STA-MCA bypasses in 2006.  Hx of CVA in 2006, 2010 and 2022.  Uses a walker and cane for ambulation.  Lives in a single family home with her .  Has mild memory impairment at baseline.    Post-op she became non-verbal and was found to have new right sided weakness.  Code stroke was called.  Initial CT showed multifocal prior ischemic stroke.  CTA head/heck showed bilateral ICA occlusion, bilateral PCA tiny caliber.  She was not given TPA given recent surgery.   Repeat CT head the following morning shows new CVA.  Brain MRI confirms bifrontal, left basal ganglia, anterior medial left temporal ischemic infarcts, edematous appearing cerebral parenchyma left frontal lobe with mild effacement lateral ventricle. On my exam she is awake but is non-verbal.  She does squeeze my hand on the left but is otherwise non-participatory in neurologic  examination.  PERRL.  Left eye gaze deviation.  Eyes only intermittently track.  Seems to have right sided sujatha-neglect.  Vitally stable.     -Stroke neuro consulted  -EEG per stroke neuro to rule out seizure given left eye deviation  -ASA 81 mg daily started  -Per neuro, longer term anticoagulation plan would be therapeutic anticoagulation if consistent with GOC  -PT/OT/SLP  -NG tube placement given aphasia and inability to swallow  -Palliative care consultation for GOC.  Family has made it clear she is DNR/DNI, no cardioversion, no long term feeding tube.  Short term NG for meds and short trial of feeding may be ok but  to discuss with kids.       Mechanical fall cb left distal femur fracture now s/p surgical fix (11/30):  Trauma imaging reveals an acute comminuted fracture of the distal femoral diaphysis with displaced fracture fragments.    -Orthopedic surgery consulted, pt now s/p surgical repair  -IVF, antiemetics, analgesics prn  -non weight bearing to LLE  -CMS checks    Acute blood loss anemia, likely post-operative:   Hgb 12.4 --> 10.0 post-op --> 6.8 following morning.  No evidence for external bleeding.  Surgical dressing with some dried blood but not oozing blood.  Obvious consideration for vasile-operative hematoma.  Bilirubin normal so unlikely to be hemolysis.  -Consented for blood, type and screen  -S/p 2 unit(s) pRBC with goal >8 in the setting of acute CVA  -Hgb largely stable following transfusion     Mild Hyponatremia  Mild Richie on CKD:  Cr baseline 0.9-1.1.  Now Cr 1.2 post-op.  Likely related to anemia and dehydration given operation.   -IVF     Hx possible Paroxysmal Atrial Fibrillation  HTN  HLD  Afib diagnosed 10/2022 at the time of patient's CVA.  Followed up with Cardiology who reviewed the event monitor and did not see clear afib, but PAC/PVCs.  Per Cardiology, given the fact that the patient's with moyamoya disease have increased risk of intracerebral hemorrhage patient's  "anticoagulation was discontinued.      -Will defer future recommendation of therapeutic anticoagulation to stroke neurology   -Continue pta Rosuvastatin and Atenolol  -Hold Losartan for now  -Monitor on telemetry     Depression  Anxiety  -Continue pta Sertraline     Obesity, BMI 30        Diet: NPO for Medical/Clinical Reasons Except for: No Exceptions    DVT Prophylaxis: PCD  Abreu Catheter: Not present  Lines: None     Cardiac Monitoring: None  Code Status: No CPR- Do NOT Intubate      Clinically Significant Risk Factors         # Hyponatremia: Lowest Na = 134 mmol/L in last 2 days, will monitor as appropriate       # Hypoalbuminemia: Lowest albumin = 2.8 g/dL at 11/30/2024  6:48 AM, will monitor as appropriate     # Hypertension: Noted on problem list              # Obesity: Estimated body mass index is 30.94 kg/m  as calculated from the following:    Height as of this encounter: 1.727 m (5' 8\").    Weight as of this encounter: 92.3 kg (203 lb 7.8 oz)., PRESENT ON ADMISSION            Social Drivers of Health    Social Connections: Unknown (5/30/2024)    Social Connection and Isolation Panel [NHANES]     Frequency of Social Gatherings with Friends and Family: More than three times a week          Disposition Plan     Medically Ready for Discharge: Anticipated in 2-4 Days      Wily Palmer DO  Hospitalist Service  [facility]  Securely message with [employer] (more info)  Text page via [employer] Paging/Directory   ______________________________________________________________________    Interval History   NAEO.  The patient is awake on my exam.  Seemed to track eyes for a short period.  Otherwise is not following any of my commands.   updated at bedside.  Again, makes it clear that she would not want a long term feeding tube.  He is up to date on the brain MRI.     Physical Exam   Vital Signs: Temp: 98.6  F (37  C) Temp src: Temporal BP: (!) 161/53 Pulse: 88   Resp: 20 SpO2: 97 % O2 Device: " None (Room air) Oxygen Delivery: 1 LPM  Weight: 203 lbs 7.75 oz  General Appearance: Patient awake.  Does not answer my questions.  No apparent distress.   Respiratory: Lungs are CTAB.  Work of breathing appears normal on room air.  Cardiovascular: Regular rate and rhythm.  No murmurs rubs or gallops.  There is no edema present.  GI: Benign.  Soft.  Non-tender.  Bowel sounds active.   Skin: No rashes or lesions exposed skin.  Neuro:  Fixed left sided gaze.  PERRL.  Seemed to track for a short period.   Did not follow any of my commands otherwise.  Seems to move RLE very minimally.  Still moving left arm normally.   Other: Patient appears comfortable.     Medical Decision Making       56 MINUTES SPENT BY ME on the date of service doing chart review, history, exam, documentation & further activities per the note.      Data   ------------------------- PAST 24 HR DATA REVIEWED -----------------------------------------------    I have personally reviewed the following data over the past 24 hrs:    12.1 (H)  \   8.9 (L)   / 192     135 101 19.1 /  114 (H)   4.3 22 0.95 \     Trop: N/A BNP: N/A     TSH: N/A T4: N/A A1C: N/A       Imaging results reviewed over the past 24 hrs:   Recent Results (from the past 24 hours)   XR Abdomen Port 1 View    Narrative    EXAM: XR ABDOMEN PORT 1 VIEW  LOCATION: [facility]  DATE: 11/30/2024    INDICATION: feeding tube placement.  Assess placement.  COMPARISON: None.      Impression    IMPRESSION: Enteric tube with the tip within the region of the gastric fundus. Gas within normal caliber visualized small bowel and colon.   XR Abdomen Port 1 View    Narrative    EXAM: XR ABDOMEN PORT 1 VIEW  LOCATION: [facility]  DATE: 11/30/2024    INDICATION: NG tube placement.  COMPARISON: Portable abdomen single view 11/30/2024 at 1523 hours.      Impression    IMPRESSION: Previously seen enteric tube has been withdrawn, no longer in the stomach or the  visualized mediastinum. Tubing overlying the right chest extending to the left upper chest likely outside the patient.   X-ray Abdomen flat port    Narrative    EXAM: XR ABDOMEN PORT 1 VIEW  LOCATION: [facility]  DATE: 12/1/2024    INDICATION: NG tube placement  COMPARISON: None.      Impression    IMPRESSION: Nasogastric tube with tip in the fundus. The proximal side-port is beyond the GE junction.   MR Brain w/o & w Contrast    Narrative    EXAM: MR BRAIN W/O and W CONTRAST  LOCATION: [facility]  DATE: 12/1/2024    INDICATION: aphasia, right side weakness  COMPARISON: 11/29/2024 CT/CTA, 07/18/2024  CONTRAST: 9mL Gadavist    Technique: Multiplanar T1-weighted, axial FLAIR, and susceptibility images were obtained without intravenous contrast. Following intravenous gadolinium-based contrast administration, axial T2-weighted, diffusion, and T1-weighted images (in multiple   planes) were obtained.    Findings:    Multifocal diffusion restriction involving the parafalcine frontal lobes bilaterally, scattered throughout the left basal ganglia and within the anterior aspect of the medial left temporal lobe, all of which display ADC hypointense and T2/FLAIR   hyperintense signal, consistent with acute infarction. There is no associated contrast enhancement within these areas of infarcted parenchyma. There is mild effacement of the frontal horn of the left lateral ventricle.    Chronic appearing, multifocal infarctions involving the right frontal lobe and the left parietal/occipital lobes.    The visualized portions of paranasal sinuses, and mastoid air cells are relatively clear. The orbits are grossly unremarkable.      Impression    Impression:    1. Multifocal, acute infarctions of the cerebral parenchyma affecting the bifrontal lobes, the left basal ganglia and the anterior aspect of the medial left temporal lobe.    2. Edematous appearing cerebral parenchyma of the left  frontal lobe which mildly effaces the adjacent lateral ventricle frontal horn.      These findings were communicated to Dr Palmer at 8:22 AM on 12/01/2024 over the phone by Eze Landaverde

## 2024-12-01 NOTE — CONSULTS
[facility]    Neurology Consultation     Date of Admission:  11/28/2024    Assessment & Plan   Candida Rose is a 84 year old female who was admitted on 11/28/2024. I was asked to see the patient for ?encephalopathy.  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  The patient has neurological deficit which started after the surgery with very likely new strokes.  Presently she is obtunded with left gaze preference and right hemiparesis on top of her prior weakness on the left.  Very likely due to new ischemia.  This is not encephalopathy.  At this time    -Will do MRI of the head if possible the patient has had some stents placed in the past.  If MRI is not possible repeat CT scan in a day or 2 might clarify the diagnosis.  -EEG to exclude the possibility of seizure.  -Further workup and management of vital signs per stroke team    General Neurology will sign off if there are any questions please call us    Gretel Ramos MD    Code Status    No CPR- Do NOT Intubate    Reason for Consult   Reason for consult: I was asked by MELVA Robbins to evaluate this patient for ?encephalopathy.    Primary Care Physician   Chani ePoples    Chief Complaint   ?encephalopathy    History is obtained from the patient and electronic health record    History of Present Illness   Candida Rose is a 84 year old female who presents with decreased mental status.  The patient was admitted 2 days ago after a fall and sustained an acute community fracture of the distal femur on the left.  Prior to admission the patient has history of a prior stroke with left hemiparesis and history of moyamoya disease and vascular dementia.  She was living at home with her  and was able to ambulate with the help of a cane.  At baseline she does have some left hemiparesis.  She does  have mild memory impairment at baseline.    The patient has had yesterday morning surgery for her fracture and was awake after the surgery however she was late on noticed to have slurred speech.  Overnight she developed right hemiparesis, code stroke was called due to aphasia and right-sided weakness.     CT scan of the head shows no acute infarct small vessel ischemic disease, left parietal occipital temporal region moderate size area of tissue loss and gliosis.  Right frontal lobe demonstrates multiple mild to moderate size area of tissue loss and gliosis.  Right occipital lobe small area of gliosis.  Right cerebellum small chronic infarcts demonstrated.    Repeat CT scan of the head shows possible subacute ischemic infarct in the left basilar ganglia on top of the chronic changes mentioned above.      Past Medical History   I have reviewed this patient's medical history and updated it with pertinent information if needed.   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 F/u dr. Soto    OA (osteoarthritis)     s/p bilat total hips     Other chronic pain     Joint pain for many years    Unspecified cerebral artery occlusion with cerebral infarction     After Moyamoya surgery > 10 yrs ago.    Vitamin D deficiencies        Past Surgical History   I have reviewed this patient's surgical history and updated it with pertinent information if needed.  Past Surgical History:   Procedure Laterality Date    ARTHROPLASTY KNEE Left 4/17/2017    Procedure: ARTHROPLASTY KNEE;  Left total knee  "arthroplasty;  Surgeon: Chidi Jenkins MD;  Location:  OR    COLONOSCOPY  2008    normal    COLONOSCOPY  7/17/2014    Procedure: COLONOSCOPY;  Surgeon: Raul Nolan DO;  Location:  GI    CRANIOTOMY      MOJAMOJA  \"Pt had repair of blood flow.\"    IR CAROTID ANGIOGRAM  10/30/2006    IR CAROTID ANGIOGRAM  6/6/2010    IR CAROTID ANGIOGRAM  6/6/2010    IR CAROTID ANGIOGRAM  6/6/2010    IR CAROTID ANGIOGRAM  5/12/2011    IR CAROTID ANGIOGRAM  5/12/2011    IR CAROTID ANGIOGRAM  5/12/2011    IR CAROTID ANGIOGRAM  6/5/2012    IR CAROTID ANGIOGRAM  6/5/2012    IR CAROTID ANGIOGRAM  6/5/2012    IR CAROTID CEREBRAL ANGIOGRAM BILATERAL  10/7/2022    IR MISCELLANEOUS PROCEDURE  6/6/2010    IR MISCELLANEOUS PROCEDURE  6/6/2010    IR MISCELLANEOUS PROCEDURE  5/12/2011    IR MISCELLANEOUS PROCEDURE  6/5/2012    RECONSTRUCT FOREFOOT WITH METATARSOPHALANGEAL (MTP) FUSION Left 8/21/2014    Procedure: RECONSTRUCT FOREFOOT WITH METATARSOPHALANGEAL (MTP) FUSION;  Surgeon: Tres Merlos DPM;  Location:  OR    Z NONSPECIFIC PROCEDURE  10/30/06    neurosurgery Dr Soto    [facility] NONSPECIFIC PROCEDURE      bilateral total hips approx 2002    [facility] NONSPECIFIC PROCEDURE  3/07    neurosurgery        Prior to Admission Medications   Prior to Admission Medications   Prescriptions Last Dose Informant Patient Reported? Taking?   Multiple Vitamins-Minerals (MULTIVITAMIN & MINERAL PO) 11/27/2024 Bedtime Self Yes Yes   Sig: Take 1 tablet by mouth daily   aspirin 81 MG EC tablet 11/28/2024 Morning  Yes Yes   Sig: Take 81 mg by mouth daily   atenolol (TENORMIN) 25 MG tablet 11/28/2024 Morning  No Yes   Sig: Take 1 tablet (25 mg) by mouth daily   cetirizine (ZYRTEC) 10 MG tablet 11/28/2024 Morning  Yes Yes   Sig: Take 10 mg by mouth daily.   cyanocobalamin (VITAMIN B-12) 1000 MCG tablet 11/28/2024 Morning  Yes Yes   Sig: Take 1,000 mcg by mouth every other day.   losartan (COZAAR) 50 MG tablet 11/28/2024 Morning  No Yes "   Sig: TAKE 1 TABLET BY MOUTH TWICE A DAY   magnesium 500 MG TABS 2024 Morning Self Yes Yes   Sig: Take 250 mg by mouth daily.   rosuvastatin (CRESTOR) 10 MG tablet 2024 Bedtime  No Yes   Sig: Take 1 tablet (10 mg) by mouth daily.   sertraline (ZOLOFT) 50 MG tablet 2024 Bedtime  Yes Yes   Sig: Take 50 mg by mouth at bedtime.      Facility-Administered Medications: None     Allergies   Allergies   Allergen Reactions    Lisinopril      Persistent cough       Social History   I have reviewed this patient's social history and updated it with pertinent information if needed. Candida Rose  reports that she has never smoked. She has never been exposed to tobacco smoke. She has never used smokeless tobacco. She reports current alcohol use. She reports that she does not use drugs.    Family History   I have reviewed this patient's family history and updated it with pertinent information if needed.   Family History   Problem Relation Age of Onset    Obesity Sister         gastric by-pass age age 60, heart murmur.    Cancer - colorectal Sister 69    Heart Disease Father         mi,  age 48    Family History Negative Mother         killed in MVA age 54    Cancer Maternal Aunt         lung cancer ,non-smoker    Lung Cancer Maternal Aunt     Breast Cancer Daughter 48    Ovarian Cancer No family hx of        Review of Systems   The 10 point Review of Systems is negative other than noted in the HPI or here.     Physical Exam   Temp: 97.5  F (36.4  C) Temp src: Temporal BP: (!) 143/50 Pulse: 78   Resp: 16 SpO2: 100 % O2 Device: None (Room air) Oxygen Delivery: 1 LPM  Vital Signs with Ranges  Temp:  [97.4  F (36.3  C)-98.5  F (36.9  C)] 97.5  F (36.4  C)  Pulse:  [66-99] 78  Resp:  [16-27] 16  BP: (114-143)/(37-64) 143/50  SpO2:  [88 %-100 %] 100 %  203 lbs 7.75 oz    The patient is asleep.  She responds to sternal rub but not to voice.  She opens the eyes however does not follow.  The eyes are deviated  towards the left.  She has a right hemiparesis.  No movement seen on the right side.  The left lower extremity is in a brace.  She moves the left upper extremity but not the right.  I did not see any movement in lower extremities.  Reflexes are decreased throughout.  The patient does not talk does not follow commands.  She has occasionally mouth movements.  Head is normocephalic and atraumatic  Neck is supple    Data   Results for orders placed or performed during the hospital encounter of 11/28/24 (from the past 24 hours)   Glucose   Result Value Ref Range    Glucose 131 (H) 70 - 99 mg/dL   Magnesium   Result Value Ref Range    Magnesium 1.7 1.7 - 2.3 mg/dL   Potassium   Result Value Ref Range    Potassium 4.3 3.4 - 5.3 mmol/L   Phosphorus   Result Value Ref Range    Phosphorus 3.2 2.5 - 4.5 mg/dL   Basic metabolic panel   Result Value Ref Range    Sodium 134 (L) 135 - 145 mmol/L    Potassium 4.3 3.4 - 5.3 mmol/L    Chloride 102 98 - 107 mmol/L    Carbon Dioxide (CO2) 21 (L) 22 - 29 mmol/L    Anion Gap 11 7 - 15 mmol/L    Urea Nitrogen 27.9 (H) 8.0 - 23.0 mg/dL    Creatinine 1.20 (H) 0.51 - 0.95 mg/dL    GFR Estimate 45 (L) >60 mL/min/1.73m2    Calcium 7.6 (L) 8.8 - 10.4 mg/dL    Glucose 131 (H) 70 - 99 mg/dL   CBC with platelets   Result Value Ref Range    WBC Count 11.8 (H) 4.0 - 11.0 10e3/uL    RBC Count 2.30 (L) 3.80 - 5.20 10e6/uL    Hemoglobin 6.8 (LL) 11.7 - 15.7 g/dL    Hematocrit 21.5 (L) 35.0 - 47.0 %    MCV 93 78 - 100 fL    MCH 30.0 26.5 - 33.0 pg    MCHC 31.8 31.5 - 36.5 g/dL    RDW 12.8 10.0 - 15.0 %    Platelet Count 196 150 - 450 10e3/uL   Hepatic panel   Result Value Ref Range    Protein Total 4.7 (L) 6.4 - 8.3 g/dL    Albumin 2.8 (L) 3.5 - 5.2 g/dL    Bilirubin Total <0.2 <=1.2 mg/dL    Alkaline Phosphatase 66 40 - 150 U/L    AST 56 (H) 0 - 45 U/L    ALT 13 0 - 50 U/L    Bilirubin Direct <0.20 0.00 - 0.30 mg/dL   Hemoglobin A1c   Result Value Ref Range    Estimated Average Glucose 126 (H) <117  mg/dL    Hemoglobin A1C 6.0 (H) <5.7 %   Troponin T, High Sensitivity   Result Value Ref Range    Troponin T, High Sensitivity 24 (H) <=14 ng/L   Blood gas venous   Result Value Ref Range    pH Venous 7.39 7.32 - 7.43    pCO2 Venous 44 40 - 50 mm Hg    pO2 Venous 43 25 - 47 mm Hg    Bicarbonate Venous 27 21 - 28 mmol/L    Base Excess/Deficit Venous 1.3 -3.0 - 3.0 mmol/L    FIO2 21     Oxyhemoglobin Venous 77 (H) 70 - 75 %    O2 Sat, Venous 79.6 (H) 70.0 - 75.0 %    Narrative    In healthy individuals, oxyhemoglobin (O2Hb) and oxygen saturation (SO2) are approximately equal. In the presence of dyshemoglobins, oxyhemoglobin can be considerably lower than oxygen saturation.   ABO/Rh type and screen *Canceled*    Narrative    The following orders were created for panel order ABO/Rh type and screen.  Procedure                               Abnormality         Status                     ---------                               -----------         ------                       Please view results for these tests on the individual orders.   ABO/Rh type and screen *Canceled*    Narrative    The following orders were created for panel order ABO/Rh type and screen.  Procedure                               Abnormality         Status                     ---------                               -----------         ------                     Adult Type and Screen[[id number]]                                                         Please view results for these tests on the individual orders.   Prepare red blood cells (unit)   Result Value Ref Range    Blood Component Type Red Blood Cells     Product Code N8311B20     Unit Status Transfused     Unit Number [id number]     CROSSMATCH Compatible     CODING SYSTEM [id number]     ISSUE DATE AND TIME [id number]     UNIT ABO/RH O+     UNIT TYPE ISBT 5100    Prepare red blood cells (unit)   Result Value Ref Range    Blood Component Type Red Blood Cells     Product Code Y5202J21     Unit  Status Transfused     Unit Number [id number]     CROSSMATCH Compatible     CODING SYSTEM [id number]     ISSUE DATE AND TIME [id number]     UNIT ABO/RH O+     UNIT TYPE ISBT 5100    CT Head w/o Contrast    Narrative    EXAM: CT HEAD WITHOUT CONTRAST  LOCATION: [facility]  DATE: 11/30/2024    INDICATION: Concern for CVA. aphasia. weakness.  COMPARISON: Head CT 11/29/2024.  TECHNIQUE: Routine CT Head without IV contrast. Multiplanar reformats. Dose reduction techniques were used.    FINDINGS:  INTRACRANIAL CONTENTS: No intracranial hemorrhage, extra-axial collection, or mass effect.  No CT evidence of acute infarct. Moderate presumed chronic small vessel ischemic changes. Areas of chronic encephalomalacia involving the anterolateral right   frontal lobe, right occipital lobe and left temporo-occipital junction again noted consistent with chronic ischemic infarcts. Subtle area of hypodensity in the left basal ganglia is again noted possibly representing a subacute ischemic infarct. Moderate   generalized volume loss. No hydrocephalus.     VISUALIZED ORBITS/SINUSES/MASTOIDS: No intraorbital abnormality. No paranasal sinus mucosal disease. No middle ear or mastoid effusion.    BONES/SOFT TISSUES: No acute abnormality.      Impression    IMPRESSION:  1.  Possible subacute ischemic infarct in the left basal ganglia again noted.  2.  No CT evidence for acute intracranial process.  3.  Brain atrophy and presumed chronic microvascular ischemic changes as above.       EKG 12-lead, tracing only   Result Value Ref Range    Systolic Blood Pressure  mmHg    Diastolic Blood Pressure  mmHg    Ventricular Rate 82 BPM    Atrial Rate 82 BPM    AK Interval 164 ms    QRS Duration 76 ms     ms    QTc 457 ms    P Axis 42 degrees    R AXIS -16 degrees    T Axis 21 degrees    Interpretation ECG       Sinus rhythm  Nonspecific ST abnormality  Abnormal ECG  When compared with ECG of 28-Nov-2024 17:44,  Sinus  rhythm has replaced Atrial flutter  Vent. rate has decreased by  58 bpm     XR Abdomen Port 1 View    Narrative    EXAM: XR ABDOMEN PORT 1 VIEW  LOCATION: [facility]  DATE: 11/30/2024    INDICATION: feeding tube placement.  Assess placement.  COMPARISON: None.      Impression    IMPRESSION: Enteric tube with the tip within the region of the gastric fundus. Gas within normal caliber visualized small bowel and colon.   Hemoglobin   Result Value Ref Range    Hemoglobin 9.3 (L) 11.7 - 15.7 g/dL

## 2024-12-01 NOTE — PROGRESS NOTES
Orthopedics Daily Progress Note  Candida Rose  12/01/2024  Date of Admission: 11/28/2024      Post Op Day: 2 Days Post-Op   Procedures: Procedure(s):  OPEN REDUCTION INTERNAL FIXATION, FRACTURE,LEFT FEMUR, DISTAL      Interval History:  Suffered stroke post operatively.  Sleeping soundly on visit today, does not arouse to voice or touch.   Otherwise, visit was limited.       Temp:  [97.5  F (36.4  C)-100.3  F (37.9  C)] 98.6  F (37  C)  Pulse:  [] 88  Resp:  [16-27] 20  BP: (114-161)/(37-64) 161/53  SpO2:  [88 %-100 %] 97 %    Physical Exam:  Sleeping.  Breathing easily without wheeze  Dressing/wound c/d/I. Knee immobilizer in place.   Severe ankle and foot swelling.   Bilateral calves soft, compressible, non tender  Palpable dp pulse, normal cap refill    Labs/Imaging:  Results for orders placed or performed during the hospital encounter of 11/28/24 (from the past 24 hours)   CT Head w/o Contrast    Narrative    EXAM: CT HEAD WITHOUT CONTRAST  LOCATION: [facility]  DATE: 11/30/2024    INDICATION: Concern for CVA. aphasia. weakness.  COMPARISON: Head CT 11/29/2024.  TECHNIQUE: Routine CT Head without IV contrast. Multiplanar reformats. Dose reduction techniques were used.    FINDINGS:  INTRACRANIAL CONTENTS: No intracranial hemorrhage, extra-axial collection, or mass effect.  No CT evidence of acute infarct. Moderate presumed chronic small vessel ischemic changes. Areas of chronic encephalomalacia involving the anterolateral right   frontal lobe, right occipital lobe and left temporo-occipital junction again noted consistent with chronic ischemic infarcts. Subtle area of hypodensity in the left basal ganglia is again noted possibly representing a subacute ischemic infarct. Moderate   generalized volume loss. No hydrocephalus.     VISUALIZED ORBITS/SINUSES/MASTOIDS: No intraorbital abnormality. No paranasal sinus mucosal disease. No middle ear or mastoid effusion.    BONES/SOFT TISSUES:  No acute abnormality.      Impression    IMPRESSION:  1.  Possible subacute ischemic infarct in the left basal ganglia again noted.  2.  No CT evidence for acute intracranial process.  3.  Brain atrophy and presumed chronic microvascular ischemic changes as above.       EKG 12-lead, tracing only   Result Value Ref Range    Systolic Blood Pressure  mmHg    Diastolic Blood Pressure  mmHg    Ventricular Rate 82 BPM    Atrial Rate 82 BPM    KS Interval 164 ms    QRS Duration 76 ms     ms    QTc 457 ms    P Axis 42 degrees    R AXIS -16 degrees    T Axis 21 degrees    Interpretation ECG       Sinus rhythm  Nonspecific ST abnormality  Abnormal ECG  When compared with ECG of 28-Nov-2024 17:44,  Sinus rhythm has replaced Atrial flutter  Vent. rate has decreased by  58 bpm     XR Abdomen Port 1 View    Narrative    EXAM: XR ABDOMEN PORT 1 VIEW  LOCATION: [facility]  DATE: 11/30/2024    INDICATION: feeding tube placement.  Assess placement.  COMPARISON: None.      Impression    IMPRESSION: Enteric tube with the tip within the region of the gastric fundus. Gas within normal caliber visualized small bowel and colon.   Hemoglobin   Result Value Ref Range    Hemoglobin 9.3 (L) 11.7 - 15.7 g/dL   XR Abdomen Port 1 View    Narrative    EXAM: XR ABDOMEN PORT 1 VIEW  LOCATION: [facility]  DATE: 11/30/2024    INDICATION: NG tube placement.  COMPARISON: Portable abdomen single view 11/30/2024 at 1523 hours.      Impression    IMPRESSION: Previously seen enteric tube has been withdrawn, no longer in the stomach or the visualized mediastinum. Tubing overlying the right chest extending to the left upper chest likely outside the patient.   X-ray Abdomen flat port    Narrative    EXAM: XR ABDOMEN PORT 1 VIEW  LOCATION: [facility]  DATE: 12/1/2024    INDICATION: NG tube placement  COMPARISON: None.      Impression    IMPRESSION: Nasogastric tube with tip in the  fundus. The proximal side-port is beyond the GE junction.   MR Brain w/o & w Contrast    Narrative    EXAM: MR BRAIN W/O and W CONTRAST  LOCATION: [facility]  DATE: 12/1/2024    INDICATION: aphasia, right side weakness  COMPARISON: 11/29/2024 CT/CTA, 07/18/2024  CONTRAST: 9mL Gadavist    Technique: Multiplanar T1-weighted, axial FLAIR, and susceptibility images were obtained without intravenous contrast. Following intravenous gadolinium-based contrast administration, axial T2-weighted, diffusion, and T1-weighted images (in multiple   planes) were obtained.    Findings:    Multifocal diffusion restriction involving the parafalcine frontal lobes bilaterally, scattered throughout the left basal ganglia and within the anterior aspect of the medial left temporal lobe, all of which display ADC hypointense and T2/FLAIR   hyperintense signal, consistent with acute infarction. There is no associated contrast enhancement within these areas of infarcted parenchyma. There is mild effacement of the frontal horn of the left lateral ventricle.    Chronic appearing, multifocal infarctions involving the right frontal lobe and the left parietal/occipital lobes.    The visualized portions of paranasal sinuses, and mastoid air cells are relatively clear. The orbits are grossly unremarkable.      Impression    Impression:    1. Multifocal, acute infarctions of the cerebral parenchyma affecting the bifrontal lobes, the left basal ganglia and the anterior aspect of the medial left temporal lobe.    2. Edematous appearing cerebral parenchyma of the left frontal lobe which mildly effaces the adjacent lateral ventricle frontal horn.      These findings were communicated to Dr Palmer at 8:22 AM on 12/01/2024 over the phone by Eze Landaverde   Phosphorus   Result Value Ref Range    Phosphorus 3.0 2.5 - 4.5 mg/dL   Magnesium   Result Value Ref Range    Magnesium 2.1 1.7 - 2.3 mg/dL   CBC with platelets   Result Value Ref  Range    WBC Count 12.1 (H) 4.0 - 11.0 10e3/uL    RBC Count 3.02 (L) 3.80 - 5.20 10e6/uL    Hemoglobin 8.9 (L) 11.7 - 15.7 g/dL    Hematocrit 27.4 (L) 35.0 - 47.0 %    MCV 91 78 - 100 fL    MCH 29.5 26.5 - 33.0 pg    MCHC 32.5 31.5 - 36.5 g/dL    RDW 13.8 10.0 - 15.0 %    Platelet Count 192 150 - 450 10e3/uL   Basic metabolic panel   Result Value Ref Range    Sodium 135 135 - 145 mmol/L    Potassium 4.3 3.4 - 5.3 mmol/L    Chloride 101 98 - 107 mmol/L    Carbon Dioxide (CO2) 22 22 - 29 mmol/L    Anion Gap 12 7 - 15 mmol/L    Urea Nitrogen 19.1 8.0 - 23.0 mg/dL    Creatinine 0.95 0.51 - 0.95 mg/dL    GFR Estimate 60 (L) >60 mL/min/1.73m2    Calcium 8.1 (L) 8.8 - 10.4 mg/dL    Glucose 114 (H) 70 - 99 mg/dL         A/P - 84 year old female s/p left interprosthetic femur ORIF    Hbg 6.8 on 11/30. 2 units transfused.     DVT proph: per primary/neuro teams. ASA, Plavix is fine.   Activity: NWB to LLE. Knee immobilizer at all times. PT/OT.  Dressing:  Keep intact. Change if greater than 60% saturated. Otherwise, will plan to change dressing on POD7.  Pain Management: Continue pain regimen.  Encouraged multiple modalities, ice.  Encourage elevation.      Dispo: TBD, Pending PT progression and stable medically.       Michelle Hodgson PA-C  [facility]

## 2024-12-01 NOTE — PROGRESS NOTES
SLP: Clinical evaluation orders received. Per discussion with RN, pt is not yet following commands. ST will monitor appropriateness for CSE.

## 2024-12-01 NOTE — CONSULTS
Care Management Initial Consult    General Information  Assessment completed with: Spouse or significant other,    Type of CM/SW Visit: Initial Assessment    Primary Care Provider verified and updated as needed:     Readmission within the last 30 days:        Reason for Consult: discharge planning  Advance Care Planning:            Communication Assessment  Patient's communication style: spoken language (English or Bilingual)    Hearing Difficulty or Deaf: yes   Wear Glasses or Blind: yes    Cognitive  Cognitive/Neuro/Behavioral: .WDL except, level of consciousness, mood/behavior, orientation, speech  Level of Consciousness: alert  Arousal Level: arouses to touch/gentle shaking, opens eyes spontaneously  Orientation: other (see comments) (LAST)  Mood/Behavior: calm  Best Language: 3 - Mute  Speech: other (see comments) (LAST)    Living Environment:   People in home: spouse     Current living Arrangements: house      Able to return to prior arrangements:         Family/Social Support:  Care provided by: self  Provides care for: no one  Marital Status:   Support system:           Description of Support System: Supportive, Involved         Current Resources:   Patient receiving home care services: No        Community Resources: None  Equipment currently used at home: cane, straight, walker, rolling  Supplies currently used at home:      Employment/Financial:  Employment Status: retired        Financial Concerns:             Does the patient's insurance plan have a 3 day qualifying hospital stay waiver?  Yes     Which insurance plan 3 day waiver is available? [employer]    Will the waiver be used for post-acute placement? No    Lifestyle & Psychosocial Needs:  Social Drivers of Health     Food Insecurity: Low Risk  (11/29/2024)    Food Insecurity     Within the past 12 months, did you worry that your food would run out before you got money to buy more?: No     Within the past 12 months, did the food you  bought just not last and you didn t have money to get more?: No   Depression: Not at risk (6/3/2024)    PHQ-2     PHQ-2 Score: 0   Housing Stability: Low Risk  (11/29/2024)    Housing Stability     Do you have housing? : Yes     Are you worried about losing your housing?: No   Tobacco Use: Low Risk  (11/29/2024)    Patient History     Smoking Tobacco Use: Never     Smokeless Tobacco Use: Never     Passive Exposure: Never   Financial Resource Strain: Low Risk  (11/29/2024)    Financial Resource Strain     Within the past 12 months, have you or your family members you live with been unable to get utilities (heat, electricity) when it was really needed?: No   Alcohol Use: Not on file   Transportation Needs: Low Risk  (11/29/2024)    Transportation Needs     Within the past 12 months, has lack of transportation kept you from medical appointments, getting your medicines, non-medical meetings or appointments, work, or from getting things that you need?: No   Physical Activity: Sufficiently Active (5/30/2024)    Exercise Vital Sign     Days of Exercise per Week: 7 days     Minutes of Exercise per Session: 30 min   Interpersonal Safety: Low Risk  (11/29/2024)    Interpersonal Safety     Do you feel physically and emotionally safe where you currently live?: Yes     Within the past 12 months, have you been hit, slapped, kicked or otherwise physically hurt by someone?: No     Within the past 12 months, have you been humiliated or emotionally abused in other ways by your partner or ex-partner?: No   Stress: No Stress Concern Present (5/30/2024)    [employer] [employer] - Occupational Stress Questionnaire     Feeling of Stress : Not at all   Social Connections: Unknown (5/30/2024)    Social Connection and Isolation Panel [NHANES]     Frequency of Communication with Friends and Family: Not on file     Frequency of Social Gatherings with Friends and Family: More than three times a week     Attends Pentecostal  Services: Not on file     Active Member of Clubs or Organizations: Not on file     Attends Club or Organization Meetings: Not on file     Marital Status: Not on file   Health Literacy: Not on file       Functional Status:  Prior to admission patient needed assistance:   Dependent ADLs:: Ambulation-cane, Ambulation-walker  Dependent IADLs:: Transportation       Mental Health Status:  Mental Health Status: No Current Concerns       Chemical Dependency Status:  Chemical Dependency Status: No Current Concerns             Values/Beliefs:  Spiritual, Cultural Beliefs, Pentecostalism Practices, Values that affect care:                 Discussed  Partnership in Safe Discharge Planning  document with patient/family: No    Additional Information:  SW met with pt's , Olu. He reports they live in a house. He reports pt was independent with all ADL's and IADL's prior to hospitalization. He reports she would use a cane for inside of the home and walker for outside of the home. Olu reports pt has had multiple falls within a six month timeframe.He reports pt has been to [facility] in the past. Pt does not receive any services at home.      Consult received for elevated risk score.        Next Steps: TBD. DESTINY will continue to follow and assist with discharge planning.  Waiting on PT and Palliative consults     Lin MUNOZ, [facility]  Inpatient Care Coordination   [facility]   [phone]

## 2024-12-01 NOTE — PLAN OF CARE
"Goal Outcome Evaluation:      Plan of Care Reviewed With: patient, spouse    Overall Patient Progress: decliningOverall Patient Progress: declining    Outcome Evaluation: VS monitored, opens eyes but does not respond verbally or follow commands, CT done, see results in EHR, IV Dilaudid 1x for pain, zamudio patent, NPO till speech eval, NG tube placed, awaiting xray verification, repositioned q2h, Mag replaced, 2 units PRBC's philipp well, drsg C/D/I, LE edema, PCD's in place, BLE pale, moves feet in response to pain stimuli, 1+/doppler for dorsal pedals, tele stroke saw pt, see their note in EHR, spouse updated at b/s.    Problem: Adult Inpatient Plan of Care  Goal: Plan of Care Review  Description: The Plan of Care Review/Shift note should be completed every shift.  The Outcome Evaluation is a brief statement about your assessment that the patient is improving, declining, or no change.  This information will be displayed automatically on your shift  note.  Outcome: Not Progressing  Flowsheets (Taken 11/30/2024 1914)  Outcome Evaluation: VS monitored, opens eyes but does not respond verbally or follow commands, CT done, see results in EHR, IV Dilaudid 1x for pain, zamudio patent, NPO till speech eval, NG tube placed, awaiting xray verification, repositioned q2h, Mag replaced, 2 units PRBC's philipp well, drsg C/D/I, LE edema, PCD's in place, BLE pale, moves feet in response to pain stimuli, 1+/doppler for dorsal pedals, tele stroke saw pt, see their note in EHR, spouse updated at b/s.  Plan of Care Reviewed With:   patient   spouse  Overall Patient Progress: declining  Goal: Patient-Specific Goal (Individualized)  Description: You can add care plan individualizations to a care plan. Examples of Individualization might be:  \"Parent requests to be called daily at 9am for status\", \"I have a hard time hearing out of my right ear\", or \"Do not touch me to wake me up as it startles  me\".  Outcome: Not Progressing  Goal: Absence of " Hospital-Acquired Illness or Injury  Outcome: Not Progressing  Goal: Optimal Comfort and Wellbeing  Outcome: Not Progressing  Goal: Readiness for Transition of Care  Outcome: Not Progressing     Problem: Hip Fracture Medical Management  Goal: Optimal Coping with Change in Health Status  Outcome: Not Progressing  Goal: Absence of Bleeding  Outcome: Not Progressing  Goal: Effective Bowel Elimination  Outcome: Not Progressing  Intervention: Promote Effective Bowel Elimination  Recent Flowsheet Documentation  Taken 11/30/2024 0807 by Frieda Foster RN  Bowel Elimination Promotion: adequate fluid intake promoted  Goal: Baseline Cognitive Function Maintained  Outcome: Not Progressing  Goal: Absence of Embolism  Outcome: Not Progressing  Goal: Fracture Stability  Outcome: Not Progressing  Goal: Optimal Functional Performance  Outcome: Not Progressing  Goal: Pain Control and Function  Outcome: Not Progressing  Goal: Effective Urinary Elimination  Outcome: Not Progressing     Problem: Hip Fracture Surgical Repair  Goal: Optimal Coping with Change in Health Status  Outcome: Not Progressing  Goal: Absence of Bleeding  Outcome: Not Progressing  Goal: Effective Bowel Elimination  Outcome: Not Progressing  Intervention: Enhance Bowel Motility and Elimination  Recent Flowsheet Documentation  Taken 11/30/2024 0807 by Frieda Foster RN  Bowel Elimination Promotion: adequate fluid intake promoted  Goal: Cognitive Function Maintained  Outcome: Not Progressing  Goal: Fluid and Electrolyte Balance  Outcome: Not Progressing  Goal: Absence of Infection Signs and Symptoms  Outcome: Not Progressing  Goal: Optimal Functional Ability  Outcome: Not Progressing  Goal: Anesthesia/Sedation Recovery  Outcome: Not Progressing  Intervention: Optimize Anesthesia Recovery  Recent Flowsheet Documentation  Taken 11/30/2024 0807 by Frieda Foster RN  Administration (IS): unable to perform  Goal: Optimal Pain Control and Function  Outcome: Not  Progressing  Goal: Nausea and Vomiting Relief  Outcome: Not Progressing  Goal: Effective Urinary Elimination  Outcome: Not Progressing  Goal: Effective Oxygenation and Ventilation  Outcome: Not Progressing

## 2024-12-01 NOTE — PLAN OF CARE
"Goal Outcome Evaluation:                 Outcome Evaluation: Plan for MRI, EEG, Echo. VS monitoring.    Pt is opening eyes spontaneously, non verbal. Can move L arm, moderate . Can not move R arm. Not able to assess lower extremities. Pt can not follow directions. Pt pulled NG out, replaced x2, first placement unsuccessful, second successful,  X ray confirm placement, meds route changed by pharmacy and given via NG. Pt started aspirin, recheck Hgb 9.3.  Pt is on RA. A fib P between 80 to 130. Pt had fever 100.3, blankets removed, went down to 99.1, 98.4. Abreu patent. Pt went down for MRI around 7 am. MRI tech called radiology who did CT scan reading and per radiology pt has no stents in her brain. Pt is NPO, oral care provided.       Problem: Adult Inpatient Plan of Care  Goal: Plan of Care Review  Description: The Plan of Care Review/Shift note should be completed every shift.  The Outcome Evaluation is a brief statement about your assessment that the patient is improving, declining, or no change.  This information will be displayed automatically on your shift  note.  Outcome: Not Progressing  Flowsheets (Taken 12/1/2024 0512)  Outcome Evaluation: Plan for MRI, EEG, Echo. VS monitoring.  Goal: Patient-Specific Goal (Individualized)  Description: You can add care plan individualizations to a care plan. Examples of Individualization might be:  \"Parent requests to be called daily at 9am for status\", \"I have a hard time hearing out of my right ear\", or \"Do not touch me to wake me up as it startles  me\".  Outcome: Not Progressing  Goal: Absence of Hospital-Acquired Illness or Injury  Outcome: Not Progressing  Intervention: Identify and Manage Fall Risk  Recent Flowsheet Documentation  Taken 12/1/2024 0102 by Radha Pablo, RN  Safety Promotion/Fall Prevention:   safety round/check completed   lighting adjusted   clutter free environment maintained   increase visualization of patient  Taken 11/30/2024 2219 by Bev, " ESSENCE Garcia  Safety Promotion/Fall Prevention:   safety round/check completed   lighting adjusted   clutter free environment maintained   increase visualization of patient  Intervention: Prevent Skin Injury  Recent Flowsheet Documentation  Taken 12/1/2024 0102 by Radha Pablo RN  Body Position: supine  Taken 11/30/2024 2219 by Radha Pablo RN  Body Position: supine  Intervention: Prevent and Manage VTE (Venous Thromboembolism) Risk  Recent Flowsheet Documentation  Taken 12/1/2024 0102 by Radha Pabol RN  VTE Prevention/Management:   SCDs on (sequential compression devices)   other (see comments)  Taken 11/30/2024 2219 by Radha Pablo RN  VTE Prevention/Management:   SCDs on (sequential compression devices)   other (see comments)  Intervention: Prevent Infection  Recent Flowsheet Documentation  Taken 12/1/2024 0102 by Radha Pablo RN  Infection Prevention:   rest/sleep promoted   single patient room provided  Taken 11/30/2024 2219 by Radha Pablo RN  Infection Prevention:   rest/sleep promoted   single patient room provided  Goal: Optimal Comfort and Wellbeing  Outcome: Not Progressing  Goal: Readiness for Transition of Care  Outcome: Not Progressing     Problem: Hip Fracture Medical Management  Goal: Optimal Coping with Change in Health Status  Outcome: Not Progressing  Goal: Absence of Bleeding  Outcome: Not Progressing  Goal: Effective Bowel Elimination  Outcome: Not Progressing  Intervention: Promote Effective Bowel Elimination  Recent Flowsheet Documentation  Taken 12/1/2024 0102 by Radha Pablo RN  Bowel Elimination Promotion: (IV infusing) adequate fluid intake promoted  Taken 11/30/2024 2219 by Radha Pablo RN  Bowel Elimination Promotion: (IV infusing) adequate fluid intake promoted  Goal: Baseline Cognitive Function Maintained  Outcome: Not Progressing  Goal: Absence of Embolism  Outcome: Not Progressing  Intervention: Prevent or Manage Embolism Risk  Recent Flowsheet Documentation  Taken 12/1/2024  0102 by Radha Pablo RN  VTE Prevention/Management:   SCDs on (sequential compression devices)   other (see comments)  Taken 11/30/2024 2219 by Radha Pablo RN  VTE Prevention/Management:   SCDs on (sequential compression devices)   other (see comments)  Goal: Fracture Stability  Outcome: Not Progressing  Intervention: Promote Fracture Stability and Healing  Recent Flowsheet Documentation  Taken 12/1/2024 0102 by Radha Pablo RN  Equipment:   immobilizer   other (see comments)  Taken 11/30/2024 2219 by Radha Pablo RN  Equipment:   immobilizer   other (see comments)  Goal: Optimal Functional Performance  Outcome: Not Progressing  Intervention: Promote Optimal Functional Status  Recent Flowsheet Documentation  Taken 12/1/2024 0102 by Radha Pablo RN  Range of Motion: ROM (range of motion) performed  Activity Management: bedrest  Taken 11/30/2024 2219 by Radha Pablo RN  Range of Motion: ROM (range of motion) performed  Activity Management: bedrest  Goal: Pain Control and Function  Outcome: Not Progressing  Goal: Effective Urinary Elimination  Outcome: Not Progressing     Problem: Hip Fracture Surgical Repair  Goal: Optimal Coping with Change in Health Status  Outcome: Not Progressing  Goal: Absence of Bleeding  Outcome: Not Progressing  Goal: Effective Bowel Elimination  Outcome: Not Progressing  Intervention: Enhance Bowel Motility and Elimination  Recent Flowsheet Documentation  Taken 12/1/2024 0102 by Radha Pablo RN  Bowel Elimination Promotion: (IV infusing) adequate fluid intake promoted  Taken 11/30/2024 2219 by Radha Pablo RN  Bowel Elimination Promotion: (IV infusing) adequate fluid intake promoted  Goal: Cognitive Function Maintained  Outcome: Not Progressing  Goal: Fluid and Electrolyte Balance  Outcome: Not Progressing  Goal: Absence of Infection Signs and Symptoms  Outcome: Not Progressing  Goal: Optimal Functional Ability  Outcome: Not Progressing  Intervention: Protect Joint Integrity  Recent  Flowsheet Documentation  Taken 12/1/2024 0102 by Radha Pablo RN  Equipment:   immobilizer   other (see comments)  Taken 11/30/2024 2219 by Radha Pablo RN  Equipment:   immobilizer   other (see comments)  Intervention: Promote Optimal Functional Status  Recent Flowsheet Documentation  Taken 12/1/2024 0102 by Radha Pablo RN  Activity Management: bedrest  Taken 11/30/2024 2219 by Radha Pablo RN  Activity Management: bedrest  Goal: Anesthesia/Sedation Recovery  Outcome: Not Progressing  Intervention: Optimize Anesthesia Recovery  Recent Flowsheet Documentation  Taken 12/1/2024 0102 by Radha Pablo RN  Safety Promotion/Fall Prevention:   safety round/check completed   lighting adjusted   clutter free environment maintained   increase visualization of patient  Administration (IS): unable to perform  Taken 11/30/2024 2219 by Radha Pablo RN  Safety Promotion/Fall Prevention:   safety round/check completed   lighting adjusted   clutter free environment maintained   increase visualization of patient  Administration (IS): unable to perform  Goal: Optimal Pain Control and Function  Outcome: Not Progressing  Goal: Nausea and Vomiting Relief  Outcome: Not Progressing  Goal: Effective Urinary Elimination  Outcome: Not Progressing  Goal: Effective Oxygenation and Ventilation  Outcome: Not Progressing  Intervention: Optimize Oxygenation and Ventilation  Recent Flowsheet Documentation  Taken 12/1/2024 0102 by Radha Pablo RN  Head of Bed (HOB) Positioning: HOB flat  Taken 11/30/2024 2219 by Radha Pablo RN  Head of Bed (HOB) Positioning: HOB flat

## 2024-12-02 ENCOUNTER — APPOINTMENT (OUTPATIENT)
Dept: SPEECH THERAPY | Facility: CLINIC | Age: 84
DRG: 480 | End: 2024-12-02
Attending: STUDENT IN AN ORGANIZED HEALTH CARE EDUCATION/TRAINING PROGRAM
Payer: MEDICARE

## 2024-12-02 ENCOUNTER — APPOINTMENT (OUTPATIENT)
Dept: CT IMAGING | Facility: CLINIC | Age: 84
DRG: 480 | End: 2024-12-02
Attending: NURSE PRACTITIONER
Payer: MEDICARE

## 2024-12-02 ENCOUNTER — APPOINTMENT (OUTPATIENT)
Dept: CARDIOLOGY | Facility: CLINIC | Age: 84
DRG: 480 | End: 2024-12-02
Attending: STUDENT IN AN ORGANIZED HEALTH CARE EDUCATION/TRAINING PROGRAM
Payer: MEDICARE

## 2024-12-02 LAB
ANION GAP SERPL CALCULATED.3IONS-SCNC: 11 MMOL/L (ref 7–15)
ATRIAL RATE - MUSE: 82 BPM
BUN SERPL-MCNC: 15.8 MG/DL (ref 8–23)
CALCIUM SERPL-MCNC: 8.2 MG/DL (ref 8.8–10.4)
CHLORIDE SERPL-SCNC: 100 MMOL/L (ref 98–107)
CREAT SERPL-MCNC: 0.79 MG/DL (ref 0.51–0.95)
DIASTOLIC BLOOD PRESSURE - MUSE: NORMAL MMHG
EGFRCR SERPLBLD CKD-EPI 2021: 74 ML/MIN/1.73M2
ERYTHROCYTE [DISTWIDTH] IN BLOOD BY AUTOMATED COUNT: 13.4 % (ref 10–15)
GLUCOSE SERPL-MCNC: 122 MG/DL (ref 70–99)
HCO3 SERPL-SCNC: 22 MMOL/L (ref 22–29)
HCT VFR BLD AUTO: 29.1 % (ref 35–47)
HGB BLD-MCNC: 9.4 G/DL (ref 11.7–15.7)
INTERPRETATION ECG - MUSE: NORMAL
LVEF ECHO: NORMAL
MAGNESIUM SERPL-MCNC: 1.9 MG/DL (ref 1.7–2.3)
MCH RBC QN AUTO: 29.8 PG (ref 26.5–33)
MCHC RBC AUTO-ENTMCNC: 32.3 G/DL (ref 31.5–36.5)
MCV RBC AUTO: 92 FL (ref 78–100)
P AXIS - MUSE: 42 DEGREES
PHOSPHATE SERPL-MCNC: 2.7 MG/DL (ref 2.5–4.5)
PLATELET # BLD AUTO: 206 10E3/UL (ref 150–450)
POTASSIUM SERPL-SCNC: 4.2 MMOL/L (ref 3.4–5.3)
PR INTERVAL - MUSE: 164 MS
QRS DURATION - MUSE: 76 MS
QT - MUSE: 392 MS
QTC - MUSE: 457 MS
R AXIS - MUSE: -16 DEGREES
RBC # BLD AUTO: 3.15 10E6/UL (ref 3.8–5.2)
SODIUM SERPL-SCNC: 133 MMOL/L (ref 135–145)
SYSTOLIC BLOOD PRESSURE - MUSE: NORMAL MMHG
T AXIS - MUSE: 21 DEGREES
VENTRICULAR RATE- MUSE: 82 BPM
WBC # BLD AUTO: 11.2 10E3/UL (ref 4–11)

## 2024-12-02 PROCEDURE — 93325 DOPPLER ECHO COLOR FLOW MAPG: CPT | Mod: 26 | Performed by: INTERNAL MEDICINE

## 2024-12-02 PROCEDURE — 83735 ASSAY OF MAGNESIUM: CPT | Performed by: STUDENT IN AN ORGANIZED HEALTH CARE EDUCATION/TRAINING PROGRAM

## 2024-12-02 PROCEDURE — 93325 DOPPLER ECHO COLOR FLOW MAPG: CPT

## 2024-12-02 PROCEDURE — 92610 EVALUATE SWALLOWING FUNCTION: CPT | Mod: GN

## 2024-12-02 PROCEDURE — 93321 DOPPLER ECHO F-UP/LMTD STD: CPT | Mod: 26 | Performed by: INTERNAL MEDICINE

## 2024-12-02 PROCEDURE — 85014 HEMATOCRIT: CPT | Performed by: STUDENT IN AN ORGANIZED HEALTH CARE EDUCATION/TRAINING PROGRAM

## 2024-12-02 PROCEDURE — 99223 1ST HOSP IP/OBS HIGH 75: CPT

## 2024-12-02 PROCEDURE — 250N000013 HC RX MED GY IP 250 OP 250 PS 637: Performed by: INTERNAL MEDICINE

## 2024-12-02 PROCEDURE — 36415 COLL VENOUS BLD VENIPUNCTURE: CPT | Performed by: STUDENT IN AN ORGANIZED HEALTH CARE EDUCATION/TRAINING PROGRAM

## 2024-12-02 PROCEDURE — 80048 BASIC METABOLIC PNL TOTAL CA: CPT | Performed by: STUDENT IN AN ORGANIZED HEALTH CARE EDUCATION/TRAINING PROGRAM

## 2024-12-02 PROCEDURE — 84100 ASSAY OF PHOSPHORUS: CPT | Performed by: STUDENT IN AN ORGANIZED HEALTH CARE EDUCATION/TRAINING PROGRAM

## 2024-12-02 PROCEDURE — 120N000001 HC R&B MED SURG/OB

## 2024-12-02 PROCEDURE — 255N000002 HC RX 255 OP 636: Performed by: STUDENT IN AN ORGANIZED HEALTH CARE EDUCATION/TRAINING PROGRAM

## 2024-12-02 PROCEDURE — 70450 CT HEAD/BRAIN W/O DYE: CPT | Mod: MG

## 2024-12-02 PROCEDURE — 250N000013 HC RX MED GY IP 250 OP 250 PS 637: Performed by: STUDENT IN AN ORGANIZED HEALTH CARE EDUCATION/TRAINING PROGRAM

## 2024-12-02 PROCEDURE — G1010 CDSM STANSON: HCPCS

## 2024-12-02 PROCEDURE — 258N000001 HC RX 258: Performed by: STUDENT IN AN ORGANIZED HEALTH CARE EDUCATION/TRAINING PROGRAM

## 2024-12-02 PROCEDURE — 93308 TTE F-UP OR LMTD: CPT | Mod: 26 | Performed by: INTERNAL MEDICINE

## 2024-12-02 PROCEDURE — 99233 SBSQ HOSP IP/OBS HIGH 50: CPT | Performed by: STUDENT IN AN ORGANIZED HEALTH CARE EDUCATION/TRAINING PROGRAM

## 2024-12-02 PROCEDURE — 85049 AUTOMATED PLATELET COUNT: CPT | Performed by: STUDENT IN AN ORGANIZED HEALTH CARE EDUCATION/TRAINING PROGRAM

## 2024-12-02 RX ORDER — HYDRALAZINE HYDROCHLORIDE 20 MG/ML
10 INJECTION INTRAMUSCULAR; INTRAVENOUS EVERY 4 HOURS PRN
Status: DISCONTINUED | OUTPATIENT
Start: 2024-12-02 | End: 2024-12-03

## 2024-12-02 RX ORDER — MAGNESIUM OXIDE 400 MG/1
400 TABLET ORAL EVERY 4 HOURS
Status: COMPLETED | OUTPATIENT
Start: 2024-12-02 | End: 2024-12-02

## 2024-12-02 RX ORDER — METHOCARBAMOL 500 MG/1
500 TABLET, FILM COATED ORAL EVERY 6 HOURS PRN
DISCHARGE
Start: 2024-12-02

## 2024-12-02 RX ORDER — LOSARTAN POTASSIUM 25 MG/1
25 TABLET ORAL DAILY
Status: DISCONTINUED | OUTPATIENT
Start: 2024-12-02 | End: 2024-12-04

## 2024-12-02 RX ORDER — POLYETHYLENE GLYCOL 3350 17 G/17G
17 POWDER, FOR SOLUTION ORAL DAILY
DISCHARGE
Start: 2024-12-02

## 2024-12-02 RX ORDER — ACETAMINOPHEN 325 MG/1
975 TABLET ORAL EVERY 8 HOURS PRN
DISCHARGE
Start: 2024-12-02

## 2024-12-02 RX ORDER — HYDROMORPHONE HYDROCHLORIDE 2 MG/1
1-2 TABLET ORAL EVERY 4 HOURS PRN
Qty: 12 TABLET | Refills: 0 | Status: SHIPPED | OUTPATIENT
Start: 2024-12-02

## 2024-12-02 RX ADMIN — HUMAN ALBUMIN MICROSPHERES AND PERFLUTREN 4 ML: 10; .22 INJECTION, SOLUTION INTRAVENOUS at 09:28

## 2024-12-02 RX ADMIN — DEXTROSE MONOHYDRATE, SODIUM CHLORIDE, SODIUM LACTATE, POTASSIUM CHLORIDE, CALCIUM CHLORIDE: 5; 600; 310; 179; 20 INJECTION, SOLUTION INTRAVENOUS at 23:48

## 2024-12-02 RX ADMIN — ROSUVASTATIN 10 MG: 10 TABLET, FILM COATED ORAL at 21:07

## 2024-12-02 RX ADMIN — POLYETHYLENE GLYCOL 3350 17 G: 17 POWDER, FOR SOLUTION ORAL at 08:45

## 2024-12-02 RX ADMIN — SENNOSIDES AND DOCUSATE SODIUM 1 TABLET: 8.6; 5 TABLET ORAL at 21:07

## 2024-12-02 RX ADMIN — ACETAMINOPHEN 975 MG: 325 TABLET, FILM COATED ORAL at 15:09

## 2024-12-02 RX ADMIN — SERTRALINE HYDROCHLORIDE 50 MG: 50 TABLET ORAL at 21:07

## 2024-12-02 RX ADMIN — ASPIRIN 81 MG CHEWABLE TABLET 81 MG: 81 TABLET CHEWABLE at 08:37

## 2024-12-02 RX ADMIN — POTASSIUM & SODIUM PHOSPHATES POWDER PACK 280-160-250 MG 1 PACKET: 280-160-250 PACK at 15:08

## 2024-12-02 RX ADMIN — LOSARTAN POTASSIUM 25 MG: 25 TABLET, FILM COATED ORAL at 15:08

## 2024-12-02 RX ADMIN — Medication 400 MG: at 15:09

## 2024-12-02 RX ADMIN — ACETAMINOPHEN 975 MG: 325 TABLET, FILM COATED ORAL at 08:37

## 2024-12-02 RX ADMIN — POTASSIUM & SODIUM PHOSPHATES POWDER PACK 280-160-250 MG 1 PACKET: 280-160-250 PACK at 11:17

## 2024-12-02 RX ADMIN — Medication 400 MG: at 11:17

## 2024-12-02 RX ADMIN — SENNOSIDES AND DOCUSATE SODIUM 1 TABLET: 8.6; 5 TABLET ORAL at 08:37

## 2024-12-02 RX ADMIN — ATENOLOL 25 MG: 25 TABLET ORAL at 08:37

## 2024-12-02 RX ADMIN — DEXTROSE MONOHYDRATE, SODIUM CHLORIDE, SODIUM LACTATE, POTASSIUM CHLORIDE, CALCIUM CHLORIDE: 5; 600; 310; 179; 20 INJECTION, SOLUTION INTRAVENOUS at 09:38

## 2024-12-02 ASSESSMENT — ACTIVITIES OF DAILY LIVING (ADL)
ADLS_ACUITY_SCORE: 63
ADLS_ACUITY_SCORE: 62
ADLS_ACUITY_SCORE: 62
ADLS_ACUITY_SCORE: 63
ADLS_ACUITY_SCORE: 63
ADLS_ACUITY_SCORE: 62
ADLS_ACUITY_SCORE: 62
ADLS_ACUITY_SCORE: 63
ADLS_ACUITY_SCORE: 62
ADLS_ACUITY_SCORE: 63
ADLS_ACUITY_SCORE: 62
ADLS_ACUITY_SCORE: 63
ADLS_ACUITY_SCORE: 62
ADLS_ACUITY_SCORE: 63
ADLS_ACUITY_SCORE: 62
ADLS_ACUITY_SCORE: 63
ADLS_ACUITY_SCORE: 62

## 2024-12-02 NOTE — PLAN OF CARE
"Goal Outcome Evaluation:      Plan of Care Reviewed With: patient    Overall Patient Progress: no changeOverall Patient Progress: no change         [id number]  Appears to respond to some simple questions verbally. Yes or no questions. Unable to follow commands. Frequent repositing. [place] overnight. RA. NG in place. No prns needed.   Palliative consult today. Son and Daughter requesting to be called during palliative consult.     0435 Is able to follow some simple commands. Was able to lift L arm and squeeze Writers fingers. Was able to slightly lift R arm and slightly squeeze writers fingers. Was able to wiggle both feet and toes when prompted.   Was able to answer simple yes or no questions, denied pain at this time.      Addition 0730 pt removed NG tube during shift change.    Problem: Adult Inpatient Plan of Care  Goal: Plan of Care Review  Description: The Plan of Care Review/Shift note should be completed every shift.  The Outcome Evaluation is a brief statement about your assessment that the patient is improving, declining, or no change.  This information will be displayed automatically on your shift  note.  Outcome: Progressing  Flowsheets (Taken 12/2/2024 0346)  Plan of Care Reviewed With: patient  Overall Patient Progress: no change  Goal: Patient-Specific Goal (Individualized)  Description: You can add care plan individualizations to a care plan. Examples of Individualization might be:  \"Parent requests to be called daily at 9am for status\", \"I have a hard time hearing out of my right ear\", or \"Do not touch me to wake me up as it startles  me\".  Outcome: Progressing  Goal: Absence of Hospital-Acquired Illness or Injury  Outcome: Progressing  Intervention: Identify and Manage Fall Risk  Recent Flowsheet Documentation  Taken 12/2/2024 0035 by Martin Vann, RN  Safety Promotion/Fall Prevention:   activity supervised   clutter free environment maintained   lighting adjusted   nonskid shoes/slippers when " out of bed   patient and family education   supervised activity   safety round/check completed   treat reversible contributory factors   treat underlying cause   room organization consistent  Intervention: Prevent Skin Injury  Recent Flowsheet Documentation  Taken 12/2/2024 0035 by Martin Vann RN  Body Position: weight shifting  Skin Protection:   adhesive use limited   incontinence pads utilized  Intervention: Prevent and Manage VTE (Venous Thromboembolism) Risk  Recent Flowsheet Documentation  Taken 12/2/2024 0035 by Martin Vann RN  VTE Prevention/Management: SCDs on (sequential compression devices)  Intervention: Prevent Infection  Recent Flowsheet Documentation  Taken 12/2/2024 0035 by Martin Vann, RN  Infection Prevention:   hand hygiene promoted   rest/sleep promoted   single patient room provided  Goal: Optimal Comfort and Wellbeing  Outcome: Progressing  Goal: Readiness for Transition of Care  Outcome: Progressing

## 2024-12-02 NOTE — PLAN OF CARE
"Goal Outcome Evaluation:           Overall Patient Progress: improvingOverall Patient Progress: improving    Outcome Evaluation: Plan for palliative care tomorrow.    Pt is alert, opens eyes on voice. Not in  pain, voided via pure wick. IVF infusing. Meds given via NG. Turned and repositioned. Plan for palliative care consult tomorrow.       Problem: Adult Inpatient Plan of Care  Goal: Plan of Care Review  Description: The Plan of Care Review/Shift note should be completed every shift.  The Outcome Evaluation is a brief statement about your assessment that the patient is improving, declining, or no change.  This information will be displayed automatically on your shift  note.  Outcome: Not Progressing  Flowsheets (Taken 12/1/2024 2327)  Outcome Evaluation: Plan for palliative care tomorrow.  Overall Patient Progress: improving  Goal: Patient-Specific Goal (Individualized)  Description: You can add care plan individualizations to a care plan. Examples of Individualization might be:  \"Parent requests to be called daily at 9am for status\", \"I have a hard time hearing out of my right ear\", or \"Do not touch me to wake me up as it startles  me\".  Outcome: Not Progressing  Goal: Absence of Hospital-Acquired Illness or Injury  Outcome: Not Progressing  Intervention: Identify and Manage Fall Risk  Recent Flowsheet Documentation  Taken 12/1/2024 2247 by Radha Pablo, RN  Safety Promotion/Fall Prevention:   activity supervised   clutter free environment maintained   lighting adjusted   nonskid shoes/slippers when out of bed   patient and family education   supervised activity   safety round/check completed   treat reversible contributory factors   treat underlying cause   room organization consistent  Intervention: Prevent Skin Injury  Recent Flowsheet Documentation  Taken 12/1/2024 2247 by Radha Pablo, RN  Body Position: weight shifting  Intervention: Prevent and Manage VTE (Venous Thromboembolism) Risk  Recent Flowsheet " Documentation  Taken 12/1/2024 2247 by Radha Pablo RN  VTE Prevention/Management: SCDs on (sequential compression devices)  Taken 12/1/2024 2045 by Radha Pablo RN  VTE Prevention/Management: SCDs on (sequential compression devices)  Intervention: Prevent Infection  Recent Flowsheet Documentation  Taken 12/1/2024 2247 by Radha Pablo RN  Infection Prevention:   hand hygiene promoted   rest/sleep promoted   single patient room provided  Goal: Optimal Comfort and Wellbeing  Outcome: Not Progressing  Goal: Readiness for Transition of Care  Outcome: Not Progressing     Problem: Hip Fracture Medical Management  Goal: Optimal Coping with Change in Health Status  Outcome: Not Progressing  Goal: Absence of Bleeding  Outcome: Not Progressing  Intervention: Monitor and Manage Bleeding  Recent Flowsheet Documentation  Taken 12/1/2024 2247 by Radha Pablo RN  Bleeding Management: dressing monitored  Goal: Effective Bowel Elimination  Outcome: Not Progressing  Goal: Baseline Cognitive Function Maintained  Outcome: Not Progressing  Intervention: Maximize Cognitive Function  Recent Flowsheet Documentation  Taken 12/1/2024 2247 by Radha Pablo RN  Reorientation Measures: familiar social contact encouraged  Goal: Absence of Embolism  Outcome: Not Progressing  Intervention: Prevent or Manage Embolism Risk  Recent Flowsheet Documentation  Taken 12/1/2024 2247 by Radha Pablo RN  VTE Prevention/Management: SCDs on (sequential compression devices)  Taken 12/1/2024 2045 by Radha Pablo RN  VTE Prevention/Management: SCDs on (sequential compression devices)  Goal: Fracture Stability  Outcome: Not Progressing  Intervention: Promote Fracture Stability and Healing  Recent Flowsheet Documentation  Taken 12/1/2024 2247 by Radha Pablo RN  Equipment:   On:   Initiated:   immobilizer   ice  Goal: Optimal Functional Performance  Outcome: Not Progressing  Intervention: Promote Optimal Functional Status  Recent Flowsheet  Documentation  Taken 12/1/2024 2247 by Radha Pablo RN  Activity Management: dorsiflexion/plantar flexion performed  Goal: Pain Control and Function  Outcome: Not Progressing  Goal: Effective Urinary Elimination  Outcome: Not Progressing  Intervention: Support Effective Urinary Elimination  Recent Flowsheet Documentation  Taken 12/1/2024 2247 by Radha Pablo RN  Urinary Elimination Promotion:   absorbent pad/diaper use encouraged   catheter patency maintained     Problem: Hip Fracture Surgical Repair  Goal: Optimal Coping with Change in Health Status  Outcome: Not Progressing  Goal: Absence of Bleeding  Outcome: Not Progressing  Intervention: Monitor and Manage Bleeding  Recent Flowsheet Documentation  Taken 12/1/2024 2247 by Radha Pablo RN  Bleeding Management: dressing monitored  Goal: Effective Bowel Elimination  Outcome: Not Progressing  Goal: Cognitive Function Maintained  Outcome: Not Progressing  Intervention: Maintain Cognitive Function  Recent Flowsheet Documentation  Taken 12/1/2024 2247 by Radha Pablo RN  Reorientation Measures: familiar social contact encouraged  Goal: Fluid and Electrolyte Balance  Outcome: Not Progressing  Intervention: Monitor and Manage Fluid and Electrolyte Balance  Recent Flowsheet Documentation  Taken 12/1/2024 2247 by Radha Pablo RN  Fluid/Electrolyte Management: (IVF) other (see comments)  Goal: Absence of Infection Signs and Symptoms  Outcome: Not Progressing  Goal: Optimal Functional Ability  Outcome: Not Progressing  Intervention: Protect Joint Integrity  Recent Flowsheet Documentation  Taken 12/1/2024 2247 by Radha Pablo RN  Equipment:   On:   Initiated:   immobilizer   ice  Intervention: Promote Optimal Functional Status  Recent Flowsheet Documentation  Taken 12/1/2024 2247 by Radha Pablo RN  Activity Management: dorsiflexion/plantar flexion performed  Goal: Anesthesia/Sedation Recovery  Outcome: Not Progressing  Intervention: Optimize Anesthesia  Recovery  Recent Flowsheet Documentation  Taken 12/1/2024 2247 by Radha Pablo, RN  Safety Promotion/Fall Prevention:   activity supervised   clutter free environment maintained   lighting adjusted   nonskid shoes/slippers when out of bed   patient and family education   supervised activity   safety round/check completed   treat reversible contributory factors   treat underlying cause   room organization consistent  Administration (IS): unable to perform  Reorientation Measures: familiar social contact encouraged  Goal: Optimal Pain Control and Function  Outcome: Not Progressing  Goal: Nausea and Vomiting Relief  Outcome: Not Progressing  Goal: Effective Urinary Elimination  Outcome: Not Progressing  Intervention: Monitor and Manage Urinary Retention  Recent Flowsheet Documentation  Taken 12/1/2024 2247 by Radha Pablo, RN  Urinary Elimination Promotion:   absorbent pad/diaper use encouraged   catheter patency maintained  Goal: Effective Oxygenation and Ventilation  Outcome: Not Progressing

## 2024-12-02 NOTE — PROGRESS NOTES
[facility]    Medicine Progress Note - Hospitalist Service    Date of Admission:  11/28/2024    Assessment & Plan   Candida Rose is a 84 year old female with a history of HTN, HLD, Moyamoya Disease, Anxiety, OA, Catarat, CVA with Left Hemiparesis, CKD, obesity who presented to the ED today with left leg pain after a mechanical fall at home.    She was found to have left femur fracture.  Ortho was consulted and the patient underwent surgical fix.     Unfortunately, post-op she became non-verbal and was found to have new right sided weakness.  Code stroke was called.  Initial CT showed multifocal prior ischemic stroke.  CTA head/heck showed bilateral ICA occlusion, bilateral PCA tiny caliber.  She was not given TPA due to recent surgery.   CT head the following morning shows new bilateral frontal stroke, basal ganglia CVA which explains her new symptoms.     There has been some improvement in neurologic exam.  On 12/2 she was able to intermittently answer some basic questions & she had increased movement of RUE.  SLP initiated modified diet.  Chance at recovery remains very guarded.  Palliative consulted.        Acute bilateral frontal CVA & left basal ganglia cb aphasia, right sided weakness  Hx of CVA with Left Hemiparesis  Hx Moyamoya Disease  Vascular Dementia::  PMH of moyamoya s/p bilateral STA-MCA bypasses in 2006.  Hx of CVA in 2006, 2010 and 2022.  Uses a walker and cane for ambulation.  Lives in a single family home with her .  Has mild memory impairment at baseline.    Post-op she became non-verbal and was found to have new right sided weakness.  Code stroke was called.  Initial CT showed multifocal prior ischemic stroke.  CTA head/heck showed bilateral ICA occlusion, bilateral PCA tiny caliber.  She was not given TPA due to recent surgery.   Repeat CT head the following morning shows new CVA.  Brain MRI confirms bifrontal, left basal ganglia, anterior medial left temporal  ischemic infarcts, edematous appearing cerebral parenchyma left frontal lobe with mild effacement lateral ventricle.  On initial exam she was awake but is non-verbal.  She does squeeze my hand on the left but was otherwise non-participatory in neurologic examination.  On 12/2 she now intermittently answers basic questions and is able to gently squeeze right hand.     -Stroke neuro consulted  -EEG negative for seizure  -Stroke neurology recommending ASA 81 mg daily  -Ongoing consideration for addition of plavix  (DAPT) vs therapeutic anticoagulation (given afib)  -If stroke neuro recommend therapeutic anticoagulation, ortho is ok with initiation at any time  -Avoid hypotension or sudden drops in BP  -PT/OT/SLP  -SLP has cleared for diet  -Will need NG tube if not able to swallow meds  -Palliative care consultation for GOC.  Family has made it clear she is DNR/DNI, no cardioversion, no long term feeding tube.  Short term NG for meds and short trial of feeding may be ok but  to discuss with kids.       Mechanical fall cb left distal femur fracture now s/p surgical fix (11/30):  Trauma imaging reveals an acute comminuted fracture of the distal femoral diaphysis with displaced fracture fragments.    -Orthopedic surgery consulted, pt now s/p surgical repair  -IVF, analgesics prn  -non weight bearing to LLE  -CMS checks    Acute blood loss anemia, post-operative:   Hgb 12.4 --> 10.0 post-op --> 6.8 following morning.  No evidence for external bleeding.  Surgical dressing with some dried blood but not oozing blood.  Obvious consideration for vasile-operative hematoma.  Bilirubin normal so unlikely to be hemolysis.  -Consented for blood, type and screen  -S/p 2 unit(s) pRBC with goal >8 in the setting of acute CVA  -Hgb largely stable following transfusion     Mild Hyponatremia  Mild Richie on CKD:  Cr baseline 0.9-1.1.  Now Cr 1.2 post-op.  Likely related to anemia and dehydration given operation.   -IVF     Hx possible  "Paroxysmal Atrial Fibrillation  HTN  HLD  Afib diagnosed 10/2022 at the time of patient's CVA.  Followed up with Cardiology who reviewed the event monitor and did not see clear afib, but PAC/PVCs.  Per Cardiology, given the fact that the patient's with moyamoya disease have increased risk of intracerebral hemorrhage patient's anticoagulation was discontinued.      -Will defer future recommendation of therapeutic anticoagulation to stroke neurology   -Continue pta Rosuvastatin and Atenolol  -Restart PTA Losartan (but at half dose 25 mg daily)  -Monitor on telemetry     Depression  Anxiety  -Continue pta Sertraline     Obesity, BMI 30        Diet: Combination Diet Minced and Moist Diet (level 5); Thin Liquids (level 0) (Supervision, upright, alert)    DVT Prophylaxis: PCD  Abreu Catheter: Not present  Lines: None     Cardiac Monitoring: None  Code Status: No CPR- Do NOT Intubate      Clinically Significant Risk Factors         # Hyponatremia: Lowest Na = 133 mmol/L in last 2 days, will monitor as appropriate       # Hypoalbuminemia: Lowest albumin = 2.8 g/dL at 11/30/2024  6:48 AM, will monitor as appropriate     # Hypertension: Noted on problem list              # Obesity: Estimated body mass index is 30.94 kg/m  as calculated from the following:    Height as of this encounter: 1.727 m (5' 8\").    Weight as of this encounter: 92.3 kg (203 lb 7.8 oz)., PRESENT ON ADMISSION            Social Drivers of Health    Social Connections: Unknown (5/30/2024)    Social Connection and Isolation Panel [NHANES]     Frequency of Social Gatherings with Friends and Family: More than three times a week          Disposition Plan     Medically Ready for Discharge: Anticipated in 2-4 Days      Wily Palmer DO  Hospitalist Service  [facility]  Securely message with Rod (more info)  Text page via [facility] Paging/Directory   ______________________________________________________________________    Interval History "   NAEO.  Patient awake.  She does intermittently answer some basic questions.  Unclear if she is a reliable historian.  Denies pain.      Physical Exam   Vital Signs: Temp: 97.8  F (36.6  C) Temp src: Temporal BP: (!) 173/56 Pulse: 55   Resp: 18 SpO2: 97 % O2 Device: None (Room air)    Weight: 203 lbs 7.75 oz  General Appearance: Patient awake.  Answers some basic questions.  No apparent distress.   Respiratory: Lungs are CTAB.  Work of breathing appears normal on room air.  Cardiovascular: Regular rate and rhythm.  No murmurs rubs or gallops.  There is no edema present.  GI: Benign.  Soft.  Non-tender.  Bowel sounds active.   Skin: No rashes or lesions exposed skin.  Neuro:  Awake.  Good squeeze left hand.  Weak squeeze right hand.  Not moving BLE.   Other: Patient appears comfortable.     Medical Decision Making       50 MINUTES SPENT BY ME on the date of service doing chart review, history, exam, documentation & further activities per the note.      Data   ------------------------- PAST 24 HR DATA REVIEWED -----------------------------------------------    I have personally reviewed the following data over the past 24 hrs:    11.2 (H)  \   9.4 (L)   / 206     133 (L) 100 15.8 /  122 (H)   4.2 22 0.79 \       Imaging results reviewed over the past 24 hrs:   Recent Results (from the past 24 hours)   Echocardiogram Limited   Result Value    LVEF  50-55%    Narrative    [id number]  [id number]  [id number]  [id number]^GALO^OLI     [facility]  Echocardiography Laboratory  [address] [place] [address]     Name: KETURAH TERRELL  MRN: [id number]  : 1940  Study Date: 2024 01:21 PM  Age: 84 yrs  Gender: Female  Patient Location: [facility]  Reason For Study: CVA  Ordering Physician: OLI ROSENTHAL  Referring Physician: Chani Peoples  Performed By: Yesenia Velazco     BSA: 2.1 m2  Height: 68 in  Weight: 203 lb  HR: 62  BP: 135/52  mmHg  ______________________________________________________________________________  Procedure  Limited Echo Adult.  ______________________________________________________________________________  Interpretation Summary     The visual ejection fraction is 50-55%.  Regional wall motion abnormalities cannot be excluded due to limited  visualization. Recommend a limited repeat study with contrast to assess the  apex more completely and especially to exclude thrombus.  The study was technically difficult.  ______________________________________________________________________________  Left Ventricle  The visual ejection fraction is 50-55%. Regional wall motion abnormalities  cannot be excluded due to limited visualization.     Right Ventricle  The right ventricle is normal in size and function.     Atria  There is no color Doppler evidence of an atrial shunt.     Mitral Valve  There is mild (1+) mitral regurgitation.     Tricuspid Valve  There is mild (1+) tricuspid regurgitation.     Aortic Valve  The aortic valve is trileaflet. No aortic regurgitation is present. No  hemodynamically significant valvular aortic stenosis.     Vessels  Normal size ascending aorta. IVC diameter and respiratory changes fall into an  intermediate range suggesting an RA pressure of 8 mmHg.     Pericardium  There is no pericardial effusion.     Rhythm  Sinus rhythm was noted.     ______________________________________________________________________________  MMode/2D Measurements & Calculations  IVC diam: 2.0 cm  asc Aorta Diam: 3.6 cm  Asc Ao diam index BSA (cm/m2): 1.7  Asc Ao diam index Ht(cm/m): 2.1     ______________________________________________________________________________  Report approved by: Xiao Bello 12/01/2024 02:59 PM         Echocardiogram Limited   Result Value    LVEF  50-55%    Narrative    [id number]  [id number]  [id number]  [id number]^DENIS^[facility]  Echocardiography Laboratory  [address]  [place] [address]     Name: KETURAH TERRELL  MRN: [id number]  : 1940  Study Date: 2024 09:23 AM  Age: 84 yrs  Gender: Female  Patient Location: [facility]  Reason For Study: CVA  Ordering Physician: JULIA BARRIOS  Performed By: Yesenia Velazco     BSA: 2.1 m2  Height: 68 in  Weight: 203 lb  ______________________________________________________________________________  Procedure  Limited Echo Adult. Optison (NDC #6478-1033) given intravenously.  ______________________________________________________________________________  Interpretation Summary     The visual ejection fraction is 50-55%.  There is mild to moderate apical wall hypokinesis.  There is no thrombus seen in the left ventricle.  ______________________________________________________________________________  Left Ventricle  The visual ejection fraction is 50-55%. There is mild to moderate apical wall  hypokinesis. There is no thrombus seen in the left ventricle.     Right Ventricle  The right ventricle is normal in size and function.     ______________________________________________________________________________  Report approved by: Xiao Bello 2024 09:54 AM     ______________________________________________________________________________      CT Head w/o Contrast    Narrative    CT HEAD WITHOUT CONTRAST  2024 10:56 AM     HISTORY:  Recent acute stroke, evaluate for interval edema.       COMPARISON:  Brain MRI from yesterday.    TECHNIQUE: Using multidetector thin collimation helical acquisition  technique, axial, coronal and sagittal CT images from the skull base  to the vertex were obtained without intravenous contrast.   (topogram) image(s) also obtained and reviewed. Dose reduction  techniques were performed.    FINDINGS: Hypodensity with loss of gray-white matter differentiation  in left greater than right inferior frontal lobes and left basal  ganglia, representing infarct seen on  yesterday's MRI. Bilateral  parietal and temporal cranioplasty changes. Scattered areas of  encephalomalacia and gliosis involving right frontal lobe, left  parietal lobe and right occipital lobe. No evidence of hemorrhagic  conversion. No new infarct. Chronic lacunar infarct in the right  cerebellar hemisphere. Mild generalized cerebral atrophy.    No hydrocephalus.    The bony calvaria and the bones of the skull base are normal. The  visualized portions of the paranasal sinuses and mastoid air cells are  clear. Grossly normal orbits.       Impression    IMPRESSION:   1. Evolving infarcts in the inferior frontal lobes and left basal  ganglia. No evidence of hemorrhagic conversion. No definite new  infarct.  2. Multiple chronic infarcts. Generalized cerebral atrophy and chronic  small vessel ischemic disease.

## 2024-12-02 NOTE — PLAN OF CARE
"Goal Outcome Evaluation:      Plan of Care Reviewed With: patient, spouse    Overall Patient Progress: improvingOverall Patient Progress: improving    Outcome Evaluation: VS monitored, bradycardia this evening, still not responding verbally but does intermittently track, able to move L UE but not on command, int follows commands to squeeze w/L hand or open mouth, DNR/DNI, zamudio removed, void small amount dark urine, PVR 71, not OOB yet, appeared in pain this afternoon and IV Dilaudid effective, EEG and Echo done, plan for palliative consult tomorrow, spouse reports kids live out of state and would like to be included in the meeting with the palliative team.    Problem: Adult Inpatient Plan of Care  Goal: Plan of Care Review  Description: The Plan of Care Review/Shift note should be completed every shift.  The Outcome Evaluation is a brief statement about your assessment that the patient is improving, declining, or no change.  This information will be displayed automatically on your shift  note.  Outcome: Not Progressing  Flowsheets (Taken 12/1/2024 1922)  Outcome Evaluation: VS monitored, bradycardia this evening, still not responding verbally but does intermittently track, able to move L UE but not on command, int follows commands to squeeze w/L hand or open mouth, DNR/DNI, zamudio removed, void small amount dark urine, PVR 71, not OOB yet, appeared in pain this afternoon and IV Dilaudid effective, EEG and Echo done, plan for palliative consult tomorrow, spouse reports kids live out of state and would like to be included in the meeting with the palliative team.  Plan of Care Reviewed With:   patient   spouse  Overall Patient Progress: improving  Goal: Patient-Specific Goal (Individualized)  Description: You can add care plan individualizations to a care plan. Examples of Individualization might be:  \"Parent requests to be called daily at 9am for status\", \"I have a hard time hearing out of my right ear\", or \"Do not " "touch me to wake me up as it startles  me\".  Outcome: Not Progressing  Goal: Absence of Hospital-Acquired Illness or Injury  Outcome: Not Progressing  Intervention: Identify and Manage Fall Risk  Recent Flowsheet Documentation  Taken 12/1/2024 0820 by Frieda Foster RN  Safety Promotion/Fall Prevention:   activity supervised   clutter free environment maintained   lighting adjusted   nonskid shoes/slippers when out of bed   patient and family education   supervised activity   safety round/check completed   treat reversible contributory factors   treat underlying cause   room organization consistent  Intervention: Prevent Skin Injury  Recent Flowsheet Documentation  Taken 12/1/2024 0820 by Frieda Foster RN  Body Position: weight shifting  Skin Protection:   adhesive use limited   incontinence pads utilized  Intervention: Prevent and Manage VTE (Venous Thromboembolism) Risk  Recent Flowsheet Documentation  Taken 12/1/2024 0820 by Frieda Foster RN  VTE Prevention/Management: SCDs on (sequential compression devices)  Intervention: Prevent Infection  Recent Flowsheet Documentation  Taken 12/1/2024 0820 by Frieda Foster RN  Infection Prevention:   hand hygiene promoted   rest/sleep promoted   single patient room provided  Goal: Optimal Comfort and Wellbeing  Outcome: Not Progressing  Intervention: Monitor Pain and Promote Comfort  Recent Flowsheet Documentation  Taken 12/1/2024 1316 by Frieda Foster RN  Pain Management Interventions: medication (see MAR)  Goal: Readiness for Transition of Care  Outcome: Not Progressing     Problem: Hip Fracture Medical Management  Goal: Optimal Coping with Change in Health Status  Outcome: Not Progressing  Intervention: Support Psychosocial Response to Injury  Recent Flowsheet Documentation  Taken 12/1/2024 0820 by Frieda Foster RN  Supportive Measures: active listening utilized  Family/Support System Care:   caregiver stress acknowledged   involvement promoted   presence " promoted   support provided  Goal: Absence of Bleeding  Outcome: Not Progressing  Intervention: Monitor and Manage Bleeding  Recent Flowsheet Documentation  Taken 12/1/2024 0820 by Frieda Foster RN  Bleeding Management: dressing monitored  Goal: Effective Bowel Elimination  Outcome: Not Progressing  Goal: Baseline Cognitive Function Maintained  Outcome: Not Progressing  Intervention: Maximize Cognitive Function  Recent Flowsheet Documentation  Taken 12/1/2024 0820 by Frieda Foster RN  Reorientation Measures: familiar social contact encouraged  Sleep/Rest Enhancement:   awakenings minimized   noise level reduced   regular sleep/rest pattern promoted   relaxation techniques promoted  Goal: Absence of Embolism  Outcome: Not Progressing  Intervention: Prevent or Manage Embolism Risk  Recent Flowsheet Documentation  Taken 12/1/2024 0820 by Frieda Foster RN  VTE Prevention/Management: SCDs on (sequential compression devices)  Goal: Fracture Stability  Outcome: Not Progressing  Intervention: Promote Fracture Stability and Healing  Recent Flowsheet Documentation  Taken 12/1/2024 0820 by Frieda Foster RN  Equipment:   On:   Initiated:   immobilizer   ice  Goal: Optimal Functional Performance  Outcome: Not Progressing  Intervention: Promote Optimal Functional Status  Recent Flowsheet Documentation  Taken 12/1/2024 0820 by Frieda Foster RN  Activity Management: dorsiflexion/plantar flexion performed  Goal: Pain Control and Function  Outcome: Not Progressing  Intervention: Manage Acute Orthopaedic-Related Pain  Recent Flowsheet Documentation  Taken 12/1/2024 1316 by Frieda Foster RN  Pain Management Interventions: medication (see MAR)  Goal: Effective Urinary Elimination  Outcome: Not Progressing  Intervention: Support Effective Urinary Elimination  Recent Flowsheet Documentation  Taken 12/1/2024 0820 by Frieda Foster RN  Urinary Elimination Promotion:   absorbent pad/diaper use encouraged   catheter patency  maintained     Problem: Hip Fracture Surgical Repair  Goal: Optimal Coping with Change in Health Status  Outcome: Not Progressing  Intervention: Support Psychosocial Response to Surgery and Mobility Changes  Recent Flowsheet Documentation  Taken 12/1/2024 0820 by Frieda Foster RN  Supportive Measures: active listening utilized  Family/Support System Care:   caregiver stress acknowledged   involvement promoted   presence promoted   support provided  Goal: Absence of Bleeding  Outcome: Not Progressing  Intervention: Monitor and Manage Bleeding  Recent Flowsheet Documentation  Taken 12/1/2024 0820 by Frieda Foster RN  Bleeding Management: dressing monitored  Goal: Effective Bowel Elimination  Outcome: Not Progressing  Goal: Cognitive Function Maintained  Outcome: Not Progressing  Intervention: Maintain Cognitive Function  Recent Flowsheet Documentation  Taken 12/1/2024 0820 by Frieda Foster RN  Reorientation Measures: familiar social contact encouraged  Sleep/Rest Enhancement:   awakenings minimized   noise level reduced   regular sleep/rest pattern promoted   relaxation techniques promoted  Goal: Fluid and Electrolyte Balance  Outcome: Not Progressing  Intervention: Monitor and Manage Fluid and Electrolyte Balance  Recent Flowsheet Documentation  Taken 12/1/2024 0820 by Frieda Foster RN  Fluid/Electrolyte Management: (IVF) other (see comments)  Goal: Absence of Infection Signs and Symptoms  Outcome: Not Progressing  Goal: Optimal Functional Ability  Outcome: Not Progressing  Intervention: Protect Joint Integrity  Recent Flowsheet Documentation  Taken 12/1/2024 0820 by Frieda Foster RN  Equipment:   On:   Initiated:   immobilizer   ice  Intervention: Promote Optimal Functional Status  Recent Flowsheet Documentation  Taken 12/1/2024 0820 by Frieda Foster RN  Activity Management: dorsiflexion/plantar flexion performed  Goal: Anesthesia/Sedation Recovery  Outcome: Not Progressing  Intervention: Optimize  Anesthesia Recovery  Recent Flowsheet Documentation  Taken 12/1/2024 0820 by Frieda Foster, RN  Safety Promotion/Fall Prevention:   activity supervised   clutter free environment maintained   lighting adjusted   nonskid shoes/slippers when out of bed   patient and family education   supervised activity   safety round/check completed   treat reversible contributory factors   treat underlying cause   room organization consistent  Administration (IS): unable to perform  Reorientation Measures: familiar social contact encouraged  Goal: Optimal Pain Control and Function  Outcome: Not Progressing  Intervention: Prevent or Manage Pain  Recent Flowsheet Documentation  Taken 12/1/2024 1316 by Frieda Foster, RN  Pain Management Interventions: medication (see MAR)  Goal: Nausea and Vomiting Relief  Outcome: Not Progressing  Goal: Effective Urinary Elimination  Outcome: Not Progressing  Intervention: Monitor and Manage Urinary Retention  Recent Flowsheet Documentation  Taken 12/1/2024 0820 by Frieda Foster RN  Urinary Elimination Promotion:   absorbent pad/diaper use encouraged   catheter patency maintained  Goal: Effective Oxygenation and Ventilation  Outcome: Not Progressing

## 2024-12-02 NOTE — CONSULTS
Palliative Care Consultation Note  [facility]      Patient: Candida Rose  Date of Admission:  11/28/2024    Requesting Clinician / Team: Wily Palmer DO (Medicine)  Reason for consult: Goals of care  Decisional support  Patient and family support     Recommendations & Counseling     GOALS OF CARE:   Restorative with limits  (DNR/DNI)   Met with Olu (spouse) at bedside. Upon on my initial visit, Candida was transferred for head CT scan.   Olu requested to include his 2 adult children in goals of care discussion via phone (Bob and Yves). Unfortunately, Candida does not have decisional capacity to participate in her goals of care discussion following her stroke. Family reports Candida was decisional prior to her stroke. Patient did return from head CT during my visit.     Family expressed adequate understanding of Candida's current medical condition. They are eager to see what Candida's head CT will showed today. Additionally, family would like neurology's input regarding Candida's neurological recovery. Candida has advanced directives and her medical wishes are clearly outlined. Physical and cognitive function is extremely important to her quality of life. Family shared particularly her cognitive function is most important to Candida. Therefore, if Candida has potential neurological recovery, her wishes would be to continue to work with physical therapy in hopes of improving her physical function. Unfortunately, Candida is current nonweightbearing for at least 6 weeks following her open reduction and internal fixation left interprosthetic femur fracture. We expressed concerns for delay in her physical function improvement along with implication of her quality of life. We briefly explored comfort/hospice care and what that would look like for Candida. Education provided regarding hospice philosophy, prognostic,and eligibility criteria. We explored the various disposition options where they can receive hospice care (home,  residential hospice homes, LTC with hospice)    Decisional support: Plan of care- Continue all current treatment. Would appreciate neurology's input regarding neurological recovery as well as expected time frame. Family expressed interest in ongoing goals of care discussion in the coming days pending clinical course. If no further improvement and/or no neurological recovery; family would be open to explore hospice care at a facility. Palliative care will continue to follow for ongoing goals of care discussion and for patient and family support.     ADVANCE CARE PLANNING:  Patient has an advance directive dated 06/21/2018.  Primary Health Care Agent Olu Rose (Spouse).  Alternate(s) Yves Grant (Daughter), Chidi Rose (Son).   There is no POLST form on file, defer to patient and/or next of kin for decisions   Code status: No CPR- Do NOT Intubate    MEDICAL MANAGEMENT:   We are not actively managing symptoms at this time.    PSYCHOSOCIAL/SPIRITUAL SUPPORT:  Family Yves Grant (Daughter), Chidi Rose (Son) and MiltonOlu (Spouse), has 6 grandchildren and 7 great-grandchildren    [place] community: [employer]   Spiritual: Declined    Palliative Care will continue to follow Candida. Thank you for the consult and allowing us to aid in the care of Candida Rose.    These recommendations have been discussed with Dr. Palmer, nursing, patient and family.    ALFREDO Nielsen CNP  [facility], Palliative Care  Securely message with the [employer] Web Console (learn more here) or  Text page via [facility] Paging/Directory         Assessment      Candida Rose is a 84 year old female with a history of HTN, HLD, Moyamoya Disease, Anxiety, OA, Catarat, CVA with Left Hemiparesis, CKD, obesity who presented to the ED today with left leg pain after a mechanical fall at home.Patient was found to have left femur fracture. Ortho consulted, patient underwent open reduction and internal fixation left interprosthetic femur  fracture. She is post up day #3 today.     Per chart review: Post-op, patient became non-verbal and was found to have new right sided weakness. Code stroke was called. Initial CT showed multifocal prior ischemic stroke. CTA head/heck showed bilateral ICA occlusion, bilateral PCA tiny caliber.  She was not given TPA due to recent surgery.   CT head the following morning shows new bilateral frontal stroke, basal ganglia CVA which explains her new symptoms.      There has been some improvement in neurologic exam On 12/2. She was able to intermittently answer some basic questions & she had increased movement of RUE.  SLP initiated modified diet. At this time, chance at recovery remains very guarded. Palliative care consulted for goals of care discussion in the setting acute stroke with physical and cognitive impairment.     Today, the patient was seen for:  #Acute bilateral frontal CVA & left basal ganglia cb aphasia, right sided weakness  #Hx of CVA with Left Hemiparesis  #Hx Moyamoya Disease  #Vascular Dementia  #Palliative care encounter   #Goals of care discussion   #Patient and family support    History of Present Illness   Met with Mari (spouse) at bedside. Bob (son) and Deepali (daughter) over the phone. I introduced our role as an extra layer of support and how we help patients and families dealing with serious, potentially life-limiting illnesses. I explained the composition of the palliative care team.  Palliative care helps patients and families navigate their care while focusing on the whole person; providing emotional, social and spiritual support  Palliative care often assists with symptom management, information sharing about what to expect from the illness, available treatment options and what effect those options may have on the disease course, and provide effective communication and caring support. and Introduced the role of palliative care as an interdisciplinary team that cares for  patients with serious illness to help support symptom management, communication, coping for patients and their families as well as support with medical decision making.    Prognosis, Goals, & Planning:   Functional Status just prior to this current hospitalization:  ECOG2 (Ambulatory and capable of all selfcare but unable to carry out any work activities; may need help with IADLs up and about > 50% of waking hours)    Prognosis, Goals, and/or Advance Care Planning:  Awaiting for neurologist input regarding neurological recovery. Plan to revisit goals of care discussion in the coming days pending neurology's input. See above for further goals of care discussion    Code Status was addressed today:   As per above, Previously made DNR/DNI     Patient's decision making preferences: unable to assess        Patient has decision-making capacity today for complex decisions: Unreliable          Coping, Meaning, & Spirituality:   Mood, coping, and/or meaning in the context of serious illness were addressed today: Yes    Social:   Living situation:lives with significant other/spouse    Medications:  Reviewed this patient's medication profile and medications from this hospitalization.     [place] Board of Pharmacy Data Base Reviewed:     ROS:  Comprehensive ROS is reviewed and is negative except as here & per HPI:     Physical Exam   Vital Signs with Ranges  Temp:  [97.8  F (36.6  C)-98.5  F (36.9  C)] 98.5  F (36.9  C)  Pulse:  [55-77] 71  Resp:  [16-18] 16  BP: (160-195)/(55-71) 165/56  SpO2:  [97 %-98 %] 98 %  Wt Readings from Last 10 Encounters:   11/28/24 92.3 kg (203 lb 7.8 oz)   10/03/24 88.5 kg (195 lb)   09/13/24 88.5 kg (195 lb 1.6 oz)   08/06/24 92.8 kg (204 lb 8 oz)   07/03/24 90.2 kg (198 lb 12.8 oz)   06/03/24 95.8 kg (211 lb 4.8 oz)   04/25/24 96.8 kg (213 lb 6.4 oz)   02/22/24 94.3 kg (208 lb)   02/16/24 94.4 kg (208 lb 3.2 oz)   12/01/23 90.5 kg (199 lb 8 oz)     203 lbs 7.75 oz    PHYSICAL EXAM:  GENERAL:  seen sitting up in recliner, no acute distress     SKIN: warm and dry   LUNGS: clear to auscultation anterolaterally; non-labored   ABDOMINAL: BS (+), soft, non distended, non tender  MUSKL: no gross joint deformities   EXTREMITIES: no edema or cyanosis, pulses 2+ and symmetrical  NEUROLOGIC: alert to self only   PSYCH: calm    Data reviewed:  CMP  Recent Labs   Lab 12/02/24  0744 12/01/24  0813 11/30/24  0648   * 135 134*   POTASSIUM 4.2 4.3 4.3  4.3   CHLORIDE 100 101 102   CO2 22 22 21*   ANIONGAP 11 12 11   * 114* 131*  131*   BUN 15.8 19.1 27.9*   CR 0.79 0.95 1.20*   GFRESTIMATED 74 60* 45*   RAINE 8.2* 8.1* 7.6*   MAG 1.9 2.1 1.7   PHOS 2.7 3.0 3.2   PROTTOTAL  --   --  4.7*   ALBUMIN  --   --  2.8*   BILITOTAL  --   --  <0.2   ALKPHOS  --   --  66   AST  --   --  56*   ALT  --   --  13     CBC  Recent Labs   Lab 12/02/24  0744 12/01/24  0813   WBC 11.2* 12.1*   RBC 3.15* 3.02*   HGB 9.4* 8.9*   HCT 29.1* 27.4*   MCV 92 91   MCH 29.8 29.5   MCHC 32.3 32.5   RDW 13.4 13.8    192     Narrative & Impression   CT HEAD WITHOUT CONTRAST  12/2/2024 10:56 AM      HISTORY:  Recent acute stroke, evaluate for interval edema.        COMPARISON:  Brain MRI from yesterday.     TECHNIQUE: Using multidetector thin collimation helical acquisition  technique, axial, coronal and sagittal CT images from the skull base  to the vertex were obtained without intravenous contrast.   (topogram) image(s) also obtained and reviewed. Dose reduction  techniques were performed.     FINDINGS: Hypodensity with loss of gray-white matter differentiation  in left greater than right inferior frontal lobes and left basal  ganglia, representing infarct seen on yesterday's MRI. Bilateral  parietal and temporal cranioplasty changes. Scattered areas of  encephalomalacia and gliosis involving right frontal lobe, left  parietal lobe and right occipital lobe. No evidence of hemorrhagic  conversion. No new infarct. Chronic  lacunar infarct in the right  cerebellar hemisphere. Mild generalized cerebral atrophy.     No hydrocephalus.     The bony calvaria and the bones of the skull base are normal. The  visualized portions of the paranasal sinuses and mastoid air cells are  clear. Grossly normal orbits.                                                                       IMPRESSION:   1. Evolving infarcts in the inferior frontal lobes and left basal  ganglia. No evidence of hemorrhagic conversion. No definite new  infarct.  2. Multiple chronic infarcts. Generalized cerebral atrophy and chronic  small vessel ischemic disease.     KAMERON THOMPSON MD           Narrative & Impression   EXAM: MR BRAIN W/O and W CONTRAST  LOCATION: [facility]  DATE: 12/1/2024     INDICATION: aphasia, right side weakness  COMPARISON: 11/29/2024 CT/CTA, 07/18/2024  CONTRAST: 9mL Gadavist     Technique: Multiplanar T1-weighted, axial FLAIR, and susceptibility images were obtained without intravenous contrast. Following intravenous gadolinium-based contrast administration, axial T2-weighted, diffusion, and T1-weighted images (in multiple   planes) were obtained.     Findings:     Multifocal diffusion restriction involving the parafalcine frontal lobes bilaterally, scattered throughout the left basal ganglia and within the anterior aspect of the medial left temporal lobe, all of which display ADC hypointense and T2/FLAIR   hyperintense signal, consistent with acute infarction. There is no associated contrast enhancement within these areas of infarcted parenchyma. There is mild effacement of the frontal horn of the left lateral ventricle.     Chronic appearing, multifocal infarctions involving the right frontal lobe and the left parietal/occipital lobes.     The visualized portions of paranasal sinuses, and mastoid air cells are relatively clear. The orbits are grossly unremarkable.                                                                       Impression:     1. Multifocal, acute infarctions of the cerebral parenchyma affecting the bifrontal lobes, the left basal ganglia and the anterior aspect of the medial left temporal lobe.     2. Edematous appearing cerebral parenchyma of the left frontal lobe which mildly effaces the adjacent lateral ventricle frontal horn.        These findings were communicated to Dr Palmer at 8:22 AM on 12/01/2024 over the phone by Eze Landaverde       Medical Decision Making       MANAGEMENT DISCUSSED with the following over the past 24 hours: Medicine, nursing, patient and family   NOTE(S)/MEDICAL RECORDS REVIEWED over the past 24 hours: Medicine, neurology, orthopedics, SLP, RT, SW, and nursing  SUPPLEMENTAL HISTORY, in addition to the patient's history, over the past 24 hours obtained from:   - Olu (spouse), 2 adult children Bob and Deepali  Medical complexity over the past 24 hours:  - Decision to DE-ESCALATE CARE based on prognosis  80 MINUTES SPENT BY ME on the date of service doing chart review, history, exam, documentation & further activities per the note.

## 2024-12-02 NOTE — PLAN OF CARE
"Pt alert to self, able to respond to questions and say \"yes\" or \"no.\" Neuro cognition has improved throughout the day, able to follow commands at time.  Palliative consult done today. CT scan done today. Tolerating minced and moist diet with thin liquids, with supervision. No complaints of pain. KI off while in bed. Magnesium and Phosphorus replaced. Purewick in place. Dressing change done this am. Up with assist x2 with lift to chair. Repositioned q2 hrs, pillow support. BLE elevated. Pt's spouse is at bedside and participating in pt care.     Problem: Adult Inpatient Plan of Care  Goal: Plan of Care Review  Description: The Plan of Care Review/Shift note should be completed every shift.  The Outcome Evaluation is a brief statement about your assessment that the patient is improving, declining, or no change.  This information will be displayed automatically on your shift  note.  Outcome: Progressing  Flowsheets (Taken 12/2/2024 7829)  Plan of Care Reviewed With:   patient   spouse  Overall Patient Progress: improving  Goal: Patient-Specific Goal (Individualized)  Description: You can add care plan individualizations to a care plan. Examples of Individualization might be:  \"Parent requests to be called daily at 9am for status\", \"I have a hard time hearing out of my right ear\", or \"Do not touch me to wake me up as it startles  me\".  Outcome: Progressing  Goal: Absence of Hospital-Acquired Illness or Injury  Outcome: Progressing  Intervention: Identify and Manage Fall Risk  Recent Flowsheet Documentation  Taken 12/2/2024 0803 by Chyna Weeks, RN  Safety Promotion/Fall Prevention:   activity supervised   clutter free environment maintained   increased rounding and observation   room near nurse's station   room organization consistent   safety round/check completed  Intervention: Prevent Skin Injury  Recent Flowsheet Documentation  Taken 12/2/2024 1522 by Chyna Weeks, RN  Body Position:   turned   heels elevated   " right  Taken 12/2/2024 0803 by Chyna Weeks RN  Body Position:   turned   heels elevated   right  Skin Protection:   adhesive use limited   incontinence pads utilized  Intervention: Prevent and Manage VTE (Venous Thromboembolism) Risk  Recent Flowsheet Documentation  Taken 12/2/2024 1200 by Chyna Weeks RN  VTE Prevention/Management: SCDs on (sequential compression devices)  Taken 12/2/2024 0803 by Chyna Weeks RN  VTE Prevention/Management: SCDs on (sequential compression devices)  Intervention: Prevent Infection  Recent Flowsheet Documentation  Taken 12/2/2024 0803 by Chyna Weeks RN  Infection Prevention:   hand hygiene promoted   rest/sleep promoted   single patient room provided  Goal: Optimal Comfort and Wellbeing  Outcome: Progressing  Goal: Readiness for Transition of Care  Outcome: Progressing     Problem: Hip Fracture Medical Management  Goal: Optimal Coping with Change in Health Status  Outcome: Progressing  Intervention: Support Psychosocial Response to Injury  Recent Flowsheet Documentation  Taken 12/2/2024 0803 by Chyna Weeks RN  Supportive Measures: goal-setting facilitated  Goal: Absence of Bleeding  Outcome: Progressing  Intervention: Monitor and Manage Bleeding  Recent Flowsheet Documentation  Taken 12/2/2024 0803 by Chyna Weeks RN  Bleeding Management: dressing monitored  Goal: Effective Bowel Elimination  Outcome: Progressing  Goal: Baseline Cognitive Function Maintained  Outcome: Progressing  Intervention: Maximize Cognitive Function  Recent Flowsheet Documentation  Taken 12/2/2024 1522 by Chyna Weeks RN  Reorientation Measures: reorientation provided  Taken 12/2/2024 1200 by Chyna Weeks RN  Reorientation Measures: reorientation provided  Taken 12/2/2024 0803 by Chyna Weeks RN  Reorientation Measures: reorientation provided  Goal: Absence of Embolism  Outcome: Progressing  Intervention: Prevent or Manage Embolism Risk  Recent Flowsheet Documentation  Taken 12/2/2024 1200 by Desi  ESSENCE Clemente  VTE Prevention/Management: SCDs on (sequential compression devices)  Taken 12/2/2024 0803 by Chyna Weeks RN  VTE Prevention/Management: SCDs on (sequential compression devices)  Goal: Fracture Stability  Outcome: Progressing  Intervention: Promote Fracture Stability and Healing  Recent Flowsheet Documentation  Taken 12/2/2024 0803 by Chyna Weeks RN  Equipment:   Off:   immobilizer  Goal: Optimal Functional Performance  Outcome: Progressing  Intervention: Promote Optimal Functional Status  Recent Flowsheet Documentation  Taken 12/2/2024 1245 by Chyna Weeks RN  Activity Management: back to bed  Taken 12/2/2024 0803 by Chyna Weeks RN  Range of Motion:   ROM (range of motion) performed   active ROM (range of motion) encouraged  Activity Management:   activity adjusted per tolerance   activity encouraged  Goal: Pain Control and Function  Outcome: Progressing  Goal: Effective Urinary Elimination  Outcome: Progressing     Problem: Hip Fracture Surgical Repair  Goal: Optimal Coping with Change in Health Status  Outcome: Progressing  Intervention: Support Psychosocial Response to Surgery and Mobility Changes  Recent Flowsheet Documentation  Taken 12/2/2024 0803 by Chyna Weeks RN  Supportive Measures: goal-setting facilitated  Goal: Absence of Bleeding  Outcome: Progressing  Intervention: Monitor and Manage Bleeding  Recent Flowsheet Documentation  Taken 12/2/2024 0803 by Chyna Weeks RN  Bleeding Management: dressing monitored  Goal: Effective Bowel Elimination  Outcome: Progressing  Goal: Cognitive Function Maintained  Outcome: Progressing  Intervention: Maintain Cognitive Function  Recent Flowsheet Documentation  Taken 12/2/2024 1522 by Chyna Weeks RN  Reorientation Measures: reorientation provided  Taken 12/2/2024 1200 by Chyna Weeks RN  Reorientation Measures: reorientation provided  Taken 12/2/2024 0803 by Chyna Weeks RN  Reorientation Measures: reorientation provided  Goal: Fluid and  Electrolyte Balance  Outcome: Progressing  Goal: Absence of Infection Signs and Symptoms  Outcome: Progressing  Goal: Optimal Functional Ability  Outcome: Progressing  Intervention: Protect Joint Integrity  Recent Flowsheet Documentation  Taken 12/2/2024 0803 by Chyna Weeks RN  Equipment:   Off:   immobilizer  Intervention: Promote Optimal Functional Status  Recent Flowsheet Documentation  Taken 12/2/2024 1245 by Chyna Weeks RN  Activity Management: back to bed  Taken 12/2/2024 0803 by Chyna Weeks RN  Activity Management:   activity adjusted per tolerance   activity encouraged  Goal: Anesthesia/Sedation Recovery  Outcome: Progressing  Intervention: Optimize Anesthesia Recovery  Recent Flowsheet Documentation  Taken 12/2/2024 1522 by Chyna Weeks RN  Reorientation Measures: reorientation provided  Taken 12/2/2024 1200 by Chyna Weeks RN  Reorientation Measures: reorientation provided  Taken 12/2/2024 0803 by Chyna Weeks RN  Safety Promotion/Fall Prevention:   activity supervised   clutter free environment maintained   increased rounding and observation   room near nurse's station   room organization consistent   safety round/check completed  Reorientation Measures: reorientation provided  Goal: Optimal Pain Control and Function  Outcome: Progressing  Goal: Nausea and Vomiting Relief  Outcome: Progressing  Goal: Effective Urinary Elimination  Outcome: Progressing  Goal: Effective Oxygenation and Ventilation  Outcome: Progressing  Intervention: Optimize Oxygenation and Ventilation  Recent Flowsheet Documentation  Taken 12/2/2024 0803 by Chyna Weeks RN  Head of Bed (HOB) Positioning: HOB at 30-45 degrees   Goal Outcome Evaluation:      Plan of Care Reviewed With: patient, spouse    Overall Patient Progress: improvingOverall Patient Progress: improving

## 2024-12-02 NOTE — PROGRESS NOTES
"Orthopedic Surgery  Candida Rose  12/02/2024     Admit Date:  11/28/2024    POD: 3 Days Post-Op   Procedure(s):  Open reduction and internal fixation left interprosthetic femur fracture    Patient resting comfortably in bed.   Pain seemingly managed. Per overnight RN, the patient was following commands for ankle/toe motion, squeezing his fingers, and answering \"yes\" and \"no\" with significant effort to some questions. During my encounter, the patient's eyes are open but she is not participatory.  NG tube for diet, however, the patient has seemingly self-removed the tube prior to my encounter. RN updated.  No acute events overnight.    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     Temp:  [96.7  F (35.9  C)-98.6  F (37  C)] 98.4  F (36.9  C)  Pulse:  [58-88] 77  Resp:  [18-20] 18  BP: (130-195)/(53-71) 195/71  SpO2:  [97 %-99 %] 98 %    Alert, eyes open and tracking, not participating in exam/following commands, no verbalizing during my encounter. NAD.  Left lower extremity KI and soft dressing are clean, dry, and intact aside from scant, dried bloody strikethrough to proximal gauze/tape.    No erythema or ecchymosis proximal or distal to the soft wrap.  Thigh is mildly swollen, but remains soft and compressible.  There is mild to moderate left foot edema, non-pitting, whereas there is mild right foot edema.   Bilateral calves are soft, seemingly non-tender.  Patient is able to spontaneously range both ankles and toes but not on command. She does actively range the ankles and toes with light touch to the plantar and dorsal foot indicating that sensation is at least somewhat intact.  DP pulse palpable.    Labs/Imaging:  Recent Labs   Lab Test 12/02/24  0744 12/01/24  0813 11/30/24  1947 11/30/24  0648   WBC 11.2* 12.1*  --  11.8* "   HGB 9.4* 8.9* 9.3* 6.8*    192  --  196     Recent Labs   Lab Test 08/08/23  0814   INR 0.91     A/P    1. S/p left interprosthetic femur fracture ORIF (DOS: 11/29/24); acute CVA  -Continue ASA for DVT prophylaxis per recommendation of stroke neuro. Okay to add Plavix from an orthopedic standpoint at any time. Lovenox is on hold. Patient was transfused on 11/30/24 for Hgb 6.8. Hgb has since been stable.   -Mobilize with PT/OT as able.  -NWB LLE.   -Per conversation with Dr. Clark this morning (12/2/24), the knee immobilizer can be off in bed to avoid pressure injury. Brace should be applied for transferring and ambulation whenever she progresses to that point. Okay for left knee and hip ROM. Avoid placement of pillows behind the left knee to avoid the development of a flexion contracture.   -Continue current pain regimen.  -Dressings: RN to remove LLE Ace wrap today and switch bulky dressings to Aquacels at the surgical sites. Ace wrap to be reapplied thereafter and removed once daily for skin checks. Aquacels can remain intact, but okay for RN to change if saturated >60% or peeling. Orders updated.   -Follow-up: 2 weeks post-op with Dr. Sinan Clark/Conner Bronson PA-C    2. Disposition  -Anticipate d/c to home vs TCU when medically cleared and progressing in PT.    Jasmyn Peter PA-C  [facility]

## 2024-12-02 NOTE — PROGRESS NOTES
"Brief Stroke Note:     Repeat CT head obtained to monitor for edema. No significant interval increase in edema noted, no hemorrhagic transformation. Per notes, palliative care consult requested.     ALFREDO Edwards, CNP  Neurology  12/02/2024 4:17 PM  To page stroke neurology after hours or on a subsequent day, click here: AMCOM  Choose \"On Call\" tab at top, then search dropdown box for \"Neurology Adult\" & press Enter, look for Neuro ICU/Stroke       "

## 2024-12-02 NOTE — PROGRESS NOTES
[facility]   Inpatient Clinical Swallow Evaluation    12/02/24 0907   Appointment Info   Signing Clinician's Name / Credentials (SLP) Melodie Austin MS CCC SLP   General Information   Onset of Illness/Injury or Date of Surgery 11/28/24   Referring Physician Wily Palmer DO   Patient/Family Therapy Goal Statement (SLP) None stated   Pertinent History of Current Problem Per provider documentation - Candida Rose is a 84 year old female with a history of HTN, HLD, Moyamoya Disease, Anxiety, OA, Catarat, CVA with Left Hemiparesis, CKD, obesity who presented to the ED today with left leg pain after a mechanical fall at home. Unfortunately, post-op she became non-verbal and was found to have new right sided weakness.  Code stroke was called.  Initial CT showed multifocal prior ischemic stroke.  CTA head/heck showed bilateral ICA occlusion, bilateral PCA tiny caliber.  She was not given TPA given recent surgery.   General Observations Pt is alert with cues, intermittently follows commands.   Type of Evaluation   Type of Evaluation Swallow Evaluation   Oral Motor   Oral Musculature unable to assess due to poor participation/comprehension   Structural Abnormalities none present   Mucosal Quality adequate   Dentition (Oral Motor)   Dentition (Oral Motor) adequate dentition   Facial Symmetry (Oral Motor)   Facial Symmetry (Oral Motor) unable/difficult to assess  (Appears to have right sided reduced strength and ROM)   Lip Function (Oral Motor)   Lip Range of Motion (Oral Motor) protrusion impairment;retraction impairment   Tongue Function (Oral Motor)   Tongue ROM (Oral Motor) lateralization is impaired   Vocal Quality/Secretion Management (Oral Motor)   Vocal Quality (Oral Motor) WFL   Secretion Management (Oral Motor) WNL   General Swallowing Observations   Past History of Dysphagia No apparent dysphagia hx   Respiratory Support room air   Current Diet/Method of Nutritional Intake (General Swallowing  Observations, NIS) NPO   Swallowing Evaluation Clinical swallow evaluation   Clinical Swallow Evaluation   Feeding Assistance frequent cues/help required   Clinical Swallow Evaluation Textures Trialed thin liquids;pureed;solid foods   Clinical Swallow Eval: Thin Liquid Texture Trial   Mode of Presentation, Thin Liquids straw;self-fed   Volume of Liquid or Food Presented 6 oz   Oral Phase of Swallow WFL;premature pharyngeal entry   Pharyngeal Phase of Swallow impaired;coughing/choking   Diagnostic Statement Overt cough and sneezing x 1 when pt being fed from straw by SLP. When she was allowed to hold the cup herself she consumed greater than 4 oz without overt s/sx of aspiration.   Clinical Swallow Evaluation: Puree Solid Texture Trial   Mode of Presentation, Puree spoon;fed by clinician   Volume of Puree Presented 3 oz   Oral Phase, Puree WFL;delayed AP movement   Oral Residue, Puree mid posterior tongue   Pharyngeal Phase, Puree intact   Diagnostic Statement Prolonged posterior propulsion and mild oral residue. No overt s/sx of aspiration   Clinical Swallow Evaluation: Solid Food Texture Trial   Mode of Presentation self-fed   Volume Presented 1 cracker   Oral Phase residue in oral cavity;impaired mastication   Pharyngeal Phase intact   Diagnostic Statement Impaired mastication and moderate oral residue with verbal cues needed to complete a liquid wash and clear oral cavity. No overt s/sx of aspiration   Esophageal Phase of Swallow   Patient reports or presents with symptoms of esophageal dysphagia No   Swallowing Recommendations   Diet Consistency Recommendations minced & moist (level 5);thin liquids (level 0)   Supervision Level for Intake 1:1 supervision needed   Mode of Delivery Recommendations bolus size, small;slow rate of intake   Swallowing Maneuver Recommendations alternate food and liquid intake;extra swallow   Monitoring/Assistance Required (Eating/Swallowing) cue for finger/lingual sweep if oral  pocketing present;stop eating activities when fatigue is present;monitor for cough or change in vocal quality with intake   Recommended Feeding/Eating Techniques (Swallow Eval) maintain upright sitting position for eating;maintain upright posture during/after eating for 30 minutes;minimize distractions during oral intake;provide assist with feeding   Medication Administration Recommendations, Swallowing (SLP) As tolerated, in puree or with water   Instrumental Assessment Recommendations instrumental evaluation not recommended at this time   General Therapy Interventions   Planned Therapy Interventions Dysphagia Treatment   Clinical Impression   Criteria for Skilled Therapeutic Interventions Met (SLP Eval) Yes, treatment indicated   SLP Diagnosis Oropharyngeal dysphagia   Risks & Benefits of therapy have been explained evaluation/treatment results reviewed;care plan/treatment goals reviewed;participants included;patient   Clinical Impression Comments Pt seen for clinical swallow evaluation. She is alert with cues, follows some basic commands, doesn't answer questions, did repeat SLP intermittently and shook her head no x 1. Oral mech difficult to complete but apparent right sided weakness and reduced ROM noted in lingual and labial skills. Pt consumed 6 oz of thin liquids via straw, one overt cough when SLP was providing the drink and it was consumed immediately following a puree bite. Otherwise pt tolerated greater than 4 oz of thin liquids self fed via straw without overt s/sx of aspiration. Pt also consumed puree (w/ and w/o pills) and a cracker without overt s/sx of aspiration. Pt's oral phase is notable for prolonged bolus formation and propulsion with mild-mod oral residue. Pt required verbal cues for multiple liquid washes to fully clear oral cavity.     Oropharyngeal dysphagia. Recommend minced and moist diet (5) and thin liquids. Supervision is recommended given pt's altered mentation. She must be alert and  positioned upright, take small bites/sips, at a slow rate, and alternate solids and liquids. Nursing to ensure oral cavity is cleared after PO. Initiate SLP services for dysphagia.   SLP Total Evaluation Time   Eval: oral/pharyngeal swallow function, clinical swallow Minutes (51988) 20

## 2024-12-03 ENCOUNTER — APPOINTMENT (OUTPATIENT)
Dept: SPEECH THERAPY | Facility: CLINIC | Age: 84
DRG: 480 | End: 2024-12-03
Payer: MEDICARE

## 2024-12-03 ENCOUNTER — APPOINTMENT (OUTPATIENT)
Dept: PHYSICAL THERAPY | Facility: CLINIC | Age: 84
DRG: 480 | End: 2024-12-03
Attending: STUDENT IN AN ORGANIZED HEALTH CARE EDUCATION/TRAINING PROGRAM
Payer: MEDICARE

## 2024-12-03 LAB
ANION GAP SERPL CALCULATED.3IONS-SCNC: 12 MMOL/L (ref 7–15)
BUN SERPL-MCNC: 12.7 MG/DL (ref 8–23)
CALCIUM SERPL-MCNC: 8.3 MG/DL (ref 8.8–10.4)
CHLORIDE SERPL-SCNC: 98 MMOL/L (ref 98–107)
CREAT SERPL-MCNC: 0.77 MG/DL (ref 0.51–0.95)
EGFRCR SERPLBLD CKD-EPI 2021: 77 ML/MIN/1.73M2
ERYTHROCYTE [DISTWIDTH] IN BLOOD BY AUTOMATED COUNT: 12.9 % (ref 10–15)
GLUCOSE SERPL-MCNC: 103 MG/DL (ref 70–99)
HCO3 SERPL-SCNC: 22 MMOL/L (ref 22–29)
HCT VFR BLD AUTO: 30.4 % (ref 35–47)
HGB BLD-MCNC: 9.8 G/DL (ref 11.7–15.7)
LACTATE SERPL-SCNC: 0.8 MMOL/L (ref 0.7–2)
MAGNESIUM SERPL-MCNC: 1.7 MG/DL (ref 1.7–2.3)
MCH RBC QN AUTO: 29.1 PG (ref 26.5–33)
MCHC RBC AUTO-ENTMCNC: 32.2 G/DL (ref 31.5–36.5)
MCV RBC AUTO: 90 FL (ref 78–100)
PHOSPHATE SERPL-MCNC: 3.2 MG/DL (ref 2.5–4.5)
PLATELET # BLD AUTO: 255 10E3/UL (ref 150–450)
POTASSIUM SERPL-SCNC: 4.3 MMOL/L (ref 3.4–5.3)
RBC # BLD AUTO: 3.37 10E6/UL (ref 3.8–5.2)
SODIUM SERPL-SCNC: 132 MMOL/L (ref 135–145)
WBC # BLD AUTO: 10.1 10E3/UL (ref 4–11)

## 2024-12-03 PROCEDURE — 97161 PT EVAL LOW COMPLEX 20 MIN: CPT | Mod: GP

## 2024-12-03 PROCEDURE — 83735 ASSAY OF MAGNESIUM: CPT | Performed by: STUDENT IN AN ORGANIZED HEALTH CARE EDUCATION/TRAINING PROGRAM

## 2024-12-03 PROCEDURE — 84100 ASSAY OF PHOSPHORUS: CPT | Performed by: STUDENT IN AN ORGANIZED HEALTH CARE EDUCATION/TRAINING PROGRAM

## 2024-12-03 PROCEDURE — 36415 COLL VENOUS BLD VENIPUNCTURE: CPT | Performed by: INTERNAL MEDICINE

## 2024-12-03 PROCEDURE — 250N000013 HC RX MED GY IP 250 OP 250 PS 637: Performed by: INTERNAL MEDICINE

## 2024-12-03 PROCEDURE — 99232 SBSQ HOSP IP/OBS MODERATE 35: CPT | Performed by: INTERNAL MEDICINE

## 2024-12-03 PROCEDURE — 97530 THERAPEUTIC ACTIVITIES: CPT | Mod: GP

## 2024-12-03 PROCEDURE — 80048 BASIC METABOLIC PNL TOTAL CA: CPT | Performed by: STUDENT IN AN ORGANIZED HEALTH CARE EDUCATION/TRAINING PROGRAM

## 2024-12-03 PROCEDURE — 36415 COLL VENOUS BLD VENIPUNCTURE: CPT | Performed by: STUDENT IN AN ORGANIZED HEALTH CARE EDUCATION/TRAINING PROGRAM

## 2024-12-03 PROCEDURE — 250N000013 HC RX MED GY IP 250 OP 250 PS 637: Performed by: STUDENT IN AN ORGANIZED HEALTH CARE EDUCATION/TRAINING PROGRAM

## 2024-12-03 PROCEDURE — 120N000001 HC R&B MED SURG/OB

## 2024-12-03 PROCEDURE — 83605 ASSAY OF LACTIC ACID: CPT | Performed by: INTERNAL MEDICINE

## 2024-12-03 PROCEDURE — 92526 ORAL FUNCTION THERAPY: CPT | Mod: GN

## 2024-12-03 PROCEDURE — 85014 HEMATOCRIT: CPT | Performed by: STUDENT IN AN ORGANIZED HEALTH CARE EDUCATION/TRAINING PROGRAM

## 2024-12-03 PROCEDURE — G0407 INPT/TELE FOLLOW UP 25: HCPCS | Mod: G0 | Performed by: NURSE PRACTITIONER

## 2024-12-03 RX ORDER — HYDRALAZINE HYDROCHLORIDE 10 MG/1
10 TABLET, FILM COATED ORAL EVERY 6 HOURS PRN
Status: DISCONTINUED | OUTPATIENT
Start: 2024-12-03 | End: 2024-12-07 | Stop reason: HOSPADM

## 2024-12-03 RX ORDER — MAGNESIUM OXIDE 400 MG/1
400 TABLET ORAL EVERY 4 HOURS
Status: COMPLETED | OUTPATIENT
Start: 2024-12-03 | End: 2024-12-03

## 2024-12-03 RX ADMIN — ROSUVASTATIN 10 MG: 10 TABLET, FILM COATED ORAL at 22:00

## 2024-12-03 RX ADMIN — Medication 400 MG: at 18:01

## 2024-12-03 RX ADMIN — ASPIRIN 81 MG CHEWABLE TABLET 81 MG: 81 TABLET CHEWABLE at 08:06

## 2024-12-03 RX ADMIN — SERTRALINE HYDROCHLORIDE 50 MG: 50 TABLET ORAL at 22:00

## 2024-12-03 RX ADMIN — HYDRALAZINE HYDROCHLORIDE 10 MG: 10 TABLET ORAL at 16:26

## 2024-12-03 RX ADMIN — SENNOSIDES AND DOCUSATE SODIUM 1 TABLET: 8.6; 5 TABLET ORAL at 20:49

## 2024-12-03 RX ADMIN — ACETAMINOPHEN 650 MG: 325 TABLET, FILM COATED ORAL at 16:37

## 2024-12-03 RX ADMIN — ATENOLOL 25 MG: 25 TABLET ORAL at 08:06

## 2024-12-03 RX ADMIN — LOSARTAN POTASSIUM 25 MG: 25 TABLET, FILM COATED ORAL at 08:06

## 2024-12-03 RX ADMIN — Medication 400 MG: at 14:49

## 2024-12-03 ASSESSMENT — ACTIVITIES OF DAILY LIVING (ADL)
ADLS_ACUITY_SCORE: 63
ADLS_ACUITY_SCORE: 63
ADLS_ACUITY_SCORE: 62
ADLS_ACUITY_SCORE: 62
ADLS_ACUITY_SCORE: 63
ADLS_ACUITY_SCORE: 62
ADLS_ACUITY_SCORE: 62
ADLS_ACUITY_SCORE: 63
ADLS_ACUITY_SCORE: 64
ADLS_ACUITY_SCORE: 63
ADLS_ACUITY_SCORE: 65
ADLS_ACUITY_SCORE: 63
ADLS_ACUITY_SCORE: 65

## 2024-12-03 NOTE — PROGRESS NOTES
[facility]    Vascular Neurology Progress Note    Interval History     Per RN, was more responsive this morning.  Able to state yes/no and her name.  On telestroke examination, was initially asleep.  Did eventually arouse to RN stimulation, with limited participation in examination.    Reviewed MRI images with her spouse, Olu.  We discussed that in the first few days after stroke, it can be often difficult to make long-term predictions about her recovery, however, given her prior strokes and baseline deficits, she may have an overall lower capacity for recovery.  I reviewed that the bifrontal location of her new strokes may result in abulia, or diminished motivation to complete tasks/participate.  Although is difficult to fully evaluate degree of weakness/deficits given her lethargy, we anticipate that she may require significant assistance with ADLs.  Olu shares that she had some left leg weakness and aphasia at baseline.  She was able to ambulate with a cane, cook, and use the bathroom independently.  He confirms that she was a very active person, and would not want to be dependent on others.  Quality life is very important to her.    Hospital Course     Chief complaint: Fall and Leg Pain     84 year old female with pertinent past medical history of moyamoya disease s/p bilateral STA-MCA bypasses in 2006, multiple strokes in the past due to moyamoya hypoperfusion, DM2, osteoarthritis, HTN, HLD.  On PTA ASA 81 mg daily and Crestor 10 mg daily.     She was admitted to [facility] after coming in 11/28 after a fall/left femur fracture and underwent surgery in the morning of 11/29/2024.  Following procedure stroke code was called due to aphasia and right-sided weakness, encephalopathy/decreased responsiveness.     Assessment and Plan     1.  Acute ischemic infarcts of bilateral STEPHEN territories and left basal ganglia.  Suspect infarcts due to hypoperfusion in the setting of general  "anesthesia/hypotension/anemia and known moyamoya disease status post bilateral STA-MCA bypass vs cardioembolic in the setting of atrial fibrillation     - Continue ASA 81 mg daily now.  Given atrial fibrillation noted on EKG 11/28, reasonable to anticoagulate for secondary stroke prevention if goals of care remain restorative.  Given size of infarct, would not start anticoagulation until 7 days post-stroke (12/6) if cleared by orthopedic surgery.  Stop aspirin when anticoagulation is started.  - Goal LDL 40-70, continue PTA Crestor  - Goal A1c less than 7.0   -Treat for SBP <180 while inpatient, with slow outpatient titration to long-term goal BP <130/80  - Therapies  - Stroke education  - Consider addition of stimulant to aid in daytime alertness     Patient Follow-up    - in 6-8 weeks with general neurology or stroke KAYLA ([phone])    We will follow peripherally. Please reach out with questions.     Myra Louie, CNP  Vascular Neurology    To page me or covering stroke neurology team member, click here: AMCOM  Choose \"On Call\" tab at top, then select \"NEUROLOGY/ALL SITES\" from middle drop-down box, press Enter, then look for \"stroke\" or \"telestroke\" for your site.    Physical Examination     Temp: 97.5  F (36.4  C) Temp src: Temporal BP: (!) 170/56 Pulse: 62   Resp: 18 SpO2: 91 % O2 Device: None (Room air)      Neuro Exam  Mental Status:   wakes to vigorous verbal and tactile stimulation from RN, does not speak during telestroke exam, does follow some commands   Cranial Nerves:   unable to keep eyes open long enough to assess gaze, right lower facial weakness, does not protrude tongue.  Motor:  no abnormal movements, LUE antigravity without drift, RUE not antigravity, wiggles toes on R>L   Reflexes:  unable to test (telestroke)  Sensory:   does not participate in formal testing, seems to be aware of being touched by RN  Coordination:   does not participate for formal testing   Station/Gait:  unable to test " due to telestroke    Imaging/Labs   (Bolded imaging and labs new and/or personally reviewed or re-reviewed by me today)    MRI/Head CT MRI: Multifocal, acute infarctions of the cerebral parenchyma affecting the bifrontal lobes, the left basal ganglia and the anterior aspect of the medial left temporal lobe.   Repeat CT head: new bilateral STEPHEN and left basal ganglia infarcts.basal ganglia strokes on vascular neurology review  CT head: Chronic infarcts, negative for acute pathology   Intracranial Vasculature CTA head:Multivessel intracranial stenoses/occlusions, (most notably severe right ICA with occlusion supraclinoid region severe left intracranial ICA, severe right MCA, occluded left M1, possibly occluded right A1, diminutive L A1, right V4 occlusion, left V4 moderate stenosis, mild-moderate mid/inferior basilar artery, severe right PCA), bilateral cranioplasties   Cervical Vasculature CTA neck:Right distal cervical ICA short segment occlusion, LV 1 occluded, extensive cervical stenosis, thyroid nodule     Echocardiogram TTE: EF 50-55%, mild to moderate apical wall hypokinesis, no LV thrombus   EKG/Telemetry Atrial fibrillation with variable AV block, nonspecific ST abnormality   Other Testing Routine EEG: no seizures     LDL  11/30/2024: 49 mg/dL   A1C  11/30/2024: 6.0 %   Troponin No lab value available in past 48 hrs       Time Spent on this Encounter   Billing: I have personally spent a total of 45 minutes providing care today, time spent in reviewing medical records and devising the plan as recorded above.

## 2024-12-03 NOTE — PLAN OF CARE
"Goal Outcome Evaluation:      Plan of Care Reviewed With: patient, spouse    Overall Patient Progress: improvingOverall Patient Progress: improving    Outcome Evaluation: Discharge TBD.      Neuro checks have remain unchanged from previous assessment. Left Forearm iv infiltrated-irritant. Noted redness, edema & painful. Outlined & photo taken. Cold compress w/ elevation. HHD filled out. New iv on opposite side-saline locked. Denies pain. Huge bm-requiring removal of ace wrap. KI off while in bed. PW patent. Took meds crushed in apple sauce w/o difficulty. Noted edema to bles.         Problem: Adult Inpatient Plan of Care  Goal: Plan of Care Review  Description: The Plan of Care Review/Shift note should be completed every shift.  The Outcome Evaluation is a brief statement about your assessment that the patient is improving, declining, or no change.  This information will be displayed automatically on your shift  note.  Outcome: Progressing  Flowsheets (Taken 12/3/2024 0812)  Outcome Evaluation: Discharge TBD.  Plan of Care Reviewed With:   patient   spouse  Overall Patient Progress: improving  Goal: Patient-Specific Goal (Individualized)  Description: You can add care plan individualizations to a care plan. Examples of Individualization might be:  \"Parent requests to be called daily at 9am for status\", \"I have a hard time hearing out of my right ear\", or \"Do not touch me to wake me up as it startles  me\".  Outcome: Progressing  Goal: Absence of Hospital-Acquired Illness or Injury  Outcome: Progressing  Intervention: Identify and Manage Fall Risk  Recent Flowsheet Documentation  Taken 12/2/2024 2100 by Elena Wallace, RN  Safety Promotion/Fall Prevention:   activity supervised   assistive device/personal items within reach  Intervention: Prevent Skin Injury  Recent Flowsheet Documentation  Taken 12/2/2024 2100 by Elena Wallace, RN  Body Position:   turned   supine, head elevated  Intervention: Prevent and Manage " VTE (Venous Thromboembolism) Risk  Recent Flowsheet Documentation  Taken 12/2/2024 2100 by Elena Wallace RN  VTE Prevention/Management: SCDs on (sequential compression devices)  Intervention: Prevent Infection  Recent Flowsheet Documentation  Taken 12/2/2024 2100 by Elena Wallace RN  Infection Prevention:   rest/sleep promoted   single patient room provided  Goal: Optimal Comfort and Wellbeing  Outcome: Progressing  Goal: Readiness for Transition of Care  Outcome: Progressing     Problem: Hip Fracture Medical Management  Goal: Optimal Coping with Change in Health Status  Outcome: Progressing  Goal: Absence of Bleeding  Outcome: Progressing  Goal: Effective Bowel Elimination  Outcome: Progressing  Intervention: Promote Effective Bowel Elimination  Recent Flowsheet Documentation  Taken 12/2/2024 2100 by Elena Wallace RN  Bowel Elimination Promotion: adequate fluid intake promoted  Goal: Baseline Cognitive Function Maintained  Outcome: Progressing  Goal: Absence of Embolism  Outcome: Progressing  Intervention: Prevent or Manage Embolism Risk  Recent Flowsheet Documentation  Taken 12/2/2024 2100 by Elena Wallace RN  VTE Prevention/Management: SCDs on (sequential compression devices)  Goal: Fracture Stability  Outcome: Progressing  Intervention: Promote Fracture Stability and Healing  Recent Flowsheet Documentation  Taken 12/2/2024 2100 by Elena Wallace RN  Equipment: (while in bed)   Off:   immobilizer  Goal: Optimal Functional Performance  Outcome: Progressing  Intervention: Promote Optimal Functional Status  Recent Flowsheet Documentation  Taken 12/2/2024 2100 by Elena Wallace RN  Range of Motion:   active ROM (range of motion) encouraged   ROM (range of motion) performed  Activity Management: (with repositioning)   activity encouraged   bedrest  Goal: Pain Control and Function  Outcome: Progressing  Goal: Effective Urinary Elimination  Outcome: Progressing  Intervention: Support Effective  Urinary Elimination  Recent Flowsheet Documentation  Taken 12/2/2024 2100 by Elena Wallace RN  Urinary Elimination Promotion: bladder volume assessed by ultrasound     Problem: Hip Fracture Surgical Repair  Goal: Optimal Coping with Change in Health Status  Outcome: Progressing  Goal: Absence of Bleeding  Outcome: Progressing  Goal: Effective Bowel Elimination  Outcome: Progressing  Intervention: Enhance Bowel Motility and Elimination  Recent Flowsheet Documentation  Taken 12/2/2024 2100 by Elena Wallace RN  Bowel Elimination Promotion: adequate fluid intake promoted  Goal: Cognitive Function Maintained  Outcome: Progressing  Goal: Fluid and Electrolyte Balance  Outcome: Progressing  Goal: Absence of Infection Signs and Symptoms  Outcome: Progressing  Goal: Optimal Functional Ability  Outcome: Progressing  Intervention: Protect Joint Integrity  Recent Flowsheet Documentation  Taken 12/2/2024 2100 by Elena Wallace RN  Equipment: (while in bed)   Off:   immobilizer  Intervention: Promote Optimal Functional Status  Recent Flowsheet Documentation  Taken 12/2/2024 2100 by Elena Wallace RN  Activity Management: (with repositioning)   activity encouraged   bedrest  Goal: Anesthesia/Sedation Recovery  Outcome: Progressing  Intervention: Optimize Anesthesia Recovery  Recent Flowsheet Documentation  Taken 12/2/2024 2100 by Elena Wallace RN  Safety Promotion/Fall Prevention:   activity supervised   assistive device/personal items within reach  Administration (IS): unable to perform  Goal: Optimal Pain Control and Function  Outcome: Progressing  Goal: Nausea and Vomiting Relief  Outcome: Progressing  Goal: Effective Urinary Elimination  Outcome: Progressing  Intervention: Monitor and Manage Urinary Retention  Recent Flowsheet Documentation  Taken 12/2/2024 2100 by Elena Wallace RN  Urinary Elimination Promotion: bladder volume assessed by ultrasound  Goal: Effective Oxygenation and Ventilation  Outcome:  Progressing  Intervention: Optimize Oxygenation and Ventilation  Recent Flowsheet Documentation  Taken 12/2/2024 2100 by Elena Wallace, RN  Head of Bed (HOB) Positioning: HOB at 20-30 degrees

## 2024-12-03 NOTE — PROGRESS NOTES
12/03/24 1641   Appointment Info   Signing Clinician's Name / Credentials (PT) Marcia De, DONOVAN   Living Environment   People in Home spouse   Current Living Arrangements house   Home Accessibility stairs to enter home;stairs within home   Number of Stairs, Main Entrance 3   Stair Railings, Main Entrance railings safe and in good condition   Number of Stairs, Within Home, Primary greater than 10 stairs   Transportation Anticipated family or friend will provide   Living Environment Comments Pt's  reports 3 [place], all needs met on main level. He does the laundry in the basement.   Self-Care   Usual Activity Tolerance moderate   Current Activity Tolerance poor   Equipment Currently Used at Home cane, straight;walker, rolling   Fall history within last six months yes   Number of times patient has fallen within last six months 5   Activity/Exercise/Self-Care Comment Pt's tushar reports pt uses a SPC for short distance, 4WW for long distance. Pt IND w/ ADLs.   General Information   Onset of Illness/Injury or Date of Surgery 11/28/24   Referring Physician Wily Palmer, DO   Patient/Family Therapy Goals Statement (PT) Return home   Pertinent History of Current Problem (include personal factors and/or comorbidities that impact the POC) Candida Rose is a 84 year old female POD #1 L femur ORIF. Pt with a history of HTN, HLD, Moyamoya Disease, Anxiety, OA, Catarat, CVA with Left Hemiparesis, CKD, obesity who presented to the ED with left leg pain after a mechanical fall at home. She was found to have left femur fracture.  Ortho was consulted and the patient underwent surgical fix.      Unfortunately, post-op she became non-verbal and was found to have new right sided weakness.  Code stroke was called.  Initial CT showed multifocal prior ischemic stroke.  CTA head/heck showed bilateral ICA occlusion, bilateral PCA tiny caliber.  She was not given TPA due to recent surgery.   CT head the following morning shows new  "bilateral frontal stroke, basal ganglia CVA which explains her new symptoms.   Existing Precautions/Restrictions fall;weight bearing;brace worn when out of bed  (\"Left knee immobilizer can be off in bed to avoid pressure injury. Brace should be applied for transferring and ambulation whenever she progresses to that point. Okay for left knee and hip ROM.\")   Weight-Bearing Status - LLE nonweight-bearing   Cognition   Affect/Mental Status (Cognition) confused   Orientation Status (Cognition) oriented to;person;verbal cues/prompts needed for orientation   Follows Commands (Cognition) follows one-step commands;0-24% accuracy   Pain Assessment   Patient Currently in Pain   (Pt denies pain at rest)   Integumentary/Edema   Integumentary/Edema Comments Incision not observed, covered.   Posture    Posture Forward head position;Protracted shoulders   Range of Motion (ROM)   Range of Motion ROM deficits secondary to surgical procedure;ROM deficits secondary to weakness   Strength (Manual Muscle Testing)   Strength (Manual Muscle Testing) Deficits observed during functional mobility   Bed Mobility   Comment, (Bed Mobility) Ax2   Transfers   Comment, (Transfers) Lift   Gait/Stairs (Locomotion)   Comment, (Gait/Stairs) Not tested d/t weakness   Balance   Balance Comments Poor seated   Sensory Examination   Sensory Perception Comments Pt unable to report   Clinical Impression   Criteria for Skilled Therapeutic Intervention Yes, treatment indicated   PT Diagnosis (PT) Impaired functional mobility and gait   Influenced by the following impairments Pain, weakness, decreased activity tolerance, impaired balance   Functional limitations due to impairments Limited functional mobility requiring AD and assist   Clinical Presentation (PT Evaluation Complexity) stable   Clinical Presentation Rationale Based on PMH, current status, and social support   Clinical Decision Making (Complexity) low complexity   Planned Therapy Interventions (PT) " "balance training;bed mobility training;gait training;stair training;transfer training;progressive activity/exercise;strengthening   Risk & Benefits of therapy have been explained evaluation/treatment results reviewed;care plan/treatment goals reviewed;risks/benefits reviewed;current/potential barriers reviewed;participants voiced agreement with care plan;participants included;patient;spouse/significant other   PT Total Evaluation Time   PT Eval, Low Complexity Minutes (99751) 10   Physical Therapy Goals   PT Frequency 5x/week   PT Predicted Duration/Target Date for Goal Attainment 12/10/24   PT Goals Bed Mobility;Transfers;Gait;Stairs   PT: Bed Mobility Independent;Supine to/from sit   PT: Transfers Modified independent;Sit to/from stand;Assistive device   PT: Gait Modified independent;Assistive device;50 feet   PT: Stairs Supervision/stand-by assist;3 stairs   Interventions   Interventions Quick Adds Therapeutic Activity   Therapeutic Activity   Therapeutic Activities: dynamic activities to improve functional performance Minutes (33912) 18   Symptoms Noted During/After Treatment Fatigue   Treatment Detail/Skilled Intervention Pt in recliner upon therapist arrival, agreeable to PT. Pt's  present for session, very supportive. Pt w/ difficulty following commands, responding to questions, able to resond to <25% w/ \"yes\" or \"no\", answers do seem appropriate. Pt's L LE in KI. Sling placed under pt in recliner. Pt transferred to bed w/ lift and Ax2. In bed, therapist guided pt through R UE AAROM, pt able to provided very minimal assist. Therapist guided pt through LE PROM, pt unable to provide assist. L LE able to perform APs. KI removed in supine. Pt's speech improved, pt able to state a few sentances. Pillow support, bed alarm on, all needs w/in reach, RN updated and in room.   PT Discharge Planning   PT Plan Progress bed mob, lift to chair, LE ROM/strength   PT Discharge Recommendation (DC Rec) Transitional Care " Facility   PT Rationale for DC Rec Pt significantly below baseline, currently Ax2 and lift dependent. Pt limited by weakness, NWB, balance, cognition. Recommend TCU to increase strength and functional mobility   PT Brief overview of current status Ax2, lift. Goals of therapy will be to address safe mobility and make recs for d/c to next level of care. Pt and RN will continue to follow all falls risk precautions as documented by RN staff while hospitalized   Physical Therapy Time and Intention   Timed Code Treatment Minutes 18   Total Session Time (sum of timed and untimed services) 28

## 2024-12-03 NOTE — PROGRESS NOTES
Orthopedic Surgery  Candida Rose  12/03/2024     Admit Date:  11/28/2024  POD: 4 Days Post-Op   Procedure(s):  Open reduction and internal fixation left interprosthetic femur fracture  Acute bilateral frontal and left basal ganglia CVA with aphasia and right-sided weakness. History of Moyamoya disease, prior DVT with left hemiparesis, and vascular dementia.     Patient resting comfortably in bed.   Pain seemingly managed. Per RN, patient is becoming more responsive.   No acute events overnight.    Temp:  [97.8  F (36.6  C)-99.6  F (37.6  C)] 99.6  F (37.6  C)  Pulse:  [55-78] 76  Resp:  [16-18] 18  BP: (152-181)/(56-70) 181/67  SpO2:  [95 %-98 %] 95 %    Alert, eyes closed t/o exam   Appears comfortable.   Left lower extremity.  Dressings clean, dry and intact.   Ace wrap in place to left LE.   Thigh is mildly swollen, but remains soft and compressible.  There is mild to moderate left foot edema, non-pitting, whereas there is mild right foot edema.   Bilateral calves are soft, seemingly non-tender.  Does not follow instruction to range ankles or toes.   DP pulse palpable.    Labs/Imaging:  Recent Labs   Lab Test 12/03/24  0808 12/02/24  0744 12/01/24  0813   WBC 10.1 11.2* 12.1*   HGB 9.8* 9.4* 8.9*    206 192     Recent Labs   Lab Test 08/08/23  0814   INR 0.91     A/P    1. S/p left interprosthetic femur fracture ORIF (DOS: 11/29/24); acute CVA  -Continue ASA for DVT prophylaxis per recommendation of stroke neuro. Okay to add Plavix from an orthopedic standpoint at any time. Patient was transfused on 11/30/24 for Hgb 6.8. Hgb has since been stable.   -Mobilize with PT/OT as able.  -NWB LLE.   -The knee immobilizer can be off in bed to avoid pressure injury. Brace should be applied for transferring and ambulation whenever she progresses to that point. Okay for left knee and hip ROM. Avoid placement of pillows behind the left knee to avoid the development of a flexion contracture.   -Continue current pain  regimen.  -Dressings: Aquacels to remain in place at the surgical sites. Ace wrap to be reapplied thereafter and removed once daily for skin checks.  Okay for RN to change aquacel if saturated >60% or peeling.   -Follow-up: 2 weeks post-op with Dr. Sinan Clark/Conner Bronson PA-C    2. Disposition  Anticipate discharge to TCU     [facility]

## 2024-12-03 NOTE — PROGRESS NOTES
[facility]    Medicine Progress Note - Hospitalist Service    Date of Admission:  11/28/2024    Assessment & Plan     Candida Rose is a 84 year old female with a history of HTN, HLD, Moyamoya Disease, Anxiety, OA, Catarat, CVA with Left Hemiparesis, CKD, obesity who presented to the ED today with left leg pain after a mechanical fall at home.     She was found to have left femur fracture.  Ortho was consulted and the patient underwent surgical fix.      Unfortunately, post-op she became non-verbal and was found to have new right sided weakness.  Code stroke was called.  Initial CT showed multifocal prior ischemic stroke.  CTA head/heck showed bilateral ICA occlusion, bilateral PCA tiny caliber.  She was not given TPA due to recent surgery.   CT head the following morning shows new bilateral frontal stroke, basal ganglia CVA which explains her new symptoms.      There has been some improvement in neurologic exam.  On 12/2 she was able to intermittently answer some basic questions & she had increased movement of RUE.  SLP initiated modified diet.  Chance at recovery remains very guarded.  Palliative consulted.         Acute bilateral frontal CVA & left basal ganglia cb aphasia, right sided weakness  Hx of CVA with Left Hemiparesis  Hx Moyamoya Disease  Vascular Dementia::  PMH of moyamoya s/p bilateral STA-MCA bypasses in 2006.  Hx of CVA in 2006, 2010 and 2022.  Uses a walker and cane for ambulation.  Lives in a single family home with her .  Has mild memory impairment at baseline.    Post-op she became non-verbal and was found to have new right sided weakness.  Code stroke was called.  Initial CT showed multifocal prior ischemic stroke.  CTA head/heck showed bilateral ICA occlusion, bilateral PCA tiny caliber.  She was not given TPA due to recent surgery.   Repeat CT head the following morning shows new CVA.  Brain MRI confirms bifrontal, left basal ganglia, anterior medial left  temporal ischemic infarcts, edematous appearing cerebral parenchyma left frontal lobe with mild effacement lateral ventricle.  On initial exam she was awake but is non-verbal.  She does squeeze my hand on the left but was otherwise non-participatory in neurologic examination.  On 12/2 she now intermittently answers basic questions and is able to gently squeeze right hand.  Neurologic status does seem to continue to slowly improve.  -Stroke neuro consulted.  -EEG negative for seizure  -Stroke neurology recommending ASA 81 mg daily for now.  -Planning to transition to DOAC after day 5 of acute stroke.  -ortho is ok with initiation at any time  -Current plan is to start anticoagulation with apixaban 5 mg twice a day on 12/5.  -Avoid hypotension or sudden drops in BP  -PT/OT/SLP  -SLP has cleared for diet.  -Currently, more awake.  -Continue dysphagia diet.  -Palliative care consult appreciated.     Mechanical fall cb left distal femur fracture now s/p surgical fix (11/30):  Trauma imaging reveals an acute comminuted fracture of the distal femoral diaphysis with displaced fracture fragments.    -Orthopedic surgery consulted, pt now s/p surgical repair  -IVF, analgesics prn  -non weight bearing to LLE  -CMS checks     Acute blood loss anemia, post-operative:   Hgb 12.4 --> 10.0 post-op --> 6.8 following morning.  No evidence for external bleeding.  Surgical dressing with some dried blood but not oozing blood.  Obvious consideration for vasile-operative hematoma.  Bilirubin normal so unlikely to be hemolysis.  -Consented for blood, type and screen  -S/p 2 unit(s) pRBC with goal >8 in the setting of acute CVA  -Hgb largely stable following transfusion  -Decrease hemoglobin monitoring to intermittently.     Mild Hyponatremia  Mild Richie on CKD:  Cr baseline 0.9-1.1.  Now Cr 1.2 post-op.  Likely related to anemia and dehydration given operation.   -IVF  -Recheck metabolic panel intermittently.     Hx possible Paroxysmal Atrial  "Fibrillation  HTN  HLD  Afib diagnosed 10/2022 at the time of patient's CVA.  Followed up with Cardiology who reviewed the event monitor and did not see clear afib, but PAC/PVCs.  Per Cardiology, given the fact that the patient's with moyamoya disease have increased risk of intracerebral hemorrhage patient's anticoagulation was discontinued.      -Plan to start apixaban 5 mg twice a day on 12/5.  -Continue pta Rosuvastatin and Atenolol  -Restart PTA Losartan (but at half dose 25 mg daily)  -Monitor on telemetry     Depression  Anxiety  -Continue pta Sertraline     Obesity, BMI 30  -Complicates care.          Diet: Combination Diet Minced and Moist Diet (level 5); Thin Liquids (level 0) (Supervision, upright, alert)    DVT Prophylaxis: Pneumatic Compression Devices  Abreu Catheter: Not present  Lines: None     Cardiac Monitoring: None  Code Status: No CPR- Do NOT Intubate      Clinically Significant Risk Factors         # Hyponatremia: Lowest Na = 132 mmol/L in last 2 days, will monitor as appropriate       # Hypoalbuminemia: Lowest albumin = 2.8 g/dL at 11/30/2024  6:48 AM, will monitor as appropriate     # Hypertension: Noted on problem list            # Obesity: Estimated body mass index is 32.01 kg/m  as calculated from the following:    Height as of this encounter: 1.727 m (5' 8\").    Weight as of this encounter: 95.5 kg (210 lb 8.6 oz).             Social Drivers of Health    Social Connections: Unknown (5/30/2024)    Social Connection and Isolation Panel [NHANES]     Frequency of Social Gatherings with Friends and Family: More than three times a week          Disposition Plan     Medically Ready for Discharge: Anticipated in 2-4 Days             Blaise Mendoza DO  Hospitalist Service  [facility]  Securely message with [email or web address] (more info)  Text page via [email or web address] Paging/Directory   ______________________________________________________________________    Interval History   More awake " than previous notes.  Currently, saying several understandable words.  Does seem to answer some questions appropriately.  Hesitates at times.  Seems to have trouble comprehending some questions.  Denies chest pain, shortness of breath.    Physical Exam   Vital Signs: Temp: 98.2  F (36.8  C) Temp src: Temporal BP: (!) 160/45 Pulse: 65   Resp: 16 SpO2: 97 % O2 Device: None (Room air)    Weight: 210 lbs 8.63 oz    Gen:  NAD, awake, speaking fairly clear words.  Does seem to answer some questions appropriately.  Eyes:  PERRL, sclera anicteric.  OP:  MMM, no lesions.  Neck:  Supple.  CV: Fairly regular, no loud murmurs.  Lung:  CTA b/l, normal effort.  Ab:  +BS, soft.  Skin:  Warm, dry to touch.  No rash.  Left leg brace not removed.  Ext:  Mild non pitting edema LE b/l.      Medical Decision Making       45 MINUTES SPENT BY ME on the date of service doing chart review, history, exam, documentation & further activities per the note.      Data     I have personally reviewed the following data over the past 24 hrs:    10.1  \   9.8 (L)   / 255     132 (L) 98 12.7 /  103 (H)   4.3 22 0.77 \     Procal: N/A CRP: N/A Lactic Acid: 0.8         Imaging results reviewed over the past 24 hrs:   No results found for this or any previous visit (from the past 24 hours).

## 2024-12-04 ENCOUNTER — APPOINTMENT (OUTPATIENT)
Dept: PHYSICAL THERAPY | Facility: CLINIC | Age: 84
DRG: 480 | End: 2024-12-04
Payer: MEDICARE

## 2024-12-04 ENCOUNTER — APPOINTMENT (OUTPATIENT)
Dept: SPEECH THERAPY | Facility: CLINIC | Age: 84
DRG: 480 | End: 2024-12-04
Payer: MEDICARE

## 2024-12-04 PROCEDURE — 99233 SBSQ HOSP IP/OBS HIGH 50: CPT

## 2024-12-04 PROCEDURE — 250N000013 HC RX MED GY IP 250 OP 250 PS 637: Performed by: INTERNAL MEDICINE

## 2024-12-04 PROCEDURE — 92526 ORAL FUNCTION THERAPY: CPT | Mod: GN

## 2024-12-04 PROCEDURE — 120N000001 HC R&B MED SURG/OB

## 2024-12-04 PROCEDURE — 97530 THERAPEUTIC ACTIVITIES: CPT | Mod: GP

## 2024-12-04 PROCEDURE — 250N000013 HC RX MED GY IP 250 OP 250 PS 637: Performed by: STUDENT IN AN ORGANIZED HEALTH CARE EDUCATION/TRAINING PROGRAM

## 2024-12-04 PROCEDURE — 99232 SBSQ HOSP IP/OBS MODERATE 35: CPT | Performed by: INTERNAL MEDICINE

## 2024-12-04 RX ORDER — LOSARTAN POTASSIUM 25 MG/1
25 TABLET ORAL ONCE
Status: COMPLETED | OUTPATIENT
Start: 2024-12-04 | End: 2024-12-04

## 2024-12-04 RX ORDER — LOSARTAN POTASSIUM 50 MG/1
50 TABLET ORAL DAILY
Status: DISCONTINUED | OUTPATIENT
Start: 2024-12-05 | End: 2024-12-07 | Stop reason: HOSPADM

## 2024-12-04 RX ADMIN — SENNOSIDES AND DOCUSATE SODIUM 1 TABLET: 8.6; 5 TABLET ORAL at 20:16

## 2024-12-04 RX ADMIN — SERTRALINE HYDROCHLORIDE 50 MG: 50 TABLET ORAL at 22:02

## 2024-12-04 RX ADMIN — LOSARTAN POTASSIUM 25 MG: 25 TABLET, FILM COATED ORAL at 14:00

## 2024-12-04 RX ADMIN — LOSARTAN POTASSIUM 25 MG: 25 TABLET, FILM COATED ORAL at 08:18

## 2024-12-04 RX ADMIN — HYDRALAZINE HYDROCHLORIDE 10 MG: 10 TABLET ORAL at 09:52

## 2024-12-04 RX ADMIN — ROSUVASTATIN 10 MG: 10 TABLET, FILM COATED ORAL at 22:01

## 2024-12-04 RX ADMIN — ASPIRIN 81 MG CHEWABLE TABLET 81 MG: 81 TABLET CHEWABLE at 08:18

## 2024-12-04 RX ADMIN — ATENOLOL 25 MG: 25 TABLET ORAL at 08:18

## 2024-12-04 ASSESSMENT — ACTIVITIES OF DAILY LIVING (ADL)
ADLS_ACUITY_SCORE: 75
ADLS_ACUITY_SCORE: 66
ADLS_ACUITY_SCORE: 75
ADLS_ACUITY_SCORE: 64
ADLS_ACUITY_SCORE: 66
ADLS_ACUITY_SCORE: 75
ADLS_ACUITY_SCORE: 66
ADLS_ACUITY_SCORE: 66
ADLS_ACUITY_SCORE: 75
ADLS_ACUITY_SCORE: 66
ADLS_ACUITY_SCORE: 75
ADLS_ACUITY_SCORE: 66
ADLS_ACUITY_SCORE: 75
ADLS_ACUITY_SCORE: 66
ADLS_ACUITY_SCORE: 75
ADLS_ACUITY_SCORE: 75
ADLS_ACUITY_SCORE: 66
ADLS_ACUITY_SCORE: 75
ADLS_ACUITY_SCORE: 75
ADLS_ACUITY_SCORE: 66
ADLS_ACUITY_SCORE: 75

## 2024-12-04 NOTE — PROGRESS NOTES
PALLIATIVE CARE PROGRESS NOTE  [facility]     Patient Name: Candida Rose  Date of Admission: 11/28/2024   Today the patient was seen for: Goals of care discussion, patient and family support     Recommendations & Counseling       GOALS OF CARE:   Restorative with limits (DNR/DNI)  Neurology following and expressed it's difficult to make long-term predictions regarding neurological recovery at this time. Neurology anticipate Candida will require significant assistance with ADLs. [facility]     Met with Candida and Olu (spouse) at bedside. Olu shares at baseline Candida has left leg weakness and aphasia. However, Candida was able to ambulate with cane, cook and use the bathroom independently. Olu shares Candida was very active person and has expressed greatly to her family she would not want to dependent on others. Her quality of life is most important to her including physical and cognitive function.     Family reports seeing some clinical improve and are hopeful. They are realistic and expressed understanding Candida will never return to her prior baseline function. Family are hopeful for now and plan is for Candida to discharge to TCU. If Candida does not progress and faces further setbacks; family expressed interest to explore hospice care. Discussed discharge plan with unit SW. Maggie  assistance for discharge planning.    ADVANCE CARE PLANNING:  Patient has an advance directive dated 06/21/2018.  Primary Health Care Agent Olu Rose (Spouse).  Alternate(s) Yves Grant (Daughter), Chidi Rose (Son).   There is no POLST form on file, defer to patient and/or next of kin for decisions   Code status: No CPR- Do NOT Intubate     MEDICAL MANAGEMENT:   We are not actively managing symptoms at this time.     PSYCHOSOCIAL/SPIRITUAL SUPPORT:  Family Yves Grant (Daughter), Chidi Rose (Son) and Olu Rose (Spouse), has 6 grandchildren and 7 great-grandchildren          [place]  community: [employer]   Spiritual: Declined    Palliative Care will continue to follow. Thank you for the consult and allowing us to aid in the care of Candida Rose.    These recommendations have been discussed with primary team, nursing, patient and family.    ALFREDO Nielsen CNP  [facility], Palliative Care  Securely message with the [employer] Web Console (learn more here) or  Text page via [facility] Paging/Directory        Assessment          Candida Rose is a 84 year old female with a history of HTN, HLD, Moyamoya Disease, Anxiety, OA, Catarat, CVA with Left Hemiparesis, CKD, obesity who presented to the ED today with left leg pain after a mechanical fall at home.Patient was found to have left femur fracture. Ortho consulted, patient underwent open reduction and internal fixation left interprosthetic femur fracture.     Post-op, patient became non-verbal and was found to have new right sided weakness. Code stroke was called. Initial CT showed multifocal prior ischemic stroke. CTA head/heck showed bilateral ICA occlusion, bilateral PCA tiny caliber.  She was not given TPA due to recent surgery.   CT head the following morning shows new bilateral frontal stroke, basal ganglia CVA which explains her new symptoms.      At this time, neurological recovery remains guarded. Will continue to follow for ongoing goals of care discussion and for patient and family support.     Today, the patient was seen for:  #Acute bilateral frontal CVA & left basal ganglia cb aphasia, right sided weakness  #Hx of CVA with Left Hemiparesis  #Hx Moyamoya Disease  #Vascular Dementia  #Palliative care encounter   #Goals of care discussion   #Patient and family support      Interval History:     Multidisciplinary collaboration:  Chart reviewed, no major events overnight.  Continue to work with physical therapy. Family remains hopeful for patient to discharge to rehab facility.    Notable medications:  Reviewed    Patient/family  narrative  Patient was seen laying in bed comfortably with eyes closed.  Did not arouse to voice. Olu (spouse) at bedside.  Reports patient has been sleeping since he arrived this morning.    Review of Systems:     Besides above, ROS was reviewed and is unremarkable        Physical Exam:   Temp:  [96.9  F (36.1  C)-97.4  F (36.3  C)] 96.9  F (36.1  C)  Pulse:  [61-81] 65  Resp:  [18-20] 18  BP: (161-189)/(50-75) 175/59  SpO2:  [95 %-100 %] 97 %  210 lbs 8.63 oz    Physical Exam  Candida was seen laying in bed comfortably, eyes closed. Did not arouse to voice on exam. Does not appear to be in discomfort, no labored breathing.  No acute distress      Data Reviewed:     CMP  Recent Labs   Lab 12/03/24  0808 12/02/24  0744 12/01/24  0813 11/30/24  0648   * 133*   < > 134*   POTASSIUM 4.3 4.2   < > 4.3  4.3   CHLORIDE 98 100   < > 102   CO2 22 22   < > 21*   ANIONGAP 12 11   < > 11   * 122*   < > 131*  131*   BUN 12.7 15.8   < > 27.9*   CR 0.77 0.79   < > 1.20*   GFRESTIMATED 77 74   < > 45*   RAINE 8.3* 8.2*   < > 7.6*   MAG 1.7 1.9   < > 1.7   PHOS 3.2 2.7   < > 3.2   PROTTOTAL  --   --   --  4.7*   ALBUMIN  --   --   --  2.8*   BILITOTAL  --   --   --  <0.2   ALKPHOS  --   --   --  66   AST  --   --   --  56*   ALT  --   --   --  13    < > = values in this interval not displayed.     CBC  Recent Labs   Lab 12/03/24  0808 12/02/24  0744   WBC 10.1 11.2*   RBC 3.37* 3.15*   HGB 9.8* 9.4*   HCT 30.4* 29.1*   MCV 90 92   MCH 29.1 29.8   MCHC 32.2 32.3   RDW 12.9 13.4    206     Narrative & Impression   CT HEAD WITHOUT CONTRAST  12/2/2024 10:56 AM      HISTORY:  Recent acute stroke, evaluate for interval edema.        COMPARISON:  Brain MRI from yesterday.     TECHNIQUE: Using multidetector thin collimation helical acquisition  technique, axial, coronal and sagittal CT images from the skull base  to the vertex were obtained without intravenous contrast.   (topogram) image(s) also obtained and  reviewed. Dose reduction  techniques were performed.     FINDINGS: Hypodensity with loss of gray-white matter differentiation  in left greater than right inferior frontal lobes and left basal  ganglia, representing infarct seen on yesterday's MRI. Bilateral  parietal and temporal cranioplasty changes. Scattered areas of  encephalomalacia and gliosis involving right frontal lobe, left  parietal lobe and right occipital lobe. No evidence of hemorrhagic  conversion. No new infarct. Chronic lacunar infarct in the right  cerebellar hemisphere. Mild generalized cerebral atrophy.     No hydrocephalus.     The bony calvaria and the bones of the skull base are normal. The  visualized portions of the paranasal sinuses and mastoid air cells are  clear. Grossly normal orbits.                                                                       IMPRESSION:   1. Evolving infarcts in the inferior frontal lobes and left basal  ganglia. No evidence of hemorrhagic conversion. No definite new  infarct.  2. Multiple chronic infarcts. Generalized cerebral atrophy and chronic  small vessel ischemic disease.     KAMERON THOMPSON MD           Medical Decision Making       MANAGEMENT DISCUSSED with the following over the past 24 hours: Medicine, nursing, patient and family   NOTE(S)/MEDICAL RECORDS REVIEWED over the past 24 hours: Medicine, neurology, speech therapy, physical therapy/Occupational Therapy and nursing  SUPPLEMENTAL HISTORY, in addition to the patient's history, over the past 24 hours obtained from:   - Spouse or significant other  Medical complexity over the past 24 hours:  - Decision to DE-ESCALATE CARE based on prognosis  55 MINUTES SPENT BY ME on the date of service doing chart review, history, exam, documentation & further activities per the note.

## 2024-12-04 NOTE — PLAN OF CARE
"Pt alert to self, able to speak in a simple sentence at times and responds with \"yes\" or \"no.\" IV was removed by patient early this am. Per MD, pt is okay not to have IV access.  Pt has good appetite and sat in chair for afternoon meal, also having good UO. Complains of \"tight pain\" in LLE, dorsalis pedis pulse is palpable, warm sensation of the left leg/foot, tylenol given x1. Ace wrap to LLE. BP elevated this afternoon, hydralazine PO given x1.  Speech therapist saw pt and will continue with minced/moist & thin liquid diet. She was able to participate in PT care this afternoon.     Problem: Adult Inpatient Plan of Care  Goal: Plan of Care Review  Description: The Plan of Care Review/Shift note should be completed every shift.  The Outcome Evaluation is a brief statement about your assessment that the patient is improving, declining, or no change.  This information will be displayed automatically on your shift  note.  Outcome: Progressing  Flowsheets (Taken 12/3/2024 1807)  Plan of Care Reviewed With:   patient   spouse  Overall Patient Progress: improving  Goal: Patient-Specific Goal (Individualized)  Description: You can add care plan individualizations to a care plan. Examples of Individualization might be:  \"Parent requests to be called daily at 9am for status\", \"I have a hard time hearing out of my right ear\", or \"Do not touch me to wake me up as it startles  me\".  Outcome: Progressing  Goal: Absence of Hospital-Acquired Illness or Injury  Outcome: Progressing  Intervention: Identify and Manage Fall Risk  Recent Flowsheet Documentation  Taken 12/3/2024 0914 by Chyna Weeks, ESSENCE  Safety Promotion/Fall Prevention:   activity supervised   lighting adjusted   room near nurse's station   supervised activity   clutter free environment maintained  Intervention: Prevent Skin Injury  Recent Flowsheet Documentation  Taken 12/3/2024 0914 by Chyna Weeks, RN  Body Position:   turned   supine   supine, legs elevated   " sitting up in bed  Skin Protection:   adhesive use limited   incontinence pads utilized  Intervention: Prevent and Manage VTE (Venous Thromboembolism) Risk  Recent Flowsheet Documentation  Taken 12/3/2024 0914 by Chyna Weeks RN  VTE Prevention/Management: SCDs on (sequential compression devices)  Intervention: Prevent Infection  Recent Flowsheet Documentation  Taken 12/3/2024 0914 by Chyna Weeks RN  Infection Prevention:   rest/sleep promoted   single patient room provided  Goal: Optimal Comfort and Wellbeing  Outcome: Progressing  Intervention: Monitor Pain and Promote Comfort  Recent Flowsheet Documentation  Taken 12/3/2024 1634 by Chyna Weeks RN  Pain Management Interventions: medication (see MAR)  Goal: Readiness for Transition of Care  Outcome: Progressing     Problem: Hip Fracture Medical Management  Goal: Optimal Coping with Change in Health Status  Outcome: Progressing  Intervention: Support Psychosocial Response to Injury  Recent Flowsheet Documentation  Taken 12/3/2024 0914 by Chyna Weeks RN  Supportive Measures: goal-setting facilitated  Family/Support System Care:   presence promoted   support provided   involvement promoted  Goal: Absence of Bleeding  Outcome: Progressing  Intervention: Monitor and Manage Bleeding  Recent Flowsheet Documentation  Taken 12/3/2024 0914 by Chyna Weeks RN  Bleeding Management: dressing monitored  Goal: Effective Bowel Elimination  Outcome: Progressing  Intervention: Promote Effective Bowel Elimination  Recent Flowsheet Documentation  Taken 12/3/2024 0914 by Chyna Weeks RN  Bowel Elimination Promotion: adequate fluid intake promoted  Goal: Baseline Cognitive Function Maintained  Outcome: Progressing  Intervention: Maximize Cognitive Function  Recent Flowsheet Documentation  Taken 12/3/2024 0914 by Chyna Weeks RN  Reorientation Measures: reorientation provided  Goal: Absence of Embolism  Outcome: Progressing  Intervention: Prevent or Manage Embolism Risk  Recent  Flowsheet Documentation  Taken 12/3/2024 0914 by Chyna Weeks RN  VTE Prevention/Management: SCDs on (sequential compression devices)  Goal: Fracture Stability  Outcome: Progressing  Intervention: Promote Fracture Stability and Healing  Recent Flowsheet Documentation  Taken 12/3/2024 0914 by Chyna Weeks RN  Equipment: (while in bed)   Off:   immobilizer  Goal: Optimal Functional Performance  Outcome: Progressing  Intervention: Promote Optimal Functional Status  Recent Flowsheet Documentation  Taken 12/3/2024 0914 by Chyna Weeks RN  Range of Motion:   active ROM (range of motion) encouraged   ROM (range of motion) performed  Activity Management: activity adjusted per tolerance  Goal: Pain Control and Function  Outcome: Progressing  Intervention: Manage Acute Orthopaedic-Related Pain  Recent Flowsheet Documentation  Taken 12/3/2024 1634 by Chyna Weeks RN  Pain Management Interventions: medication (see MAR)  Goal: Effective Urinary Elimination  Outcome: Progressing     Problem: Hip Fracture Surgical Repair  Goal: Optimal Coping with Change in Health Status  Outcome: Progressing  Intervention: Support Psychosocial Response to Surgery and Mobility Changes  Recent Flowsheet Documentation  Taken 12/3/2024 0914 by Chyna Weeks RN  Supportive Measures: goal-setting facilitated  Family/Support System Care:   presence promoted   support provided   involvement promoted  Goal: Absence of Bleeding  Outcome: Progressing  Intervention: Monitor and Manage Bleeding  Recent Flowsheet Documentation  Taken 12/3/2024 0914 by Chyna Weeks RN  Bleeding Management: dressing monitored  Goal: Effective Bowel Elimination  Outcome: Progressing  Intervention: Enhance Bowel Motility and Elimination  Recent Flowsheet Documentation  Taken 12/3/2024 0914 by Chyna Weeks RN  Bowel Elimination Promotion: adequate fluid intake promoted  Goal: Cognitive Function Maintained  Outcome: Progressing  Intervention: Maintain Cognitive  Function  Recent Flowsheet Documentation  Taken 12/3/2024 0914 by Chyna Weeks, RN  Reorientation Measures: reorientation provided  Goal: Fluid and Electrolyte Balance  Outcome: Progressing  Goal: Absence of Infection Signs and Symptoms  Outcome: Progressing  Goal: Optimal Functional Ability  Outcome: Progressing  Intervention: Protect Joint Integrity  Recent Flowsheet Documentation  Taken 12/3/2024 0914 by Chyna Weeks RN  Equipment: (while in bed)   Off:   immobilizer  Intervention: Promote Optimal Functional Status  Recent Flowsheet Documentation  Taken 12/3/2024 0914 by Chyna Weeks RN  Activity Management: activity adjusted per tolerance  Goal: Anesthesia/Sedation Recovery  Outcome: Progressing  Intervention: Optimize Anesthesia Recovery  Recent Flowsheet Documentation  Taken 12/3/2024 0914 by Chyna Weeks RN  Safety Promotion/Fall Prevention:   activity supervised   lighting adjusted   room near nurse's station   supervised activity   clutter free environment maintained  Reorientation Measures: reorientation provided  Goal: Optimal Pain Control and Function  Outcome: Progressing  Intervention: Prevent or Manage Pain  Recent Flowsheet Documentation  Taken 12/3/2024 1634 by Chyna Weeks RN  Pain Management Interventions: medication (see MAR)  Goal: Nausea and Vomiting Relief  Outcome: Progressing  Goal: Effective Urinary Elimination  Outcome: Progressing  Goal: Effective Oxygenation and Ventilation  Outcome: Progressing  Intervention: Optimize Oxygenation and Ventilation  Recent Flowsheet Documentation  Taken 12/3/2024 0914 by Chyna Weeks, RN  Head of Bed (HOB) Positioning: HOB at 30-45 degrees   Goal Outcome Evaluation:      Plan of Care Reviewed With: patient, spouse    Overall Patient Progress: improvingOverall Patient Progress: improving

## 2024-12-04 NOTE — PROGRESS NOTES
[facility]    Medicine Progress Note - Hospitalist Service    Date of Admission:  11/28/2024    Assessment & Plan     Candida Rose is a 84 year old female with a history of HTN, HLD, Moyamoya Disease, Anxiety, OA, Catarat, CVA with Left Hemiparesis, CKD, obesity who presented to the ED today with left leg pain after a mechanical fall at home.     She was found to have left femur fracture.  Ortho was consulted and the patient underwent surgical fix.      Unfortunately, post-op she became non-verbal and was found to have new right sided weakness.  Code stroke was called.  Initial CT showed multifocal prior ischemic stroke.  CTA head/heck showed bilateral ICA occlusion, bilateral PCA tiny caliber.  She was not given TPA due to recent surgery.   CT head the following morning shows new bilateral frontal stroke, basal ganglia CVA which explains her new symptoms.      There has been some improvement in neurologic exam.  On 12/2 she was able to intermittently answer some basic questions & she had increased movement of RUE.  SLP initiated modified diet.  Chance at recovery remains very guarded.  Palliative consulted.         Acute bilateral frontal CVA & left basal ganglia cb aphasia, right sided weakness  Hx of CVA with Left Hemiparesis  Hx Moyamoya Disease  Vascular Dementia::  PMH of moyamoya s/p bilateral STA-MCA bypasses in 2006.  Hx of CVA in 2006, 2010 and 2022.  Uses a walker and cane for ambulation.  Lives in a single family home with her .  Has mild memory impairment at baseline.    Post-op she became non-verbal and was found to have new right sided weakness.  Code stroke was called.  Initial CT showed multifocal prior ischemic stroke.  CTA head/heck showed bilateral ICA occlusion, bilateral PCA tiny caliber.  She was not given TPA due to recent surgery.   Repeat CT head the following morning shows new CVA.  Brain MRI confirms bifrontal, left basal ganglia, anterior medial left  temporal ischemic infarcts, edematous appearing cerebral parenchyma left frontal lobe with mild effacement lateral ventricle.  On initial exam she was awake but is non-verbal.  She does squeeze my hand on the left but was otherwise non-participatory in neurologic examination.  On 12/2 she now intermittently answers basic questions and is able to gently squeeze right hand.  Neurologic status does seem to continue to slowly improve.  -Stroke neuro consulted.  -EEG negative for seizure  -Stroke neurology recommending ASA 81 mg daily for now.  -Planning to transition to DOAC after day 5 of acute stroke.  -ortho is ok with initiation at any time  -Current plan is to start anticoagulation with apixaban 5 mg twice a day on 12/5.  Stop aspirin once apixaban started.  -Avoid hypotension or sudden drops in BP  -PT/OT/SLP  -SLP has cleared for diet.  -Currently, more awake.  -Continue dysphagia diet.  -Palliative care consult appreciated.     Mechanical fall cb left distal femur fracture now s/p surgical fix (11/30):  Trauma imaging reveals an acute comminuted fracture of the distal femoral diaphysis with displaced fracture fragments.    -Orthopedic surgery consulted, pt now s/p surgical repair  -IVF, analgesics prn  -non weight bearing to LLE  -CMS checks     Acute blood loss anemia, post-operative:   Hgb 12.4 --> 10.0 post-op --> 6.8 following morning.  No evidence for external bleeding.  Surgical dressing with some dried blood but not oozing blood.  Obvious consideration for vasile-operative hematoma.  Bilirubin normal so unlikely to be hemolysis.  -Consented for blood, type and screen  -S/p 2 unit(s) pRBC with goal >8 in the setting of acute CVA  -Hgb largely stable following transfusion  -Decrease hemoglobin monitoring to intermittently.     Mild Hyponatremia  Mild Richie on CKD:  Cr baseline 0.9-1.1.  Now Cr 1.2 post-op.  Likely related to anemia and dehydration given operation.   -IVF  -Recheck metabolic panel  "intermittently.     Hx of paroxysmal Atrial Fibrillation  Hypertension.  Hyperlipidemia.  Afib diagnosed 10/2022 at the time of patient's CVA.  Followed up with Cardiology who reviewed the event monitor and did not see clear afib, but PAC/PVCs.  Per Cardiology, given the fact that the patient's with moyamoya disease have increased risk of intracerebral hemorrhage patient's anticoagulation was discontinued.      -Plan to start apixaban 5 mg twice a day on 12/6.  -Continue pta Rosuvastatin and Atenolol  -Increase Losartan to 50 mg daily.  -Monitor on telemetry     Depression  Anxiety  -Continue Sertraline     Obesity, BMI 30  -Complicates care.          Diet: Combination Diet Minced and Moist Diet (level 5); Thin Liquids (level 0) (Supervision, upright, alert)    DVT Prophylaxis: Pneumatic Compression Devices  Abreu Catheter: Not present  Lines: None     Cardiac Monitoring: None  Code Status: No CPR- Do NOT Intubate      Clinically Significant Risk Factors         # Hyponatremia: Lowest Na = 132 mmol/L in last 2 days, will monitor as appropriate       # Hypoalbuminemia: Lowest albumin = 2.8 g/dL at 11/30/2024  6:48 AM, will monitor as appropriate     # Hypertension: Noted on problem list            # Obesity: Estimated body mass index is 32.01 kg/m  as calculated from the following:    Height as of this encounter: 1.727 m (5' 8\").    Weight as of this encounter: 95.5 kg (210 lb 8.6 oz).             Social Drivers of Health    Social Connections: Unknown (5/30/2024)    Social Connection and Isolation Panel [NHANES]     Frequency of Social Gatherings with Friends and Family: More than three times a week          Disposition Plan     Medically Ready for Discharge: Anticipated in 2-4 Days             Blaise Mendoza DO  Hospitalist Service  [facility]  Securely message with [employer] (more info)  Text page via [employer] Paging/Directory "   ______________________________________________________________________    Interval History       Physical Exam   Vital Signs: Temp: 97.4  F (36.3  C) Temp src: Temporal BP: (!) 161/58 Pulse: 64   Resp: 18 SpO2: 95 % O2 Device: None (Room air)    Weight: 210 lbs 8.63 oz    Gen:  NAD, awake, not speaking for me today.  Eyes:  PERRL, sclera anicteric.  OP:  MMM, no lesions.  Neck:  Supple.  CV: Mildly irregular, no murmurs.  Lung:  CTA b/l, normal effort.  Ab:  +BS, soft.  Skin:  Warm, dry to touch.  No rash.  Ext:  Mild non pitting edema LE b/l.      Medical Decision Making       45 MINUTES SPENT BY ME on the date of service doing chart review, history, exam, documentation & further activities per the note.      Data         Imaging results reviewed over the past 24 hrs:   No results found for this or any previous visit (from the past 24 hours).

## 2024-12-04 NOTE — PLAN OF CARE
"Goal Outcome Evaluation:    Alert, oriented to self only. Aphasia present. Difficulty answering questions, will repeat what is said. A of 2, lift. No IV access. Purewick in place. Dressing CDI.       Problem: Adult Inpatient Plan of Care  Goal: Plan of Care Review  Description: The Plan of Care Review/Shift note should be completed every shift.  The Outcome Evaluation is a brief statement about your assessment that the patient is improving, declining, or no change.  This information will be displayed automatically on your shift  note.  Outcome: Progressing  Goal: Patient-Specific Goal (Individualized)  Description: You can add care plan individualizations to a care plan. Examples of Individualization might be:  \"Parent requests to be called daily at 9am for status\", \"I have a hard time hearing out of my right ear\", or \"Do not touch me to wake me up as it startles  me\".  Outcome: Progressing  Goal: Absence of Hospital-Acquired Illness or Injury  Outcome: Progressing  Intervention: Identify and Manage Fall Risk  Recent Flowsheet Documentation  Taken 12/4/2024 0453 by Nisa Mayberry RN  Safety Promotion/Fall Prevention: safety round/check completed  Taken 12/4/2024 0210 by Nisa Mayberry RN  Safety Promotion/Fall Prevention: safety round/check completed  Taken 12/3/2024 2053 by Nisa Mayberry RN  Safety Promotion/Fall Prevention:   activity supervised   safety round/check completed   room organization consistent   room near nurse's station   patient and family education   clutter free environment maintained   room door open  Intervention: Prevent Skin Injury  Recent Flowsheet Documentation  Taken 12/3/2024 2053 by Nisa Mayberry RN  Body Position: supine, head elevated  Goal: Optimal Comfort and Wellbeing  Outcome: Progressing  Goal: Readiness for Transition of Care  Outcome: Progressing     Problem: Hip Fracture Medical Management  Goal: Optimal Coping with Change in Health Status  Outcome: " Progressing  Goal: Absence of Bleeding  Outcome: Progressing  Goal: Effective Bowel Elimination  Outcome: Progressing  Goal: Baseline Cognitive Function Maintained  Outcome: Progressing  Goal: Absence of Embolism  Outcome: Progressing  Goal: Fracture Stability  Outcome: Progressing  Goal: Optimal Functional Performance  Outcome: Progressing  Intervention: Promote Optimal Functional Status  Recent Flowsheet Documentation  Taken 12/3/2024 2210 by Nisa Mayberry RN  Activity Management: activity adjusted per tolerance  Goal: Pain Control and Function  Outcome: Progressing  Goal: Effective Urinary Elimination  Outcome: Progressing     Problem: Hip Fracture Surgical Repair  Goal: Optimal Coping with Change in Health Status  Outcome: Progressing  Goal: Absence of Bleeding  Outcome: Progressing  Goal: Effective Bowel Elimination  Outcome: Progressing  Goal: Cognitive Function Maintained  Outcome: Progressing  Goal: Fluid and Electrolyte Balance  Outcome: Progressing  Goal: Absence of Infection Signs and Symptoms  Outcome: Progressing  Goal: Optimal Functional Ability  Outcome: Progressing  Intervention: Promote Optimal Functional Status  Recent Flowsheet Documentation  Taken 12/3/2024 2210 by Nisa Mayberry RN  Activity Management: activity adjusted per tolerance  Goal: Anesthesia/Sedation Recovery  Outcome: Progressing  Intervention: Optimize Anesthesia Recovery  Recent Flowsheet Documentation  Taken 12/4/2024 0453 by Nisa Mayberry RN  Safety Promotion/Fall Prevention: safety round/check completed  Taken 12/4/2024 0210 by Nisa Mayberry RN  Safety Promotion/Fall Prevention: safety round/check completed  Taken 12/3/2024 2053 by Nisa Mayberry RN  Safety Promotion/Fall Prevention:   activity supervised   safety round/check completed   room organization consistent   room near nurse's station   patient and family education   clutter free environment maintained   room door open  Goal: Optimal  Pain Control and Function  Outcome: Progressing  Goal: Nausea and Vomiting Relief  Outcome: Progressing  Goal: Effective Urinary Elimination  Outcome: Progressing  Goal: Effective Oxygenation and Ventilation  Outcome: Progressing  Intervention: Optimize Oxygenation and Ventilation  Recent Flowsheet Documentation  Taken 12/3/2024 2053 by Nisa Mayberry, RN  Head of Bed (HOB) Positioning: HOB at 20-30 degrees

## 2024-12-04 NOTE — PLAN OF CARE
"Pt arouses to voice. Alert to self only. Can answer yes/no questions @ times. Still with R side weakness. Denies pain. PRN hydralazine given this AM for BP w/ improvement. Up w/ Ax2, using lift. Voiding via purewick. Tolerating minced & moist diet-mild thick liquids.   Plan is TBD.     Problem: Adult Inpatient Plan of Care  Goal: Plan of Care Review  Description: The Plan of Care Review/Shift note should be completed every shift.  The Outcome Evaluation is a brief statement about your assessment that the patient is improving, declining, or no change.  This information will be displayed automatically on your shift  note.  Outcome: Progressing  Flowsheets (Taken 12/4/2024 1721)  Plan of Care Reviewed With:   patient   spouse  Overall Patient Progress: improving  Goal: Patient-Specific Goal (Individualized)  Description: You can add care plan individualizations to a care plan. Examples of Individualization might be:  \"Parent requests to be called daily at 9am for status\", \"I have a hard time hearing out of my right ear\", or \"Do not touch me to wake me up as it startles  me\".  Outcome: Progressing  Goal: Absence of Hospital-Acquired Illness or Injury  Outcome: Progressing  Intervention: Identify and Manage Fall Risk  Recent Flowsheet Documentation  Taken 12/4/2024 1600 by Heidy Gandara, RN  Safety Promotion/Fall Prevention:   assistive device/personal items within reach   activity supervised   nonskid shoes/slippers when out of bed   safety round/check completed  Taken 12/4/2024 1200 by Heidy Gandara, RN  Safety Promotion/Fall Prevention:   assistive device/personal items within reach   activity supervised   nonskid shoes/slippers when out of bed   safety round/check completed  Taken 12/4/2024 0835 by Heidy Gandara, RN  Safety Promotion/Fall Prevention:   assistive device/personal items within reach   activity supervised   nonskid shoes/slippers when out of bed   safety round/check completed  Intervention: " Prevent Skin Injury  Recent Flowsheet Documentation  Taken 12/4/2024 1600 by Heidy Gandara RN  Body Position: weight shifting  Taken 12/4/2024 1200 by Heidy Gandara RN  Body Position: weight shifting  Taken 12/4/2024 0835 by Heidy Gandara RN  Body Position: weight shifting  Intervention: Prevent and Manage VTE (Venous Thromboembolism) Risk  Recent Flowsheet Documentation  Taken 12/4/2024 1600 by Heidy Gandara RN  VTE Prevention/Management: SCDs on (sequential compression devices)  Taken 12/4/2024 1200 by Heidy Gandara RN  VTE Prevention/Management: SCDs on (sequential compression devices)  Taken 12/4/2024 0835 by Heidy Gandara RN  VTE Prevention/Management: SCDs on (sequential compression devices)  Intervention: Prevent Infection  Recent Flowsheet Documentation  Taken 12/4/2024 1600 by Heidy Gandara RN  Infection Prevention: rest/sleep promoted  Taken 12/4/2024 1200 by Heidy Gandara RN  Infection Prevention: rest/sleep promoted  Taken 12/4/2024 0835 by Heidy Gandara RN  Infection Prevention: rest/sleep promoted  Goal: Optimal Comfort and Wellbeing  Outcome: Progressing  Goal: Readiness for Transition of Care  Outcome: Progressing     Problem: Hip Fracture Medical Management  Goal: Optimal Coping with Change in Health Status  Outcome: Progressing  Goal: Absence of Bleeding  Outcome: Progressing  Intervention: Monitor and Manage Bleeding  Recent Flowsheet Documentation  Taken 12/4/2024 1600 by Heidy Gandara RN  Bleeding Management: dressing monitored  Taken 12/4/2024 1200 by Heidy Gandara RN  Bleeding Management: dressing monitored  Taken 12/4/2024 0835 by Heidy Gandara RN  Bleeding Management: dressing monitored  Goal: Effective Bowel Elimination  Outcome: Progressing  Intervention: Promote Effective Bowel Elimination  Recent Flowsheet Documentation  Taken 12/4/2024 1600 by Heidy Gandara RN  Bowel Elimination Promotion: adequate fluid intake promoted  Taken  12/4/2024 1200 by Heidy Gandara RN  Bowel Elimination Promotion: adequate fluid intake promoted  Taken 12/4/2024 0835 by Heidy Gandara RN  Bowel Elimination Promotion: adequate fluid intake promoted  Goal: Baseline Cognitive Function Maintained  Outcome: Progressing  Goal: Absence of Embolism  Outcome: Progressing  Intervention: Prevent or Manage Embolism Risk  Recent Flowsheet Documentation  Taken 12/4/2024 1600 by Heidy Gandara RN  VTE Prevention/Management: SCDs on (sequential compression devices)  Taken 12/4/2024 1200 by Heidy Gandara RN  VTE Prevention/Management: SCDs on (sequential compression devices)  Taken 12/4/2024 0835 by Heidy Gandara RN  VTE Prevention/Management: SCDs on (sequential compression devices)  Goal: Fracture Stability  Outcome: Progressing  Goal: Optimal Functional Performance  Outcome: Progressing  Intervention: Promote Optimal Functional Status  Recent Flowsheet Documentation  Taken 12/4/2024 1330 by Heidy Gandara RN  Activity Management: up in chair  Goal: Pain Control and Function  Outcome: Progressing  Goal: Effective Urinary Elimination  Outcome: Progressing     Problem: Hip Fracture Surgical Repair  Goal: Optimal Coping with Change in Health Status  Outcome: Progressing  Goal: Absence of Bleeding  Outcome: Progressing  Intervention: Monitor and Manage Bleeding  Recent Flowsheet Documentation  Taken 12/4/2024 1600 by Heidy Gandara RN  Bleeding Management: dressing monitored  Taken 12/4/2024 1200 by Heidy Gandara RN  Bleeding Management: dressing monitored  Taken 12/4/2024 0835 by Heidy Gandara RN  Bleeding Management: dressing monitored  Goal: Effective Bowel Elimination  Outcome: Progressing  Intervention: Enhance Bowel Motility and Elimination  Recent Flowsheet Documentation  Taken 12/4/2024 1600 by Heidy Gandara RN  Bowel Motility Enhancement: fluid intake encouraged  Bowel Elimination Promotion: adequate fluid intake promoted  Taken  12/4/2024 1200 by Heidy Gandara RN  Bowel Motility Enhancement: fluid intake encouraged  Bowel Elimination Promotion: adequate fluid intake promoted  Taken 12/4/2024 0835 by Heidy Gandara RN  Bowel Motility Enhancement: fluid intake encouraged  Bowel Elimination Promotion: adequate fluid intake promoted  Goal: Cognitive Function Maintained  Outcome: Progressing  Goal: Fluid and Electrolyte Balance  Outcome: Progressing  Intervention: Monitor and Manage Fluid and Electrolyte Balance  Recent Flowsheet Documentation  Taken 12/4/2024 1600 by Heidy Gandara RN  Fluid/Electrolyte Management: fluids provided  Taken 12/4/2024 1200 by Heidy Gandara RN  Fluid/Electrolyte Management: fluids provided  Taken 12/4/2024 0835 by Heidy Gandara RN  Fluid/Electrolyte Management: fluids provided  Goal: Absence of Infection Signs and Symptoms  Outcome: Progressing  Intervention: Prevent or Manage Infection  Recent Flowsheet Documentation  Taken 12/4/2024 1600 by Heidy Gandara RN  Infection Management: aseptic technique maintained  Taken 12/4/2024 1200 by Heidy Gandara RN  Infection Management: aseptic technique maintained  Taken 12/4/2024 0835 by Heidy Gandara RN  Infection Management: aseptic technique maintained  Goal: Optimal Functional Ability  Outcome: Progressing  Intervention: Promote Optimal Functional Status  Recent Flowsheet Documentation  Taken 12/4/2024 1330 by Heidy Gandara RN  Activity Management: up in chair  Goal: Anesthesia/Sedation Recovery  Outcome: Progressing  Intervention: Optimize Anesthesia Recovery  Recent Flowsheet Documentation  Taken 12/4/2024 1600 by Heidy Gandara RN  Safety Promotion/Fall Prevention:   assistive device/personal items within reach   activity supervised   nonskid shoes/slippers when out of bed   safety round/check completed  Administration (IS): unable to perform  Taken 12/4/2024 1200 by Heidy Gandara RN  Safety Promotion/Fall Prevention:   assistive  device/personal items within reach   activity supervised   nonskid shoes/slippers when out of bed   safety round/check completed  Administration (IS): unable to perform  Taken 12/4/2024 0835 by Heidy Gandara RN  Safety Promotion/Fall Prevention:   assistive device/personal items within reach   activity supervised   nonskid shoes/slippers when out of bed   safety round/check completed  Administration (IS): unable to perform  Goal: Optimal Pain Control and Function  Outcome: Progressing  Goal: Nausea and Vomiting Relief  Outcome: Progressing  Goal: Effective Urinary Elimination  Outcome: Progressing  Goal: Effective Oxygenation and Ventilation  Outcome: Progressing  Intervention: Optimize Oxygenation and Ventilation  Recent Flowsheet Documentation  Taken 12/4/2024 1600 by Heidy Gandara RN  Head of Bed (HOB) Positioning: HOB at 30-45 degrees  Taken 12/4/2024 1200 by Heidy Gandara RN  Head of Bed (HOB) Positioning: HOB at 30-45 degrees  Taken 12/4/2024 0835 by Heidy Gandara RN  Head of Bed (HOB) Positioning: HOB at 30-45 degrees     Problem: Hip Fracture Surgical Repair  Goal: Optimal Functional Ability  12/4/2024 1722 by Heidy Gandara RN  Outcome: Not Progressing  12/4/2024 1721 by Heidy Gandara RN  Outcome: Progressing  12/4/2024 1721 by Heidy Gandara RN  Outcome: Progressing  Intervention: Promote Optimal Functional Status  Recent Flowsheet Documentation  Taken 12/4/2024 1330 by Heidy Gandara RN  Activity Management: up in chair     Problem: Stroke, Ischemic (Includes Transient Ischemic Attack)  Goal: Optimal Cognitive Function  Outcome: Not Progressing  Goal: Optimal Functional Ability  Outcome: Not Progressing  Intervention: Optimize Functional Ability  Recent Flowsheet Documentation  Taken 12/4/2024 1330 by Heidy Gandara RN  Activity Management: up in chair     Problem: Adult Inpatient Plan of Care  Goal: Plan of Care Review  Description: The Plan of Care Review/Shift note  "should be completed every shift.  The Outcome Evaluation is a brief statement about your assessment that the patient is improving, declining, or no change.  This information will be displayed automatically on your shift  note.  12/4/2024 1722 by Heidy Gandara RN  Outcome: Progressing  Flowsheets (Taken 12/4/2024 1722)  Plan of Care Reviewed With:   patient   spouse  Overall Patient Progress: no change  12/4/2024 1721 by Heidy Gandara RN  Outcome: Progressing  Flowsheets (Taken 12/4/2024 1721)  Overall Patient Progress: no change  12/4/2024 1721 by Heidy Gandara RN  Outcome: Progressing  Flowsheets (Taken 12/4/2024 1721)  Plan of Care Reviewed With:   patient   spouse  Overall Patient Progress: no change  Goal: Patient-Specific Goal (Individualized)  Description: You can add care plan individualizations to a care plan. Examples of Individualization might be:  \"Parent requests to be called daily at 9am for status\", \"I have a hard time hearing out of my right ear\", or \"Do not touch me to wake me up as it startles  me\".  12/4/2024 1722 by Heidy Gandara RN  Outcome: Progressing  12/4/2024 1721 by Heidy Gandara RN  Outcome: Progressing  12/4/2024 1721 by Heidy Gandara RN  Outcome: Progressing  Goal: Absence of Hospital-Acquired Illness or Injury  12/4/2024 1722 by Heidy Gandara RN  Outcome: Progressing  12/4/2024 1721 by Heidy Gandara RN  Outcome: Progressing  12/4/2024 1721 by Heidy Gandara RN  Outcome: Progressing  Intervention: Identify and Manage Fall Risk  Recent Flowsheet Documentation  Taken 12/4/2024 1600 by Heidy Gandara RN  Safety Promotion/Fall Prevention:   assistive device/personal items within reach   activity supervised   nonskid shoes/slippers when out of bed   safety round/check completed  Taken 12/4/2024 1200 by Heidy Gandara RN  Safety Promotion/Fall Prevention:   assistive device/personal items within reach   activity supervised   nonskid shoes/slippers when " out of bed   safety round/check completed  Taken 12/4/2024 0835 by Heidy Gandara RN  Safety Promotion/Fall Prevention:   assistive device/personal items within reach   activity supervised   nonskid shoes/slippers when out of bed   safety round/check completed  Intervention: Prevent Skin Injury  Recent Flowsheet Documentation  Taken 12/4/2024 1600 by Heidy Gandara RN  Body Position: weight shifting  Taken 12/4/2024 1200 by Heidy Gandara RN  Body Position: weight shifting  Taken 12/4/2024 0835 by Heidy Gandara RN  Body Position: weight shifting  Intervention: Prevent and Manage VTE (Venous Thromboembolism) Risk  Recent Flowsheet Documentation  Taken 12/4/2024 1600 by Heidy Gandara RN  VTE Prevention/Management: SCDs on (sequential compression devices)  Taken 12/4/2024 1200 by Heidy Gandara RN  VTE Prevention/Management: SCDs on (sequential compression devices)  Taken 12/4/2024 0835 by Heidy Gandara RN  VTE Prevention/Management: SCDs on (sequential compression devices)  Intervention: Prevent Infection  Recent Flowsheet Documentation  Taken 12/4/2024 1600 by Heidy Gandara RN  Infection Prevention: rest/sleep promoted  Taken 12/4/2024 1200 by Heidy Gandara RN  Infection Prevention: rest/sleep promoted  Taken 12/4/2024 0835 by Heidy Gandara RN  Infection Prevention: rest/sleep promoted  Goal: Optimal Comfort and Wellbeing  12/4/2024 1722 by Heidy Gandara RN  Outcome: Progressing  12/4/2024 1721 by Heidy Gandara RN  Outcome: Progressing  12/4/2024 1721 by Heidy Gandara RN  Outcome: Progressing  Goal: Readiness for Transition of Care  12/4/2024 1722 by Heidy Gandara RN  Outcome: Progressing  12/4/2024 1721 by Heidy Gandara RN  Outcome: Progressing  12/4/2024 1721 by Heidy Gandara RN  Outcome: Progressing     Problem: Hip Fracture Medical Management  Goal: Optimal Coping with Change in Health Status  12/4/2024 1722 by Heidy Gandara RN  Outcome:  Progressing  12/4/2024 1721 by Heidy Gandara RN  Outcome: Progressing  12/4/2024 1721 by Heidy Gandara RN  Outcome: Progressing  Goal: Absence of Bleeding  12/4/2024 1722 by Heidy Gandara RN  Outcome: Progressing  12/4/2024 1721 by Heidy Gandara RN  Outcome: Progressing  12/4/2024 1721 by Heidy Gandara RN  Outcome: Progressing  Intervention: Monitor and Manage Bleeding  Recent Flowsheet Documentation  Taken 12/4/2024 1600 by Heidy Gandara RN  Bleeding Management: dressing monitored  Taken 12/4/2024 1200 by Heidy Gandara RN  Bleeding Management: dressing monitored  Taken 12/4/2024 0835 by Heidy Gandara RN  Bleeding Management: dressing monitored  Goal: Effective Bowel Elimination  12/4/2024 1722 by Heidy Gandara RN  Outcome: Progressing  12/4/2024 1721 by Heidy Gandara RN  Outcome: Progressing  12/4/2024 1721 by Heidy Gandara RN  Outcome: Progressing  Intervention: Promote Effective Bowel Elimination  Recent Flowsheet Documentation  Taken 12/4/2024 1600 by Heidy Gandara RN  Bowel Elimination Promotion: adequate fluid intake promoted  Taken 12/4/2024 1200 by Heidy Gandara RN  Bowel Elimination Promotion: adequate fluid intake promoted  Taken 12/4/2024 0835 by Heidy Gandara RN  Bowel Elimination Promotion: adequate fluid intake promoted  Goal: Baseline Cognitive Function Maintained  12/4/2024 1722 by Heidy Gandara RN  Outcome: Progressing  12/4/2024 1721 by Heidy Gandara RN  Outcome: Progressing  12/4/2024 1721 by Heidy Gandara RN  Outcome: Progressing  Goal: Absence of Embolism  12/4/2024 1722 by Heidy Gandara RN  Outcome: Progressing  12/4/2024 1721 by Heidy Gandara RN  Outcome: Progressing  12/4/2024 1721 by Heidy Gandara RN  Outcome: Progressing  Intervention: Prevent or Manage Embolism Risk  Recent Flowsheet Documentation  Taken 12/4/2024 1600 by Heidy Gandara RN  VTE Prevention/Management: SCDs on (sequential compression  devices)  Taken 12/4/2024 1200 by Heidy Gandara RN  VTE Prevention/Management: SCDs on (sequential compression devices)  Taken 12/4/2024 0835 by Heidy Gandara RN  VTE Prevention/Management: SCDs on (sequential compression devices)  Goal: Fracture Stability  12/4/2024 1722 by Heidy Gandara RN  Outcome: Progressing  12/4/2024 1721 by Heidy Gandara RN  Outcome: Progressing  12/4/2024 1721 by Heidy Gandara RN  Outcome: Progressing  Goal: Optimal Functional Performance  12/4/2024 1722 by Heidy Gandara RN  Outcome: Progressing  12/4/2024 1721 by Heidy Gandara RN  Outcome: Not Progressing  12/4/2024 1721 by Heidy Gandara RN  Outcome: Progressing  Intervention: Promote Optimal Functional Status  Recent Flowsheet Documentation  Taken 12/4/2024 1330 by Heidy Gandara RN  Activity Management: up in chair  Goal: Pain Control and Function  12/4/2024 1722 by Heidy Gandara RN  Outcome: Progressing  12/4/2024 1721 by Heidy Gandara RN  Outcome: Progressing  12/4/2024 1721 by Heidy Gandara RN  Outcome: Progressing  Goal: Effective Urinary Elimination  12/4/2024 1722 by Heidy Gandara RN  Outcome: Progressing  12/4/2024 1721 by Heidy Gandara RN  Outcome: Progressing  12/4/2024 1721 by Heidy Gandara RN  Outcome: Progressing     Problem: Hip Fracture Surgical Repair  Goal: Optimal Coping with Change in Health Status  12/4/2024 1722 by Heidy Gandara RN  Outcome: Progressing  12/4/2024 1721 by Heidy Gandara RN  Outcome: Progressing  12/4/2024 1721 by Heidy Gandara RN  Outcome: Progressing  Goal: Absence of Bleeding  12/4/2024 1722 by Heidy Gandara RN  Outcome: Progressing  12/4/2024 1721 by Heidy Gandara RN  Outcome: Progressing  12/4/2024 1721 by Heidy Gandara RN  Outcome: Progressing  Intervention: Monitor and Manage Bleeding  Recent Flowsheet Documentation  Taken 12/4/2024 1600 by Heidy Gandara, RN  Bleeding Management: dressing monitored  Taken  12/4/2024 1200 by Heidy Gandara RN  Bleeding Management: dressing monitored  Taken 12/4/2024 0835 by Heidy Gandara RN  Bleeding Management: dressing monitored  Goal: Effective Bowel Elimination  12/4/2024 1722 by Heidy Gandara RN  Outcome: Progressing  12/4/2024 1721 by Heidy Gandara RN  Outcome: Progressing  12/4/2024 1721 by Heidy Gandara RN  Outcome: Progressing  Intervention: Enhance Bowel Motility and Elimination  Recent Flowsheet Documentation  Taken 12/4/2024 1600 by Heidy Gandara RN  Bowel Motility Enhancement: fluid intake encouraged  Bowel Elimination Promotion: adequate fluid intake promoted  Taken 12/4/2024 1200 by Heidy Gandara RN  Bowel Motility Enhancement: fluid intake encouraged  Bowel Elimination Promotion: adequate fluid intake promoted  Taken 12/4/2024 0835 by Heidy Gandara RN  Bowel Motility Enhancement: fluid intake encouraged  Bowel Elimination Promotion: adequate fluid intake promoted  Goal: Cognitive Function Maintained  12/4/2024 1722 by Heidy Gandara RN  Outcome: Progressing  12/4/2024 1721 by Heidy Gandara RN  Outcome: Progressing  12/4/2024 1721 by Heidy Gandara RN  Outcome: Progressing  Goal: Fluid and Electrolyte Balance  12/4/2024 1722 by Heidy Gandara RN  Outcome: Progressing  12/4/2024 1721 by Heidy Gandara RN  Outcome: Progressing  12/4/2024 1721 by Heidy Gandara RN  Outcome: Progressing  Intervention: Monitor and Manage Fluid and Electrolyte Balance  Recent Flowsheet Documentation  Taken 12/4/2024 1600 by Heidy Gandara RN  Fluid/Electrolyte Management: fluids provided  Taken 12/4/2024 1200 by Heidy Gandara RN  Fluid/Electrolyte Management: fluids provided  Taken 12/4/2024 0835 by Heidy Gandara RN  Fluid/Electrolyte Management: fluids provided  Goal: Absence of Infection Signs and Symptoms  12/4/2024 1722 by Heidy Gandara RN  Outcome: Progressing  12/4/2024 1721 by Heidy Gandara RN  Outcome:  Progressing  12/4/2024 1721 by Heidy Gandara RN  Outcome: Progressing  Intervention: Prevent or Manage Infection  Recent Flowsheet Documentation  Taken 12/4/2024 1600 by Heidy Gandara RN  Infection Management: aseptic technique maintained  Taken 12/4/2024 1200 by Heidy Gandara RN  Infection Management: aseptic technique maintained  Taken 12/4/2024 0835 by Heidy Gandara RN  Infection Management: aseptic technique maintained  Goal: Anesthesia/Sedation Recovery  12/4/2024 1722 by Heidy Gandara RN  Outcome: Progressing  12/4/2024 1721 by Heidy Gandara RN  Outcome: Progressing  12/4/2024 1721 by Heidy Gandara RN  Outcome: Progressing  Intervention: Optimize Anesthesia Recovery  Recent Flowsheet Documentation  Taken 12/4/2024 1600 by Heidy Gandara RN  Safety Promotion/Fall Prevention:   assistive device/personal items within reach   activity supervised   nonskid shoes/slippers when out of bed   safety round/check completed  Administration (IS): unable to perform  Taken 12/4/2024 1200 by Heidy Gandara RN  Safety Promotion/Fall Prevention:   assistive device/personal items within reach   activity supervised   nonskid shoes/slippers when out of bed   safety round/check completed  Administration (IS): unable to perform  Taken 12/4/2024 0835 by Heidy Gandara RN  Safety Promotion/Fall Prevention:   assistive device/personal items within reach   activity supervised   nonskid shoes/slippers when out of bed   safety round/check completed  Administration (IS): unable to perform  Goal: Optimal Pain Control and Function  12/4/2024 1722 by Heidy Gandara RN  Outcome: Progressing  12/4/2024 1721 by Heidy Gandara RN  Outcome: Progressing  12/4/2024 1721 by Heidy Gandara RN  Outcome: Progressing  Goal: Nausea and Vomiting Relief  12/4/2024 1722 by Heidy Gandara RN  Outcome: Progressing  12/4/2024 1721 by Heidy Gandara RN  Outcome: Progressing  12/4/2024 1721 by Heidy Gandara  RN  Outcome: Progressing  Goal: Effective Urinary Elimination  12/4/2024 1722 by Heidy Gandara RN  Outcome: Progressing  12/4/2024 1721 by Heidy Gandara RN  Outcome: Progressing  12/4/2024 1721 by Heidy Gandara RN  Outcome: Progressing  Goal: Effective Oxygenation and Ventilation  12/4/2024 1722 by Heidy Gandara RN  Outcome: Progressing  12/4/2024 1721 by Heidy Gandara RN  Outcome: Progressing  12/4/2024 1721 by Heidy Gandara RN  Outcome: Progressing  Intervention: Optimize Oxygenation and Ventilation  Recent Flowsheet Documentation  Taken 12/4/2024 1600 by Heidy Gandara RN  Head of Bed (HOB) Positioning: HOB at 30-45 degrees  Taken 12/4/2024 1200 by Heidy Gandara RN  Head of Bed (HOB) Positioning: HOB at 30-45 degrees  Taken 12/4/2024 0835 by Heidy Gandara RN  Head of Bed (HOB) Positioning: HOB at 30-45 degrees     Problem: Stroke, Ischemic (Includes Transient Ischemic Attack)  Goal: Optimal Coping  Outcome: Progressing  Goal: Effective Bowel Elimination  Outcome: Progressing  Goal: Optimal Cerebral Tissue Perfusion  Outcome: Progressing  Intervention: Protect and Optimize Cerebral Perfusion  Recent Flowsheet Documentation  Taken 12/4/2024 1600 by Heidy Gandara RN  Fluid/Electrolyte Management: fluids provided  Taken 12/4/2024 1200 by Heidy Gandara RN  Fluid/Electrolyte Management: fluids provided  Taken 12/4/2024 0835 by Heidy Gandara RN  Fluid/Electrolyte Management: fluids provided  Goal: Improved Communication Skills  Outcome: Progressing  Goal: Optimal Nutrition Intake  Outcome: Progressing  Goal: Effective Oxygenation and Ventilation  Outcome: Progressing  Intervention: Optimize Oxygenation and Ventilation  Recent Flowsheet Documentation  Taken 12/4/2024 1600 by Heidy Gandara RN  Head of Bed (HOB) Positioning: HOB at 30-45 degrees  Taken 12/4/2024 1200 by Heidy Gandara RN  Head of Bed (HOB) Positioning: HOB at 30-45 degrees  Taken 12/4/2024 0835 by  Heidy Gandara, RN  Head of Bed (HOB) Positioning: HOB at 30-45 degrees  Goal: Improved Sensorimotor Function  Outcome: Progressing  Goal: Safe and Effective Swallow  Outcome: Progressing  Goal: Effective Urinary Elimination  Outcome: Progressing   Goal Outcome Evaluation:      Plan of Care Reviewed With: patient, spouse    Overall Patient Progress: improvingOverall Patient Progress: improving

## 2024-12-05 ENCOUNTER — APPOINTMENT (OUTPATIENT)
Dept: OCCUPATIONAL THERAPY | Facility: CLINIC | Age: 84
DRG: 480 | End: 2024-12-05
Attending: ORTHOPAEDIC SURGERY
Payer: MEDICARE

## 2024-12-05 VITALS
WEIGHT: 210.54 LBS | HEART RATE: 66 BPM | OXYGEN SATURATION: 97 % | RESPIRATION RATE: 24 BRPM | TEMPERATURE: 98.1 F | BODY MASS INDEX: 31.91 KG/M2 | DIASTOLIC BLOOD PRESSURE: 51 MMHG | HEIGHT: 68 IN | SYSTOLIC BLOOD PRESSURE: 123 MMHG

## 2024-12-05 LAB
ANION GAP SERPL CALCULATED.3IONS-SCNC: 11 MMOL/L (ref 7–15)
BACTERIA BLD CULT: NORMAL
BUN SERPL-MCNC: 18.4 MG/DL (ref 8–23)
CALCIUM SERPL-MCNC: 8.6 MG/DL (ref 8.8–10.4)
CHLORIDE SERPL-SCNC: 97 MMOL/L (ref 98–107)
CREAT SERPL-MCNC: 0.81 MG/DL (ref 0.51–0.95)
EGFRCR SERPLBLD CKD-EPI 2021: 72 ML/MIN/1.73M2
GLUCOSE SERPL-MCNC: 102 MG/DL (ref 70–99)
HCO3 SERPL-SCNC: 24 MMOL/L (ref 22–29)
PLATELET # BLD AUTO: 322 10E3/UL (ref 150–450)
POTASSIUM SERPL-SCNC: 4.1 MMOL/L (ref 3.4–5.3)
SODIUM SERPL-SCNC: 132 MMOL/L (ref 135–145)

## 2024-12-05 PROCEDURE — 250N000013 HC RX MED GY IP 250 OP 250 PS 637: Performed by: INTERNAL MEDICINE

## 2024-12-05 PROCEDURE — 82565 ASSAY OF CREATININE: CPT | Performed by: INTERNAL MEDICINE

## 2024-12-05 PROCEDURE — 85049 AUTOMATED PLATELET COUNT: CPT | Performed by: ORTHOPAEDIC SURGERY

## 2024-12-05 PROCEDURE — 36415 COLL VENOUS BLD VENIPUNCTURE: CPT | Performed by: INTERNAL MEDICINE

## 2024-12-05 PROCEDURE — 97530 THERAPEUTIC ACTIVITIES: CPT | Mod: GO | Performed by: REHABILITATION PRACTITIONER

## 2024-12-05 PROCEDURE — 97166 OT EVAL MOD COMPLEX 45 MIN: CPT | Mod: GO | Performed by: REHABILITATION PRACTITIONER

## 2024-12-05 PROCEDURE — 80048 BASIC METABOLIC PNL TOTAL CA: CPT | Performed by: INTERNAL MEDICINE

## 2024-12-05 PROCEDURE — 99232 SBSQ HOSP IP/OBS MODERATE 35: CPT | Performed by: INTERNAL MEDICINE

## 2024-12-05 PROCEDURE — 82374 ASSAY BLOOD CARBON DIOXIDE: CPT | Performed by: INTERNAL MEDICINE

## 2024-12-05 PROCEDURE — 120N000001 HC R&B MED SURG/OB

## 2024-12-05 RX ADMIN — ACETAMINOPHEN 650 MG: 325 TABLET, FILM COATED ORAL at 09:42

## 2024-12-05 RX ADMIN — APIXABAN 5 MG: 5 TABLET, FILM COATED ORAL at 12:23

## 2024-12-05 RX ADMIN — LOSARTAN POTASSIUM 50 MG: 50 TABLET, FILM COATED ORAL at 09:47

## 2024-12-05 RX ADMIN — SENNOSIDES AND DOCUSATE SODIUM 1 TABLET: 8.6; 5 TABLET ORAL at 09:42

## 2024-12-05 RX ADMIN — ATENOLOL 25 MG: 25 TABLET ORAL at 09:47

## 2024-12-05 RX ADMIN — APIXABAN 5 MG: 5 TABLET, FILM COATED ORAL at 20:21

## 2024-12-05 RX ADMIN — ROSUVASTATIN 10 MG: 10 TABLET, FILM COATED ORAL at 22:29

## 2024-12-05 RX ADMIN — SERTRALINE HYDROCHLORIDE 50 MG: 50 TABLET ORAL at 22:29

## 2024-12-05 ASSESSMENT — ACTIVITIES OF DAILY LIVING (ADL)
ADLS_ACUITY_SCORE: 77
IADL_COMMENTS: FAMILY CAN COMPLETE
ADLS_ACUITY_SCORE: 75
ADLS_ACUITY_SCORE: 77

## 2024-12-05 NOTE — PROGRESS NOTES
[facility]    Medicine Progress Note - Hospitalist Service    Date of Admission:  11/28/2024    Assessment & Plan     Candida Rose is a 84 year old female with a history of HTN, HLD, Moyamoya Disease, Anxiety, OA, Catarat, CVA with Left Hemiparesis, CKD, obesity who presented to the ED today with left leg pain after a mechanical fall at home.     She was found to have left femur fracture.  Ortho was consulted and the patient underwent surgical fix.      Unfortunately, post-op she became non-verbal and was found to have new right sided weakness.  Code stroke was called.  Initial CT showed multifocal prior ischemic stroke.  CTA head/heck showed bilateral ICA occlusion, bilateral PCA tiny caliber.  She was not given TPA due to recent surgery.   CT head the following morning shows new bilateral frontal stroke, basal ganglia CVA which explains her new symptoms.      There has been some improvement in neurologic exam.  On 12/2 she was able to intermittently answer some basic questions & she had increased movement of RUE.  SLP initiated modified diet.  Chance at recovery remains very guarded.  Palliative consulted. Plan at this time is topursue an attempt at rehab and if it is unsuccessful or too arduous, pt will move to a comfort care approach.        Acute bilateral frontal CVA & left basal ganglia cb aphasia, right sided weakness  Hx of CVA with Left Hemiparesis  Hx Moyamoya Disease  Vascular Dementia:  PMH of moyamoya s/p bilateral STA-MCA bypasses in 2006.  Hx of CVA in 2006, 2010 and 2022.  Uses a walker and cane for ambulation.  Lives in a single family home with her .  Has mild memory impairment at baseline.    Post-op she became non-verbal and was found to have new right sided weakness.  Code stroke was called.  Initial CT showed multifocal prior ischemic stroke.  CTA head/heck showed bilateral ICA occlusion, bilateral PCA tiny caliber.  She was not given TPA due to recent surgery.    Repeat CT head the following morning shows new CVA.  Brain MRI confirms bifrontal, left basal ganglia, anterior medial left temporal ischemic infarcts, edematous appearing cerebral parenchyma left frontal lobe with mild effacement lateral ventricle.  On initial exam she was awake but is non-verbal.  She does squeeze my hand on the left but was otherwise non-participatory in neurologic examination.  On 12/2 she now intermittently answers basic questions and is able to gently squeeze right hand.  Neurologic status does seem to continue to slowly improve.  -Stroke neurology recommending transition to DOAC: stop asa and start apixaban 5 mg BID for stroke prophylaxis (day 5 after stroke)   -Avoid hypotension or sudden drops in BP  -PT/OT/SLP  -SLP has cleared for diet.  -Continues dysphagia diet. Able to feed self.  -Palliative care consult appreciated.     Mechanical fall cb left distal femur fracture now s/p surgical fix (11/30):  Trauma imaging reveals an acute comminuted fracture of the distal femoral diaphysis with displaced fracture fragments.    -Orthopedic surgery consulted, pt now s/p surgical repair  -IVF, analgesics prn  -non weight bearing to LLE  -CMS checks     Acute blood loss anemia, post-operative:   Hgb 12.4 --> 10.0 post-op --> 6.8 following morning.  No evidence for external bleeding.  Surgical dressing with some dried blood but not oozing blood.    -S/p 2 unit(s) pRBC with goal >8 in the setting of acute CVA  -Hgb stable following transfusion  -Decrease hemoglobin monitoring to intermittently.     Mild Hyponatremia  Mild Richie on CKD:  Cr baseline 0.9-1.1.  Now Cr 1.2 post-op.  Likely related to anemia and dehydration given operation.   -IVF  -Recheck metabolic panel intermittently.     Hx of paroxysmal Atrial Fibrillation  Hypertension.  Hyperlipidemia.  Afib diagnosed 10/2022 at the time of patient's CVA.  Followed up with Cardiology who reviewed the event monitor and did not see clear afib, but  "PAC/PVCs.  Per Cardiology, given the fact that the patient's with moyamoya disease have increased risk of intracerebral hemorrhage patient's anticoagulation was discontinued.      -Plan to start apixaban 5 mg twice a day on 12/6.  -Continue pta Rosuvastatin and Atenolol  -Increase Losartan to 50 mg daily.  -Monitor on telemetry     Depression  Anxiety  -Continue Sertraline     Obesity, BMI 30  -Complicates care.          Diet: Combination Diet Minced and Moist Diet (level 5); Mildly Thick (level 2) (Supervision, upright, alert)    DVT Prophylaxis: Pneumatic Compression Devices  Abreu Catheter: Not present  Lines: None     Cardiac Monitoring: None  Code Status: No CPR- Do NOT Intubate      Clinically Significant Risk Factors         # Hyponatremia: Lowest Na = 132 mmol/L in last 2 days, will monitor as appropriate  # Hypochloremia: Lowest Cl = 97 mmol/L in last 2 days, will monitor as appropriate      # Hypoalbuminemia: Lowest albumin = 2.8 g/dL at 11/30/2024  6:48 AM, will monitor as appropriate     # Hypertension: Noted on problem list            # Obesity: Estimated body mass index is 32.01 kg/m  as calculated from the following:    Height as of this encounter: 1.727 m (5' 8\").    Weight as of this encounter: 95.5 kg (210 lb 8.6 oz).             Social Drivers of Health    Social Connections: Unknown (5/30/2024)    Social Connection and Isolation Panel [NHANES]     Frequency of Social Gatherings with Friends and Family: More than three times a week          Disposition Plan     Medically Ready for Discharge: Anticipated in 2-4 Days             Brian Willis MD  Hospitalist Service  [facility]  Securely message with "[employer]" (more info)  Text page via [facility] Paging/Directory   ______________________________________________________________________    Interval History   Chart reviewed, pt interviewed.       Pt poorly interactive with me. I spoke with her  by phone.  As above, will plan on " trial of rehab first, consider when comfort care is appropriate.     Physical Exam   Vital Signs: Temp: 96.9  F (36.1  C) Temp src: Temporal BP: (!) 168/55 Pulse: 68   Resp: 18 SpO2: 94 % O2 Device: None (Room air)    Weight: 210 lbs 8.63 oz    Gen:  NAD, awake, makes a brief effort at speaking with me today.   Eyes:  PERRL, sclera anicteric. Extraocular motions are dysconjugate.   OP:  MMM, no lesions.  Neck:  Supple.  CV: Mildly irregular, no murmurs.  Lung:  CTA b/l, normal effort.  Ab:  +BS, soft.  Skin:  Warm, dry to touch.  No rash.  Ext:  Mild non pitting edema LE b/l.  Neuro: Minimal movement of right Upper and Lower extremities.       Medical Decision Making       45 MINUTES SPENT BY ME on the date of service doing chart review, history, exam, documentation & further activities per the note.      Results for orders placed or performed during the hospital encounter of 11/28/24 (from the past 24 hours)   Platelet count   Result Value Ref Range    Platelet Count 322 150 - 450 10e3/uL   Basic metabolic panel   Result Value Ref Range    Sodium 132 (L) 135 - 145 mmol/L    Potassium 4.1 3.4 - 5.3 mmol/L    Chloride 97 (L) 98 - 107 mmol/L    Carbon Dioxide (CO2) 24 22 - 29 mmol/L    Anion Gap 11 7 - 15 mmol/L    Urea Nitrogen 18.4 8.0 - 23.0 mg/dL    Creatinine 0.81 0.51 - 0.95 mg/dL    GFR Estimate 72 >60 mL/min/1.73m2    Calcium 8.6 (L) 8.8 - 10.4 mg/dL    Glucose 102 (H) 70 - 99 mg/dL

## 2024-12-05 NOTE — PROGRESS NOTES
"   12/05/24 0852   Appointment Info   Signing Clinician's Name / Credentials (OT) Adenike Gurrola, OTR/L   Living Environment   People in Home spouse   Current Living Arrangements house   Home Accessibility stairs to enter home;stairs within home   Number of Stairs, Main Entrance 3   Number of Stairs, Within Home, Primary greater than 10 stairs   Transportation Anticipated family or friend will provide   Living Environment Comments Pt's  reports 3 [place], all needs met on main level. He does the laundry in the basement.   Self-Care   Usual Activity Tolerance moderate   Current Activity Tolerance fair   Equipment Currently Used at Home cane, quad;walker, rolling   Fall history within last six months yes   Number of times patient has fallen within last six months 5   Activity/Exercise/Self-Care Comment Pt's tushar reports pt uses a SPC for short distance, 4WW for long distance. Pt IND w/ ADLs. Per pallative note \"Olu coyle at baseline Candida has left leg weakness and aphasia. However, Candida was able to ambulate with cane, cook and use the bathroom independently. Olu shares Candida was very active person and has expressed greatly to her family she would not want to dependent on other\".   Instrumental Activities of Daily Living (IADL)   IADL Comments family can complete   General Information   Onset of Illness/Injury or Date of Surgery 11/28/24   Referring Physician Andrea Clark MD   Patient/Family Therapy Goal Statement (OT) not stated   Additional Occupational Profile Info/Pertinent History of Current Problem Candida Rose is a 84 year old female POD #1 L femur ORIF. Pt with a history of HTN, HLD, Moyamoya Disease, Anxiety, OA, Catarat, CVA with Left Hemiparesis, CKD, obesity who presented to the ED with left leg pain after a mechanical fall at home. She was found to have left femur fracture.  Ortho was consulted and the patient underwent surgical fix.      Unfortunately, post-op she became non-verbal and was found " to have new right sided weakness.  Code stroke was called.  Initial CT showed multifocal prior ischemic stroke.  CTA head/heck showed bilateral ICA occlusion, bilateral PCA tiny caliber.  She was not given TPA due to recent surgery.   CT head the following morning shows new bilateral frontal stroke, basal ganglia CVA which explains her new symptoms.   Existing Precautions/Restrictions fall   Left Lower Extremity (Weight-bearing Status) non weight-bearing (NWB)  (KI when OOB)   General Observations and Info pt was in bed, indicates she is agreeable to OT session   Cognitive Status Examination   Affect/Mental Status (Cognitive) flat/blunted affect   Follows Commands delayed response/completion;increased processing time needed;initiation impaired;physical/tactile prompts required;repetition of directions required;verbal cues/prompting required;follows one-step commands;0-24% accuracy   Cognitive Status Comments difficult to assess d/t aphasia, however when asked directly and time provided, pt is able to answer questions and answers appear to be appropriate and correct   Visual Perception   Visual Impairment/Limitations corrective lenses full-time   Visual Processing Deficit sujatha-inattention/neglect, right   Impact of Vision Impairment on Function (Vision) difficult to assess vision d/t cogntitive and aphasia deficits, however pt appears to have a strong R side neglect/inattention   Pain Assessment   Patient Currently in Pain No  (pt does not appear to be in pain)   Range of Motion Comprehensive   Comment, General Range of Motion L appears to be intact, R impaired   Strength Comprehensive (MMT)   Comment, General Manual Muscle Testing (MMT) Assessment L appears to intact, R impaired, does initiate use of R hand with functional activity   Coordination   Upper Extremity Coordination Right UE impaired   Coordination Comments has weak R grasp, difficulty with digit extension, initiates very limited R UE AROM   Bed Mobility    Comment (Bed Mobility) depx2 for rolling in bed to place lift sling   Transfers   Transfer Comments Ax2-3 via LIKO lift to transfer to chair, needs L KI donned with transfer   Balance   Balance Comments severe deficits with balance   Activities of Daily Living   BADL Assessment/Intervention bathing;upper body dressing;lower body dressing;grooming;toileting   Bathing Assessment/Intervention   [place] Level (Bathing) dependent (less than 25% patient effort)   Upper Body Dressing Assessment/Training   [place] Level (Upper Body Dressing) dependent (less than 25% patient effort)   Lower Body Dressing Assessment/Training   [place] Level (Lower Body Dressing) dependent (less than 25% patient effort)   Grooming Assessment/Training   [place] Level (Grooming) maximum assist (25% patient effort)   Toileting   [place] Level (Toileting) dependent (less than 25% patient effort)   Clinical Impression   Criteria for Skilled Therapeutic Interventions Met (OT) Yes, treatment indicated   OT Diagnosis decreased ADL/IADLs   OT Problem List-Impairments impacting ADL activity tolerance impaired;balance;cognition;communication;coordination;mobility;strength;pain;vision;post-surgical precautions;range of motion (ROM);muscle tone   Assessment of Occupational Performance 5 or more Performance Deficits   Identified Performance Deficits dsg, toileting, bathing, functional/mcommunity mobility, household chores, errands   Planned Therapy Interventions (OT) ADL retraining;progressive activity/exercise;transfer training;visual perception;strengthening;ROM;stretching;cognition   Clinical Decision Making Complexity (OT) problem focused assessment/low complexity   Risk & Benefits of therapy have been explained evaluation/treatment results reviewed;care plan/treatment goals reviewed;risks/benefits reviewed;current/potential barriers reviewed;participants voiced agreement with care plan;patient   OT Total Evaluation Time   OT  Sonny, Moderate Complexity Minutes ([id number]) 10   OT Goals   Therapy Frequency (OT) Daily   OT Predicted Duration/Target Date for Goal Attainment 12/12/24   OT Goals Hygiene/Grooming;Upper Body Dressing;OT Goal 1;OT Goal 2   OT: Hygiene/Grooming moderate assist;from wheelchair  (to complete 2-3 tasks with support provided to R UE to aid in task completion)   OT: Upper Body Dressing Moderate assist   OT: Goal 1 Patient to participate in R UE AAROM/AROM exercises.   OT: Goal 2 Patient to attend to R side  with min A cues 2/5 times durign session   OT Discharge Planning   OT Plan g/h tasks, R UE AAROM/AROM, sitting balance while in chair, trunk strengthening, attending to R side   OT Discharge Recommendation (DC Rec) Acute [facility]hab [facility]-Motivated patient will benefit from intensive, interdisciplinary therapy.  Anticipate will be able to tolerate 3 hours of therapy per day   OT Rationale for DC Rec Patient is well below baseline for ADL, R side function and vision. pt will require further rehab at discharge, would benefit from continued OT in IP and ARU setting, if patient does not qualify for ARU then recommend TCU.   OT Brief overview of current status lift x2 for transfer, able to participate in light g/h tasks, min verbal communition,   Total Session Time   Total Session Time (sum of timed and untimed services) 10

## 2024-12-05 NOTE — PLAN OF CARE
"Goal Outcome Evaluation:      Plan of Care Reviewed With: patient    Overall Patient Progress: no changeOverall Patient Progress: no change    Outcome Evaluation: VS monitored, lift for OOB, voiding w/external catheter, Tylenol for pain, philipp diet, DNR/DNI, drsg C/D/I, LE edema, 1+ dorsal pedals, int answers simply yes/no questions, still w/R side wkns, plan TBD.    Problem: Adult Inpatient Plan of Care  Goal: Plan of Care Review  Description: The Plan of Care Review/Shift note should be completed every shift.  The Outcome Evaluation is a brief statement about your assessment that the patient is improving, declining, or no change.  This information will be displayed automatically on your shift  note.  Outcome: Progressing  Flowsheets (Taken 12/5/2024 1602)  Outcome Evaluation: VS monitored, lift for OOB, voiding w/external catheter, Tylenol for pain, philipp diet, DNR/DNI, drsg C/D/I, LE edema, 1+ dorsal pedals, int answers simply yes/no questions, still w/R side wkns, plan TBD.  Plan of Care Reviewed With: patient  Overall Patient Progress: no change  Goal: Patient-Specific Goal (Individualized)  Description: You can add care plan individualizations to a care plan. Examples of Individualization might be:  \"Parent requests to be called daily at 9am for status\", \"I have a hard time hearing out of my right ear\", or \"Do not touch me to wake me up as it startles  me\".  Outcome: Progressing  Goal: Absence of Hospital-Acquired Illness or Injury  Outcome: Progressing  Intervention: Identify and Manage Fall Risk  Recent Flowsheet Documentation  Taken 12/5/2024 0989 by Frieda Foster, RN  Safety Promotion/Fall Prevention:   activity supervised   assistive device/personal items within reach   clutter free environment maintained   lighting adjusted   nonskid shoes/slippers when out of bed   patient and family education   room organization consistent   safety round/check completed   supervised activity   treat reversible " contributory factors   treat underlying cause  Intervention: Prevent Skin Injury  Recent Flowsheet Documentation  Taken 12/5/2024 0942 by Frieda Foster RN  Skin Protection:   adhesive use limited   incontinence pads utilized  Intervention: Prevent and Manage VTE (Venous Thromboembolism) Risk  Recent Flowsheet Documentation  Taken 12/5/2024 0942 by Frieda Foster RN  VTE Prevention/Management: SCDs off (sequential compression devices)  Intervention: Prevent Infection  Recent Flowsheet Documentation  Taken 12/5/2024 0942 by Frieda Foster RN  Infection Prevention:   hand hygiene promoted   rest/sleep promoted   single patient room provided  Goal: Optimal Comfort and Wellbeing  Outcome: Progressing  Goal: Readiness for Transition of Care  Outcome: Progressing     Problem: Hip Fracture Medical Management  Goal: Optimal Coping with Change in Health Status  Outcome: Progressing  Goal: Absence of Bleeding  Outcome: Progressing  Intervention: Monitor and Manage Bleeding  Recent Flowsheet Documentation  Taken 12/5/2024 0942 by Frieda Foster RN  Bleeding Management: dressing monitored  Goal: Effective Bowel Elimination  Outcome: Progressing  Intervention: Promote Effective Bowel Elimination  Recent Flowsheet Documentation  Taken 12/5/2024 0942 by Frieda Foster RN  Bowel Elimination Promotion: adequate fluid intake promoted  Goal: Baseline Cognitive Function Maintained  Outcome: Progressing  Intervention: Maximize Cognitive Function  Recent Flowsheet Documentation  Taken 12/5/2024 0942 by Frieda Foster RN  Reorientation Measures:   reorientation provided   glasses use encouraged  Sleep/Rest Enhancement:   awakenings minimized   natural light exposure provided   noise level reduced   regular sleep/rest pattern promoted  Goal: Absence of Embolism  Outcome: Progressing  Intervention: Prevent or Manage Embolism Risk  Recent Flowsheet Documentation  Taken 12/5/2024 0942 by Frieda Foster RN  VTE Prevention/Management:  SCDs off (sequential compression devices)  Goal: Fracture Stability  Outcome: Progressing  Intervention: Promote Fracture Stability and Healing  Recent Flowsheet Documentation  Taken 12/5/2024 0942 by Frieda Foster RN  Equipment:   On:   immobilizer  Goal: Optimal Functional Performance  Outcome: Progressing  Intervention: Promote Optimal Functional Status  Recent Flowsheet Documentation  Taken 12/5/2024 0942 by Frieda Foster RN  Activity Management:   dorsiflexion/plantar flexion performed   up in chair  Goal: Pain Control and Function  Outcome: Progressing  Goal: Effective Urinary Elimination  Outcome: Progressing  Intervention: Support Effective Urinary Elimination  Recent Flowsheet Documentation  Taken 12/5/2024 0942 by Frieda Foster RN  Urinary Elimination Promotion: absorbent pad/diaper use encouraged     Problem: Hip Fracture Surgical Repair  Goal: Optimal Coping with Change in Health Status  Outcome: Progressing  Goal: Absence of Bleeding  Outcome: Progressing  Intervention: Monitor and Manage Bleeding  Recent Flowsheet Documentation  Taken 12/5/2024 0942 by Frieda Foster RN  Bleeding Management: dressing monitored  Goal: Effective Bowel Elimination  Outcome: Progressing  Intervention: Enhance Bowel Motility and Elimination  Recent Flowsheet Documentation  Taken 12/5/2024 0942 by Frieda Foster RN  Bowel Motility Enhancement:   fluid intake encouraged   oral intake encouraged  Bowel Elimination Promotion: adequate fluid intake promoted  Goal: Cognitive Function Maintained  Outcome: Progressing  Intervention: Maintain Cognitive Function  Recent Flowsheet Documentation  Taken 12/5/2024 0942 by Frieda Foster RN  Reorientation Measures:   reorientation provided   glasses use encouraged  Sleep/Rest Enhancement:   awakenings minimized   natural light exposure provided   noise level reduced   regular sleep/rest pattern promoted  Goal: Fluid and Electrolyte Balance  Outcome: Progressing  Intervention:  Monitor and Manage Fluid and Electrolyte Balance  Recent Flowsheet Documentation  Taken 12/5/2024 0942 by Frieda Foster RN  Fluid/Electrolyte Management: fluids provided  Goal: Absence of Infection Signs and Symptoms  Outcome: Progressing  Goal: Optimal Functional Ability  Outcome: Progressing  Intervention: Protect Joint Integrity  Recent Flowsheet Documentation  Taken 12/5/2024 0942 by Frieda Foster RN  Equipment:   On:   immobilizer  Intervention: Promote Optimal Functional Status  Recent Flowsheet Documentation  Taken 12/5/2024 0942 by Frieda Foster RN  Activity Management:   dorsiflexion/plantar flexion performed   up in chair  Goal: Anesthesia/Sedation Recovery  Outcome: Progressing  Intervention: Optimize Anesthesia Recovery  Recent Flowsheet Documentation  Taken 12/5/2024 0942 by Frieda Foster RN  Safety Promotion/Fall Prevention:   activity supervised   assistive device/personal items within reach   clutter free environment maintained   lighting adjusted   nonskid shoes/slippers when out of bed   patient and family education   room organization consistent   safety round/check completed   supervised activity   treat reversible contributory factors   treat underlying cause  Administration (IS): unable to perform  Reorientation Measures:   reorientation provided   glasses use encouraged  Goal: Optimal Pain Control and Function  Outcome: Progressing  Goal: Nausea and Vomiting Relief  Outcome: Progressing  Goal: Effective Urinary Elimination  Outcome: Progressing  Intervention: Monitor and Manage Urinary Retention  Recent Flowsheet Documentation  Taken 12/5/2024 0942 by Frieda Foster RN  Urinary Elimination Promotion: absorbent pad/diaper use encouraged  Goal: Effective Oxygenation and Ventilation  Outcome: Progressing     Problem: Stroke, Ischemic (Includes Transient Ischemic Attack)  Goal: Optimal Coping  Outcome: Progressing  Goal: Effective Bowel Elimination  Outcome: Progressing  Goal: Optimal  Cerebral Tissue Perfusion  Outcome: Progressing  Intervention: Protect and Optimize Cerebral Perfusion  Recent Flowsheet Documentation  Taken 12/5/2024 0942 by Frieda Foster RN  Sensory Stimulation Regulation:   care clustered   lighting decreased   quiet environment promoted  Fluid/Electrolyte Management: fluids provided  Goal: Optimal Cognitive Function  Outcome: Progressing  Intervention: Optimize Cognitive Function  Recent Flowsheet Documentation  Taken 12/5/2024 0942 by Frieda Foster RN  Sensory Stimulation Regulation:   care clustered   lighting decreased   quiet environment promoted  Reorientation Measures:   reorientation provided   glasses use encouraged  Goal: Improved Communication Skills  Outcome: Progressing  Intervention: Optimize Communication Skills  Recent Flowsheet Documentation  Taken 12/5/2024 0942 by Frieda Foster RN  Communication Enhancement Strategies: call light answered in person  Goal: Optimal Functional Ability  Outcome: Progressing  Intervention: Optimize Functional Ability  Recent Flowsheet Documentation  Taken 12/5/2024 0942 by Frieda Foster RN  Activity Management:   dorsiflexion/plantar flexion performed   up in chair  Goal: Optimal Nutrition Intake  Outcome: Progressing  Goal: Effective Oxygenation and Ventilation  Outcome: Progressing  Goal: Improved Sensorimotor Function  Outcome: Progressing  Intervention: Optimize Range of Motion, Motor Control and Function  Recent Flowsheet Documentation  Taken 12/5/2024 0942 by Frieda Foster RN  Positioning/Transfer Devices:   pillows   applied   in use  Intervention: Optimize Sensory and Perceptual Ability  Recent Flowsheet Documentation  Taken 12/5/2024 0942 by Frieda Foster RN  Pressure Reduction Techniques:   frequent weight shift encouraged   heels elevated off bed   weight shift assistance provided  Pressure Reduction Devices: heel offloading device utilized  Goal: Safe and Effective Swallow  Outcome:  Progressing  Intervention: Promote and Optimize Fluid and Food Intake  Recent Flowsheet Documentation  Taken 12/5/2024 0942 by Frieda Foster RN  Aspiration Precautions:   awake/alert before oral intake   food consistency adjusted   food texture adjusted   liquids thickened   respiratory status monitored   upright posture maintained  Feeding/Eating Techniques: feeding assistance provided  Goal: Effective Urinary Elimination  Outcome: Progressing  Intervention: Promote Effective Bladder Elimination  Recent Flowsheet Documentation  Taken 12/5/2024 0942 by Frieda Foster RN  Urinary Elimination Promotion: absorbent pad/diaper use encouraged

## 2024-12-05 NOTE — PLAN OF CARE
"Goal Outcome Evaluation:    Alert. Aphasia present. Difficulty answering questions. Unable to follow commands, difficult to do assessments.  A of 2, lift. No IV access. Purewick in place. Dressing CDI.          Problem: Adult Inpatient Plan of Care  Goal: Plan of Care Review  Description: The Plan of Care Review/Shift note should be completed every shift.  The Outcome Evaluation is a brief statement about your assessment that the patient is improving, declining, or no change.  This information will be displayed automatically on your shift  note.  Outcome: Progressing  Goal: Patient-Specific Goal (Individualized)  Description: You can add care plan individualizations to a care plan. Examples of Individualization might be:  \"Parent requests to be called daily at 9am for status\", \"I have a hard time hearing out of my right ear\", or \"Do not touch me to wake me up as it startles  me\".  Outcome: Progressing  Goal: Absence of Hospital-Acquired Illness or Injury  Outcome: Progressing  Intervention: Identify and Manage Fall Risk  Recent Flowsheet Documentation  Taken 12/4/2024 2015 by Nisa Mayberry RN  Safety Promotion/Fall Prevention:   activity supervised   safety round/check completed   room organization consistent   clutter free environment maintained   increased rounding and observation  Intervention: Prevent and Manage VTE (Venous Thromboembolism) Risk  Recent Flowsheet Documentation  Taken 12/4/2024 2015 by Nisa Mayberry RN  VTE Prevention/Management: SCDs off (sequential compression devices)  Intervention: Prevent Infection  Recent Flowsheet Documentation  Taken 12/4/2024 2015 by Nisa Mayberry RN  Infection Prevention: rest/sleep promoted  Goal: Optimal Comfort and Wellbeing  Outcome: Progressing  Goal: Readiness for Transition of Care  Outcome: Progressing     Problem: Hip Fracture Medical Management  Goal: Optimal Coping with Change in Health Status  Outcome: Progressing  Goal: Absence of " Bleeding  Outcome: Progressing  Goal: Effective Bowel Elimination  Outcome: Progressing  Goal: Baseline Cognitive Function Maintained  Outcome: Progressing  Goal: Absence of Embolism  Outcome: Progressing  Intervention: Prevent or Manage Embolism Risk  Recent Flowsheet Documentation  Taken 12/4/2024 2015 by Nisa Mayberry RN  VTE Prevention/Management: SCDs off (sequential compression devices)  Goal: Fracture Stability  Outcome: Progressing  Goal: Optimal Functional Performance  Outcome: Progressing  Goal: Pain Control and Function  Outcome: Progressing  Goal: Effective Urinary Elimination  Outcome: Progressing     Problem: Hip Fracture Surgical Repair  Goal: Optimal Coping with Change in Health Status  Outcome: Progressing  Goal: Absence of Bleeding  Outcome: Progressing  Goal: Effective Bowel Elimination  Outcome: Progressing  Goal: Cognitive Function Maintained  Outcome: Progressing  Goal: Fluid and Electrolyte Balance  Outcome: Progressing  Goal: Absence of Infection Signs and Symptoms  Outcome: Progressing  Goal: Optimal Functional Ability  Outcome: Progressing  Goal: Anesthesia/Sedation Recovery  Outcome: Progressing  Intervention: Optimize Anesthesia Recovery  Recent Flowsheet Documentation  Taken 12/4/2024 2015 by Nisa Mayberry RN  Safety Promotion/Fall Prevention:   activity supervised   safety round/check completed   room organization consistent   clutter free environment maintained   increased rounding and observation  Goal: Optimal Pain Control and Function  Outcome: Progressing  Goal: Nausea and Vomiting Relief  Outcome: Progressing  Goal: Effective Urinary Elimination  Outcome: Progressing  Goal: Effective Oxygenation and Ventilation  Outcome: Progressing     Problem: Stroke, Ischemic (Includes Transient Ischemic Attack)  Goal: Optimal Coping  Outcome: Progressing  Goal: Effective Bowel Elimination  Outcome: Progressing  Goal: Optimal Cerebral Tissue Perfusion  Outcome: Progressing  Goal:  Optimal Cognitive Function  Outcome: Progressing  Goal: Improved Communication Skills  Outcome: Progressing  Goal: Optimal Functional Ability  Outcome: Progressing  Goal: Optimal Nutrition Intake  Outcome: Progressing  Goal: Effective Oxygenation and Ventilation  Outcome: Progressing  Goal: Improved Sensorimotor Function  Outcome: Progressing  Goal: Safe and Effective Swallow  Outcome: Progressing  Goal: Effective Urinary Elimination  Outcome: Progressing

## 2024-12-05 NOTE — CONSULTS
"CLINICAL NUTRITION SERVICES  -  ASSESSMENT NOTE      Recommendations:   - Diet per SLP.  - Added [employer] offerings with lunch and dinner meals.     MALNUTRITION:  % Weight Loss:  None noted  % Intake:  <75% for > 7 days (moderate malnutrition)  Subcutaneous Fat Loss: None observed but very brief NFPE today  Muscle Loss: Not clear what is baseline with aging vs result of CVAs vs any acute changes  Fluid Retention: Mild ankle/feet --> weak indicator at this time    Malnutrition Diagnosis: Unable to determine due to insufficient information          REASON FOR ASSESSMENT  Candida Rose is a 84 year old female seen by Registered Dietitian for length of stay.      PMH of: CVA w/ L-sided hemiparesis, CKD, dementia.    Admit 2/2: Mechanical fall with L femur fracture requiring ORIF on 11/29, hyponatremia, also found to have new strokes acutely.    NUTRITION HISTORY  - Information obtained from chart review at this time.  No family in room and documented to have cognitive deficits, aphasia, alert to self, does not follow commands.  - Documented to reside with  in home setting PTA per Care Coordinator note.  - Palliative care team is following w/ \"restorative with limits\" documented and the following was documented 12/04: \"Family reports seeing some clinical improve and are hopeful. They are realistic and expressed understanding Candida will never return to her prior baseline function. Family are hopeful for now and plan is for Candida to discharge to TCU. If Candida does not progress and faces further setbacks; family expressed interest to explore hospice care. Discussed discharge plan with unit SW. Maggie DIXON assistance for discharge planning.\"  - Allergies: NKFA.      CURRENT NUTRITION ORDERS  Diet Order:     IDDSI 5/mildly thick    Current Intake/Tolerance:  Noted Candida was NPO during beginning of admit pre-procedure and post-procedure remained NPO d/t swallowing difficulty.  She had a short period of NGT for medication " "access but was never used for short term feeding.  SLP was consulted and diet advanced to IDDSI 5/thins on 12/02, liquid portion of diet downgraded on 12/04.  Documented to be a total feed with 100% intakes since diet advanced and 2 instances of 25% intakes.        ANTHROPOMETRICS  Height: 5' 8\"  Weight: 210 lbs 8.63 oz  Body mass index is 32.01 kg/m .  Weight Status:  Obesity Grade I BMI 30-34.9  Weight History:  Wt Readings from Last 10 Encounters:   12/03/24 95.5 kg (210 lb 8.6 oz)   10/03/24 88.5 kg (195 lb)   09/13/24 88.5 kg (195 lb 1.6 oz)   08/06/24 92.8 kg (204 lb 8 oz)   07/03/24 90.2 kg (198 lb 12.8 oz)   06/03/24 95.8 kg (211 lb 4.8 oz)   04/25/24 96.8 kg (213 lb 6.4 oz)   02/22/24 94.3 kg (208 lb)   02/16/24 94.4 kg (208 lb 3.2 oz)   12/01/23 90.5 kg (199 lb 8 oz)     - Trace to mild ankle/foot edema documented.   - Most recent wt via bed scale.   - Appears to be slightly above vs consistent with recent wt trends available over the months up to ~1 year.  May also have degree of scale differences.    LABS: Reviewed.    MEDICATIONS: Reviewed.    GI: Stooling patterns noted w/ recorded BM 12/03.    SKIN: No current documentation of PI.       ASSESSED NUTRITION NEEDS PER APPROVED PRACTICE GUIDELINES:    Dosing Weight 96 kg - dry wt?  Estimated Energy Needs: 20-25 Kcal/Kg  Justification: maintenance  Estimated Protein Needs: 1-1.2 g pro/Kg  Justification: preservation of lean body mass  Estimated Fluid Needs: per MD      NUTRITION DIAGNOSIS:  Inadequate oral intake related to previous diet restrictions with dysphagia, possible low appetite at times as evidenced by meeting <75% of nutrition needs on average acutely.     NUTRITION INTERVENTIONS  Recommendations / Nutrition Prescription  See above.    Implementation  Nutrition education: Not appropriate at this time due to patient condition.    Medical Food Supplement: As above.     Nutrition goals:  PO intakes of at least 50-75% of meals or supplements TID, if " "restorative path.    MONITORING AND EVALUATION:  Progress towards goals will be monitored and evaluated per protocol and Practice Guidelines          Deepali Nair RDN, , LD  Clinical Dietitian  Rod Message Group: \"Dietitian [Chuck]\"  Office: [phone]  Pagers:  3rd floor/ICU: [phone]  All other floors: [phone]  Weekend/holiday: [phone]    "

## 2024-12-06 ENCOUNTER — APPOINTMENT (OUTPATIENT)
Dept: OCCUPATIONAL THERAPY | Facility: CLINIC | Age: 84
DRG: 480 | End: 2024-12-06
Payer: MEDICARE

## 2024-12-06 ENCOUNTER — APPOINTMENT (OUTPATIENT)
Dept: SPEECH THERAPY | Facility: CLINIC | Age: 84
DRG: 480 | End: 2024-12-06
Payer: MEDICARE

## 2024-12-06 LAB
BACTERIA BLD CULT: NO GROWTH
HGB BLD-MCNC: 10.4 G/DL (ref 11.7–15.7)

## 2024-12-06 PROCEDURE — 250N000013 HC RX MED GY IP 250 OP 250 PS 637: Performed by: INTERNAL MEDICINE

## 2024-12-06 PROCEDURE — 92522 EVALUATE SPEECH PRODUCTION: CPT | Mod: GN

## 2024-12-06 PROCEDURE — 92526 ORAL FUNCTION THERAPY: CPT | Mod: GN

## 2024-12-06 PROCEDURE — 120N000001 HC R&B MED SURG/OB

## 2024-12-06 PROCEDURE — 36415 COLL VENOUS BLD VENIPUNCTURE: CPT | Performed by: INTERNAL MEDICINE

## 2024-12-06 PROCEDURE — 99232 SBSQ HOSP IP/OBS MODERATE 35: CPT | Performed by: INTERNAL MEDICINE

## 2024-12-06 PROCEDURE — 97535 SELF CARE MNGMENT TRAINING: CPT | Mod: GO | Performed by: OCCUPATIONAL THERAPIST

## 2024-12-06 PROCEDURE — 85018 HEMOGLOBIN: CPT | Performed by: INTERNAL MEDICINE

## 2024-12-06 RX ADMIN — LOSARTAN POTASSIUM 50 MG: 50 TABLET, FILM COATED ORAL at 08:04

## 2024-12-06 RX ADMIN — ATENOLOL 25 MG: 25 TABLET ORAL at 08:04

## 2024-12-06 RX ADMIN — ROSUVASTATIN 10 MG: 10 TABLET, FILM COATED ORAL at 21:27

## 2024-12-06 RX ADMIN — SENNOSIDES AND DOCUSATE SODIUM 1 TABLET: 8.6; 5 TABLET ORAL at 21:27

## 2024-12-06 RX ADMIN — APIXABAN 5 MG: 5 TABLET, FILM COATED ORAL at 21:27

## 2024-12-06 RX ADMIN — SERTRALINE HYDROCHLORIDE 50 MG: 50 TABLET ORAL at 21:27

## 2024-12-06 RX ADMIN — APIXABAN 5 MG: 5 TABLET, FILM COATED ORAL at 08:04

## 2024-12-06 ASSESSMENT — ACTIVITIES OF DAILY LIVING (ADL)
ADLS_ACUITY_SCORE: 77
ADLS_ACUITY_SCORE: 73
ADLS_ACUITY_SCORE: 77
ADLS_ACUITY_SCORE: 73
ADLS_ACUITY_SCORE: 77
ADLS_ACUITY_SCORE: 77
ADLS_ACUITY_SCORE: 73
ADLS_ACUITY_SCORE: 77
ADLS_ACUITY_SCORE: 73
ADLS_ACUITY_SCORE: 77
ADLS_ACUITY_SCORE: 73
ADLS_ACUITY_SCORE: 77

## 2024-12-06 NOTE — PROGRESS NOTES
[facility]    Medicine Progress Note - Hospitalist Service    Date of Admission:  11/28/2024    Assessment & Plan     Candida Rose is a 84 year old female with a history of HTN, HLD, Moyamoya Disease, Anxiety, OA, Catarat, CVA with Left Hemiparesis, CKD, obesity who presented to the ED today with left leg pain after a mechanical fall at home.     She was found to have left femur fracture.  Ortho was consulted and the patient underwent surgical fix.      Unfortunately, post-op she became non-verbal and was found to have new right sided weakness.  Code stroke was called.  Initial CT showed multifocal prior ischemic stroke.  CTA head/heck showed bilateral ICA occlusion, bilateral PCA tiny caliber.  She was not given TPA due to recent surgery.   CT head the following morning shows new bilateral frontal stroke, basal ganglia CVA which explains her new symptoms.      There has been some improvement in neurologic exam.  On 12/2 she was able to intermittently answer some basic questions & she had increased movement of RUE.  SLP initiated modified diet.  Chance at recovery remains very guarded.  Palliative consulted. Plan at this time is topursue an attempt at rehab and if it is unsuccessful or too arduous, pt will move to a comfort care approach.        Acute bilateral frontal CVA & left basal ganglia cb aphasia, right sided weakness  Hx of CVA with Left Hemiparesis  Hx Moyamoya Disease  Vascular Dementia:  PMH of moyamoya s/p bilateral STA-MCA bypasses in 2006.  Hx of CVA in 2006, 2010 and 2022.  Uses a walker and cane for ambulation.  Lives in a single family home with her .  Has mild memory impairment at baseline.    Post-op she became non-verbal and was found to have new right sided weakness.  Code stroke was called.  Initial CT showed multifocal prior ischemic stroke.  CTA head/heck showed bilateral ICA occlusion, bilateral PCA tiny caliber.  She was not given TPA due to recent surgery.    Repeat CT head the following morning shows new CVA.  Brain MRI confirms bifrontal, left basal ganglia, anterior medial left temporal ischemic infarcts, edematous appearing cerebral parenchyma left frontal lobe with mild effacement lateral ventricle.  On initial exam she was awake but is non-verbal.  She does squeeze my hand on the left but was otherwise non-participatory in neurologic examination.  On 12/2 she now intermittently answers basic questions and is able to gently squeeze right hand.  Neurologic status does seem to continue to slowly improve.  -Stroke neurology recommending transition to DOAC: stop asa and start apixaban 5 mg BID for stroke prophylaxis (day 5 after stroke)   -Avoid hypotension or sudden drops in BP  -PT/OT/SLP  -SLP has cleared for diet.  -Continues dysphagia diet. Able to feed self.  -Palliative care consult appreciated.     Mechanical fall cb left distal femur fracture now s/p surgical fix (11/30):  Trauma imaging reveals an acute comminuted fracture of the distal femoral diaphysis with displaced fracture fragments.  S/p surgical repair.  Non weight bearing to LLE  -IVF, analgesics prn  -CMS checks     Acute blood loss anemia, post-operative:   Hgb 12.4 --> 10.0 post-op --> 6.8 following morning.  No evidence for external bleeding.  Surgical dressing with some dried blood but not oozing blood.    -S/p 2 unit(s) pRBC with goal >8 in the setting of acute CVA  -Hgb stable following transfusion  -Decrease hemoglobin monitoring to intermittently.     Mild Hyponatremia  Mild Richie on CKD:  Cr baseline 0.9-1.1.  Now Cr 1.2 post-op.  Likely related to anemia and dehydration given operation.   -IVF  -Recheck metabolic panel intermittently.     Hx of paroxysmal Atrial Fibrillation  Hypertension.  Hyperlipidemia.  Afib diagnosed 10/2022 at the time of patient's CVA.  Followed up with Cardiology who reviewed the event monitor and did not see clear afib, but PAC/PVCs.  Per Cardiology, given the fact  "that the patient's with moyamoya disease have increased risk of intracerebral hemorrhage patient's anticoagulation was discontinued.      -Plan to start apixaban 5 mg twice a day on 12/6.  -Continue pta Rosuvastatin and Atenolol  -Increase Losartan to 50 mg daily.  -Monitor on telemetry     Depression  Anxiety  -Continue Sertraline     Obesity, BMI 30  -Complicates care.          Diet: Combination Diet Minced and Moist Diet (level 5); Mildly Thick (level 2) (Supervision, upright, alert)  Snacks/Supplements Adult: Magic Cup; With Meals    DVT Prophylaxis: Pneumatic Compression Devices  Abreu Catheter: Not present  Lines: None     Cardiac Monitoring: None  Code Status: No CPR- Do NOT Intubate      Clinically Significant Risk Factors         # Hyponatremia: Lowest Na = 132 mmol/L in last 2 days, will monitor as appropriate  # Hypochloremia: Lowest Cl = 97 mmol/L in last 2 days, will monitor as appropriate      # Hypoalbuminemia: Lowest albumin = 2.8 g/dL at 11/30/2024  6:48 AM, will monitor as appropriate     # Hypertension: Noted on problem list            # Obesity: Estimated body mass index is 32.01 kg/m  as calculated from the following:    Height as of this encounter: 1.727 m (5' 8\").    Weight as of this encounter: 95.5 kg (210 lb 8.6 oz).             Social Drivers of Health    Social Connections: Unknown (5/30/2024)    Social Connection and Isolation Panel [NHANES]     Frequency of Social Gatherings with Friends and Family: More than three times a week          Disposition Plan     Medically Ready for Discharge: Anticipated Tomorrow             Brian Willis MD  Hospitalist Service  [facility]  Securely message with Rod (more info)  Text page via [facility] Paging/Directory   ______________________________________________________________________    Interval History   Continues stable per nursing notes.     Minimally interact with me today. I met with her  at the bedside. We " confirmed plans for pt to discharge to TCU with plan to make further decisions based on response to therapies.     Physical Exam   Vital Signs: Temp: 97  F (36.1  C) Temp src: Temporal BP: 137/48 Pulse: 64   Resp: 16 SpO2: 96 % O2 Device: None (Room air)    Weight: 210 lbs 8.63 oz    Gen:  NAD, awake, makes minimal effort at speaking with me today. Does not meaningfully follow commands.  Eyes:  PERRL, sclera anicteric.   OP:  MMM, no lesions.  Neck:  Supple.  CV: Mildly irregular, no murmurs.  Lung:  CTA b/l, normal effort.  Ab:  +BS, soft.  Skin:  Warm, dry to touch.  No rash.  Ext:  Mild non pitting edema LE b/l.  Neuro: left  strength intact.  No movement of the left leg.      Medical Decision Making       45 MINUTES SPENT BY ME on the date of service doing chart review, history, exam, documentation & further activities per the note.      Results for orders placed or performed during the hospital encounter of 11/28/24 (from the past 24 hours)   Hemoglobin   Result Value Ref Range    Hemoglobin 10.4 (L) 11.7 - 15.7 g/dL

## 2024-12-06 NOTE — PROGRESS NOTES
Care Management Discharge Note    Discharge Date: 12/09/2024       Discharge Disposition: Transitional Care    Discharge Services: Transportation Services    Discharge DME: None    Discharge Transportation: agency    Private pay costs discussed: transportation costs    Does the patient's insurance plan have a 3 day qualifying hospital stay waiver?  Yes     Which insurance plan 3 day waiver is available? [employer]    Will the waiver be used for post-acute placement? No    PAS Confirmation Code: [id number]    Patient/family educated on Medicare website which has current facility and service quality ratings: yes    Education Provided on the Discharge Plan: Yes    Persons Notified of Discharge Plans: patient's spouse     Patient/Family in Agreement with the Plan: yes    Handoff Referral Completed: No, handoff not indicated or clinically appropriate    Additional Information:  [facility].   DESTINY spoke with pt's spouse, Olu who is agreeable to the discharge plan.   DESTINY arranged for stretcher transport to  between [id number].   DESTINY addressed all of Olu's questions and concerns at this time related to the discharge plan.   [id number]    ARTUR Ray  Inpatient Care Coordination   [facility]  [phone]

## 2024-12-06 NOTE — PLAN OF CARE
"Goal Outcome Evaluation:      Plan of Care Reviewed With: patient, spouse    Overall Patient Progress: no changeOverall Patient Progress: no change    Outcome Evaluation: VSS on RA, no S&S of pain, Ax2 lift, minced & moist diet w/ thickened liquids, stool incontinence x2 loose BMs, scant amount of drainge on aquacell, family at bedside attentive to pt, discharge plans TBD    Problem: Adult Inpatient Plan of Care  Goal: Plan of Care Review  Description: The Plan of Care Review/Shift note should be completed every shift.  The Outcome Evaluation is a brief statement about your assessment that the patient is improving, declining, or no change.  This information will be displayed automatically on your shift  note.  Outcome: Progressing  Flowsheets (Taken 12/5/2024 2990)  Outcome Evaluation: VSS on RA, no S&S of pain, Ax2 lift, minced & moist diet w/ thickened liquids, stool incontinence x2 loose BMs, scant amount of drainge on aquacell, family at bedside attentive to pt, discharge plans TBD  Plan of Care Reviewed With:   patient   spouse  Overall Patient Progress: no change  Goal: Patient-Specific Goal (Individualized)  Description: You can add care plan individualizations to a care plan. Examples of Individualization might be:  \"Parent requests to be called daily at 9am for status\", \"I have a hard time hearing out of my right ear\", or \"Do not touch me to wake me up as it startles  me\".  Outcome: Progressing  Goal: Absence of Hospital-Acquired Illness or Injury  Outcome: Progressing  Intervention: Identify and Manage Fall Risk  Recent Flowsheet Documentation  Taken 12/5/2024 1542 by Lisa Rust RN  Safety Promotion/Fall Prevention: safety round/check completed  Intervention: Prevent Skin Injury  Recent Flowsheet Documentation  Taken 12/5/2024 2024 by Lisa Rust, RN  Body Position:   turned   right   heels elevated   supine, head elevated  Taken 12/5/2024 1542 by Lisa Rust RN  Body Position: " supine, head elevated  Intervention: Prevent and Manage VTE (Venous Thromboembolism) Risk  Recent Flowsheet Documentation  Taken 12/5/2024 1542 by Lisa Rust RN  VTE Prevention/Management: SCDs off (sequential compression devices)  Intervention: Prevent Infection  Recent Flowsheet Documentation  Taken 12/5/2024 1542 by Lisa Rust RN  Infection Prevention: hand hygiene promoted  Goal: Optimal Comfort and Wellbeing  Outcome: Progressing  Goal: Readiness for Transition of Care  Outcome: Progressing     Problem: Hip Fracture Medical Management  Goal: Optimal Coping with Change in Health Status  Outcome: Progressing  Goal: Absence of Bleeding  Outcome: Progressing  Intervention: Monitor and Manage Bleeding  Recent Flowsheet Documentation  Taken 12/5/2024 1542 by Lisa Rust RN  Bleeding Management: dressing monitored  Goal: Effective Bowel Elimination  Outcome: Progressing  Goal: Baseline Cognitive Function Maintained  Outcome: Progressing  Goal: Absence of Embolism  Outcome: Progressing  Intervention: Prevent or Manage Embolism Risk  Recent Flowsheet Documentation  Taken 12/5/2024 1542 by Lisa Rust RN  VTE Prevention/Management: SCDs off (sequential compression devices)  Goal: Fracture Stability  Outcome: Progressing  Goal: Optimal Functional Performance  Outcome: Progressing  Goal: Pain Control and Function  Outcome: Progressing  Goal: Effective Urinary Elimination  Outcome: Progressing     Problem: Hip Fracture Surgical Repair  Goal: Optimal Coping with Change in Health Status  Outcome: Progressing  Goal: Absence of Bleeding  Outcome: Progressing  Intervention: Monitor and Manage Bleeding  Recent Flowsheet Documentation  Taken 12/5/2024 1542 by Lisa Rust RN  Bleeding Management: dressing monitored  Goal: Effective Bowel Elimination  Outcome: Progressing  Goal: Cognitive Function Maintained  Outcome: Progressing  Goal: Fluid and Electrolyte Balance  Outcome:  Progressing  Intervention: Monitor and Manage Fluid and Electrolyte Balance  Recent Flowsheet Documentation  Taken 12/5/2024 1542 by Lisa Rust RN  Fluid/Electrolyte Management: fluids provided  Goal: Absence of Infection Signs and Symptoms  Outcome: Progressing  Intervention: Prevent or Manage Infection  Recent Flowsheet Documentation  Taken 12/5/2024 1542 by Lisa Rust RN  Infection Management: aseptic technique maintained  Goal: Optimal Functional Ability  Outcome: Progressing  Goal: Anesthesia/Sedation Recovery  Outcome: Progressing  Intervention: Optimize Anesthesia Recovery  Recent Flowsheet Documentation  Taken 12/5/2024 1542 by Lisa Rust RN  Safety Promotion/Fall Prevention: safety round/check completed  Goal: Optimal Pain Control and Function  Outcome: Progressing  Goal: Nausea and Vomiting Relief  Outcome: Progressing  Goal: Effective Urinary Elimination  Outcome: Progressing  Goal: Effective Oxygenation and Ventilation  Outcome: Progressing  Intervention: Optimize Oxygenation and Ventilation  Recent Flowsheet Documentation  Taken 12/5/2024 2024 by Lisa Rust RN  Head of Bed (HOB) Positioning: HOB at 20-30 degrees  Taken 12/5/2024 1542 by Lisa Rust RN  Head of Bed (HOB) Positioning: HOB at 20-30 degrees     Problem: Stroke, Ischemic (Includes Transient Ischemic Attack)  Goal: Optimal Coping  Outcome: Progressing  Goal: Effective Bowel Elimination  Outcome: Progressing  Goal: Optimal Cerebral Tissue Perfusion  Outcome: Progressing  Intervention: Protect and Optimize Cerebral Perfusion  Recent Flowsheet Documentation  Taken 12/5/2024 1542 by Lisa Rust RN  Cerebral Perfusion Promotion: blood pressure monitored  Fluid/Electrolyte Management: fluids provided  Goal: Optimal Cognitive Function  Outcome: Progressing  Goal: Improved Communication Skills  Outcome: Progressing  Goal: Optimal Functional Ability  Outcome: Progressing  Goal: Optimal Nutrition  Intake  Outcome: Progressing  Goal: Effective Oxygenation and Ventilation  Outcome: Progressing  Intervention: Optimize Oxygenation and Ventilation  Recent Flowsheet Documentation  Taken 12/5/2024 2024 by Lisa Rust RN  Head of Bed (HOB) Positioning: HOB at 20-30 degrees  Taken 12/5/2024 1542 by Lisa Rust RN  Head of Bed ([facility]) Positioning: HOB at 20-30 degrees  Goal: Improved Sensorimotor Function  Outcome: Progressing  Intervention: Optimize Range of Motion, Motor Control and Function  Recent Flowsheet Documentation  Taken 12/5/2024 2024 by Lisa Rust RN  Positioning/Transfer Devices:   pillows   in use  Taken 12/5/2024 1542 by Lisa Rust RN  Positioning/Transfer Devices:   pillows   in use  Intervention: Optimize Sensory and Perceptual Ability  Recent Flowsheet Documentation  Taken 12/5/2024 1542 by Lisa Rust RN  Pressure Reduction Techniques: weight shift assistance provided  Pressure Reduction Devices: heel offloading device utilized  Goal: Safe and Effective Swallow  Outcome: Progressing  Intervention: Promote and Optimize Fluid and Food Intake  Recent Flowsheet Documentation  Taken 12/5/2024 1542 by Lisa Rust RN  Aspiration Precautions:   awake/alert before oral intake   upright posture maintained  Feeding/Eating Techniques:   close supervision provided   rest periods provided  Goal: Effective Urinary Elimination  Outcome: Progressing

## 2024-12-06 NOTE — PLAN OF CARE
"Patient vital signs are at baseline: Yes  Patient able to ambulate as they were prior to admission or with assist devices provided by therapies during their stay:  No,  Reason:  lift   Patient MUST void prior to discharge:  Yes, via purewick   Patient able to tolerate oral intake:  Yes, total feed   Pain has adequate pain control using Oral analgesics:  Yes  Does patient have an identified :  No,  Reason:  TCU  Has goal D/C date and time been discussed with patient:  Yes, TCU tomorrow     Pt unable to verbalize needs, will occasionally follow commands when asked to open eye. Unable to perform plantar and dorsiflexions. Repositioned every two hours. Dressing CDI.  visited and care plan reviewed with him.       Problem: Adult Inpatient Plan of Care  Goal: Plan of Care Review  Description: The Plan of Care Review/Shift note should be completed every shift.  The Outcome Evaluation is a brief statement about your assessment that the patient is improving, declining, or no change.  This information will be displayed automatically on your shift  note.  Outcome: Not Progressing  Flowsheets (Taken 12/6/2024 7586)  Plan of Care Reviewed With: spouse  Overall Patient Progress: no change  Goal: Patient-Specific Goal (Individualized)  Description: You can add care plan individualizations to a care plan. Examples of Individualization might be:  \"Parent requests to be called daily at 9am for status\", \"I have a hard time hearing out of my right ear\", or \"Do not touch me to wake me up as it startles  me\".  Outcome: Not Progressing  Goal: Absence of Hospital-Acquired Illness or Injury  Outcome: Not Progressing  Intervention: Identify and Manage Fall Risk  Recent Flowsheet Documentation  Taken 12/6/2024 1798 by Karen Ledbetter RN  Safety Promotion/Fall Prevention:   activity supervised   patient and family education   room near nurse's station   room door open   room organization consistent   safety round/check completed   " supervised activity  Intervention: Prevent Skin Injury  Recent Flowsheet Documentation  Taken 12/6/2024 0758 by Karen Ledbetter RN  Body Position: supine, head elevated  Skin Protection:   adhesive use limited   incontinence pads utilized  Intervention: Prevent and Manage VTE (Venous Thromboembolism) Risk  Recent Flowsheet Documentation  Taken 12/6/2024 0758 by Karen Ledbetter RN  VTE Prevention/Management: SCDs off (sequential compression devices)  Intervention: Prevent Infection  Recent Flowsheet Documentation  Taken 12/6/2024 0758 by Karen Ledbetter RN  Infection Prevention:   cohorting utilized   single patient room provided  Goal: Optimal Comfort and Wellbeing  Outcome: Not Progressing  Goal: Readiness for Transition of Care  Outcome: Not Progressing     Problem: Hip Fracture Medical Management  Goal: Optimal Coping with Change in Health Status  Outcome: Not Progressing  Goal: Absence of Bleeding  Outcome: Not Progressing  Goal: Effective Bowel Elimination  Outcome: Not Progressing  Goal: Baseline Cognitive Function Maintained  Outcome: Not Progressing  Goal: Absence of Embolism  Outcome: Not Progressing  Intervention: Prevent or Manage Embolism Risk  Recent Flowsheet Documentation  Taken 12/6/2024 0758 by Karen Ledbetter RN  VTE Prevention/Management: SCDs off (sequential compression devices)  Goal: Fracture Stability  Outcome: Not Progressing  Goal: Optimal Functional Performance  Outcome: Not Progressing  Intervention: Promote Optimal Functional Status  Recent Flowsheet Documentation  Taken 12/6/2024 0758 by Karen Ledbetter RN  Range of Motion: active ROM (range of motion) encouraged  Activity Management: activity adjusted per tolerance  Goal: Pain Control and Function  Outcome: Not Progressing  Goal: Effective Urinary Elimination  Outcome: Not Progressing     Problem: Hip Fracture Surgical Repair  Goal: Optimal Coping with Change in Health Status  Outcome: Not Progressing  Goal: Absence of Bleeding  Outcome: Not  Progressing  Goal: Effective Bowel Elimination  Outcome: Not Progressing  Goal: Cognitive Function Maintained  Outcome: Not Progressing  Goal: Fluid and Electrolyte Balance  Outcome: Not Progressing  Intervention: Monitor and Manage Fluid and Electrolyte Balance  Recent Flowsheet Documentation  Taken 12/6/2024 0758 by Karen Ledbetter RN  Fluid/Electrolyte Management: fluids provided  Goal: Absence of Infection Signs and Symptoms  Outcome: Not Progressing  Goal: Optimal Functional Ability  Outcome: Not Progressing  Intervention: Promote Optimal Functional Status  Recent Flowsheet Documentation  Taken 12/6/2024 0758 by Karen Ledbetter RN  Activity Management: activity adjusted per tolerance  Goal: Anesthesia/Sedation Recovery  Outcome: Not Progressing  Intervention: Optimize Anesthesia Recovery  Recent Flowsheet Documentation  Taken 12/6/2024 0758 by Karen Ledbetter RN  Safety Promotion/Fall Prevention:   activity supervised   patient and family education   room near nurse's station   room door open   room organization consistent   safety round/check completed   supervised activity  Goal: Optimal Pain Control and Function  Outcome: Not Progressing  Goal: Nausea and Vomiting Relief  Outcome: Not Progressing  Goal: Effective Urinary Elimination  Outcome: Not Progressing  Goal: Effective Oxygenation and Ventilation  Outcome: Not Progressing  Intervention: Optimize Oxygenation and Ventilation  Recent Flowsheet Documentation  Taken 12/6/2024 0758 by Karen Ledbetter RN  Head of Bed (HOB) Positioning: HOB at 20-30 degrees     Problem: Stroke, Ischemic (Includes Transient Ischemic Attack)  Goal: Optimal Coping  Outcome: Not Progressing  Goal: Effective Bowel Elimination  Outcome: Not Progressing  Goal: Optimal Cerebral Tissue Perfusion  Outcome: Not Progressing  Intervention: Protect and Optimize Cerebral Perfusion  Recent Flowsheet Documentation  Taken 12/6/2024 0758 by Karen Ledbetter RN  Fluid/Electrolyte Management: fluids  provided  Goal: Optimal Cognitive Function  Outcome: Not Progressing  Goal: Improved Communication Skills  Outcome: Not Progressing  Goal: Optimal Functional Ability  Outcome: Not Progressing  Intervention: Optimize Functional Ability  Recent Flowsheet Documentation  Taken 12/6/2024 0758 by Karen Ledbetter RN  Activity Management: activity adjusted per tolerance  Goal: Optimal Nutrition Intake  Outcome: Not Progressing  Goal: Effective Oxygenation and Ventilation  Outcome: Not Progressing  Intervention: Optimize Oxygenation and Ventilation  Recent Flowsheet Documentation  Taken 12/6/2024 0758 by Karen Ledbetter RN  Head of Bed (HOB) Positioning: HOB at 20-30 degrees  Goal: Improved Sensorimotor Function  Outcome: Not Progressing  Intervention: Optimize Range of Motion, Motor Control and Function  Recent Flowsheet Documentation  Taken 12/6/2024 0758 by Karen Ledbetter RN  Range of Motion: active ROM (range of motion) encouraged  Positioning/Transfer Devices:   pillows   in use  Intervention: Optimize Sensory and Perceptual Ability  Recent Flowsheet Documentation  Taken 12/6/2024 0758 by Karen Ledbetter RN  Pressure Reduction Techniques: weight shift assistance provided  Pressure Reduction Devices: heel offloading device utilized  Goal: Safe and Effective Swallow  Outcome: Not Progressing  Intervention: Promote and Optimize Fluid and Food Intake  Recent Flowsheet Documentation  Taken 12/6/2024 0758 by Karen Ledbetter RN  Feeding/Eating Techniques:   close supervision provided   rest periods provided  Goal: Effective Urinary Elimination  Outcome: Not Progressing   Goal Outcome Evaluation:      Plan of Care Reviewed With: spouse    Overall Patient Progress: no changeOverall Patient Progress: no change

## 2024-12-06 NOTE — PLAN OF CARE
"Goal Outcome Evaluation:    Plan of Care Reviewed With: patient    Overall Patient Progress: no change    Outcome Evaluation: Up with A2 via lift. Flat affect. Intermittently responds with yes or no. Diet tolerated. Eliquis restarted. Continue with goals of care. Discharge plan pending.    Problem: Adult Inpatient Plan of Care  Goal: Plan of Care Review  Description: The Plan of Care Review/Shift note should be completed every shift.  The Outcome Evaluation is a brief statement about your assessment that the patient is improving, declining, or no change.  This information will be displayed automatically on your shift  note.  Outcome: Progressing  Flowsheets (Taken 12/6/2024 4068)  Outcome Evaluation: Up with A2 via lift. Flat affect. Intermittently responds with yes or no. Diet tolerated. Eliquis restarted. Continue with goals of care. Discharge plan pending.  Plan of Care Reviewed With: patient  Overall Patient Progress: no change  Goal: Patient-Specific Goal (Individualized)  Description: You can add care plan individualizations to a care plan. Examples of Individualization might be:  \"Parent requests to be called daily at 9am for status\", \"I have a hard time hearing out of my right ear\", or \"Do not touch me to wake me up as it startles  me\".  Outcome: Progressing  Goal: Absence of Hospital-Acquired Illness or Injury  Outcome: Progressing  Goal: Optimal Comfort and Wellbeing  Outcome: Progressing  Goal: Readiness for Transition of Care  Outcome: Progressing     Problem: Hip Fracture Medical Management  Goal: Optimal Coping with Change in Health Status  Outcome: Progressing  Goal: Absence of Bleeding  Outcome: Progressing  Goal: Effective Bowel Elimination  Outcome: Progressing  Goal: Baseline Cognitive Function Maintained  Outcome: Progressing  Goal: Absence of Embolism  Outcome: Progressing  Goal: Fracture Stability  Outcome: Progressing  Goal: Optimal Functional Performance  Outcome: Progressing  Goal: Pain " Control and Function  Outcome: Progressing  Goal: Effective Urinary Elimination  Outcome: Progressing     Problem: Hip Fracture Surgical Repair  Goal: Optimal Coping with Change in Health Status  Outcome: Progressing  Goal: Absence of Bleeding  Outcome: Progressing  Goal: Effective Bowel Elimination  Outcome: Progressing  Goal: Cognitive Function Maintained  Outcome: Progressing  Goal: Fluid and Electrolyte Balance  Outcome: Progressing  Goal: Absence of Infection Signs and Symptoms  Outcome: Progressing  Goal: Optimal Functional Ability  Outcome: Progressing  Goal: Anesthesia/Sedation Recovery  Outcome: Progressing  Goal: Optimal Pain Control and Function  Outcome: Progressing  Goal: Nausea and Vomiting Relief  Outcome: Progressing  Goal: Effective Urinary Elimination  Outcome: Progressing  Goal: Effective Oxygenation and Ventilation  Outcome: Progressing     Problem: Stroke, Ischemic (Includes Transient Ischemic Attack)  Goal: Optimal Coping  Outcome: Progressing  Goal: Effective Bowel Elimination  Outcome: Progressing  Goal: Optimal Cerebral Tissue Perfusion  Outcome: Progressing  Goal: Optimal Cognitive Function  Outcome: Progressing  Goal: Improved Communication Skills  Outcome: Progressing  Goal: Optimal Functional Ability  Outcome: Progressing  Goal: Optimal Nutrition Intake  Outcome: Progressing  Goal: Effective Oxygenation and Ventilation  Outcome: Progressing  Goal: Improved Sensorimotor Function  Outcome: Progressing  Goal: Safe and Effective Swallow  Outcome: Progressing  Goal: Effective Urinary Elimination  Outcome: Progressing

## 2024-12-06 NOTE — PROGRESS NOTES
12/06/24 1500   Appointment Info   Signing Clinician's Name / Credentials (SLP) Tanna Pate M.A. CCC-SLP   General Information   Onset of Illness/Injury or Date of Surgery 11/28/24   Referring Physician Wily Palmer   Patient/Family Therapy Goal Statement (SLP) Pt unable to state a goal   Pertinent History of Current Problem Candida Rose is a 84 year old female with a history of HTN, HLD, [place] Disease, Anxiety, OA, Catarat, CVA with Left Hemiparesis, CKD, obesity who presented to the ED today with left leg pain after a mechanical fall at home.     She was found to have left femur fracture.  Ortho was consulted and the patient underwent surgical fix.      Unfortunately, post-op she became non-verbal and was found to have new right sided weakness.  Code stroke was called.  Initial CT showed multifocal prior ischemic stroke.  CTA head/heck showed bilateral ICA occlusion, bilateral PCA tiny caliber.  She was not given TPA due to recent surgery.   CT head the following morning shows new bilateral frontal stroke, basal ganglia CVA which explains her new symptoms.   General Observations more lethargic this afternoon   Type of Evaluation   Type of Evaluation Speech, Language, Cognition   Western Aphasia Battery- Revised Bedside Record From   Spontaneous Speech Content Score (out of 10) 1   Spontaneous Speech Fluency Score (out of 10) 2   Auditory Verbal Comprehension Score (out of 10) 4   Sequential Commands Score (out of 10) 1   Repetition Score (out of 10) 2   Object Naming Score (out of 10) 2   Bedside Aphasia Sum 12   WAB-R Bedside Aphasia Score 20   Aphasia Severity Level Very Severe Aphasia   General Therapy Interventions   Planned Therapy Interventions Dysphagia Treatment;Communication;Language   Dysphagia treatment Instruction of safe swallow strategies;Modified diet education   Language Verbal expression   Clinical Impression   Criteria for Skilled Therapeutic Interventions Met (SLP Eval) Yes,  treatment indicated   SLP Diagnosis oropharyngeal dysphagia;   Risks & Benefits of therapy have been explained evaluation/treatment results reviewed;care plan/treatment goals reviewed;current/potential barriers reviewed;participants voiced agreement with care plan;participants included;patient   Clinical Impression Comments WAB-R initiated. The patient was unfortunately more fatigued during this exam, but appears motivated to participate. Severe aphasia noted, with best results being with simple yes/no questions and repetition. Pt maintained eye contact and showed good participation despite fatigue.   SLP Total Evaluation Time   Eval: Sound production Minutes (artic, phonology, apraxia, dysarthria) (86429) 15   SLP Goals   Therapy Frequency (SLP Eval) daily   SLP Goals SLP Goal 1;SLP Goal 2   SLP: Goal 1 follow 1 step commands with 80% accuracy & min cue   SLP: Goal 2 complete simple verbal communication tasks with 80% accuracy and min cue   Swallowing Intervention   Treatment of Swallowing Dysfunction &/or Oral Function for Feeding Minutes (68128) 10   Treatment Detail/Skilled Intervention Jerzy cough present with single sip of thin by straw 2/2. No cough via cup but reduced lip seal noted.   SLP Discharge Planning   SLP Plan diet tolerance, readiness for upgrade, basic level expressive/receptive lang tx   SLP Discharge Recommendation [facility]-Motivated patient will benefit from intensive, interdisciplinary therapy.  Anticipate will be able to tolerate 3 hours of therapy per day   SLP Rationale for DC Rec Swallow function and communication are significantly below baseline & the patient appears motivated to participate in tx. Intensive SLP intervention would provide best prognosis for improvement.   SLP Brief overview of current status  Rec a minced and moist diet (IDDSI 5) with mildly thick consistency (2). Small, alternated bites/sips at a slow pace when upright/alert.   SLP Time and Intention   Total  Session Time (sum of timed and untimed services) 25

## 2024-12-07 VITALS
DIASTOLIC BLOOD PRESSURE: 54 MMHG | RESPIRATION RATE: 20 BRPM | WEIGHT: 210.54 LBS | BODY MASS INDEX: 31.91 KG/M2 | OXYGEN SATURATION: 99 % | HEIGHT: 68 IN | SYSTOLIC BLOOD PRESSURE: 136 MMHG | HEART RATE: 66 BPM | TEMPERATURE: 97 F

## 2024-12-07 PROBLEM — D62 ABLA (ACUTE BLOOD LOSS ANEMIA): Status: ACTIVE | Noted: 2024-12-07

## 2024-12-07 PROBLEM — I10 ESSENTIAL HYPERTENSION: Status: ACTIVE | Noted: 2024-12-07

## 2024-12-07 PROBLEM — F01.53 VASCULAR DEMENTIA WITH DEPRESSED MOOD (H): Status: ACTIVE | Noted: 2024-12-07

## 2024-12-07 PROBLEM — R47.01 APHASIA DUE TO ACUTE STROKE (H): Status: ACTIVE | Noted: 2024-12-07

## 2024-12-07 PROBLEM — I63.9 APHASIA DUE TO ACUTE STROKE (H): Status: ACTIVE | Noted: 2024-12-07

## 2024-12-07 PROBLEM — I63.9 ACUTE STROKE DUE TO ISCHEMIA (H): Status: ACTIVE | Noted: 2024-12-07

## 2024-12-07 PROCEDURE — 99238 HOSP IP/OBS DSCHRG MGMT 30/<: CPT | Performed by: INTERNAL MEDICINE

## 2024-12-07 PROCEDURE — 250N000013 HC RX MED GY IP 250 OP 250 PS 637: Performed by: INTERNAL MEDICINE

## 2024-12-07 RX ADMIN — ATENOLOL 25 MG: 25 TABLET ORAL at 07:34

## 2024-12-07 RX ADMIN — SENNOSIDES AND DOCUSATE SODIUM 1 TABLET: 8.6; 5 TABLET ORAL at 07:34

## 2024-12-07 RX ADMIN — APIXABAN 5 MG: 5 TABLET, FILM COATED ORAL at 07:34

## 2024-12-07 RX ADMIN — POLYETHYLENE GLYCOL 3350 17 G: 17 POWDER, FOR SOLUTION ORAL at 07:34

## 2024-12-07 RX ADMIN — LOSARTAN POTASSIUM 50 MG: 50 TABLET, FILM COATED ORAL at 07:34

## 2024-12-07 ASSESSMENT — ACTIVITIES OF DAILY LIVING (ADL)
ADLS_ACUITY_SCORE: 73
ADLS_ACUITY_SCORE: 77
ADLS_ACUITY_SCORE: 73

## 2024-12-07 NOTE — PLAN OF CARE
"Discharge Note      Patient discharged to TCU via EMS/BLS accompanied by other NA .  IV: Discontinued  Prescriptions  set to TCU .   Belongings reviewed and sent with family.   Home medications returned to patient: NA  Equipment sent with: patient, N/A.   family verbalizes understanding of discharge instructions. AVS given to patient and sent to TCU, gave copy to   .  Additional education completed?  NA      Problem: Adult Inpatient Plan of Care  Goal: Plan of Care Review  Description: The Plan of Care Review/Shift note should be completed every shift.  The Outcome Evaluation is a brief statement about your assessment that the patient is improving, declining, or no change.  This information will be displayed automatically on your shift  note.  Outcome: Adequate for Care Transition  Flowsheets (Taken 12/7/2024 1015)  Plan of Care Reviewed With: spouse  Overall Patient Progress: no change  Goal: Patient-Specific Goal (Individualized)  Description: You can add care plan individualizations to a care plan. Examples of Individualization might be:  \"Parent requests to be called daily at 9am for status\", \"I have a hard time hearing out of my right ear\", or \"Do not touch me to wake me up as it startles  me\".  Outcome: Adequate for Care Transition  Goal: Absence of Hospital-Acquired Illness or Injury  Outcome: Adequate for Care Transition  Intervention: Identify and Manage Fall Risk  Recent Flowsheet Documentation  Taken 12/7/2024 0732 by Karen Ledbetter, RN  Safety Promotion/Fall Prevention:   activity supervised   clutter free environment maintained   increase visualization of patient   nonskid shoes/slippers when out of bed   room door open   safety round/check completed  Intervention: Prevent Skin Injury  Recent Flowsheet Documentation  Taken 12/7/2024 0732 by Karen Ledbetter, RN  Body Position:   supine   supine, head elevated  Intervention: Prevent and Manage VTE (Venous Thromboembolism) Risk  Recent Flowsheet " Documentation  Taken 12/7/2024 0732 by Karen Ledbetter RN  VTE Prevention/Management: SCDs off (sequential compression devices)  Intervention: Prevent Infection  Recent Flowsheet Documentation  Taken 12/7/2024 0732 by Karen Ledbetter RN  Infection Prevention:   cohorting utilized   single patient room provided  Goal: Optimal Comfort and Wellbeing  Outcome: Adequate for Care Transition  Goal: Readiness for Transition of Care  Outcome: Adequate for Care Transition     Problem: Hip Fracture Medical Management  Goal: Optimal Coping with Change in Health Status  Outcome: Adequate for Care Transition  Goal: Absence of Bleeding  Outcome: Adequate for Care Transition  Intervention: Monitor and Manage Bleeding  Recent Flowsheet Documentation  Taken 12/7/2024 0732 by Karen Ledbetter RN  Bleeding Management: dressing monitored  Goal: Effective Bowel Elimination  Outcome: Adequate for Care Transition  Goal: Baseline Cognitive Function Maintained  Outcome: Adequate for Care Transition  Goal: Absence of Embolism  Outcome: Adequate for Care Transition  Intervention: Prevent or Manage Embolism Risk  Recent Flowsheet Documentation  Taken 12/7/2024 0732 by Karen Ledbetter RN  VTE Prevention/Management: SCDs off (sequential compression devices)  Goal: Fracture Stability  Outcome: Adequate for Care Transition  Goal: Optimal Functional Performance  Outcome: Adequate for Care Transition  Intervention: Promote Optimal Functional Status  Recent Flowsheet Documentation  Taken 12/7/2024 0732 by Karen Ledbetter RN  Range of Motion: active ROM (range of motion) encouraged  Activity Management: activity adjusted per tolerance  Goal: Pain Control and Function  Outcome: Adequate for Care Transition  Goal: Effective Urinary Elimination  Outcome: Adequate for Care Transition     Problem: Hip Fracture Surgical Repair  Goal: Optimal Coping with Change in Health Status  Outcome: Adequate for Care Transition  Goal: Absence of Bleeding  Outcome: Adequate for Care  Transition  Intervention: Monitor and Manage Bleeding  Recent Flowsheet Documentation  Taken 12/7/2024 0732 by Karen Ledbetter RN  Bleeding Management: dressing monitored  Goal: Effective Bowel Elimination  Outcome: Adequate for Care Transition  Goal: Cognitive Function Maintained  Outcome: Adequate for Care Transition  Goal: Fluid and Electrolyte Balance  Outcome: Adequate for Care Transition  Intervention: Monitor and Manage Fluid and Electrolyte Balance  Recent Flowsheet Documentation  Taken 12/7/2024 0732 by Karen Ledbetter RN  Fluid/Electrolyte Management: fluids provided  Goal: Absence of Infection Signs and Symptoms  Outcome: Adequate for Care Transition  Goal: Optimal Functional Ability  Outcome: Adequate for Care Transition  Intervention: Promote Optimal Functional Status  Recent Flowsheet Documentation  Taken 12/7/2024 0732 by Karen Ledbetter RN  Activity Management: activity adjusted per tolerance  Goal: Anesthesia/Sedation Recovery  Outcome: Adequate for Care Transition  Intervention: Optimize Anesthesia Recovery  Recent Flowsheet Documentation  Taken 12/7/2024 0732 by Karen Ledbetter RN  Safety Promotion/Fall Prevention:   activity supervised   clutter free environment maintained   increase visualization of patient   nonskid shoes/slippers when out of bed   room door open   safety round/check completed  Administration (IS): unable to perform  Goal: Optimal Pain Control and Function  Outcome: Adequate for Care Transition  Goal: Nausea and Vomiting Relief  Outcome: Adequate for Care Transition  Goal: Effective Urinary Elimination  Outcome: Adequate for Care Transition  Goal: Effective Oxygenation and Ventilation  Outcome: Adequate for Care Transition  Intervention: Optimize Oxygenation and Ventilation  Recent Flowsheet Documentation  Taken 12/7/2024 0732 by Karen Ledbetter RN  Head of Bed (HOB) Positioning: HOB at 20-30 degrees     Problem: Stroke, Ischemic (Includes Transient Ischemic Attack)  Goal: Optimal  Coping  Outcome: Adequate for Care Transition  Goal: Effective Bowel Elimination  Outcome: Adequate for Care Transition  Goal: Optimal Cerebral Tissue Perfusion  Outcome: Adequate for Care Transition  Intervention: Protect and Optimize Cerebral Perfusion  Recent Flowsheet Documentation  Taken 12/7/2024 0732 by Karen Ledbetter RN  Fluid/Electrolyte Management: fluids provided  Goal: Optimal Cognitive Function  Outcome: Adequate for Care Transition  Goal: Improved Communication Skills  Outcome: Adequate for Care Transition  Goal: Optimal Functional Ability  Outcome: Adequate for Care Transition  Intervention: Optimize Functional Ability  Recent Flowsheet Documentation  Taken 12/7/2024 0732 by Karen Ledbetter RN  Activity Management: activity adjusted per tolerance  Goal: Optimal Nutrition Intake  Outcome: Adequate for Care Transition  Goal: Effective Oxygenation and Ventilation  Outcome: Adequate for Care Transition  Intervention: Optimize Oxygenation and Ventilation  Recent Flowsheet Documentation  Taken 12/7/2024 0732 by Karen Ledbetter RN  Head of Bed (HOB) Positioning: HOB at 20-30 degrees  Goal: Improved Sensorimotor Function  Outcome: Adequate for Care Transition  Intervention: Optimize Range of Motion, Motor Control and Function  Recent Flowsheet Documentation  Taken 12/7/2024 0732 by Karen Ledbetter RN  Range of Motion: active ROM (range of motion) encouraged  Positioning/Transfer Devices:   pillows   in use  Intervention: Optimize Sensory and Perceptual Ability  Recent Flowsheet Documentation  Taken 12/7/2024 0732 by Karen Ledbetter RN  Pressure Reduction Techniques: weight shift assistance provided  Pressure Reduction Devices: heel offloading device utilized  Goal: Safe and Effective Swallow  Outcome: Adequate for Care Transition  Intervention: Promote and Optimize Fluid and Food Intake  Recent Flowsheet Documentation  Taken 12/7/2024 0732 by Karen Ledbetter RN  Feeding/Eating Techniques:   close supervision provided    feeding assistance provided  Goal: Effective Urinary Elimination  Outcome: Adequate for Care Transition   Goal Outcome Evaluation:      Plan of Care Reviewed With: spouse    Overall Patient Progress: no changeOverall Patient Progress: no change

## 2024-12-07 NOTE — CONSULTS
Care Management Discharge Note    Discharge Date: 12/07/2024       Discharge Disposition: Transitional Care    Discharge Services: Transportation Services    Discharge DME: None    Discharge Transportation: agency    Private pay costs discussed: Not applicable    Does the patient's insurance plan have a 3 day qualifying hospital stay waiver?  Yes     Which insurance plan 3 day waiver is available? [employer]    Will the waiver be used for post-acute placement? No    PAS Confirmation Code: [id number]  Patient/family educated on Medicare website which has current facility and service quality ratings: yes    Education Provided on the Discharge Plan: Yes  Persons Notified of Discharge Plans: Ana at [facility] - #[phone], fax [phone]  Patient/Family in Agreement with the Plan: yes    Handoff Referral Completed: Yes, DEEPAK PCP: Internal handoff referral completed    Additional Information:  DANIELE faxed discharge instructions to Ana at [facility] (phone [phone], fax [phone])  DANIELE confirmed stretcher  time with Luan, Candida, and family.     ARTUR Fields [phone]

## 2024-12-07 NOTE — DISCHARGE SUMMARY
[facility] Discharge Summary    Candida Rose MRN# [id number]   Age: 84 year old YOB: 1940     Date of Admission:  11/28/2024  Date of Discharge::  12/7/2024  Admitting Physician:  Jimmie Correa MD  Discharge Physician:  Brian Willis MD     Home clinic: [facility]          Admission Diagnoses:   Hip pain, left [M25.552]  Closed fracture of left distal femur (H) [S72.402A]  Fall at home, initial encounter [W19.XXXA, Y92.009]          Discharge Diagnosis:     Principal Problem:    Hip pain, left  Active Problems:    Moyamoya disease    Hyponatremia    Fall at home, initial encounter    Acute stroke due to ischemia (H)    Aphasia due to acute stroke (H)    Vascular dementia with depressed mood (H)    ABLA (acute blood loss anemia)    Essential hypertension           Procedures:   Open reduction and internal fixation left interprosthetic femur fracture   CT head without contrast  CTA head and neck with contrast  CT head perfusion with contrast  Brain with and without contrast   Follow-up CT head without contrast          Medications Prior to Admission:     Medications Prior to Admission   Medication Sig Dispense Refill Last Dose/Taking    aspirin 81 MG EC tablet Take 81 mg by mouth daily   11/28/2024 Morning    atenolol (TENORMIN) 25 MG tablet Take 1 tablet (25 mg) by mouth daily 90 tablet 1 11/28/2024 Morning    cetirizine (ZYRTEC) 10 MG tablet Take 10 mg by mouth daily.   11/28/2024 Morning    cyanocobalamin (VITAMIN B-12) 1000 MCG tablet Take 1,000 mcg by mouth every other day.   11/28/2024 Morning    losartan (COZAAR) 50 MG tablet TAKE 1 TABLET BY MOUTH TWICE A  tablet 0 11/28/2024 Morning    magnesium 500 MG TABS Take 250 mg by mouth daily.   11/28/2024 Morning    Multiple Vitamins-Minerals (MULTIVITAMIN & MINERAL PO) Take 1 tablet by mouth daily   11/27/2024 Bedtime    rosuvastatin (CRESTOR) 10 MG tablet Take 1 tablet (10 mg) by mouth daily. 90  tablet 1 11/27/2024 Bedtime    sertraline (ZOLOFT) 50 MG tablet Take 50 mg by mouth at bedtime.   11/27/2024 Bedtime             Discharge Medications:     Current Discharge Medication List        START taking these medications    Details   acetaminophen (TYLENOL) 325 MG tablet Take 3 tablets (975 mg) by mouth or NG Tube every 8 hours as needed for mild pain.    Associated Diagnoses: Closed displaced comminuted fracture of shaft of left femur, initial encounter (H)      apixaban ANTICOAGULANT (ELIQUIS) 5 MG tablet Take 1 tablet (5 mg) by mouth 2 times daily.    Associated Diagnoses: Acute stroke due to ischemia (H); Aphasia due to acute stroke (H)      HYDROmorphone (DILAUDID) 2 MG tablet Take 0.5-1 tablets (1-2 mg) by mouth or NG Tube every 4 hours as needed for moderate to severe pain (Take 1 mg (1/2 tab) for pain 4-6/10, take 2 mg (1 tab) for pain 7-10/10).  Qty: 12 tablet, Refills: 0    Associated Diagnoses: Closed displaced comminuted fracture of shaft of left femur, initial encounter (H)      methocarbamol (ROBAXIN) 500 MG tablet Take 1 tablet (500 mg) by mouth or NG Tube every 6 hours as needed for muscle spasms.    Associated Diagnoses: Closed displaced comminuted fracture of shaft of left femur, initial encounter (H)      polyethylene glycol (MIRALAX) 17 GM/Dose powder Take 17 g by mouth daily.    Associated Diagnoses: Closed displaced comminuted fracture of shaft of left femur, initial encounter (H)           CONTINUE these medications which have NOT CHANGED    Details   atenolol (TENORMIN) 25 MG tablet Take 1 tablet (25 mg) by mouth daily  Qty: 90 tablet, Refills: 1    Associated Diagnoses: HTN, goal below 140/90      losartan (COZAAR) 50 MG tablet TAKE 1 TABLET BY MOUTH TWICE A DAY  Qty: 180 tablet, Refills: 0    Associated Diagnoses: HTN, goal below 140/90      rosuvastatin (CRESTOR) 10 MG tablet Take 1 tablet (10 mg) by mouth daily.  Qty: 90 tablet, Refills: 1    Associated Diagnoses: Hyperlipidemia  "LDL goal <130      sertraline (ZOLOFT) 50 MG tablet Take 50 mg by mouth at bedtime.           STOP taking these medications       aspirin 81 MG EC tablet Comments:   Reason for Stopping:         cetirizine (ZYRTEC) 10 MG tablet Comments:   Reason for Stopping:         cyanocobalamin (VITAMIN B-12) 1000 MCG tablet Comments:   Reason for Stopping:         magnesium 500 MG TABS Comments:   Reason for Stopping:         Multiple Vitamins-Minerals (MULTIVITAMIN & MINERAL PO) Comments:   Reason for Stopping:                     Consultations:   Consultation during this admission received from neurology (both general and vascular neuro) and orthopedics          Brief History of Illness:   Candida Rose is a 84 year old female with a history of HTN, HLD, Moyamoya Disease, Anxiety, OA, Catarat, multiple CVA with Left Hemiparesis and mild aphasia, CKD, obesity who presented to the ED on 11/28/2024 with left leg pain after a mechanical fall at home.    PMH includes moyamoya s/p bilateral STA-MCA bypasses in 2006.  Hx of CVA in 2006, 2010 and 2022.  Uses a walker and cane for ambulation.  Lives in a single family home with her .  Has mild memory impairment at baseline.       She was found to have left femur fracture.  Ortho was consulted and the patient underwent surgical repair of an inter-prosthetic fracture.          Hospital Course:   In the post-op period the pt became non-verbal and was found to have new right sided weakness.  Code stroke was called.  Initial CT showed multifocal prior ischemic stroke.  CTA head/heck showed bilateral ICA occlusion, bilateral PCA tiny caliber.  She was not given TPA (contraindicated by recent surgery).   Repeat CT head the following morning showed new bilateral frontal strokes which likely explain her new symptoms.   MRI confirmed multiple chronic storkes in addition to \"Multifocal, acute infarctions of the cerebral parenchyma affecting the bifrontal lobes, the left basal ganglia and " "the anterior aspect of the medial left temporal lobe.\"    Following this, the pt remained hemodynamically stable and in addition to initial PT/OT/SLP evaluations, goals of care discussions were undertaken.     BP (!) (P) 119/39   Pulse 66   Temp 98.7  F (37.1  C) (Temporal)   Resp 16   Ht 1.727 m (5' 8\")   Wt 95.5 kg (210 lb 8.6 oz)   LMP  (LMP Unknown)   SpO2 96%   BMI 32.01 kg/m    At the time of discharge, Ms. Rose is alert and appears to intermittently to be tracking the conversation.   HEENT: No focal muscular asymmetry.  EOMI (though intermittently the eye movements are not conjugate)  Chest: No increased WOB  COR: RRR without murmur  Abd: Soft, NTND  On my exam she is awake but is non-verbal.  She does squeeze my hand on the left but is otherwise non-participatory in neurologic examination.  PERRL.  Left eye gaze deviation.  Eyes do not track.  Seems to have right sided sujatha-neglect.           Discharge Instructions and Follow-Up:     Discharge diet: Mechanically altered   Discharge activity: Activity as tolerated.  NON-WEIGHT BEARING ON THE LEFT LEG.   Discharge follow-up: With PT/OT/SLP      Ms. Lara's family is well aware of the extremely challenging long-term outlook for this patient based on her acute strokes.  They have discussed the possibility of pursuing comfort care only.  They will make further decisions based on the patient's recovery and response to therapy.           Discharge Disposition:     Discharged to short-term care facility      Attestation:  I have reviewed today's vital signs, notes, medications, labs and imaging.    Brian Willis MD     "

## 2024-12-07 NOTE — PLAN OF CARE
Occupational Therapy Discharge Summary    Reason for therapy discharge:    Discharged to transitional care facility.    Progress towards therapy goal(s). See goals on Care Plan in [facility] electronic health record for goal details.  Goals partially met.  Barriers to achieving goals:   limited tolerance for therapy and discharge from facility.    Therapy recommendation(s):    Continued therapy is recommended.  Rationale/Recommendations:  Patient is well below baseline for ADL, R side function and vision. Pt will require further rehab at discharge, would benefit from continued OT in TCU..      **Pt not seen by writer on this date, note written based on previous treating OT's note and recommendations.

## 2024-12-07 NOTE — PLAN OF CARE
"Goal Outcome Evaluation:      Plan of Care Reviewed With: patient    Overall Patient Progress: no change    Outcome Evaluation: Ax2 lift; VSS; eliquis; TCU this afternoon via stretcher transport      Problem: Adult Inpatient Plan of Care  Goal: Plan of Care Review  Description: The Plan of Care Review/Shift note should be completed every shift.  The Outcome Evaluation is a brief statement about your assessment that the patient is improving, declining, or no change.  This information will be displayed automatically on your shift  note.  Outcome: Progressing  Flowsheets (Taken 12/7/2024 0445)  Outcome Evaluation:   Ax2 lift   VSS   eliquis   TCU this afternoon via stretcher transport  Plan of Care Reviewed With: patient  Overall Patient Progress: no change  Goal: Patient-Specific Goal (Individualized)  Description: You can add care plan individualizations to a care plan. Examples of Individualization might be:  \"Parent requests to be called daily at 9am for status\", \"I have a hard time hearing out of my right ear\", or \"Do not touch me to wake me up as it startles  me\".  Outcome: Progressing  Goal: Absence of Hospital-Acquired Illness or Injury  Outcome: Progressing  Goal: Optimal Comfort and Wellbeing  Outcome: Progressing  Goal: Readiness for Transition of Care  Outcome: Progressing     Problem: Hip Fracture Medical Management  Goal: Optimal Coping with Change in Health Status  Outcome: Progressing  Goal: Absence of Bleeding  Outcome: Progressing  Goal: Effective Bowel Elimination  Outcome: Progressing  Goal: Baseline Cognitive Function Maintained  Outcome: Progressing  Goal: Absence of Embolism  Outcome: Progressing  Goal: Fracture Stability  Outcome: Progressing  Goal: Optimal Functional Performance  Outcome: Progressing  Goal: Pain Control and Function  Outcome: Progressing  Goal: Effective Urinary Elimination  Outcome: Progressing     Problem: Hip Fracture Surgical Repair  Goal: Optimal Coping with Change in " Health Status  Outcome: Progressing  Goal: Absence of Bleeding  Outcome: Progressing  Goal: Effective Bowel Elimination  Outcome: Progressing  Goal: Cognitive Function Maintained  Outcome: Progressing  Goal: Fluid and Electrolyte Balance  Outcome: Progressing  Goal: Absence of Infection Signs and Symptoms  Outcome: Progressing  Goal: Optimal Functional Ability  Outcome: Progressing  Goal: Anesthesia/Sedation Recovery  Outcome: Progressing  Goal: Optimal Pain Control and Function  Outcome: Progressing  Goal: Nausea and Vomiting Relief  Outcome: Progressing  Goal: Effective Urinary Elimination  Outcome: Progressing  Goal: Effective Oxygenation and Ventilation  Outcome: Progressing     Problem: Stroke, Ischemic (Includes Transient Ischemic Attack)  Goal: Optimal Coping  Outcome: Progressing  Goal: Effective Bowel Elimination  Outcome: Progressing  Goal: Optimal Cerebral Tissue Perfusion  Outcome: Progressing  Goal: Optimal Cognitive Function  Outcome: Progressing  Goal: Improved Communication Skills  Outcome: Progressing  Goal: Optimal Functional Ability  Outcome: Progressing  Goal: Optimal Nutrition Intake  Outcome: Progressing  Goal: Effective Oxygenation and Ventilation  Outcome: Progressing  Goal: Improved Sensorimotor Function  Outcome: Progressing  Goal: Safe and Effective Swallow  Outcome: Progressing  Goal: Effective Urinary Elimination  Outcome: Progressing

## 2024-12-07 NOTE — PLAN OF CARE
Physical Therapy Discharge Summary    Reason for therapy discharge:    Discharged to transitional care facility.    Progress towards therapy goal(s). See goals on Care Plan in [facility] electronic health record for goal details.  Goals not met.  Barriers to achieving goals:   discharge from facility.    Therapy recommendation(s):    Continued therapy is recommended.  Rationale/Recommendations:  TCU recommended for further progression of strength and IND mobility.

## 2024-12-07 NOTE — PLAN OF CARE
Speech Language Therapy Discharge Summary    Reason for therapy discharge:    Discharged to transitional care facility.    Progress towards therapy goal(s). See goals on Care Plan in [facility] electronic health record for goal details.  Goals not met.  Barriers to achieving goals:   discharge from facility.    Therapy recommendation(s):    Continued therapy is recommended.  Rationale/Recommendations:  management of dysphagia and aphasia.    Speech-Language Assessement: WAB-R initiated. The patient was unfortunately more fatigued during this exam, but appears motivated to participate. Severe aphasia noted, with best results being with simple yes/no questions and repetition. Pt maintained eye contact and showed good participation despite fatigue.     Clinical Swallow Evaluation: Oropharyngeal dysphagia. Recommend minced and moist diet (5) and thin liquids. Supervision is recommended given pt's altered mentation. She must be alert and positioned upright, take small bites/sips, at a slow rate, and alternate solids and liquids. Nursing to ensure oral cavity is cleared after PO. Initiate SLP services for dysphagia.     *Patient not seen by this writer, therefore, information taken from recent notes from treating SLPs.

## 2024-12-08 ENCOUNTER — LAB REQUISITION (OUTPATIENT)
Dept: LAB | Facility: CLINIC | Age: 84
End: 2024-12-08
Payer: MEDICARE

## 2024-12-08 DIAGNOSIS — Z11.1 ENCOUNTER FOR SCREENING FOR RESPIRATORY TUBERCULOSIS: ICD-10-CM

## 2024-12-09 ENCOUNTER — LAB REQUISITION (OUTPATIENT)
Dept: LAB | Facility: CLINIC | Age: 84
End: 2024-12-09
Payer: MEDICARE

## 2024-12-09 ENCOUNTER — DOCUMENTATION ONLY (OUTPATIENT)
Dept: GERIATRICS | Facility: CLINIC | Age: 84
End: 2024-12-09

## 2024-12-09 ENCOUNTER — TRANSITIONAL CARE UNIT VISIT (OUTPATIENT)
Dept: GERIATRICS | Facility: CLINIC | Age: 84
End: 2024-12-09
Payer: MEDICARE

## 2024-12-09 ENCOUNTER — PATIENT OUTREACH (OUTPATIENT)
Dept: CARE COORDINATION | Facility: CLINIC | Age: 84
End: 2024-12-09

## 2024-12-09 VITALS
HEIGHT: 68 IN | RESPIRATION RATE: 16 BRPM | TEMPERATURE: 97.9 F | DIASTOLIC BLOOD PRESSURE: 70 MMHG | BODY MASS INDEX: 30.55 KG/M2 | HEART RATE: 67 BPM | WEIGHT: 201.6 LBS | OXYGEN SATURATION: 98 % | SYSTOLIC BLOOD PRESSURE: 123 MMHG

## 2024-12-09 DIAGNOSIS — F32.A DEPRESSION, UNSPECIFIED DEPRESSION TYPE: ICD-10-CM

## 2024-12-09 DIAGNOSIS — Z98.890 S/P ORIF (OPEN REDUCTION INTERNAL FIXATION) FRACTURE: ICD-10-CM

## 2024-12-09 DIAGNOSIS — Z86.79 HISTORY OF ATRIAL FIBRILLATION: ICD-10-CM

## 2024-12-09 DIAGNOSIS — R53.81 PHYSICAL DECONDITIONING: ICD-10-CM

## 2024-12-09 DIAGNOSIS — K59.01 SLOW TRANSIT CONSTIPATION: ICD-10-CM

## 2024-12-09 DIAGNOSIS — Z87.81 S/P ORIF (OPEN REDUCTION INTERNAL FIXATION) FRACTURE: ICD-10-CM

## 2024-12-09 DIAGNOSIS — I67.5 MOYAMOYA DISEASE: ICD-10-CM

## 2024-12-09 DIAGNOSIS — R13.12 OROPHARYNGEAL DYSPHAGIA: ICD-10-CM

## 2024-12-09 DIAGNOSIS — S72.402D CLOSED FRACTURE OF DISTAL END OF LEFT FEMUR WITH ROUTINE HEALING, UNSPECIFIED FRACTURE MORPHOLOGY, SUBSEQUENT ENCOUNTER: ICD-10-CM

## 2024-12-09 DIAGNOSIS — E78.5 DYSLIPIDEMIA: ICD-10-CM

## 2024-12-09 DIAGNOSIS — F01.50 VASCULAR DEMENTIA, UNSPECIFIED DEMENTIA SEVERITY, UNSPECIFIED WHETHER BEHAVIORAL, PSYCHOTIC, OR MOOD DISTURBANCE OR ANXIETY (H): ICD-10-CM

## 2024-12-09 DIAGNOSIS — E66.811 CLASS 1 OBESITY WITH SERIOUS COMORBIDITY AND BODY MASS INDEX (BMI) OF 30.0 TO 30.9 IN ADULT, UNSPECIFIED OBESITY TYPE: ICD-10-CM

## 2024-12-09 DIAGNOSIS — E87.1 HYPONATREMIA: ICD-10-CM

## 2024-12-09 DIAGNOSIS — I10 ESSENTIAL HYPERTENSION: ICD-10-CM

## 2024-12-09 DIAGNOSIS — I69.320 APHASIA AS LATE EFFECT OF CEREBROVASCULAR ACCIDENT (CVA): Primary | ICD-10-CM

## 2024-12-09 DIAGNOSIS — N18.9 CHRONIC KIDNEY DISEASE, UNSPECIFIED: ICD-10-CM

## 2024-12-09 DIAGNOSIS — D62 ANEMIA DUE TO BLOOD LOSS, ACUTE: ICD-10-CM

## 2024-12-09 DIAGNOSIS — N18.2 CKD (CHRONIC KIDNEY DISEASE) STAGE 2, GFR 60-89 ML/MIN: ICD-10-CM

## 2024-12-09 DIAGNOSIS — Z86.73 HISTORY OF CVA (CEREBROVASCULAR ACCIDENT): ICD-10-CM
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
## 2024-12-09 DIAGNOSIS — G47.00 INSOMNIA, UNSPECIFIED TYPE: ICD-10-CM

## 2024-12-09 DIAGNOSIS — D64.9 ANEMIA, UNSPECIFIED: ICD-10-CM

## 2024-12-09 DIAGNOSIS — F41.9 ANXIETY: ICD-10-CM

## 2024-12-09 PROCEDURE — 86481 TB AG RESPONSE T-CELL SUSP: CPT | Mod: ORL | Performed by: NURSE PRACTITIONER

## 2024-12-09 PROCEDURE — 99310 SBSQ NF CARE HIGH MDM 45: CPT | Performed by: NURSE PRACTITIONER

## 2024-12-09 PROCEDURE — 36415 COLL VENOUS BLD VENIPUNCTURE: CPT | Mod: ORL | Performed by: NURSE PRACTITIONER

## 2024-12-09 PROCEDURE — P9604 ONE-WAY ALLOW PRORATED TRIP: HCPCS | Mod: ORL | Performed by: NURSE PRACTITIONER

## 2024-12-09 NOTE — PROGRESS NOTES
[facility] GERIATRICS    PRIMARY CARE PROVIDER AND CLINIC:  Chani Peoples MD, [address] [facility] / [facility] [address]  Chief Complaint   Patient presents with    [facility] Medical Record Number:  [id number]  Place of Service where encounter took place:  [facility]  ([facility]) [[id number]]    Candida Rose  is a 84 year old  (1940), admitted to the above facility from  [facility]. Hospital stay 11/28/24 through 12/7/24.    HPI:    Past medical history significant for hypertension, dyslipidemia, paroxysmal atrial fibrillation, moyamoya disease, CVA with left hemiparesis, dementia, CKD, OA, anxiety, depression, obesity    Summary of recent hospitalization:  Patient was hospitalized at [facility] from 1/28/2024-12/7/2024 for left femur fracture status post ORIF and CVA.  Patient presented to the emergency department for evaluation of a fall that was witnessed by her spouse.  In the emergency department laboratory evaluation revealed sodium 132, creatinine 1.01, hemoglobin 12.4.  Imaging significant for left distal femoral fracture with displacement and comminution.  Ortho was consulted and patient status post ORIF on 11/29/2024.   cc. Hemoglobin dropped to ofelia of 6.8 and required PRBC. Postoperatively patient with drowsiness, confusion, right-sided weakness, aphasia.  CTA head and neck on 11/29 revealed multiple intracranial arterial stenosis and occlusions, bilateral cranioplasty's, neck CTA revealed right distal cervical artery short segment occlusion with reconstitution, left vertebral artery origin and left V1 segment occluded with reconstitution in the left proximal P2 segment, extensive cervical artery stenosis, thyroid nodule, recommend nonemergent thyroid ultrasound.  CT head perfusion on 11/29 revealed large areas of Tmax greater than 6 seconds within the bilateral frontal lobes STEPHEN and MCA territories and to a lesser  extent right greater than left PCA territories, findings correspond to areas of brain rest, no abnormal perfusion on CBF less than 30% to suggest core infarct, however qualitatively there is decreased CBD within the bilateral frontal lobes and left occipital temporal region.  MRI of brain on 12/1 revealed multifocal, acute infarctions of the cerebral parenchyma affecting the bifrontal lobes, left basal ganglia and anterior aspect of the medial left temporal lobe, edematous appearing cerebral parenchyma on the left frontal lobe which mildly effaces the adjacent lateral ventricle frontal horn.  Repeat CT head revealed no evidence of hemorrhagic conversion, no definite new infarct. Echo on 12/2 revealed LVEF 50 to 55%, mild to moderate apical wall hypokinesis, no thrombus present. Neurology was consulted. Patient started on apixaban. Follow up with general neurology in 6 weeks. SLP consulted for oropharyngeal dysphagia and recommended minced and moist diet with mildly thickened consistency. Continues to have have hyponatremia at discharge, Na level 132 on 12/5. PTA aspirin, cetrizine, B12, Mg and multivitamin discontinued inpatient. Palliative care consulted and plan is for patient to attempt rehab and if unsuccessful will transition to comfort approach. Discharged to TCU for physical rehabilitation and medical management.     Reviewed ER note, consultant notes, discharge summary, labs and imaging from recent hospital stay.    Today patient was seen for admission visit in the TCU.  Patient with aphasia, when asked if she has any pain she did say no, however unable to answer any additional questions.  She does occasionally speak, though not making sense in regards to questions.  Discussed with Occupational Therapy, is needing max assistance with feeding and max assistance with cueing for all tasks.  Nursing staff report that patient seems to have her days and nights mixed up, was awake all night and sleeping during the  day since admission.  Later returned and spoke to patient's spouse Olu.  He feels patient's pain has been managed.  He tells me at baseline she had history of previous stroke with left-sided hemiparesis and weakness, though was able to ambulate with a walker and cane at home.  He tells me their goals are to attempt rehab and if that does not go well, he would like to transition her to hospice with goal on quality of life.    Reviewed facility EMR including medications, recent nursing progress notes, vital signs.  Discussed plan of care with nursing.    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 F/u dr. Soto    OA (osteoarthritis)     s/p bilat total hips     Other chronic pain     Joint pain for many years    Unspecified cerebral artery occlusion with cerebral infarction     After Moyamoya surgery > 10 yrs ago.    Vitamin D deficiencies       PAST SURGICAL HISTORY:   has a past surgical history that includes NONSPECIFIC PROCEDURE (10/30/06); NONSPECIFIC PROCEDURE; NONSPECIFIC PROCEDURE (3/07); colonoscopy (2008); Colonoscopy (7/17/2014); Reconstruct forefoot with metatarsophalangeal (MTP) fusion (Left, 8/21/2014); craniotomy; Arthroplasty knee (Left, 4/17/2017); IR Carotid Angiogram (10/30/2006); IR Carotid Angiogram (6/6/2010); IR Carotid Angiogram (6/6/2010); IR Carotid Angiogram (6/6/2010); IR  Miscellaneous Procedure (6/6/2010); IR Miscellaneous Procedure (6/6/2010); IR Carotid Angiogram (5/12/2011); IR Carotid Angiogram (5/12/2011); IR Miscellaneous Procedure (5/12/2011); IR Carotid Angiogram (5/12/2011); IR Carotid Angiogram (6/5/2012); IR Carotid Angiogram (6/5/2012); IR Carotid Angiogram (6/5/2012); IR Miscellaneous Procedure (6/5/2012); IR Carotid Cerebral Angiogram Bilateral (10/7/2022); and Open reduction internal fixation femur distal (Left, 11/29/2024).  FAMILY HISTORY: family history includes Breast Cancer (age of onset: 48) in her daughter; Cancer in her maternal aunt; Cancer - colorectal (age of onset: 69) in her sister; Family History Negative in her mother; Heart Disease in her father; Lung Cancer in her maternal aunt; Obesity in her sister.  SOCIAL HISTORY:   reports that she has never smoked. She has never been exposed to tobacco smoke. She has never used smokeless tobacco. She reports current alcohol use. She reports that she does not use drugs.  Patient's living condition: lives with spouse    Post Discharge Medication Reconciliation Status:   MED REC REQUIRED  Post Medication Reconciliation Status:  Discharge medications reconciled and changed, see notes/orders       Current Outpatient Medications   Medication Sig Dispense Refill    acetaminophen (TYLENOL) 325 MG tablet Take 3 tablets (975 mg) by mouth or NG Tube every 8 hours as needed for mild pain.      apixaban ANTICOAGULANT (ELIQUIS) 5 MG tablet Take 1 tablet (5 mg) by mouth 2 times daily.      atenolol (TENORMIN) 25 MG tablet Take 1 tablet (25 mg) by mouth daily 90 tablet 1    HYDROmorphone (DILAUDID) 2 MG tablet Take 0.5-1 tablets (1-2 mg) by mouth or NG Tube every 4 hours as needed for moderate to severe pain (Take 1 mg (1/2 tab) for pain 4-6/10, take 2 mg (1 tab) for pain 7-10/10). 12 tablet 0    losartan (COZAAR) 50 MG tablet TAKE 1 TABLET BY MOUTH TWICE A  tablet 0    melatonin 3 MG tablet Take 1 tablet (3 mg) by mouth  "at bedtime.      methocarbamol (ROBAXIN) 500 MG tablet Take 1 tablet (500 mg) by mouth or NG Tube every 6 hours as needed for muscle spasms.      polyethylene glycol (MIRALAX) 17 GM/Dose powder Take 17 g by mouth daily.      rosuvastatin (CRESTOR) 10 MG tablet Take 1 tablet (10 mg) by mouth daily. 90 tablet 1    sertraline (ZOLOFT) 50 MG tablet Take 50 mg by mouth at bedtime.       No current facility-administered medications for this visit.       ROS:  Unobtainable secondary to cognitive impairment.  and Unobtainable secondary to aphasia.     Vitals:  /70   Pulse 67   Temp 97.9  F (36.6  C)   Resp 16   Ht 1.727 m (5' 8\")   Wt 91.4 kg (201 lb 9.6 oz)   LMP  (LMP Unknown)   SpO2 98%   BMI 30.65 kg/m    Exam:  GENERAL APPEARANCE:  Alert, in NAD  HEENT: normocephalic, moist mucous membranes, nose without drainage or crusting  RESP:  respiratory effort normal, no respiratory distress, Lung sounds clear, patient is on RA  CV: auscultation of heart done, rate and rhythm regular.   ABDOMEN: + bowel sounds, soft, nontender, no grimacing or guarding with palpation.  M/S:  left lower extremity edema  SKIN:  multiple dressings intact to left hip- no drainage or erythema noted  NEURO: aphasia  PSYCH: oriented x self, affect and mood normal      Lab/Diagnostic data:  Labs done in SNF are in [facility]. Please refer to them using [facility]/Care Everywhere. and Recent labs in [facility] reviewed by me today.     ASSESSMENT/PLAN:  Acute bilateral frontal CVA and left basal ganglia CVA with aphasia and right-sided weakness  History of CVA with left-sided hemiparesis  History of Moyamoya Disease  Vascular dementia  Patient started on apixaban inpatient  With aphasia and right sided weakness today  Plan: Continue apixaban 5 mg twice daily.  Monitor blood pressure.  SBP goal less than 130.  Therapy as below.  Follow-up with neurology 6 to 8 weeks.  SLP to evaluate and treat. OCCUPATIONAL THERAPY to complete cognitive testing for " safe discharge planning. Nursing to assist with cares, meals, medication assistance, activities.    Oropharyngeal dysphagia   Secondary to CVA  Plan: Continue minced and moist level 5 texture with mildly thickened liquids.  Patient also requires assistance for feeding with all meals.  SLP to evaluate and treat.    Left distal femur fracture status post ORIF on 11/30/2024  Fall, subsequent encounter  Pain appears managed  Plan: Continue pain management with Tylenol 975 mg every 8 hours as needed, Dilaudid 1 to 2 mg every 4 hours as needed, methocarbamol 500 mg every 6 hours as needed.  Continue apixaban 5 mg twice daily for DVT prophylaxis.  TG shapers for leg swelling.  Nonweightbearing to left leg, left knee immobilizer off in bed, apply brace with transferring and ambulation.  Therapy as below.  Follow-up with Ortho per recommendations.    Acute blood loss anemia  Baseline hemoglobin 12-13  Hemoglobin ofelia 6.8 on 11/30/2024, received 2 units PRBC  Hemoglobin 10.4 on 12/6/2024  Plan: CBC 12/10.  Monitor for signs and symptoms of bleeding.    Hyponatremia  Sodium level of 132 112/5/2024  Plan: BMP 12/10.     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 Avoid nephrotoxins. Renally dose medications as indicated.    History of paroxysmal atrial fibrillation  Patient diagnosed in 10/2022 at time of previous CVA, however patient had event monitor following that did not see clear A-fib but PAC and PVCs.  Cardiology notes that patients with mild mild disease have increased risk of hemorrhage, therefore patient's anticoagulation was discontinued.  Patient started on apixaban on 12/6.  Plan: Continue apixaban 5 mg twice daily and atenolol 25 mg daily.  Monitor heart rate.    Essential hypertension  PTA losartan dose increased inpatient  SBP controlled mostly 110-140, one higher  BP  Plan: Continue losartan 50 mg twice daily, atenolol 25 mg daily.  Monitor blood pressure.  SBP goal less than 130.    Dyslipidemia  Plan: Continue rosuvastatin 10 mg daily.    Depression  Anxiety  Insomnia  Staff noting patient has days and nights mixed up  Plan: Start melatonin 3 mg at bedtime.  Try to keep patient awake with lights on during the day.  Continue sertraline 50 mg daily.  Monitor mood and symptoms.  Consider ACP referral as needed.    Slow transit constipation  Bowels moving regularly per nursing documentation  Plan: Continue MiraLAX 17 g daily.  Monitor mood and symptoms.  Consider ACP referral as needed.    Obesity, BMI 30.65  Complicates care  Plan: Encourage weight loss.  Dietitian follows in the TCU.    Physical deconditioning  Secondary to recent hospitalization, medical conditions as above  Plan: Encourage participation in physical therapy/occupational therapy for strengthening and deconditioning. Discharge planning per their recommendation. Social work to assist with d/c planning.          Incidental finding  Thyroid nodule seen on CTA head neck  Plan: Follow up outpatient for US evaluation.      Disclaimer: This note may contain text created using speech-recognition software and may contain unintended word substitutions.        Total time spent with patient visit at the skilled nursing facility was 50 minutes including patient visit and return to speak with spouse later in the morning, review of past records, medication reconciliation, review of admission orders, discussion with nursing staff regarding plan of care.      Electronically signed by:  ALFREDO Pugh CNP

## 2024-12-09 NOTE — LETTER
12/9/2024      Candida Rose  [address]  [facility] [id number]        [facility] GERIATRICS    PRIMARY CARE PROVIDER AND CLINIC:  Chani Peoples MD, Lilian CHAIM WHITNEYBINAPARISH [facility] [address]  Chief Complaint   Patient presents with     [facility] Medical Record Number:  [id number]  Place of Service where encounter took place:  [facility]  ([facility]) [[id number]]    Candida Rose  is a 84 year old  (1940), admitted to the above facility from  [facility]. Hospital stay 11/28/24 through 12/7/24.    HPI:    Past medical history significant for hypertension, dyslipidemia, paroxysmal atrial fibrillation, moyamoya disease, CVA with left hemiparesis, dementia, CKD, OA, anxiety, depression, obesity    Summary of recent hospitalization:  Patient was hospitalized at [facility] from 1/28/2024-12/7/2024 for left femur fracture status post ORIF and CVA.  Patient presented to the emergency department for evaluation of a fall that was witnessed by her spouse.  In the emergency department laboratory evaluation revealed sodium 132, creatinine 1.01, hemoglobin 12.4.  Imaging significant for left distal femoral fracture with displacement and comminution.  Ortho was consulted and patient status post ORIF on 11/29/2024.   cc. Hemoglobin dropped to ofelia of 6.8 and required PRBC. Postoperatively patient with drowsiness, confusion, right-sided weakness, aphasia.  CTA head and neck on 11/29 revealed multiple intracranial arterial stenosis and occlusions, bilateral cranioplasty's, neck CTA revealed right distal cervical artery short segment occlusion with reconstitution, left vertebral artery origin and left V1 segment occluded with reconstitution in the left proximal P2 segment, extensive cervical artery stenosis, thyroid nodule, recommend nonemergent thyroid ultrasound.  CT head perfusion on 11/29 revealed large areas of Tmax greater than 6  seconds within the bilateral frontal lobes STEPHEN and MCA territories and to a lesser extent right greater than left PCA territories, findings correspond to areas of brain rest, no abnormal perfusion on CBF less than 30% to suggest core infarct, however qualitatively there is decreased CBD within the bilateral frontal lobes and left occipital temporal region.  MRI of brain on 12/1 revealed multifocal, acute infarctions of the cerebral parenchyma affecting the bifrontal lobes, left basal ganglia and anterior aspect of the medial left temporal lobe, edematous appearing cerebral parenchyma on the left frontal lobe which mildly effaces the adjacent lateral ventricle frontal horn.  Repeat CT head revealed no evidence of hemorrhagic conversion, no definite new infarct. Echo on 12/2 revealed LVEF 50 to 55%, mild to moderate apical wall hypokinesis, no thrombus present. Neurology was consulted. Patient started on apixaban. Follow up with general neurology in 6 weeks. SLP consulted for oropharyngeal dysphagia and recommended minced and moist diet with mildly thickened consistency. Continues to have have hyponatremia at discharge, Na level 132 on 12/5. PTA aspirin, cetrizine, B12, Mg and multivitamin discontinued inpatient. Palliative care consulted and plan is for patient to attempt rehab and if unsuccessful will transition to comfort approach. Discharged to TCU for physical rehabilitation and medical management.     Reviewed ER note, consultant notes, discharge summary, labs and imaging from recent hospital stay.    Today patient was seen for admission visit in the TCU.  Patient with aphasia, when asked if she has any pain she did say no, however unable to answer any additional questions.  She does occasionally speak, though not making sense in regards to questions.  Discussed with Occupational Therapy, is needing max assistance with feeding and max assistance with cueing for all tasks.  Nursing staff report that patient seems  to have her days and nights mixed up, was awake all night and sleeping during the day since admission.  Later returned and spoke to patient's spouse Olu.  He feels patient's pain has been managed.  He tells me at baseline she had history of previous stroke with left-sided hemiparesis and weakness, though was able to ambulate with a walker and cane at home.  He tells me their goals are to attempt rehab and if that does not go well, he would like to transition her to hospice with goal on quality of life.    Reviewed facility EMR including medications, recent nursing progress notes, vital signs.  Discussed plan of care with nursing.    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 F/u dr. Soto     OA (osteoarthritis)     s/p bilat total hips      Other chronic pain     Joint pain for many years     Unspecified cerebral artery occlusion with cerebral infarction     After Moyamoya surgery > 10 yrs ago.     Vitamin D deficiencies       PAST SURGICAL HISTORY:   has a past surgical history that includes NONSPECIFIC PROCEDURE (10/30/06); NONSPECIFIC PROCEDURE; NONSPECIFIC PROCEDURE (3/07); colonoscopy (2008); Colonoscopy (7/17/2014); Reconstruct forefoot with metatarsophalangeal (MTP) fusion (Left, 8/21/2014); craniotomy; Arthroplasty knee (Left, 4/17/2017); IR Carotid Angiogram (10/30/2006); IR Carotid  Angiogram (6/6/2010); IR Carotid Angiogram (6/6/2010); IR Carotid Angiogram (6/6/2010); IR Miscellaneous Procedure (6/6/2010); IR Miscellaneous Procedure (6/6/2010); IR Carotid Angiogram (5/12/2011); IR Carotid Angiogram (5/12/2011); IR Miscellaneous Procedure (5/12/2011); IR Carotid Angiogram (5/12/2011); IR Carotid Angiogram (6/5/2012); IR Carotid Angiogram (6/5/2012); IR Carotid Angiogram (6/5/2012); IR Miscellaneous Procedure (6/5/2012); IR Carotid Cerebral Angiogram Bilateral (10/7/2022); and Open reduction internal fixation femur distal (Left, 11/29/2024).  FAMILY HISTORY: family history includes Breast Cancer (age of onset: 48) in her daughter; Cancer in her maternal aunt; Cancer - colorectal (age of onset: 69) in her sister; Family History Negative in her mother; Heart Disease in her father; Lung Cancer in her maternal aunt; Obesity in her sister.  SOCIAL HISTORY:   reports that she has never smoked. She has never been exposed to tobacco smoke. She has never used smokeless tobacco. She reports current alcohol use. She reports that she does not use drugs.  Patient's living condition: lives with spouse    Post Discharge Medication Reconciliation Status:   MED REC REQUIRED  Post Medication Reconciliation Status:  Discharge medications reconciled and changed, see notes/orders       Current Outpatient Medications   Medication Sig Dispense Refill     acetaminophen (TYLENOL) 325 MG tablet Take 3 tablets (975 mg) by mouth or NG Tube every 8 hours as needed for mild pain.       apixaban ANTICOAGULANT (ELIQUIS) 5 MG tablet Take 1 tablet (5 mg) by mouth 2 times daily.       atenolol (TENORMIN) 25 MG tablet Take 1 tablet (25 mg) by mouth daily 90 tablet 1     HYDROmorphone (DILAUDID) 2 MG tablet Take 0.5-1 tablets (1-2 mg) by mouth or NG Tube every 4 hours as needed for moderate to severe pain (Take 1 mg (1/2 tab) for pain 4-6/10, take 2 mg (1 tab) for pain 7-10/10). 12 tablet 0     losartan (COZAAR) 50 MG tablet TAKE 1  "TABLET BY MOUTH TWICE A  tablet 0     melatonin 3 MG tablet Take 1 tablet (3 mg) by mouth at bedtime.       methocarbamol (ROBAXIN) 500 MG tablet Take 1 tablet (500 mg) by mouth or NG Tube every 6 hours as needed for muscle spasms.       polyethylene glycol (MIRALAX) 17 GM/Dose powder Take 17 g by mouth daily.       rosuvastatin (CRESTOR) 10 MG tablet Take 1 tablet (10 mg) by mouth daily. 90 tablet 1     sertraline (ZOLOFT) 50 MG tablet Take 50 mg by mouth at bedtime.       No current facility-administered medications for this visit.       ROS:  Unobtainable secondary to cognitive impairment.  and Unobtainable secondary to aphasia.     Vitals:  /70   Pulse 67   Temp 97.9  F (36.6  C)   Resp 16   Ht 1.727 m (5' 8\")   Wt 91.4 kg (201 lb 9.6 oz)   LMP  (LMP Unknown)   SpO2 98%   BMI 30.65 kg/m    Exam:  GENERAL APPEARANCE:  Alert, in NAD  HEENT: normocephalic, moist mucous membranes, nose without drainage or crusting  RESP:  respiratory effort normal, no respiratory distress, Lung sounds clear, patient is on RA  CV: auscultation of heart done, rate and rhythm regular.   ABDOMEN: + bowel sounds, soft, nontender, no grimacing or guarding with palpation.  M/S:  left lower extremity edema  SKIN:  multiple dressings intact to left hip- no drainage or erythema noted  NEURO: aphasia  PSYCH: oriented x self, affect and mood normal      Lab/Diagnostic data:  Labs done in SNF are in [facility]. Please refer to them using [facility]/Care Everywhere. and Recent labs in [facility] reviewed by me today.     ASSESSMENT/PLAN:  Acute bilateral frontal CVA and left basal ganglia CVA with aphasia and right-sided weakness  History of CVA with left-sided hemiparesis  History of Moyamoya Disease  Vascular dementia  Patient started on apixaban inpatient  With aphasia and right sided weakness today  Plan: Continue apixaban 5 mg twice daily.  Monitor blood pressure.  SBP goal less than 130.  Therapy as below.  Follow-up with " neurology 6 to 8 weeks.  SLP to evaluate and treat. OCCUPATIONAL THERAPY to complete cognitive testing for safe discharge planning. Nursing to assist with cares, meals, medication assistance, activities.    Oropharyngeal dysphagia   Secondary to CVA  Plan: Continue minced and moist level 5 texture with mildly thickened liquids.  Patient also requires assistance for feeding with all meals.  SLP to evaluate and treat.    Left distal femur fracture status post ORIF on 11/30/2024  Fall, subsequent encounter  Pain appears managed  Plan: Continue pain management with Tylenol 975 mg every 8 hours as needed, Dilaudid 1 to 2 mg every 4 hours as needed, methocarbamol 500 mg every 6 hours as needed.  Continue apixaban 5 mg twice daily for DVT prophylaxis.  TG shapers for leg swelling.  Nonweightbearing to left leg, left knee immobilizer off in bed, apply brace with transferring and ambulation.  Therapy as below.  Follow-up with Ortho per recommendations.    Acute blood loss anemia  Baseline hemoglobin 12-13  Hemoglobin ofelia 6.8 on 11/30/2024, received 2 units PRBC  Hemoglobin 10.4 on 12/6/2024  Plan: CBC 12/10.  Monitor for signs and symptoms of bleeding.    Hyponatremia  Sodium level of 132 112/5/2024  Plan: BMP 12/10.     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 Avoid nephrotoxins. Renally dose medications as indicated.    History of paroxysmal atrial fibrillation  Patient diagnosed in 10/2022 at time of previous CVA, however patient had event monitor following that did not see clear A-fib but PAC and PVCs.  Cardiology notes that patients with mild mild disease have increased risk of hemorrhage, therefore patient's anticoagulation was discontinued.  Patient started on apixaban on 12/6.  Plan: Continue apixaban 5 mg twice daily and atenolol 25 mg daily.  Monitor heart  rate.    Essential hypertension  PTA losartan dose increased inpatient  SBP controlled mostly 110-140, one higher BP  Plan: Continue losartan 50 mg twice daily, atenolol 25 mg daily.  Monitor blood pressure.  SBP goal less than 130.    Dyslipidemia  Plan: Continue rosuvastatin 10 mg daily.    Depression  Anxiety  Insomnia  Staff noting patient has days and nights mixed up  Plan: Start melatonin 3 mg at bedtime.  Try to keep patient awake with lights on during the day.  Continue sertraline 50 mg daily.  Monitor mood and symptoms.  Consider ACP referral as needed.    Slow transit constipation  Bowels moving regularly per nursing documentation  Plan: Continue MiraLAX 17 g daily.  Monitor mood and symptoms.  Consider ACP referral as needed.    Obesity, BMI 30.65  Complicates care  Plan: Encourage weight loss.  Dietitian follows in the TCU.    Physical deconditioning  Secondary to recent hospitalization, medical conditions as above  Plan: Encourage participation in physical therapy/occupational therapy for strengthening and deconditioning. Discharge planning per their recommendation. Social work to assist with d/c planning.          Incidental finding  Thyroid nodule seen on CTA head neck  Plan: Follow up outpatient for US evaluation.      Disclaimer: This note may contain text created using speech-recognition software and may contain unintended word substitutions.        Total time spent with patient visit at the skilled nursing facility was 50 minutes including patient visit and return to speak with spouse later in the morning, review of past records, medication reconciliation, review of admission orders, discussion with nursing staff regarding plan of care.      Electronically signed by:  ALFREDO Pugh CNP                   Sincerely,        ALFREDO Pugh CNP

## 2024-12-09 NOTE — LETTER
[facility]   To:             Please give to facility    From:   Jacinta Velazco RN  Care Coordinator   [facility]   P: [phone]  [email or web address]   Patient Name:  Candida Rose YOB: 1940   Admit date: 12/7/24      *Information Needed:  Please contact me when the patient will discharge (or if they will move to long term care)- include the discharge date, disposition, and main diagnosis   If the patient is discharged with home care services, please provide the name of the agency    Also- Please inform me if a care conference is being held.   Phone, Fax or Email with information                        Thank you

## 2024-12-09 NOTE — PROGRESS NOTES
Clinic Care Coordination Contact  Care Coordination Transition Communication    Clinical Data: Patient was hospitalized at [facility] from 11/28/24 to 12/7/24 with diagnosis of Hip pain, left; Closed fracture of left distal femur.     Assessment: Patient has transitioned to TCU/ARU for short term rehabilitation:    Facility Name: [facility]   Transition Communication:  Notified facility of Primary Care- Care Coordination support via TCU hand-in e-mail.    Plan: Care Coordinator will await notification from facility staff informing of patient's discharge plans/needs. Care Coordinator will review chart and outreach to facility staff every 4 weeks and as needed.     Jacinta Velazco RN, BSN, CPHN, CCM  [facility] Ambulatory Care Management  [facility]  [email or web address]  Office: [phone]  Employed by [employer]

## 2024-12-09 NOTE — PATIENT INSTRUCTIONS
Orders  Candida Rose  1940  1) BMP, CBC 12/10. Diagnosis: CKD, anemia  2) SLP to evaluate and treat for CVA, dysphagia  3) Start melatonin 3 mg at bedtime.  Diagnosis: insomnia  4) Try to keep patient awake with lights on during the day to help with day/night reversal  Margarita Jade, ALFREDO CNP on 12/9/2024 at 2:49 PM

## 2024-12-10 LAB
ANION GAP SERPL CALCULATED.3IONS-SCNC: 14 MMOL/L (ref 7–15)
BUN SERPL-MCNC: 35.2 MG/DL (ref 8–23)
CALCIUM SERPL-MCNC: 8.2 MG/DL (ref 8.8–10.4)
CHLORIDE SERPL-SCNC: 96 MMOL/L (ref 98–107)
CREAT SERPL-MCNC: 0.9 MG/DL (ref 0.51–0.95)
EGFRCR SERPLBLD CKD-EPI 2021: 64 ML/MIN/1.73M2
ERYTHROCYTE [DISTWIDTH] IN BLOOD BY AUTOMATED COUNT: 12.9 % (ref 10–15)
GAMMA INTERFERON BACKGROUND BLD IA-ACNC: 0.04 IU/ML
GLUCOSE SERPL-MCNC: 102 MG/DL (ref 70–99)
HCO3 SERPL-SCNC: 22 MMOL/L (ref 22–29)
HCT VFR BLD AUTO: 28.4 % (ref 35–47)
HGB BLD-MCNC: 8.9 G/DL (ref 11.7–15.7)
M TB IFN-G BLD-IMP: NEGATIVE
M TB IFN-G CD4+ BCKGRND COR BLD-ACNC: 1.02 IU/ML
MCH RBC QN AUTO: 29.1 PG (ref 26.5–33)
MCHC RBC AUTO-ENTMCNC: 31.3 G/DL (ref 31.5–36.5)
MCV RBC AUTO: 93 FL (ref 78–100)
MITOGEN IGNF BCKGRD COR BLD-ACNC: 0 IU/ML
MITOGEN IGNF BCKGRD COR BLD-ACNC: 0 IU/ML
PLATELET # BLD AUTO: 477 10E3/UL (ref 150–450)
POTASSIUM SERPL-SCNC: 4.5 MMOL/L (ref 3.4–5.3)
QUANTIFERON MITOGEN: 1.06 IU/ML
QUANTIFERON NIL TUBE: 0.04 IU/ML
QUANTIFERON TB1 TUBE: 0.04 IU/ML
QUANTIFERON TB2 TUBE: 0.04
RBC # BLD AUTO: 3.06 10E6/UL (ref 3.8–5.2)
SODIUM SERPL-SCNC: 132 MMOL/L (ref 135–145)
WBC # BLD AUTO: 12.4 10E3/UL (ref 4–11)

## 2024-12-10 PROCEDURE — 36415 COLL VENOUS BLD VENIPUNCTURE: CPT | Mod: ORL | Performed by: NURSE PRACTITIONER

## 2024-12-10 PROCEDURE — 85027 COMPLETE CBC AUTOMATED: CPT | Mod: ORL | Performed by: NURSE PRACTITIONER

## 2024-12-10 PROCEDURE — P9604 ONE-WAY ALLOW PRORATED TRIP: HCPCS | Mod: ORL | Performed by: NURSE PRACTITIONER

## 2024-12-10 PROCEDURE — 80048 BASIC METABOLIC PNL TOTAL CA: CPT | Mod: ORL | Performed by: NURSE PRACTITIONER

## 2024-12-10 NOTE — PROGRESS NOTES
Orders  Candida Rose  1940  1) Push fluids- 8oz for every 1 hour she is awake  2) BMP 12/12. dX: dehydration  ALFREDO Pugh CNP on 12/10/2024 at 12:02 PM

## 2024-12-11 ENCOUNTER — LAB REQUISITION (OUTPATIENT)
Dept: LAB | Facility: CLINIC | Age: 84
End: 2024-12-11
Payer: MEDICARE

## 2024-12-11 DIAGNOSIS — E86.0 DEHYDRATION: ICD-10-CM

## 2024-12-12 ENCOUNTER — TRANSITIONAL CARE UNIT VISIT (OUTPATIENT)
Dept: GERIATRICS | Facility: CLINIC | Age: 84
End: 2024-12-12
Payer: MEDICARE

## 2024-12-12 VITALS
SYSTOLIC BLOOD PRESSURE: 134 MMHG | OXYGEN SATURATION: 96 % | TEMPERATURE: 98.3 F | HEART RATE: 62 BPM | HEIGHT: 68 IN | BODY MASS INDEX: 30.46 KG/M2 | WEIGHT: 201 LBS | RESPIRATION RATE: 18 BRPM | DIASTOLIC BLOOD PRESSURE: 51 MMHG

## 2024-12-12 DIAGNOSIS — F41.9 ANXIETY: ICD-10-CM

## 2024-12-12 DIAGNOSIS — K59.01 SLOW TRANSIT CONSTIPATION: ICD-10-CM

## 2024-12-12 DIAGNOSIS — D62 ANEMIA DUE TO BLOOD LOSS, ACUTE: ICD-10-CM

## 2024-12-12 DIAGNOSIS — R13.12 OROPHARYNGEAL DYSPHAGIA: ICD-10-CM

## 2024-12-12 DIAGNOSIS — Z86.79 HISTORY OF ATRIAL FIBRILLATION: ICD-10-CM

## 2024-12-12 DIAGNOSIS — I69.320 APHASIA AS LATE EFFECT OF CEREBROVASCULAR ACCIDENT (CVA): Primary | ICD-10-CM

## 2024-12-12 DIAGNOSIS — F01.50 VASCULAR DEMENTIA, UNSPECIFIED DEMENTIA SEVERITY, UNSPECIFIED WHETHER BEHAVIORAL, PSYCHOTIC, OR MOOD DISTURBANCE OR ANXIETY (H): ICD-10-CM

## 2024-12-12 DIAGNOSIS — G47.00 INSOMNIA, UNSPECIFIED TYPE: ICD-10-CM

## 2024-12-12 DIAGNOSIS — I10 ESSENTIAL HYPERTENSION: ICD-10-CM

## 2024-12-12 DIAGNOSIS — Z87.81 S/P ORIF (OPEN REDUCTION INTERNAL FIXATION) FRACTURE: ICD-10-CM

## 2024-12-12 DIAGNOSIS — R53.81 PHYSICAL DECONDITIONING: ICD-10-CM

## 2024-12-12 DIAGNOSIS — Z86.73 HISTORY OF CVA (CEREBROVASCULAR ACCIDENT): ICD-10-CM

## 2024-12-12 DIAGNOSIS — E87.1 HYPONATREMIA: ICD-10-CM

## 2024-12-12 DIAGNOSIS — E66.811 CLASS 1 OBESITY WITH SERIOUS COMORBIDITY AND BODY MASS INDEX (BMI) OF 30.0 TO 30.9 IN ADULT, UNSPECIFIED OBESITY TYPE: ICD-10-CM

## 2024-12-12 DIAGNOSIS — N18.2 CKD (CHRONIC KIDNEY DISEASE) STAGE 2, GFR 60-89 ML/MIN: ICD-10-CM
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
## 2024-12-12 DIAGNOSIS — Z98.890 S/P ORIF (OPEN REDUCTION INTERNAL FIXATION) FRACTURE: ICD-10-CM

## 2024-12-12 DIAGNOSIS — I67.5 MOYAMOYA DISEASE: ICD-10-CM

## 2024-12-12 DIAGNOSIS — S72.402D CLOSED FRACTURE OF DISTAL END OF LEFT FEMUR WITH ROUTINE HEALING, UNSPECIFIED FRACTURE MORPHOLOGY, SUBSEQUENT ENCOUNTER: ICD-10-CM

## 2024-12-12 DIAGNOSIS — F32.A DEPRESSION, UNSPECIFIED DEPRESSION TYPE: ICD-10-CM

## 2024-12-12 DIAGNOSIS — E78.5 DYSLIPIDEMIA: ICD-10-CM

## 2024-12-12 LAB
ANION GAP SERPL CALCULATED.3IONS-SCNC: 13 MMOL/L (ref 7–15)
BUN SERPL-MCNC: 37.3 MG/DL (ref 8–23)
CALCIUM SERPL-MCNC: 8.7 MG/DL (ref 8.8–10.4)
CHLORIDE SERPL-SCNC: 98 MMOL/L (ref 98–107)
CREAT SERPL-MCNC: 0.88 MG/DL (ref 0.51–0.95)
EGFRCR SERPLBLD CKD-EPI 2021: 65 ML/MIN/1.73M2
GLUCOSE SERPL-MCNC: 101 MG/DL (ref 70–99)
HCO3 SERPL-SCNC: 23 MMOL/L (ref 22–29)
POTASSIUM SERPL-SCNC: 4.4 MMOL/L (ref 3.4–5.3)
SODIUM SERPL-SCNC: 134 MMOL/L (ref 135–145)

## 2024-12-12 PROCEDURE — 80048 BASIC METABOLIC PNL TOTAL CA: CPT | Mod: ORL | Performed by: NURSE PRACTITIONER

## 2024-12-12 NOTE — PATIENT INSTRUCTIONS
Christie Rose  1940  1) Please reinforce dressing  2) Push fluids 2L per day is goal  3) BMP, CBC 12/16. Diagnosis: dehydration, anemia  4) Update losartan hold parameter to hold if SBP<110.   5) check weight weekly  ALFREDO Pugh CNP on 12/12/2024 at 10:44 AM

## 2024-12-12 NOTE — LETTER
12/12/2024      Candida Rose  [address] [facility] [place] [id number]        [facility] GERIATRICS    Chief Complaint   Patient presents with     RECHECK     HPI:  Candida Rose is a 84 year old  (1940), who is being seen today for an episodic care visit at: [facility]  ([facility]) [[id number]].     Past medical history significant for hypertension, dyslipidemia, paroxysmal atrial fibrillation, moyamoya disease, CVA with left hemiparesis, dementia, CKD, OA, anxiety, depression, obesity     Summary of recent hospitalization:  Patient was hospitalized at [facility] from 1/28/2024-12/7/2024 for left femur fracture status post ORIF and CVA.  Patient presented to the emergency department for evaluation of a fall that was witnessed by her spouse.  In the emergency department laboratory evaluation revealed sodium 132, creatinine 1.01, hemoglobin 12.4.  Imaging significant for left distal femoral fracture with displacement and comminution.  Ortho was consulted and patient status post ORIF on 11/29/2024.   cc. Hemoglobin dropped to ofelia of 6.8 and required PRBC. Postoperatively patient with drowsiness, confusion, right-sided weakness, aphasia.  CTA head and neck on 11/29 revealed multiple intracranial arterial stenosis and occlusions, bilateral cranioplasty's, neck CTA revealed right distal cervical artery short segment occlusion with reconstitution, left vertebral artery origin and left V1 segment occluded with reconstitution in the left proximal P2 segment, extensive cervical artery stenosis, thyroid nodule, recommend nonemergent thyroid ultrasound.  CT head perfusion on 11/29 revealed large areas of Tmax greater than 6 seconds within the bilateral frontal lobes STEPHEN and MCA territories and to a lesser extent right greater than left PCA territories, findings correspond to areas of brain rest, no abnormal perfusion on CBF less than 30% to suggest core infarct, however qualitatively there is  "decreased CBD within the bilateral frontal lobes and left occipital temporal region.  MRI of brain on 12/1 revealed multifocal, acute infarctions of the cerebral parenchyma affecting the bifrontal lobes, left basal ganglia and anterior aspect of the medial left temporal lobe, edematous appearing cerebral parenchyma on the left frontal lobe which mildly effaces the adjacent lateral ventricle frontal horn.  Repeat CT head revealed no evidence of hemorrhagic conversion, no definite new infarct. Echo on 12/2 revealed LVEF 50 to 55%, mild to moderate apical wall hypokinesis, no thrombus present. Neurology was consulted. Patient started on apixaban. Follow up with general neurology in 6 weeks. SLP consulted for oropharyngeal dysphagia and recommended minced and moist diet with mildly thickened consistency. Continues to have have hyponatremia at discharge, Na level 132 on 12/5. PTA aspirin, cetrizine, B12, Mg and multivitamin discontinued inpatient. Palliative care consulted and plan is for patient to attempt rehab and if unsuccessful will transition to comfort approach. Discharged to TCU for physical rehabilitation and medical management.     Today patient was seen for follow-up in the TCU.  Patient with aphasia, says \"hi.\"  And then unable to answer any other questions for me this morning.  She does appear comfortable.  Patient continues to be max assistant for all cares.  Patient had some softer blood pressures last evening, that improved this morning.  She remains afebrile.    Spoke to patient's spouse today regarding plan of care. We reviewed importance of pushing fluids. He is ok if she needs to receive IV fluids next week. He reports she was on these in the hospital. He reports she ate better and is drinking better today than she was yesterday and that overall she is more alert. He also notes she was picking at her dressing. I examined it again and spoke with nursing  and she had infact removed the reinforced area. " "I asked nursing to reinforce and trim her nails. Also discussed she may need pants or ACE wraps. Nursing also found something she could hold to fidget with. Reviewed plan of care and answered questions.     Reviewed facility EMR including medications, recent nursing notes and vital signs. Discussed plan of care with nursing.    Allergies, and PMH/PSH reviewed in EPIC today.  REVIEW OF SYSTEMS:  Unobtainable secondary to cognitive impairment.  and Unobtainable secondary to aphasia.     Objective:   /51   Pulse 62   Temp 98.3  F (36.8  C)   Resp 18   Ht 1.727 m (5' 8\")   Wt 91.2 kg (201 lb)   LMP  (LMP Unknown)   SpO2 96%   BMI 30.56 kg/m    GENERAL APPEARANCE:  Alert, in NAD  HEENT: normocephalic, moist mucous membranes, nose without drainage or crusting  RESP:  respiratory effort normal, no respiratory distress, Lung sounds clear anteriorly, patient is on RA  CV: auscultation of heart done, rate and rhythm regular.  ABDOMEN: + bowel sounds, soft, nontender, no grimacing or guarding with palpation.  M/S: generalized bilateral lower extremity edema- slightly worse to left leg  SKIN: dressing to left thigh is saturated about 40%- also pulling away from skin- asked nursing to reinforce dressing today  NEURO: aphasia  PSYCH: affect flat and mood normal      Labs done in SNF are in [place] [facility]. Please refer to them using EPIC/Care Everywhere. and Recent labs in [facility] reviewed by me today.     Assessment/Plan:  Acute bilateral frontal CVA and left basal ganglia CVA with aphasia and right-sided weakness  History of CVA with left-sided hemiparesis  History of Moyamoya Disease  Vascular dementia  Patient started on apixaban inpatient  With ongoing aphasia and right sided weakness  Plan: Continue apixaban 5 mg twice daily.  Monitor blood pressure.  SBP goal less than 130.  Therapy as below.  Follow-up with neurology 6 to 8 weeks.  SLP to evaluate and treat. OCCUPATIONAL THERAPY to complete cognitive testing for " safe discharge planning. Nursing to assist with cares, meals, medication assistance, activities.     Oropharyngeal dysphagia   Secondary to CVA  Plan: Continue minced and moist level 5 texture with mildly thickened liquids.  Patient also requires assistance for feeding with all meals.  SLP to evaluate and treat. At risk for malnutrition. Check weekly weight.     Left distal femur fracture status post ORIF on 11/30/2024  Fall, subsequent encounter  She appears comfortable today  Plan: Continue pain management with Tylenol 975 mg every 8 hours as needed, Dilaudid 1 to 2 mg every 4 hours as needed, methocarbamol 500 mg every 6 hours as needed.  Continue apixaban 5 mg twice daily for DVT prophylaxis.  TG shapers for leg swelling.  Nonweightbearing to left leg, left knee immobilizer off in bed, apply brace with transferring and ambulation.  Therapy as below.  Follow-up with Ortho per recommendations.     Acute blood loss anemia  Baseline hemoglobin 12-13  Hemoglobin ofelia 6.8 on 11/30/2024, received 2 units PRBC  Hemoglobin 8.9 on 12/10/2024  Plan: CBC 12/16.  Monitor for signs and symptoms of bleeding.     Hyponatremia  mild  Sodium level of 134 on 12/12/2024- up slightly from previous  Plan: BMP 12/16.      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 BMP 12/16. Avoid nephrotoxins. Renally dose medications as indicated.     History of paroxysmal atrial fibrillation  Patient diagnosed in 10/2022 at time of previous CVA, however patient had event monitor following that did not see clear A-fib but PAC and PVCs.  Cardiology notes that patients with mild mild disease have increased risk of hemorrhage, therefore patient's anticoagulation was  discontinued.  Patient started on apixaban on 12/6.  HR 60-70s  Plan: Continue apixaban 5 mg twice daily and atenolol 25 mg daily.  Monitor heart rate.     Essential hypertension  PTA losartan dose increased inpatient  SBP ok control 110-130s mostly, had softer BP last evening, but is fine now this morning  Plan: Continue losartan 50 mg twice daily, atenolol 25 mg daily.  Monitor blood pressure.  SBP goal less than 130.     Dyslipidemia  Plan: Continue rosuvastatin 10 mg daily.     Depression  Anxiety  Insomnia  Staff noting patient has days and nights mixed up  Plan: Continue melatonin 3 mg at bedtime.  Try to keep patient awake with lights on during the day.  Continue sertraline 50 mg daily.  Monitor mood and symptoms.  Consider ACP referral as needed.     Slow transit constipation  Bowels moving regularly per nursing documentation  Plan: Continue MiraLAX 17 g daily.  Monitor mood and symptoms.  Consider ACP referral as needed.     Obesity, BMI 30.56  Complicates care  Plan: Encourage weight loss.  Dietitian follows in the TCU.     Physical deconditioning  Secondary to recent hospitalization, medical conditions as above  Plan: Encourage participation in physical therapy/occupational therapy for strengthening and deconditioning. Discharge planning per their recommendation. Social work to assist with d/c planning.     Incidental finding  Thyroid nodule seen on CTA head neck  Plan: Follow up outpatient for US evaluation.       MED REC REQUIRED  Post Medication Reconciliation Status:  Medication reconciliation previously completed during another office visit    Disclaimer: This note may contain text created using speech-recognition software and may contain unintended word substitutions.     Electronically signed by: ALFREDO Pugh CNP         Sincerely,        ALFREDO Pugh CNP

## 2024-12-12 NOTE — PROGRESS NOTES
[facility] GERIATRICS    Chief Complaint   Patient presents with    RECHECK     HPI:  Candida Rose is a 84 year old  (1940), who is being seen today for an episodic care visit at: [facility]  ([facility]) [[id number]].     Past medical history significant for hypertension, dyslipidemia, paroxysmal atrial fibrillation, moyamoya disease, CVA with left hemiparesis, dementia, CKD, OA, anxiety, depression, obesity     Summary of recent hospitalization:  Patient was hospitalized at [facility] from 1/28/2024-12/7/2024 for left femur fracture status post ORIF and CVA.  Patient presented to the emergency department for evaluation of a fall that was witnessed by her spouse.  In the emergency department laboratory evaluation revealed sodium 132, creatinine 1.01, hemoglobin 12.4.  Imaging significant for left distal femoral fracture with displacement and comminution.  Ortho was consulted and patient status post ORIF on 11/29/2024.   cc. Hemoglobin dropped to ofelia of 6.8 and required PRBC. Postoperatively patient with drowsiness, confusion, right-sided weakness, aphasia.  CTA head and neck on 11/29 revealed multiple intracranial arterial stenosis and occlusions, bilateral cranioplasty's, neck CTA revealed right distal cervical artery short segment occlusion with reconstitution, left vertebral artery origin and left V1 segment occluded with reconstitution in the left proximal P2 segment, extensive cervical artery stenosis, thyroid nodule, recommend nonemergent thyroid ultrasound.  CT head perfusion on 11/29 revealed large areas of Tmax greater than 6 seconds within the bilateral frontal lobes STEPHEN and MCA territories and to a lesser extent right greater than left PCA territories, findings correspond to areas of brain rest, no abnormal perfusion on CBF less than 30% to suggest core infarct, however qualitatively there is decreased CBD within the bilateral frontal lobes and left occipital temporal  "region.  MRI of brain on 12/1 revealed multifocal, acute infarctions of the cerebral parenchyma affecting the bifrontal lobes, left basal ganglia and anterior aspect of the medial left temporal lobe, edematous appearing cerebral parenchyma on the left frontal lobe which mildly effaces the adjacent lateral ventricle frontal horn.  Repeat CT head revealed no evidence of hemorrhagic conversion, no definite new infarct. Echo on 12/2 revealed LVEF 50 to 55%, mild to moderate apical wall hypokinesis, no thrombus present. Neurology was consulted. Patient started on apixaban. Follow up with general neurology in 6 weeks. SLP consulted for oropharyngeal dysphagia and recommended minced and moist diet with mildly thickened consistency. Continues to have have hyponatremia at discharge, Na level 132 on 12/5. PTA aspirin, cetrizine, B12, Mg and multivitamin discontinued inpatient. Palliative care consulted and plan is for patient to attempt rehab and if unsuccessful will transition to comfort approach. Discharged to TCU for physical rehabilitation and medical management.     Today patient was seen for follow-up in the TCU.  Patient with aphasia, says \"hi.\"  And then unable to answer any other questions for me this morning.  She does appear comfortable.  Patient continues to be max assistant for all cares.  Patient had some softer blood pressures last evening, that improved this morning.  She remains afebrile.    Spoke to patient's spouse today regarding plan of care. We reviewed importance of pushing fluids. He is ok if she needs to receive IV fluids next week. He reports she was on these in the hospital. He reports she ate better and is drinking better today than she was yesterday and that overall she is more alert. He also notes she was picking at her dressing. I examined it again and spoke with nursing  and she had infact removed the reinforced area. I asked nursing to reinforce and trim her nails. Also discussed she may need " "pants or ACE wraps. Nursing also found something she could hold to fidget with. Reviewed plan of care and answered questions.     Reviewed facility EMR including medications, recent nursing notes and vital signs. Discussed plan of care with nursing.    Allergies, and PMH/PSH reviewed in EPIC today.  REVIEW OF SYSTEMS:  Unobtainable secondary to cognitive impairment.  and Unobtainable secondary to aphasia.     Objective:   /51   Pulse 62   Temp 98.3  F (36.8  C)   Resp 18   Ht 1.727 m (5' 8\")   Wt 91.2 kg (201 lb)   LMP  (LMP Unknown)   SpO2 96%   BMI 30.56 kg/m    GENERAL APPEARANCE:  Alert, in NAD  HEENT: normocephalic, moist mucous membranes, nose without drainage or crusting  RESP:  respiratory effort normal, no respiratory distress, Lung sounds clear anteriorly, patient is on RA  CV: auscultation of heart done, rate and rhythm regular.  ABDOMEN: + bowel sounds, soft, nontender, no grimacing or guarding with palpation.  M/S: generalized bilateral lower extremity edema- slightly worse to left leg  SKIN: dressing to left thigh is saturated about 40%- also pulling away from skin- asked nursing to reinforce dressing today  NEURO: aphasia  PSYCH: affect flat and mood normal      Labs done in SNF are in [place] [facility]. Please refer to them using [employer]/Care Everywhere. and Recent labs in EPIC reviewed by me today.     Assessment/Plan:  Acute bilateral frontal CVA and left basal ganglia CVA with aphasia and right-sided weakness  History of CVA with left-sided hemiparesis  History of Moyamoya Disease  Vascular dementia  Patient started on apixaban inpatient  With ongoing aphasia and right sided weakness  Plan: Continue apixaban 5 mg twice daily.  Monitor blood pressure.  SBP goal less than 130.  Therapy as below.  Follow-up with neurology 6 to 8 weeks.  SLP to evaluate and treat. OCCUPATIONAL THERAPY to complete cognitive testing for safe discharge planning. Nursing to assist with cares, meals, medication " assistance, activities.     Oropharyngeal dysphagia   Secondary to CVA  Plan: Continue minced and moist level 5 texture with mildly thickened liquids.  Patient also requires assistance for feeding with all meals.  SLP to evaluate and treat. At risk for malnutrition. Check weekly weight.     Left distal femur fracture status post ORIF on 11/30/2024  Fall, subsequent encounter  She appears comfortable today  Plan: Continue pain management with Tylenol 975 mg every 8 hours as needed, Dilaudid 1 to 2 mg every 4 hours as needed, methocarbamol 500 mg every 6 hours as needed.  Continue apixaban 5 mg twice daily for DVT prophylaxis.  TG shapers for leg swelling.  Nonweightbearing to left leg, left knee immobilizer off in bed, apply brace with transferring and ambulation.  Therapy as below.  Follow-up with Ortho per recommendations.     Acute blood loss anemia  Baseline hemoglobin 12-13  Hemoglobin ofelia 6.8 on 11/30/2024, received 2 units PRBC  Hemoglobin 8.9 on 12/10/2024  Plan: CBC 12/16.  Monitor for signs and symptoms of bleeding.     Hyponatremia  mild  Sodium level of 134 on 12/12/2024- up slightly from previous  Plan: BMP 12/16.      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 BMP 12/16. Avoid nephrotoxins. Renally dose medications as indicated.     History of paroxysmal atrial fibrillation  Patient diagnosed in 10/2022 at time of previous CVA, however patient had event monitor following that did not see clear A-fib but PAC and PVCs.  Cardiology notes that patients with mild mild disease have increased risk of hemorrhage, therefore patient's anticoagulation was discontinued.  Patient started on apixaban on 12/6.  HR 60-70s  Plan: Continue apixaban 5 mg  twice daily and atenolol 25 mg daily.  Monitor heart rate.     Essential hypertension  PTA losartan dose increased inpatient  SBP ok control 110-130s mostly, had softer BP last evening, but is fine now this morning  Plan: Continue losartan 50 mg twice daily, atenolol 25 mg daily.  Monitor blood pressure.  SBP goal less than 130.     Dyslipidemia  Plan: Continue rosuvastatin 10 mg daily.     Depression  Anxiety  Insomnia  Staff noting patient has days and nights mixed up  Plan: Continue melatonin 3 mg at bedtime.  Try to keep patient awake with lights on during the day.  Continue sertraline 50 mg daily.  Monitor mood and symptoms.  Consider ACP referral as needed.     Slow transit constipation  Bowels moving regularly per nursing documentation  Plan: Continue MiraLAX 17 g daily.  Monitor mood and symptoms.  Consider ACP referral as needed.     Obesity, BMI 30.56  Complicates care  Plan: Encourage weight loss.  Dietitian follows in the TCU.     Physical deconditioning  Secondary to recent hospitalization, medical conditions as above  Plan: Encourage participation in physical therapy/occupational therapy for strengthening and deconditioning. Discharge planning per their recommendation. Social work to assist with d/c planning.     Incidental finding  Thyroid nodule seen on CTA head neck  Plan: Follow up outpatient for US evaluation.       MED REC REQUIRED  Post Medication Reconciliation Status:  Medication reconciliation previously completed during another office visit    Disclaimer: This note may contain text created using speech-recognition software and may contain unintended word substitutions.     Electronically signed by: ALFREDO Pugh CNP

## 2024-12-15 ENCOUNTER — LAB REQUISITION (OUTPATIENT)
Dept: LAB | Facility: CLINIC | Age: 84
End: 2024-12-15
Payer: MEDICARE

## 2024-12-15 DIAGNOSIS — E86.0 DEHYDRATION: ICD-10-CM

## 2024-12-15 DIAGNOSIS — D64.9 ANEMIA, UNSPECIFIED: ICD-10-CM

## 2024-12-16 ENCOUNTER — TRANSITIONAL CARE UNIT VISIT (OUTPATIENT)
Dept: GERIATRICS | Facility: CLINIC | Age: 84
End: 2024-12-16
Payer: MEDICARE

## 2024-12-16 VITALS
DIASTOLIC BLOOD PRESSURE: 76 MMHG | SYSTOLIC BLOOD PRESSURE: 113 MMHG | HEIGHT: 68 IN | WEIGHT: 201.6 LBS | OXYGEN SATURATION: 91 % | HEART RATE: 122 BPM | BODY MASS INDEX: 30.55 KG/M2 | TEMPERATURE: 97.6 F | RESPIRATION RATE: 20 BRPM

## 2024-12-16 DIAGNOSIS — K59.01 SLOW TRANSIT CONSTIPATION: ICD-10-CM

## 2024-12-16 DIAGNOSIS — S72.402D CLOSED FRACTURE OF DISTAL END OF LEFT FEMUR WITH ROUTINE HEALING, UNSPECIFIED FRACTURE MORPHOLOGY, SUBSEQUENT ENCOUNTER: ICD-10-CM

## 2024-12-16 DIAGNOSIS — Z87.81 S/P ORIF (OPEN REDUCTION INTERNAL FIXATION) FRACTURE: ICD-10-CM

## 2024-12-16 DIAGNOSIS — I10 ESSENTIAL HYPERTENSION: ICD-10-CM

## 2024-12-16 DIAGNOSIS — Z47.89 AFTERCARE FOLLOWING SURGERY OF THE MUSCULOSKELETAL SYSTEM: Primary | ICD-10-CM
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
## 2024-12-16 DIAGNOSIS — Z86.73 RECENT CEREBROVASCULAR ACCIDENT (CVA): ICD-10-CM

## 2024-12-16 DIAGNOSIS — F33.0 MILD RECURRENT MAJOR DEPRESSION (H): ICD-10-CM

## 2024-12-16 DIAGNOSIS — I67.5 MOYAMOYA DISEASE: ICD-10-CM

## 2024-12-16 DIAGNOSIS — Z86.73 HISTORY OF CVA (CEREBROVASCULAR ACCIDENT): ICD-10-CM

## 2024-12-16 DIAGNOSIS — I48.0 PAROXYSMAL ATRIAL FIBRILLATION (H): ICD-10-CM

## 2024-12-16 DIAGNOSIS — R53.81 PHYSICAL DECONDITIONING: ICD-10-CM

## 2024-12-16 DIAGNOSIS — Z98.890 S/P ORIF (OPEN REDUCTION INTERNAL FIXATION) FRACTURE: ICD-10-CM

## 2024-12-16 DIAGNOSIS — D62 ANEMIA DUE TO BLOOD LOSS, ACUTE: ICD-10-CM

## 2024-12-16 DIAGNOSIS — E78.5 HYPERLIPIDEMIA, UNSPECIFIED HYPERLIPIDEMIA TYPE: ICD-10-CM

## 2024-12-16 PROBLEM — F01.53 VASCULAR DEMENTIA WITH DEPRESSED MOOD (H): Status: RESOLVED | Noted: 2024-12-07 | Resolved: 2024-12-16

## 2024-12-16 LAB
ANION GAP SERPL CALCULATED.3IONS-SCNC: 14 MMOL/L (ref 7–15)
BUN SERPL-MCNC: 45.6 MG/DL (ref 8–23)
CALCIUM SERPL-MCNC: 8.9 MG/DL (ref 8.8–10.4)
CHLORIDE SERPL-SCNC: 101 MMOL/L (ref 98–107)
CREAT SERPL-MCNC: 0.99 MG/DL (ref 0.51–0.95)
EGFRCR SERPLBLD CKD-EPI 2021: 57 ML/MIN/1.73M2
ERYTHROCYTE [DISTWIDTH] IN BLOOD BY AUTOMATED COUNT: 12.7 % (ref 10–15)
GLUCOSE SERPL-MCNC: 109 MG/DL (ref 70–99)
HCO3 SERPL-SCNC: 21 MMOL/L (ref 22–29)
HCT VFR BLD AUTO: 32.2 % (ref 35–47)
HGB BLD-MCNC: 10 G/DL (ref 11.7–15.7)
MCH RBC QN AUTO: 29.2 PG (ref 26.5–33)
MCHC RBC AUTO-ENTMCNC: 31.1 G/DL (ref 31.5–36.5)
MCV RBC AUTO: 94 FL (ref 78–100)
PLATELET # BLD AUTO: 523 10E3/UL (ref 150–450)
POTASSIUM SERPL-SCNC: 4.5 MMOL/L (ref 3.4–5.3)
RBC # BLD AUTO: 3.43 10E6/UL (ref 3.8–5.2)
SODIUM SERPL-SCNC: 136 MMOL/L (ref 135–145)
WBC # BLD AUTO: 10.4 10E3/UL (ref 4–11)

## 2024-12-16 PROCEDURE — P9604 ONE-WAY ALLOW PRORATED TRIP: HCPCS | Mod: ORL | Performed by: NURSE PRACTITIONER

## 2024-12-16 PROCEDURE — 80048 BASIC METABOLIC PNL TOTAL CA: CPT | Mod: ORL | Performed by: NURSE PRACTITIONER

## 2024-12-16 PROCEDURE — 36415 COLL VENOUS BLD VENIPUNCTURE: CPT | Mod: ORL | Performed by: NURSE PRACTITIONER

## 2024-12-16 PROCEDURE — 85027 COMPLETE CBC AUTOMATED: CPT | Mod: ORL | Performed by: NURSE PRACTITIONER

## 2024-12-16 PROCEDURE — 99305 1ST NF CARE MODERATE MDM 35: CPT | Performed by: INTERNAL MEDICINE

## 2024-12-16 NOTE — PROGRESS NOTES
[facility] GERIATRICS  INITIAL VISIT NOTE  December 16, 2024      PRIMARY CARE PROVIDER AND CLINIC:  Chani Peoples [address] [facility] [address]    [facility] Medical Record Number:  [id number]  Place of Service where encounter took place:  [facility] ([facility]  ([facility]) [[id number]]    Chief Complaint   Patient presents with    Hospital F/U       HPI:    Canddia Rose is a 84 year old  (1940) female seen today at [facility]. 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  She was hospitalized at [facility] from 11/28/2024 to 12/7/2024 where she presented with leg pain after a fall and was found to have a comminuted distal left into prosthetic femur fracture for which she is s/p ORIF (11/29/24). Surgery without complication. She developed acute blood loss anemia (Hgb 12.4 --> ofelia 6.8) Post op course notable for new R sided weakness and imaging with acute CVA felt secondary to hypoperfusionin setting of moyamoya vs cardioembolic/a-fib. (ECG 11/28 w/ a-fib). She is new on DOAC. She was admitted to this facility for  rehab, medical management, and nursing care.      History obtained from: facility chart records, facility staff, [facility] chart review, and Care Everywhere [facility] chart review.      Today, Ms. Rose is seen in her room laying in bed.  She is a limited historian due to post CVA aphasia.  Concern for wound site infection.  I was able to get a good look at her left leg incision with both her the nurse and the DON.  There is a clear transition point where the proximal half of the incision is erythematous.  It is not warm.  It is not draining.  Feels like some dependent subQ edema. No fevers.  She had been picking at the incision and they have worked hard to reinforce the dressing and try to keep it very clean.  The facility is going to contact the surgeon and include a photo re: abx?  No other  "acute concerns today per discussion with nursing.  She is working with therapies.    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 F/u dr. Soto    OA (osteoarthritis)     s/p bilat total hips     Other chronic pain     Joint pain for many years    Unspecified cerebral artery occlusion with cerebral infarction     After Moyamoya surgery > 10 yrs ago.    Vitamin D deficiencies        PAST SURGICAL HISTORY:   Past Surgical History:   Procedure Laterality Date    ARTHROPLASTY KNEE Left 4/17/2017    Procedure: ARTHROPLASTY KNEE;  Left total knee arthroplasty;  Surgeon: Chidi Jenkins MD;  Location:  OR    COLONOSCOPY  2008    normal    COLONOSCOPY  7/17/2014    Procedure: COLONOSCOPY;  Surgeon: Raul Nolan DO;  Location:  GI    CRANIOTOMY      MOJAMOJA  \"Pt had repair of blood flow.\"    IR CAROTID ANGIOGRAM  10/30/2006    IR CAROTID ANGIOGRAM  6/6/2010    IR CAROTID ANGIOGRAM  6/6/2010    IR CAROTID ANGIOGRAM  6/6/2010    IR CAROTID ANGIOGRAM  5/12/2011    IR CAROTID ANGIOGRAM  5/12/2011    IR CAROTID ANGIOGRAM  5/12/2011    IR CAROTID ANGIOGRAM  6/5/2012    IR CAROTID ANGIOGRAM  6/5/2012    IR CAROTID ANGIOGRAM  6/5/2012    IR CAROTID CEREBRAL ANGIOGRAM BILATERAL  10/7/2022    IR MISCELLANEOUS PROCEDURE  6/6/2010    IR MISCELLANEOUS PROCEDURE  6/6/2010    IR MISCELLANEOUS PROCEDURE  5/12/2011    IR MISCELLANEOUS PROCEDURE  6/5/2012 "    OPEN REDUCTION INTERNAL FIXATION FEMUR DISTAL Left 2024    Procedure: Open reduction and internal fixation left interprosthetic femur fracture;  Surgeon: Andrea Clark MD;  Location: [facility] OR    RECONSTRUCT FOREFOOT WITH METATARSOPHALANGEAL (MTP) FUSION Left 2014    Procedure: RECONSTRUCT FOREFOOT WITH METATARSOPHALANGEAL (MTP) FUSION;  Surgeon: Tres Merlos DPM;  Location: [facility] OR    [facility] NONSPECIFIC PROCEDURE  10/30/06    neurosurgery Dr Soto    [facility] NONSPECIFIC PROCEDURE      bilateral total hips approx     [facility] NONSPECIFIC PROCEDURE  3/07    neurosurgery        FAMILY HISTORY:   Family History   Problem Relation Age of Onset    Obesity Sister         gastric by-pass age age 60, heart murmur.    Cancer - colorectal Sister 69    Heart Disease Father         mi,  age 48    Family History Negative Mother         killed in MVA age 54    Cancer Maternal Aunt         lung cancer ,non-smoker    Lung Cancer Maternal Aunt     Breast Cancer Daughter 48    Ovarian Cancer No family hx of      SOCIAL HISTORY:   Patient's living condition: lives with spouse    MEDICATIONS:  Post Discharge Medication Reconciliation Status: discharge medications reconciled and changed, per note/orders.  Current Outpatient Medications   Medication Sig Dispense Refill    acetaminophen (TYLENOL) 325 MG tablet Take 3 tablets (975 mg) by mouth or NG Tube every 8 hours as needed for mild pain.      apixaban ANTICOAGULANT (ELIQUIS) 5 MG tablet Take 1 tablet (5 mg) by mouth 2 times daily.      atenolol (TENORMIN) 25 MG tablet Take 1 tablet (25 mg) by mouth daily 90 tablet 1    HYDROmorphone (DILAUDID) 2 MG tablet Take 0.5-1 tablets (1-2 mg) by mouth or NG Tube every 4 hours as needed for moderate to severe pain (Take 1 mg (1/2 tab) for pain 4-6/10, take 2 mg (1 tab) for pain 7-10/10). 12 tablet 0    losartan (COZAAR) 50 MG tablet TAKE 1 TABLET BY MOUTH TWICE A  tablet 0    melatonin 3 MG tablet Take 1 tablet (3  "mg) by mouth at bedtime.      methocarbamol (ROBAXIN) 500 MG tablet Take 1 tablet (500 mg) by mouth or NG Tube every 6 hours as needed for muscle spasms.      polyethylene glycol (MIRALAX) 17 GM/Dose powder Take 17 g by mouth daily.      rosuvastatin (CRESTOR) 10 MG tablet Take 1 tablet (10 mg) by mouth daily. 90 tablet 1    sertraline (ZOLOFT) 50 MG tablet Take 50 mg by mouth at bedtime.         ROS:  Unable to obtain due to cognitive impairment or aphasia    PHYSICAL EXAM:  /76   Pulse (!) 122   Temp 97.6  F (36.4  C)   Resp 20   Ht 1.727 m (5' 8\")   Wt 91.4 kg (201 lb 9.6 oz)   LMP  (LMP Unknown)   SpO2 91%   BMI 30.65 kg/m    Gen: laying in bed, alert, cooperative and in no acute distress  Card: RRR, S1, S2, no murmurs  Resp: lungs clear to auscultation bilaterally anteriorly and laterally, no crackles or wheezes; moving good air  Ext: no LE edema  Neuro: CX II-XII grossly in tact; ROM in all four extremities grossly in tact  Psych: unable to assess due to aphasia  Skin: lateral LLE incision with staples in place; erythema on the proximal half without warmth or drainage     LABORATORY/IMAGING DATA:  Reviewed as per [facility] and/or [facility]    ASSESSMENT/PLAN:    Left Comminuted Interprosthetic Femur Fracture s/p ORIF (11/29/24)  Concern for Incisional Cellulitis   Secondary to a fall. The proximal half of the incision has erythema, no drainage or warmth. She had been picking at it - no open/excoriated areas.   -- DON saw today, took photo and is sending to her surgeon - my inclination would be a course of abx, but will defer to surgeon  -- WBAT  -- DVT ppx with apixaban  -- analgesia with APAP 975 mg q8hPRN, hydromorphone 1-2 mg q4h PRN, methocarbamol 500 mg q6h PRN     Acute Blood Loss Anemia  Secondary to above. No evidence of ongoing bleeding. Hgb 12.4 --> ofelia 6.8. Hgb 10 today. New on DOAC  -- follow clinically.     Acute Bilateral STEPHEN and L Basal Ganglia CVA (12/1/24)  CVA with L Sided " Weakness  Aphasia  Moyamoya  ? If secondary to hypoperfusion vs cardioembolic/a-fib. New on DOAC  -- apixaban 5 mg BID  -- rosuvastatin 10 mg daily   -- PT/OT    Hypovolemia  Clinically looks dry. Cr up a bit today  -- gentle 1L NS  -- nutrition following     Paroxysmal Atrial Fibrillation  Noted on 11/28 ECG. RRR on my exam today   -- apixaban 5 mg BID    HTN  SBPs 110s-120s. HR 60s-70s. Weight 201 lb x1.   -- atenolol 25 mg daily, losartan 25 mg daily   -- follow BPs and adjust medications as needed    HLD  -- rosuvastatin 10 mg daily     Mild Major Recurrent Depression  -- sertraline 50 mg at bedtime    Slow Transit Constipation  -- Miralax 17g daily   -- adjust bowel regimen as needed    Fall  Physical Deconditioning  In setting of hospitalization and underlying medical conditions  -- ongoing PT/OT        Electronically signed by:  Vee Martinez MD

## 2024-12-16 NOTE — PROGRESS NOTES
Christie Rose  1940  1) Start 1L normal saline IV fluids at rate of 75ml/hr. Diagnosis: dehydration  2) [facility] 12/18. diagnosis: dehydration  ALFREDO Pugh CNP on 12/16/2024 at 11:46 AM

## 2024-12-16 NOTE — LETTER
12/16/2024      Candida Rose  [address]  [facility] [address]        [facility] GERIATRICS  INITIAL VISIT NOTE  December 16, 2024      PRIMARY CARE PROVIDER AND CLINIC:  Chani Peoples [address] [facility] [address]    [facility] Medical Record Number:  [id number]  Place of Service where encounter took place:  [facility]  (U) [[id number]]    Chief Complaint   Patient presents with     Hospital F/U       HPI:    Candida Rose is a 84 year old  (1940) female seen today at [facility]. 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  She was hospitalized at [facility] from 11/28/2024 to 12/7/2024 where she presented with leg pain after a fall and was found to have a comminuted distal left into prosthetic femur fracture for which she is s/p ORIF (11/29/24). Surgery without complication. She developed acute blood loss anemia (Hgb 12.4 --> ofelia 6.8) Post op course notable for new R sided weakness and imaging with acute CVA felt secondary to hypoperfusionin setting of moyamoya vs cardioembolic/a-fib. (ECG 11/28 w/ a-fib). She is new on DOAC. She was admitted to this facility for  rehab, medical management, and nursing care.      History obtained from: facility chart records, facility staff, [facility] chart review, and Care Everywhere [facility] chart review.      Today, Ms. Rose is seen in her room laying in bed.  She is a limited historian due to post CVA aphasia.  Concern for wound site infection.  I was able to get a good look at her left leg incision with both her the nurse and the DON.  There is a clear transition point where the proximal half of the incision is erythematous.  It is not warm.  It is not draining.  Feels like some dependent subQ edema. No fevers.  She had been picking at the incision and they have worked hard to reinforce the dressing and try to keep it very clean.  The  "facility is going to contact the surgeon and include a photo re: abx?  No other acute concerns today per discussion with nursing.  She is working with therapies.    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 F/u dr. Soto     OA (osteoarthritis)     s/p bilat total hips      Other chronic pain     Joint pain for many years     Unspecified cerebral artery occlusion with cerebral infarction     After Moyamoya surgery > 10 yrs ago.     Vitamin D deficiencies        PAST SURGICAL HISTORY:   Past Surgical History:   Procedure Laterality Date     ARTHROPLASTY KNEE Left 4/17/2017    Procedure: ARTHROPLASTY KNEE;  Left total knee arthroplasty;  Surgeon: Chidi Jenkins MD;  Location:  OR     COLONOSCOPY  2008    normal     COLONOSCOPY  7/17/2014    Procedure: COLONOSCOPY;  Surgeon: Raul Nolan DO;  Location:  GI     CRANIOTOMY      MOJAMOJA  \"Pt had repair of blood flow.\"     IR CAROTID ANGIOGRAM  10/30/2006     IR CAROTID ANGIOGRAM  6/6/2010     IR CAROTID ANGIOGRAM  6/6/2010     IR CAROTID ANGIOGRAM  6/6/2010     IR CAROTID ANGIOGRAM  5/12/2011     IR CAROTID ANGIOGRAM  5/12/2011     IR CAROTID ANGIOGRAM  5/12/2011     IR CAROTID ANGIOGRAM  6/5/2012     IR CAROTID ANGIOGRAM  6/5/2012     IR CAROTID ANGIOGRAM  6/5/2012     IR CAROTID CEREBRAL ANGIOGRAM BILATERAL  10/7/2022     IR MISCELLANEOUS PROCEDURE  6/6/2010     IR " MISCELLANEOUS PROCEDURE  2010     IR MISCELLANEOUS PROCEDURE  2011     IR MISCELLANEOUS PROCEDURE  2012     OPEN REDUCTION INTERNAL FIXATION FEMUR DISTAL Left 2024    Procedure: Open reduction and internal fixation left interprosthetic femur fracture;  Surgeon: Andrea Clark MD;  Location: [facility] OR     RECONSTRUCT FOREFOOT WITH METATARSOPHALANGEAL (MTP) FUSION Left 2014    Procedure: RECONSTRUCT FOREFOOT WITH METATARSOPHALANGEAL (MTP) FUSION;  Surgeon: Tres Merlos DPM;  Location: [facility] OR     [facility] NONSPECIFIC PROCEDURE  10/30/06    neurosurgery Dr Soto     [facility] NONSPECIFIC PROCEDURE      bilateral total hips approx      [facility] NONSPECIFIC PROCEDURE  3/07    neurosurgery        FAMILY HISTORY:   Family History   Problem Relation Age of Onset     Obesity Sister         gastric by-pass age age 60, heart murmur.     Cancer - colorectal Sister 69     Heart Disease Father         mi,  age 48     Family History Negative Mother         killed in MVA age 54     Cancer Maternal Aunt         lung cancer ,non-smoker     Lung Cancer Maternal Aunt      Breast Cancer Daughter 48     Ovarian Cancer No family hx of      SOCIAL HISTORY:   Patient's living condition: lives with spouse    MEDICATIONS:  Post Discharge Medication Reconciliation Status: discharge medications reconciled and changed, per note/orders.  Current Outpatient Medications   Medication Sig Dispense Refill     acetaminophen (TYLENOL) 325 MG tablet Take 3 tablets (975 mg) by mouth or NG Tube every 8 hours as needed for mild pain.       apixaban ANTICOAGULANT (ELIQUIS) 5 MG tablet Take 1 tablet (5 mg) by mouth 2 times daily.       atenolol (TENORMIN) 25 MG tablet Take 1 tablet (25 mg) by mouth daily 90 tablet 1     HYDROmorphone (DILAUDID) 2 MG tablet Take 0.5-1 tablets (1-2 mg) by mouth or NG Tube every 4 hours as needed for moderate to severe pain (Take 1 mg (1/2 tab) for pain 4-6/10, take 2 mg (1 tab) for pain 7-10/10). 12  "tablet 0     losartan (COZAAR) 50 MG tablet TAKE 1 TABLET BY MOUTH TWICE A  tablet 0     melatonin 3 MG tablet Take 1 tablet (3 mg) by mouth at bedtime.       methocarbamol (ROBAXIN) 500 MG tablet Take 1 tablet (500 mg) by mouth or NG Tube every 6 hours as needed for muscle spasms.       polyethylene glycol (MIRALAX) 17 GM/Dose powder Take 17 g by mouth daily.       rosuvastatin (CRESTOR) 10 MG tablet Take 1 tablet (10 mg) by mouth daily. 90 tablet 1     sertraline (ZOLOFT) 50 MG tablet Take 50 mg by mouth at bedtime.         ROS:  Unable to obtain due to cognitive impairment or aphasia    PHYSICAL EXAM:  /76   Pulse (!) 122   Temp 97.6  F (36.4  C)   Resp 20   Ht 1.727 m (5' 8\")   Wt 91.4 kg (201 lb 9.6 oz)   LMP  (LMP Unknown)   SpO2 91%   BMI 30.65 kg/m    Gen: laying in bed, alert, cooperative and in no acute distress  Card: RRR, S1, S2, no murmurs  Resp: lungs clear to auscultation bilaterally anteriorly and laterally, no crackles or wheezes; moving good air  Ext: no LE edema  Neuro: CX II-XII grossly in tact; ROM in all four extremities grossly in tact  Psych: unable to assess due to aphasia  Skin: lateral LLE incision with staples in place; erythema on the proximal half without warmth or drainage     LABORATORY/IMAGING DATA:  Reviewed as per [facility] and/or [facility]    ASSESSMENT/PLAN:    Left Comminuted Interprosthetic Femur Fracture s/p ORIF (11/29/24)  Concern for Incisional Cellulitis   Secondary to a fall. The proximal half of the incision has erythema, no drainage or warmth. She had been picking at it - no open/excoriated areas.   -- DON saw today, took photo and is sending to her surgeon - my inclination would be a course of abx, but will defer to surgeon  -- WBAT  -- DVT ppx with apixaban  -- analgesia with APAP 975 mg q8hPRN, hydromorphone 1-2 mg q4h PRN, methocarbamol 500 mg q6h PRN     Acute Blood Loss Anemia  Secondary to above. No evidence of ongoing bleeding. Hgb 12.4 --> " ofelia 6.8. Hgb 10 today. New on DOAC  -- follow clinically.     Acute Bilateral STEPHEN and L Basal Ganglia CVA (12/1/24)  CVA with L Sided Weakness  Aphasia  Moyamoya  ? If secondary to hypoperfusion vs cardioembolic/a-fib. New on DOAC  -- apixaban 5 mg BID  -- rosuvastatin 10 mg daily   -- PT/OT    Hypovolemia  Clinically looks dry. Cr up a bit today  -- gentle 1L NS  -- nutrition following     Paroxysmal Atrial Fibrillation  Noted on 11/28 ECG. RRR on my exam today   -- apixaban 5 mg BID    HTN  SBPs 110s-120s. HR 60s-70s. Weight 201 lb x1.   -- atenolol 25 mg daily, losartan 25 mg daily   -- follow BPs and adjust medications as needed    HLD  -- rosuvastatin 10 mg daily     Mild Major Recurrent Depression  -- sertraline 50 mg at bedtime    Slow Transit Constipation  -- Miralax 17g daily   -- adjust bowel regimen as needed    Fall  Physical Deconditioning  In setting of hospitalization and underlying medical conditions  -- ongoing PT/OT        Electronically signed by:  Vee Martinez MD                          Sincerely,        Vee Martinez MD

## 2024-12-17 ENCOUNTER — LAB REQUISITION (OUTPATIENT)
Dept: LAB | Facility: CLINIC | Age: 84
End: 2024-12-17
Payer: MEDICARE

## 2024-12-17 DIAGNOSIS — E86.0 DEHYDRATION: ICD-10-CM

## 2024-12-17 NOTE — PROGRESS NOTES
[facility] GERIATRICS    Chief Complaint   Patient presents with    RECHECK     HPI:  Candida Rose is a 84 year old  (1940), who is being seen today for an episodic care visit at: [facility]  ([facility]) [[id number]].     Past medical history significant for hypertension, dyslipidemia, paroxysmal atrial fibrillation, moyamoya disease, CVA with left hemiparesis, dementia, CKD, OA, anxiety, depression, obesity     Summary of recent hospitalization:  Patient was hospitalized at [facility] from 1/28/2024-12/7/2024 for left femur fracture status post ORIF and CVA.  Patient presented to the emergency department for evaluation of a fall that was witnessed by her spouse.  In the emergency department laboratory evaluation revealed sodium 132, creatinine 1.01, hemoglobin 12.4.  Imaging significant for left distal femoral fracture with displacement and comminution.  Ortho was consulted and patient status post ORIF on 11/29/2024.   cc. Hemoglobin dropped to ofelia of 6.8 and required PRBC. Postoperatively patient with drowsiness, confusion, right-sided weakness, aphasia.  CTA head and neck on 11/29 revealed multiple intracranial arterial stenosis and occlusions, bilateral cranioplasty's, neck CTA revealed right distal cervical artery short segment occlusion with reconstitution, left vertebral artery origin and left V1 segment occluded with reconstitution in the left proximal P2 segment, extensive cervical artery stenosis, thyroid nodule, recommend nonemergent thyroid ultrasound.  CT head perfusion on 11/29 revealed large areas of Tmax greater than 6 seconds within the bilateral frontal lobes STEPHEN and MCA territories and to a lesser extent right greater than left PCA territories, findings correspond to areas of brain rest, no abnormal perfusion on CBF less than 30% to suggest core infarct, however qualitatively there is decreased CBD within the bilateral frontal lobes and left occipital temporal  region.  MRI of brain on 12/1 revealed multifocal, acute infarctions of the cerebral parenchyma affecting the bifrontal lobes, left basal ganglia and anterior aspect of the medial left temporal lobe, edematous appearing cerebral parenchyma on the left frontal lobe which mildly effaces the adjacent lateral ventricle frontal horn.  Repeat CT head revealed no evidence of hemorrhagic conversion, no definite new infarct. Echo on 12/2 revealed LVEF 50 to 55%, mild to moderate apical wall hypokinesis, no thrombus present. Neurology was consulted. Patient started on apixaban. Follow up with general neurology in 6 weeks. SLP consulted for oropharyngeal dysphagia and recommended minced and moist diet with mildly thickened consistency. Continues to have have hyponatremia at discharge, Na level 132 on 12/5. PTA aspirin, cetrizine, B12, Mg and multivitamin discontinued inpatient. Palliative care consulted and plan is for patient to attempt rehab and if unsuccessful will transition to comfort approach. Discharged to TCU for physical rehabilitation and medical management.        Today patient was seen for follow-up in the TCU patient with aphasia, does not speak at all today.  Unable to provide any history.  She does look comfortable.  Patient received IV fluids, currently finishing at time of visit.  Per discussion with nursing staff, medial part of hip incision is concerning, as it is draining and incision with some erythema.  Nursing staff otherwise with no concerns.  She continues on thickened liquids and minced and moist level texture.  Patient is afebrile and hemodynamically stable. Patient completed course of therapy with OCCUPATIONAL THERAPY and PHYSICAL THERAPY, continues with SLP. Will be moving to LTC as directed by .    Reviewed facility EMR including medications, recent nursing progress notes, vital signs.  Discussed plan of care with nursing.    Allergies, and PMH/PSH reviewed in EPIC today.  REVIEW OF  "SYSTEMS:  Unobtainable secondary to aphasia.     Objective:   BP (!) 164/77   Pulse 63   Temp 97.6  F (36.4  C)   Resp 18   Ht 1.727 m (5' 8\")   Wt 89.4 kg (197 lb)   LMP  (LMP Unknown)   SpO2 93%   BMI 29.95 kg/m    GENERAL APPEARANCE:  Alert, in NAD  HEENT: normocephalic, dry mucous membranes, nose without drainage or crusting  RESP:  respiratory effort normal, no respiratory distress, Lung sounds clear anteriorly, patient is on RA  CV: auscultation of heart done, rate and rhythm regular.   ABDOMEN: + bowel sounds, soft, nontender, no grimacing or guarding with palpation.  M/S:   1+ bilateral lower extremity edema  SKIN:  left hip incision with erythema and incision macerated with serosanguinous drainage from left hip- distal part of incision looks ok- scabs are formed and it is healing  NEURO: aphasia  PSYCH: affect and mood normal      Labs done in SNF are in [facility]. Please refer to them using [facility]/Care Everywhere. and Recent labs in EPIC reviewed by me today.     Assessment/Plan:  Acute bilateral frontal CVA and left basal ganglia CVA with aphasia and right-sided weakness  History of CVA with left-sided hemiparesis  History of Moyamoya Disease  Vascular dementia  Patient started on apixaban inpatient  With aphasia and right sided weakness  Plan: Continue apixaban 5 mg twice daily.  Monitor blood pressure.  SBP goal less than 130.  Therapy as below.  Follow-up with neurology 6 to 8 weeks.  SLP to continue to evaluate and treat     Oropharyngeal dysphagia   Secondary to CVA  Wt Readings from Last 4 Encounters:   12/19/24 89.4 kg (197 lb)   12/16/24 91.4 kg (201 lb 9.6 oz)   12/12/24 91.2 kg (201 lb)   12/09/24 91.4 kg (201 lb 9.6 oz)     Plan: Continue minced and moist level 5 texture with mildly thickened liquids.  Patient also requires assistance for feeding with all meals.  SLP to evaluate and treat. At risk for malnutrition. Check weekly weight.     Incision concerning for infection  Left " distal femur fracture status post ORIF on 11/30/2024  Fall, subsequent encounter  Patient saw ortho on 12/17 who reported that incision looked ok  She appears comfortable  Incision is concerning for infection- medial part of incision appears somewhat macerated with serosanguinous drainage present  Plan: Discussed with nursing who is going to contact ortho with update and photos of incision to see if they want her to receive antibiotics. Continue pain management with Tylenol 975 mg every 8 hours as needed, Dilaudid 1 to 2 mg every 4 hours as needed, methocarbamol 500 mg every 6 hours as needed.  Continue apixaban 5 mg twice daily for DVT prophylaxis.  TG shapers for leg swelling.  Nonweightbearing to left leg, left knee immobilizer off in bed, apply brace with transferring and ambulation.  Therapy as below.  Follow-up with Ortho per recommendations.     Acute blood loss anemia  Baseline hemoglobin 12-13  Hemoglobin ofelia 6.8 on 11/30/2024, received 2 units PRBC  Hemoglobin 10.0 on 12/16/2024  Plan: CBC PRN.  Monitor for signs and symptoms of bleeding.     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 BMP 12/20. Avoid nephrotoxins. Renally dose medications as indicated.     History of paroxysmal atrial fibrillation  Patient diagnosed in 10/2022 at time of previous CVA, however patient had event monitor following that did not see clear A-fib but PAC and PVCs.  Cardiology notes that patients with mild mild disease have increased risk of hemorrhage, therefore patient's anticoagulation was discontinued.  Patient started on apixaban on  12/6.  HR 60s  Plan: Continue apixaban 5 mg twice daily and atenolol 25 mg daily.  Monitor heart rate.     Essential hypertension  PTA losartan dose increased inpatient  -140s recently  Plan: Continue losartan 50 mg twice daily, atenolol 25 mg daily.  Monitor blood pressure.  SBP goal less than 130.     Dyslipidemia  Plan: Continue rosuvastatin 10 mg daily.     Depression  Anxiety  Insomnia  On admission had days and nights mixed up, no ongoing reports of nursing regarding concerns about this  Plan: Continue melatonin 3 mg at bedtime. Continue sertraline 50 mg daily.  Monitor mood and symptoms.  Consider ACP referral as needed.     Slow transit constipation  Bowels are moving regularly  Plan: Continue MiraLAX 17 g daily.  Monitor mood and symptoms.  Consider ACP referral as needed.     Physical deconditioning  Secondary to recent hospitalization, medical conditions as above   Patient has completed course with physical and occupational therapy and will be transitioning to LTC  Plan:  to assist with move.     Incidental finding  Thyroid nodule seen on CTA head neck  Plan: Follow up outpatient for US evaluation.    MED REC REQUIRED  Post Medication Reconciliation Status:  Medication reconciliation previously completed during another office visit      Disclaimer: This note may contain text created using speech-recognition software and may contain unintended word substitutions.     Electronically signed by: ALFREDO Pugh CNP

## 2024-12-18 LAB
ANION GAP SERPL CALCULATED.3IONS-SCNC: 14 MMOL/L (ref 7–15)
BUN SERPL-MCNC: 39 MG/DL (ref 8–23)
CALCIUM SERPL-MCNC: 8.9 MG/DL (ref 8.8–10.4)
CHLORIDE SERPL-SCNC: 102 MMOL/L (ref 98–107)
CREAT SERPL-MCNC: 0.85 MG/DL (ref 0.51–0.95)
EGFRCR SERPLBLD CKD-EPI 2021: 68 ML/MIN/1.73M2
GLUCOSE SERPL-MCNC: 121 MG/DL (ref 70–99)
HCO3 SERPL-SCNC: 22 MMOL/L (ref 22–29)
POTASSIUM SERPL-SCNC: 3.8 MMOL/L (ref 3.4–5.3)
SODIUM SERPL-SCNC: 138 MMOL/L (ref 135–145)

## 2024-12-18 PROCEDURE — 36415 COLL VENOUS BLD VENIPUNCTURE: CPT | Mod: ORL | Performed by: NURSE PRACTITIONER

## 2024-12-18 PROCEDURE — P9604 ONE-WAY ALLOW PRORATED TRIP: HCPCS | Mod: ORL | Performed by: NURSE PRACTITIONER

## 2024-12-18 PROCEDURE — 80048 BASIC METABOLIC PNL TOTAL CA: CPT | Mod: ORL | Performed by: NURSE PRACTITIONER

## 2024-12-18 NOTE — PROGRESS NOTES
Patient still looking dehydrated. BUN/creatinine ratio is 45. Creatinine is improved from last check. Nursing spoke to spouse on site and he is ok with her receiving another liter of fluids.     Orders  Candida Rose  1940  1) Administer 1 L of normal saline at rate of 75 ml/hr. Diagnosis: dehydration.  2) OK to remove peripheral IV when fluids are complete  3) [facility] 12/20. Diagnosis: dehydration  ALFREDO Pugh CNP on 12/18/2024 at 4:19 PM

## 2024-12-19 ENCOUNTER — TRANSITIONAL CARE UNIT VISIT (OUTPATIENT)
Dept: GERIATRICS | Facility: CLINIC | Age: 84
End: 2024-12-19
Payer: MEDICARE

## 2024-12-19 ENCOUNTER — LAB REQUISITION (OUTPATIENT)
Dept: LAB | Facility: CLINIC | Age: 84
End: 2024-12-19
Payer: MEDICARE

## 2024-12-19 VITALS
DIASTOLIC BLOOD PRESSURE: 77 MMHG | TEMPERATURE: 97.6 F | HEART RATE: 63 BPM | HEIGHT: 68 IN | BODY MASS INDEX: 29.86 KG/M2 | RESPIRATION RATE: 18 BRPM | SYSTOLIC BLOOD PRESSURE: 164 MMHG | OXYGEN SATURATION: 93 % | WEIGHT: 197 LBS

## 2024-12-19 DIAGNOSIS — N18.2 CKD (CHRONIC KIDNEY DISEASE) STAGE 2, GFR 60-89 ML/MIN: ICD-10-CM

## 2024-12-19 DIAGNOSIS — E86.0 DEHYDRATION: ICD-10-CM

## 2024-12-19 DIAGNOSIS — K59.01 SLOW TRANSIT CONSTIPATION: ICD-10-CM

## 2024-12-19 DIAGNOSIS — I48.0 PAROXYSMAL ATRIAL FIBRILLATION (H): ICD-10-CM

## 2024-12-19 DIAGNOSIS — R53.81 PHYSICAL DECONDITIONING: ICD-10-CM

## 2024-12-19 DIAGNOSIS — Z87.81 S/P ORIF (OPEN REDUCTION INTERNAL FIXATION) FRACTURE: ICD-10-CM

## 2024-12-19 DIAGNOSIS — D62 ANEMIA DUE TO BLOOD LOSS, ACUTE: ICD-10-CM

## 2024-12-19 DIAGNOSIS — S72.402D CLOSED FRACTURE OF DISTAL END OF LEFT FEMUR WITH ROUTINE HEALING, UNSPECIFIED FRACTURE MORPHOLOGY, SUBSEQUENT ENCOUNTER: ICD-10-CM

## 2024-12-19 DIAGNOSIS — I67.5 MOYAMOYA DISEASE: ICD-10-CM

## 2024-12-19 DIAGNOSIS — F33.0 MILD RECURRENT MAJOR DEPRESSION (H): ICD-10-CM

## 2024-12-19 DIAGNOSIS — Z98.890 S/P ORIF (OPEN REDUCTION INTERNAL FIXATION) FRACTURE: ICD-10-CM

## 2024-12-19 DIAGNOSIS — I69.320 APHASIA AS LATE EFFECT OF CEREBROVASCULAR ACCIDENT (CVA): Primary | ICD-10-CM

## 2024-12-19 DIAGNOSIS — F01.50 VASCULAR DEMENTIA, UNSPECIFIED DEMENTIA SEVERITY, UNSPECIFIED WHETHER BEHAVIORAL, PSYCHOTIC, OR MOOD DISTURBANCE OR ANXIETY (H): ICD-10-CM

## 2024-12-19 DIAGNOSIS — E78.5 HYPERLIPIDEMIA, UNSPECIFIED HYPERLIPIDEMIA TYPE: ICD-10-CM

## 2024-12-19 DIAGNOSIS — I10 ESSENTIAL HYPERTENSION: ICD-10-CM

## 2024-12-19 NOTE — LETTER
12/19/2024      Candida Rose  [address] [id number]        [facility] GERIATRICS    Chief Complaint   Patient presents with     RECHECK     HPI:  Candida Rose is a 84 year old  (1940), who is being seen today for an episodic care visit at: [facility]  ([facility]) [[id number]].     Past medical history significant for hypertension, dyslipidemia, paroxysmal atrial fibrillation, moyamoya disease, CVA with left hemiparesis, dementia, CKD, OA, anxiety, depression, obesity     Summary of recent hospitalization:  Patient was hospitalized at [facility] from 1/28/2024-12/7/2024 for left femur fracture status post ORIF and CVA.  Patient presented to the emergency department for evaluation of a fall that was witnessed by her spouse.  In the emergency department laboratory evaluation revealed sodium 132, creatinine 1.01, hemoglobin 12.4.  Imaging significant for left distal femoral fracture with displacement and comminution.  Ortho was consulted and patient status post ORIF on 11/29/2024.   cc. Hemoglobin dropped to ofelia of 6.8 and required PRBC. Postoperatively patient with drowsiness, confusion, right-sided weakness, aphasia.  CTA head and neck on 11/29 revealed multiple intracranial arterial stenosis and occlusions, bilateral cranioplasty's, neck CTA revealed right distal cervical artery short segment occlusion with reconstitution, left vertebral artery origin and left V1 segment occluded with reconstitution in the left proximal P2 segment, extensive cervical artery stenosis, thyroid nodule, recommend nonemergent thyroid ultrasound.  CT head perfusion on 11/29 revealed large areas of Tmax greater than 6 seconds within the bilateral frontal lobes STEPHEN and MCA territories and to a lesser extent right greater than left PCA territories, findings correspond to areas of brain rest, no abnormal perfusion on CBF less than 30% to suggest core infarct, however qualitatively there is  decreased CBD within the bilateral frontal lobes and left occipital temporal region.  MRI of brain on 12/1 revealed multifocal, acute infarctions of the cerebral parenchyma affecting the bifrontal lobes, left basal ganglia and anterior aspect of the medial left temporal lobe, edematous appearing cerebral parenchyma on the left frontal lobe which mildly effaces the adjacent lateral ventricle frontal horn.  Repeat CT head revealed no evidence of hemorrhagic conversion, no definite new infarct. Echo on 12/2 revealed LVEF 50 to 55%, mild to moderate apical wall hypokinesis, no thrombus present. Neurology was consulted. Patient started on apixaban. Follow up with general neurology in 6 weeks. SLP consulted for oropharyngeal dysphagia and recommended minced and moist diet with mildly thickened consistency. Continues to have have hyponatremia at discharge, Na level 132 on 12/5. PTA aspirin, cetrizine, B12, Mg and multivitamin discontinued inpatient. Palliative care consulted and plan is for patient to attempt rehab and if unsuccessful will transition to comfort approach. Discharged to TCU for physical rehabilitation and medical management.        Today patient was seen for follow-up in the TCU patient with aphasia, does not speak at all today.  Unable to provide any history.  She does look comfortable.  Patient received IV fluids, currently finishing at time of visit.  Per discussion with nursing staff, medial part of hip incision is concerning, as it is draining and incision with some erythema.  Nursing staff otherwise with no concerns.  She continues on thickened liquids and minced and moist level texture.  Patient is afebrile and hemodynamically stable. Patient completed course of therapy with OCCUPATIONAL THERAPY and PHYSICAL THERAPY, continues with SLP. Will be moving to LTC as directed by .    Reviewed facility EMR including medications, recent nursing progress notes, vital signs.  Discussed plan of  "care with nursing.    Allergies, and PMH/PSH reviewed in EPIC today.  REVIEW OF SYSTEMS:  Unobtainable secondary to aphasia.     Objective:   BP (!) 164/77   Pulse 63   Temp 97.6  F (36.4  C)   Resp 18   Ht 1.727 m (5' 8\")   Wt 89.4 kg (197 lb)   LMP  (LMP Unknown)   SpO2 93%   BMI 29.95 kg/m    GENERAL APPEARANCE:  Alert, in NAD  HEENT: normocephalic, moist mucous membranes, nose without drainage or crusting  RESP:  respiratory effort normal, no respiratory distress, Lung sounds clear anteriorly, patient is on RA  CV: auscultation of heart done, rate and rhythm regular.   ABDOMEN: + bowel sounds, soft, nontender, no grimacing or guarding with palpation.  M/S:   1+ bilateral lower extremity edema  SKIN:  left hip incision with erythema and incision macerated with serosanguinous drainage from left hip- distal part of incision looks ok- scabs are formed and it is healing  NEURO: aphasia  PSYCH: affect and mood normal      Labs done in SNF are in [place]. Please refer to them using EPIC/Care Everywhere. and Recent labs in [facility] reviewed by me today.     Assessment/Plan:  Acute bilateral frontal CVA and left basal ganglia CVA with aphasia and right-sided weakness  History of CVA with left-sided hemiparesis  History of Moyamoya Disease  Vascular dementia  Patient started on apixaban inpatient  With aphasia and right sided weakness  Plan: Continue apixaban 5 mg twice daily.  Monitor blood pressure.  SBP goal less than 130.  Therapy as below.  Follow-up with neurology 6 to 8 weeks.  SLP to continue to evaluate and treat     Oropharyngeal dysphagia   Secondary to CVA  Wt Readings from Last 4 Encounters:   12/19/24 89.4 kg (197 lb)   12/16/24 91.4 kg (201 lb 9.6 oz)   12/12/24 91.2 kg (201 lb)   12/09/24 91.4 kg (201 lb 9.6 oz)     Plan: Continue minced and moist level 5 texture with mildly thickened liquids.  Patient also requires assistance for feeding with all meals.  SLP to evaluate and treat. At risk for " malnutrition. Check weekly weight.     Incision concerning for infection  Left distal femur fracture status post ORIF on 11/30/2024  Fall, subsequent encounter  Patient saw ortho on 12/17 who reported that incision looked ok  She appears comfortable  Incision is concerning for infection- medial part of incision appears somewhat macerated with serosanguinous drainage present  Plan: Discussed with nursing who is going to contact ortho with update and photos of incision to see if they want her to receive antibiotics. Continue pain management with Tylenol 975 mg every 8 hours as needed, Dilaudid 1 to 2 mg every 4 hours as needed, methocarbamol 500 mg every 6 hours as needed.  Continue apixaban 5 mg twice daily for DVT prophylaxis.  TG shapers for leg swelling.  Nonweightbearing to left leg, left knee immobilizer off in bed, apply brace with transferring and ambulation.  Therapy as below.  Follow-up with Ortho per recommendations.     Acute blood loss anemia  Baseline hemoglobin 12-13  Hemoglobin ofelia 6.8 on 11/30/2024, received 2 units PRBC  Hemoglobin 10.0 on 12/16/2024  Plan: CBC PRN.  Monitor for signs and symptoms of bleeding.     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 BMP 12/20. Avoid nephrotoxins. Renally dose medications as indicated.     History of paroxysmal atrial fibrillation  Patient diagnosed in 10/2022 at time of previous CVA, however patient had event monitor following that did not see clear A-fib but PAC and PVCs.  Cardiology notes that patients with mild mild disease have increased risk of hemorrhage, therefore  patient's anticoagulation was discontinued.  Patient started on apixaban on 12/6.  HR 60s  Plan: Continue apixaban 5 mg twice daily and atenolol 25 mg daily.  Monitor heart rate.     Essential hypertension  PTA losartan dose increased inpatient  -140s recently  Plan: Continue losartan 50 mg twice daily, atenolol 25 mg daily.  Monitor blood pressure.  SBP goal less than 130.     Dyslipidemia  Plan: Continue rosuvastatin 10 mg daily.     Depression  Anxiety  Insomnia  On admission had days and nights mixed up, no ongoing reports of nursing regarding concerns about this  Plan: Continue melatonin 3 mg at bedtime. Continue sertraline 50 mg daily.  Monitor mood and symptoms.  Consider ACP referral as needed.     Slow transit constipation  Bowels are moving regularly  Plan: Continue MiraLAX 17 g daily.  Monitor mood and symptoms.  Consider ACP referral as needed.     Physical deconditioning  Secondary to recent hospitalization, medical conditions as above   Patient has completed course with physical and occupational therapy and will be transitioning to LTC  Plan:  to assist with move.     Incidental finding  Thyroid nodule seen on CTA head neck  Plan: Follow up outpatient for US evaluation.    MED REC REQUIRED  Post Medication Reconciliation Status:  Medication reconciliation previously completed during another office visit      Disclaimer: This note may contain text created using speech-recognition software and may contain unintended word substitutions.     Electronically signed by: ALFREDO Pugh CNP         Sincerely,        ALFREDO Pugh CNP

## 2024-12-19 NOTE — PATIENT INSTRUCTIONS
Christie Rose  1940  1) Update ortho regarding surgical incision  2) Discontinue normal saline flushes for peripheral line  3) Remove peripheral IV when fluids finish today  ALFREDO Pugh CNP on 12/19/2024 at 12:46 PM

## 2024-12-20 LAB
ANION GAP SERPL CALCULATED.3IONS-SCNC: 13 MMOL/L (ref 7–15)
BUN SERPL-MCNC: 25.5 MG/DL (ref 8–23)
CALCIUM SERPL-MCNC: 8.9 MG/DL (ref 8.8–10.4)
CHLORIDE SERPL-SCNC: 104 MMOL/L (ref 98–107)
CREAT SERPL-MCNC: 0.75 MG/DL (ref 0.51–0.95)
EGFRCR SERPLBLD CKD-EPI 2021: 79 ML/MIN/1.73M2
GLUCOSE SERPL-MCNC: 96 MG/DL (ref 70–99)
HCO3 SERPL-SCNC: 22 MMOL/L (ref 22–29)
POTASSIUM SERPL-SCNC: 4.1 MMOL/L (ref 3.4–5.3)
SODIUM SERPL-SCNC: 139 MMOL/L (ref 135–145)

## 2024-12-20 PROCEDURE — 80048 BASIC METABOLIC PNL TOTAL CA: CPT | Mod: ORL | Performed by: NURSE PRACTITIONER

## 2024-12-20 PROCEDURE — P9604 ONE-WAY ALLOW PRORATED TRIP: HCPCS | Mod: ORL | Performed by: NURSE PRACTITIONER

## 2024-12-20 PROCEDURE — 36415 COLL VENOUS BLD VENIPUNCTURE: CPT | Mod: ORL | Performed by: NURSE PRACTITIONER

## 2024-12-26 ENCOUNTER — LAB REQUISITION (OUTPATIENT)
Dept: LAB | Facility: CLINIC | Age: 84
End: 2024-12-26
Payer: MEDICARE

## 2024-12-26 DIAGNOSIS — N18.9 CHRONIC KIDNEY DISEASE, UNSPECIFIED: ICD-10-CM

## 2024-12-27 ENCOUNTER — TRANSITIONAL CARE UNIT VISIT (OUTPATIENT)
Dept: GERIATRICS | Facility: CLINIC | Age: 84
End: 2024-12-27
Payer: MEDICARE

## 2024-12-27 VITALS
RESPIRATION RATE: 18 BRPM | BODY MASS INDEX: 28.49 KG/M2 | DIASTOLIC BLOOD PRESSURE: 54 MMHG | WEIGHT: 188 LBS | HEIGHT: 68 IN | TEMPERATURE: 97.2 F | SYSTOLIC BLOOD PRESSURE: 120 MMHG | HEART RATE: 61 BPM | OXYGEN SATURATION: 97 %

## 2024-12-27 DIAGNOSIS — N18.2 CKD (CHRONIC KIDNEY DISEASE) STAGE 2, GFR 60-89 ML/MIN: ICD-10-CM

## 2024-12-27 DIAGNOSIS — Z86.73 RECENT CEREBROVASCULAR ACCIDENT (CVA): Primary | ICD-10-CM

## 2024-12-27 DIAGNOSIS — F01.50 VASCULAR DEMENTIA, UNSPECIFIED DEMENTIA SEVERITY, UNSPECIFIED WHETHER BEHAVIORAL, PSYCHOTIC, OR MOOD DISTURBANCE OR ANXIETY (H): ICD-10-CM

## 2024-12-27 DIAGNOSIS — I69.320 APHASIA AS LATE EFFECT OF CEREBROVASCULAR ACCIDENT (CVA): ICD-10-CM

## 2024-12-27 DIAGNOSIS — I69.954 HEMIPARESIS OF LEFT NONDOMINANT SIDE AS LATE EFFECT OF CEREBROVASCULAR DISEASE, UNSPECIFIED CEREBROVASCULAR DISEASE TYPE (H): ICD-10-CM

## 2024-12-27 DIAGNOSIS — I67.5 MOYAMOYA DISEASE: Chronic | ICD-10-CM

## 2024-12-27 DIAGNOSIS — R13.12 OROPHARYNGEAL DYSPHAGIA: ICD-10-CM

## 2024-12-27 DIAGNOSIS — E86.0 DEHYDRATION: ICD-10-CM

## 2024-12-27 LAB
ANION GAP SERPL CALCULATED.3IONS-SCNC: 14 MMOL/L (ref 7–15)
BUN SERPL-MCNC: 38.9 MG/DL (ref 8–23)
CALCIUM SERPL-MCNC: 9.7 MG/DL (ref 8.8–10.4)
CHLORIDE SERPL-SCNC: 100 MMOL/L (ref 98–107)
CREAT SERPL-MCNC: 0.91 MG/DL (ref 0.51–0.95)
EGFRCR SERPLBLD CKD-EPI 2021: 63 ML/MIN/1.73M2
GLUCOSE SERPL-MCNC: 95 MG/DL (ref 70–99)
HCO3 SERPL-SCNC: 22 MMOL/L (ref 22–29)
POTASSIUM SERPL-SCNC: 4.1 MMOL/L (ref 3.4–5.3)
SODIUM SERPL-SCNC: 136 MMOL/L (ref 135–145)

## 2024-12-27 PROCEDURE — 80048 BASIC METABOLIC PNL TOTAL CA: CPT | Mod: ORL | Performed by: NURSE PRACTITIONER

## 2024-12-27 PROCEDURE — 80048 BASIC METABOLIC PNL TOTAL CA: CPT | Performed by: NURSE PRACTITIONER

## 2024-12-27 PROCEDURE — 82310 ASSAY OF CALCIUM: CPT | Performed by: NURSE PRACTITIONER

## 2024-12-27 PROCEDURE — 99310 SBSQ NF CARE HIGH MDM 45: CPT

## 2024-12-27 NOTE — LETTER
12/27/2024      Candida Rose  [address] [facility] [facility] [id number]        [facility] GERIATRICS    Chief Complaint   Patient presents with     RECHECK     HPI:  Candida Rose is a 84 year old  (1940), who is being seen today for an episodic care visit at: [facility]  ([facility]) [[id number]].     By chart review, patient was hospitalized at [facility] 11/28-12/7/24 with an acute left femur fracture and CVA after presenting to the ED following a fall. In ED, labs remarkable for Na 132, Cr 1.01, hgb 12.4. Xray demonstrated left distal femoral fracture with displacement and comminution. Ortho was consulted and she underwent ORIF 11/29/24 with  ml. Hgb dropped post-operatively to 6.8 and she was transfused with PRBC. She had persistent drowsiness, confusion and weakness with aphasia postoperatively and CTA of the head and neck 111/29 demonstrated multiple intracranial arterail stenoses and occlusions, bilateral cranioplasties, right distal cervical artery short segment occlusion with reconstitution at P2, extensive cervical artery stenosis and a thyroid nodule. CT perfusion of the head 11/29 demonstrated large areas of T max >6 seconds in the bilateral frontal lobe STEPHEN and MCA territories, R>L PCA territories consistent with brain rest and no evidence of core infarct. MRI of the brain 12/1 with multifocal acute infarctions of the cerebral parenchyma affecting the bifrontal lobes, left basal ganglia and the anterior aspect of the medial left temporal lobe, edematous cerebral parenchyma in the left frontal lobe2. Repeat CT without evidence of hemorrhagic conversion. ECHO 12/2 with LVEF 50-55%, mild-mod apical wall hypokinesis, negative for thrombus. Patient was followed by neurology and started on apixaban. He was recommended follow-up in 6 weeks outpatient. SLP followed for oropharyngeal dysphagia and recommended a modified diet and mildly thickened liquids. He had persistent hyponatremia. PTA  "ASA, cetirizine, B12, Mg and MVI discontinued. Palliative was consulted and patient/family opted for trial of TCU and if unsuccessful consider transition to comfort focus. She was recommended discharge to TCU and admitted to current facility for ongoing medical management, rehab, nursing care.     Today's concern is: Seen today in her room in TCU resting. She responds to my greeting and some initial questions before she stops responding. She reports she is doing well. She reports she is hungry and thirsty. She is not sure whether she had breakfast. Intermittently interactive with eye contact. SLP is at bedside, notes patient has been more responsive, following some commands, seems to have perked up a bit with IVF. She will trial thin liquids today which may improve oral intakes. No reports of CP, SOB, changes in b/b habits, fever/chills.    Allergies, and PMH/PSH reviewed in EPIC today.  REVIEW OF SYSTEMS:  Limited secondary to cognitive impairment but today pt reports the above and Limited secondary to aphasia impairment but today pt reports the above    Objective:   /54   Pulse 61   Temp 97.2  F (36.2  C)   Resp 18   Ht 1.727 m (5' 8\")   Wt 85.3 kg (188 lb)   LMP  (LMP Unknown)   SpO2 97%   BMI 28.59 kg/m    GENERAL APPEARANCE:  Alert, in no distress, frail apperaing  RESP:  respiratory effort and palpation of chest normal, lungs clear to auscultation , no respiratory distress  CV:  regular rate and rhythm, no murmur, rub, or gallop, no edema  ABDOMEN:  normal bowel sounds, soft, nontender, no hepatosplenomegaly or other masses  M/S:   Gait and station abnormal transfers with assist  SKIN:  Inspection of skin and subcutaneous tissue baseline, Palpation of skin and subcutaneous tissue baseline  NEURO:   RUE tremor, initially with verbal appropraite response 1-3 words then aphasic  PSYCH:  memory impaired , affect and mood normal    Labs done in SNF are in [facility]. Please refer to them using " EPIC/Care Everywhere. and Recent labs in [facility] reviewed by me today.     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 Started on apixabn inpatient.  Plan: continue apixaban 5 mg bid, monitor BP, PT/OT/SLP. Follow-up with neurology as directed.     (F01.50) Vascular dementia, unspecified dementia severity, unspecified whether behavioral, psychotic, or mood disturbance or anxiety (H)  Comment: by history, now with very low functional status  Plan: SNF for assist with ADLs, medication management, meals, activities    (R13.12) Oropharyngeal dysphagia  Comment: secondary to CNS insults. Working with SLP, hoping to clear her for thin liquids which may improve intakes  Plan: continue modified diet, mildly thickened liquids, SLP following. Monitor weights, intakes, offer food preferences.    (E86.0) Dehydration  (N18.2) CKD (chronic kidney disease) stage 2, GFR 60-89 ml/min  Comment: Cr baseline 0.7-1.2, UN:Cr trending back up at 43. She reports feeling thirsty and hungry. She has received IVF intermittently, per SLP today this has helped with mentation and therapy performance, will give her another Liter of fluids. Consider G tube for hydration if consistent with goals of care vs more comfort focused approach.   Creatinine   Date Value Ref Range Status   12/27/2024 0.91 0.51 - 0.95 mg/dL Final   05/17/2021 1.23 (H) 0.52 - 1.04 mg/dL Final   03/11/2021 1.26 (H) 0.52 - 1.04 mg/dL Final   Plan: 1 L IVF today now. BMP 12/30.  Push oral fluids. Avoid nephrotoxins. Renally dose medications as appropriate. Monitor BMP.        MED REC REQUIRED  Post  Medication Reconciliation Status: medication reconcilation previously completed during another office visit      Orders:  Consult IV team for PIV  1 L NS 0.9% at 75 mls/hr  Flush PIV q shift with 3 ml NS to maintain patency  BMP 12/30/24  Offer oral fluids Q2HWA x 72 hours    Total time spent with patient visit at the [facility] nursing [facility] was 50 minutes including patient visit, review of past records, and review of management with nursing facility staff, SLP.       Electronically signed by: Cassy Sloan CNA         Sincerely,        ALFREDO Parks CNP    Electronically signed

## 2024-12-27 NOTE — PROGRESS NOTES
[facility] GERIATRICS    Chief Complaint   Patient presents with    RECHECK     HPI:  Candida Rose is a 84 year old  (1940), who is being seen today for an episodic care visit at: [facility]  ([facility]) [[id number]].     By chart review, patient was hospitalized at [facility] 11/28-12/7/24 with an acute left femur fracture and CVA after presenting to the ED following a fall. In ED, labs remarkable for Na 132, Cr 1.01, hgb 12.4. Xray demonstrated left distal femoral fracture with displacement and comminution. Ortho was consulted and she underwent ORIF 11/29/24 with  ml. Hgb dropped post-operatively to 6.8 and she was transfused with PRBC. She had persistent drowsiness, confusion and weakness with aphasia postoperatively and CTA of the head and neck 111/29 demonstrated multiple intracranial arterail stenoses and occlusions, bilateral cranioplasties, right distal cervical artery short segment occlusion with reconstitution at P2, extensive cervical artery stenosis and a thyroid nodule. CT perfusion of the head 11/29 demonstrated large areas of T max >6 seconds in the bilateral frontal lobe STEPHEN and MCA territories, R>L PCA territories consistent with brain rest and no evidence of core infarct. MRI of the brain 12/1 with multifocal acute infarctions of the cerebral parenchyma affecting the bifrontal lobes, left basal ganglia and the anterior aspect of the medial left temporal lobe, edematous cerebral parenchyma in the left frontal lobe2. Repeat CT without evidence of hemorrhagic conversion. ECHO 12/2 with LVEF 50-55%, mild-mod apical wall hypokinesis, negative for thrombus. Patient was followed by neurology and started on apixaban. He was recommended follow-up in 6 weeks outpatient. SLP followed for oropharyngeal dysphagia and recommended a modified diet and mildly thickened liquids. He had persistent hyponatremia. PTA ASA, cetirizine, B12, Mg and MVI discontinued. Palliative was consulted and  "patient/family opted for trial of TCU and if unsuccessful consider transition to comfort focus. She was recommended discharge to TCU and admitted to current facility for ongoing medical management, rehab, nursing care.     Today's concern is: Seen today in her room in TCU resting. She responds to my greeting and some initial questions before she stops responding. She reports she is doing well. She reports she is hungry and thirsty. She is not sure whether she had breakfast. Intermittently interactive with eye contact. SLP is at bedside, notes patient has been more responsive, following some commands, seems to have perked up a bit with IVF. She will trial thin liquids today which may improve oral intakes. No reports of CP, SOB, changes in b/b habits, fever/chills.    Allergies, and PMH/PSH reviewed in EPIC today.  REVIEW OF SYSTEMS:  Limited secondary to cognitive impairment but today pt reports the above and Limited secondary to aphasia impairment but today pt reports the above    Objective:   /54   Pulse 61   Temp 97.2  F (36.2  C)   Resp 18   Ht 1.727 m (5' 8\")   Wt 85.3 kg (188 lb)   LMP  (LMP Unknown)   SpO2 97%   BMI 28.59 kg/m    GENERAL APPEARANCE:  Alert, in no distress, frail apperaing  RESP:  respiratory effort and palpation of chest normal, lungs clear to auscultation , no respiratory distress  CV:  regular rate and rhythm, no murmur, rub, or gallop, no edema  ABDOMEN:  normal bowel sounds, soft, nontender, no hepatosplenomegaly or other masses  M/S:   Gait and station abnormal transfers with assist  SKIN:  Inspection of skin and subcutaneous tissue baseline, Palpation of skin and subcutaneous tissue baseline  NEURO:   RUE tremor, initially with verbal appropraite response 1-3 words then aphasic  PSYCH:  memory impaired , affect and mood normal    Labs done in SNF are in [facility]. Please refer to them using [employer]/Care Everywhere. and Recent labs in [employer] reviewed by me today. 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 Started on apixabn inpatient.  Plan: continue apixaban 5 mg bid, monitor BP, PT/OT/SLP. Follow-up with neurology as directed.     (F01.50) Vascular dementia, unspecified dementia severity, unspecified whether behavioral, psychotic, or mood disturbance or anxiety (H)  Comment: by history, now with very low functional status  Plan: SNF for assist with ADLs, medication management, meals, activities    (R13.12) Oropharyngeal dysphagia  Comment: secondary to CNS insults. Working with SLP, hoping to clear her for thin liquids which may improve intakes  Plan: continue modified diet, mildly thickened liquids, SLP following. Monitor weights, intakes, offer food preferences.    (E86.0) Dehydration  (N18.2) CKD (chronic kidney disease) stage 2, GFR 60-89 ml/min  Comment: Cr baseline 0.7-1.2, UN:Cr trending back up at 43. She reports feeling thirsty and hungry. She has received IVF intermittently, per SLP today this has helped with mentation and therapy performance, will give her another Liter of fluids. Consider G tube for hydration if consistent with goals of care vs more comfort focused approach.   Creatinine   Date Value Ref Range Status   12/27/2024 0.91 0.51 - 0.95 mg/dL Final   05/17/2021 1.23 (H) 0.52 - 1.04 mg/dL Final   03/11/2021 1.26 (H) 0.52 - 1.04 mg/dL Final   Plan: 1 L IVF today now. BMP 12/30.  Push oral fluids. Avoid nephrotoxins. Renally dose medications as appropriate. Monitor BMP.        MED REC REQUIRED  Post Medication Reconciliation Status: medication reconcilation previously  completed during another office visit      Orders:  Consult IV team for PIV  1 L NS 0.9% at 75 mls/hr  Flush PIV q shift with 3 ml NS to maintain patency  BMP 12/30/24  Offer oral fluids Q2HWA x 72 hours    Total time spent with patient visit at the [facility] nursing [facility] was 50 minutes including patient visit, review of past records, and review of management with nursing facility staff, SLP.       Electronically signed by: Cassy Sloan CNA

## 2024-12-29 ENCOUNTER — LAB REQUISITION (OUTPATIENT)
Dept: LAB | Facility: CLINIC | Age: 84
End: 2024-12-29
Payer: MEDICARE

## 2024-12-29 VITALS
RESPIRATION RATE: 18 BRPM | DIASTOLIC BLOOD PRESSURE: 71 MMHG | BODY MASS INDEX: 28.55 KG/M2 | HEART RATE: 64 BPM | WEIGHT: 188.4 LBS | HEIGHT: 68 IN | OXYGEN SATURATION: 96 % | SYSTOLIC BLOOD PRESSURE: 148 MMHG | TEMPERATURE: 97.9 F

## 2024-12-29 DIAGNOSIS — E86.0 DEHYDRATION: ICD-10-CM

## 2024-12-30 ENCOUNTER — TRANSITIONAL CARE UNIT VISIT (OUTPATIENT)
Dept: GERIATRICS | Facility: CLINIC | Age: 84
End: 2024-12-30
Payer: MEDICARE

## 2024-12-30 DIAGNOSIS — I48.0 PAROXYSMAL ATRIAL FIBRILLATION (H): ICD-10-CM

## 2024-12-30 DIAGNOSIS — R13.12 OROPHARYNGEAL DYSPHAGIA: ICD-10-CM

## 2024-12-30 DIAGNOSIS — I10 ESSENTIAL HYPERTENSION: ICD-10-CM

## 2024-12-30 DIAGNOSIS — E86.0 DEHYDRATION: ICD-10-CM

## 2024-12-30 DIAGNOSIS — D62 ANEMIA DUE TO BLOOD LOSS, ACUTE: ICD-10-CM

## 2024-12-30 DIAGNOSIS — I69.954 HEMIPARESIS OF LEFT NONDOMINANT SIDE AS LATE EFFECT OF CEREBROVASCULAR DISEASE, UNSPECIFIED CEREBROVASCULAR DISEASE TYPE (H): ICD-10-CM

## 2024-12-30 DIAGNOSIS — I69.320 APHASIA AS LATE EFFECT OF CEREBROVASCULAR ACCIDENT (CVA): ICD-10-CM

## 2024-12-30 DIAGNOSIS — F01.50 VASCULAR DEMENTIA, UNSPECIFIED DEMENTIA SEVERITY, UNSPECIFIED WHETHER BEHAVIORAL, PSYCHOTIC, OR MOOD DISTURBANCE OR ANXIETY (H): ICD-10-CM

## 2024-12-30 DIAGNOSIS — Z71.89 GOALS OF CARE, COUNSELING/DISCUSSION: Primary | ICD-10-CM

## 2024-12-30 DIAGNOSIS — S72.402D CLOSED FRACTURE OF DISTAL END OF LEFT FEMUR WITH ROUTINE HEALING, UNSPECIFIED FRACTURE MORPHOLOGY, SUBSEQUENT ENCOUNTER: ICD-10-CM

## 2024-12-30 DIAGNOSIS — I67.5 MOYAMOYA DISEASE: ICD-10-CM

## 2024-12-30 DIAGNOSIS — Z86.73 RECENT CEREBROVASCULAR ACCIDENT (CVA): ICD-10-CM

## 2024-12-30 LAB
ANION GAP SERPL CALCULATED.3IONS-SCNC: 13 MMOL/L (ref 7–15)
BUN SERPL-MCNC: 24.8 MG/DL (ref 8–23)
CALCIUM SERPL-MCNC: 9.4 MG/DL (ref 8.8–10.4)
CHLORIDE SERPL-SCNC: 100 MMOL/L (ref 98–107)
CREAT SERPL-MCNC: 0.84 MG/DL (ref 0.51–0.95)
EGFRCR SERPLBLD CKD-EPI 2021: 69 ML/MIN/1.73M2
GLUCOSE SERPL-MCNC: 96 MG/DL (ref 70–99)
HCO3 SERPL-SCNC: 22 MMOL/L (ref 22–29)
POTASSIUM SERPL-SCNC: 4 MMOL/L (ref 3.4–5.3)
SODIUM SERPL-SCNC: 135 MMOL/L (ref 135–145)

## 2024-12-30 PROCEDURE — 36415 COLL VENOUS BLD VENIPUNCTURE: CPT | Mod: ORL | Performed by: NURSE PRACTITIONER

## 2024-12-30 PROCEDURE — 80048 BASIC METABOLIC PNL TOTAL CA: CPT | Mod: ORL | Performed by: NURSE PRACTITIONER

## 2024-12-30 PROCEDURE — 82310 ASSAY OF CALCIUM: CPT | Performed by: NURSE PRACTITIONER

## 2024-12-30 PROCEDURE — P9604 ONE-WAY ALLOW PRORATED TRIP: HCPCS | Mod: ORL | Performed by: NURSE PRACTITIONER

## 2024-12-30 PROCEDURE — 99309 SBSQ NF CARE MODERATE MDM 30: CPT | Performed by: INTERNAL MEDICINE

## 2024-12-30 NOTE — PROGRESS NOTES
Candida Rose is a 84 year old female seen December 30, 2024 at [facility] TCU where she was admitted after [facility] hospitalization 11/28 to 12/7 following a fall in which she suffered a left distal femur interprosthetic fracture.  She underwent ORIF on 11/29    Post operative course was complicated by multiple strokes, with worsened aphasia and new right sided weakness.   She also had blood loss anemia for which she was transfused 2 units pRBCs    Strokes were felt to be secondary to hypoperfusion in Moyamoya disease vs atrial fib with cardioemboli     Atrial fib new and she was started on apixaban.       Today pt is seen in her room lying abed.  She answers a couple of questions, states she's okay, comfortable.  She does smile, but doesn't say anything else.   Her  Olu is present and he helps with history.   Notes pt was independent in her ADLs prior to the fall   He was trying to discourage driving, but she was otherwise cooking and getting about without difficulty.   They live nearby in [place] and he comes to visit pt in TCU a couple of times a day.  Olu doesn't feel she is progressing in her time here.   She doesn't take fluids well   Speech Therapist felt pt was more interactive after receiving IVF and is continuing to work with her.    By chart review, pt has a h/o Moyamoya disease with multiple strokes in 2006, 2010 and 2022   She underwent bilateral STA-MCA bypasses in 2006   Aphasia and left hemiparesis prominent after 2022 stroke.   She also has anxiety/depression and HTN    In clinic notes she had recently reported increased fatigue and sleepiness.     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 F/u dr. Soto    OA (osteoarthritis)     s/p bilat total hips     Other chronic pain     Joint pain for many years    Unspecified cerebral artery occlusion with cerebral infarction     After Moyamoya surgery > 10 yrs ago.    Vitamin D deficiencies        Past Surgical History:   Procedure Laterality Date    ARTHROPLASTY KNEE Left 4/17/2017    Procedure: ARTHROPLASTY KNEE;  Left total knee arthroplasty;  Surgeon: Chidi Jenkins MD;  Location: [facility] OR    COLONOSCOPY  2008    normal    COLONOSCOPY  7/17/2014    Procedure: COLONOSCOPY;  Surgeon: Raul Nolan DO;  Location:  GI    CRANIOTOMY      MOJAMOJA  \"Pt had repair of blood flow.\"    IR CAROTID ANGIOGRAM  10/30/2006    IR CAROTID ANGIOGRAM  6/6/2010    IR CAROTID ANGIOGRAM  6/6/2010    IR CAROTID ANGIOGRAM  6/6/2010    IR CAROTID ANGIOGRAM  5/12/2011    IR CAROTID ANGIOGRAM  5/12/2011    IR CAROTID ANGIOGRAM  5/12/2011    IR CAROTID ANGIOGRAM  6/5/2012    IR CAROTID ANGIOGRAM  6/5/2012    IR CAROTID ANGIOGRAM  6/5/2012    IR CAROTID CEREBRAL ANGIOGRAM BILATERAL  10/7/2022    IR MISCELLANEOUS PROCEDURE  6/6/2010    IR MISCELLANEOUS PROCEDURE  6/6/2010    IR MISCELLANEOUS PROCEDURE  5/12/2011    IR MISCELLANEOUS PROCEDURE  6/5/2012    OPEN REDUCTION INTERNAL FIXATION FEMUR DISTAL Left 11/29/2024    Procedure: Open reduction and internal fixation left interprosthetic femur fracture;  Surgeon: Andrea Clark MD;  Location: [facility] OR    RECONSTRUCT FOREFOOT WITH METATARSOPHALANGEAL (MTP) FUSION Left 8/21/2014    Procedure: RECONSTRUCT FOREFOOT WITH METATARSOPHALANGEAL (MTP) FUSION;  Surgeon: Tres Merlos DPM;  Location: [facility] OR    ZZC NONSPECIFIC PROCEDURE  10/30/06    neurosurgery Dr Soto    [facility] NONSPECIFIC PROCEDURE      bilateral total hips approx 2002    ZZC NONSPECIFIC PROCEDURE  3/07    " "neurosurgery      SH:  Lives with her  Olu, house in [place]   Daughter and son live out of state     ROS:  She is a Lakeisha lift out of bed   Wt Readings from Last 5 Encounters:   12/29/24 85.5 kg (188 lb 6.4 oz)   12/27/24 85.3 kg (188 lb)   12/19/24 89.4 kg (197 lb)   12/16/24 91.4 kg (201 lb 9.6 oz)   12/12/24 91.2 kg (201 lb)      EXAM: NAD  BP (!) 148/71   Pulse 64   Temp 97.9  F (36.6  C)   Resp 18   Ht 1.727 m (5' 8\")   Wt 85.5 kg (188 lb 6.4 oz)   LMP  (LMP Unknown)   SpO2 96%   BMI 28.65 kg/m     Neck supple without adenopathy  Lungs clear anteriorly   Heart RRR s1s2   Abd soft, NT,  no distention or guarding, +BS  Ext with trace edema   Left lateral thigh incision is healing.   +serosanguinous drainage from proximal incision  Mild edema and erythema at proximal part of the incision, distal part is healing well.   Neuro: significant aphasia, left hemiparesis  Contracture of LUE, holds onto a ball   Psych: affect okay, smiles     Lab Results   Component Value Date     12/30/2024    POTASSIUM 4.0 12/30/2024    CHLORIDE 100 12/30/2024    CO2 22 12/30/2024    ANIONGAP 13 12/30/2024    GLC 96 12/30/2024    BUN 24.8 (H) 12/30/2024    CR 0.84 12/30/2024    GFRESTIMATED 69 12/30/2024    RAINE 9.4 12/30/2024     Lab Results   Component Value Date    AST 56 11/30/2024      ALBUMIN 2.8 11/30/2024      ALKPHOS 66 11/30/2024     Lab Results   Component Value Date    WBC 10.4 12/16/2024      HGB 10.0 12/16/2024      MCV 94 12/16/2024       12/16/2024     MR BRAIN W/O and W CONTRAST   12/1/2024  INDICATION: aphasia, right side weakness  Multifocal diffusion restriction involving the parafalcine frontal lobes bilaterally, scattered throughout the left basal ganglia and within the anterior aspect of the medial left temporal lobe, all of which display ADC hypointense and T2/FLAIR hyperintense signal, consistent with acute infarction. There is no associated contrast enhancement within these areas " of infarcted parenchyma. There is mild effacement of the frontal horn of the left lateral ventricle.  Chronic appearing, multifocal infarctions involving the right frontal lobe and the left parietal/occipital lobes.  Impression:  1. Multifocal, acute infarctions of the cerebral parenchyma affecting the bifrontal lobes, the left basal ganglia and the anterior aspect of the medial left temporal lobe.  2. Edematous appearing cerebral parenchyma of the left frontal lobe which mildly effaces the adjacent lateral ventricle frontal horn.     XR KNEE LEFT 1/2 VIEWS, XR FEMUR LEFT 2 VIEWS    11/28/2024  There is an acute comminuted fracture of the distal femoral diaphysis with displaced fracture fragments (at least one shaft width posterior displacement of distal fragment) and foreshortening (at least 6 cm). There is with associated varus angulation of the fracture. Total knee arthroplasty without evidence of complication. Total hip arthroplasty partially visualized.      IMP/PLAN:   (S79.447Q) Closed fracture of distal end of left femur with routine healing, unspecified fracture morphology, subsequent encounter  (primary encounter diagnosis)  Comment: she is NWB left leg  Knee immobilizer when she is out of bed.     Plan: continue to monitor incision which is still draining.   Dressing to cover and keep pt from picking at it.     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   Neurology follow up     (R13.12) Oropharyngeal dysphagia  (E86.0) Dehydration  Comment: she is not staying hydrated with thickened liquids, and weight is down.  Has needed IVF past couple of weeks     Plan: working with Speech Therapy to trial thin liquids and see if she will be able to take in more po.       (I48.0) Paroxysmal atrial fibrillation (H)  Comment:   Pulse Readings from Last 4 Encounters:   12/29/24 64   12/27/24 61   12/19/24 63   12/16/24 (!) 122      Plan: atenolol 25 mg /day for VR control   Apixaban 5 mg bid for stroke prophylaxis       (I10) Essential hypertension  Comment:   BP Readings from Last 3 Encounters:   12/29/24 (!) 148/71   12/27/24 120/54   12/19/24 (!) 164/77      Plan: losartan 50 mg/day and atenolol 25 mg/day   Some permissive HTN in setting of strokes     (D62) Anemia due to blood loss, acute  Comment: slow trend upwards   Plan: follow hgb      (Z71.89) Goals of care, counseling/discussion with pt and her  Olu.   Olu notes they have had many discussions about what they would want.  Pt has been very clear on these occasions that she is interested in comfort focus, and does not want further hospitalization or aggressive care.   They had a Hospice meeting just after hospital discharge, but it was decided they would see how she could do with therapies.   With worsened neurologic deficits, lack of progress with therapies, weight loss and overall decline she will likely have to go upstairs to LTC, and not be able to return home   Consequently, they would like to revisit a Hospice consult.   Their daughter is coming into town later this week, will want to ask for Hospice eval then.       Nova Linares MD

## 2024-12-30 NOTE — LETTER
12/30/2024      Candida Rose  [address] [facility] [id number]        Candida Rose is a 84 year old female seen December 30, 2024 at [facility] where she was admitted after [facility] hospitalization 11/28 to 12/7 following a fall in which she suffered a left distal femur interprosthetic fracture.  She underwent ORIF on 11/29    Post operative course was complicated by multiple strokes, with worsened aphasia and new right sided weakness.   She also had blood loss anemia for which she was transfused 2 units pRBCs    Strokes were felt to be secondary to hypoperfusion in Moyamoya disease vs atrial fib with cardioemboli     Atrial fib new and she was started on apixaban.       Today pt is seen in her room lying abed.  She answers a couple of questions, states she's okay, comfortable.  She does smile, but doesn't say anything else.   Her  Olu is present and he helps with history.   Notes pt was independent in her ADLs prior to the fall   He was trying to discourage driving, but she was otherwise cooking and getting about without difficulty.   They live nearby in [place] and he comes to visit pt in TCU a couple of times a day.  Olu doesn't feel she is progressing in her time here.   She doesn't take fluids well   Speech Therapist felt pt was more interactive after receiving IVF and is continuing to work with her.    By chart review, pt has a h/o Moyamoya disease with multiple strokes in 2006, 2010 and 2022   She underwent bilateral STA-MCA bypasses in 2006   Aphasia and left hemiparesis prominent after 2022 stroke.   She also has anxiety/depression and HTN    In clinic notes she had recently reported increased fatigue and sleepiness.     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 F/u dr. Soto     OA (osteoarthritis)     s/p bilat total hips      Other chronic pain     Joint pain for many years     Unspecified cerebral artery occlusion with cerebral infarction     After Moyamoya surgery > 10 yrs ago.     Vitamin D deficiencies        Past Surgical History:   Procedure Laterality Date     ARTHROPLASTY KNEE Left 4/17/2017    Procedure: ARTHROPLASTY KNEE;  Left total knee arthroplasty;  Surgeon: Chidi Jenkins MD;  Location: [facility] OR     COLONOSCOPY  2008    normal     COLONOSCOPY  7/17/2014    Procedure: COLONOSCOPY;  Surgeon: Raul Nolan DO;  Location:  GI     CRANIOTOMY      MOJAMOJA  \"Pt had repair of blood flow.\"     IR CAROTID ANGIOGRAM  10/30/2006     IR CAROTID ANGIOGRAM  6/6/2010     IR CAROTID ANGIOGRAM  6/6/2010     IR CAROTID ANGIOGRAM  6/6/2010     IR CAROTID ANGIOGRAM  5/12/2011     IR CAROTID ANGIOGRAM  5/12/2011     IR CAROTID ANGIOGRAM  5/12/2011     IR CAROTID ANGIOGRAM  6/5/2012     IR CAROTID ANGIOGRAM  6/5/2012     IR CAROTID ANGIOGRAM  6/5/2012     IR CAROTID CEREBRAL ANGIOGRAM BILATERAL  10/7/2022     IR MISCELLANEOUS PROCEDURE  6/6/2010     IR MISCELLANEOUS PROCEDURE  6/6/2010     IR MISCELLANEOUS PROCEDURE  5/12/2011     IR MISCELLANEOUS PROCEDURE  6/5/2012     OPEN REDUCTION INTERNAL FIXATION FEMUR DISTAL Left 11/29/2024    Procedure: Open reduction and internal fixation left interprosthetic femur fracture;  Surgeon: Andrea Clark MD;  Location: [facility] OR     RECONSTRUCT FOREFOOT WITH METATARSOPHALANGEAL (MTP) FUSION Left 8/21/2014    Procedure: RECONSTRUCT FOREFOOT WITH METATARSOPHALANGEAL (MTP) FUSION;  Surgeon: Tres Merlos DPM;  Location: [facility] OR     ZZC NONSPECIFIC PROCEDURE  10/30/06    neurosurgery Dr " "Charles     [facility] NONSPECIFIC PROCEDURE      bilateral total hips approx 2002     [facility] NONSPECIFIC PROCEDURE  3/07    neurosurgery      SH:  Lives with her  Olu, house in [place]   Daughter and son live out of state     ROS:  She is a Lakeisha lift out of bed   Wt Readings from Last 5 Encounters:   12/29/24 85.5 kg (188 lb 6.4 oz)   12/27/24 85.3 kg (188 lb)   12/19/24 89.4 kg (197 lb)   12/16/24 91.4 kg (201 lb 9.6 oz)   12/12/24 91.2 kg (201 lb)      EXAM: NAD  BP (!) 148/71   Pulse 64   Temp 97.9  F (36.6  C)   Resp 18   Ht 1.727 m (5' 8\")   Wt 85.5 kg (188 lb 6.4 oz)   LMP  (LMP Unknown)   SpO2 96%   BMI 28.65 kg/m     Neck supple without adenopathy  Lungs clear anteriorly   Heart RRR s1s2   Abd soft, NT,  no distention or guarding, +BS  Ext with trace edema   Left lateral thigh incision is healing.   +serosanguinous drainage from proximal incision  Mild edema and erythema at proximal part of the incision, distal part is healing well.   Neuro: significant aphasia, left hemiparesis  Contracture of LUE, holds onto a ball   Psych: affect okay, smiles     Lab Results   Component Value Date     12/30/2024    POTASSIUM 4.0 12/30/2024    CHLORIDE 100 12/30/2024    CO2 22 12/30/2024    ANIONGAP 13 12/30/2024    GLC 96 12/30/2024    BUN 24.8 (H) 12/30/2024    CR 0.84 12/30/2024    GFRESTIMATED 69 12/30/2024    RAINE 9.4 12/30/2024     Lab Results   Component Value Date    AST 56 11/30/2024      ALBUMIN 2.8 11/30/2024      ALKPHOS 66 11/30/2024     Lab Results   Component Value Date    WBC 10.4 12/16/2024      HGB 10.0 12/16/2024      MCV 94 12/16/2024       12/16/2024     MR BRAIN W/O and W CONTRAST   12/1/2024  INDICATION: aphasia, right side weakness  Multifocal diffusion restriction involving the parafalcine frontal lobes bilaterally, scattered throughout the left basal ganglia and within the anterior aspect of the medial left temporal lobe, all of which display ADC hypointense and T2/FLAIR " hyperintense signal, consistent with acute infarction. There is no associated contrast enhancement within these areas of infarcted parenchyma. There is mild effacement of the frontal horn of the left lateral ventricle.  Chronic appearing, multifocal infarctions involving the right frontal lobe and the left parietal/occipital lobes.  Impression:  1. Multifocal, acute infarctions of the cerebral parenchyma affecting the bifrontal lobes, the left basal ganglia and the anterior aspect of the medial left temporal lobe.  2. Edematous appearing cerebral parenchyma of the left frontal lobe which mildly effaces the adjacent lateral ventricle frontal horn.     XR KNEE LEFT 1/2 VIEWS, XR FEMUR LEFT 2 VIEWS    11/28/2024  There is an acute comminuted fracture of the distal femoral diaphysis with displaced fracture fragments (at least one shaft width posterior displacement of distal fragment) and foreshortening (at least 6 cm). There is with associated varus angulation of the fracture. Total knee arthroplasty without evidence of complication. Total hip arthroplasty partially visualized.      IMP/PLAN:   (S72.349P) Closed fracture of distal end of left femur with routine healing, unspecified fracture morphology, subsequent encounter  (primary encounter diagnosis)  Comment: she is NWB left leg  Knee immobilizer when she is out of bed.     Plan: continue to monitor incision which is still draining.   Dressing to cover and keep pt from picking at it.     Orthopedics follow up    Pain management with acetaminophen q8 hours, PRN methocarbamol, hydromorphone     On apixaban for DVT prophylaxis     PHYSICAL THERAPY /OCCUPATIONAL THERAPY for transfers, ADLs, strengthening      (Z86.73) Recent cerebrovascular accident (CVA)  (I69.320) Aphasia as late effect of cerebrovascular accident (CVA)  (I69.954) Hemiparesis of left nondominant side as late effect of cerebrovascular disease, unspecified cerebrovascular disease type (H)  (I67.5)  Moyamoya disease  (F01.50) Vascular dementia, unspecified dementia severity, unspecified whether behavioral, psychotic, or mood disturbance or anxiety (H)  Comment: many new insults in the post operative period      Plan: rosuvastatin 10 mg/day   Anticoagulation as above.   Neurology follow up     (R13.12) Oropharyngeal dysphagia  (E86.0) Dehydration  Comment: she is not staying hydrated with thickened liquids, and weight is down.  Has needed IVF past couple of weeks     Plan: working with Speech Therapy to trial thin liquids and see if she will be able to take in more po.       (I48.0) Paroxysmal atrial fibrillation (H)  Comment:   Pulse Readings from Last 4 Encounters:   12/29/24 64   12/27/24 61   12/19/24 63   12/16/24 (!) 122      Plan: atenolol 25 mg /day for VR control   Apixaban 5 mg bid for stroke prophylaxis       (I10) Essential hypertension  Comment:   BP Readings from Last 3 Encounters:   12/29/24 (!) 148/71   12/27/24 120/54   12/19/24 (!) 164/77      Plan: losartan 50 mg/day and atenolol 25 mg/day   Some permissive HTN in setting of strokes     (D62) Anemia due to blood loss, acute  Comment: slow trend upwards   Plan: follow hgb      (Z71.89) Goals of care, counseling/discussion with pt and her  Olu.   Olu notes they have had many discussions about what they would want.  Pt has been very clear on these occasions that she is interested in comfort focus, and does not want further hospitalization or aggressive care.   They had a Hospice meeting just after hospital discharge, but it was decided they would see how she could do with therapies.   With worsened neurologic deficits, lack of progress with therapies, weight loss and overall decline she will likely have to go upstairs to LTC, and not be able to return home   Consequently, they would like to revisit a Hospice consult.   Their daughter is coming into town later this week, will want to ask for Hospice eval then.       Nova Linares MD        Sincerely,        Nova Linares MD    Electronically signed

## 2025-01-02 VITALS — HEIGHT: 68 IN | WEIGHT: 188 LBS | BODY MASS INDEX: 28.49 KG/M2

## 2025-01-02 NOTE — PROGRESS NOTES
"[facility] GERIATRICS    Chief Complaint   Patient presents with    RECHECK     HPI:  Candida Rose is a 84 year old  (1940), who is being seen today for an episodic care visit at: [facility]  ([facility]) [[id number]]. Today's concern is: ***    Allergies, and PMH/PSH reviewed in EPIC today.  REVIEW OF SYSTEMS:  {zbfhab58:484048}    Objective:   Ht 1.727 m (5' 8\")   Wt 85.3 kg (188 lb)   LMP  (LMP Unknown)   BMI 28.59 kg/m    {Nursing home physical exam :477125}    {fgslab:801552}    Assessment/Plan:  {FGS DX2:773388}    MED REC REQUIRED{TIP  Click the link below to document or use med rec list, use list to pull in response :768390}  Post Medication Reconciliation Status: {MED REC LIST:898456}      Orders:  {fgsorders:436950}  ***    Electronically signed by: Cassy Sloan CNA ***      "

## 2025-01-03 ENCOUNTER — TRANSITIONAL CARE UNIT VISIT (OUTPATIENT)
Dept: GERIATRICS | Facility: CLINIC | Age: 85
End: 2025-01-03
Payer: MEDICARE

## 2025-01-03 NOTE — LETTER
1/3/2025      Candida Rose  [address] [facility] [address]        No notes on file      Sincerely,        Margarita Jade, APRN CNP    Electronically signed

## 2025-01-06 ENCOUNTER — LAB REQUISITION (OUTPATIENT)
Dept: LAB | Facility: CLINIC | Age: 85
End: 2025-01-06
Payer: MEDICARE

## 2025-01-06 VITALS
DIASTOLIC BLOOD PRESSURE: 69 MMHG | SYSTOLIC BLOOD PRESSURE: 116 MMHG | OXYGEN SATURATION: 95 % | RESPIRATION RATE: 16 BRPM | HEART RATE: 61 BPM | BODY MASS INDEX: 28.76 KG/M2 | WEIGHT: 189.8 LBS | HEIGHT: 68 IN | TEMPERATURE: 98.1 F

## 2025-01-06 DIAGNOSIS — D64.9 ANEMIA, UNSPECIFIED: ICD-10-CM

## 2025-01-06 DIAGNOSIS — N18.9 CHRONIC KIDNEY DISEASE, UNSPECIFIED: ICD-10-CM

## 2025-01-06 NOTE — PROGRESS NOTES
[facility] GERIATRICS    Chief Complaint   Patient presents with    RECHECK     HPI:  Candida Rose is a 84 year old  (1940), who is being seen today for an episodic care visit at: [facility]  ([facility]) [[id number]].     Past medical history significant for hypertension, dyslipidemia, paroxysmal atrial fibrillation, moyamoya disease, CVA with left hemiparesis, dementia, CKD, OA, anxiety, depression, obesity     Summary of recent hospitalization:  Patient was hospitalized at [facility] from 1/28/2024-12/7/2024 for left femur fracture status post ORIF and CVA.  Patient presented to the emergency department for evaluation of a fall that was witnessed by her spouse.  In the emergency department laboratory evaluation revealed sodium 132, creatinine 1.01, hemoglobin 12.4.  Imaging significant for left distal femoral fracture with displacement and comminution.  Ortho was consulted and patient status post ORIF on 11/29/2024.   cc. Hemoglobin dropped to ofelia of 6.8 and required PRBC. Postoperatively patient with drowsiness, confusion, right-sided weakness, aphasia.  CTA head and neck on 11/29 revealed multiple intracranial arterial stenosis and occlusions, bilateral cranioplasty's, neck CTA revealed right distal cervical artery short segment occlusion with reconstitution, left vertebral artery origin and left V1 segment occluded with reconstitution in the left proximal P2 segment, extensive cervical artery stenosis, thyroid nodule, recommend nonemergent thyroid ultrasound.  CT head perfusion on 11/29 revealed large areas of Tmax greater than 6 seconds within the bilateral frontal lobes STEPHEN and MCA territories and to a lesser extent right greater than left PCA territories, findings correspond to areas of brain rest, no abnormal perfusion on CBF less than 30% to suggest core infarct, however qualitatively there is decreased CBD within the bilateral frontal lobes and left occipital temporal  region.  MRI of brain on 12/1 revealed multifocal, acute infarctions of the cerebral parenchyma affecting the bifrontal lobes, left basal ganglia and anterior aspect of the medial left temporal lobe, edematous appearing cerebral parenchyma on the left frontal lobe which mildly effaces the adjacent lateral ventricle frontal horn.  Repeat CT head revealed no evidence of hemorrhagic conversion, no definite new infarct. Echo on 12/2 revealed LVEF 50 to 55%, mild to moderate apical wall hypokinesis, no thrombus present. Neurology was consulted. Patient started on apixaban. Follow up with general neurology in 6 weeks. SLP consulted for oropharyngeal dysphagia and recommended minced and moist diet with mildly thickened consistency. Continues to have have hyponatremia at discharge, Na level 132 on 12/5. PTA aspirin, cetrizine, B12, Mg and multivitamin discontinued inpatient. Palliative care consulted and plan is for patient to attempt rehab and if unsuccessful will transition to comfort approach. Discharged to TCU for physical rehabilitation and medical management.     Today patient was seen for follow-up in the TCU.  Patient is limited historian.  She has much more alert than I have ever seen her since her stay in the TCU and answers more questions today than she has in the past.  She reports that she is feeling good today.  She denies any pain.  She denies any trouble breathing.  Patient does not respond to the rest of my review of symptoms today.  Nursing staff with no specific concerns.  Spoke to patient's spouse, who tells me she has been having a really great day today and overall has been doing well.  She is tolerating the thin liquids and he has noticed that she has been drinking them, when they are within her reach.  Her lab work reflects dehydration with increase in creatinine.  Spoke to patient's spouse on the phone today  and he is in agreement with trying to push fluids orally, with recheck later this week and  "if no improvement will consider IV fluids at that time.  He is not noticing her cough or have any issues tolerating the thin liquids    Reviewed facility EMR including medications, recent nursing progress notes, vital signs.  Discussed plan of care with nursing.    Allergies, and PMH/PSH reviewed in e994 today.  REVIEW OF SYSTEMS:  Limited ROS secondary to cognitive impairment and aphasia    Objective:   /69   Pulse 61   Temp 98.1  F (36.7  C)   Resp 16   Ht 1.727 m (5' 8\")   Wt 86.1 kg (189 lb 12.8 oz)   LMP  (LMP Unknown)   SpO2 95%   BMI 28.86 kg/m    GENERAL APPEARANCE:  Alert, in NAD  HEENT: normocephalic, dry mucous membranes, nose without drainage or crusting  RESP:  respiratory effort normal, no respiratory distress, Lung sounds clear, patient is on RA  CV: auscultation of heart done, rate and rhythm regular.   ABDOMEN: + bowel sounds, soft, nontender, no grimacing or guarding with palpation.  M/S:   +1 bilateral lower extremity edema  SKIN: reviewed images taken yesterday at TCU today  NEURO: aphasia  PSYCH: affect flat       Labs done in SNF are in [facility]. Please refer to them using e994/Care Everywhere. and Recent labs in [facility] reviewed by me today.     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 BMP 1/9. Will consider IV hydration if worsening at repeat. Avoid nephrotoxins. Renally dose medications as indicated.    Leukocytosis  Suspect due to dehydration  She appears more alert than I have seen her since admission " here  VSS and she is afebrile   She does have the surgical incision that is being closely watched to left thigh but no overt signs of infection  Plan: Push fluids as above. CBC 1/9. Monitor closely for s/s of infection, especially hip wound.    Acute bilateral frontal CVA and left basal ganglia CVA with aphasia and right-sided weakness  History of CVA with left-sided hemiparesis  History of Moyamoya Disease  Vascular dementia  Patient started on apixaban inpatient  With ongoing aphasia, though much more alert today than I have seen her before  Plan: Continue apixaban 5 mg twice daily.  Monitor blood pressure.  SBP goal less than 130.  Therapy as below.  Follow-up with neurology 6 to 8 weeks.  SLP to continue to evaluate and treat     Oropharyngeal dysphagia   Secondary to CVA  Wt Readings from Last 4 Encounters:   01/06/25 86.1 kg (189 lb 12.8 oz)   01/02/25 85.3 kg (188 lb)   12/29/24 85.5 kg (188 lb 6.4 oz)   12/27/24 85.3 kg (188 lb)     Has had good participation with speech, is now on thin liquids and tolerating well  Plan: Continue minced and moist level 5 texture and advance to thin liquids per SLP today.  Patient also requires assistance for feeding with all meals.  SLP to evaluate and treat. At risk for malnutrition. Check weekly weight.     Left distal femur fracture status post ORIF on 11/30/2024  Fall, subsequent encounter  Patient saw ortho on 12/17 who reported that incision looked ok  She appears comfortable- not using dilaudid or methocarbamol  Reviewed images of wound from yesterday small blister and area of dehiscence - images were last reviewed by ortho yesterday and facility reviewed wound care with ortho who was fine with current plan  Plan:  Continue pain management with Tylenol 975 mg every 8 hours as needed.  Continue apixaban 5 mg twice daily for DVT prophylaxis.  TG shapers for leg swelling.  Nonweightbearing to left leg, left knee immobilizer off in bed, apply brace with transferring and  ambulation.  Therapy as below.  Continue wound care as ordered and monitor wound closely. Follow-up with Ortho per recommendations.     Acute blood loss anemia  Baseline hemoglobin 12-13  Hemoglobin ofelia 6.8 on 11/30/2024, received 2 units PRBC  Hemoglobin 13.0 on 1/7/2025-suspect slightly elevated due to dehydration  Plan: CBC PRN.  Monitor for signs and symptoms of bleeding.     Hyponatremia, resolved  Sodium level of 137 on 1/7/2025  Plan: BMP PRN     History of paroxysmal atrial fibrillation  Patient diagnosed in 10/2022 at time of previous CVA, however patient had event monitor following that did not see clear A-fib but PAC and PVCs.  Cardiology notes that patients with mild mild disease have increased risk of hemorrhage, therefore patient's anticoagulation was discontinued.  Patient started on apixaban on 12/6.  HR 60-70s recently  Plan: Continue apixaban 5 mg twice daily and atenolol 25 mg daily.  Monitor heart rate.     Essential hypertension  PTA losartan dose increased inpatient  -140s  Plan: Continue losartan 50 mg twice daily, atenolol 25 mg daily.  Monitor blood pressure.  SBP goal less than 130.     Dyslipidemia  Plan: Continue rosuvastatin 10 mg daily.     Depression  Anxiety  Insomnia  Plan: Continue melatonin 3 mg at bedtime. Continue sertraline 50 mg daily.  Monitor mood and symptoms.  Consider ACP referral as needed.     Slow transit constipation  Bowels are moving regularly per nursing documentation  Plan: Continue MiraLAX 17 g daily.  Monitor mood and symptoms.  Consider ACP referral as needed.     Physical deconditioning  Secondary to recent hospitalization, medical conditions as above   Patient continues to work with speech therapy  Plan: Encourage participation in physical therapy/occupational therapy for strengthening and deconditioning. Discharge planning per their recommendation. Social work to assist with d/c planning.     Goals of care  Spouse and family are looking into hospice  after TCU stay is completed.        Incidental finding  Thyroid nodule seen on CTA head neck  Plan: Follow up outpatient for US evaluation.      MED REC REQUIRED  Post Medication Reconciliation Status:  Medication reconciliation previously completed during another office visit    Disclaimer: This note may contain text created using speech-recognition software and may contain unintended word substitutions.     Electronically signed by: ALFREDO Pugh CNP

## 2025-01-07 ENCOUNTER — TRANSITIONAL CARE UNIT VISIT (OUTPATIENT)
Dept: GERIATRICS | Facility: CLINIC | Age: 85
End: 2025-01-07
Payer: MEDICARE

## 2025-01-07 DIAGNOSIS — F41.9 ANXIETY: ICD-10-CM

## 2025-01-07 DIAGNOSIS — E86.0 DEHYDRATION: Primary | ICD-10-CM

## 2025-01-07 DIAGNOSIS — S72.402D CLOSED FRACTURE OF DISTAL END OF LEFT FEMUR WITH ROUTINE HEALING, UNSPECIFIED FRACTURE MORPHOLOGY, SUBSEQUENT ENCOUNTER: ICD-10-CM

## 2025-01-07 DIAGNOSIS — G47.00 INSOMNIA, UNSPECIFIED TYPE: ICD-10-CM

## 2025-01-07 DIAGNOSIS — F33.0 MILD RECURRENT MAJOR DEPRESSION: ICD-10-CM

## 2025-01-07 DIAGNOSIS — Z87.81 S/P ORIF (OPEN REDUCTION INTERNAL FIXATION) FRACTURE: ICD-10-CM

## 2025-01-07 DIAGNOSIS — E78.5 DYSLIPIDEMIA: ICD-10-CM

## 2025-01-07 DIAGNOSIS — D62 ANEMIA DUE TO BLOOD LOSS, ACUTE: ICD-10-CM

## 2025-01-07 DIAGNOSIS — R13.12 OROPHARYNGEAL DYSPHAGIA: ICD-10-CM

## 2025-01-07 DIAGNOSIS — Z98.890 S/P ORIF (OPEN REDUCTION INTERNAL FIXATION) FRACTURE: ICD-10-CM

## 2025-01-07 DIAGNOSIS — D72.829 LEUKOCYTOSIS, UNSPECIFIED TYPE: ICD-10-CM

## 2025-01-07 DIAGNOSIS — N18.2 CKD (CHRONIC KIDNEY DISEASE) STAGE 2, GFR 60-89 ML/MIN: ICD-10-CM

## 2025-01-07 DIAGNOSIS — Z86.73 RECENT CEREBROVASCULAR ACCIDENT (CVA): ICD-10-CM

## 2025-01-07 DIAGNOSIS — I67.5 MOYAMOYA DISEASE: ICD-10-CM

## 2025-01-07 DIAGNOSIS — I69.320 APHASIA AS LATE EFFECT OF CEREBROVASCULAR ACCIDENT (CVA): ICD-10-CM

## 2025-01-07 DIAGNOSIS — I48.0 PAROXYSMAL ATRIAL FIBRILLATION (H): ICD-10-CM

## 2025-01-07 DIAGNOSIS — I69.954 HEMIPARESIS OF LEFT NONDOMINANT SIDE AS LATE EFFECT OF CEREBROVASCULAR DISEASE, UNSPECIFIED CEREBROVASCULAR DISEASE TYPE (H): ICD-10-CM

## 2025-01-07 DIAGNOSIS — R53.81 PHYSICAL DECONDITIONING: ICD-10-CM
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
## 2025-01-07 DIAGNOSIS — F01.50 VASCULAR DEMENTIA, UNSPECIFIED DEMENTIA SEVERITY, UNSPECIFIED WHETHER BEHAVIORAL, PSYCHOTIC, OR MOOD DISTURBANCE OR ANXIETY (H): ICD-10-CM

## 2025-01-07 DIAGNOSIS — I10 ESSENTIAL HYPERTENSION: ICD-10-CM

## 2025-01-07 LAB
ANION GAP SERPL CALCULATED.3IONS-SCNC: 19 MMOL/L (ref 7–15)
BUN SERPL-MCNC: 43.2 MG/DL (ref 8–23)
CALCIUM SERPL-MCNC: 10.1 MG/DL (ref 8.8–10.4)
CHLORIDE SERPL-SCNC: 97 MMOL/L (ref 98–107)
CREAT SERPL-MCNC: 1.05 MG/DL (ref 0.51–0.95)
EGFRCR SERPLBLD CKD-EPI 2021: 53 ML/MIN/1.73M2
ERYTHROCYTE [DISTWIDTH] IN BLOOD BY AUTOMATED COUNT: 13.2 % (ref 10–15)
GLUCOSE SERPL-MCNC: 188 MG/DL (ref 70–99)
HCO3 SERPL-SCNC: 21 MMOL/L (ref 22–29)
HCT VFR BLD AUTO: 40.9 % (ref 35–47)
HGB BLD-MCNC: 13 G/DL (ref 11.7–15.7)
MCH RBC QN AUTO: 29.7 PG (ref 26.5–33)
MCHC RBC AUTO-ENTMCNC: 31.8 G/DL (ref 31.5–36.5)
MCV RBC AUTO: 94 FL (ref 78–100)
PLATELET # BLD AUTO: 476 10E3/UL (ref 150–450)
POTASSIUM SERPL-SCNC: 3.6 MMOL/L (ref 3.4–5.3)
RBC # BLD AUTO: 4.37 10E6/UL (ref 3.8–5.2)
SODIUM SERPL-SCNC: 137 MMOL/L (ref 135–145)
WBC # BLD AUTO: 14.1 10E3/UL (ref 4–11)

## 2025-01-07 PROCEDURE — 85027 COMPLETE CBC AUTOMATED: CPT | Mod: ORL | Performed by: NURSE PRACTITIONER

## 2025-01-07 PROCEDURE — 80048 BASIC METABOLIC PNL TOTAL CA: CPT | Mod: ORL | Performed by: NURSE PRACTITIONER

## 2025-01-07 PROCEDURE — 36415 COLL VENOUS BLD VENIPUNCTURE: CPT | Mod: ORL | Performed by: NURSE PRACTITIONER

## 2025-01-07 PROCEDURE — P9603 ONE-WAY ALLOW PRORATED MILES: HCPCS | Mod: ORL | Performed by: NURSE PRACTITIONER

## 2025-01-07 NOTE — LETTER
1/7/2025      Candida Rose  [address] [address]        [facility] GERIATRICS    Chief Complaint   Patient presents with     RECHECK     HPI:  Candida Rose is a 84 year old  (1940), who is being seen today for an episodic care visit at: [facility]  ([facility]) [[id number]].     Past medical history significant for hypertension, dyslipidemia, paroxysmal atrial fibrillation, moyamoya disease, CVA with left hemiparesis, dementia, CKD, OA, anxiety, depression, obesity     Summary of recent hospitalization:  Patient was hospitalized at [facility] from 1/28/2024-12/7/2024 for left femur fracture status post ORIF and CVA.  Patient presented to the emergency department for evaluation of a fall that was witnessed by her spouse.  In the emergency department laboratory evaluation revealed sodium 132, creatinine 1.01, hemoglobin 12.4.  Imaging significant for left distal femoral fracture with displacement and comminution.  Ortho was consulted and patient status post ORIF on 11/29/2024.   cc. Hemoglobin dropped to ofelia of 6.8 and required PRBC. Postoperatively patient with drowsiness, confusion, right-sided weakness, aphasia.  CTA head and neck on 11/29 revealed multiple intracranial arterial stenosis and occlusions, bilateral cranioplasty's, neck CTA revealed right distal cervical artery short segment occlusion with reconstitution, left vertebral artery origin and left V1 segment occluded with reconstitution in the left proximal P2 segment, extensive cervical artery stenosis, thyroid nodule, recommend nonemergent thyroid ultrasound.  CT head perfusion on 11/29 revealed large areas of Tmax greater than 6 seconds within the bilateral frontal lobes STEPHEN and MCA territories and to a lesser extent right greater than left PCA territories, findings correspond to areas of brain rest, no abnormal perfusion on CBF less than 30% to suggest core infarct, however qualitatively there is  decreased CBD within the bilateral frontal lobes and left occipital temporal region.  MRI of brain on 12/1 revealed multifocal, acute infarctions of the cerebral parenchyma affecting the bifrontal lobes, left basal ganglia and anterior aspect of the medial left temporal lobe, edematous appearing cerebral parenchyma on the left frontal lobe which mildly effaces the adjacent lateral ventricle frontal horn.  Repeat CT head revealed no evidence of hemorrhagic conversion, no definite new infarct. Echo on 12/2 revealed LVEF 50 to 55%, mild to moderate apical wall hypokinesis, no thrombus present. Neurology was consulted. Patient started on apixaban. Follow up with general neurology in 6 weeks. SLP consulted for oropharyngeal dysphagia and recommended minced and moist diet with mildly thickened consistency. Continues to have have hyponatremia at discharge, Na level 132 on 12/5. PTA aspirin, cetrizine, B12, Mg and multivitamin discontinued inpatient. Palliative care consulted and plan is for patient to attempt rehab and if unsuccessful will transition to comfort approach. Discharged to TCU for physical rehabilitation and medical management.     Today patient was seen for follow-up in the TCU.  Patient is limited historian.  She has much more alert than I have ever seen her since her stay in the TCU and answers more questions today than she has in the past.  She reports that she is feeling good today.  She denies any pain.  She denies any trouble breathing.  Patient does not respond to the rest of my review of symptoms today.  Nursing staff with no specific concerns.  Spoke to patient's spouse, who tells me she has been having a really great day today and overall has been doing well.  She is tolerating the thin liquids and he has noticed that she has been drinking them, when they are within her reach.  Her lab work reflects dehydration with increase in creatinine.  Spoke to patient's spouse on the phone today  and he is in  "agreement with trying to push fluids orally, with recheck later this week and if no improvement will consider IV fluids at that time.  He is not noticing her cough or have any issues tolerating the thin liquids    Reviewed facility EMR including medications, recent nursing progress notes, vital signs.  Discussed plan of care with nursing.    Allergies, and PMH/PSH reviewed in EPIC today.  REVIEW OF SYSTEMS:  Limited ROS secondary to cognitive impairment and aphasia    Objective:   /69   Pulse 61   Temp 98.1  F (36.7  C)   Resp 16   Ht 1.727 m (5' 8\")   Wt 86.1 kg (189 lb 12.8 oz)   LMP  (LMP Unknown)   SpO2 95%   BMI 28.86 kg/m    GENERAL APPEARANCE:  Alert, in NAD  HEENT: normocephalic, dry mucous membranes, nose without drainage or crusting  RESP:  respiratory effort normal, no respiratory distress, Lung sounds clear, patient is on RA  CV: auscultation of heart done, rate and rhythm regular.   ABDOMEN: + bowel sounds, soft, nontender, no grimacing or guarding with palpation.  M/S:   +1 bilateral lower extremity edema  SKIN: reviewed images taken yesterday at TCU today  NEURO: aphasia  PSYCH: affect flat       Labs done in SNF are in [facility]. Please refer to them using EPIC/Care Everywhere. and Recent labs in [facility] reviewed by me today.     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 BMP 1/9. Will consider IV hydration if worsening at repeat. Avoid nephrotoxins. Renally dose medications as indicated.    Leukocytosis  Suspect due to " dehydration  She appears more alert than I have seen her since admission here  VSS and she is afebrile   She does have the surgical incision that is being closely watched to left thigh but no overt signs of infection  Plan: Push fluids as above. CBC 1/9. Monitor closely for s/s of infection, especially hip wound.    Acute bilateral frontal CVA and left basal ganglia CVA with aphasia and right-sided weakness  History of CVA with left-sided hemiparesis  History of Moyamoya Disease  Vascular dementia  Patient started on apixaban inpatient  With ongoing aphasia, though much more alert today than I have seen her before  Plan: Continue apixaban 5 mg twice daily.  Monitor blood pressure.  SBP goal less than 130.  Therapy as below.  Follow-up with neurology 6 to 8 weeks.  SLP to continue to evaluate and treat     Oropharyngeal dysphagia   Secondary to CVA  Wt Readings from Last 4 Encounters:   01/06/25 86.1 kg (189 lb 12.8 oz)   01/02/25 85.3 kg (188 lb)   12/29/24 85.5 kg (188 lb 6.4 oz)   12/27/24 85.3 kg (188 lb)     Has had good participation with speech, is now on thin liquids and tolerating well  Plan: Continue minced and moist level 5 texture and advance to thin liquids per SLP today.  Patient also requires assistance for feeding with all meals.  SLP to evaluate and treat. At risk for malnutrition. Check weekly weight.     Left distal femur fracture status post ORIF on 11/30/2024  Fall, subsequent encounter  Patient saw ortho on 12/17 who reported that incision looked ok  She appears comfortable- not using dilaudid or methocarbamol  Reviewed images of wound from yesterday small blister and area of dehiscence - images were last reviewed by ortho yesterday and facility reviewed wound care with ortho who was fine with current plan  Plan:  Continue pain management with Tylenol 975 mg every 8 hours as needed.  Continue apixaban 5 mg twice daily for DVT prophylaxis.  TG shapers for leg swelling.  Nonweightbearing to left  leg, left knee immobilizer off in bed, apply brace with transferring and ambulation.  Therapy as below.  Continue wound care as ordered and monitor wound closely. Follow-up with Ortho per recommendations.     Acute blood loss anemia  Baseline hemoglobin 12-13  Hemoglobin ofelia 6.8 on 11/30/2024, received 2 units PRBC  Hemoglobin 13.0 on 1/7/2025-suspect slightly elevated due to dehydration  Plan: CBC PRN.  Monitor for signs and symptoms of bleeding.     Hyponatremia, resolved  Sodium level of 137 on 1/7/2025  Plan: BMP PRN     History of paroxysmal atrial fibrillation  Patient diagnosed in 10/2022 at time of previous CVA, however patient had event monitor following that did not see clear A-fib but PAC and PVCs.  Cardiology notes that patients with mild mild disease have increased risk of hemorrhage, therefore patient's anticoagulation was discontinued.  Patient started on apixaban on 12/6.  HR 60-70s recently  Plan: Continue apixaban 5 mg twice daily and atenolol 25 mg daily.  Monitor heart rate.     Essential hypertension  PTA losartan dose increased inpatient  -140s  Plan: Continue losartan 50 mg twice daily, atenolol 25 mg daily.  Monitor blood pressure.  SBP goal less than 130.     Dyslipidemia  Plan: Continue rosuvastatin 10 mg daily.     Depression  Anxiety  Insomnia  Plan: Continue melatonin 3 mg at bedtime. Continue sertraline 50 mg daily.  Monitor mood and symptoms.  Consider ACP referral as needed.     Slow transit constipation  Bowels are moving regularly per nursing documentation  Plan: Continue MiraLAX 17 g daily.  Monitor mood and symptoms.  Consider ACP referral as needed.     Physical deconditioning  Secondary to recent hospitalization, medical conditions as above   Patient continues to work with speech therapy  Plan: Encourage participation in physical therapy/occupational therapy for strengthening and deconditioning. Discharge planning per their recommendation. Social work to assist with d/c  planning.     Goals of care  Spouse and family are looking into hospice after TCU stay is completed.        Incidental finding  Thyroid nodule seen on CTA head neck  Plan: Follow up outpatient for US evaluation.      MED REC REQUIRED  Post Medication Reconciliation Status:  Medication reconciliation previously completed during another office visit    Disclaimer: This note may contain text created using speech-recognition software and may contain unintended word substitutions.     Electronically signed by: ALFREDO Pugh CNP         Sincerely,        ALFREDO Pugh CNP    Electronically signed

## 2025-01-07 NOTE — PATIENT INSTRUCTIONS
Orders  Candida Rose  1940  1) Push fluids- 2L per day, please leave fluids where patient can reach them and nursing to assist her with drinking them  2) CBC and BMP 1/9. Diagnosis: leukocytosis and dehydration  Margarita Jade, APRN CNP on 1/7/2025 at 1:58 PM

## 2025-01-08 ENCOUNTER — LAB REQUISITION (OUTPATIENT)
Dept: LAB | Facility: CLINIC | Age: 85
End: 2025-01-08
Payer: MEDICARE

## 2025-01-08 DIAGNOSIS — E86.0 DEHYDRATION: ICD-10-CM

## 2025-01-08 DIAGNOSIS — I10 HTN, GOAL BELOW 140/90: Chronic | ICD-10-CM

## 2025-01-08 DIAGNOSIS — D72.829 ELEVATED WHITE BLOOD CELL COUNT, UNSPECIFIED: ICD-10-CM

## 2025-01-08 RX ORDER — ATENOLOL 25 MG/1
25 TABLET ORAL DAILY
Qty: 90 TABLET | Refills: 0 | Status: SHIPPED | OUTPATIENT
Start: 2025-01-08

## 2025-01-09 LAB
ANION GAP SERPL CALCULATED.3IONS-SCNC: 16 MMOL/L (ref 7–15)
BUN SERPL-MCNC: 49.4 MG/DL (ref 8–23)
CALCIUM SERPL-MCNC: 9.6 MG/DL (ref 8.8–10.4)
CHLORIDE SERPL-SCNC: 104 MMOL/L (ref 98–107)
CREAT SERPL-MCNC: 0.98 MG/DL (ref 0.51–0.95)
EGFRCR SERPLBLD CKD-EPI 2021: 57 ML/MIN/1.73M2
ERYTHROCYTE [DISTWIDTH] IN BLOOD BY AUTOMATED COUNT: 13.3 % (ref 10–15)
GLUCOSE SERPL-MCNC: 106 MG/DL (ref 70–99)
HCO3 SERPL-SCNC: 20 MMOL/L (ref 22–29)
HCT VFR BLD AUTO: 34.6 % (ref 35–47)
HGB BLD-MCNC: 10.9 G/DL (ref 11.7–15.7)
MCH RBC QN AUTO: 29.3 PG (ref 26.5–33)
MCHC RBC AUTO-ENTMCNC: 31.5 G/DL (ref 31.5–36.5)
MCV RBC AUTO: 93 FL (ref 78–100)
PLATELET # BLD AUTO: 316 10E3/UL (ref 150–450)
POTASSIUM SERPL-SCNC: 4.1 MMOL/L (ref 3.4–5.3)
RBC # BLD AUTO: 3.72 10E6/UL (ref 3.8–5.2)
SODIUM SERPL-SCNC: 140 MMOL/L (ref 135–145)
WBC # BLD AUTO: 9.2 10E3/UL (ref 4–11)

## 2025-01-09 PROCEDURE — P9604 ONE-WAY ALLOW PRORATED TRIP: HCPCS | Mod: ORL | Performed by: NURSE PRACTITIONER

## 2025-01-09 PROCEDURE — 80048 BASIC METABOLIC PNL TOTAL CA: CPT | Mod: ORL | Performed by: NURSE PRACTITIONER

## 2025-01-09 PROCEDURE — 85027 COMPLETE CBC AUTOMATED: CPT | Mod: ORL | Performed by: NURSE PRACTITIONER

## 2025-01-09 PROCEDURE — 36415 COLL VENOUS BLD VENIPUNCTURE: CPT | Mod: ORL | Performed by: NURSE PRACTITIONER

## 2025-01-10 ENCOUNTER — TRANSITIONAL CARE UNIT VISIT (OUTPATIENT)
Dept: GERIATRICS | Facility: CLINIC | Age: 85
End: 2025-01-10
Payer: MEDICARE

## 2025-01-10 VITALS
SYSTOLIC BLOOD PRESSURE: 109 MMHG | HEART RATE: 52 BPM | BODY MASS INDEX: 28.49 KG/M2 | RESPIRATION RATE: 16 BRPM | HEIGHT: 68 IN | TEMPERATURE: 97.6 F | OXYGEN SATURATION: 94 % | WEIGHT: 188 LBS | DIASTOLIC BLOOD PRESSURE: 52 MMHG

## 2025-01-10 DIAGNOSIS — N18.2 CKD (CHRONIC KIDNEY DISEASE) STAGE 2, GFR 60-89 ML/MIN: ICD-10-CM

## 2025-01-10 DIAGNOSIS — E86.0 DEHYDRATION: Primary | ICD-10-CM

## 2025-01-10 DIAGNOSIS — D72.829 LEUKOCYTOSIS, UNSPECIFIED TYPE: ICD-10-CM

## 2025-01-10 PROCEDURE — 99309 SBSQ NF CARE MODERATE MDM 30: CPT | Performed by: NURSE PRACTITIONER

## 2025-01-10 NOTE — PROGRESS NOTES
[facility] GERIATRICS    Chief Complaint   Patient presents with    RECHECK     HPI:  Candida Rose is a 84 year old  (1940), who is being seen today for an episodic care visit at: [facility]  ([facility]) [[id number]].       Past medical history significant for hypertension, dyslipidemia, paroxysmal atrial fibrillation, moyamoya disease, CVA with left hemiparesis, dementia, CKD, OA, anxiety, depression, obesity     Summary of recent hospitalization:  Patient was hospitalized at [facility] from 1/28/2024-12/7/2024 for left femur fracture status post ORIF and CVA.  Patient presented to the emergency department for evaluation of a fall that was witnessed by her spouse.  In the emergency department laboratory evaluation revealed sodium 132, creatinine 1.01, hemoglobin 12.4.  Imaging significant for left distal femoral fracture with displacement and comminution.  Ortho was consulted and patient status post ORIF on 11/29/2024.   cc. Hemoglobin dropped to ofelia of 6.8 and required PRBC. Postoperatively patient with drowsiness, confusion, right-sided weakness, aphasia.  CTA head and neck on 11/29 revealed multiple intracranial arterial stenosis and occlusions, bilateral cranioplasty's, neck CTA revealed right distal cervical artery short segment occlusion with reconstitution, left vertebral artery origin and left V1 segment occluded with reconstitution in the left proximal P2 segment, extensive cervical artery stenosis, thyroid nodule, recommend nonemergent thyroid ultrasound.  CT head perfusion on 11/29 revealed large areas of Tmax greater than 6 seconds within the bilateral frontal lobes STEPHEN and MCA territories and to a lesser extent right greater than left PCA territories, findings correspond to areas of brain rest, no abnormal perfusion on CBF less than 30% to suggest core infarct, however qualitatively there is decreased CBD within the bilateral frontal lobes and left occipital temporal  region.  MRI of brain on 12/1 revealed multifocal, acute infarctions of the cerebral parenchyma affecting the bifrontal lobes, left basal ganglia and anterior aspect of the medial left temporal lobe, edematous appearing cerebral parenchyma on the left frontal lobe which mildly effaces the adjacent lateral ventricle frontal horn.  Repeat CT head revealed no evidence of hemorrhagic conversion, no definite new infarct. Echo on 12/2 revealed LVEF 50 to 55%, mild to moderate apical wall hypokinesis, no thrombus present. Neurology was consulted. Patient started on apixaban. Follow up with general neurology in 6 weeks. SLP consulted for oropharyngeal dysphagia and recommended minced and moist diet with mildly thickened consistency. Continues to have have hyponatremia at discharge, Na level 132 on 12/5. PTA aspirin, cetrizine, B12, Mg and multivitamin discontinued inpatient. Palliative care consulted and plan is for patient to attempt rehab and if unsuccessful will transition to comfort approach. Discharged to TCU for physical rehabilitation and medical management.      Today patient was seen for follow-up in the TCU.  Patient was resting in bed during my visit today, tried to arouse her multiple times to wake her up, as it was after breakfast, but patient continued to rest.  She was seen today to follow-up on lab work completed yesterday.  I also called her spouse in hopes to discuss plan moving forward and potential IV hydration today.  Unfortunately he did not have voicemail and I was unable to talk to them about this.  I did talk to the nurse manager and ask her if he was on site to ask him manage thoughts on further IV fluids.  Patient is now on thin liquids, though is needing assistance with drinking.  I did discuss this in detail with nurse manager, as patient moved to new unit on 1/9.  She discussed she will update nursing staff about this.  Nursing staff with no other concerns today.  Patient will be following up  "with new primary care provider next week.    Reviewed facility EMR including medications, recent nursing progress notes, vital signs.  Discussed plan of care with nursing.    Allergies, and PMH/PSH reviewed in EPIC today.  REVIEW OF SYSTEMS:  Unobtainable secondary to patient condition    Objective:   /52   Pulse 52   Temp 97.6  F (36.4  C)   Resp 16   Ht 1.727 m (5' 8\")   Wt 85.3 kg (188 lb)   LMP  (LMP Unknown)   SpO2 94%   BMI 28.59 kg/m    GENERAL APPEARANCE:  Resting, in NAD  HEENT: normocephalic, dry mucous membranes, nose without drainage or crusting  RESP:  respiratory effort normal, no respiratory distress, Lung sounds clear, patient is on RA  CV: auscultation of heart done, rate and rhythm regular.  ABDOMEN: + bowel sounds  M/S:   1 + lower extremity edema    Labs done in SNF are in [facility]. Please refer to them using EPIC/Care Everywhere. and Recent labs in [facility] reviewed by me today.     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 Will continue to push fluids orally for now, as unable to get a hold of spouse today to discuss IV fluids. I discussed this with new primary provider will follow up on Monday. Avoid nephrotoxins. Renally dose medications as indicated.     Leukocytosis, resolved   Suspect due to dehydration  WBC 9.2 on 1/9/2025  Plan: CBC PRN. Monitor closely for s/s of infection, especially hip wound.     Goals of care  Family is considering hospice, but " has not made that decision yet.     MED REC REQUIRED  Post Medication Reconciliation Status:  Medication reconciliation previously completed during another office visit      Disclaimer: This note may contain text created using speech-recognition software and may contain unintended word substitutions.     Electronically signed by: ALFREDO Pugh CNP

## 2025-01-10 NOTE — LETTER
1/10/2025      Candida Rose  [address] [address]        [facility] GERIATRICS    Chief Complaint   Patient presents with     RECHECK     HPI:  Candida Rose is a 84 year old  (1940), who is being seen today for an episodic care visit at: [facility]  ([facility]) [[id number]].       Past medical history significant for hypertension, dyslipidemia, paroxysmal atrial fibrillation, moyamoya disease, CVA with left hemiparesis, dementia, CKD, OA, anxiety, depression, obesity     Summary of recent hospitalization:  Patient was hospitalized at [facility] from 1/28/2024-12/7/2024 for left femur fracture status post ORIF and CVA.  Patient presented to the emergency department for evaluation of a fall that was witnessed by her spouse.  In the emergency department laboratory evaluation revealed sodium 132, creatinine 1.01, hemoglobin 12.4.  Imaging significant for left distal femoral fracture with displacement and comminution.  Ortho was consulted and patient status post ORIF on 11/29/2024.   cc. Hemoglobin dropped to ofelia of 6.8 and required PRBC. Postoperatively patient with drowsiness, confusion, right-sided weakness, aphasia.  CTA head and neck on 11/29 revealed multiple intracranial arterial stenosis and occlusions, bilateral cranioplasty's, neck CTA revealed right distal cervical artery short segment occlusion with reconstitution, left vertebral artery origin and left V1 segment occluded with reconstitution in the left proximal P2 segment, extensive cervical artery stenosis, thyroid nodule, recommend nonemergent thyroid ultrasound.  CT head perfusion on 11/29 revealed large areas of Tmax greater than 6 seconds within the bilateral frontal lobes STEPHEN and MCA territories and to a lesser extent right greater than left PCA territories, findings correspond to areas of brain rest, no abnormal perfusion on CBF less than 30% to suggest core infarct, however qualitatively there is  decreased CBD within the bilateral frontal lobes and left occipital temporal region.  MRI of brain on 12/1 revealed multifocal, acute infarctions of the cerebral parenchyma affecting the bifrontal lobes, left basal ganglia and anterior aspect of the medial left temporal lobe, edematous appearing cerebral parenchyma on the left frontal lobe which mildly effaces the adjacent lateral ventricle frontal horn.  Repeat CT head revealed no evidence of hemorrhagic conversion, no definite new infarct. Echo on 12/2 revealed LVEF 50 to 55%, mild to moderate apical wall hypokinesis, no thrombus present. Neurology was consulted. Patient started on apixaban. Follow up with general neurology in 6 weeks. SLP consulted for oropharyngeal dysphagia and recommended minced and moist diet with mildly thickened consistency. Continues to have have hyponatremia at discharge, Na level 132 on 12/5. PTA aspirin, cetrizine, B12, Mg and multivitamin discontinued inpatient. Palliative care consulted and plan is for patient to attempt rehab and if unsuccessful will transition to comfort approach. Discharged to TCU for physical rehabilitation and medical management.      Today patient was seen for follow-up in the TCU.  Patient was resting in bed during my visit today, tried to arouse her multiple times to wake her up, as it was after breakfast, but patient continued to rest.  She was seen today to follow-up on lab work completed yesterday.  I also called her spouse in hopes to discuss plan moving forward and potential IV hydration today.  Unfortunately he did not have voicemail and I was unable to talk to them about this.  I did talk to the nurse manager and ask her if he was on site to ask him manage thoughts on further IV fluids.  Patient is now on thin liquids, though is needing assistance with drinking.  I did discuss this in detail with nurse manager, as patient moved to new unit on 1/9.  She discussed she will update nursing staff about this.  " Nursing staff with no other concerns today.  Patient will be following up with new primary care provider next week.    Reviewed facility EMR including medications, recent nursing progress notes, vital signs.  Discussed plan of care with nursing.    Allergies, and PMH/PSH reviewed in EPIC today.  REVIEW OF SYSTEMS:  Unobtainable secondary to patient condition    Objective:   /52   Pulse 52   Temp 97.6  F (36.4  C)   Resp 16   Ht 1.727 m (5' 8\")   Wt 85.3 kg (188 lb)   LMP  (LMP Unknown)   SpO2 94%   BMI 28.59 kg/m    GENERAL APPEARANCE:  Resting, in NAD  HEENT: normocephalic, dry mucous membranes, nose without drainage or crusting  RESP:  respiratory effort normal, no respiratory distress, Lung sounds clear, patient is on RA  CV: auscultation of heart done, rate and rhythm regular.  ABDOMEN: + bowel sounds  M/S:   1 + lower extremity edema    Labs done in SNF are in [facility]. Please refer to them using [employer]/Care Everywhere. and Recent labs in [facility] reviewed by me today.     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 Will continue to push fluids orally for now, as unable to get a hold of spouse today to discuss IV fluids. I discussed this with new primary provider will follow up on Monday. Avoid nephrotoxins. Renally dose medications as indicated.     Leukocytosis, resolved   Suspect due to dehydration  WBC 9.2 on 1/9/2025  Plan: CBC PRN. Monitor closely for s/s of infection, " especially hip wound.     Goals of care  Family is considering hospice, but has not made that decision yet.     MED REC REQUIRED  Post Medication Reconciliation Status:  Medication reconciliation previously completed during another office visit      Disclaimer: This note may contain text created using speech-recognition software and may contain unintended word substitutions.     Electronically signed by: ALFREDO Pugh CNP         Sincerely,        ALFREDO Pugh CNP    Electronically signed

## 2025-01-13 ENCOUNTER — NURSING HOME VISIT (OUTPATIENT)
Dept: GERIATRICS | Facility: CLINIC | Age: 85
End: 2025-01-13
Payer: MEDICARE

## 2025-01-13 ENCOUNTER — DOCUMENTATION ONLY (OUTPATIENT)
Dept: GERIATRICS | Facility: CLINIC | Age: 85
End: 2025-01-13
Payer: MEDICARE

## 2025-01-13 VITALS
DIASTOLIC BLOOD PRESSURE: 85 MMHG | HEART RATE: 70 BPM | SYSTOLIC BLOOD PRESSURE: 165 MMHG | HEIGHT: 68 IN | TEMPERATURE: 97.2 F | WEIGHT: 188 LBS | RESPIRATION RATE: 18 BRPM | OXYGEN SATURATION: 94 % | BODY MASS INDEX: 28.49 KG/M2

## 2025-01-13 DIAGNOSIS — I67.5 MOYAMOYA DISEASE: ICD-10-CM

## 2025-01-13 DIAGNOSIS — N18.2 CKD (CHRONIC KIDNEY DISEASE) STAGE 2, GFR 60-89 ML/MIN: ICD-10-CM

## 2025-01-13 DIAGNOSIS — I69.954 HEMIPARESIS OF LEFT NONDOMINANT SIDE AS LATE EFFECT OF CEREBROVASCULAR DISEASE, UNSPECIFIED CEREBROVASCULAR DISEASE TYPE (H): ICD-10-CM

## 2025-01-13 DIAGNOSIS — I48.0 PAROXYSMAL ATRIAL FIBRILLATION (H): ICD-10-CM

## 2025-01-13 DIAGNOSIS — G47.00 INSOMNIA, UNSPECIFIED TYPE: ICD-10-CM

## 2025-01-13 DIAGNOSIS — R13.12 OROPHARYNGEAL DYSPHAGIA: ICD-10-CM

## 2025-01-13 DIAGNOSIS — F01.50 VASCULAR DEMENTIA, UNSPECIFIED DEMENTIA SEVERITY, UNSPECIFIED WHETHER BEHAVIORAL, PSYCHOTIC, OR MOOD DISTURBANCE OR ANXIETY (H): ICD-10-CM

## 2025-01-13 DIAGNOSIS — R53.81 PHYSICAL DECONDITIONING: ICD-10-CM

## 2025-01-13 DIAGNOSIS — F41.9 ANXIETY: ICD-10-CM

## 2025-01-13 DIAGNOSIS — E04.1 THYROID NODULE: ICD-10-CM

## 2025-01-13 DIAGNOSIS — Z71.89 ACP (ADVANCE CARE PLANNING): Chronic | ICD-10-CM

## 2025-01-13 DIAGNOSIS — D72.829 LEUKOCYTOSIS, UNSPECIFIED TYPE: ICD-10-CM

## 2025-01-13 DIAGNOSIS — F33.0 MILD RECURRENT MAJOR DEPRESSION: ICD-10-CM

## 2025-01-13 DIAGNOSIS — I69.320 APHASIA AS LATE EFFECT OF CEREBROVASCULAR ACCIDENT (CVA): Primary | ICD-10-CM

## 2025-01-13 DIAGNOSIS — Z86.73 RECENT CEREBROVASCULAR ACCIDENT (CVA): ICD-10-CM

## 2025-01-13 DIAGNOSIS — E86.0 DEHYDRATION: ICD-10-CM

## 2025-01-13 DIAGNOSIS — K59.01 SLOW TRANSIT CONSTIPATION: ICD-10-CM

## 2025-01-13 DIAGNOSIS — S72.402D CLOSED FRACTURE OF DISTAL END OF LEFT FEMUR WITH ROUTINE HEALING, UNSPECIFIED FRACTURE MORPHOLOGY, SUBSEQUENT ENCOUNTER: ICD-10-CM

## 2025-01-13 DIAGNOSIS — I10 ESSENTIAL HYPERTENSION: ICD-10-CM

## 2025-01-13 DIAGNOSIS — E78.5 DYSLIPIDEMIA: ICD-10-CM
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
## 2025-01-13 NOTE — PROGRESS NOTES
[facility] GERIATRICS    Chief Complaint   Patient presents with    Establish Care     HPI:  Candida Rose is a 84 year old  (1940), who is being seen today for an episodic care visit at: [facility]  () [[id number]].     By chart review, patient was hospitalized at [facility] 11/28-12/7/24 with an acute left femur fracture and CVA after presenting to the ED following a fall. In ED, labs remarkable for Na 132, Cr 1.01, hgb 12.4. Xray demonstrated left distal femoral fracture with displacement and comminution. Ortho was consulted and she underwent ORIF 11/29/24 with  ml. Hgb dropped post-operatively to 6.8 and she was transfused with PRBC. She had persistent drowsiness, confusion and weakness with aphasia postoperatively and CTA of the head and neck 111/29 demonstrated multiple intracranial arterail stenoses and occlusions, bilateral cranioplasties, right distal cervical artery short segment occlusion with reconstitution at P2, extensive cervical artery stenosis and a thyroid nodule. CT perfusion of the head 11/29 demonstrated large areas of T max >6 seconds in the bilateral frontal lobe STEPHEN and MCA territories, R>L PCA territories consistent with brain rest and no evidence of core infarct. MRI of the brain 12/1 with multifocal acute infarctions of the cerebral parenchyma affecting the bifrontal lobes, left basal ganglia and the anterior aspect of the medial left temporal lobe, edematous cerebral parenchyma in the left frontal lobe2. Repeat CT without evidence of hemorrhagic conversion. ECHO 12/2 with LVEF 50-55%, mild-mod apical wall hypokinesis, negative for thrombus. Patient was followed by neurology and started on apixaban. He was recommended follow-up in 6 weeks outpatient. SLP followed for oropharyngeal dysphagia and recommended a modified diet and mildly thickened liquids. He had persistent hyponatremia. PTA ASA, cetirizine, B12, Mg and MVI discontinued. Palliative was consulted and  "patient/family opted for trial of TCU and if unsuccessful consider transition to comfort focus. She was recommended discharge to TCU and admitted to current facility for ongoing medical management, rehab, nursing care.     Today's concern is: Seen today for follow-up in TCU over lunch. She is eating lunch and alert but does not respond to questions today. She is interactive with contact. Per nursing, patient's  was in earlier today. She appears comfortable. No concerns from bedside staff.    Allergies, and PMH/PSH reviewed in EPIC today.  REVIEW OF SYSTEMS:  Unobtainable secondary to aphasia.     Objective:   BP (!) 165/85   Pulse 70   Temp 97.2  F (36.2  C)   Resp 18   Ht 1.727 m (5' 8\")   Wt 85.3 kg (188 lb)   LMP  (LMP Unknown)   SpO2 94%   BMI 28.59 kg/m    GENERAL APPEARANCE:  Alert, in no distress  RESP:  respiratory effort and palpation of chest normal, lungs clear to auscultation , no respiratory distress  CV:  regular rate and rhythm, no murmur, rub, or gallop, no edema  ABDOMEN:  normal bowel sounds, soft, nontender, no hepatosplenomegaly or other masses  M/S:   Gait and station abnormal transfers with assist  SKIN:  Inspection of skin and subcutaneous tissue baseline, Palpation of skin and subcutaneous tissue baseline  NEURO:   right sided weakness, left hemiparesis, RUE tremor  PSYCH:  LAST    Labs done in SNF are in [place] EPIC. Please refer to them using [employer]/Care Everywhere. and Recent labs in EPIC reviewed by me today.     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 She is dependent for cares. Completed PT/OT/SLP  Plan: continue anticoagulation, statin, follow-up with neurology as directed.LTC for assist with ADLs, medication management, meals, activities.       (R13.12) Oropharyngeal dysphagia  Comment: secondary to CNS insults, completed a round of SLP and cleared for thin liquids, hopeful that will improve her intakes. She has been unable to maintain adequate hydration with PO intakes, G tube not consistent with goals of care.  Plan: continue modified diet, assist with meals, monitor weights, intakes, offer food preferences. Encourage oral fluids.     (N18.2) CKD (chronic kidney disease) stage 2, GFR 60-89 ml/min  Comment: chronic, baseline Cr 0.7-1.2, requiring fluids intermittently in TCU due to dehydration.   Plan: Avoid nephrotoxins. Renally dose medications as appropriate. Monitor BMP periodically    (D72.829) Leukocytosis, unspecified type  Comment: intermittent, mild, suspect hydration status contributing  Plan: monitor WBC periodically and for s/sx infection    (S72.402D) Closed fracture of distal end of left femur with routine healing, unspecified fracture morphology, subsequent encounter  (W19.XXXD) Fall, subsequent encounter  Comment: s/p ORIF 11/30/24. Pain is well controlled. Completed rounds of PT/OT.  Plan: continue tylenol Q8H PRN, apixaban 5 mg BID for DVT prophy, TG shapers for edema, NWB LLE, immobilizer when OOB, wound care as directed, follow-up with ortho per recommendations.    (I48.0) Paroxysmal atrial fibrillation (H)  Comment: chronic, HR 60s  Plan: continue atenolol 25 mg daily for VR control, apixaban 5 mg BID for stroke prophylaxis    (I10) Essential hypertension  Comment: chronic, fairly controlled. Avoid hypotension due to patient age, frailty, and risk for complications.  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 ACP PRN    (K59.01) Slow transit constipation  Comment: immobility contributing  Plan: continue bowel regimen, monitor bowel habits per nursing    (R53.81) Physical deconditioning  Comment: acute on chronic, related to acute and chronic conditions as above. Reached a plateau in TCU and now residing in LTC  Plan: LTC for assist with ADLs, medication management, meals, activities.     (Z71.89) ACP (advance care planning) - scanned in the chart 2018  Comment: per discussions with my colleagues, patient did not rehab as hoped and they are leaning towards hospice per goals of care. Patient does not want further hospitalization or aggressive care. Attempted to reach spouse today but was unable to connect or leave a VM. Patient is at risk for poor outcomes with escalation of care. Management reviewed with unit nurse manager, hopefully team can have additional conversations in the next week or so to determine plan  Plan: ongoing goals of care discussions. DNR/DNI, avoid hospitalization or escalation of care.    (E04.1) Thyroid nodule  Comment: noted on imaging  Plan: follow-up with US if consistent with goals of care      MED REC REQUIRED  Post Medication Reconciliation Status: discharge medications reconciled and changed, per note/orders    Total time spent with patient visit at the skilled nursing facility was 50 minutes including patient visit, review of past records, phone call to patient contact, and review of management with nurse manager regarding need for ongoing goals of care discussions..          Electronically signed by: ALFREDO Parks CNP

## 2025-01-13 NOTE — LETTER
1/13/2025      Candida Rose  [address] [address]        [facility] GERIATRICS    Chief Complaint   Patient presents with     [facility] Care     HPI:  Candida Rose is a 84 year old  (1940), who is being seen today for an episodic care visit at: [facility]  () [[id number]].     By chart review, patient was hospitalized at [facility] 11/28-12/7/24 with an acute left femur fracture and CVA after presenting to the ED following a fall. In ED, labs remarkable for Na 132, Cr 1.01, hgb 12.4. Xray demonstrated left distal femoral fracture with displacement and comminution. Ortho was consulted and she underwent ORIF 11/29/24 with  ml. Hgb dropped post-operatively to 6.8 and she was transfused with PRBC. She had persistent drowsiness, confusion and weakness with aphasia postoperatively and CTA of the head and neck 111/29 demonstrated multiple intracranial arterail stenoses and occlusions, bilateral cranioplasties, right distal cervical artery short segment occlusion with reconstitution at P2, extensive cervical artery stenosis and a thyroid nodule. CT perfusion of the head 11/29 demonstrated large areas of T max >6 seconds in the bilateral frontal lobe STEPHEN and MCA territories, R>L PCA territories consistent with brain rest and no evidence of core infarct. MRI of the brain 12/1 with multifocal acute infarctions of the cerebral parenchyma affecting the bifrontal lobes, left basal ganglia and the anterior aspect of the medial left temporal lobe, edematous cerebral parenchyma in the left frontal lobe2. Repeat CT without evidence of hemorrhagic conversion. ECHO 12/2 with LVEF 50-55%, mild-mod apical wall hypokinesis, negative for thrombus. Patient was followed by neurology and started on apixaban. He was recommended follow-up in 6 weeks outpatient. SLP followed for oropharyngeal dysphagia and recommended a modified diet and mildly thickened liquids. He had persistent hyponatremia.  "PTA ASA, cetirizine, B12, Mg and MVI discontinued. Palliative was consulted and patient/family opted for trial of TCU and if unsuccessful consider transition to comfort focus. She was recommended discharge to TCU and admitted to current facility for ongoing medical management, rehab, nursing care.     Today's concern is: Seen today for follow-up in TCU over lunch. She is eating lunch and alert but does not respond to questions today. She is interactive with contact. Per nursing, patient's  was in earlier today. She appears comfortable. No concerns from bedside staff.    Allergies, and PMH/PSH reviewed in EPIC today.  REVIEW OF SYSTEMS:  Unobtainable secondary to aphasia.     Objective:   BP (!) 165/85   Pulse 70   Temp 97.2  F (36.2  C)   Resp 18   Ht 1.727 m (5' 8\")   Wt 85.3 kg (188 lb)   LMP  (LMP Unknown)   SpO2 94%   BMI 28.59 kg/m    GENERAL APPEARANCE:  Alert, in no distress  RESP:  respiratory effort and palpation of chest normal, lungs clear to auscultation , no respiratory distress  CV:  regular rate and rhythm, no murmur, rub, or gallop, no edema  ABDOMEN:  normal bowel sounds, soft, nontender, no hepatosplenomegaly or other masses  M/S:   Gait and station abnormal transfers with assist  SKIN:  Inspection of skin and subcutaneous tissue baseline, Palpation of skin and subcutaneous tissue baseline  NEURO:   right sided weakness, left hemiparesis, RUE tremor  PSYCH:  LAST    Labs done in SNF are in [facility]. Please refer to them using EPIC/Care Everywhere. and Recent labs in [facility] reviewed by me today.     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 She is dependent for cares. Completed PT/OT/SLP  Plan: continue anticoagulation, statin, follow-up with neurology as directed.LTC for assist with ADLs, medication management, meals, activities.       (R13.12) Oropharyngeal dysphagia  Comment: secondary to CNS insults, completed a round of SLP and cleared for thin liquids, hopeful that will improve her intakes. She has been unable to maintain adequate hydration with PO intakes, G tube not consistent with goals of care.  Plan: continue modified diet, assist with meals, monitor weights, intakes, offer food preferences. Encourage oral fluids.     (N18.2) CKD (chronic kidney disease) stage 2, GFR 60-89 ml/min  Comment: chronic, baseline Cr 0.7-1.2, requiring fluids intermittently in TCU due to dehydration.   Plan: Avoid nephrotoxins. Renally dose medications as appropriate. Monitor BMP periodically    (D72.829) Leukocytosis, unspecified type  Comment: intermittent, mild, suspect hydration status contributing  Plan: monitor WBC periodically and for s/sx infection    (S72.402D) Closed fracture of distal end of left femur with routine healing, unspecified fracture morphology, subsequent encounter  (W19.XXXD) Fall, subsequent encounter  Comment: s/p ORIF 11/30/24. Pain is well controlled. Completed rounds of PT/OT.  Plan: continue tylenol Q8H PRN, apixaban 5 mg BID for DVT prophy, TG shapers for edema, NWB LLE, immobilizer when OOB, wound care as directed, follow-up with ortho per recommendations.    (I48.0) Paroxysmal atrial fibrillation (H)  Comment: chronic, HR 60s  Plan: continue atenolol 25 mg daily for VR control, apixaban 5 mg BID for stroke prophylaxis    (I10) Essential hypertension  Comment: chronic, fairly controlled. Avoid  hypotension due to patient age, frailty, and risk for complications.  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 ACP PRN    (K59.01) Slow transit constipation  Comment: immobility contributing  Plan: continue bowel regimen, monitor bowel habits per nursing    (R53.81) Physical deconditioning  Comment: acute on chronic, related to acute and chronic conditions as above. Reached a plateau in TCU and now residing in LTC  Plan: LTC for assist with ADLs, medication management, meals, activities.     (Z71.89) ACP (advance care planning) - scanned in the chart 2018  Comment: per discussions with my colleagues, patient did not rehab as hoped and they are leaning towards hospice per goals of care. Patient does not want further hospitalization or aggressive care. Attempted to reach spouse today but was unable to connect or leave a VM. Patient is at risk for poor outcomes with escalation of care. Management reviewed with unit nurse manager, hopefully team can have additional conversations in the next week or so to determine plan  Plan: ongoing goals of care discussions. DNR/DNI, avoid hospitalization or escalation of care.    (E04.1) Thyroid nodule  Comment: noted on imaging  Plan: follow-up with US if consistent with goals of care      MED REC REQUIRED  Post Medication Reconciliation Status: discharge medications reconciled and changed, per note/orders    Total time spent with patient visit at the skilled nursing facility was 50 minutes including patient visit, review of past records, phone call to patient contact, and review of  management with nurse manager regarding need for ongoing goals of care discussions..         Electronically signed by: ALFREDO Parks CNP         Sincerely,        ALFREDO Parks CNP    Electronically signed

## 2025-01-16 ENCOUNTER — NURSING HOME VISIT (OUTPATIENT)
Dept: GERIATRICS | Facility: CLINIC | Age: 85
End: 2025-01-16
Payer: MEDICARE

## 2025-01-16 VITALS
RESPIRATION RATE: 17 BRPM | DIASTOLIC BLOOD PRESSURE: 74 MMHG | TEMPERATURE: 97.5 F | OXYGEN SATURATION: 98 % | BODY MASS INDEX: 28.49 KG/M2 | HEIGHT: 68 IN | SYSTOLIC BLOOD PRESSURE: 151 MMHG | HEART RATE: 65 BPM | WEIGHT: 188 LBS

## 2025-01-16 DIAGNOSIS — I69.320 APHASIA AS LATE EFFECT OF CEREBROVASCULAR ACCIDENT (CVA): Primary | ICD-10-CM

## 2025-01-16 DIAGNOSIS — R13.12 OROPHARYNGEAL DYSPHAGIA: ICD-10-CM

## 2025-01-16 DIAGNOSIS — I69.954 HEMIPARESIS OF LEFT NONDOMINANT SIDE AS LATE EFFECT OF CEREBROVASCULAR DISEASE, UNSPECIFIED CEREBROVASCULAR DISEASE TYPE (H): ICD-10-CM

## 2025-01-16 DIAGNOSIS — I67.5 MOYAMOYA DISEASE: Chronic | ICD-10-CM

## 2025-01-16 DIAGNOSIS — Z86.73 RECENT CEREBROVASCULAR ACCIDENT (CVA): ICD-10-CM

## 2025-01-16 DIAGNOSIS — E86.0 DEHYDRATION: ICD-10-CM

## 2025-01-16 DIAGNOSIS — F01.50 VASCULAR DEMENTIA, UNSPECIFIED DEMENTIA SEVERITY, UNSPECIFIED WHETHER BEHAVIORAL, PSYCHOTIC, OR MOOD DISTURBANCE OR ANXIETY (H): ICD-10-CM

## 2025-01-16 PROCEDURE — 99310 SBSQ NF CARE HIGH MDM 45: CPT

## 2025-01-16 NOTE — PROGRESS NOTES
"[facility] GERIATRICS    Chief Complaint   Patient presents with    RECHECK     HPI:  Candida Rose is a 84 year old  (1940), who is being seen today for an episodic care visit at: [facility]  () [[id number]]. Today's concern is: ***    Allergies, and PMH/PSH reviewed in [facility] today.  REVIEW OF SYSTEMS:  {huexyh69:088679}    Objective:   BP (!) 151/74   Pulse 65   Temp 97.5  F (36.4  C)   Resp 17   Ht 1.727 m (5' 8\")   Wt 85.3 kg (188 lb)   LMP  (LMP Unknown)   SpO2 98%   BMI 28.59 kg/m    {Nursing home physical exam :621859}    {fgslab:175686}    Assessment/Plan:  {S DX2:704319}    MED REC REQUIRED{TIP  Click the link below to document or use med rec list, use list to pull in response :924573}  Post Medication Reconciliation Status: {MED REC LIST:197164}      Orders:  {fgsorders:078189}  ***    Electronically signed by: Cassy Sloan CNA ***      " "patient/family opted for trial of TCU and if unsuccessful consider transition to comfort focus. She was recommended discharge to TCU and admitted to current facility for ongoing medical management, rehab, nursing care.     Today's concern is: Seen today for follow-up in her room in LTC resting abed. She is sleeping and briefly opens her eyes with stimulus but is otherwise not interactive. She appears comfortable.    Allergies, and PMH/PSH reviewed in EPIC today.  REVIEW OF SYSTEMS:  Unobtainable secondary to cognitive impairment.     Objective:   BP (!) 151/74   Pulse 65   Temp 97.5  F (36.4  C)   Resp 17   Ht 1.727 m (5' 8\")   Wt 85.3 kg (188 lb)   LMP  (LMP Unknown)   SpO2 98%   BMI 28.59 kg/m    GENERAL APPEARANCE:  Alert, in no distress  RESP:  respiratory effort and palpation of chest normal, lungs clear to auscultation , no respiratory distress  CV:  regular rate and rhythm, no murmur, rub, or gallop, no edema  ABDOMEN:  normal bowel sounds, soft, nontender, no hepatosplenomegaly or other masses  M/S:   Gait and station abnormal transfers with assist  SKIN:  Inspection of skin and subcutaneous tissue baseline, Palpation of skin and subcutaneous tissue baseline  NEURO:   generalized weakness, obtunded  PSYCH:  memory impaired     Labs done in SNF are in [facility]. Please refer to them using [employer]/Care Everywhere. and Recent labs in [facility] reviewed by me today.     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 She is dependent for cares. Completed PT/OT/SLP  Plan: continue anticoagulation, statin, follow-up with neurology as directed.LTC for assist with ADLs, medication management, meals, activities.      (R13.12) Oropharyngeal dysphagia  Comment: secondary to CNS insults, completed a round of SLP and cleared for thin liquids, hopeful that will improve her intakes. She has been unable to maintain adequate hydration with PO intakes, G tube not consistent with goals of care.  Plan: continue modified diet, assist with meals, monitor weights, intakes, offer food preferences. Encourage oral fluids.     (E86.0) Dehydration  Comment: secondary to the above. She seems less responsive today and reviewed with  that TCU provider was recommended IV fluids 1/10. Considering previously documented wishes had held off on fluids earlier this week. We discussed we do not typically use IVF for maintenance and that G tube would be recommended for this, IV fluids are higher risk in LTC setting as she is not monitored as closely. Today he feels okay with additional fluids as his daughter is coming to town next week and they will have a care conference to discuss measures moving forward. Management reviewed with social work, nursing and primary team will plan to attend CC 1/20.  Plan: NS 1 L x 1 @ 75 mls/hr. BMP 1/20.         MED REC REQUIRED  Post Medication Reconciliation Status: discharge medications reconciled and changed, per note/orders      Orders:  Administer 1 L of normal saline at rate of 75 ml/hr x 1 Diagnosis: dehydration.  OK to remove peripheral IV when fluids are complete  BMP 1/20/25. Diagnosis: dehydration    Total time spent with patient visit at the skilled nursing facility was 51 minutes including patient visit, review of past records, phone call to patient contact, and review of management with nursing facility staff - nurse manager, nurse  supervisor regarding fluid orders, social work regarding care conference.       Electronically signed by: ALFREDO Parks CNP

## 2025-01-16 NOTE — LETTER
1/16/2025      Candida Rose  [address] [facility] [id number]        No notes on file      Sincerely,        ALFREDO Parks CNP    Electronically signed   "cetirizine, B12, Mg and MVI discontinued. Palliative was consulted and patient/family opted for trial of TCU and if unsuccessful consider transition to comfort focus. She was recommended discharge to TCU and admitted to current facility for ongoing medical management, rehab, nursing care.     Today's concern is: Seen today for follow-up in her room in LTC resting abed. She is sleeping and briefly opens her eyes with stimulus but is otherwise not interactive. She appears comfortable.    Allergies, and PMH/PSH reviewed in EPIC today.  REVIEW OF SYSTEMS:  Unobtainable secondary to cognitive impairment.     Objective:   BP (!) 151/74   Pulse 65   Temp 97.5  F (36.4  C)   Resp 17   Ht 1.727 m (5' 8\")   Wt 85.3 kg (188 lb)   LMP  (LMP Unknown)   SpO2 98%   BMI 28.59 kg/m    GENERAL APPEARANCE:  Alert, in no distress  RESP:  respiratory effort and palpation of chest normal, lungs clear to auscultation , no respiratory distress  CV:  regular rate and rhythm, no murmur, rub, or gallop, no edema  ABDOMEN:  normal bowel sounds, soft, nontender, no hepatosplenomegaly or other masses  M/S:   Gait and station abnormal transfers with assist  SKIN:  Inspection of skin and subcutaneous tissue baseline, Palpation of skin and subcutaneous tissue baseline  NEURO:   generalized weakness, obtunded  PSYCH:  memory impaired     Labs done in SNF are in [place] [facility]. Please refer to them using [facility]/Care Everywhere. and Recent labs in [facility] reviewed by me today.     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 She is dependent for cares. Completed PT/OT/SLP  Plan: continue anticoagulation, statin, follow-up with neurology as directed.LTC for assist with ADLs, medication management, meals, activities.      (R13.12) Oropharyngeal dysphagia  Comment: secondary to CNS insults, completed a round of SLP and cleared for thin liquids, hopeful that will improve her intakes. She has been unable to maintain adequate hydration with PO intakes, G tube not consistent with goals of care.  Plan: continue modified diet, assist with meals, monitor weights, intakes, offer food preferences. Encourage oral fluids.     (E86.0) Dehydration  Comment: secondary to the above. She seems less responsive today and reviewed with  that TCU provider was recommended IV fluids 1/10. Considering previously documented wishes had held off on fluids earlier this week. We discussed we do not typically use IVF for maintenance and that G tube would be recommended for this, IV fluids are higher risk in LTC setting as she is not monitored as closely. Today he feels okay with additional fluids as his daughter is coming to town next week and they will have a care conference to discuss measures moving forward. Management reviewed with social work, nursing and primary team will plan to attend CC 1/20.  Plan: NS 1 L x 1 @ 75 mls/hr. BMP 1/20.         MED REC REQUIRED  Post Medication Reconciliation Status: discharge medications reconciled and changed, per note/orders      Orders:  Administer 1 L of normal saline at rate of 75 ml/hr x 1 Diagnosis: dehydration.  OK to remove peripheral IV when fluids are complete  BMP 1/20/25. Diagnosis: dehydration    Total time spent with patient visit at the skilled nursing facility was 51 minutes including patient visit, review of past records, phone call to patient contact, and  review of management with nursing facility staff - nurse manager, nurse supervisor regarding fluid orders, social work regarding care conference.       Electronically signed by: ALFREDO Parks CNP         Sincerely,        ALFREDO Parks CNP    Electronically signed

## 2025-01-16 NOTE — PATIENT INSTRUCTIONS
Christie Rose  1940     Administer 1 L of normal saline at rate of 75 ml/hr x 1 Diagnosis: dehydration.  OK to remove peripheral IV when fluids are complete  BMP 1/20/25. Diagnosis: dehydration      ALFREDO Parks CNP on 1/16/2025 at 3:18 PM

## 2025-01-19 ENCOUNTER — LAB REQUISITION (OUTPATIENT)
Dept: LAB | Facility: CLINIC | Age: 85
End: 2025-01-19
Payer: MEDICARE

## 2025-01-19 DIAGNOSIS — E86.0 DEHYDRATION: ICD-10-CM

## 2025-01-20 ENCOUNTER — NURSING HOME VISIT (OUTPATIENT)
Dept: GERIATRICS | Facility: CLINIC | Age: 85
End: 2025-01-20
Payer: MEDICARE

## 2025-01-20 VITALS
WEIGHT: 188 LBS | HEIGHT: 68 IN | OXYGEN SATURATION: 98 % | BODY MASS INDEX: 28.49 KG/M2 | SYSTOLIC BLOOD PRESSURE: 134 MMHG | DIASTOLIC BLOOD PRESSURE: 69 MMHG | HEART RATE: 66 BPM | RESPIRATION RATE: 17 BRPM | TEMPERATURE: 97.5 F

## 2025-01-20 DIAGNOSIS — I67.5 MOYAMOYA DISEASE: ICD-10-CM

## 2025-01-20 DIAGNOSIS — Z86.73 RECENT CEREBROVASCULAR ACCIDENT (CVA): Primary | ICD-10-CM

## 2025-01-20 DIAGNOSIS — Z71.89 ACP (ADVANCE CARE PLANNING): ICD-10-CM

## 2025-01-20 LAB
ANION GAP SERPL CALCULATED.3IONS-SCNC: 13 MMOL/L (ref 7–15)
BUN SERPL-MCNC: 27.6 MG/DL (ref 8–23)
CALCIUM SERPL-MCNC: 9.6 MG/DL (ref 8.8–10.4)
CHLORIDE SERPL-SCNC: 100 MMOL/L (ref 98–107)
CREAT SERPL-MCNC: 0.89 MG/DL (ref 0.51–0.95)
EGFRCR SERPLBLD CKD-EPI 2021: 64 ML/MIN/1.73M2
GLUCOSE SERPL-MCNC: 98 MG/DL (ref 70–99)
HCO3 SERPL-SCNC: 24 MMOL/L (ref 22–29)
POTASSIUM SERPL-SCNC: 3.7 MMOL/L (ref 3.4–5.3)
SODIUM SERPL-SCNC: 137 MMOL/L (ref 135–145)

## 2025-01-20 PROCEDURE — P9604 ONE-WAY ALLOW PRORATED TRIP: HCPCS

## 2025-01-20 PROCEDURE — 36415 COLL VENOUS BLD VENIPUNCTURE: CPT

## 2025-01-20 PROCEDURE — 99310 SBSQ NF CARE HIGH MDM 45: CPT

## 2025-01-20 PROCEDURE — 80048 BASIC METABOLIC PNL TOTAL CA: CPT

## 2025-01-20 NOTE — PATIENT INSTRUCTIONS
Christie Roes  1940     [facility] eval and treat dx: Moyamoya disease, Recent CVA      ALFREDO Parks CNP on 1/20/2025 at 11:56 AM

## 2025-01-20 NOTE — LETTER
1/20/2025      Candida Rose  [address] [id number]        [facility] GERIATR[facility]    Chief Complaint   Patient presents with     RECHECK     HPI:  Candida Rose is a 84 year old  (1940), who is being seen today for an episodic care visit at: [facility]  () [[id number]].     By chart review, patient was hospitalized at [facility] 11/28-12/7/24 with an acute left femur fracture and CVA after presenting to the ED following a fall. In ED, labs remarkable for Na 132, Cr 1.01, hgb 12.4. Xray demonstrated left distal femoral fracture with displacement and comminution. Ortho was consulted and she underwent ORIF 11/29/24 with  ml. Hgb dropped post-operatively to 6.8 and she was transfused with PRBC. She had persistent drowsiness, confusion and weakness with aphasia postoperatively and CTA of the head and neck 111/29 demonstrated multiple intracranial arterail stenoses and occlusions, bilateral cranioplasties, right distal cervical artery short segment occlusion with reconstitution at P2, extensive cervical artery stenosis and a thyroid nodule. CT perfusion of the head 11/29 demonstrated large areas of T max >6 seconds in the bilateral frontal lobe STEPHEN and MCA territories, R>L PCA territories consistent with brain rest and no evidence of core infarct. MRI of the brain 12/1 with multifocal acute infarctions of the cerebral parenchyma affecting the bifrontal lobes, left basal ganglia and the anterior aspect of the medial left temporal lobe, edematous cerebral parenchyma in the left frontal lobe2. Repeat CT without evidence of hemorrhagic conversion. ECHO 12/2 with LVEF 50-55%, mild-mod apical wall hypokinesis, negative for thrombus. Patient was followed by neurology and started on apixaban. He was recommended follow-up in 6 weeks outpatient. SLP followed for oropharyngeal dysphagia and recommended a modified diet and mildly thickened liquids. He had persistent hyponatremia. PTA ASA,  "cetirizine, B12, Mg and MVI discontinued. Palliative was consulted and patient/family opted for trial of TCU and if unsuccessful consider transition to comfort focus. She was recommended discharge to TCU and admitted to current facility for ongoing medical management, rehab, nursing care.       Today's concern is: Seen today for follow-up in her room in TCU resting abed. She is alert and interactive with eye contact but does not offer any verbal response. She appears comfortable.    Met with family 11:30-12:10 for a care conference; spouse, daughter, social work, dietary, nursing present. Family has reviewed her advanced directives and have more questions about prognosis and hospice care.    Allergies, and PMH/PSH reviewed in EPIC today.  REVIEW OF SYSTEMS:  Unobtainable secondary to cognitive impairment.     Objective:   /69   Pulse 66   Temp 97.5  F (36.4  C)   Resp 17   Ht 1.727 m (5' 8\")   Wt 85.3 kg (188 lb)   LMP  (LMP Unknown)   SpO2 98%   BMI 28.59 kg/m    GENERAL APPEARANCE:  Alert, in no distress  RESP:  respiratory effort and palpation of chest normal, lungs clear to auscultation , no respiratory distress  CV:  regular rate and rhythm, no murmur, rub, or gallop, no edema  ABDOMEN:  normal bowel sounds, soft, nontender, no hepatosplenomegaly or other masses  M/S:   Gait and station abnormal transfers with assist  SKIN:  Inspection of skin and subcutaneous tissue baseline, Palpation of skin and subcutaneous tissue baseline  NEURO:   moving BUE  PSYCH:  memory impaired , affect and mood normal    Labs done in SNF are in [facility]. Please refer to them using [employer]/Care Everywhere. and Recent labs in [facility] reviewed by me today.     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 She is dependent for cares. Despite rounds of PT/OT/SLP she has been unable to make significant gain in function. She is dependent for all cares and unable to communicate her needs. Met with family today, they do not feel she is understanding much anymore. They feel she may be giving up, physically and mentally. She has moments where she will say something or smile and they feel more hopeful. Overall, they do not feel she would want to keep living like this, and they do not want to prolong her suffering. She did not want artificial nutrition or hydration per advanced directives, and IVF have not proven helpful as far as her overall responsiveness or function. We discuss that likely Candida will die in the next 6 months regardless of medical interventions. They would like to focus on comfort. We discuss hospice as an added layer of support to the care she is currently receiving, with a focus on symptoms management. The care team and I agree this is the reasonable and compassionate decision based on her prognosis and previously expressed wishes.   Plan: Hospice eval and treat. Treat to comfort. DNR/DNI.       MED REC REQUIRED  Post Medication Reconciliation Status: discharge medications reconciled, continue medications without change      Orders:  Hospice evaluate and treat    Total time spent with patient visit at the skilled nursing facility was 64 minutes including patient visit, review of past records, and care conference with family, nursing facility staff.       Electronically signed by: ALFREDO Parks CNP         Sincerely,        ALFREDO Parks CNP    Electronically signed

## 2025-01-27 ENCOUNTER — PATIENT OUTREACH (OUTPATIENT)
Dept: CARE COORDINATION | Facility: CLINIC | Age: 85
End: 2025-01-27

## 2025-01-27 NOTE — PROGRESS NOTES
Clinic Care Coordination Contact    Situation: Patient chart reviewed by care coordinator.    Background: RN CC has been following patient for discharge from TCU.      Assessment: Patient is still admitted to [facility]. A care conference was scheduled with family and staff at [facility].  Those present at care conference were spouse, daughter, social work, dietary, nursing present. Family has reviewed her advanced directives and have more questions about prognosis and hospice care.     Plan/Recommendations: Patient is not appropriate for Primary Care Coordination as the decision was made for Hospice to eval and treat. Treat to comfort. DNR/DNI. No further outreaches will be done.     Jacinta Velazco RN, BSN, CPHN, [facility] Management  [facility], and [facility]  [email or web address]  Office: [phone]  Employed by [facility]

## 2025-04-13 NOTE — TELEPHONE ENCOUNTER
losartan (COZAAR) 50 MG tablet      Last Written Prescription Date: 06/08/17  Last Fill Quantity: 180, # refills: 0  Last Office Visit with G, P or [facility] prescribing provider: 05/18/17       Potassium   Date Value Ref Range Status   05/04/2017 4.3 3.4 - 5.3 mmol/L Final     Creatinine   Date Value Ref Range Status   05/04/2017 1.10 (H) 0.52 - 1.04 mg/dL Final     BP Readings from Last 3 Encounters:   06/22/17 144/74   05/18/17 150/78   05/04/17 98/60        Generalized muscle weakness Fall Paresthesia Acute UTI

## (undated) DEVICE — GLOVE BIOGEL PI MICRO INDICATOR UNDERGLOVE SZ 7.5 48975

## (undated) DEVICE — DRSG TELFA ISLAND 4X10"

## (undated) DEVICE — Device

## (undated) DEVICE — DRAPE STERI TOWEL LG 1010

## (undated) DEVICE — DRAPE C-ARM 60X42" 1013

## (undated) DEVICE — PREP CHLORAPREP 26ML TINTED HI-LITE ORANGE 930815

## (undated) DEVICE — SU VICRYL 2-0 CP-1 27" UND J266H

## (undated) DEVICE — STPL SKIN 35W 6.9MM  PXW35

## (undated) DEVICE — GLOVE BIOGEL PI SZ 7.5 40875

## (undated) DEVICE — LINEN ORTHO ACL PACK 5447

## (undated) DEVICE — BAG CLEAR TRASH 1.3M 39X33" P4040C

## (undated) DEVICE — DRSG TEGADERM 2 3/8X2 3/4" 1624W

## (undated) DEVICE — GLOVE PROTEXIS POWDER FREE 8.0 ORTHOPEDIC 2D73ET80

## (undated) DEVICE — SU VICRYL 2-0 CT-1 27" J339H

## (undated) DEVICE — CAST PADDING 6" STERILE 9046S

## (undated) DEVICE — CATH TRAY FOLEY SURESTEP 16FR W/URINE MTR STATLK LF A303416A

## (undated) DEVICE — LINEN FULL SHEET 5511

## (undated) DEVICE — SUCTION MANIFOLD NEPTUNE 2 SYS 4 PORT 0702-020-000

## (undated) DEVICE — DRSG XEROFORM 5X9" 8884431605

## (undated) DEVICE — CAST PADDING 6IN COTTON WEBRIL STERILE 2554

## (undated) DEVICE — LINEN HALF SHEET 5512

## (undated) DEVICE — DRAPE STERI U 1015

## (undated) DEVICE — GLOVE PROTEXIS BLUE W/NEU-THERA 8.0  2D73EB80

## (undated) DEVICE — DRAPE X-RAY TUBE 00-901169-01-OEC

## (undated) DEVICE — DRSG GAUZE 4X4" TRAY

## (undated) DEVICE — DRAPE SPLIT SHEET 77X108 REINFORCED 29436

## (undated) DEVICE — BLADE SAW SAGITTAL STRK 21X85X1.2MM 4/5/6/2000 4221-120-085

## (undated) DEVICE — SU VICRYL 0 CP-1 27" J467H

## (undated) DEVICE — TOURNIQUET CUFF 30" REPRO BLUE 60-7070-105

## (undated) DEVICE — PACK TOTAL KNEE BOXED LATEX FREE PO15TKFCT

## (undated) DEVICE — SOL NACL 0.9% IRRIG 3000ML BAG 2B7477

## (undated) DEVICE — IMM KNEE 20"

## (undated) DEVICE — ESU GROUND PAD ADULT W/CORD E7507

## (undated) DEVICE — SU STRATAFIX PDS PLUS 1 CT-1 12" SXPP1A443

## (undated) DEVICE — DRSG ABDOMINAL 07 1/2X8" 7197D

## (undated) DEVICE — BNDG ELASTIC 6" DBL LENGTH UNSTERILE 6611-16

## (undated) DEVICE — SOL WATER IRRIG 1000ML BOTTLE 2F7114

## (undated) DEVICE — IMP SCR SYN OPTILINK LOK VA CAN 5.0X70MM 42.231.670: Type: IMPLANTABLE DEVICE | Site: FEMUR | Status: NON-FUNCTIONAL

## (undated) DEVICE — SU VICRYL 2-0 CT-1 36" UND J945H

## (undated) DEVICE — DRSG ADAPTIC 3X8" 6113

## (undated) DEVICE — SPONGE LAP 18X18" X8435

## (undated) DEVICE — GLOVE PROTEXIS POWDER FREE 7.5 ORTHOPEDIC 2D73ET75

## (undated) RX ORDER — ONDANSETRON 2 MG/ML
INJECTION INTRAMUSCULAR; INTRAVENOUS
Status: DISPENSED
Start: 2017-04-17

## (undated) RX ORDER — FENTANYL CITRATE 50 UG/ML
INJECTION, SOLUTION INTRAMUSCULAR; INTRAVENOUS
Status: DISPENSED
Start: 2024-11-29

## (undated) RX ORDER — PROPOFOL 10 MG/ML
INJECTION, EMULSION INTRAVENOUS
Status: DISPENSED
Start: 2024-11-29

## (undated) RX ORDER — HEPARIN SODIUM 200 [USP'U]/100ML
INJECTION, SOLUTION INTRAVENOUS
Status: DISPENSED
Start: 2022-10-07

## (undated) RX ORDER — NEOSTIGMINE METHYLSULFATE 1 MG/ML
VIAL (ML) INJECTION
Status: DISPENSED
Start: 2024-11-29

## (undated) RX ORDER — FENTANYL CITRATE 50 UG/ML
INJECTION, SOLUTION INTRAMUSCULAR; INTRAVENOUS
Status: DISPENSED
Start: 2017-04-17

## (undated) RX ORDER — PHENYLEPHRINE HYDROCHLORIDE 10 MG/ML
INJECTION INTRAVENOUS
Status: DISPENSED
Start: 2024-11-29

## (undated) RX ORDER — DEXAMETHASONE SODIUM PHOSPHATE 4 MG/ML
INJECTION, SOLUTION INTRA-ARTICULAR; INTRALESIONAL; INTRAMUSCULAR; INTRAVENOUS; SOFT TISSUE
Status: DISPENSED
Start: 2024-11-29

## (undated) RX ORDER — NALOXONE HYDROCHLORIDE 0.4 MG/ML
INJECTION, SOLUTION INTRAMUSCULAR; INTRAVENOUS; SUBCUTANEOUS
Status: DISPENSED
Start: 2024-11-29

## (undated) RX ORDER — FENTANYL CITRATE-0.9 % NACL/PF 10 MCG/ML
PLASTIC BAG, INJECTION (ML) INTRAVENOUS
Status: DISPENSED
Start: 2024-11-29

## (undated) RX ORDER — FENTANYL CITRATE 50 UG/ML
INJECTION, SOLUTION INTRAMUSCULAR; INTRAVENOUS
Status: DISPENSED
Start: 2022-10-07

## (undated) RX ORDER — ONDANSETRON 2 MG/ML
INJECTION INTRAMUSCULAR; INTRAVENOUS
Status: DISPENSED
Start: 2024-11-29

## (undated) RX ORDER — GLYCOPYRROLATE 0.2 MG/ML
INJECTION, SOLUTION INTRAMUSCULAR; INTRAVENOUS
Status: DISPENSED
Start: 2024-11-29

## (undated) RX ORDER — EPHEDRINE SULFATE 50 MG/ML
INJECTION, SOLUTION INTRAMUSCULAR; INTRAVENOUS; SUBCUTANEOUS
Status: DISPENSED
Start: 2024-11-29

## (undated) RX ORDER — METHYLPREDNISOLONE ACETATE 80 MG/ML
INJECTION, SUSPENSION INTRA-ARTICULAR; INTRALESIONAL; INTRAMUSCULAR; SOFT TISSUE
Status: DISPENSED
Start: 2017-04-17

## (undated) RX ORDER — BUPIVACAINE HYDROCHLORIDE AND EPINEPHRINE 2.5; 5 MG/ML; UG/ML
INJECTION, SOLUTION EPIDURAL; INFILTRATION; INTRACAUDAL; PERINEURAL
Status: DISPENSED
Start: 2024-11-29

## (undated) RX ORDER — LIDOCAINE HYDROCHLORIDE 10 MG/ML
INJECTION, SOLUTION EPIDURAL; INFILTRATION; INTRACAUDAL; PERINEURAL
Status: DISPENSED
Start: 2024-11-29

## (undated) RX ORDER — CEFAZOLIN SODIUM 2 G/100ML
INJECTION, SOLUTION INTRAVENOUS
Status: DISPENSED
Start: 2017-04-17

## (undated) RX ORDER — CEFAZOLIN SODIUM/WATER 2 G/20 ML
SYRINGE (ML) INTRAVENOUS
Status: DISPENSED
Start: 2024-11-29

## (undated) RX ORDER — TRANEXAMIC ACID 10 MG/ML
INJECTION, SOLUTION INTRAVENOUS
Status: DISPENSED
Start: 2024-11-29